# Patient Record
Sex: FEMALE | Race: WHITE | NOT HISPANIC OR LATINO
[De-identification: names, ages, dates, MRNs, and addresses within clinical notes are randomized per-mention and may not be internally consistent; named-entity substitution may affect disease eponyms.]

---

## 2017-09-05 ENCOUNTER — TRANSCRIPTION ENCOUNTER (OUTPATIENT)
Age: 82
End: 2017-09-05

## 2017-12-18 ENCOUNTER — EMERGENCY (EMERGENCY)
Facility: HOSPITAL | Age: 82
LOS: 1 days | Discharge: HOME | End: 2017-12-18
Attending: EMERGENCY MEDICINE | Admitting: EMERGENCY MEDICINE
Payer: MEDICARE

## 2017-12-18 ENCOUNTER — TRANSCRIPTION ENCOUNTER (OUTPATIENT)
Age: 82
End: 2017-12-18

## 2017-12-18 VITALS
RESPIRATION RATE: 12 BRPM | TEMPERATURE: 98 F | DIASTOLIC BLOOD PRESSURE: 71 MMHG | HEART RATE: 79 BPM | WEIGHT: 113.98 LBS | SYSTOLIC BLOOD PRESSURE: 121 MMHG | OXYGEN SATURATION: 98 %

## 2017-12-18 VITALS
DIASTOLIC BLOOD PRESSURE: 72 MMHG | OXYGEN SATURATION: 98 % | HEART RATE: 78 BPM | RESPIRATION RATE: 16 BRPM | SYSTOLIC BLOOD PRESSURE: 122 MMHG

## 2017-12-18 LAB
ALBUMIN SERPL ELPH-MCNC: 3.5 G/DL — SIGNIFICANT CHANGE UP (ref 3.3–5)
ALP SERPL-CCNC: 80 U/L — SIGNIFICANT CHANGE UP (ref 40–120)
ALT FLD-CCNC: 20 U/L — SIGNIFICANT CHANGE UP (ref 12–78)
ANION GAP SERPL CALC-SCNC: 6 MMOL/L — SIGNIFICANT CHANGE UP (ref 5–17)
APTT BLD: 32.6 SEC — SIGNIFICANT CHANGE UP (ref 27.5–37.4)
AST SERPL-CCNC: 16 U/L — SIGNIFICANT CHANGE UP (ref 15–37)
BASOPHILS # BLD AUTO: 0.1 K/UL — SIGNIFICANT CHANGE UP (ref 0–0.2)
BASOPHILS NFR BLD AUTO: 1.4 % — SIGNIFICANT CHANGE UP (ref 0–2)
BILIRUB SERPL-MCNC: 0.5 MG/DL — SIGNIFICANT CHANGE UP (ref 0.2–1.2)
BUN SERPL-MCNC: 22 MG/DL — SIGNIFICANT CHANGE UP (ref 7–23)
CALCIUM SERPL-MCNC: 9.4 MG/DL — SIGNIFICANT CHANGE UP (ref 8.5–10.1)
CHLORIDE SERPL-SCNC: 104 MMOL/L — SIGNIFICANT CHANGE UP (ref 96–108)
CO2 SERPL-SCNC: 31 MMOL/L — SIGNIFICANT CHANGE UP (ref 22–31)
CREAT SERPL-MCNC: 0.75 MG/DL — SIGNIFICANT CHANGE UP (ref 0.5–1.3)
EOSINOPHIL # BLD AUTO: 0.1 K/UL — SIGNIFICANT CHANGE UP (ref 0–0.5)
EOSINOPHIL NFR BLD AUTO: 2.2 % — SIGNIFICANT CHANGE UP (ref 0–6)
GLUCOSE SERPL-MCNC: 153 MG/DL — HIGH (ref 70–99)
HCT VFR BLD CALC: 36 % — SIGNIFICANT CHANGE UP (ref 34.5–45)
HGB BLD-MCNC: 11.4 G/DL — LOW (ref 11.5–15.5)
INR BLD: 1.02 RATIO — SIGNIFICANT CHANGE UP (ref 0.88–1.16)
LYMPHOCYTES # BLD AUTO: 1.2 K/UL — SIGNIFICANT CHANGE UP (ref 1–3.3)
LYMPHOCYTES # BLD AUTO: 20 % — SIGNIFICANT CHANGE UP (ref 13–44)
MCHC RBC-ENTMCNC: 28.3 PG — SIGNIFICANT CHANGE UP (ref 27–34)
MCHC RBC-ENTMCNC: 31.7 GM/DL — LOW (ref 32–36)
MCV RBC AUTO: 89.3 FL — SIGNIFICANT CHANGE UP (ref 80–100)
MONOCYTES # BLD AUTO: 0.6 K/UL — SIGNIFICANT CHANGE UP (ref 0–0.9)
MONOCYTES NFR BLD AUTO: 9.5 % — HIGH (ref 1–9)
NEUTROPHILS # BLD AUTO: 4.1 K/UL — SIGNIFICANT CHANGE UP (ref 1.8–7.4)
NEUTROPHILS NFR BLD AUTO: 66.9 % — SIGNIFICANT CHANGE UP (ref 43–77)
PLATELET # BLD AUTO: 306 K/UL — SIGNIFICANT CHANGE UP (ref 150–400)
POTASSIUM SERPL-MCNC: 4.1 MMOL/L — SIGNIFICANT CHANGE UP (ref 3.5–5.3)
POTASSIUM SERPL-SCNC: 4.1 MMOL/L — SIGNIFICANT CHANGE UP (ref 3.5–5.3)
PROT SERPL-MCNC: 7.1 G/DL — SIGNIFICANT CHANGE UP (ref 6–8.3)
PROTHROM AB SERPL-ACNC: 11.1 SEC — SIGNIFICANT CHANGE UP (ref 9.8–12.7)
RBC # BLD: 4.03 M/UL — SIGNIFICANT CHANGE UP (ref 3.8–5.2)
RBC # FLD: 12.8 % — SIGNIFICANT CHANGE UP (ref 10.3–14.5)
SODIUM SERPL-SCNC: 141 MMOL/L — SIGNIFICANT CHANGE UP (ref 135–145)
TROPONIN I SERPL-MCNC: <.015 NG/ML — SIGNIFICANT CHANGE UP (ref 0.01–0.04)
WBC # BLD: 6.2 K/UL — SIGNIFICANT CHANGE UP (ref 3.8–10.5)
WBC # FLD AUTO: 6.2 K/UL — SIGNIFICANT CHANGE UP (ref 3.8–10.5)

## 2017-12-18 PROCEDURE — 70450 CT HEAD/BRAIN W/O DYE: CPT | Mod: 26

## 2017-12-18 PROCEDURE — 99285 EMERGENCY DEPT VISIT HI MDM: CPT

## 2017-12-18 PROCEDURE — 71250 CT THORAX DX C-: CPT

## 2017-12-18 PROCEDURE — 99291 CRITICAL CARE FIRST HOUR: CPT | Mod: 25

## 2017-12-18 PROCEDURE — 85027 COMPLETE CBC AUTOMATED: CPT

## 2017-12-18 PROCEDURE — 70450 CT HEAD/BRAIN W/O DYE: CPT

## 2017-12-18 PROCEDURE — 80053 COMPREHEN METABOLIC PANEL: CPT

## 2017-12-18 PROCEDURE — 85610 PROTHROMBIN TIME: CPT

## 2017-12-18 PROCEDURE — 85730 THROMBOPLASTIN TIME PARTIAL: CPT

## 2017-12-18 PROCEDURE — 84484 ASSAY OF TROPONIN QUANT: CPT

## 2017-12-18 PROCEDURE — 71250 CT THORAX DX C-: CPT | Mod: 26

## 2017-12-18 NOTE — ED PROVIDER NOTE - CRANIAL NERVE AND PUPILLARY EXAM
central vision intact/peripheral vision intact/mild right facial droop; PERRL; EOMI/extra-ocular movements intact

## 2017-12-18 NOTE — ED PROVIDER NOTE - PROGRESS NOTE DETAILS
Dr. Ramirze to evaluate patient in ED  currently no focal deficits patient evaluated by Dr Ramirez - no focal deficits - stable for discharge as per Dr. Ramirez  patient agrees with plan

## 2017-12-18 NOTE — ED PROVIDER NOTE - OBJECTIVE STATEMENT
83 yo female with hx htn, remote hx of cvas sent for evaluation of weakness and right facial droop. Patient doesn't take any blood thinners/antiplatelet medications. PAtient states she fell x 2 days ago and hit head against wall. denies loc. denies fall, patient has been sleepy. Patient went to urgent care today, and was found to have rib fractures and sent to ED for further evaluation. Prior to urgent care, friend not 83 yo female with hx htn, remote hx of cvas sent for evaluation of weakness and right facial droop. Patient doesn't take any blood thinners/antiplatelet medications. PAtient states she fell x 2 days ago and hit head against wall. denies loc. denies fall, patient has been sleepy. Patient went to urgent care today, and was found to have rib fractures and sent to ED for further evaluation. Prior to urgent care, friend noted right facial droop. Patient has right facial droop at baseline, but possibly increased today. patient states she didn't noticed when it worsened. denies any difficulty with speech. denies any weakness to arms or legs. c/o pain to right lower ribs

## 2017-12-18 NOTE — ED ADULT TRIAGE NOTE - CHIEF COMPLAINT QUOTE
patient with complain of fatigue, off balanced and delayed speech responses. patient with previous facial droop now appears worst as per patient's friend, today starting about an hour ago. Pt with hx of multiple strokes.

## 2018-07-31 ENCOUNTER — TRANSCRIPTION ENCOUNTER (OUTPATIENT)
Age: 83
End: 2018-07-31

## 2019-01-09 ENCOUNTER — TRANSCRIPTION ENCOUNTER (OUTPATIENT)
Age: 84
End: 2019-01-09

## 2019-07-16 ENCOUNTER — TRANSCRIPTION ENCOUNTER (OUTPATIENT)
Age: 84
End: 2019-07-16

## 2019-08-18 ENCOUNTER — TRANSCRIPTION ENCOUNTER (OUTPATIENT)
Age: 84
End: 2019-08-18

## 2019-08-25 ENCOUNTER — TRANSCRIPTION ENCOUNTER (OUTPATIENT)
Age: 84
End: 2019-08-25

## 2019-08-26 PROBLEM — I10 ESSENTIAL (PRIMARY) HYPERTENSION: Chronic | Status: ACTIVE | Noted: 2017-12-18

## 2019-08-26 PROBLEM — I63.9 CEREBRAL INFARCTION, UNSPECIFIED: Chronic | Status: ACTIVE | Noted: 2017-12-18

## 2019-09-01 ENCOUNTER — TRANSCRIPTION ENCOUNTER (OUTPATIENT)
Age: 84
End: 2019-09-01

## 2019-09-06 PROBLEM — Z00.00 ENCOUNTER FOR PREVENTIVE HEALTH EXAMINATION: Status: ACTIVE | Noted: 2019-09-06

## 2019-09-09 ENCOUNTER — LABORATORY RESULT (OUTPATIENT)
Age: 84
End: 2019-09-09

## 2019-09-09 ENCOUNTER — APPOINTMENT (OUTPATIENT)
Dept: UROLOGY | Facility: CLINIC | Age: 84
End: 2019-09-09
Payer: MEDICARE

## 2019-09-09 VITALS
SYSTOLIC BLOOD PRESSURE: 147 MMHG | OXYGEN SATURATION: 96 % | BODY MASS INDEX: 23.79 KG/M2 | HEIGHT: 59 IN | HEART RATE: 77 BPM | DIASTOLIC BLOOD PRESSURE: 73 MMHG | WEIGHT: 118 LBS

## 2019-09-09 DIAGNOSIS — Z86.79 PERSONAL HISTORY OF OTHER DISEASES OF THE CIRCULATORY SYSTEM: ICD-10-CM

## 2019-09-09 DIAGNOSIS — Z80.42 FAMILY HISTORY OF MALIGNANT NEOPLASM OF PROSTATE: ICD-10-CM

## 2019-09-09 DIAGNOSIS — Z80.3 FAMILY HISTORY OF MALIGNANT NEOPLASM OF BREAST: ICD-10-CM

## 2019-09-09 DIAGNOSIS — Z86.39 PERSONAL HISTORY OF OTHER ENDOCRINE, NUTRITIONAL AND METABOLIC DISEASE: ICD-10-CM

## 2019-09-09 DIAGNOSIS — Z78.9 OTHER SPECIFIED HEALTH STATUS: ICD-10-CM

## 2019-09-09 DIAGNOSIS — N39.46 MIXED INCONTINENCE: ICD-10-CM

## 2019-09-09 DIAGNOSIS — Z63.4 DISAPPEARANCE AND DEATH OF FAMILY MEMBER: ICD-10-CM

## 2019-09-09 DIAGNOSIS — Z87.891 PERSONAL HISTORY OF NICOTINE DEPENDENCE: ICD-10-CM

## 2019-09-09 PROCEDURE — 99204 OFFICE O/P NEW MOD 45 MIN: CPT

## 2019-09-09 RX ORDER — METOPROLOL TARTRATE 50 MG/1
50 TABLET, FILM COATED ORAL
Qty: 180 | Refills: 0 | Status: ACTIVE | COMMUNITY
Start: 2019-09-04

## 2019-09-09 RX ORDER — LOVASTATIN 20 MG/1
20 TABLET ORAL
Qty: 90 | Refills: 0 | Status: ACTIVE | COMMUNITY
Start: 2019-05-14

## 2019-09-09 RX ORDER — FLUTICASONE PROPIONATE 50 UG/1
50 SPRAY, METERED NASAL
Qty: 16 | Refills: 0 | Status: ACTIVE | COMMUNITY
Start: 2019-01-21

## 2019-09-09 RX ORDER — MELOXICAM 7.5 MG/1
7.5 TABLET ORAL
Qty: 10 | Refills: 0 | Status: ACTIVE | COMMUNITY
Start: 2019-07-16

## 2019-09-09 RX ORDER — GABAPENTIN 300 MG/1
300 CAPSULE ORAL
Qty: 180 | Refills: 0 | Status: ACTIVE | COMMUNITY
Start: 2019-07-26

## 2019-09-09 RX ORDER — METFORMIN ER 500 MG 500 MG/1
500 TABLET ORAL
Qty: 180 | Refills: 0 | Status: ACTIVE | COMMUNITY
Start: 2018-10-09

## 2019-09-09 RX ORDER — SULFAMETHOXAZOLE AND TRIMETHOPRIM 800; 160 MG/1; MG/1
800-160 TABLET ORAL
Qty: 14 | Refills: 0 | Status: ACTIVE | COMMUNITY
Start: 2019-08-18

## 2019-09-09 RX ORDER — METHYLPREDNISOLONE 4 MG/1
4 TABLET ORAL
Qty: 21 | Refills: 0 | Status: ACTIVE | COMMUNITY
Start: 2019-07-18

## 2019-09-09 RX ORDER — NITROFURANTOIN (MONOHYDRATE/MACROCRYSTALS) 25; 75 MG/1; MG/1
100 CAPSULE ORAL
Qty: 14 | Refills: 0 | Status: ACTIVE | COMMUNITY
Start: 2019-08-25

## 2019-09-09 SDOH — SOCIAL STABILITY - SOCIAL INSECURITY: DISSAPEARANCE AND DEATH OF FAMILY MEMBER: Z63.4

## 2019-09-09 NOTE — PHYSICAL EXAM
[General Appearance - Well Developed] : well developed [Normal Appearance] : normal appearance [General Appearance - In No Acute Distress] : no acute distress [FreeTextEntry1] : normal peripheral circulation  [] : no respiratory distress [Abdomen Tenderness] : non-tender [Abdomen Soft] : soft [Abdomen Mass (___ Cm)] : no abdominal mass palpated [Costovertebral Angle Tenderness] : no ~M costovertebral angle tenderness [Normal Station and Gait] : the gait and station were normal for the patient's age [Skin Color & Pigmentation] : normal skin color and pigmentation [No Focal Deficits] : no focal deficits [Oriented To Time, Place, And Person] : oriented to person, place, and time [No Palpable Adenopathy] : no palpable adenopathy

## 2019-09-09 NOTE — HISTORY OF PRESENT ILLNESS
[FreeTextEntry1] : 85 yo female presents for Recurrent urinary tract infections. \par Has been to Urgent care center for dizziness a few times and has been told has UTI each time. \par Normal daytime frequency is every few hours or so. Nocturia of 1 x. \par Endorses urgency, urge incontinence, incontinence with cough and sneeze and sense of incomplete emptying. Wears pad. Changes 1 pad/day. \par Denies dysuria, hematuria, lower abdominal or flank pain, fever, chills or rigors.\par Past smoker: 0.5 PPD for 20 years. Quit: 20 years ago. \par

## 2019-09-09 NOTE — ASSESSMENT
[FreeTextEntry1] : Mixed urinary incontinence:\par Asked Pt to do timed voiding every 3-4 hours. \par Encouraged Kegel's exercise. \par \par Recurrent UTIs:\par Recommended: good oral hydration, timed voiding, urinate right after sex, change sanitary pads often, avoid douches, bubble baths and other feminine hygiene products. \par Recommended OTC Cranberry tablets. \par Discussed that if she keeps having UTIs options would be to either use low dose vaginal estrogen cream or low prophylactic antibiotics.\par Will get Urinalysis with reflex Urine culture and Renal and Bladder Ultrasound. \par \par Return to office after Ultrasound.

## 2019-09-09 NOTE — REVIEW OF SYSTEMS
[Eyesight Problems] : eyesight problems [Shortness Of Breath] : shortness of breath [Dry Eyes] : dryness of the eyes [Pain during urination] : pain during urination [Urine Infection (bladder/kidney)] : bladder/kidney infection [Negative] : Heme/Lymph

## 2019-09-09 NOTE — PHYSICAL EXAM
[General Appearance - Well Developed] : well developed [Normal Appearance] : normal appearance [] : no respiratory distress [FreeTextEntry1] : normal peripheral circulation  [General Appearance - In No Acute Distress] : no acute distress [Abdomen Tenderness] : non-tender [Abdomen Soft] : soft [Abdomen Mass (___ Cm)] : no abdominal mass palpated [Costovertebral Angle Tenderness] : no ~M costovertebral angle tenderness [Skin Color & Pigmentation] : normal skin color and pigmentation [Normal Station and Gait] : the gait and station were normal for the patient's age [No Focal Deficits] : no focal deficits [Oriented To Time, Place, And Person] : oriented to person, place, and time [No Palpable Adenopathy] : no palpable adenopathy

## 2019-09-11 LAB
APPEARANCE: CLEAR
BILIRUBIN URINE: NEGATIVE
BLOOD URINE: NEGATIVE
COLOR: YELLOW
GLUCOSE QUALITATIVE U: NEGATIVE
KETONES URINE: NEGATIVE
LEUKOCYTE ESTERASE URINE: ABNORMAL
NITRITE URINE: NEGATIVE
PH URINE: 6.5
PROTEIN URINE: NEGATIVE
SPECIFIC GRAVITY URINE: 1.01
UROBILINOGEN URINE: NORMAL

## 2019-09-16 ENCOUNTER — FORM ENCOUNTER (OUTPATIENT)
Age: 84
End: 2019-09-16

## 2019-09-17 ENCOUNTER — OUTPATIENT (OUTPATIENT)
Dept: OUTPATIENT SERVICES | Facility: HOSPITAL | Age: 84
LOS: 1 days | End: 2019-09-17
Payer: MEDICARE

## 2019-09-17 ENCOUNTER — APPOINTMENT (OUTPATIENT)
Dept: ULTRASOUND IMAGING | Facility: CLINIC | Age: 84
End: 2019-09-17
Payer: MEDICARE

## 2019-09-17 DIAGNOSIS — N39.0 URINARY TRACT INFECTION, SITE NOT SPECIFIED: ICD-10-CM

## 2019-09-17 PROCEDURE — 76770 US EXAM ABDO BACK WALL COMP: CPT

## 2019-09-17 PROCEDURE — 76770 US EXAM ABDO BACK WALL COMP: CPT | Mod: 26

## 2019-10-07 ENCOUNTER — APPOINTMENT (OUTPATIENT)
Dept: UROLOGY | Facility: CLINIC | Age: 84
End: 2019-10-07
Payer: MEDICARE

## 2019-10-07 VITALS
DIASTOLIC BLOOD PRESSURE: 69 MMHG | HEIGHT: 59 IN | SYSTOLIC BLOOD PRESSURE: 136 MMHG | RESPIRATION RATE: 14 BRPM | HEART RATE: 75 BPM | OXYGEN SATURATION: 95 % | WEIGHT: 118 LBS | BODY MASS INDEX: 23.79 KG/M2

## 2019-10-07 DIAGNOSIS — N39.0 URINARY TRACT INFECTION, SITE NOT SPECIFIED: ICD-10-CM

## 2019-10-07 PROCEDURE — 99213 OFFICE O/P EST LOW 20 MIN: CPT

## 2019-10-07 NOTE — ASSESSMENT
[FreeTextEntry1] : Recurrent urinary tract infections:\par Discussed Renal and Bladder Ultrasound. \par Discussed options of low dose prophylactic antibiotics since Pt cannot take Estrogen cream due to heart condition and history of blood clots. Not keen on Antibiotics prophylaxis. \par Once gain discussed: good oral hydration, timed voiding, urinate right after sex, change sanitary pads often, avoid douches, bubble baths and other feminine hygiene products. \par Recommended OTC Cranberry tablets. \par \par Follow with Primary care physician. \par Return to clinic as needed. \par \par  \par

## 2019-10-07 NOTE — HISTORY OF PRESENT ILLNESS
[FreeTextEntry1] : 87 yo female presents for follow up. \par No new episode of UTI. \par Denies dysuria, hematuria, lower abdominal or flank pain, fever, chills or rigors. \par \par Initially seen for Recurrent urinary tract infections. \par Had been to Urgent care center for dizziness a few times and has been told has UTI each time. \par Normal daytime frequency is every few hours or so. Nocturia of 1 x. \par Endorsed urgency, urge incontinence, incontinence with cough and sneeze and sense of incomplete emptying. Wears pad. Changes 1 pad/day. \par \par Past smoker: 0.5 PPD for 20 years. Quit: 20 years ago. \par

## 2020-01-25 ENCOUNTER — EMERGENCY (EMERGENCY)
Facility: HOSPITAL | Age: 85
LOS: 1 days | Discharge: HOME | End: 2020-01-25
Attending: EMERGENCY MEDICINE | Admitting: EMERGENCY MEDICINE
Payer: MEDICARE

## 2020-01-25 VITALS
TEMPERATURE: 98 F | OXYGEN SATURATION: 98 % | SYSTOLIC BLOOD PRESSURE: 125 MMHG | DIASTOLIC BLOOD PRESSURE: 57 MMHG | RESPIRATION RATE: 17 BRPM | HEART RATE: 84 BPM

## 2020-01-25 VITALS — HEIGHT: 59 IN | WEIGHT: 121.92 LBS

## 2020-01-25 DIAGNOSIS — Z95.828 PRESENCE OF OTHER VASCULAR IMPLANTS AND GRAFTS: Chronic | ICD-10-CM

## 2020-01-25 DIAGNOSIS — I25.10 ATHEROSCLEROTIC HEART DISEASE OF NATIVE CORONARY ARTERY WITHOUT ANGINA PECTORIS: Chronic | ICD-10-CM

## 2020-01-25 LAB
ALBUMIN SERPL ELPH-MCNC: 3.5 G/DL — SIGNIFICANT CHANGE UP (ref 3.3–5)
ALP SERPL-CCNC: 113 U/L — SIGNIFICANT CHANGE UP (ref 40–120)
ALT FLD-CCNC: 25 U/L — SIGNIFICANT CHANGE UP (ref 12–78)
ANION GAP SERPL CALC-SCNC: 8 MMOL/L — SIGNIFICANT CHANGE UP (ref 5–17)
APTT BLD: 30 SEC — SIGNIFICANT CHANGE UP (ref 28.5–37)
AST SERPL-CCNC: 32 U/L — SIGNIFICANT CHANGE UP (ref 15–37)
BASOPHILS # BLD AUTO: 0.03 K/UL — SIGNIFICANT CHANGE UP (ref 0–0.2)
BASOPHILS NFR BLD AUTO: 0.3 % — SIGNIFICANT CHANGE UP (ref 0–2)
BILIRUB SERPL-MCNC: 0.6 MG/DL — SIGNIFICANT CHANGE UP (ref 0.2–1.2)
BUN SERPL-MCNC: 17 MG/DL — SIGNIFICANT CHANGE UP (ref 7–23)
CALCIUM SERPL-MCNC: 9.4 MG/DL — SIGNIFICANT CHANGE UP (ref 8.5–10.1)
CHLORIDE SERPL-SCNC: 102 MMOL/L — SIGNIFICANT CHANGE UP (ref 96–108)
CK MB CFR SERPL CALC: 1.5 NG/ML — SIGNIFICANT CHANGE UP (ref 0–3.6)
CO2 SERPL-SCNC: 27 MMOL/L — SIGNIFICANT CHANGE UP (ref 22–31)
CREAT SERPL-MCNC: 0.83 MG/DL — SIGNIFICANT CHANGE UP (ref 0.5–1.3)
D DIMER BLD IA.RAPID-MCNC: 481 NG/ML DDU — HIGH
EOSINOPHIL # BLD AUTO: 0.18 K/UL — SIGNIFICANT CHANGE UP (ref 0–0.5)
EOSINOPHIL NFR BLD AUTO: 1.7 % — SIGNIFICANT CHANGE UP (ref 0–6)
GLUCOSE SERPL-MCNC: 317 MG/DL — HIGH (ref 70–99)
HCT VFR BLD CALC: 34.7 % — SIGNIFICANT CHANGE UP (ref 34.5–45)
HGB BLD-MCNC: 11.3 G/DL — LOW (ref 11.5–15.5)
IMM GRANULOCYTES NFR BLD AUTO: 0.3 % — SIGNIFICANT CHANGE UP (ref 0–1.5)
INR BLD: 1.03 RATIO — SIGNIFICANT CHANGE UP (ref 0.88–1.16)
LYMPHOCYTES # BLD AUTO: 1.06 K/UL — SIGNIFICANT CHANGE UP (ref 1–3.3)
LYMPHOCYTES # BLD AUTO: 10.1 % — LOW (ref 13–44)
MCHC RBC-ENTMCNC: 28.2 PG — SIGNIFICANT CHANGE UP (ref 27–34)
MCHC RBC-ENTMCNC: 32.6 GM/DL — SIGNIFICANT CHANGE UP (ref 32–36)
MCV RBC AUTO: 86.5 FL — SIGNIFICANT CHANGE UP (ref 80–100)
MONOCYTES # BLD AUTO: 0.94 K/UL — HIGH (ref 0–0.9)
MONOCYTES NFR BLD AUTO: 8.9 % — SIGNIFICANT CHANGE UP (ref 2–14)
NEUTROPHILS # BLD AUTO: 8.3 K/UL — HIGH (ref 1.8–7.4)
NEUTROPHILS NFR BLD AUTO: 78.7 % — HIGH (ref 43–77)
NRBC # BLD: 0 /100 WBCS — SIGNIFICANT CHANGE UP (ref 0–0)
NT-PROBNP SERPL-SCNC: 410 PG/ML — SIGNIFICANT CHANGE UP (ref 0–450)
PLATELET # BLD AUTO: 294 K/UL — SIGNIFICANT CHANGE UP (ref 150–400)
POTASSIUM SERPL-MCNC: 4.7 MMOL/L — SIGNIFICANT CHANGE UP (ref 3.5–5.3)
POTASSIUM SERPL-SCNC: 4.7 MMOL/L — SIGNIFICANT CHANGE UP (ref 3.5–5.3)
PROT SERPL-MCNC: 7.3 G/DL — SIGNIFICANT CHANGE UP (ref 6–8.3)
PROTHROM AB SERPL-ACNC: 11.6 SEC — SIGNIFICANT CHANGE UP (ref 10–12.9)
RBC # BLD: 4.01 M/UL — SIGNIFICANT CHANGE UP (ref 3.8–5.2)
RBC # FLD: 13.7 % — SIGNIFICANT CHANGE UP (ref 10.3–14.5)
SODIUM SERPL-SCNC: 137 MMOL/L — SIGNIFICANT CHANGE UP (ref 135–145)
TROPONIN I SERPL-MCNC: <.015 NG/ML — SIGNIFICANT CHANGE UP (ref 0.01–0.04)
TROPONIN I SERPL-MCNC: <.015 NG/ML — SIGNIFICANT CHANGE UP (ref 0.01–0.04)
WBC # BLD: 10.54 K/UL — HIGH (ref 3.8–10.5)
WBC # FLD AUTO: 10.54 K/UL — HIGH (ref 3.8–10.5)

## 2020-01-25 PROCEDURE — 99285 EMERGENCY DEPT VISIT HI MDM: CPT

## 2020-01-25 PROCEDURE — 93970 EXTREMITY STUDY: CPT | Mod: 26

## 2020-01-25 PROCEDURE — 85730 THROMBOPLASTIN TIME PARTIAL: CPT

## 2020-01-25 PROCEDURE — 71046 X-RAY EXAM CHEST 2 VIEWS: CPT

## 2020-01-25 PROCEDURE — 84484 ASSAY OF TROPONIN QUANT: CPT

## 2020-01-25 PROCEDURE — 85610 PROTHROMBIN TIME: CPT

## 2020-01-25 PROCEDURE — 99284 EMERGENCY DEPT VISIT MOD MDM: CPT | Mod: 25

## 2020-01-25 PROCEDURE — 93005 ELECTROCARDIOGRAM TRACING: CPT

## 2020-01-25 PROCEDURE — 83880 ASSAY OF NATRIURETIC PEPTIDE: CPT

## 2020-01-25 PROCEDURE — 93010 ELECTROCARDIOGRAM REPORT: CPT

## 2020-01-25 PROCEDURE — 71046 X-RAY EXAM CHEST 2 VIEWS: CPT | Mod: 26

## 2020-01-25 PROCEDURE — 36415 COLL VENOUS BLD VENIPUNCTURE: CPT

## 2020-01-25 PROCEDURE — 82553 CREATINE MB FRACTION: CPT

## 2020-01-25 PROCEDURE — 80053 COMPREHEN METABOLIC PANEL: CPT

## 2020-01-25 PROCEDURE — 71275 CT ANGIOGRAPHY CHEST: CPT | Mod: 26

## 2020-01-25 PROCEDURE — 85379 FIBRIN DEGRADATION QUANT: CPT

## 2020-01-25 PROCEDURE — 85027 COMPLETE CBC AUTOMATED: CPT

## 2020-01-25 PROCEDURE — 71275 CT ANGIOGRAPHY CHEST: CPT

## 2020-01-25 PROCEDURE — 93970 EXTREMITY STUDY: CPT

## 2020-01-25 PROCEDURE — 96374 THER/PROPH/DIAG INJ IV PUSH: CPT | Mod: XU

## 2020-01-25 RX ORDER — TRAMADOL HYDROCHLORIDE 50 MG/1
1 TABLET ORAL
Qty: 9 | Refills: 0
Start: 2020-01-25 | End: 2020-01-27

## 2020-01-25 RX ORDER — MORPHINE SULFATE 50 MG/1
2 CAPSULE, EXTENDED RELEASE ORAL ONCE
Refills: 0 | Status: DISCONTINUED | OUTPATIENT
Start: 2020-01-25 | End: 2020-01-25

## 2020-01-25 RX ORDER — CYCLOBENZAPRINE HYDROCHLORIDE 10 MG/1
1 TABLET, FILM COATED ORAL
Qty: 15 | Refills: 0
Start: 2020-01-25 | End: 2020-01-29

## 2020-01-25 RX ADMIN — MORPHINE SULFATE 2 MILLIGRAM(S): 50 CAPSULE, EXTENDED RELEASE ORAL at 13:18

## 2020-01-25 NOTE — ED PROVIDER NOTE - PATIENT PORTAL LINK FT
You can access the FollowMyHealth Patient Portal offered by [facility] by registering at the following website: [email or web address]. By joining [facility]’s FollowMyHealth portal, you will also be able to view your health information using other applications (apps) compatible with our system.

## 2020-01-25 NOTE — ED PROVIDER NOTE - CARE PLAN
Assessment and plan of treatment:	85yo F h/o htn, cva, cad cabg p/w sharp pleuritic chest pain since this AM. denies f/c/n/v/d/sob/cough. pt reports recent travel to [place]. Principal Discharge DX:	Chest pain, unspecified type  Assessment and plan of treatment:	85yo F h/o htn, cva, cad cabg p/w sharp pleuritic chest pain since this AM. denies f/c/n/v/d/sob/cough. pt reports recent travel to [place].

## 2020-01-25 NOTE — ED PROVIDER NOTE - PHYSICAL EXAMINATION
GEN: awake, alert, well appearing, NAD   HENT: atraumatic, normocephalic, MARCIO, EOMI, no midline instability, oropharynx w/o erythema or exudates, no lymphadenopathy  CV: reproducible tenderness noted to mid sternum normal rate and rhythm, S1, S2, no MRG, equal pulses throughout, no JVD, LLE edema noted   RESP: no distress, no IWOB, no retraction, clear to auscultation bilaterally   ABD: soft, nontender, nondistended, no rebound, no guarding, normoactive bowel sounds, no organomegally  MUSCULOSKELETAL: strenght 5/5 x 4, full range of motion, CMS intact   SKIN: normal color, no turgor, no wounds or rash   NEURO: Awake alert oriented x 3, no facial asymmetry, no slurred speech, no pronator drift, moving all extremities  PSYCH: no suicial ideation, no homicidal ideation, no depression, no anxiety, no hallucination

## 2020-01-25 NOTE — ED PROVIDER NOTE - NSFOLLOWUPINSTRUCTIONS_ED_ALL_ED_FT
I called to check up on the pt and help the pt setup a hospital follow up.  The pt did not answer her phone, so I left a vm for the pt to give me a call back.     [facility]    Chest Wall Pain  Chest wall pain is pain in or around the bones and muscles of your chest. Chest wall pain may be caused by:  An injury.Coughing a lot.Using your chest and arm muscles too much.Sometimes, the cause may not be known. This pain may take a few weeks or longer to get better.  Follow these instructions at home:  Managing pain, stiffness, and swelling     If told, put ice on the painful area:  Put ice in a plastic bag.Place a towel between your skin and the bag.Leave the ice on for 20 minutes, 2–3 times a day.Activity     Rest as told by your doctor.Avoid doing things that cause pain. This includes lifting heavy items.Ask your doctor what activities are safe for you.General instructions        Take over-the-counter and prescription medicines only as told by your doctor.Do not use any products that contain nicotine or tobacco, such as cigarettes, e-cigarettes, and chewing tobacco. If you need help quitting, ask your doctor.Keep all follow-up visits as told by your doctor. This is important.Contact a doctor if:  You have a fever.Your chest pain gets worse.You have new symptoms.Get help right away if:  You feel sick to your stomach (nauseous) or you throw up (vomit).You feel sweaty or light-headed.You have a cough with mucus from your lungs (sputum) or you cough up blood.You are short of breath.These symptoms may be an emergency. Do not wait to see if the symptoms will go away. Get medical help right away. Call your local emergency services (911 in the [place] Do not drive yourself to the hospital.   Summary  Chest wall pain is pain in or around the bones and muscles of your chest.It may be treated with ice, rest, and medicines. Your condition may also get better if you avoid doing things that cause pain.Contact a doctor if you have a fever, chest pain that gets worse, or new symptoms.Get help right away if you feel light-headed or you get short of breath. These symptoms may be an emergency.This information is not intended to replace advice given to you by your health care provider. Make sure you discuss any questions you have with your health care provider.    Document Released: 06/05/2009 Document Revised: 06/20/2019 Document Reviewed: 06/20/2019  Elsevier Interactive Patient Education © 2019 [employer]

## 2020-01-25 NOTE — ED ADULT NURSE NOTE - NSIMPLEMENTINTERV_GEN_ALL_ED
Implemented All Fall with Harm Risk Interventions:  [place] to call system. Call bell, personal items and telephone within reach. Instruct patient to call for assistance. Room bathroom lighting operational. Non-slip footwear when patient is off stretcher. Physically safe environment: no spills, clutter or unnecessary equipment. Stretcher in lowest position, wheels locked, appropriate side rails in place. Provide visual cue, wrist band, yellow gown, etc. Monitor gait and stability. Monitor for mental status changes and reorient to person, place, and time. Review medications for side effects contributing to fall risk. Reinforce activity limits and safety measures with patient and family. Provide visual clues: red socks.

## 2020-01-25 NOTE — ED PROVIDER NOTE - CLINICAL SUMMARY MEDICAL DECISION MAKING FREE TEXT BOX
87yo F h/o htn, cva, cad cabg p/w sharp pleuritic chest pain since this AM. denies f/c/n/v/d/sob/cough. pt reports recent travel to [place]. 85yo F h/o htn, cva, cad cabg p/w sharp pleuritic chest pain since this AM. denies f/c/n/v/d/sob/cough. pt reports recent travel to [place].  labs sig for elevated d-dimer, otherwise unremarkable, CTA neg, doppler neg, repeat trop neg, pt to f/u w/ pcp for further eval and mgmt

## 2020-01-25 NOTE — ED PROVIDER NOTE - PLAN OF CARE
87yo F h/o htn, cva, cad cabg p/w sharp pleuritic chest pain since this AM. denies f/c/n/v/d/sob/cough. pt reports recent travel to [place].

## 2020-01-25 NOTE — ED ADULT NURSE NOTE - OBJECTIVE STATEMENT
pt with complaints of sternal chest pain that awoke her up this morning at 0830, pt with cough but she has "post nasal drip" pt states it is a sharp pain when she breathes. pt also with left leg swelling "that I always have" when I don't elevate it.

## 2020-01-25 NOTE — ED PROVIDER NOTE - NS ED ROS FT
GEN: no fever, no chills, no weakness  HENT: no eye pain, no visual changes, no ear pain, no visual or hearing changes, no sore throat, no swelling or neck pain  CV: +chest pain, no palpitations, no dizziness, no swelling  RESP: no coughing, no sob, no IWOB, no JACKSON  GI: no abd pain, no distension, no nausea, no vomiting, no diarrhea, no constipation  : no dysuria,  no frequency, no hematuria, no discharge, no flank pain  MUSCULOSKELETAL: no myalgia, no arthralgia, no joint swelling, no bruising   SKIN: no rash, no wounds, no itching  NEURO: no change in mentation, no visual changes, no HA, no focal weakness, no trouble speaking, no gait abnormalities, no dizziness  PSYCH: no suidical ideation, no homicidal ideation, no depression, no anxiety, no hallucinations

## 2020-01-25 NOTE — ED ADULT TRIAGE NOTE - CHIEF COMPLAINT QUOTE
"I have chest pains."  pt states they woke her up this morning at 0830; pt also c/o shortness of breath

## 2020-01-25 NOTE — ED PROVIDER NOTE - OBJECTIVE STATEMENT
85yo F h/o htn, cva, cad cabg p/w sharp pleuritic chest pain since this AM. denies f/c/n/v/d/sob/cough. pt reports recent travel to [place].

## 2021-05-11 NOTE — ED PROVIDER NOTE - BIRTH SEX
Female Pain Refusal Text: I offered to prescribe pain medication but the patient refused to take this medication.

## 2021-05-27 ENCOUNTER — EMERGENCY (EMERGENCY)
Facility: HOSPITAL | Age: 86
LOS: 1 days | Discharge: HOME | End: 2021-05-27
Attending: EMERGENCY MEDICINE | Admitting: EMERGENCY MEDICINE
Payer: MEDICARE

## 2021-05-27 VITALS
OXYGEN SATURATION: 98 % | RESPIRATION RATE: 18 BRPM | HEART RATE: 72 BPM | DIASTOLIC BLOOD PRESSURE: 59 MMHG | SYSTOLIC BLOOD PRESSURE: 138 MMHG | TEMPERATURE: 98 F

## 2021-05-27 VITALS
RESPIRATION RATE: 16 BRPM | OXYGEN SATURATION: 96 % | SYSTOLIC BLOOD PRESSURE: 145 MMHG | HEART RATE: 88 BPM | DIASTOLIC BLOOD PRESSURE: 54 MMHG | WEIGHT: 113.1 LBS | TEMPERATURE: 98 F | HEIGHT: 59 IN

## 2021-05-27 DIAGNOSIS — Z95.828 PRESENCE OF OTHER VASCULAR IMPLANTS AND GRAFTS: Chronic | ICD-10-CM

## 2021-05-27 DIAGNOSIS — I25.10 ATHEROSCLEROTIC HEART DISEASE OF NATIVE CORONARY ARTERY WITHOUT ANGINA PECTORIS: Chronic | ICD-10-CM

## 2021-05-27 LAB
ALBUMIN SERPL ELPH-MCNC: 3.1 G/DL — LOW (ref 3.3–5)
ALP SERPL-CCNC: 73 U/L — SIGNIFICANT CHANGE UP (ref 40–120)
ALT FLD-CCNC: 13 U/L — SIGNIFICANT CHANGE UP (ref 12–78)
ANION GAP SERPL CALC-SCNC: 8 MMOL/L — SIGNIFICANT CHANGE UP (ref 5–17)
APPEARANCE UR: CLEAR — SIGNIFICANT CHANGE UP
AST SERPL-CCNC: 18 U/L — SIGNIFICANT CHANGE UP (ref 15–37)
BASOPHILS # BLD AUTO: 0.05 K/UL — SIGNIFICANT CHANGE UP (ref 0–0.2)
BASOPHILS NFR BLD AUTO: 0.8 % — SIGNIFICANT CHANGE UP (ref 0–2)
BILIRUB SERPL-MCNC: 0.4 MG/DL — SIGNIFICANT CHANGE UP (ref 0.2–1.2)
BILIRUB UR-MCNC: NEGATIVE — SIGNIFICANT CHANGE UP
BUN SERPL-MCNC: 20 MG/DL — SIGNIFICANT CHANGE UP (ref 7–23)
CALCIUM SERPL-MCNC: 10.8 MG/DL — HIGH (ref 8.5–10.1)
CHLORIDE SERPL-SCNC: 102 MMOL/L — SIGNIFICANT CHANGE UP (ref 96–108)
CO2 SERPL-SCNC: 27 MMOL/L — SIGNIFICANT CHANGE UP (ref 22–31)
COLOR SPEC: YELLOW — SIGNIFICANT CHANGE UP
CREAT SERPL-MCNC: 0.64 MG/DL — SIGNIFICANT CHANGE UP (ref 0.5–1.3)
DIFF PNL FLD: NEGATIVE — SIGNIFICANT CHANGE UP
EOSINOPHIL # BLD AUTO: 0.32 K/UL — SIGNIFICANT CHANGE UP (ref 0–0.5)
EOSINOPHIL NFR BLD AUTO: 5.1 % — SIGNIFICANT CHANGE UP (ref 0–6)
GLUCOSE SERPL-MCNC: 135 MG/DL — HIGH (ref 70–99)
GLUCOSE UR QL: NEGATIVE — SIGNIFICANT CHANGE UP
HCT VFR BLD CALC: 27.2 % — LOW (ref 34.5–45)
HGB BLD-MCNC: 8.8 G/DL — LOW (ref 11.5–15.5)
IMM GRANULOCYTES NFR BLD AUTO: 0.8 % — SIGNIFICANT CHANGE UP (ref 0–1.5)
KETONES UR-MCNC: NEGATIVE — SIGNIFICANT CHANGE UP
LEUKOCYTE ESTERASE UR-ACNC: ABNORMAL
LYMPHOCYTES # BLD AUTO: 1.52 K/UL — SIGNIFICANT CHANGE UP (ref 1–3.3)
LYMPHOCYTES # BLD AUTO: 24.1 % — SIGNIFICANT CHANGE UP (ref 13–44)
MAGNESIUM SERPL-MCNC: 1.3 MG/DL — LOW (ref 1.6–2.6)
MCHC RBC-ENTMCNC: 26.8 PG — LOW (ref 27–34)
MCHC RBC-ENTMCNC: 32.4 GM/DL — SIGNIFICANT CHANGE UP (ref 32–36)
MCV RBC AUTO: 82.9 FL — SIGNIFICANT CHANGE UP (ref 80–100)
MONOCYTES # BLD AUTO: 0.52 K/UL — SIGNIFICANT CHANGE UP (ref 0–0.9)
MONOCYTES NFR BLD AUTO: 8.2 % — SIGNIFICANT CHANGE UP (ref 2–14)
NEUTROPHILS # BLD AUTO: 3.86 K/UL — SIGNIFICANT CHANGE UP (ref 1.8–7.4)
NEUTROPHILS NFR BLD AUTO: 61 % — SIGNIFICANT CHANGE UP (ref 43–77)
NITRITE UR-MCNC: NEGATIVE — SIGNIFICANT CHANGE UP
NRBC # BLD: 0 /100 WBCS — SIGNIFICANT CHANGE UP (ref 0–0)
PH UR: 5 — SIGNIFICANT CHANGE UP (ref 5–8)
PHOSPHATE SERPL-MCNC: 2.6 MG/DL — SIGNIFICANT CHANGE UP (ref 2.5–4.5)
PLATELET # BLD AUTO: 455 K/UL — HIGH (ref 150–400)
POTASSIUM SERPL-MCNC: 4 MMOL/L — SIGNIFICANT CHANGE UP (ref 3.5–5.3)
POTASSIUM SERPL-SCNC: 4 MMOL/L — SIGNIFICANT CHANGE UP (ref 3.5–5.3)
PROT SERPL-MCNC: 6.8 G/DL — SIGNIFICANT CHANGE UP (ref 6–8.3)
PROT UR-MCNC: NEGATIVE — SIGNIFICANT CHANGE UP
RBC # BLD: 3.28 M/UL — LOW (ref 3.8–5.2)
RBC # FLD: 14.6 % — HIGH (ref 10.3–14.5)
SODIUM SERPL-SCNC: 137 MMOL/L — SIGNIFICANT CHANGE UP (ref 135–145)
SP GR SPEC: 1.02 — SIGNIFICANT CHANGE UP (ref 1.01–1.02)
UROBILINOGEN FLD QL: NEGATIVE — SIGNIFICANT CHANGE UP
WBC # BLD: 6.32 K/UL — SIGNIFICANT CHANGE UP (ref 3.8–10.5)
WBC # FLD AUTO: 6.32 K/UL — SIGNIFICANT CHANGE UP (ref 3.8–10.5)

## 2021-05-27 PROCEDURE — 84100 ASSAY OF PHOSPHORUS: CPT

## 2021-05-27 PROCEDURE — 86901 BLOOD TYPING SEROLOGIC RH(D): CPT

## 2021-05-27 PROCEDURE — 83735 ASSAY OF MAGNESIUM: CPT

## 2021-05-27 PROCEDURE — 96374 THER/PROPH/DIAG INJ IV PUSH: CPT

## 2021-05-27 PROCEDURE — 81001 URINALYSIS AUTO W/SCOPE: CPT

## 2021-05-27 PROCEDURE — 86900 BLOOD TYPING SEROLOGIC ABO: CPT

## 2021-05-27 PROCEDURE — 71045 X-RAY EXAM CHEST 1 VIEW: CPT

## 2021-05-27 PROCEDURE — 86850 RBC ANTIBODY SCREEN: CPT

## 2021-05-27 PROCEDURE — 71045 X-RAY EXAM CHEST 1 VIEW: CPT | Mod: 26

## 2021-05-27 PROCEDURE — 93010 ELECTROCARDIOGRAM REPORT: CPT

## 2021-05-27 PROCEDURE — 87086 URINE CULTURE/COLONY COUNT: CPT

## 2021-05-27 PROCEDURE — 93005 ELECTROCARDIOGRAM TRACING: CPT

## 2021-05-27 PROCEDURE — 99284 EMERGENCY DEPT VISIT MOD MDM: CPT

## 2021-05-27 PROCEDURE — 99284 EMERGENCY DEPT VISIT MOD MDM: CPT | Mod: 25

## 2021-05-27 PROCEDURE — 36415 COLL VENOUS BLD VENIPUNCTURE: CPT

## 2021-05-27 PROCEDURE — 85025 COMPLETE CBC W/AUTO DIFF WBC: CPT

## 2021-05-27 PROCEDURE — 80053 COMPREHEN METABOLIC PANEL: CPT

## 2021-05-27 RX ORDER — AZTREONAM 2 G
1 VIAL (EA) INJECTION
Qty: 14 | Refills: 0
Start: 2021-05-27 | End: 2021-06-02

## 2021-05-27 RX ORDER — MAGNESIUM SULFATE 500 MG/ML
2 VIAL (ML) INJECTION ONCE
Refills: 0 | Status: COMPLETED | OUTPATIENT
Start: 2021-05-27 | End: 2021-05-27

## 2021-05-27 RX ADMIN — Medication 50 GRAM(S): at 10:57

## 2021-05-27 NOTE — ED PROVIDER NOTE - PROGRESS NOTE DETAILS
Case discussed with Dr. Moreira, labs results discussed (pending UA). He states yesterday her calcium was > 13 which is why he sent her to ED. Today calcium is 10.8 however mag is low. Mag replaced. Agrees patient can be discharge and he will see patient in office next week for follow up.

## 2021-05-27 NOTE — ED PROVIDER NOTE - OBJECTIVE STATEMENT
89 yo female with h/o HTN, DM, CAD with stents, CVA sent to ED by her pmd Dr. Moreira for low calcium level. Patient was recently admitted to Dr. Whitaker last Monday to Saturday. While admitted: patient had cardiac angio - no stents required at that time. patient was found to be anemic, (hemoglobin 7-7.5), no blood transfusion, patient was given iron. Patient had endoscopy/colonoscopy which showed small intestine bleed which was cauterized. Since being home patient feeling weak and tired. Denies fever, chills, chest pain, sob, headache, dizziness, visual changes, abd pain, n/V, UE/LE weakness or paresthesias. no fall or trauma.     pmd: Dr. Moreira.

## 2021-05-27 NOTE — ED PROVIDER NOTE - CLINICAL SUMMARY MEDICAL DECISION MAKING FREE TEXT BOX
87 yo female with h/o HTN, DM, CAD with stents, CVA sent to ED by her pmd Dr. Moreira for low calcium level. Patient was recently admitted to Dr. Whitaker last Monday to Saturday. While admitted: patient had cardiac angio - no stents required at that time. patient was found to be anemic, (hemoglobin 7-7.5), no blood transfusion, patient was given iron. Patient had endoscopy/colonoscopy which showed small intestine bleed which was cauterized. Since being home patient feeling weak and tired. Denies fever, chills, chest pain, sob, headache, dizziness, visual changes, abd pain, n/V, UE/LE weakness or paresthesias. no fall or trauma. PE: as above. a/p: ekg, labs, cxr, UA, reassess. 87 yo female with h/o HTN, DM, CAD with stents, CVA sent to ED by her pmd Dr. Moreira for low calcium level. Patient was recently admitted to Dr. Whitaker last Monday to Saturday. While admitted: patient had cardiac angio - no stents required at that time. patient was found to be anemic, (hemoglobin 7-7.5), no blood transfusion, patient was given iron. Patient had endoscopy/colonoscopy which showed small intestine bleed which was cauterized. Since being home patient feeling weak and tired. Denies fever, chills, chest pain, sob, headache, dizziness, visual changes, abd pain, n/V, UE/LE weakness or paresthesias. no fall or trauma. PE: as above. a/p: ekg, labs, cxr, UA, reassess. + hypomagnesium, calcium 10.8, + UTI. Case discussed with pmd Dr. Moreira, agrees patient can be dced, will follow up in office next week.

## 2021-05-27 NOTE — ED ADULT NURSE NOTE - NSIMPLEMENTINTERV_GEN_ALL_ED
Implemented All Fall Risk Interventions:  [place] to call system. Call bell, personal items and telephone within reach. Instruct patient to call for assistance. Room bathroom lighting operational. Non-slip footwear when patient is off stretcher. Physically safe environment: no spills, clutter or unnecessary equipment. Stretcher in lowest position, wheels locked, appropriate side rails in place. Provide visual cue, wrist band, yellow gown, etc. Monitor gait and stability. Monitor for mental status changes and reorient to person, place, and time. Review medications for side effects contributing to fall risk. Reinforce activity limits and safety measures with patient and family.

## 2021-05-27 NOTE — ED PROVIDER NOTE - CARE PROVIDER_API CALL
Raymundo Moreira)  Internal Medicine  [address] [place] [address]  Phone: [phone]  Fax: [phone]  Follow Up Time: 1-3 Days

## 2021-05-27 NOTE — ED PROVIDER NOTE - PATIENT PORTAL LINK FT
You can access the FollowMyHealth Patient Portal offered by [facility] by registering at the following website: [email or web address]. By joining [facility]’s FollowMyHealth portal, you will also be able to view your health information using other applications (apps) compatible with our system.

## 2021-05-27 NOTE — ED PROVIDER NOTE - ATTENDING CONTRIBUTION TO CARE
89 yo sp recent admission to [facility], dc 5 days ago p/w had routine fu with PMD - found acute calcium abnormality (unsure if elev / decreased). Pt sent for eval. pt had JACKSON and had full cardiac mann, neg cath, xfusion for anemia. Pt now with persistent weakness, some jackson.  no recent travel / sick contacts. pt with hx COVID vaccination. no recent exposures.  no dizziness / palp. no numb/ting/focal weak. no other inj or co.  exam: MM Moist. neck supple. no meningeal signs. abd soft NT. no hsm. no cvat. nl non-focal neuro exam. No other acute findings. no c/c/e.   check labs, IV, reeval

## 2021-05-27 NOTE — ED ADULT NURSE NOTE - OBJECTIVE STATEMENT
a/ox4 patient came to ED c/o abnormal lab level. Per patient, her MD called her and states her calcium level "is at a dangerous level." Patient does not know exact number. Patient recently discharged from [facility] after having colonscopy which showed bleeding in small intestine. Patient states she's been having diarrhea and weakness since she's been discharged. Afebrile, no n/v/cp/sob/dizziness. Pending further lab results and radiology reports.

## 2021-05-27 NOTE — ED ADULT NURSE NOTE - CHIEF COMPLAINT QUOTE
Patient is send by Dr. Olivas for abnormal calcium levels. Patient was admitted to [facility] for GI bleed last week

## 2021-05-27 NOTE — ED PROVIDER NOTE - NSFOLLOWUPINSTRUCTIONS_ED_ALL_ED_FT
Hypomagnesemia  Hypomagnesemia is a condition in which the level of magnesium in the blood is low. Magnesium is a mineral that is found in many foods. It is used in many different processes in the body. Hypomagnesemia can affect every organ in the body. In severe cases, it can cause life-threatening problems.    What are the causes?  This condition may be caused by:  Not getting enough magnesium in your diet.  Malnutrition.  Problems with absorbing magnesium from the intestines.  Dehydration.  Alcohol abuse.  Vomiting.  Severe or chronic diarrhea.  Some medicines, including medicines that make you urinate more (diuretics).  Certain diseases, such as kidney disease, diabetes, celiac disease, and overactive thyroid.  What are the signs or symptoms?  Symptoms of this condition include:  Loss of appetite.  Nausea and vomiting.  Involuntary shaking or trembling of a body part (tremor).  Muscle weakness.  Tingling in the arms and legs.  Sudden tightening of muscles (muscle spasms).  Confusion.  Psychiatric issues, such as depression, irritability, or psychosis.  A feeling of fluttering of the heart.  Seizures.  These symptoms are more severe if magnesium levels drop suddenly.    How is this diagnosed?  This condition may be diagnosed based on:  Your symptoms and medical history.  A physical exam.  Blood and urine tests.  How is this treated?  ImageTreatment depends on the cause and the severity of the condition. It may be treated with:  A magnesium supplement. This can be taken in pill form. If the condition is severe, magnesium is usually given through an IV.  Changes to your diet. You may be directed to eat foods that have a lot of magnesium, such as green leafy vegetables, peas, beans, and nuts.  Stopping any intake of alcohol.  Follow these instructions at home:  Image Image   Make sure that your diet includes foods with magnesium. Foods that have a lot of magnesium in them include:  Green leafy vegetables, such as spinach and broccoli.  Beans and peas.  Nuts and seeds, such as almonds and sunflower seeds.  Whole grains, such as whole grain bread and fortified cereals.  Take magnesium supplements if your health care provider tells you to do that. Take them as directed.  Take over-the-counter and prescription medicines only as told by your health care provider.  Have your magnesium levels monitored as told by your health care provider.  When you are active, drink fluids that contain electrolytes.  Avoid drinking alcohol.  Keep all follow-up visits as told by your health care provider. This is important.  Contact a health care provider if:  You get worse instead of better.  Your symptoms return.  Get help right away if you:  Develop severe muscle weakness.  Have trouble breathing.  Feel that your heart is racing.  Summary  Hypomagnesemia is a condition in which the level of magnesium in the blood is low.  Hypomagnesemia can affect every organ in the body.  Treatment may include eating more foods that contain magnesium, taking magnesium supplements, and not drinking alcohol.  Have your magnesium levels monitored as told by your health care provider.  This information is not intended to replace advice given to you by your health care provider. Make sure you discuss any questions you have with your health care provider.    Urinary Tract Infection    A urinary tract infection (UTI) is an infection of any part of the urinary tract, which includes the kidneys, ureters, bladder, and urethra. Risk factors include ignoring your need to urinate, wiping back to front if female, being an uncircumcised male, and having diabetes or a weak immune system. Symptoms include frequent urination, pain or burning with urination, foul smelling urine, cloudy urine, pain in the lower abdomen, blood in the urine, and fever. If you were prescribed an antibiotic medicine, take it as told by your health care provider. Do not stop taking the antibiotic even if you start to feel better.    SEEK IMMEDIATE MEDICAL CARE IF YOU HAVE ANY OF THE FOLLOWING SYMPTOMS: severe back or abdominal pain, fever, inability to keep fluids or medicine down, dizziness/lightheadedness, or a change in mental status.

## 2021-05-27 NOTE — ED ADULT TRIAGE NOTE - CHIEF COMPLAINT QUOTE
Patient is send by Dr. Olivas for abnormal calcium levels. Patient was admitted to [facility] for GI bleed last week

## 2021-05-29 LAB
CULTURE RESULTS: SIGNIFICANT CHANGE UP
SPECIMEN SOURCE: SIGNIFICANT CHANGE UP

## 2021-11-02 ENCOUNTER — EMERGENCY (EMERGENCY)
Facility: HOSPITAL | Age: 86
LOS: 1 days | Discharge: HOME | End: 2021-11-02
Attending: EMERGENCY MEDICINE | Admitting: EMERGENCY MEDICINE
Payer: MEDICARE

## 2021-11-02 VITALS
DIASTOLIC BLOOD PRESSURE: 72 MMHG | SYSTOLIC BLOOD PRESSURE: 142 MMHG | WEIGHT: 108.91 LBS | RESPIRATION RATE: 16 BRPM | HEART RATE: 64 BPM | TEMPERATURE: 98 F | OXYGEN SATURATION: 100 % | HEIGHT: 59 IN

## 2021-11-02 DIAGNOSIS — I25.10 ATHEROSCLEROTIC HEART DISEASE OF NATIVE CORONARY ARTERY WITHOUT ANGINA PECTORIS: Chronic | ICD-10-CM

## 2021-11-02 DIAGNOSIS — Z95.828 PRESENCE OF OTHER VASCULAR IMPLANTS AND GRAFTS: Chronic | ICD-10-CM

## 2021-11-02 LAB
ALBUMIN SERPL ELPH-MCNC: 3.4 G/DL — SIGNIFICANT CHANGE UP (ref 3.3–5)
ALP SERPL-CCNC: 115 U/L — SIGNIFICANT CHANGE UP (ref 40–120)
ALT FLD-CCNC: 26 U/L — SIGNIFICANT CHANGE UP (ref 12–78)
ANION GAP SERPL CALC-SCNC: 7 MMOL/L — SIGNIFICANT CHANGE UP (ref 5–17)
APPEARANCE UR: ABNORMAL
APTT BLD: 35.5 SEC — SIGNIFICANT CHANGE UP (ref 27.5–35.5)
AST SERPL-CCNC: 21 U/L — SIGNIFICANT CHANGE UP (ref 15–37)
BASOPHILS # BLD AUTO: 0.05 K/UL — SIGNIFICANT CHANGE UP (ref 0–0.2)
BASOPHILS NFR BLD AUTO: 0.8 % — SIGNIFICANT CHANGE UP (ref 0–2)
BILIRUB SERPL-MCNC: 0.4 MG/DL — SIGNIFICANT CHANGE UP (ref 0.2–1.2)
BILIRUB UR-MCNC: NEGATIVE — SIGNIFICANT CHANGE UP
BUN SERPL-MCNC: 25 MG/DL — HIGH (ref 7–23)
CALCIUM SERPL-MCNC: 9.7 MG/DL — SIGNIFICANT CHANGE UP (ref 8.5–10.1)
CHLORIDE SERPL-SCNC: 102 MMOL/L — SIGNIFICANT CHANGE UP (ref 96–108)
CO2 SERPL-SCNC: 29 MMOL/L — SIGNIFICANT CHANGE UP (ref 22–31)
COLOR SPEC: YELLOW — SIGNIFICANT CHANGE UP
CREAT SERPL-MCNC: 0.81 MG/DL — SIGNIFICANT CHANGE UP (ref 0.5–1.3)
DIFF PNL FLD: ABNORMAL
EOSINOPHIL # BLD AUTO: 0.25 K/UL — SIGNIFICANT CHANGE UP (ref 0–0.5)
EOSINOPHIL NFR BLD AUTO: 4 % — SIGNIFICANT CHANGE UP (ref 0–6)
GLUCOSE SERPL-MCNC: 129 MG/DL — HIGH (ref 70–99)
GLUCOSE UR QL: NEGATIVE — SIGNIFICANT CHANGE UP
HCT VFR BLD CALC: 31.3 % — LOW (ref 34.5–45)
HGB BLD-MCNC: 9.8 G/DL — LOW (ref 11.5–15.5)
IMM GRANULOCYTES NFR BLD AUTO: 0.3 % — SIGNIFICANT CHANGE UP (ref 0–1.5)
INR BLD: 0.96 RATIO — SIGNIFICANT CHANGE UP (ref 0.88–1.16)
KETONES UR-MCNC: NEGATIVE — SIGNIFICANT CHANGE UP
LEUKOCYTE ESTERASE UR-ACNC: ABNORMAL
LYMPHOCYTES # BLD AUTO: 1.73 K/UL — SIGNIFICANT CHANGE UP (ref 1–3.3)
LYMPHOCYTES # BLD AUTO: 27.8 % — SIGNIFICANT CHANGE UP (ref 13–44)
MCHC RBC-ENTMCNC: 27.8 PG — SIGNIFICANT CHANGE UP (ref 27–34)
MCHC RBC-ENTMCNC: 31.3 GM/DL — LOW (ref 32–36)
MCV RBC AUTO: 88.7 FL — SIGNIFICANT CHANGE UP (ref 80–100)
MONOCYTES # BLD AUTO: 0.64 K/UL — SIGNIFICANT CHANGE UP (ref 0–0.9)
MONOCYTES NFR BLD AUTO: 10.3 % — SIGNIFICANT CHANGE UP (ref 2–14)
NEUTROPHILS # BLD AUTO: 3.54 K/UL — SIGNIFICANT CHANGE UP (ref 1.8–7.4)
NEUTROPHILS NFR BLD AUTO: 56.8 % — SIGNIFICANT CHANGE UP (ref 43–77)
NITRITE UR-MCNC: NEGATIVE — SIGNIFICANT CHANGE UP
NRBC # BLD: 0 /100 WBCS — SIGNIFICANT CHANGE UP (ref 0–0)
OB PNL STL: NEGATIVE — SIGNIFICANT CHANGE UP
PH UR: 6 — SIGNIFICANT CHANGE UP (ref 5–8)
PLATELET # BLD AUTO: 276 K/UL — SIGNIFICANT CHANGE UP (ref 150–400)
POTASSIUM SERPL-MCNC: 4.3 MMOL/L — SIGNIFICANT CHANGE UP (ref 3.5–5.3)
POTASSIUM SERPL-SCNC: 4.3 MMOL/L — SIGNIFICANT CHANGE UP (ref 3.5–5.3)
PROT SERPL-MCNC: 7.5 G/DL — SIGNIFICANT CHANGE UP (ref 6–8.3)
PROT UR-MCNC: 15
PROTHROM AB SERPL-ACNC: 11.3 SEC — SIGNIFICANT CHANGE UP (ref 10.6–13.6)
RBC # BLD: 3.53 M/UL — LOW (ref 3.8–5.2)
RBC # FLD: 15 % — HIGH (ref 10.3–14.5)
SARS-COV-2 RNA SPEC QL NAA+PROBE: SIGNIFICANT CHANGE UP
SODIUM SERPL-SCNC: 138 MMOL/L — SIGNIFICANT CHANGE UP (ref 135–145)
SP GR SPEC: 1.01 — SIGNIFICANT CHANGE UP (ref 1.01–1.02)
UROBILINOGEN FLD QL: NEGATIVE — SIGNIFICANT CHANGE UP
WBC # BLD: 6.23 K/UL — SIGNIFICANT CHANGE UP (ref 3.8–10.5)
WBC # FLD AUTO: 6.23 K/UL — SIGNIFICANT CHANGE UP (ref 3.8–10.5)

## 2021-11-02 PROCEDURE — 99284 EMERGENCY DEPT VISIT MOD MDM: CPT | Mod: 25

## 2021-11-02 PROCEDURE — 83880 ASSAY OF NATRIURETIC PEPTIDE: CPT

## 2021-11-02 PROCEDURE — 74177 CT ABD & PELVIS W/CONTRAST: CPT | Mod: MA

## 2021-11-02 PROCEDURE — 86901 BLOOD TYPING SEROLOGIC RH(D): CPT

## 2021-11-02 PROCEDURE — 86900 BLOOD TYPING SEROLOGIC ABO: CPT

## 2021-11-02 PROCEDURE — 87186 SC STD MICRODIL/AGAR DIL: CPT

## 2021-11-02 PROCEDURE — 74177 CT ABD & PELVIS W/CONTRAST: CPT | Mod: 26,MA

## 2021-11-02 PROCEDURE — 82272 OCCULT BLD FECES 1-3 TESTS: CPT

## 2021-11-02 PROCEDURE — 99284 EMERGENCY DEPT VISIT MOD MDM: CPT

## 2021-11-02 PROCEDURE — 87086 URINE CULTURE/COLONY COUNT: CPT

## 2021-11-02 PROCEDURE — 93005 ELECTROCARDIOGRAM TRACING: CPT

## 2021-11-02 PROCEDURE — 81001 URINALYSIS AUTO W/SCOPE: CPT

## 2021-11-02 PROCEDURE — 86850 RBC ANTIBODY SCREEN: CPT

## 2021-11-02 PROCEDURE — 96360 HYDRATION IV INFUSION INIT: CPT | Mod: XU

## 2021-11-02 PROCEDURE — U0003: CPT

## 2021-11-02 PROCEDURE — 85025 COMPLETE CBC W/AUTO DIFF WBC: CPT

## 2021-11-02 PROCEDURE — 85730 THROMBOPLASTIN TIME PARTIAL: CPT

## 2021-11-02 PROCEDURE — 93010 ELECTROCARDIOGRAM REPORT: CPT

## 2021-11-02 PROCEDURE — U0005: CPT

## 2021-11-02 PROCEDURE — 85610 PROTHROMBIN TIME: CPT

## 2021-11-02 PROCEDURE — 87077 CULTURE AEROBIC IDENTIFY: CPT

## 2021-11-02 PROCEDURE — 36415 COLL VENOUS BLD VENIPUNCTURE: CPT

## 2021-11-02 PROCEDURE — 80053 COMPREHEN METABOLIC PANEL: CPT

## 2021-11-02 RX ORDER — SODIUM CHLORIDE 9 MG/ML
500 INJECTION INTRAMUSCULAR; INTRAVENOUS; SUBCUTANEOUS ONCE
Refills: 0 | Status: COMPLETED | OUTPATIENT
Start: 2021-11-02 | End: 2021-11-02

## 2021-11-02 RX ADMIN — SODIUM CHLORIDE 500 MILLILITER(S): 9 INJECTION INTRAMUSCULAR; INTRAVENOUS; SUBCUTANEOUS at 15:01

## 2021-11-02 RX ADMIN — SODIUM CHLORIDE 500 MILLILITER(S): 9 INJECTION INTRAMUSCULAR; INTRAVENOUS; SUBCUTANEOUS at 14:01

## 2021-11-02 NOTE — ED PROVIDER NOTE - OBJECTIVE STATEMENT
89 y/o F with hx of DM, HTN, HLD, GERD, CVA presents with c/o black stools x 2 weeks. Pt states that she has chronic intermittent diarrhea since colonoscopy in 05/2021 but states that she noticed her stools have been black in color. Pt spoke with her PCP, Dr. Moreira today and advised to come to ED for eval. States that she also has had bloating and gurgling sensation in her abdomen for 2 days. Denies pain, N/V, fever, urinary symptoms, CP, SOB, hx of abdominal surgeries. 89 y/o F with hx of DM, HTN, HLD, GERD, CVA presents with c/o black stools x 2 weeks. Pt states that she has chronic intermittent diarrhea since colonoscopy in 05/2021 but states that she noticed her stools have been black in color. Pt spoke with her PCP, Dr. Moreira today and advised to come to ED for eval. States that she also has had bloating and gurgling sensation in her abdomen for 2 days. Denies pain, N/V, fever, urinary symptoms, CP, SOB, dizziness, hx of abdominal surgeries, use of blood thinners.

## 2021-11-02 NOTE — ED PROVIDER NOTE - CLINICAL SUMMARY MEDICAL DECISION MAKING FREE TEXT BOX
89 y/o F with hx of DM, HTN, HLD, GERD, CVA presents with c/o black stools x 2 weeks. Pt states that she has chronic intermittent diarrhea since colonoscopy in 05/2021 but states that she noticed her stools have been black in color. Pt spoke with her PCP, Dr. Moreira today and advised to come to ED for eval. States that she also has had bloating and gurgling sensation in her abdomen for 2 days. Denies pain, N/V, fever, urinary symptoms, CP, SOB, hx of abdominal surgeries. PE; as above A/P: will get labs, guaiac, ct abdomen/pelvis, reassess

## 2021-11-02 NOTE — ED PROVIDER NOTE - PROGRESS NOTE DETAILS
Reevaluated patient at bedside.  Patient feeling much improved.  Discussed the results of all diagnostic testing in ED and copies of all reports given.  Advised to f/u with pcp and GI. Informed about UA results, cx pending, no abx at this time since pt asymptomatic.  An opportunity to ask questions was given.  Discussed the importance of prompt, close medical follow-up.  Patient will return with any changes, concerns or persistent / worsening symptoms.  Understanding of all instructions verbalized.

## 2021-11-02 NOTE — ED ADULT NURSE NOTE - OBJECTIVE STATEMENT
Received pt alert and orientated. Pt is complaining of black stools for about 2 weeks. Pt denies nausea vomiting chest pain and SOB and abd pain. Pt is able to eat. Pt is able to walk. Call bell within reach. Freq rounding performed. Will continue to monitor. Safety/Comfort maintained.

## 2021-11-02 NOTE — ED PROVIDER NOTE - CARE PROVIDER_API CALL
Raymundo Moreira (MD)  Internal Medicine  [address] [place] [address]  Phone: [phone]  Fax: [phone]  Follow Up Time: 1-3 Days    Jossue Garcia ()  Internal Medicine  [address] [place] [address]  Phone: [phone]  Fax: [phone]  Follow Up Time: 1-3 Days

## 2021-11-02 NOTE — ED PROVIDER NOTE - PATIENT PORTAL LINK FT
You can access the FollowMyHealth Patient Portal offered by [facility] by registering at the following website: [email or web address]. By joining [employer]’s FollowMyHealth portal, you will also be able to view your health information using other applications (apps) compatible with our system.

## 2021-11-02 NOTE — ED PROVIDER NOTE - ATTENDING CONTRIBUTION TO CARE
I have personally performed a face to face diagnostic evaluation on this patient.  I have reviewed the PA note and agree with the history, exam, and plan of care, except as noted.  History and Exam by me shows 87 y/o F with hx of DM, HTN, HLD, GERD, CVA presents with c/o black stools x 2 weeks. Pt states that she has chronic intermittent diarrhea since colonoscopy in 05/2021 but states that she noticed her stools have been black in color. .  Patient is NAD.  A n O x 3. Head NC/AT. Lungs cta bl. Heart s1,s2, rrr, no murmurs. Abd-soft, nt, no guarding, no rebound, no distension, no cva tenderness. Ext- FROM actively,  ambulating s any difficulty.  Labs and ct  were unremarkable.

## 2021-11-02 NOTE — ED PROVIDER NOTE - NSFOLLOWUPINSTRUCTIONS_ED_ALL_ED_FT
Follow up with your PMD and gastroenterologist in 1-2 days for re-evaluation, ongoing care and treatment. Rest, drink plenty of fluids. If having worsening of symptoms, fever, vomiting, severe abdominal pain or other related symptoms, RETURN TO THE ER IMMEDIATELY.

## 2021-11-02 NOTE — ED PROVIDER NOTE - PROVIDER TOKENS
PROVIDER:[TOKEN:[[id number]],FOLLOWUP:[1-3 Days]],PROVIDER:[TOKEN:[[id number]],FOLLOWUP:[1-3 Days]]

## 2021-11-02 NOTE — ED ADULT NURSE NOTE - NSICDXPASTSURGICALHX_GEN_ALL_CORE_FT
PAST SURGICAL HISTORY:  CAD (coronary artery disease) cardiac stent    S/P vascular bypass left leg

## 2021-11-05 LAB
-  AMIKACIN: SIGNIFICANT CHANGE UP
-  AMOXICILLIN/CLAVULANIC ACID: SIGNIFICANT CHANGE UP
-  AMPICILLIN/SULBACTAM: SIGNIFICANT CHANGE UP
-  AMPICILLIN: SIGNIFICANT CHANGE UP
-  AZTREONAM: SIGNIFICANT CHANGE UP
-  CEFAZOLIN: SIGNIFICANT CHANGE UP
-  CEFEPIME: SIGNIFICANT CHANGE UP
-  CEFOXITIN: SIGNIFICANT CHANGE UP
-  CEFTRIAXONE: SIGNIFICANT CHANGE UP
-  CIPROFLOXACIN: SIGNIFICANT CHANGE UP
-  ERTAPENEM: SIGNIFICANT CHANGE UP
-  GENTAMICIN: SIGNIFICANT CHANGE UP
-  LEVOFLOXACIN: SIGNIFICANT CHANGE UP
-  MEROPENEM: SIGNIFICANT CHANGE UP
-  NITROFURANTOIN: SIGNIFICANT CHANGE UP
-  PIPERACILLIN/TAZOBACTAM: SIGNIFICANT CHANGE UP
-  TIGECYCLINE: SIGNIFICANT CHANGE UP
-  TOBRAMYCIN: SIGNIFICANT CHANGE UP
-  TRIMETHOPRIM/SULFAMETHOXAZOLE: SIGNIFICANT CHANGE UP
CULTURE RESULTS: SIGNIFICANT CHANGE UP
METHOD TYPE: SIGNIFICANT CHANGE UP
ORGANISM # SPEC MICROSCOPIC CNT: SIGNIFICANT CHANGE UP
ORGANISM # SPEC MICROSCOPIC CNT: SIGNIFICANT CHANGE UP
SPECIMEN SOURCE: SIGNIFICANT CHANGE UP

## 2021-11-05 NOTE — ED POST DISCHARGE NOTE - DETAILS
spoke with pt, informed about urine culture results, pt asymptomatic - no fever/dysuria/hematuria/frequency, advised to take abx sent to pharmacy and f/u PCP, return precautions given.

## 2022-05-18 NOTE — ED ADULT TRIAGE NOTE - RESPIRATORY RATE (BREATHS/MIN)
Problem: PAIN - ADULT  Goal: Verbalizes/displays adequate comfort level or baseline comfort level  Description: Interventions:  - Encourage patient to monitor pain and request assistance  - Assess pain using appropriate pain scale  - Administer analgesics based on type and severity of pain and evaluate response  - Implement non-pharmacological measures as appropriate and evaluate response  - Consider cultural and social influences on pain and pain management  - Notify physician/advanced practitioner if interventions unsuccessful or patient reports new pain  Outcome: Progressing     Problem: INFECTION - ADULT  Goal: Absence or prevention of progression during hospitalization  Description: INTERVENTIONS:  - Assess and monitor for signs and symptoms of infection  - Monitor lab/diagnostic results  - Monitor all insertion sites, i e  indwelling lines, tubes, and drains  - Monitor endotracheal if appropriate and nasal secretions for changes in amount and color  - [place] appropriate cooling/warming therapies per order  - Administer medications as ordered  - Instruct and encourage patient and family to use good hand hygiene technique  - Identify and instruct in appropriate isolation precautions for identified infection/condition  Outcome: Progressing     Problem: SAFETY ADULT  Goal: Patient will remain free of falls  Description: INTERVENTIONS:  - Educate patient/family on patient safety including physical limitations  - Instruct patient to call for assistance with activity   - Consult OT/PT to assist with strengthening/mobility   - Keep Call bell within reach  - Keep bed low and locked with side rails adjusted as appropriate  - Keep care items and personal belongings within reach  - Initiate and maintain comfort rounds  - Make Fall Risk Sign visible to staff  - Offer Toileting every 2 Hours, in advance of need  - Initiate/Maintain alarm  - Obtain necessary fall risk management equipment:   - Apply yellow socks and bracelet for high fall risk patients  - Consider moving patient to room near nurses station  Outcome: Progressing  Goal: Maintain or return to baseline ADL function  Description: INTERVENTIONS:  -  Assess patient's ability to carry out ADLs; assess patient's baseline for ADL function and identify physical deficits which impact ability to perform ADLs (bathing, care of mouth/teeth, toileting, grooming, dressing, etc )  - Assess/evaluate cause of self-care deficits   - Assess range of motion  - Assess patient's mobility; develop plan if impaired  - Assess patient's need for assistive devices and provide as appropriate  - Encourage maximum independence but intervene and supervise when necessary  - Involve family in performance of ADLs  - Assess for home care needs following discharge   - Consider OT consult to assist with ADL evaluation and planning for discharge  - Provide patient education as appropriate  Outcome: Progressing  Goal: Maintains/Returns to pre admission functional level  Description: INTERVENTIONS:  - Perform BMAT or MOVE assessment daily    - Set and communicate daily mobility goal to care team and patient/family/caregiver     - Collaborate with rehabilitation services on mobility goals if consulted  - Out of bed for toileting  - Record patient progress and toleration of activity level   Outcome: Progressing     Problem: DISCHARGE PLANNING  Goal: Discharge to home or other facility with appropriate resources  Description: INTERVENTIONS:  - Identify barriers to discharge w/patient and caregiver  - Arrange for needed discharge resources and transportation as appropriate  - Identify discharge learning needs (meds, wound care, etc )  - Arrange for interpretive services to assist at discharge as needed  - Refer to Case Management Department for coordinating discharge planning if the patient needs post-hospital services based on physician/advanced practitioner order or complex needs related to functional status, cognitive ability, or social support system  Outcome: Progressing     Problem: Knowledge Deficit  Goal: Patient/family/caregiver demonstrates understanding of disease process, treatment plan, medications, and discharge instructions  Description: Complete learning assessment and assess knowledge base  Interventions:  - Provide teaching at level of understanding  - Provide teaching via preferred learning methods  Outcome: Progressing     Problem: MOBILITY - ADULT  Goal: Maintain or return to baseline ADL function  Description: INTERVENTIONS:  -  Assess patient's ability to carry out ADLs; assess patient's baseline for ADL function and identify physical deficits which impact ability to perform ADLs (bathing, care of mouth/teeth, toileting, grooming, dressing, etc )  - Assess/evaluate cause of self-care deficits   - Assess range of motion  - Assess patient's mobility; develop plan if impaired  - Assess patient's need for assistive devices and provide as appropriate  - Encourage maximum independence but intervene and supervise when necessary  - Involve family in performance of ADLs  - Assess for home care needs following discharge   - Consider OT consult to assist with ADL evaluation and planning for discharge  - Provide patient education as appropriate  Outcome: Progressing  Goal: Maintains/Returns to pre admission functional level  Description: INTERVENTIONS:  - Perform BMAT or MOVE assessment daily    - Set and communicate daily mobility goal to care team and patient/family/caregiver     - Collaborate with rehabilitation services on mobility goals if consulted  - Out of bed for toileting  - Record patient progress and toleration of activity level   Outcome: Progressing     Problem: Prexisting or High Potential for Compromised Skin Integrity  Goal: Skin integrity is maintained or improved  Description: INTERVENTIONS:  - Identify patients at risk for skin breakdown  - Assess and monitor skin integrity  - Assess and monitor nutrition and hydration status  - Monitor labs   - Assess for incontinence   - Turn and reposition patient  - Assist with mobility/ambulation  - Relieve pressure over bony prominences  - Avoid friction and shearing  - Provide appropriate hygiene as needed including keeping skin clean and dry  - Evaluate need for skin moisturizer/barrier cream  - Collaborate with interdisciplinary team   - Patient/family teaching  - Consider wound care consult   Outcome: Progressing     Problem: Potential for Falls  Goal: Patient will remain free of falls  Description: INTERVENTIONS:  - Educate patient/family on patient safety including physical limitations  - Instruct patient to call for assistance with activity   - Consult OT/PT to assist with strengthening/mobility   - Keep Call bell within reach  - Keep bed low and locked with side rails adjusted as appropriate  - Keep care items and personal belongings within reach  - Initiate and maintain comfort rounds  - Make Fall Risk Sign visible to staff  - Offer Toileting every 2 Hours, in advance of need  - Initiate/Maintain alarm  - Obtain necessary fall risk management equipment:   - Apply yellow socks and bracelet for high fall risk patients  - Consider moving patient to room near nurses station  Outcome: Progressing     Problem: Nutrition/Hydration-ADULT  Goal: Nutrient/Hydration intake appropriate for improving, restoring or maintaining nutritional needs  Description: Monitor and assess patient's nutrition/hydration status for malnutrition  Collaborate with interdisciplinary team and initiate plan and interventions as ordered  Monitor patient's weight and dietary intake as ordered or per policy  Utilize nutrition screening tool and intervene as necessary  Determine patient's food preferences and provide high-protein, high-caloric foods as appropriate       INTERVENTIONS:  - Monitor oral intake, urinary output, labs, and treatment plans  - Assess nutrition and hydration status and recommend course of action  - Evaluate amount of meals eaten  - Assist patient with eating if necessary   - Allow adequate time for meals  - Recommend/ encourage appropriate diets, oral nutritional supplements, and vitamin/mineral supplements  - Order, calculate, and assess calorie counts as needed  - Recommend, monitor, and adjust tube feedings and TPN/PPN based on assessed needs  - Assess need for intravenous fluids  - Provide specific nutrition/hydration education as appropriate  - Include patient/family/caregiver in decisions related to nutrition  Outcome: Progressing     Problem: METABOLIC, FLUID AND ELECTROLYTES - ADULT  Goal: Electrolytes maintained within normal limits  Description: INTERVENTIONS:  - Monitor labs and assess patient for signs and symptoms of electrolyte imbalances  - Administer electrolyte replacement as ordered  - Monitor response to electrolyte replacements, including repeat lab results as appropriate  - Instruct patient on fluid and nutrition as appropriate  Outcome: Progressing  Goal: Fluid balance maintained  Description: INTERVENTIONS:  - Monitor labs   - Monitor I/O and WT  - Instruct patient on fluid and nutrition as appropriate  - Assess for signs & symptoms of volume excess or deficit  Outcome: Progressing     Problem: HEMATOLOGIC - ADULT  Goal: Maintains hematologic stability  Description: INTERVENTIONS  - Assess for signs and symptoms of bleeding or hemorrhage  - Monitor labs  - Administer supportive blood products/factors as ordered and appropriate  Outcome: Progressing     Problem: MUSCULOSKELETAL - ADULT  Goal: Maintain or return mobility to safest level of function  Description: INTERVENTIONS:  - Assess patient's ability to carry out ADLs; assess patient's baseline for ADL function and identify physical deficits which impact ability to perform ADLs (bathing, care of mouth/teeth, toileting, grooming, dressing, etc )  - Assess/evaluate cause of self-care deficits   - Assess range of motion  - Assess patient's mobility  - Assess patient's need for assistive devices and provide as appropriate  - Encourage maximum independence but intervene and supervise when necessary  - Involve family in performance of ADLs  - Assess for home care needs following discharge   - Consider OT consult to assist with ADL evaluation and planning for discharge  - Provide patient education as appropriate  Outcome: Progressing 16

## 2023-03-25 NOTE — ED PROVIDER NOTE - CPE EDP CARDIAC NORM
Your ears both have some redness behind ear drum but no bulging or signs of pus behind ear drum. I have sent a watch and wait Rx to the pharmacy. If you symptoms worsen, please start antibiotic, if they do not, no need to start antibiotic and just do symptomatic treatment as needed (fluids, tylenol, ibuprofen). We will message you if your strep culture comes back positive.    normal...

## 2024-04-19 ENCOUNTER — INPATIENT (INPATIENT)
Facility: HOSPITAL | Age: 89
LOS: 7 days | Discharge: HOME | DRG: 690 | End: 2024-04-27
Attending: INTERNAL MEDICINE | Admitting: INTERNAL MEDICINE
Payer: MEDICARE

## 2024-04-19 VITALS
OXYGEN SATURATION: 89 % | HEART RATE: 110 BPM | WEIGHT: 119.93 LBS | TEMPERATURE: 98 F | HEIGHT: 59 IN | SYSTOLIC BLOOD PRESSURE: 105 MMHG | DIASTOLIC BLOOD PRESSURE: 65 MMHG | RESPIRATION RATE: 18 BRPM

## 2024-04-19 DIAGNOSIS — I25.10 ATHEROSCLEROTIC HEART DISEASE OF NATIVE CORONARY ARTERY WITHOUT ANGINA PECTORIS: Chronic | ICD-10-CM

## 2024-04-19 DIAGNOSIS — N39.0 URINARY TRACT INFECTION, SITE NOT SPECIFIED: ICD-10-CM

## 2024-04-19 DIAGNOSIS — Z95.828 PRESENCE OF OTHER VASCULAR IMPLANTS AND GRAFTS: Chronic | ICD-10-CM

## 2024-04-19 LAB
ALBUMIN SERPL ELPH-MCNC: 3.3 G/DL — SIGNIFICANT CHANGE UP (ref 3.3–5)
ALP SERPL-CCNC: 102 U/L — SIGNIFICANT CHANGE UP (ref 40–120)
ALT FLD-CCNC: 16 U/L — SIGNIFICANT CHANGE UP (ref 12–78)
ANION GAP SERPL CALC-SCNC: 8 MMOL/L — SIGNIFICANT CHANGE UP (ref 5–17)
APPEARANCE UR: ABNORMAL
AST SERPL-CCNC: 16 U/L — SIGNIFICANT CHANGE UP (ref 15–37)
BASOPHILS # BLD AUTO: 0.04 K/UL — SIGNIFICANT CHANGE UP (ref 0–0.2)
BASOPHILS NFR BLD AUTO: 0.3 % — SIGNIFICANT CHANGE UP (ref 0–2)
BILIRUB SERPL-MCNC: 0.8 MG/DL — SIGNIFICANT CHANGE UP (ref 0.2–1.2)
BILIRUB UR-MCNC: NEGATIVE — SIGNIFICANT CHANGE UP
BUN SERPL-MCNC: 27 MG/DL — HIGH (ref 7–23)
CALCIUM SERPL-MCNC: 9.3 MG/DL — SIGNIFICANT CHANGE UP (ref 8.5–10.1)
CHLORIDE SERPL-SCNC: 106 MMOL/L — SIGNIFICANT CHANGE UP (ref 96–108)
CO2 SERPL-SCNC: 26 MMOL/L — SIGNIFICANT CHANGE UP (ref 22–31)
COLOR SPEC: YELLOW — SIGNIFICANT CHANGE UP
COMMENT - URINE 2: SIGNIFICANT CHANGE UP
CREAT SERPL-MCNC: 1.2 MG/DL — SIGNIFICANT CHANGE UP (ref 0.5–1.3)
DIFF PNL FLD: NEGATIVE — SIGNIFICANT CHANGE UP
EGFR: 43 ML/MIN/1.73M2 — LOW
EOSINOPHIL # BLD AUTO: 0.08 K/UL — SIGNIFICANT CHANGE UP (ref 0–0.5)
EOSINOPHIL NFR BLD AUTO: 0.6 % — SIGNIFICANT CHANGE UP (ref 0–6)
EPI CELLS # UR: PRESENT
FLUAV AG NPH QL: SIGNIFICANT CHANGE UP
FLUBV AG NPH QL: SIGNIFICANT CHANGE UP
GLUCOSE SERPL-MCNC: 207 MG/DL — HIGH (ref 70–99)
GLUCOSE UR QL: NEGATIVE MG/DL — SIGNIFICANT CHANGE UP
HCT VFR BLD CALC: 35 % — SIGNIFICANT CHANGE UP (ref 34.5–45)
HGB BLD-MCNC: 11.3 G/DL — LOW (ref 11.5–15.5)
IMM GRANULOCYTES NFR BLD AUTO: 0.4 % — SIGNIFICANT CHANGE UP (ref 0–0.9)
KETONES UR-MCNC: ABNORMAL MG/DL
LEUKOCYTE ESTERASE UR-ACNC: ABNORMAL
LIDOCAIN IGE QN: 9 U/L — LOW (ref 13–75)
LYMPHOCYTES # BLD AUTO: 0.69 K/UL — LOW (ref 1–3.3)
LYMPHOCYTES # BLD AUTO: 5 % — LOW (ref 13–44)
MCHC RBC-ENTMCNC: 29 PG — SIGNIFICANT CHANGE UP (ref 27–34)
MCHC RBC-ENTMCNC: 32.3 GM/DL — SIGNIFICANT CHANGE UP (ref 32–36)
MCV RBC AUTO: 89.7 FL — SIGNIFICANT CHANGE UP (ref 80–100)
MONOCYTES # BLD AUTO: 0.82 K/UL — SIGNIFICANT CHANGE UP (ref 0–0.9)
MONOCYTES NFR BLD AUTO: 5.9 % — SIGNIFICANT CHANGE UP (ref 2–14)
NEUTROPHILS # BLD AUTO: 12.13 K/UL — HIGH (ref 1.8–7.4)
NEUTROPHILS NFR BLD AUTO: 87.8 % — HIGH (ref 43–77)
NITRITE UR-MCNC: NEGATIVE — SIGNIFICANT CHANGE UP
NRBC # BLD: 0 /100 WBCS — SIGNIFICANT CHANGE UP (ref 0–0)
PH UR: 5.5 — SIGNIFICANT CHANGE UP (ref 5–8)
PLATELET # BLD AUTO: 322 K/UL — SIGNIFICANT CHANGE UP (ref 150–400)
POTASSIUM SERPL-MCNC: 4.8 MMOL/L — SIGNIFICANT CHANGE UP (ref 3.5–5.3)
POTASSIUM SERPL-SCNC: 4.8 MMOL/L — SIGNIFICANT CHANGE UP (ref 3.5–5.3)
PROT SERPL-MCNC: 7.1 G/DL — SIGNIFICANT CHANGE UP (ref 6–8.3)
PROT UR-MCNC: 30 MG/DL
RBC # BLD: 3.9 M/UL — SIGNIFICANT CHANGE UP (ref 3.8–5.2)
RBC # FLD: 14.5 % — SIGNIFICANT CHANGE UP (ref 10.3–14.5)
RBC CASTS # UR COMP ASSIST: 0 /HPF — SIGNIFICANT CHANGE UP (ref 0–4)
RSV RNA NPH QL NAA+NON-PROBE: SIGNIFICANT CHANGE UP
SARS-COV-2 RNA SPEC QL NAA+PROBE: SIGNIFICANT CHANGE UP
SODIUM SERPL-SCNC: 140 MMOL/L — SIGNIFICANT CHANGE UP (ref 135–145)
SP GR SPEC: 1.02 — SIGNIFICANT CHANGE UP (ref 1–1.03)
UROBILINOGEN FLD QL: 0.2 MG/DL — SIGNIFICANT CHANGE UP (ref 0.2–1)
WBC # BLD: 13.82 K/UL — HIGH (ref 3.8–10.5)
WBC # FLD AUTO: 13.82 K/UL — HIGH (ref 3.8–10.5)
WBC UR QL: ABNORMAL /HPF (ref 0–5)

## 2024-04-19 PROCEDURE — 99285 EMERGENCY DEPT VISIT HI MDM: CPT | Mod: FS

## 2024-04-19 PROCEDURE — 71045 X-RAY EXAM CHEST 1 VIEW: CPT | Mod: 26

## 2024-04-19 RX ORDER — HEPARIN SODIUM 5000 [USP'U]/ML
5000 INJECTION INTRAVENOUS; SUBCUTANEOUS EVERY 8 HOURS
Refills: 0 | Status: DISCONTINUED | OUTPATIENT
Start: 2024-04-19 | End: 2024-04-27

## 2024-04-19 RX ORDER — GLUCAGON INJECTION, SOLUTION 0.5 MG/.1ML
1 INJECTION, SOLUTION SUBCUTANEOUS ONCE
Refills: 0 | Status: DISCONTINUED | OUTPATIENT
Start: 2024-04-19 | End: 2024-04-27

## 2024-04-19 RX ORDER — CEFTRIAXONE 500 MG/1
1000 INJECTION, POWDER, FOR SOLUTION INTRAMUSCULAR; INTRAVENOUS EVERY 24 HOURS
Refills: 0 | Status: COMPLETED | OUTPATIENT
Start: 2024-04-20 | End: 2024-04-22

## 2024-04-19 RX ORDER — PANTOPRAZOLE SODIUM 20 MG/1
40 TABLET, DELAYED RELEASE ORAL
Refills: 0 | Status: DISCONTINUED | OUTPATIENT
Start: 2024-04-19 | End: 2024-04-27

## 2024-04-19 RX ORDER — GABAPENTIN 400 MG/1
400 CAPSULE ORAL AT BEDTIME
Refills: 0 | Status: DISCONTINUED | OUTPATIENT
Start: 2024-04-19 | End: 2024-04-27

## 2024-04-19 RX ORDER — FUROSEMIDE 40 MG
1 TABLET ORAL
Refills: 0 | DISCHARGE

## 2024-04-19 RX ORDER — METHENAMINE MANDELATE 1 G
1 TABLET ORAL
Refills: 0 | DISCHARGE

## 2024-04-19 RX ORDER — ACETAMINOPHEN 500 MG
1000 TABLET ORAL ONCE
Refills: 0 | Status: COMPLETED | OUTPATIENT
Start: 2024-04-19 | End: 2024-04-19

## 2024-04-19 RX ORDER — GABAPENTIN 400 MG/1
1 CAPSULE ORAL
Refills: 0 | DISCHARGE

## 2024-04-19 RX ORDER — TIZANIDINE 4 MG/1
0 TABLET ORAL
Qty: 0 | Refills: 0 | DISCHARGE

## 2024-04-19 RX ORDER — ASPIRIN/CALCIUM CARB/MAGNESIUM 324 MG
81 TABLET ORAL DAILY
Refills: 0 | Status: DISCONTINUED | OUTPATIENT
Start: 2024-04-19 | End: 2024-04-27

## 2024-04-19 RX ORDER — METOPROLOL TARTRATE 50 MG
0 TABLET ORAL
Qty: 0 | Refills: 0 | DISCHARGE

## 2024-04-19 RX ORDER — FLUTICASONE PROPIONATE AND SALMETEROL 50; 250 UG/1; UG/1
1 POWDER ORAL; RESPIRATORY (INHALATION)
Refills: 0 | DISCHARGE

## 2024-04-19 RX ORDER — INSULIN LISPRO 100/ML
VIAL (ML) SUBCUTANEOUS
Refills: 0 | Status: DISCONTINUED | OUTPATIENT
Start: 2024-04-19 | End: 2024-04-27

## 2024-04-19 RX ORDER — ATORVASTATIN CALCIUM 80 MG/1
0 TABLET, FILM COATED ORAL
Qty: 0 | Refills: 0 | DISCHARGE

## 2024-04-19 RX ORDER — DEXTROSE 50 % IN WATER 50 %
15 SYRINGE (ML) INTRAVENOUS ONCE
Refills: 0 | Status: DISCONTINUED | OUTPATIENT
Start: 2024-04-19 | End: 2024-04-27

## 2024-04-19 RX ORDER — METFORMIN HYDROCHLORIDE 850 MG/1
2 TABLET ORAL
Refills: 0 | DISCHARGE

## 2024-04-19 RX ORDER — OMEPRAZOLE 10 MG/1
1 CAPSULE, DELAYED RELEASE ORAL
Refills: 0 | DISCHARGE

## 2024-04-19 RX ORDER — OMEPRAZOLE 10 MG/1
0 CAPSULE, DELAYED RELEASE ORAL
Qty: 0 | Refills: 0 | DISCHARGE

## 2024-04-19 RX ORDER — METFORMIN HYDROCHLORIDE 850 MG/1
0 TABLET ORAL
Qty: 0 | Refills: 0 | DISCHARGE

## 2024-04-19 RX ORDER — MULTIVIT-MIN/FERROUS GLUCONATE 9 MG/15 ML
1 LIQUID (ML) ORAL
Refills: 0 | DISCHARGE

## 2024-04-19 RX ORDER — SODIUM CHLORIDE 9 MG/ML
1000 INJECTION INTRAMUSCULAR; INTRAVENOUS; SUBCUTANEOUS
Refills: 0 | Status: DISCONTINUED | OUTPATIENT
Start: 2024-04-19 | End: 2024-04-20

## 2024-04-19 RX ORDER — ASCORBIC ACID 60 MG
0 TABLET,CHEWABLE ORAL
Qty: 0 | Refills: 0 | DISCHARGE

## 2024-04-19 RX ORDER — SODIUM CHLORIDE 9 MG/ML
1000 INJECTION, SOLUTION INTRAVENOUS
Refills: 0 | Status: DISCONTINUED | OUTPATIENT
Start: 2024-04-19 | End: 2024-04-27

## 2024-04-19 RX ORDER — FUROSEMIDE 40 MG
0 TABLET ORAL
Qty: 0 | Refills: 0 | DISCHARGE

## 2024-04-19 RX ORDER — CEFTRIAXONE 500 MG/1
1000 INJECTION, POWDER, FOR SOLUTION INTRAMUSCULAR; INTRAVENOUS ONCE
Refills: 0 | Status: COMPLETED | OUTPATIENT
Start: 2024-04-19 | End: 2024-04-19

## 2024-04-19 RX ORDER — FLUTICASONE PROPIONATE 220 MCG
0 AEROSOL WITH ADAPTER (GRAM) INHALATION
Qty: 0 | Refills: 0 | DISCHARGE

## 2024-04-19 RX ORDER — SODIUM CHLORIDE 9 MG/ML
1000 INJECTION INTRAMUSCULAR; INTRAVENOUS; SUBCUTANEOUS ONCE
Refills: 0 | Status: COMPLETED | OUTPATIENT
Start: 2024-04-19 | End: 2024-04-19

## 2024-04-19 RX ORDER — ATORVASTATIN CALCIUM 80 MG/1
1 TABLET, FILM COATED ORAL
Refills: 0 | DISCHARGE

## 2024-04-19 RX ORDER — METOPROLOL TARTRATE 50 MG
50 TABLET ORAL
Refills: 0 | Status: DISCONTINUED | OUTPATIENT
Start: 2024-04-19 | End: 2024-04-27

## 2024-04-19 RX ORDER — DEXTROSE 50 % IN WATER 50 %
25 SYRINGE (ML) INTRAVENOUS ONCE
Refills: 0 | Status: DISCONTINUED | OUTPATIENT
Start: 2024-04-19 | End: 2024-04-27

## 2024-04-19 RX ORDER — INSULIN DEGLUDEC 100 U/ML
0 INJECTION, SOLUTION SUBCUTANEOUS
Qty: 0 | Refills: 0 | DISCHARGE

## 2024-04-19 RX ORDER — PREGABALIN 225 MG/1
1 CAPSULE ORAL
Refills: 0 | DISCHARGE

## 2024-04-19 RX ORDER — INSULIN GLARGINE 100 [IU]/ML
6 INJECTION, SOLUTION SUBCUTANEOUS AT BEDTIME
Refills: 0 | Status: DISCONTINUED | OUTPATIENT
Start: 2024-04-19 | End: 2024-04-20

## 2024-04-19 RX ORDER — ASPIRIN/CALCIUM CARB/MAGNESIUM 324 MG
0 TABLET ORAL
Qty: 0 | Refills: 0 | DISCHARGE

## 2024-04-19 RX ORDER — MAGNESIUM OXIDE 400 MG ORAL TABLET 241.3 MG
1 TABLET ORAL
Refills: 0 | DISCHARGE

## 2024-04-19 RX ORDER — INSULIN LISPRO 100/ML
VIAL (ML) SUBCUTANEOUS AT BEDTIME
Refills: 0 | Status: DISCONTINUED | OUTPATIENT
Start: 2024-04-19 | End: 2024-04-27

## 2024-04-19 RX ORDER — INSULIN DEGLUDEC 100 U/ML
12 INJECTION, SOLUTION SUBCUTANEOUS
Refills: 0 | DISCHARGE

## 2024-04-19 RX ORDER — METOPROLOL TARTRATE 50 MG
1 TABLET ORAL
Refills: 0 | DISCHARGE

## 2024-04-19 RX ORDER — DEXTROSE 50 % IN WATER 50 %
12.5 SYRINGE (ML) INTRAVENOUS ONCE
Refills: 0 | Status: DISCONTINUED | OUTPATIENT
Start: 2024-04-19 | End: 2024-04-27

## 2024-04-19 RX ORDER — ASPIRIN/CALCIUM CARB/MAGNESIUM 324 MG
1 TABLET ORAL
Refills: 0 | DISCHARGE

## 2024-04-19 RX ORDER — DEXTROSE 10 % IN WATER 10 %
125 INTRAVENOUS SOLUTION INTRAVENOUS ONCE
Refills: 0 | Status: DISCONTINUED | OUTPATIENT
Start: 2024-04-19 | End: 2024-04-27

## 2024-04-19 RX ORDER — ALBUTEROL 90 UG/1
2 AEROSOL, METERED ORAL EVERY 6 HOURS
Refills: 0 | Status: DISCONTINUED | OUTPATIENT
Start: 2024-04-19 | End: 2024-04-23

## 2024-04-19 RX ADMIN — SODIUM CHLORIDE 75 MILLILITER(S): 9 INJECTION INTRAMUSCULAR; INTRAVENOUS; SUBCUTANEOUS at 16:56

## 2024-04-19 RX ADMIN — SODIUM CHLORIDE 1000 MILLILITER(S): 9 INJECTION INTRAMUSCULAR; INTRAVENOUS; SUBCUTANEOUS at 15:06

## 2024-04-19 RX ADMIN — Medication 1000 MILLIGRAM(S): at 14:50

## 2024-04-19 RX ADMIN — CEFTRIAXONE 100 MILLIGRAM(S): 500 INJECTION, POWDER, FOR SOLUTION INTRAMUSCULAR; INTRAVENOUS at 15:03

## 2024-04-19 RX ADMIN — Medication 600 MILLIGRAM(S): at 23:56

## 2024-04-19 RX ADMIN — CEFTRIAXONE 1000 MILLIGRAM(S): 500 INJECTION, POWDER, FOR SOLUTION INTRAMUSCULAR; INTRAVENOUS at 16:56

## 2024-04-19 RX ADMIN — Medication 400 MILLIGRAM(S): at 13:53

## 2024-04-19 RX ADMIN — SODIUM CHLORIDE 1000 MILLILITER(S): 9 INJECTION INTRAMUSCULAR; INTRAVENOUS; SUBCUTANEOUS at 13:29

## 2024-04-19 RX ADMIN — Medication 50 MILLIGRAM(S): at 19:40

## 2024-04-19 RX ADMIN — HEPARIN SODIUM 5000 UNIT(S): 5000 INJECTION INTRAVENOUS; SUBCUTANEOUS at 22:27

## 2024-04-19 RX ADMIN — GABAPENTIN 400 MILLIGRAM(S): 400 CAPSULE ORAL at 22:27

## 2024-04-19 RX ADMIN — INSULIN GLARGINE 6 UNIT(S): 100 INJECTION, SOLUTION SUBCUTANEOUS at 22:26

## 2024-04-19 NOTE — PATIENT PROFILE ADULT - FALL HARM RISK - HARM RISK INTERVENTIONS

## 2024-04-19 NOTE — ED PROVIDER NOTE - OBJECTIVE STATEMENT
89 yo female with PMHx of CVA (cerebral vascular accident), HTN (hypertension) and COPD, presenting to ED with complaints of weakness today, patient is currently being treated for UTI, has only received 2 doses of Nitrofurantoin yesterday, has not taken her dose today. Patient endorses weakness and shaking chills, as well as cough that has progressed from dry to "rumbly" unable to bring up phlegm. Denies CP, SOB or dizziness

## 2024-04-19 NOTE — H&P ADULT - HISTORY OF PRESENT ILLNESS
89 yo female with PMHx of CVA (cerebral vascular accident), HTN (hypertension) and COPD, presenting to ED with complaints of weakness today, patient went to see urologist for dysuria and chills  was treated for UTI received 2 doses of Nitrofurantoin yesterday,   Patient endorses weakness and shaking chills, as well as cough that has progressed from dry to "rumbly" unable to bring up phlegm. Denies CP, SOB or dizziness  In ED tm101

## 2024-04-19 NOTE — ED ADULT NURSE NOTE - OBJECTIVE STATEMENT
Pt BIBA from home c/o weakness along with back pain. Pt A&Ox3 on 2L NC. Per pt daughter, pt has been treated for UTI and states pt c/o sore throat along with shivers last night. Pt noted to have a wet cough. Rectal temp noted - MD made aware. Pt medicated per MD. Pt daughter remains at bedside. Pt placed on bedside cardiac monitoring along with continuous O2 monitoring. Pt resting on stretcher.

## 2024-04-19 NOTE — ED PROVIDER NOTE - ATTENDING APP SHARED VISIT CONTRIBUTION OF CARE
89yo female bib ems with weakness, unable to walk today, recently treated for uti, +cough, no vomiting or diarrhea  exam: lungs cta, rrr, abd soft, neuro intact  plan: eg, labs, xr, fluids, ua, iv abx admit  agree with assessment and plan of PA

## 2024-04-19 NOTE — H&P ADULT - NSHPLABSRESULTS_GEN_ALL_CORE
LABS:                        11.3   13.82 )-----------( 322      ( 2024 13:20 )             35.0     04-    140  |  106  |  27<H>  ----------------------------<  207<H>  4.8   |  26  |  1.20    Ca    9.3      2024 13:20    TPro  7.1  /  Alb  3.3  /  TBili  0.8  /  DBili  x   /  AST  16  /  ALT  16  /  AlkPhos  102  04-19      CAPILLARY BLOOD GLUCOSE      POCT Blood Glucose.: 188 mg/dL (2024 21:49)        Urinalysis Basic - ( 2024 13:20 )    Color: Yellow / Appearance: Cloudy / S.017 / pH: x  Gluc: 207 mg/dL / Ketone: Trace mg/dL  / Bili: Negative / Urobili: 0.2 mg/dL   Blood: x / Protein: 30 mg/dL / Nitrite: Negative   Leuk Esterase: Large / RBC: 0 /HPF / WBC Too Numerous to count /HPF   Sq Epi: x / Non Sq Epi: x / Bacteria: x        RADIOLOGY & ADDITIONAL TESTS:

## 2024-04-19 NOTE — ED ADULT NURSE NOTE - CHIEF COMPLAINT QUOTE
from home, AOX4, c/o weakness, ABD  and back pain, cough. Pt being treated by PCP for UTI. no relief. allergies to Penicillin and Mycin. . decreased appetite.

## 2024-04-19 NOTE — H&P ADULT - ASSESSMENT
89 yo female with PMHx of CVA (cerebral vascular accident), HTN (hypertension) , DM2 and COPD aw dysuria, fever/chills and weakness  +leucocytosis, +UA,   aw sepsis dt UTI    #Sepsis dt UTI  cont ceftriaxone  IVF  fu Ucx, Bld cx  ID cs fu    #Worsening cough  hx of stable copd  RVP neg  CXR- diffuse interstitial lung dz  check probnp  will obtain CT chest if no improvement    #HTN- hold lasix dt sepsis  resume bblocker    #DM-2- lantus and sliding scale    #DVT ppx- hep sq    [facility]   [phone]

## 2024-04-19 NOTE — ED ADULT TRIAGE NOTE - CHIEF COMPLAINT QUOTE
from home, AOX4, c/o weakness, ABD  and back pain. Pt being treated by PCP for UTI. no relief. allergies to Penicillin and Mycin. . decreased appetite. from home, AOX4, c/o weakness, ABD  and back pain, cough. Pt being treated by PCP for UTI. no relief. allergies to Penicillin and Mycin. . decreased appetite.

## 2024-04-19 NOTE — ED PROVIDER NOTE - WHICH SHOWED
EKG: sinus tachy @107, normal axis, normal OH, no acute st t wave changes EKG: sinus tachy @107, normal axis, normal UT, no acute st t wave changes  XR; chronic changes, ?LLL infiltrate

## 2024-04-19 NOTE — H&P ADULT - NSHPREVIEWOFSYSTEMS_GEN_ALL_CORE
REVIEW OF SYSTEMS:    CONSTITUTIONAL: No weakness, fevers or +chills  EYES/ENT: No visual changes;  No vertigo or throat pain   NECK: No pain or stiffness  RESPIRATORY: No cough, wheezing, hemoptysis; No shortness of breath  CARDIOVASCULAR: No chest pain or palpitations  GASTROINTESTINAL: No abdominal or epigastric pain. No nausea, vomiting, or hematemesis; No diarrhea or constipation. No melena or hematochezia.  GENITOURINARY: + dysuria, frequency or hematuria  NEUROLOGICAL: No numbness or weakness  SKIN: No itching, rashes

## 2024-04-19 NOTE — ED ADULT NURSE NOTE - NSFALLRISKINTERV_ED_ALL_ED
Assistance OOB with selected safe patient handling equipment if applicable/Assistance with ambulation/Communicate fall risk and risk factors to all staff, patient, and family/Monitor gait and stability/Provide visual cue: yellow wristband, yellow gown, etc/Reinforce activity limits and safety measures with patient and family/Call bell, personal items and telephone in reach/Instruct patient to call for assistance before getting out of bed/chair/stretcher/Non-slip footwear applied when patient is off stretcher/[place] to call system/Physically safe environment - no spills, clutter or unnecessary equipment/Purposeful Proactive Rounding/Room/bathroom lighting operational, light cord in reach

## 2024-04-19 NOTE — H&P ADULT - NSHPPHYSICALEXAM_GEN_ALL_CORE
Vitals last 24 hrs  T(C): 36.7 (04-19-24 @ 21:50), Max: 37.5 (04-19-24 @ 14:49)  HR: 90 (04-19-24 @ 21:50) (86 - 110)  BP: 110/68 (04-19-24 @ 21:50) (99/59 - 110/68)  RR: 18 (04-19-24 @ 21:50) (17 - 18)  SpO2: 97% (04-19-24 @ 21:50) (88% - 98%)    PHYSICAL EXAM:  GENERAL: NAD, well-groomed, well-developed  HEAD:  Atraumatic, Normocephalic  EYES: EOMI, PERRLA, conjunctiva and sclera clear  ENMT: No tonsillar erythema, exudates, or enlargement; Moist mucous membranes  NECK: Supple, No JVD, Normal thyroid  HEART: Regular rate and rhythm; No murmurs, rubs, or gallops  RESPIRATORY: CTA B/L, No W/R/R  ABDOMEN: Soft, Nontender, Nondistended; Bowel sounds present  NEUROLOGY: A&Ox3, nonfocal, moving all extremities  EXTREMITIES:  2+ Peripheral Pulses, No clubbing, cyanosis, or edema  SKIN: warm, dry, normal color, no rash or abnormal lesions

## 2024-04-20 LAB
A1C WITH ESTIMATED AVERAGE GLUCOSE RESULT: 9.2 % — HIGH (ref 4–5.6)
ANION GAP SERPL CALC-SCNC: 6 MMOL/L — SIGNIFICANT CHANGE UP (ref 5–17)
BUN SERPL-MCNC: 20 MG/DL — SIGNIFICANT CHANGE UP (ref 7–23)
CALCIUM SERPL-MCNC: 8.1 MG/DL — LOW (ref 8.5–10.1)
CHLORIDE SERPL-SCNC: 107 MMOL/L — SIGNIFICANT CHANGE UP (ref 96–108)
CO2 SERPL-SCNC: 23 MMOL/L — SIGNIFICANT CHANGE UP (ref 22–31)
CREAT SERPL-MCNC: 0.87 MG/DL — SIGNIFICANT CHANGE UP (ref 0.5–1.3)
CULTURE RESULTS: SIGNIFICANT CHANGE UP
EGFR: 63 ML/MIN/1.73M2 — SIGNIFICANT CHANGE UP
ESTIMATED AVERAGE GLUCOSE: 217 MG/DL — HIGH (ref 68–114)
GLUCOSE SERPL-MCNC: 191 MG/DL — HIGH (ref 70–99)
HCT VFR BLD CALC: 34.3 % — LOW (ref 34.5–45)
HGB BLD-MCNC: 10.8 G/DL — LOW (ref 11.5–15.5)
MCHC RBC-ENTMCNC: 29.1 PG — SIGNIFICANT CHANGE UP (ref 27–34)
MCHC RBC-ENTMCNC: 31.5 GM/DL — LOW (ref 32–36)
MCV RBC AUTO: 92.5 FL — SIGNIFICANT CHANGE UP (ref 80–100)
NRBC # BLD: 0 /100 WBCS — SIGNIFICANT CHANGE UP (ref 0–0)
PLATELET # BLD AUTO: 280 K/UL — SIGNIFICANT CHANGE UP (ref 150–400)
POTASSIUM SERPL-MCNC: 4.6 MMOL/L — SIGNIFICANT CHANGE UP (ref 3.5–5.3)
POTASSIUM SERPL-SCNC: 4.6 MMOL/L — SIGNIFICANT CHANGE UP (ref 3.5–5.3)
RBC # BLD: 3.71 M/UL — LOW (ref 3.8–5.2)
RBC # FLD: 14.5 % — SIGNIFICANT CHANGE UP (ref 10.3–14.5)
SODIUM SERPL-SCNC: 136 MMOL/L — SIGNIFICANT CHANGE UP (ref 135–145)
SPECIMEN SOURCE: SIGNIFICANT CHANGE UP
WBC # BLD: 8.58 K/UL — SIGNIFICANT CHANGE UP (ref 3.8–10.5)
WBC # FLD AUTO: 8.58 K/UL — SIGNIFICANT CHANGE UP (ref 3.8–10.5)

## 2024-04-20 PROCEDURE — 71250 CT THORAX DX C-: CPT | Mod: 26

## 2024-04-20 RX ORDER — INSULIN GLARGINE 100 [IU]/ML
12 INJECTION, SOLUTION SUBCUTANEOUS AT BEDTIME
Refills: 0 | Status: DISCONTINUED | OUTPATIENT
Start: 2024-04-20 | End: 2024-04-27

## 2024-04-20 RX ORDER — ACETAMINOPHEN 500 MG
650 TABLET ORAL EVERY 6 HOURS
Refills: 0 | Status: DISCONTINUED | OUTPATIENT
Start: 2024-04-20 | End: 2024-04-27

## 2024-04-20 RX ORDER — BENZOCAINE AND MENTHOL 5; 1 G/100ML; G/100ML
1 LIQUID ORAL
Refills: 0 | Status: DISCONTINUED | OUTPATIENT
Start: 2024-04-20 | End: 2024-04-27

## 2024-04-20 RX ORDER — FUROSEMIDE 40 MG
20 TABLET ORAL ONCE
Refills: 0 | Status: COMPLETED | OUTPATIENT
Start: 2024-04-20 | End: 2024-04-20

## 2024-04-20 RX ADMIN — HEPARIN SODIUM 5000 UNIT(S): 5000 INJECTION INTRAVENOUS; SUBCUTANEOUS at 05:41

## 2024-04-20 RX ADMIN — HEPARIN SODIUM 5000 UNIT(S): 5000 INJECTION INTRAVENOUS; SUBCUTANEOUS at 21:42

## 2024-04-20 RX ADMIN — PANTOPRAZOLE SODIUM 40 MILLIGRAM(S): 20 TABLET, DELAYED RELEASE ORAL at 05:41

## 2024-04-20 RX ADMIN — Medication 650 MILLIGRAM(S): at 15:04

## 2024-04-20 RX ADMIN — Medication 50 MILLIGRAM(S): at 05:40

## 2024-04-20 RX ADMIN — GABAPENTIN 400 MILLIGRAM(S): 400 CAPSULE ORAL at 21:42

## 2024-04-20 RX ADMIN — Medication 600 MILLIGRAM(S): at 12:09

## 2024-04-20 RX ADMIN — Medication 20 MILLIGRAM(S): at 15:06

## 2024-04-20 RX ADMIN — Medication 650 MILLIGRAM(S): at 12:09

## 2024-04-20 RX ADMIN — INSULIN GLARGINE 12 UNIT(S): 100 INJECTION, SOLUTION SUBCUTANEOUS at 21:42

## 2024-04-20 RX ADMIN — Medication 1: at 12:49

## 2024-04-20 RX ADMIN — Medication 2: at 17:40

## 2024-04-20 RX ADMIN — Medication 81 MILLIGRAM(S): at 12:09

## 2024-04-20 RX ADMIN — CEFTRIAXONE 100 MILLIGRAM(S): 500 INJECTION, POWDER, FOR SOLUTION INTRAMUSCULAR; INTRAVENOUS at 14:39

## 2024-04-20 RX ADMIN — HEPARIN SODIUM 5000 UNIT(S): 5000 INJECTION INTRAVENOUS; SUBCUTANEOUS at 14:38

## 2024-04-20 NOTE — PHYSICAL THERAPY INITIAL EVALUATION ADULT - TRANSFER TRAINING, PT EVAL
Patient will perform sit<->stand transfer with CGA with rolling walker by 2 weeks to allow patient to get on/off toilet safely.

## 2024-04-20 NOTE — PROGRESS NOTE ADULT - ASSESSMENT
89 yo female with PMHx of CVA (cerebral vascular accident), HTN (hypertension) , DM2 and COPD aw dysuria, fever/chills and weakness  +leucocytosis, +UA,   aw sepsis dt UTI    #Sepsis dt UTI/+/-pneumonia  cont ceftriaxone  will add levaquin  fu Ucx, Bld cx  ID cs fu    #Worsening cough  hx of stable copd  RVP neg  CXR- diffuse interstitial lung dz  check probnp  will obtain CT chest- showed GC opacities- pulm edema vs pneumonia  check probnp  start levaquin  cards cs, pulm and  ID cs fu    #HTN- hold lasix dt sepsis  resume bblocker    #DM-2- lantus and sliding scale    #DVT ppx- hep sq    d/w dgtr at bedside plan of care    [facility]   [phone]

## 2024-04-20 NOTE — PHYSICAL THERAPY INITIAL EVALUATION ADULT - PERTINENT HX OF CURRENT PROBLEM, REHAB EVAL
Per H&P, pt is a 89 yo female with PMHx of CVA (cerebral vascular accident), HTN (hypertension) and COPD, presenting to ED with complaints of weakness today, patient went to see urologist for dysuria and chills  was treated for UTI received 2 doses of Nitrofurantoin yesterday,   Patient endorses weakness and shaking chills, as well as cough that has progressed from dry to "rumbly" unable to bring up phlegm. Denies CP, SOB or dizziness. In ED tm101.

## 2024-04-20 NOTE — CONSULT NOTE ADULT - SUBJECTIVE AND OBJECTIVE BOX
HPI:  89YO F PMHx of CVA, HTN and COPD presenting to ED with complaints of weakness and shaking chills, with cough Denies CP, SOB or dizziness Also with dysuria and chills and saw her urologist who prescribed Nitrofurantoin for poss UTI Pt took 2 doses PTA. In ED [id number] UA neg, CT chest with pna.     Infectious Disease consult was called to evaluate pt and for antibiotic management.    Past Medical & Surgical Hx:  PAST MEDICAL & SURGICAL HISTORY:  HTN (hypertension)  CVA (cerebral vascular accident)  CAD (coronary artery disease)  cardiac stent  S/P vascular bypass  left leg      Social History--  EtOH: denies   Tobacco: denies   Drug Use: denies     FAMILY HISTORY:  Noncontributory    Allergies  Biaxin (Unknown)  penicillin (Unknown)    Intolerances  NONE      Home Medications:  aspirin 81 mg oral delayed release tablet: 1 tab(s) orally once a day (19 Apr 2024 16:57)  atorvastatin 20 mg oral tablet: 1 tab(s) orally once a day (19 Apr 2024 16:59)  cyanocobalamin 1000 mcg oral tablet: 1 tab(s) orally once a day (19 Apr 2024 16:59)  furosemide 20 mg oral tablet: 1 tab(s) orally every other day as needed depending on the day as per patient (19 Apr 2024 16:59)  gabapentin 400 mg oral capsule: 1 cap(s) orally once a day (19 Apr 2024 16:59)  insulin degludec 100 units/mL subcutaneous solution: 12 unit(s) subcutaneous once a day (at bedtime) (19 Apr 2024 16:59)  magnesium oxide 400 mg oral tablet: 1 tab(s) orally once a day (19 Apr 2024 16:59)  MetFORMIN (Eqv-Fortamet) 500 mg oral tablet, extended release: 2 tab(s) orally once a day (19 Apr 2024 16:59)  methenamine hippurate 1 g oral tablet: 1 tab(s) orally 2 times a day (19 Apr 2024 16:59)  metoprolol tartrate 50 mg oral tablet: 1 tab(s) orally 2 times a day (19 Apr 2024 16:59)  Multiple Vitamins oral tablet: 1 tab(s) orally once a day (19 Apr 2024 16:59)  omeprazole 40 mg oral delayed release capsule: 1 cap(s) orally once a day (19 Apr 2024 16:59)  PreserVision AREDS 2 oral capsule: 1 cap(s) orally 2 times a day (19 Apr 2024 16:59)  Wixela Inhub 250 mcg-50 mcg inhalation powder: 1 puff(s) inhaled as needed for  shortness of breath and/or wheezing (19 Apr 2024 16:59)      Current Inpatient Medications :    ANTIBIOTICS:   cefTRIAXone   IVPB 1000 milliGRAM(s) IV Intermittent every 24 hours  levoFLOXacin IVPB 750 milliGRAM(s) IV Intermittent every 48 hours      OTHER RELEVANT MEDICATIONS :  acetaminophen     Tablet .. 650 milliGRAM(s) Oral every 6 hours PRN  albuterol    90 MICROgram(s) HFA Inhaler 2 Puff(s) Inhalation every 6 hours PRN  aspirin enteric coated 81 milliGRAM(s) Oral daily  dextrose 10% Bolus 125 milliLiter(s) IV Bolus once  dextrose 5%. 1000 milliLiter(s) IV Continuous <Continuous>  dextrose 5%. 1000 milliLiter(s) IV Continuous <Continuous>  dextrose 50% Injectable 12.5 Gram(s) IV Push once  dextrose 50% Injectable 25 Gram(s) IV Push once  dextrose Oral Gel 15 Gram(s) Oral once PRN  gabapentin 400 milliGRAM(s) Oral at bedtime  glucagon  Injectable 1 milliGRAM(s) IntraMuscular once  guaiFENesin  milliGRAM(s) Oral every 12 hours PRN  heparin   Injectable 5000 Unit(s) SubCutaneous every 8 hours  insulin glargine Injectable (LANTUS) 12 Unit(s) SubCutaneous at bedtime  insulin lispro (ADMELOG) corrective regimen sliding scale   SubCutaneous at bedtime  insulin lispro (ADMELOG) corrective regimen sliding scale   SubCutaneous three times a day before meals  metoprolol tartrate 50 milliGRAM(s) Oral two times a day  pantoprazole    Tablet 40 milliGRAM(s) Oral before breakfast      ROS:  CONSTITUTIONAL:  + fever or chills  EYES:  Negative  blurry vision or double vision  CARDIOVASCULAR:  Negative for chest pain or palpitations  RESPIRATORY:  Negative for wheezing, or SOB + cough,   GASTROINTESTINAL:  Negative for nausea, vomiting, diarrhea, constipation, or abdominal pain  GENITOURINARY:  Negative frequency, urgency ,  or hematuria +dysuria  NEUROLOGIC:  No headache, confusion, dizziness, lightheadedness  All other systems were reviewed and are negative          I&O's Detail    19 Apr 2024 07:01  -  20 Apr 2024 07:00  --------------------------------------------------------  IN:  Total IN: 0 mL    OUT:    Voided (mL): 850 mL  Total OUT: 850 mL    Total NET: -850 mL          Physical Exam:  Vital Signs Last 24 Hrs  T(C): 38.8 (20 Apr 2024 11:45), Max: 38.8 (20 Apr 2024 11:45)  T(F): 101.8 (20 Apr 2024 11:45), Max: 101.8 (20 Apr 2024 11:45)  HR: 99 (20 Apr 2024 11:45) (86 - 99)  BP: 118/64 (20 Apr 2024 11:45) (108/64 - 118/65)  RR: 18 (20 Apr 2024 11:45) (18 - 18)  SpO2: 93% (20 Apr 2024 11:45) (88% - 97%)    Parameters below as of 20 Apr 2024 11:45  Patient On (Oxygen Delivery Method): nasal cannula      General: weak no acute distress  Neck: supple, trachea midline  Lungs: decreased, no wheeze/rhonchi  Cardiovascular: regular rate and rhythm, S1 S2  Abdomen: soft, nontender, ND, bowel sounds normal  Neurological:  alert and oriented x3  Skin: no rash  Extremities: no edema    Labs:                         10.8   8.58  )-----------( 280      ( 20 Apr 2024 08:50 )             34.3   04-20    136  |  107  |  20  ----------------------------<  191<H>  4.6   |  23  |  0.87    Ca    8.1<L>      20 Apr 2024 08:50    TPro  7.1  /  Alb  3.3  /  TBili  0.8  /  DBili  x   /  AST  16  /  ALT  16  /  AlkPhos  102  04-19    Urine Microscopic-Add On (NC) (04.19.24 @ 13:20)    Red Blood Cell - Urine: 0 /HPF   White Blood Cell - Urine: Too Numerous to count /HPF   Squamous Epithelial Cells: Present   Comment - Urine 2: Few wbc clumps  Urinalysis (04.19.24 @ 13:20)    pH Urine: 5.5   Glucose Qualitative, Urine: Negative mg/dL   Blood, Urine: Negative   Color: Yellow   Urine Appearance: Cloudy   Bilirubin: Negative   Ketone - Urine: Trace mg/dL   Specific Gravity: 1.017   Protein, Urine: 30 mg/dL   Urobilinogen: 0.2 mg/dL   Nitrite: Negative   Leukocyte Esterase Concentration: Large        RECENT CULTURES:  Culture - Urine (collected 19 Apr 2024 13:20)  Source: Clean Catch Clean Catch (Midstream)  Final Report (20 Apr 2024 16:51):    <10,000 CFU/mL Normal Urogenital Kimberlee      RADIOLOGY & ADDITIONAL STUDIES:    ACC: [id number] EXAM:  CT CHEST   ORDERED BY:  KAISER ESTEVES     PROCEDURE DATE:  04/20/2024          INTERPRETATION:  CT CHEST WITHOUT CONTRAST    INDICATION: Obstructive pulmonary disease. Cough. Haziness on x-ray.    TECHNIQUE: Unenhanced helical images were obtained of the chest. Coronal   and sagittal images were reconstructed. Maximum intensity projection   images were generated.        COMPARISON: Radiograph chest 4/19/2024. CT chest 1/25/2020 and 12/18/2017.    FINDINGS:    Tubes/Lines: None.    Lungs, airways and pleura: Bilateral septal thickening, groundglass   opacities and peripheral consolidation in the upper lobes and bilateral   lower lobe consolidation. The opacities are superimposed upon underlying   emphysema.    Secretions in the trachea and bronchus intermedius. No pneumothorax.    Mediastinum: Hypodense right lobe thyroid nodule. The chest lymph nodes   measure less than 15 mm in the short axis. Unremarkable esophagus. The   heart is normal in size. Coronary artery calcifications. Mitral annular   calcification. Aortic valve calcifications. The aorta is normal in   caliber. Aortic calcifications.    Upper Abdomen: The left renal pelvis is dilated. Diverticulosis.   Thickening of the left adrenal gland.    Bones And Soft Tissues: The bones are unremarkable.  A right axillary   graft is partially imaged.      IMPRESSION:    1.  Bilateral septal thickening, groundglass opacities, peripheral   consolidation and pleural effusions. These findings could occur inthe   setting of pulmonary edema, however, if there are no signs of pulmonary   edema, other entities including pneumonia, could be considered.      Assessment :   89YO F PMHx of CVA, HTN and COPD admitted with CAP   In ED [id number] UA neg, CT chest with pna.   + cough  UTI ruled out    Plan :   Cont Rocephin  Stop Levaquin  Fu cultures  Trend temps and cbc  Legionella and pneumococcal antigen  Asp precautions        Continue with present regiment .  Approptiate use of antibiotics and adverse effects reviewed.      I have discussed the above plan of care with patient/daughter in detail. They expressed understanding of the treatment plan . Risks, benefits and alternatives discussed in detail. I have asked if they have any questions or concerns and appropriately addressed them to the best of my ability .      > 45 minutes spent in direct patient care reviewing  the notes, lab data/ imaging , discussion with multidisciplinary team. All questions were addressed and answered to the best of my capacity .    Thank you for allowing me to participate in the care of your patient .      Jeannette Terry MD  Infectious Disease  [phone]

## 2024-04-20 NOTE — PROGRESS NOTE ADULT - SUBJECTIVE AND OBJECTIVE BOX
Patient is a 89y old  Female who presents with a chief complaint of     INTERVAL HPI/OVERNIGHT EVENTS: noted  pt seen and examined this am   events note  +high temp 101 this am  +cough, weakness      Vital Signs Last 24 Hrs  T(C): 38.8 (20 Apr 2024 11:45), Max: 38.8 (20 Apr 2024 11:45)  T(F): 101.8 (20 Apr 2024 11:45), Max: 101.8 (20 Apr 2024 11:45)  HR: 99 (20 Apr 2024 11:45) (86 - 99)  BP: 118/64 (20 Apr 2024 11:45) (108/64 - 118/65)  BP(mean): --  RR: 18 (20 Apr 2024 11:45) (18 - 18)  SpO2: 93% (20 Apr 2024 11:45) (88% - 97%)    Parameters below as of 20 Apr 2024 11:45  Patient On (Oxygen Delivery Method): nasal cannula        acetaminophen     Tablet .. 650 milliGRAM(s) Oral every 6 hours PRN  albuterol    90 MICROgram(s) HFA Inhaler 2 Puff(s) Inhalation every 6 hours PRN  aspirin enteric coated 81 milliGRAM(s) Oral daily  cefTRIAXone   IVPB 1000 milliGRAM(s) IV Intermittent every 24 hours  dextrose 10% Bolus 125 milliLiter(s) IV Bolus once  dextrose 5%. 1000 milliLiter(s) IV Continuous <Continuous>  dextrose 5%. 1000 milliLiter(s) IV Continuous <Continuous>  dextrose 50% Injectable 12.5 Gram(s) IV Push once  dextrose 50% Injectable 25 Gram(s) IV Push once  dextrose Oral Gel 15 Gram(s) Oral once PRN  gabapentin 400 milliGRAM(s) Oral at bedtime  glucagon  Injectable 1 milliGRAM(s) IntraMuscular once  guaiFENesin  milliGRAM(s) Oral every 12 hours PRN  heparin   Injectable 5000 Unit(s) SubCutaneous every 8 hours  insulin glargine Injectable (LANTUS) 12 Unit(s) SubCutaneous at bedtime  insulin lispro (ADMELOG) corrective regimen sliding scale   SubCutaneous three times a day before meals  insulin lispro (ADMELOG) corrective regimen sliding scale   SubCutaneous at bedtime  levoFLOXacin IVPB 750 milliGRAM(s) IV Intermittent every 48 hours  metoprolol tartrate 50 milliGRAM(s) Oral two times a day  pantoprazole    Tablet 40 milliGRAM(s) Oral before breakfast      PHYSICAL EXAM:  GENERAL: NAD,   EYES: conjunctiva and sclera clear  ENMT: Moist mucous membranes  NECK: Supple, No JVD, Normal thyroid  CHEST/LUNG: non labored, cta b/l  HEART: Regular rate and rhythm; No murmurs, rubs, or gallops  ABDOMEN: Soft, Nontender, Nondistended; Bowel sounds present  EXTREMITIES:  2+ Peripheral Pulses, No clubbing, cyanosis, or edema  LYMPH: No lymphadenopathy noted  SKIN: No rashes or lesions    Consultant(s) Notes Reviewed:  [x ] YES  [ ] NO  Care Discussed with Consultants/Other Providers [ x] YES  [ ] NO    LABS:                        10.8   8.58  )-----------( 280      ( 20 Apr 2024 08:50 )             34.3     04-20    136  |  107  |  20  ----------------------------<  191<H>  4.6   |  23  |  0.87    Ca    8.1<L>      20 Apr 2024 08:50    TPro  7.1  /  Alb  3.3  /  TBili  0.8  /  DBili  x   /  AST  16  /  ALT  16  /  AlkPhos  102  04-19      Urinalysis Basic - ( 20 Apr 2024 08:50 )    Color: x / Appearance: x / SG: x / pH: x  Gluc: 191 mg/dL / Ketone: x  / Bili: x / Urobili: x   Blood: x / Protein: x / Nitrite: x   Leuk Esterase: x / RBC: x / WBC x   Sq Epi: x / Non Sq Epi: x / Bacteria: x      CAPILLARY BLOOD GLUCOSE      POCT Blood Glucose.: 191 mg/dL (20 Apr 2024 12:44)  POCT Blood Glucose.: 121 mg/dL (20 Apr 2024 08:26)  POCT Blood Glucose.: 188 mg/dL (19 Apr 2024 21:49)        Urinalysis Basic - ( 20 Apr 2024 08:50 )    Color: x / Appearance: x / SG: x / pH: x  Gluc: 191 mg/dL / Ketone: x  / Bili: x / Urobili: x   Blood: x / Protein: x / Nitrite: x   Leuk Esterase: x / RBC: x / WBC x   Sq Epi: x / Non Sq Epi: x / Bacteria: x          RADIOLOGY & ADDITIONAL TESTS:    Imaging Personally Reviewed:  [x ] YES  [ ] NO

## 2024-04-20 NOTE — PHYSICAL THERAPY INITIAL EVALUATION ADULT - ADDITIONAL COMMENTS
Pt lives with her daughter in a private house, [address] + HR. Patient reports being independent in ADLs and ambulation prior to admission. Pt owns [employer] RW & cane.

## 2024-04-20 NOTE — PHYSICAL THERAPY INITIAL EVALUATION ADULT - PATIENT PROFILE REVIEW, REHAB EVAL
PT orders received: increase as tolerated. Consult with TAWANNA Zacarias, pt may participate in PT evaluation./yes

## 2024-04-21 LAB
ANION GAP SERPL CALC-SCNC: 6 MMOL/L — SIGNIFICANT CHANGE UP (ref 5–17)
BUN SERPL-MCNC: 15 MG/DL — SIGNIFICANT CHANGE UP (ref 7–23)
CALCIUM SERPL-MCNC: 8 MG/DL — LOW (ref 8.5–10.1)
CHLORIDE SERPL-SCNC: 102 MMOL/L — SIGNIFICANT CHANGE UP (ref 96–108)
CO2 SERPL-SCNC: 26 MMOL/L — SIGNIFICANT CHANGE UP (ref 22–31)
CREAT SERPL-MCNC: 0.86 MG/DL — SIGNIFICANT CHANGE UP (ref 0.5–1.3)
EGFR: 64 ML/MIN/1.73M2 — SIGNIFICANT CHANGE UP
GLUCOSE SERPL-MCNC: 235 MG/DL — HIGH (ref 70–99)
HCT VFR BLD CALC: 30.4 % — LOW (ref 34.5–45)
HGB BLD-MCNC: 9.9 G/DL — LOW (ref 11.5–15.5)
MCHC RBC-ENTMCNC: 28.8 PG — SIGNIFICANT CHANGE UP (ref 27–34)
MCHC RBC-ENTMCNC: 32.6 GM/DL — SIGNIFICANT CHANGE UP (ref 32–36)
MCV RBC AUTO: 88.4 FL — SIGNIFICANT CHANGE UP (ref 80–100)
NRBC # BLD: 0 /100 WBCS — SIGNIFICANT CHANGE UP (ref 0–0)
NT-PROBNP SERPL-SCNC: 1769 PG/ML — HIGH (ref 0–450)
PLATELET # BLD AUTO: 279 K/UL — SIGNIFICANT CHANGE UP (ref 150–400)
POTASSIUM SERPL-MCNC: 4.1 MMOL/L — SIGNIFICANT CHANGE UP (ref 3.5–5.3)
POTASSIUM SERPL-SCNC: 4.1 MMOL/L — SIGNIFICANT CHANGE UP (ref 3.5–5.3)
RBC # BLD: 3.44 M/UL — LOW (ref 3.8–5.2)
RBC # FLD: 14.3 % — SIGNIFICANT CHANGE UP (ref 10.3–14.5)
SODIUM SERPL-SCNC: 134 MMOL/L — LOW (ref 135–145)
WBC # BLD: 6.77 K/UL — SIGNIFICANT CHANGE UP (ref 3.8–10.5)
WBC # FLD AUTO: 6.77 K/UL — SIGNIFICANT CHANGE UP (ref 3.8–10.5)

## 2024-04-21 PROCEDURE — 93010 ELECTROCARDIOGRAM REPORT: CPT

## 2024-04-21 RX ORDER — SODIUM CHLORIDE 9 MG/ML
500 INJECTION INTRAMUSCULAR; INTRAVENOUS; SUBCUTANEOUS ONCE
Refills: 0 | Status: COMPLETED | OUTPATIENT
Start: 2024-04-21 | End: 2024-04-21

## 2024-04-21 RX ADMIN — Medication 2: at 21:49

## 2024-04-21 RX ADMIN — GABAPENTIN 400 MILLIGRAM(S): 400 CAPSULE ORAL at 21:48

## 2024-04-21 RX ADMIN — Medication 50 MILLIGRAM(S): at 05:37

## 2024-04-21 RX ADMIN — Medication 50 MILLIGRAM(S): at 17:53

## 2024-04-21 RX ADMIN — HEPARIN SODIUM 5000 UNIT(S): 5000 INJECTION INTRAVENOUS; SUBCUTANEOUS at 14:37

## 2024-04-21 RX ADMIN — BENZOCAINE AND MENTHOL 1 LOZENGE: 5; 1 LIQUID ORAL at 05:37

## 2024-04-21 RX ADMIN — INSULIN GLARGINE 12 UNIT(S): 100 INJECTION, SOLUTION SUBCUTANEOUS at 21:48

## 2024-04-21 RX ADMIN — Medication 1: at 08:24

## 2024-04-21 RX ADMIN — Medication 81 MILLIGRAM(S): at 12:14

## 2024-04-21 RX ADMIN — HEPARIN SODIUM 5000 UNIT(S): 5000 INJECTION INTRAVENOUS; SUBCUTANEOUS at 05:37

## 2024-04-21 RX ADMIN — Medication 100 MILLIGRAM(S): at 17:52

## 2024-04-21 RX ADMIN — CEFTRIAXONE 100 MILLIGRAM(S): 500 INJECTION, POWDER, FOR SOLUTION INTRAMUSCULAR; INTRAVENOUS at 14:36

## 2024-04-21 RX ADMIN — PANTOPRAZOLE SODIUM 40 MILLIGRAM(S): 20 TABLET, DELAYED RELEASE ORAL at 05:37

## 2024-04-21 RX ADMIN — Medication 100 MILLIGRAM(S): at 03:22

## 2024-04-21 RX ADMIN — SODIUM CHLORIDE 500 MILLILITER(S): 9 INJECTION INTRAMUSCULAR; INTRAVENOUS; SUBCUTANEOUS at 04:51

## 2024-04-21 RX ADMIN — BENZOCAINE AND MENTHOL 1 LOZENGE: 5; 1 LIQUID ORAL at 17:52

## 2024-04-21 RX ADMIN — HEPARIN SODIUM 5000 UNIT(S): 5000 INJECTION INTRAVENOUS; SUBCUTANEOUS at 21:49

## 2024-04-21 RX ADMIN — Medication 2: at 12:13

## 2024-04-21 NOTE — CONSULT NOTE ADULT - SUBJECTIVE AND OBJECTIVE BOX
History of Present Illness: The patient is a 89 year old female with a history of HTN, HL, DM, COPD, CVA who presented with weakness and chills. She was started on antibiotics as outpatient for UTI. She also notes a cough, at times wheezing. She has chronic shortness of breath. No chest pain or leg swelling.    Past Medical/Surgical History:  HTN, HL, DM, COPD, CVA    Medications:  Home Medications:  aspirin 81 mg oral delayed release tablet: 1 tab(s) orally once a day (19 Apr 2024 16:57)  atorvastatin 20 mg oral tablet: 1 tab(s) orally once a day (19 Apr 2024 16:59)  cyanocobalamin 1000 mcg oral tablet: 1 tab(s) orally once a day (19 Apr 2024 16:59)  furosemide 20 mg oral tablet: 1 tab(s) orally every other day as needed depending on the day as per patient (19 Apr 2024 16:59)  gabapentin 400 mg oral capsule: 1 cap(s) orally once a day (19 Apr 2024 16:59)  insulin degludec 100 units/mL subcutaneous solution: 12 unit(s) subcutaneous once a day (at bedtime) (19 Apr 2024 16:59)  magnesium oxide 400 mg oral tablet: 1 tab(s) orally once a day (19 Apr 2024 16:59)  MetFORMIN (Eqv-Fortamet) 500 mg oral tablet, extended release: 2 tab(s) orally once a day (19 Apr 2024 16:59)  methenamine hippurate 1 g oral tablet: 1 tab(s) orally 2 times a day (19 Apr 2024 16:59)  metoprolol tartrate 50 mg oral tablet: 1 tab(s) orally 2 times a day (19 Apr 2024 16:59)  Multiple Vitamins oral tablet: 1 tab(s) orally once a day (19 Apr 2024 16:59)  omeprazole 40 mg oral delayed release capsule: 1 cap(s) orally once a day (19 Apr 2024 16:59)  PreserVision AREDS 2 oral capsule: 1 cap(s) orally 2 times a day (19 Apr 2024 16:59)  Wixela Inhub 250 mcg-50 mcg inhalation powder: 1 puff(s) inhaled as needed for  shortness of breath and/or wheezing (19 Apr 2024 16:59)      Family History: Non-contributory family history of premature cardiovascular atherosclerotic disease    Social History: No tobacco, alcohol or drug use    Review of Systems:  General: +fevers, +chills, weight gain  Skin: No rashes, color changes  Cardiovascular: No chest pain, orthopnea  Respiratory: No shortness of breath, +cough  Gastrointestinal: No nausea, abdominal pain  Genitourinary: No incontinence, pain with urination  Musculoskeletal: No pain, swelling, decreased range of motion  Neurological: No headache, +weakness  Psychiatric: No depression, anxiety  Endocrine: No weight gain, increased thirst  All other systems are comprehensively negative.    Physical Exam:  Vitals:        Vital Signs Last 24 Hrs  T(C): 37.8 (21 Apr 2024 04:47), Max: 38.8 (20 Apr 2024 11:45)  T(F): 100 (21 Apr 2024 04:47), Max: 101.8 (20 Apr 2024 11:45)  HR: 113 (21 Apr 2024 05:34) (99 - 121)  BP: 113/68 (21 Apr 2024 05:34) (95/52 - 118/64)  BP(mean): --  RR: 18 (21 Apr 2024 04:47) (18 - 18)  SpO2: 94% (21 Apr 2024 04:47) (93% - 94%)  Parameters below as of 21 Apr 2024 04:47  Patient On (Oxygen Delivery Method): nasal cannula  O2 Flow (L/min): 2  General: NAD  HEENT: MMM  Neck: No JVD, no carotid bruit  Lungs: CTAB  CV: RRR, nl S1/S2, no M/R/G  Abdomen: S/NT/ND, +BS  Extremities: No LE edema, no cyanosis  Neuro: AAOx3, non-focal  Skin: No rash    Labs:                        10.8   8.58  )-----------( 280      ( 20 Apr 2024 08:50 )             34.3     04-20    136  |  107  |  20  ----------------------------<  191<H>  4.6   |  23  |  0.87    Ca    8.1<L>      20 Apr 2024 08:50    TPro  7.1  /  Alb  3.3  /  TBili  0.8  /  DBili  x   /  AST  16  /  ALT  16  /  AlkPhos  102  04-19            ECG/Telemetry: Sinus tachycardia, normal axis, nonspecific ST abnormality

## 2024-04-21 NOTE — CARE COORDINATION ASSESSMENT. - NSCAREPROVIDERS_GEN_ALL_CORE_FT
CARE PROVIDERS:  Accepting Physician: Amy Cummings  Administration: Lisandro Frost  Admitting: Amy Cummings  Attending: Amy Cummings  Consultant: Jeannette Terry  Consultant: Asif Chen  Covering Team: Kavin Garcia  ED ACP: Mariposa Sullivan  ED Attending: Cathleen Zelaya  ED Nurse: Kim Melvin  Nurse: Deann Johnson  Ordered: ADM, User  Ordered: Doctor, Unknown  Ordered: ServiceAccount, [facility]  Outpatient Provider: Emily Cole  Override: Monse Guerra  Override: Deann Johnson  Override: Zac Luna  PCA/Nursing Assistant: Monse Guerra  PCA/Nursing Assistant: Rosy Garcia  Primary Team: Hannah Valdivia  : Jasmyn Naylor

## 2024-04-21 NOTE — PROGRESS NOTE ADULT - SUBJECTIVE AND OBJECTIVE BOX
Covering [facility] DIVISION of INFECTIOUS DISEASE  KRISSY Hannon, LILLY Rajput G. Casimir WHELEHAN, EILEEN is a 89yFemale , patient examined and chart reviewed.    INTERVAL HPI/ OVERNIGHT EVENTS:   No events. Feeling better today.  Afebrile.    PAST MEDICAL & SURGICAL HISTORY:  HTN (hypertension)  CVA (cerebral vascular accident)  CAD (coronary artery disease)  cardiac stent  S/P vascular bypass  left leg      For details regarding the patient's social history, family history, and other miscellaneous elements, please refer the initial infectious diseases consultation and/or the admitting history and physical examination for this admission.    ROS:  CONSTITUTIONAL:  Negative fever or chills  EYES:  Negative  blurry vision or double vision  CARDIOVASCULAR:  Negative for chest pain or palpitations  RESPIRATORY:  Negative for cough, wheezing, or SOB   GASTROINTESTINAL:  Negative for nausea, vomiting, diarrhea, constipation, or abdominal pain  GENITOURINARY:  Negative frequency, urgency or dysuria  NEUROLOGIC:  No headache, confusion, dizziness, lightheadedness  All other systems were reviewed and are negative     ALLERGIES  Biaxin (Unknown)  penicillin (Unknown)      Current inpatient medications :    ANTIBIOTICS/RELEVANT:  cefTRIAXone   IVPB 1000 milliGRAM(s) IV Intermittent every 24 hours      acetaminophen     Tablet .. 650 milliGRAM(s) Oral every 6 hours PRN  albuterol    90 MICROgram(s) HFA Inhaler 2 Puff(s) Inhalation every 6 hours PRN  aspirin enteric coated 81 milliGRAM(s) Oral daily  benzocaine/menthol Lozenge 1 Lozenge Oral two times a day  dextrose 10% Bolus 125 milliLiter(s) IV Bolus once  dextrose 5%. 1000 milliLiter(s) IV Continuous <Continuous>  dextrose 5%. 1000 milliLiter(s) IV Continuous <Continuous>  dextrose 50% Injectable 12.5 Gram(s) IV Push once  dextrose 50% Injectable 25 Gram(s) IV Push once  dextrose Oral Gel 15 Gram(s) Oral once PRN  gabapentin 400 milliGRAM(s) Oral at bedtime  glucagon  Injectable 1 milliGRAM(s) IntraMuscular once  guaiFENesin Oral Liquid (Sugar-Free) 100 milliGRAM(s) Oral every 6 hours PRN  heparin   Injectable 5000 Unit(s) SubCutaneous every 8 hours  insulin glargine Injectable (LANTUS) 12 Unit(s) SubCutaneous at bedtime  insulin lispro (ADMELOG) corrective regimen sliding scale   SubCutaneous three times a day before meals  insulin lispro (ADMELOG) corrective regimen sliding scale   SubCutaneous at bedtime  metoprolol tartrate 50 milliGRAM(s) Oral two times a day  pantoprazole    Tablet 40 milliGRAM(s) Oral before breakfast      Objective:    04-20 @ 07:01  -  04-21 @ 07:00  --------------------------------------------------------  IN: 0 mL / OUT: 800 mL / NET: -800 mL      T(C): 36.8 (04-21-24 @ 11:37), Max: 37.8 (04-21-24 @ 04:47)  HR: 95 (04-21-24 @ 11:37) (95 - 121)  BP: 114/75 (04-21-24 @ 16:52) (101/64 - 114/75)  RR: 18 (04-21-24 @ 11:37) (18 - 18)  SpO2: 96% (04-21-24 @ 11:37) (94% - 96%)      Physical Exam:  General:  no acute distress  Neck: supple, trachea midline  Lungs: decreased no wheeze/rhonchi  Cardiovascular: regular rate and rhythm, S1 S2  Abdomen: soft, nontender,  bowel sounds normal  Neurological: alert and oriented x3  Skin: no rash  Extremities: no edema        LABS:                          9.9    6.77  )-----------( 279      ( 21 Apr 2024 08:55 )             30.4       04-21    134<L>  |  102  |  15  ----------------------------<  235<H>  4.1   |  26  |  0.86    Ca    8.0<L>      21 Apr 2024 08:55      MICROBIOLOGY:    Culture - Blood (collected 19 Apr 2024 13:25)  Source: .Blood Blood-Peripheral  Preliminary Report (21 Apr 2024 19:01):    No growth at 48 Hours    Culture - Blood (collected 19 Apr 2024 13:20)  Source: .Blood Blood-Peripheral  Preliminary Report (21 Apr 2024 19:01):    No growth at 48 Hours    Culture - Urine (collected 19 Apr 2024 13:20)  Source: Clean Catch Clean Catch (Midstream)  Final Report (20 Apr 2024 16:51):    <10,000 CFU/mL Normal Urogenital Kimberlee    RADIOLOGY & ADDITIONAL STUDIES:  ACC: [id number] EXAM:  CT CHEST   ORDERED BY:  KAISER ESTEVES     PROCEDURE DATE:  04/20/2024          INTERPRETATION:  CT CHEST WITHOUT CONTRAST    INDICATION: Obstructive pulmonary disease. Cough. Haziness on x-ray.    TECHNIQUE: Unenhanced helical images were obtained of the chest. Coronal   and sagittal images were reconstructed. Maximum intensity projection   images were generated.        COMPARISON: Radiograph chest 4/19/2024. CT chest 1/25/2020 and 12/18/2017.    FINDINGS:    Tubes/Lines: None.    Lungs, airways and pleura: Bilateral septal thickening, groundglass   opacities and peripheral consolidation in the upper lobes and bilateral   lower lobe consolidation. The opacities are superimposed upon underlying   emphysema.    Secretions in the trachea and bronchus intermedius. No pneumothorax.    Mediastinum: Hypodense right lobe thyroid nodule. The chest lymph nodes   measure less than 15 mm in the short axis. Unremarkable esophagus. The   heart is normal in size. Coronary artery calcifications. Mitral annular   calcification. Aortic valve calcifications. The aorta is normal in   caliber. Aortic calcifications.    Upper Abdomen: The left renal pelvis is dilated. Diverticulosis.   Thickening of the left adrenal gland.    Bones And Soft Tissues: The bones are unremarkable.  A right axillary   graft is partially imaged.      IMPRESSION:    1.  Bilateral septal thickening, groundglass opacities, peripheral   consolidation and pleural effusions. These findings could occur inthe   setting of pulmonary edema, however, if there are no signs of pulmonary   edema, other entities including pneumonia, could be considered.      Assessment :   89YO F PMHx of CVA, HTN and COPD admitted with CAP   In ED [id number] UA neg, CT chest with pna.   + cough  UTI ruled out    Plan :   Cont Rocephin  Fu cultures  Trend temps and cbc  Legionella and pneumococcal antigen  Asp precautions        Continue with present regiment.  Appropriate use of antibiotics and adverse effects reviewed.      I have discussed the above plan of care with patient/ family in detail. They expressed understanding of the  treatment plan . Risks, benefits and alternatives discussed in detail. I have asked if they have any questions or concerns and appropriately addressed them to the best of my ability .    > 35 minutes were spent in direct patient care reviewing notes, medications ,labs data/ imaging , discussion with multidisciplinary team.    Thank you for allowing me to participate in care of your patient .    Jeannette Terry MD  Infectious Disease  [phone]

## 2024-04-21 NOTE — PATIENT CHOICE NOTE. - NSPOSTACUTEPROV_GEN_ALL_CORE
Subacute Rehab [FreeTextEntry1] : SOAPP: low on 11/16/22 \par Low risk: Patient has combination of a low risk SOAP and no risk factors. UDS would be repeated randomly every quarter.\par \par UDS:12/15/22, repeated 01/18/23\par \par [place] REGISTRY: Checked\par \par \par \par

## 2024-04-21 NOTE — PROVIDER CONTACT NOTE (OTHER) - SITUATION
HR -118-121, B/P 101/64, parameters for Lopressor are to hold for B/P less than 110, 1800 dose not given d/t parameter

## 2024-04-21 NOTE — CARE COORDINATION ASSESSMENT. - OTHER PERTINENT DISCHARGE PLANNING INFORMATION:
SW met with this pt at bedside with dtr present- sw discussed name role elos and dc planning. Pt from home, lives with dtr, independent ambulator, has no homecare services previously, PCP is Dr Moreira and pharmacy is [facility] on [facility]. Pt does not used home o2. PT recommending LEELEE, pt and dtr both in agreement. LEELEE list reviewed and left at the bedside. SW to follow.

## 2024-04-21 NOTE — CARE COORDINATION ASSESSMENT. - NSPASTMEDSURGHISTORY_GEN_ALL_CORE_FT
PAST MEDICAL & SURGICAL HISTORY:  CVA (cerebral vascular accident)      HTN (hypertension)      S/P vascular bypass  left leg      CAD (coronary artery disease)  cardiac stent

## 2024-04-21 NOTE — PROGRESS NOTE ADULT - ASSESSMENT
89 yo female with PMHx of CVA (cerebral vascular accident), HTN (hypertension) , DM2 and COPD aw dysuria, fever/chills and weakness  +leucocytosis, +UA,   aw sepsis dt UTI    #Sepsis dt UTI/and pneumonia  cont ceftriaxone  levaquin dced  fu Ucx neg( pt received nitrofurantoin PTA), Bld cx ngtd  ID cs fu noted    #Worsening cough  hx of stable copd  RVP neg  CXR- diffuse interstitial lung dz  check probnp  will obtain CT chest- showed GC opacities- pulm edema vs pneumonia  check probnp- 1700, fu TTE  cards cs, pulm and  ID cs fu    #HTN- hold lasix dt sepsis  resume bblocker    #DM-2- lantus and sliding scale    #DVT ppx- hep sq    d/w dgtr at bedside plan of care    [facility]   [phone]       I will SWITCH the dose or number of times a day I take the medications listed below when I get home from the hospital:    aspirin 325 mg oral tablet  -- 1 tab(s) by mouth once a day

## 2024-04-21 NOTE — PROGRESS NOTE ADULT - SUBJECTIVE AND OBJECTIVE BOX
Patient is a 89y old  Female who presents with a chief complaint of     INTERVAL HPI/OVERNIGHT EVENTS: noted  pt seen and examined this am   events noted  feels well      Vital Signs Last 24 Hrs  T(C): 36.8 (21 Apr 2024 11:37), Max: 37.8 (21 Apr 2024 04:47)  T(F): 98.2 (21 Apr 2024 11:37), Max: 100 (21 Apr 2024 04:47)  HR: 95 (21 Apr 2024 11:37) (95 - 121)  BP: 114/67 (21 Apr 2024 11:37) (95/52 - 114/67)  BP(mean): --  RR: 18 (21 Apr 2024 11:37) (18 - 18)  SpO2: 96% (21 Apr 2024 11:37) (94% - 96%)    Parameters below as of 21 Apr 2024 11:37  Patient On (Oxygen Delivery Method): nasal cannula        acetaminophen     Tablet .. 650 milliGRAM(s) Oral every 6 hours PRN  albuterol    90 MICROgram(s) HFA Inhaler 2 Puff(s) Inhalation every 6 hours PRN  aspirin enteric coated 81 milliGRAM(s) Oral daily  benzocaine/menthol Lozenge 1 Lozenge Oral two times a day  cefTRIAXone   IVPB 1000 milliGRAM(s) IV Intermittent every 24 hours  dextrose 10% Bolus 125 milliLiter(s) IV Bolus once  dextrose 5%. 1000 milliLiter(s) IV Continuous <Continuous>  dextrose 5%. 1000 milliLiter(s) IV Continuous <Continuous>  dextrose 50% Injectable 12.5 Gram(s) IV Push once  dextrose 50% Injectable 25 Gram(s) IV Push once  dextrose Oral Gel 15 Gram(s) Oral once PRN  gabapentin 400 milliGRAM(s) Oral at bedtime  glucagon  Injectable 1 milliGRAM(s) IntraMuscular once  guaiFENesin Oral Liquid (Sugar-Free) 100 milliGRAM(s) Oral every 6 hours PRN  heparin   Injectable 5000 Unit(s) SubCutaneous every 8 hours  insulin glargine Injectable (LANTUS) 12 Unit(s) SubCutaneous at bedtime  insulin lispro (ADMELOG) corrective regimen sliding scale   SubCutaneous three times a day before meals  insulin lispro (ADMELOG) corrective regimen sliding scale   SubCutaneous at bedtime  metoprolol tartrate 50 milliGRAM(s) Oral two times a day  pantoprazole    Tablet 40 milliGRAM(s) Oral before breakfast      PHYSICAL EXAM:  GENERAL: NAD,   EYES: conjunctiva and sclera clear  ENMT: Moist mucous membranes  NECK: Supple, No JVD, Normal thyroid  CHEST/LUNG: non labored, cta b/l  HEART: Regular rate and rhythm; No murmurs, rubs, or gallops  ABDOMEN: Soft, Nontender, Nondistended; Bowel sounds present  EXTREMITIES:  2+ Peripheral Pulses, No clubbing, cyanosis, or edema  LYMPH: No lymphadenopathy noted  SKIN: No rashes or lesions    Consultant(s) Notes Reviewed:  [x ] YES  [ ] NO  Care Discussed with Consultants/Other Providers [ x] YES  [ ] NO    LABS:                        9.9    6.77  )-----------( 279      ( 21 Apr 2024 08:55 )             30.4     04-21    134<L>  |  102  |  15  ----------------------------<  235<H>  4.1   |  26  |  0.86    Ca    8.0<L>      21 Apr 2024 08:55        Urinalysis Basic - ( 21 Apr 2024 08:55 )    Color: x / Appearance: x / SG: x / pH: x  Gluc: 235 mg/dL / Ketone: x  / Bili: x / Urobili: x   Blood: x / Protein: x / Nitrite: x   Leuk Esterase: x / RBC: x / WBC x   Sq Epi: x / Non Sq Epi: x / Bacteria: x      CAPILLARY BLOOD GLUCOSE      POCT Blood Glucose.: 204 mg/dL (21 Apr 2024 12:08)  POCT Blood Glucose.: 166 mg/dL (21 Apr 2024 08:17)  POCT Blood Glucose.: 178 mg/dL (20 Apr 2024 21:17)  POCT Blood Glucose.: 214 mg/dL (20 Apr 2024 17:05)        Urinalysis Basic - ( 21 Apr 2024 08:55 )    Color: x / Appearance: x / SG: x / pH: x  Gluc: 235 mg/dL / Ketone: x  / Bili: x / Urobili: x   Blood: x / Protein: x / Nitrite: x   Leuk Esterase: x / RBC: x / WBC x   Sq Epi: x / Non Sq Epi: x / Bacteria: x        Culture - Blood (collected 19 Apr 2024 13:25)  Source: .Blood Blood-Peripheral  Preliminary Report (20 Apr 2024 19:01):    No growth at 24 hours    Culture - Blood (collected 19 Apr 2024 13:20)  Source: .Blood Blood-Peripheral  Preliminary Report (20 Apr 2024 19:01):    No growth at 24 hours    Culture - Urine (collected 19 Apr 2024 13:20)  Source: Clean Catch Clean Catch (Midstream)  Final Report (20 Apr 2024 16:51):    <10,000 CFU/mL Normal Urogenital Kimberlee        RADIOLOGY & ADDITIONAL TESTS:    Imaging Personally Reviewed:  [x ] YES  [ ] NO

## 2024-04-22 ENCOUNTER — RESULT REVIEW (OUTPATIENT)
Age: 89
End: 2024-04-22

## 2024-04-22 LAB
ANION GAP SERPL CALC-SCNC: 6 MMOL/L — SIGNIFICANT CHANGE UP (ref 5–17)
BUN SERPL-MCNC: 15 MG/DL — SIGNIFICANT CHANGE UP (ref 7–23)
CALCIUM SERPL-MCNC: 8.8 MG/DL — SIGNIFICANT CHANGE UP (ref 8.5–10.1)
CHLORIDE SERPL-SCNC: 102 MMOL/L — SIGNIFICANT CHANGE UP (ref 96–108)
CO2 SERPL-SCNC: 28 MMOL/L — SIGNIFICANT CHANGE UP (ref 22–31)
CREAT SERPL-MCNC: 0.78 MG/DL — SIGNIFICANT CHANGE UP (ref 0.5–1.3)
EGFR: 72 ML/MIN/1.73M2 — SIGNIFICANT CHANGE UP
GLUCOSE SERPL-MCNC: 189 MG/DL — HIGH (ref 70–99)
HCT VFR BLD CALC: 30.8 % — LOW (ref 34.5–45)
HGB BLD-MCNC: 10.1 G/DL — LOW (ref 11.5–15.5)
MCHC RBC-ENTMCNC: 28.7 PG — SIGNIFICANT CHANGE UP (ref 27–34)
MCHC RBC-ENTMCNC: 32.8 GM/DL — SIGNIFICANT CHANGE UP (ref 32–36)
MCV RBC AUTO: 87.5 FL — SIGNIFICANT CHANGE UP (ref 80–100)
NRBC # BLD: 0 /100 WBCS — SIGNIFICANT CHANGE UP (ref 0–0)
PLATELET # BLD AUTO: 303 K/UL — SIGNIFICANT CHANGE UP (ref 150–400)
POTASSIUM SERPL-MCNC: 4.1 MMOL/L — SIGNIFICANT CHANGE UP (ref 3.5–5.3)
POTASSIUM SERPL-SCNC: 4.1 MMOL/L — SIGNIFICANT CHANGE UP (ref 3.5–5.3)
RBC # BLD: 3.52 M/UL — LOW (ref 3.8–5.2)
RBC # FLD: 14 % — SIGNIFICANT CHANGE UP (ref 10.3–14.5)
SODIUM SERPL-SCNC: 136 MMOL/L — SIGNIFICANT CHANGE UP (ref 135–145)
WBC # BLD: 6.34 K/UL — SIGNIFICANT CHANGE UP (ref 3.8–10.5)
WBC # FLD AUTO: 6.34 K/UL — SIGNIFICANT CHANGE UP (ref 3.8–10.5)

## 2024-04-22 RX ADMIN — CEFTRIAXONE 100 MILLIGRAM(S): 500 INJECTION, POWDER, FOR SOLUTION INTRAMUSCULAR; INTRAVENOUS at 14:49

## 2024-04-22 RX ADMIN — Medication 2: at 17:21

## 2024-04-22 RX ADMIN — HEPARIN SODIUM 5000 UNIT(S): 5000 INJECTION INTRAVENOUS; SUBCUTANEOUS at 14:48

## 2024-04-22 RX ADMIN — GABAPENTIN 400 MILLIGRAM(S): 400 CAPSULE ORAL at 22:22

## 2024-04-22 RX ADMIN — Medication 100 MILLIGRAM(S): at 06:01

## 2024-04-22 RX ADMIN — BENZOCAINE AND MENTHOL 1 LOZENGE: 5; 1 LIQUID ORAL at 18:52

## 2024-04-22 RX ADMIN — HEPARIN SODIUM 5000 UNIT(S): 5000 INJECTION INTRAVENOUS; SUBCUTANEOUS at 22:21

## 2024-04-22 RX ADMIN — INSULIN GLARGINE 12 UNIT(S): 100 INJECTION, SOLUTION SUBCUTANEOUS at 22:22

## 2024-04-22 RX ADMIN — BENZOCAINE AND MENTHOL 1 LOZENGE: 5; 1 LIQUID ORAL at 06:01

## 2024-04-22 RX ADMIN — Medication 1: at 22:20

## 2024-04-22 RX ADMIN — Medication 100 MILLIGRAM(S): at 22:30

## 2024-04-22 RX ADMIN — Medication 2: at 12:13

## 2024-04-22 RX ADMIN — HEPARIN SODIUM 5000 UNIT(S): 5000 INJECTION INTRAVENOUS; SUBCUTANEOUS at 06:01

## 2024-04-22 RX ADMIN — ALBUTEROL 2 PUFF(S): 90 AEROSOL, METERED ORAL at 22:29

## 2024-04-22 RX ADMIN — PANTOPRAZOLE SODIUM 40 MILLIGRAM(S): 20 TABLET, DELAYED RELEASE ORAL at 06:02

## 2024-04-22 RX ADMIN — Medication 81 MILLIGRAM(S): at 14:48

## 2024-04-22 RX ADMIN — Medication 1: at 08:13

## 2024-04-22 RX ADMIN — Medication 50 MILLIGRAM(S): at 18:52

## 2024-04-22 NOTE — CASE MANAGEMENT PROGRESS NOTE - NSCMPROGRESSNOTE_GEN_ALL_CORE
Discussed patient in interdisciplinary rounds.  Patient admitted with UTI on IVAX and anticipate LEELEE when D/C ready.  WIll monitor for transition needs

## 2024-04-22 NOTE — PROGRESS NOTE ADULT - SUBJECTIVE AND OBJECTIVE BOX
[facility], Division of Infectious Diseases  KRISSY Luong Y. Patel, S. Shah, G. [facility]  [phone]    Name: FEDERICO DOS SANTOS  Age: 89y  Gender: Female  MRN: [id number]    Interval History:  Patient seen and examined at bedside  No acute overnight events. Afebrile  No complaints  Notes reviewed    Antibiotics:  cefTRIAXone   IVPB 1000 milliGRAM(s) IV Intermittent every 24 hours      Medications:  acetaminophen     Tablet .. 650 milliGRAM(s) Oral every 6 hours PRN  albuterol    90 MICROgram(s) HFA Inhaler 2 Puff(s) Inhalation every 6 hours PRN  aspirin enteric coated 81 milliGRAM(s) Oral daily  benzocaine/menthol Lozenge 1 Lozenge Oral two times a day  cefTRIAXone   IVPB 1000 milliGRAM(s) IV Intermittent every 24 hours  dextrose 10% Bolus 125 milliLiter(s) IV Bolus once  dextrose 5%. 1000 milliLiter(s) IV Continuous <Continuous>  dextrose 5%. 1000 milliLiter(s) IV Continuous <Continuous>  dextrose 50% Injectable 25 Gram(s) IV Push once  dextrose 50% Injectable 12.5 Gram(s) IV Push once  dextrose Oral Gel 15 Gram(s) Oral once PRN  gabapentin 400 milliGRAM(s) Oral at bedtime  glucagon  Injectable 1 milliGRAM(s) IntraMuscular once  guaiFENesin Oral Liquid (Sugar-Free) 100 milliGRAM(s) Oral every 6 hours PRN  heparin   Injectable 5000 Unit(s) SubCutaneous every 8 hours  insulin glargine Injectable (LANTUS) 12 Unit(s) SubCutaneous at bedtime  insulin lispro (ADMELOG) corrective regimen sliding scale   SubCutaneous three times a day before meals  insulin lispro (ADMELOG) corrective regimen sliding scale   SubCutaneous at bedtime  metoprolol tartrate 50 milliGRAM(s) Oral two times a day  pantoprazole    Tablet 40 milliGRAM(s) Oral before breakfast      Review of Systems:  A 10-point review of systems was obtained.   Review of systems otherwise negative except as previously noted.    Allergies: Biaxin (Unknown)  penicillin (Unknown)    For details regarding the patient's past medical history, social history, family history, and other miscellaneous elements, please refer the initial infectious diseases consultation and/or the admitting history and physical examination for this admission.    Objective:  Vitals:   T(C): 36.8 (04-22-24 @ 04:47), Max: 36.8 (04-21-24 @ 11:37)  HR: 92 (04-22-24 @ 04:47) (87 - 95)  BP: 107/61 (04-22-24 @ 04:47) (107/61 - 145/71)  RR: 18 (04-22-24 @ 04:47) (18 - 18)  SpO2: 97% (04-22-24 @ 04:47) (96% - 97%)    Physical Examination:  General: no acute distress  HEENT: NC/AT, EOMI,   Cardio: S1, S2 heard, RRR, no murmurs  Resp: decreased breath sounds  Abd: soft, NT, ND,  Ext: no edema or cyanosis  Skin: warm, dry, no visible rash      Laboratory Studies:  CBC:                       10.1   6.34  )-----------( 303      ( 22 Apr 2024 07:40 )             30.8     CMP: 04-22    136  |  102  |  15  ----------------------------<  189<H>  4.1   |  28  |  0.78    Ca    8.8      22 Apr 2024 07:40        Urinalysis Basic - ( 22 Apr 2024 07:40 )    Color: x / Appearance: x / SG: x / pH: x  Gluc: 189 mg/dL / Ketone: x  / Bili: x / Urobili: x   Blood: x / Protein: x / Nitrite: x   Leuk Esterase: x / RBC: x / WBC x   Sq Epi: x / Non Sq Epi: x / Bacteria: x        Microbiology: reviewed    Culture - Blood (collected 04-19-24 @ 13:25)  Source: .Blood Blood-Peripheral  Preliminary Report (04-21-24 @ 19:01):    No growth at 48 Hours    Culture - Blood (collected 04-19-24 @ 13:20)  Source: .Blood Blood-Peripheral  Preliminary Report (04-21-24 @ 19:01):    No growth at 48 Hours    Culture - Urine (collected 04-19-24 @ 13:20)  Source: Clean Catch Clean Catch (Midstream)  Final Report (04-20-24 @ 16:51):    <10,000 CFU/mL Normal Urogenital Kimberlee          Radiology: reviewed

## 2024-04-22 NOTE — PROGRESS NOTE ADULT - SUBJECTIVE AND OBJECTIVE BOX
Chief Complaint: Weakness, chills    Interval Events: No events overnight.    Review of Systems:  General: No fevers, chills, weight gain  Skin: No rashes, color changes  Cardiovascular: No chest pain, orthopnea  Respiratory: No shortness of breath, cough  Gastrointestinal: No nausea, abdominal pain  Genitourinary: No incontinence, pain with urination  Musculoskeletal: No pain, swelling, decreased range of motion  Neurological: No headache, weakness  Psychiatric: No depression, anxiety  Endocrine: No weight gain, increased thirst  All other systems are comprehensively negative.    Physical Exam:  Vitals:        Vital Signs Last 24 Hrs  T(C): 36.8 (22 Apr 2024 04:47), Max: 36.8 (21 Apr 2024 11:37)  T(F): 98.3 (22 Apr 2024 04:47), Max: 98.3 (22 Apr 2024 04:47)  HR: 92 (22 Apr 2024 04:47) (87 - 95)  BP: 107/61 (22 Apr 2024 04:47) (107/61 - 145/71)  BP(mean): --  RR: 18 (22 Apr 2024 04:47) (18 - 18)  SpO2: 97% (22 Apr 2024 04:47) (96% - 97%)  Parameters below as of 22 Apr 2024 04:47  Patient On (Oxygen Delivery Method): nasal cannula  O2 Flow (L/min): 2  General: NAD  HEENT: MMM  Neck: No JVD, no carotid bruit  Lungs: CTAB  CV: RRR, nl S1/S2, no M/R/G  Abdomen: S/NT/ND, +BS  Extremities: No LE edema, no cyanosis  Neuro: AAOx3, non-focal  Skin: No rash    Labs:                        9.9    6.77  )-----------( 279      ( 21 Apr 2024 08:55 )             30.4     04-21    134<L>  |  102  |  15  ----------------------------<  235<H>  4.1   |  26  |  0.86    Ca    8.0<L>      21 Apr 2024 08:55              ECG/Telemetry: Sinus rhythm

## 2024-04-22 NOTE — PROGRESS NOTE ADULT - ASSESSMENT
The patient is a 89 year old female with a history of HTN, HL, DM, COPD, CVA who presented with weakness and chills in the setting of UTI, possible PNA.    Plan:  - ECG with sinus tachycardia and otherwise nonspecific findings  - CT chest with bilateral septal thickening, GGO, consolidations and pleural effusions - PNA vs. heart failure  - BNP 1769  - Echo pending  - No signs of decompensated heart failure on exam  - Hold off with diuretics for now; await echo  - Continue aspirin 81 mg daily  - Continue atorvastatin 20 mg daily  - Continue metoprolol tartrate 50 mg bid  - IV antibiotics

## 2024-04-22 NOTE — PROGRESS NOTE ADULT - ASSESSMENT
89 yo female with PMHx of CVA (cerebral vascular accident), HTN (hypertension) , DM2 and COPD aw dysuria, fever/chills and weakness  +leucocytosis, +UA,   aw sepsis dt UTI    #Sepsis dt UTI/and pneumonia  cont ceftriaxone  levaquin dced  fu Ucx neg( pt received nitrofurantoin PTA), Bld cx ngtd  ID cs fu noted    #Worsening cough  hx of stable copd  RVP neg  CXR- diffuse interstitial lung dz  check probnp  will obtain CT chest- showed GC opacities- pulm edema vs pneumonia  check probnp- 1700, fu TTE  cards cs, pulm and  ID cs fu    #HTN- hold lasix dt sepsis  resume bblocker    #DM-2- lantus and sliding scale    #DVT ppx- hep sq    d/w dgtr at bedside plan of care    [facility]   [phone]

## 2024-04-22 NOTE — PHARMACOTHERAPY INTERVENTION NOTE - COMMENTS
Admission counseling - discussed current and new medications with the patient at bedside including indications, directions, and adverse effects to monitor. Patient verbalized understanding and had no further questions.
Diabetes Medication on Admission: Humalog, Lantus,     Diabetes medications and hospital hypoglycemic protocol was counseled by Pharmacist at bedside on indications, directions, and side effects.    Patient verbalized understanding and had no further questions.

## 2024-04-22 NOTE — PROGRESS NOTE ADULT - SUBJECTIVE AND OBJECTIVE BOX
Patient is a 89y old  Female who presents with a chief complaint of     INTERVAL HPI/OVERNIGHT EVENTS: noted  pt seen and examined this am   events noted  feels well      Vital Signs Last 24 Hrs  T(C): 36.7 (22 Apr 2024 11:27), Max: 36.8 (22 Apr 2024 04:47)  T(F): 98 (22 Apr 2024 11:27), Max: 98.3 (22 Apr 2024 04:47)  HR: 103 (22 Apr 2024 11:27) (87 - 103)  BP: 111/73 (22 Apr 2024 11:27) (107/61 - 145/71)  BP(mean): --  RR: 18 (22 Apr 2024 11:27) (18 - 18)  SpO2: 95% (22 Apr 2024 11:27) (95% - 97%)    Parameters below as of 22 Apr 2024 11:27  Patient On (Oxygen Delivery Method): nasal cannula        acetaminophen     Tablet .. 650 milliGRAM(s) Oral every 6 hours PRN  albuterol    90 MICROgram(s) HFA Inhaler 2 Puff(s) Inhalation every 6 hours PRN  aspirin enteric coated 81 milliGRAM(s) Oral daily  benzocaine/menthol Lozenge 1 Lozenge Oral two times a day  dextrose 10% Bolus 125 milliLiter(s) IV Bolus once  dextrose 5%. 1000 milliLiter(s) IV Continuous <Continuous>  dextrose 5%. 1000 milliLiter(s) IV Continuous <Continuous>  dextrose 50% Injectable 25 Gram(s) IV Push once  dextrose 50% Injectable 12.5 Gram(s) IV Push once  dextrose Oral Gel 15 Gram(s) Oral once PRN  gabapentin 400 milliGRAM(s) Oral at bedtime  glucagon  Injectable 1 milliGRAM(s) IntraMuscular once  guaiFENesin Oral Liquid (Sugar-Free) 100 milliGRAM(s) Oral every 6 hours PRN  heparin   Injectable 5000 Unit(s) SubCutaneous every 8 hours  insulin glargine Injectable (LANTUS) 12 Unit(s) SubCutaneous at bedtime  insulin lispro (ADMELOG) corrective regimen sliding scale   SubCutaneous three times a day before meals  insulin lispro (ADMELOG) corrective regimen sliding scale   SubCutaneous at bedtime  metoprolol tartrate 50 milliGRAM(s) Oral two times a day  pantoprazole    Tablet 40 milliGRAM(s) Oral before breakfast      PHYSICAL EXAM:  GENERAL: NAD,   EYES: conjunctiva and sclera clear  ENMT: Moist mucous membranes  NECK: Supple, No JVD, Normal thyroid  CHEST/LUNG: non labored, cta b/l  HEART: Regular rate and rhythm; No murmurs, rubs, or gallops  ABDOMEN: Soft, Nontender, Nondistended; Bowel sounds present  EXTREMITIES:  2+ Peripheral Pulses, No clubbing, cyanosis, or edema  LYMPH: No lymphadenopathy noted  SKIN: No rashes or lesions    Consultant(s) Notes Reviewed:  [x ] YES  [ ] NO  Care Discussed with Consultants/Other Providers [ x] YES  [ ] NO    LABS:                        10.1   6.34  )-----------( 303      ( 22 Apr 2024 07:40 )             30.8     04-22    136  |  102  |  15  ----------------------------<  189<H>  4.1   |  28  |  0.78    Ca    8.8      22 Apr 2024 07:40        Urinalysis Basic - ( 22 Apr 2024 07:40 )    Color: x / Appearance: x / SG: x / pH: x  Gluc: 189 mg/dL / Ketone: x  / Bili: x / Urobili: x   Blood: x / Protein: x / Nitrite: x   Leuk Esterase: x / RBC: x / WBC x   Sq Epi: x / Non Sq Epi: x / Bacteria: x      CAPILLARY BLOOD GLUCOSE      POCT Blood Glucose.: 214 mg/dL (22 Apr 2024 11:59)  POCT Blood Glucose.: 181 mg/dL (22 Apr 2024 07:56)  POCT Blood Glucose.: 336 mg/dL (21 Apr 2024 21:41)  POCT Blood Glucose.: 147 mg/dL (21 Apr 2024 16:55)        Urinalysis Basic - ( 22 Apr 2024 07:40 )    Color: x / Appearance: x / SG: x / pH: x  Gluc: 189 mg/dL / Ketone: x  / Bili: x / Urobili: x   Blood: x / Protein: x / Nitrite: x   Leuk Esterase: x / RBC: x / WBC x   Sq Epi: x / Non Sq Epi: x / Bacteria: x          RADIOLOGY & ADDITIONAL TESTS:    Imaging Personally Reviewed:  [x ] YES  [ ] NO

## 2024-04-23 ENCOUNTER — TRANSCRIPTION ENCOUNTER (OUTPATIENT)
Age: 89
End: 2024-04-23

## 2024-04-23 DIAGNOSIS — E11.65 TYPE 2 DIABETES MELLITUS WITH HYPERGLYCEMIA: ICD-10-CM

## 2024-04-23 LAB
BASE EXCESS BLDV CALC-SCNC: 3.3 MMOL/L — HIGH (ref -2–3)
BLOOD GAS COMMENTS, VENOUS: SIGNIFICANT CHANGE UP
D DIMER BLD IA.RAPID-MCNC: 1267 NG/ML DDU — HIGH
HCO3 BLDV-SCNC: 28 MMOL/L — SIGNIFICANT CHANGE UP (ref 22–29)
PCO2 BLDV: 48 MMHG — HIGH (ref 39–42)
PH BLDV: 7.38 — SIGNIFICANT CHANGE UP (ref 7.32–7.43)
PO2 BLDV: 89 MMHG — HIGH (ref 25–45)
SAO2 % BLDV: 98.9 % — HIGH (ref 67–88)

## 2024-04-23 PROCEDURE — 71045 X-RAY EXAM CHEST 1 VIEW: CPT | Mod: 26

## 2024-04-23 PROCEDURE — 99222 1ST HOSP IP/OBS MODERATE 55: CPT

## 2024-04-23 RX ORDER — POLYETHYLENE GLYCOL 3350 17 G/17G
17 POWDER, FOR SOLUTION ORAL DAILY
Refills: 0 | Status: DISCONTINUED | OUTPATIENT
Start: 2024-04-23 | End: 2024-04-27

## 2024-04-23 RX ORDER — IPRATROPIUM/ALBUTEROL SULFATE 18-103MCG
3 AEROSOL WITH ADAPTER (GRAM) INHALATION EVERY 8 HOURS
Refills: 0 | Status: DISCONTINUED | OUTPATIENT
Start: 2024-04-23 | End: 2024-04-27

## 2024-04-23 RX ORDER — SENNA PLUS 8.6 MG/1
2 TABLET ORAL AT BEDTIME
Refills: 0 | Status: DISCONTINUED | OUTPATIENT
Start: 2024-04-23 | End: 2024-04-27

## 2024-04-23 RX ORDER — CEFUROXIME AXETIL 250 MG
500 TABLET ORAL EVERY 12 HOURS
Refills: 0 | Status: COMPLETED | OUTPATIENT
Start: 2024-04-23 | End: 2024-04-26

## 2024-04-23 RX ADMIN — POLYETHYLENE GLYCOL 3350 17 GRAM(S): 17 POWDER, FOR SOLUTION ORAL at 14:35

## 2024-04-23 RX ADMIN — INSULIN GLARGINE 12 UNIT(S): 100 INJECTION, SOLUTION SUBCUTANEOUS at 22:00

## 2024-04-23 RX ADMIN — Medication 2: at 12:34

## 2024-04-23 RX ADMIN — Medication 50 MILLIGRAM(S): at 17:22

## 2024-04-23 RX ADMIN — Medication 100 MILLIGRAM(S): at 05:43

## 2024-04-23 RX ADMIN — BENZOCAINE AND MENTHOL 1 LOZENGE: 5; 1 LIQUID ORAL at 05:43

## 2024-04-23 RX ADMIN — GABAPENTIN 400 MILLIGRAM(S): 400 CAPSULE ORAL at 22:00

## 2024-04-23 RX ADMIN — Medication 81 MILLIGRAM(S): at 12:38

## 2024-04-23 RX ADMIN — HEPARIN SODIUM 5000 UNIT(S): 5000 INJECTION INTRAVENOUS; SUBCUTANEOUS at 22:01

## 2024-04-23 RX ADMIN — PANTOPRAZOLE SODIUM 40 MILLIGRAM(S): 20 TABLET, DELAYED RELEASE ORAL at 05:43

## 2024-04-23 RX ADMIN — HEPARIN SODIUM 5000 UNIT(S): 5000 INJECTION INTRAVENOUS; SUBCUTANEOUS at 05:43

## 2024-04-23 RX ADMIN — Medication 500 MILLIGRAM(S): at 17:22

## 2024-04-23 RX ADMIN — BENZOCAINE AND MENTHOL 1 LOZENGE: 5; 1 LIQUID ORAL at 17:23

## 2024-04-23 RX ADMIN — SENNA PLUS 2 TABLET(S): 8.6 TABLET ORAL at 22:00

## 2024-04-23 RX ADMIN — Medication 1: at 08:25

## 2024-04-23 RX ADMIN — HEPARIN SODIUM 5000 UNIT(S): 5000 INJECTION INTRAVENOUS; SUBCUTANEOUS at 14:35

## 2024-04-23 RX ADMIN — Medication 1: at 17:23

## 2024-04-23 NOTE — PROGRESS NOTE ADULT - SUBJECTIVE AND OBJECTIVE BOX
Chief Complaint: Weakness, chills    Interval Events: No events overnight.    Review of Systems:  General: No fevers, chills, weight gain  Skin: No rashes, color changes  Cardiovascular: No chest pain, orthopnea  Respiratory: No shortness of breath, cough  Gastrointestinal: No nausea, abdominal pain  Genitourinary: No incontinence, pain with urination  Musculoskeletal: No pain, swelling, decreased range of motion  Neurological: No headache, weakness  Psychiatric: No depression, anxiety  Endocrine: No weight gain, increased thirst  All other systems are comprehensively negative.    Physical Exam:  Vital Signs Last 24 Hrs  T(C): 36.3 (23 Apr 2024 05:11), Max: 36.7 (22 Apr 2024 11:27)  T(F): 97.4 (23 Apr 2024 05:11), Max: 98.1 (22 Apr 2024 21:15)  HR: 96 (23 Apr 2024 05:11) (86 - 103)  BP: 107/57 (23 Apr 2024 05:11) (107/57 - 122/69)  BP(mean): --  RR: 18 (23 Apr 2024 05:11) (18 - 18)  SpO2: 94% (23 Apr 2024 05:11) (94% - 95%)  Parameters below as of 23 Apr 2024 05:11  Patient On (Oxygen Delivery Method): nasal cannula  O2 Flow (L/min): 2  General: NAD  HEENT: MMM  Neck: No JVD, no carotid bruit  Lungs: CTAB  CV: RRR, nl S1/S2, no M/R/G  Abdomen: S/NT/ND, +BS  Extremities: No LE edema, no cyanosis  Neuro: AAOx3, non-focal  Skin: No rash    Labs:             04-22    136  |  102  |  15  ----------------------------<  189<H>  4.1   |  28  |  0.78    Ca    8.8      22 Apr 2024 07:40                          10.1   6.34  )-----------( 303      ( 22 Apr 2024 07:40 )             30.8       ECG/Telemetry: Sinus rhythm

## 2024-04-23 NOTE — PROGRESS NOTE ADULT - SUBJECTIVE AND OBJECTIVE BOX
Patient is a 89y old  Female who presents with a chief complaint of     INTERVAL HPI/OVERNIGHT EVENTS: noted  pt seen and examined this am   events noted  feels well      Vital Signs Last 24 Hrs  T(C): 36.9 (23 Apr 2024 11:41), Max: 36.9 (23 Apr 2024 11:41)  T(F): 98.4 (23 Apr 2024 11:41), Max: 98.4 (23 Apr 2024 11:41)  HR: 101 (23 Apr 2024 11:41) (86 - 101)  BP: 116/55 (23 Apr 2024 11:41) (107/57 - 122/69)  BP(mean): --  RR: 18 (23 Apr 2024 11:41) (18 - 18)  SpO2: 98% (23 Apr 2024 11:41) (94% - 98%)    Parameters below as of 23 Apr 2024 11:41  Patient On (Oxygen Delivery Method): nasal cannula        acetaminophen     Tablet .. 650 milliGRAM(s) Oral every 6 hours PRN  albuterol/ipratropium for Nebulization 3 milliLiter(s) Nebulizer every 8 hours  aspirin enteric coated 81 milliGRAM(s) Oral daily  benzocaine/menthol Lozenge 1 Lozenge Oral two times a day  cefuroxime   Tablet 500 milliGRAM(s) Oral every 12 hours  dextrose 10% Bolus 125 milliLiter(s) IV Bolus once  dextrose 5%. 1000 milliLiter(s) IV Continuous <Continuous>  dextrose 5%. 1000 milliLiter(s) IV Continuous <Continuous>  dextrose 50% Injectable 12.5 Gram(s) IV Push once  dextrose 50% Injectable 25 Gram(s) IV Push once  dextrose Oral Gel 15 Gram(s) Oral once PRN  gabapentin 400 milliGRAM(s) Oral at bedtime  glucagon  Injectable 1 milliGRAM(s) IntraMuscular once  guaiFENesin Oral Liquid (Sugar-Free) 100 milliGRAM(s) Oral every 6 hours PRN  heparin   Injectable 5000 Unit(s) SubCutaneous every 8 hours  insulin glargine Injectable (LANTUS) 12 Unit(s) SubCutaneous at bedtime  insulin lispro (ADMELOG) corrective regimen sliding scale   SubCutaneous three times a day before meals  insulin lispro (ADMELOG) corrective regimen sliding scale   SubCutaneous at bedtime  metoprolol tartrate 50 milliGRAM(s) Oral two times a day  pantoprazole    Tablet 40 milliGRAM(s) Oral before breakfast  polyethylene glycol 3350 17 Gram(s) Oral daily  senna 2 Tablet(s) Oral at bedtime      PHYSICAL EXAM:  GENERAL: NAD,   EYES: conjunctiva and sclera clear  ENMT: Moist mucous membranes  NECK: Supple, No JVD, Normal thyroid  CHEST/LUNG: non labored, cta b/l  HEART: Regular rate and rhythm; No murmurs, rubs, or gallops  ABDOMEN: Soft, Nontender, Nondistended; Bowel sounds present  EXTREMITIES:  2+ Peripheral Pulses, No clubbing, cyanosis, or edema  LYMPH: No lymphadenopathy noted  SKIN: No rashes or lesions    Consultant(s) Notes Reviewed:  [x ] YES  [ ] NO  Care Discussed with Consultants/Other Providers [ x] YES  [ ] NO    LABS:                        10.1   6.34  )-----------( 303      ( 22 Apr 2024 07:40 )             30.8     04-22    136  |  102  |  15  ----------------------------<  189<H>  4.1   |  28  |  0.78    Ca    8.8      22 Apr 2024 07:40        Urinalysis Basic - ( 22 Apr 2024 07:40 )    Color: x / Appearance: x / SG: x / pH: x  Gluc: 189 mg/dL / Ketone: x  / Bili: x / Urobili: x   Blood: x / Protein: x / Nitrite: x   Leuk Esterase: x / RBC: x / WBC x   Sq Epi: x / Non Sq Epi: x / Bacteria: x      CAPILLARY BLOOD GLUCOSE      POCT Blood Glucose.: 224 mg/dL (23 Apr 2024 12:31)  POCT Blood Glucose.: 176 mg/dL (23 Apr 2024 08:20)  POCT Blood Glucose.: 253 mg/dL (22 Apr 2024 22:14)  POCT Blood Glucose.: 201 mg/dL (22 Apr 2024 16:57)        Urinalysis Basic - ( 22 Apr 2024 07:40 )    Color: x / Appearance: x / SG: x / pH: x  Gluc: 189 mg/dL / Ketone: x  / Bili: x / Urobili: x   Blood: x / Protein: x / Nitrite: x   Leuk Esterase: x / RBC: x / WBC x   Sq Epi: x / Non Sq Epi: x / Bacteria: x          RADIOLOGY & ADDITIONAL TESTS:    Imaging Personally Reviewed:  [x ] YES  [ ] NO Patient is a 89y old  Female who presents with a chief complaint of     INTERVAL HPI/OVERNIGHT EVENTS: noted  pt seen and examined this am   events noted  c/o the sided pleuritic cp, cough worse      Vital Signs Last 24 Hrs  T(C): 36.9 (23 Apr 2024 11:41), Max: 36.9 (23 Apr 2024 11:41)  T(F): 98.4 (23 Apr 2024 11:41), Max: 98.4 (23 Apr 2024 11:41)  HR: 101 (23 Apr 2024 11:41) (86 - 101)  BP: 116/55 (23 Apr 2024 11:41) (107/57 - 122/69)  BP(mean): --  RR: 18 (23 Apr 2024 11:41) (18 - 18)  SpO2: 98% (23 Apr 2024 11:41) (94% - 98%)    Parameters below as of 23 Apr 2024 11:41  Patient On (Oxygen Delivery Method): nasal cannula        acetaminophen     Tablet .. 650 milliGRAM(s) Oral every 6 hours PRN  albuterol/ipratropium for Nebulization 3 milliLiter(s) Nebulizer every 8 hours  aspirin enteric coated 81 milliGRAM(s) Oral daily  benzocaine/menthol Lozenge 1 Lozenge Oral two times a day  cefuroxime   Tablet 500 milliGRAM(s) Oral every 12 hours  dextrose 10% Bolus 125 milliLiter(s) IV Bolus once  dextrose 5%. 1000 milliLiter(s) IV Continuous <Continuous>  dextrose 5%. 1000 milliLiter(s) IV Continuous <Continuous>  dextrose 50% Injectable 12.5 Gram(s) IV Push once  dextrose 50% Injectable 25 Gram(s) IV Push once  dextrose Oral Gel 15 Gram(s) Oral once PRN  gabapentin 400 milliGRAM(s) Oral at bedtime  glucagon  Injectable 1 milliGRAM(s) IntraMuscular once  guaiFENesin Oral Liquid (Sugar-Free) 100 milliGRAM(s) Oral every 6 hours PRN  heparin   Injectable 5000 Unit(s) SubCutaneous every 8 hours  insulin glargine Injectable (LANTUS) 12 Unit(s) SubCutaneous at bedtime  insulin lispro (ADMELOG) corrective regimen sliding scale   SubCutaneous three times a day before meals  insulin lispro (ADMELOG) corrective regimen sliding scale   SubCutaneous at bedtime  metoprolol tartrate 50 milliGRAM(s) Oral two times a day  pantoprazole    Tablet 40 milliGRAM(s) Oral before breakfast  polyethylene glycol 3350 17 Gram(s) Oral daily  senna 2 Tablet(s) Oral at bedtime      PHYSICAL EXAM:  GENERAL: NAD,   EYES: conjunctiva and sclera clear  ENMT: Moist mucous membranes  NECK: Supple, No JVD, Normal thyroid  CHEST/LUNG: non labored, cta b/l  HEART: Regular rate and rhythm; No murmurs, rubs, or gallops  ABDOMEN: Soft, Nontender, Nondistended; Bowel sounds present  EXTREMITIES:  2+ Peripheral Pulses, No clubbing, cyanosis, or edema  LYMPH: No lymphadenopathy noted  SKIN: No rashes or lesions    Consultant(s) Notes Reviewed:  [x ] YES  [ ] NO  Care Discussed with Consultants/Other Providers [ x] YES  [ ] NO    LABS:                        10.1   6.34  )-----------( 303      ( 22 Apr 2024 07:40 )             30.8     04-22    136  |  102  |  15  ----------------------------<  189<H>  4.1   |  28  |  0.78    Ca    8.8      22 Apr 2024 07:40        Urinalysis Basic - ( 22 Apr 2024 07:40 )    Color: x / Appearance: x / SG: x / pH: x  Gluc: 189 mg/dL / Ketone: x  / Bili: x / Urobili: x   Blood: x / Protein: x / Nitrite: x   Leuk Esterase: x / RBC: x / WBC x   Sq Epi: x / Non Sq Epi: x / Bacteria: x      CAPILLARY BLOOD GLUCOSE      POCT Blood Glucose.: 224 mg/dL (23 Apr 2024 12:31)  POCT Blood Glucose.: 176 mg/dL (23 Apr 2024 08:20)  POCT Blood Glucose.: 253 mg/dL (22 Apr 2024 22:14)  POCT Blood Glucose.: 201 mg/dL (22 Apr 2024 16:57)        Urinalysis Basic - ( 22 Apr 2024 07:40 )    Color: x / Appearance: x / SG: x / pH: x  Gluc: 189 mg/dL / Ketone: x  / Bili: x / Urobili: x   Blood: x / Protein: x / Nitrite: x   Leuk Esterase: x / RBC: x / WBC x   Sq Epi: x / Non Sq Epi: x / Bacteria: x          RADIOLOGY & ADDITIONAL TESTS:    Imaging Personally Reviewed:  [x ] YES  [ ] NO

## 2024-04-23 NOTE — DISCHARGE NOTE PROVIDER - NSDCMRMEDTOKEN_GEN_ALL_CORE_FT
aspirin 81 mg oral delayed release tablet: 1 tab(s) orally once a day  atorvastatin 20 mg oral tablet: 1 tab(s) orally once a day  cyanocobalamin 1000 mcg oral tablet: 1 tab(s) orally once a day  furosemide 20 mg oral tablet: 1 tab(s) orally every other day as needed depending on the day as per patient  gabapentin 400 mg oral capsule: 1 cap(s) orally once a day  insulin degludec 100 units/mL subcutaneous solution: 12 unit(s) subcutaneous once a day (at bedtime)  magnesium oxide 400 mg oral tablet: 1 tab(s) orally once a day  MetFORMIN (Eqv-Fortamet) 500 mg oral tablet, extended release: 2 tab(s) orally once a day  methenamine hippurate 1 g oral tablet: 1 tab(s) orally 2 times a day  metoprolol tartrate 50 mg oral tablet: 1 tab(s) orally 2 times a day  Multiple Vitamins oral tablet: 1 tab(s) orally once a day  omeprazole 40 mg oral delayed release capsule: 1 cap(s) orally once a day  PreserVision AREDS 2 oral capsule: 1 cap(s) orally 2 times a day  Wixela Inhub 250 mcg-50 mcg inhalation powder: 1 puff(s) inhaled as needed for  shortness of breath and/or wheezing

## 2024-04-23 NOTE — DISCHARGE NOTE PROVIDER - CARE PROVIDER_API CALL
Nadine Douglass  Pulmonary Disease  [address] [facility], [address]  [place] [address]  Phone: [phone]  Fax: [phone]  Follow Up Time:     Timothy Rachel  Cardiovascular Disease  [address] [place] [address], [address]  [place] [id number]  Phone: [phone]  Fax: [phone]  Follow Up Time:     Raymundo Moreira  Internal Medicine  [address] [place] [id number]  Phone: [phone]  Fax: [phone]  Follow Up Time:

## 2024-04-23 NOTE — CONSULT NOTE ADULT - SUBJECTIVE AND OBJECTIVE BOX
Date/Time Patient Seen:  		  Referring MD:   Data Reviewed	       Patient is a 89y old  Female who presents with a chief complaint of     Subjective/HPI   89 yo female with PMHx of CVA (cerebral vascular accident), HTN (hypertension) and COPD, presenting to ED with complaints of weakness   PAST MEDICAL & SURGICAL HISTORY:  HTN (hypertension)    CVA (cerebral vascular accident)    CAD (coronary artery disease)  cardiac stent    S/P vascular bypass  left leg    PAST SURGICAL HISTORY:  CAD (coronary artery disease) cardiac stent    S/P vascular bypass left leg.     Social History:  · Substance use	No  · Social History (marital status, living situation, occupation, and sexual history)	lives with dgtr  walks independently at home,  occasionally uses cane/walker     Tobacco Screening:  · Core Measure Site	Yes  · Has the patient used tobacco in the past 30 days?	No    Risk Assessment:    Present on Admission:  Deep Venous Thrombosis	no  Pulmonary Embolus	no        Medication list         MEDICATIONS  (STANDING):  aspirin enteric coated 81 milliGRAM(s) Oral daily  benzocaine/menthol Lozenge 1 Lozenge Oral two times a day  cefuroxime   Tablet 500 milliGRAM(s) Oral every 12 hours  dextrose 10% Bolus 125 milliLiter(s) IV Bolus once  dextrose 5%. 1000 milliLiter(s) (100 mL/Hr) IV Continuous <Continuous>  dextrose 5%. 1000 milliLiter(s) (50 mL/Hr) IV Continuous <Continuous>  dextrose 50% Injectable 12.5 Gram(s) IV Push once  dextrose 50% Injectable 25 Gram(s) IV Push once  gabapentin 400 milliGRAM(s) Oral at bedtime  glucagon  Injectable 1 milliGRAM(s) IntraMuscular once  heparin   Injectable 5000 Unit(s) SubCutaneous every 8 hours  insulin glargine Injectable (LANTUS) 12 Unit(s) SubCutaneous at bedtime  insulin lispro (ADMELOG) corrective regimen sliding scale   SubCutaneous three times a day before meals  insulin lispro (ADMELOG) corrective regimen sliding scale   SubCutaneous at bedtime  metoprolol tartrate 50 milliGRAM(s) Oral two times a day  pantoprazole    Tablet 40 milliGRAM(s) Oral before breakfast  polyethylene glycol 3350 17 Gram(s) Oral daily  senna 2 Tablet(s) Oral at bedtime    MEDICATIONS  (PRN):  acetaminophen     Tablet .. 650 milliGRAM(s) Oral every 6 hours PRN Temp greater or equal to 38C (100.4F), Mild Pain (1 - 3)  albuterol    90 MICROgram(s) HFA Inhaler 2 Puff(s) Inhalation every 6 hours PRN Shortness of Breath and/or Wheezing  dextrose Oral Gel 15 Gram(s) Oral once PRN Blood Glucose LESS THAN 70 milliGRAM(s)/deciliter  guaiFENesin Oral Liquid (Sugar-Free) 100 milliGRAM(s) Oral every 6 hours PRN Cough         Vitals log        ICU Vital Signs Last 24 Hrs  T(C): 36.9 (23 Apr 2024 11:41), Max: 36.9 (23 Apr 2024 11:41)  T(F): 98.4 (23 Apr 2024 11:41), Max: 98.4 (23 Apr 2024 11:41)  HR: 101 (23 Apr 2024 11:41) (86 - 101)  BP: 116/55 (23 Apr 2024 11:41) (107/57 - 122/69)  BP(mean): --  ABP: --  ABP(mean): --  RR: 18 (23 Apr 2024 11:41) (18 - 18)  SpO2: 98% (23 Apr 2024 11:41) (94% - 98%)    O2 Parameters below as of 23 Apr 2024 11:41  Patient On (Oxygen Delivery Method): nasal cannula      PAST SURGICAL HISTORY:  CAD (coronary artery disease) cardiac stent    S/P vascular bypass left leg.     Social History:  · Substance use	No  · Social History (marital status, living situation, occupation, and sexual history)	lives with dgtr  walks independently at home,  occasionally uses cane/walker     Tobacco Screening:  · Core Measure Site	Yes  · Has the patient used tobacco in the past 30 days?	No    Risk Assessment:    Present on Admission:  Deep Venous Thrombosis	no  Pulmonary Embolus	no             Input and Output:  I&O's Detail    22 Apr 2024 07:01  -  23 Apr 2024 07:00  --------------------------------------------------------  IN:  Total IN: 0 mL    OUT:    Voided (mL): 250 mL  Total OUT: 250 mL    Total NET: -250 mL          Lab Data                        10.1   6.34  )-----------( 303      ( 22 Apr 2024 07:40 )             30.8     04-22    136  |  102  |  15  ----------------------------<  189<H>  4.1   |  28  |  0.78    Ca    8.8      22 Apr 2024 07:40              Review of Systems	  edema  weakness  sob  acevedo  on o2 support  alert  verbal      Objective     Physical Examination    heart s1s2  lung dc BS  head nc  verbal  alert  cn grossly int  on o2 support  moves extr      Pertinent Lab findings & Imaging      Lv:  NO   Adequate UO     I&O's Detail    22 Apr 2024 07:01  -  23 Apr 2024 07:00  --------------------------------------------------------  IN:  Total IN: 0 mL    OUT:    Voided (mL): 250 mL  Total OUT: 250 mL    Total NET: -250 mL               Discussed with:     Cultures:	        Radiology    CONCLUSIONS:      1. Left ventricular systolic function is normal with an ejection fraction of 57 % by Gomez's method of disks.   2. The left atrium is mildly dilated.    ________________________________________________________________________________________  FINDINGS:     Left Ventricle:  The left ventricular cavity is normal in size. Left ventricular wall thickness is normal. Left ventricular systolic function is normal with a calculated ejection fraction of 57 % by the Gomez's biplane method of disks. There is mild (grade 1) left ventricular diastolic dysfunction.           ACC: [id number] EXAM:  CT CHEST   ORDERED BY:  [facility]     PROCEDURE DATE:  04/20/2024          INTERPRETATION:  CT CHEST WITHOUT CONTRAST    INDICATION: Obstructive pulmonary disease. Cough. Haziness on x-ray.    TECHNIQUE: Unenhanced helical images were obtained of the chest. Coronal   and sagittal images were reconstructed. Maximum intensity projection   images were generated.        COMPARISON: Radiograph chest 4/19/2024. CT chest 1/25/2020 and 12/18/2017.    FINDINGS:    Tubes/Lines: None.    Lungs, airways and pleura: Bilateral septal thickening, groundglass   opacities and peripheral consolidation in the upper lobes and bilateral   lower lobe consolidation. The opacities are superimposed upon underlying   emphysema.    Secretions in the trachea and bronchus intermedius. No pneumothorax.    Mediastinum: Hypodense right lobe thyroid nodule. The chest lymph nodes   measure less than 15 mm in the short axis. Unremarkable esophagus. The   heart is normal in size. Coronary artery calcifications. Mitral annular   calcification. Aortic valve calcifications. The aorta is normal in   caliber. Aortic calcifications.    Upper Abdomen: The left renal pelvis is dilated. Diverticulosis.   Thickening of the left adrenal gland.    Bones And Soft Tissues: The bones are unremarkable.  A right axillary   graft is partially imaged.      IMPRESSION:    1.  Bilateral septal thickening, groundglass opacities, peripheral   consolidation and pleural effusions. These findings could occur in the   setting of pulmonary edema, however, if there are no signs of pulmonary   edema, other entities including pneumonia, could be considered.    --- End of Report ---            PETER PHELPS MD; Attending Radiologist  This document has been electronically signed. Apr 20 2024 12:54PM

## 2024-04-23 NOTE — PROGRESS NOTE ADULT - ASSESSMENT
The patient is a 89 year old female with a history of HTN, HL, DM, COPD, CVA who presented with weakness and chills in the setting of UTI, possible PNA.    Plan:  - ECG with sinus tachycardia and otherwise nonspecific findings  - CT chest with bilateral septal thickening, GGO, consolidations and pleural effusions - PNA vs. heart failure  - BNP 1769  - Echo with normal LV systolic function, no valve issues, normal pulm pressures, very small/collapsible IVC  - No signs of decompensated heart failure on exam  - No indication for diuretics  - If needed, can give IV fluids  - Continue aspirin 81 mg daily  - Continue atorvastatin 20 mg daily  - Continue metoprolol tartrate 50 mg bid  - IV antibiotics

## 2024-04-23 NOTE — CONSULT NOTE ADULT - ASSESSMENT
The patient is a 89 year old female with a history of HTN, HL, DM, COPD, CVA who presented with weakness and chills in the setting of UTI, possible PNA.    Plan:  - ECG with sinus tachycardia and otherwise nonspecific findings  - CT chest with bilateral septal thickening, GGO, consolidations and pleural effusions - PNA vs. heart failure  - Check BNP  - Check echo  - No signs of decompensated heart failure on exam  - Hold off with diuretics unless BNP significantly elevated or if evidence of heart failure on echo  - Continue aspirin 81 mg daily  - Continue atorvastatin 20 mg daily  - Continue metoprolol tartrate 50 mg bid  - IV antibiotics
89 yo female with PMHx of CVA (cerebral vascular accident), HTN (hypertension) and COPD, presenting to ED with complaints of weakness     copd  dyspnea  pleural eff  atelectasis  pulm edema  HTN  CVA    ID and Cardio eval noted  complete ABX  WBC noted  LABS reviewed  TTE and CT chest reviewed  follows with Dr Rafat loza as outpatient - [facility]  [employer] TID - hold off on Inhaler rx regimen - pt may not be a suitable candidate for inhaler use  VBG  [facility] rx regimen optimization  CT chest shows Pleural eff - pulm edema - atelectasis  monitor VS and HD and Sat  goal sat > 88 pct  spoke with DTR  
Physical Exam:   Vital Signs Last 24 Hrs  T(C): 36.3 (23 Apr 2024 05:11), Max: 36.7 (22 Apr 2024 11:27)  T(F): 97.4 (23 Apr 2024 05:11), Max: 98.1 (22 Apr 2024 21:15)  HR: 96 (23 Apr 2024 05:11) (86 - 103)  BP: 107/57 (23 Apr 2024 05:11) (107/57 - 122/69)  BP(mean): --  RR: 18 (23 Apr 2024 05:11) (18 - 18)  SpO2: 94% (23 Apr 2024 05:11) (94% - 95%)    Parameters below as of 23 Apr 2024 05:11  Patient On (Oxygen Delivery Method): nasal cannula  O2 Flow (L/min): 2           CAPILLARY BLOOD GLUCOSE      POCT Blood Glucose.: 176 mg/dL (23 Apr 2024 08:20)  POCT Blood Glucose.: 253 mg/dL (22 Apr 2024 22:14)  POCT Blood Glucose.: 201 mg/dL (22 Apr 2024 16:57)  POCT Blood Glucose.: 214 mg/dL (22 Apr 2024 11:59)      Cholesterol, Serum: 113 mg/dL (05.19.21 @ 08:36)     HDL Cholesterol, Serum: 22 mg/dL (05.19.21 @ 08:36)     LDL Cholesterol Calculated: 66 mg/dL (05.19.21 @ 08:36)     DIET: CC  >50%

## 2024-04-23 NOTE — CONSULT NOTE ADULT - SUBJECTIVE AND OBJECTIVE BOX
Patient is a 89y old  Female who presents with a chief complaint of     Reason For Consult: dm2 uncontrolled    HPI:  89 yo female with PMHx of CVA (cerebral vascular accident), HTN (hypertension) and COPD, presenting to ED with complaints of weakness today, patient went to see urologist for dysuria and chills  was treated for UTI received 2 doses of Nitrofurantoin yesterday,   Patient endorses weakness and shaking chills, as well as cough that has progressed from dry to "rumbly" unable to bring up phlegm. Denies CP, SOB or dizziness  In ED [id number] (19 Apr 2024 22:02)      PAST MEDICAL & SURGICAL HISTORY:  HTN (hypertension)      CVA (cerebral vascular accident)      CAD (coronary artery disease)  cardiac stent      S/P vascular bypass  left leg          FAMILY HISTORY:        Social History:    MEDICATIONS  (STANDING):  aspirin enteric coated 81 milliGRAM(s) Oral daily  benzocaine/menthol Lozenge 1 Lozenge Oral two times a day  dextrose 10% Bolus 125 milliLiter(s) IV Bolus once  dextrose 5%. 1000 milliLiter(s) (50 mL/Hr) IV Continuous <Continuous>  dextrose 5%. 1000 milliLiter(s) (100 mL/Hr) IV Continuous <Continuous>  dextrose 50% Injectable 12.5 Gram(s) IV Push once  dextrose 50% Injectable 25 Gram(s) IV Push once  gabapentin 400 milliGRAM(s) Oral at bedtime  glucagon  Injectable 1 milliGRAM(s) IntraMuscular once  heparin   Injectable 5000 Unit(s) SubCutaneous every 8 hours  insulin glargine Injectable (LANTUS) 12 Unit(s) SubCutaneous at bedtime  insulin lispro (ADMELOG) corrective regimen sliding scale   SubCutaneous three times a day before meals  insulin lispro (ADMELOG) corrective regimen sliding scale   SubCutaneous at bedtime  metoprolol tartrate 50 milliGRAM(s) Oral two times a day  pantoprazole    Tablet 40 milliGRAM(s) Oral before breakfast    MEDICATIONS  (PRN):  acetaminophen     Tablet .. 650 milliGRAM(s) Oral every 6 hours PRN Temp greater or equal to 38C (100.4F), Mild Pain (1 - 3)  albuterol    90 MICROgram(s) HFA Inhaler 2 Puff(s) Inhalation every 6 hours PRN Shortness of Breath and/or Wheezing  dextrose Oral Gel 15 Gram(s) Oral once PRN Blood Glucose LESS THAN 70 milliGRAM(s)/deciliter  guaiFENesin Oral Liquid (Sugar-Free) 100 milliGRAM(s) Oral every 6 hours PRN Cough        T(C): 36.3 (04-23-24 @ 05:11), Max: 36.7 (04-22-24 @ 11:27)  HR: 96 (04-23-24 @ 05:11) (86 - 103)  BP: 107/57 (04-23-24 @ 05:11) (107/57 - 122/69)  RR: 18 (04-23-24 @ 05:11) (18 - 18)  SpO2: 94% (04-23-24 @ 05:11) (94% - 95%)  Wt(kg): --    PHYSICAL EXAM:  GENERAL: NAD, well-groomed, well-developed  HEAD:  Atraumatic, Normocephalic  NECK: Supple, No JVD, Normal thyroid  CHEST/LUNG: Clear to percussion bilaterally; No rales, rhonchi, wheezing, or rubs  HEART: Regular rate and rhythm; No murmurs, rubs, or gallops  ABDOMEN: Soft, Nontender, Nondistended; Bowel sounds present  EXTREMITIES:  2+ Peripheral Pulses, No clubbing, cyanosis, or edema  SKIN: No rashes or lesions    CAPILLARY BLOOD GLUCOSE      POCT Blood Glucose.: 253 mg/dL (22 Apr 2024 22:14)  POCT Blood Glucose.: 201 mg/dL (22 Apr 2024 16:57)  POCT Blood Glucose.: 214 mg/dL (22 Apr 2024 11:59)  POCT Blood Glucose.: 181 mg/dL (22 Apr 2024 07:56)                            10.1   6.34  )-----------( 303      ( 22 Apr 2024 07:40 )             30.8       CMP:  04-22 @ 07:40  SGPT --  Albumin --   Alk Phos --   Anion Gap 6   SGOT --   Total Bili --   BUN 15   Calcium Total 8.8   CO2 28   Chloride 102   Creatinine 0.78   eGFR if AA --   eGFR if non AA --   Glucose 189   Potassium 4.1   Protein --   Sodium 136      Thyroid Function Tests:      Diabetes Tests:       Radiology:

## 2024-04-23 NOTE — CONSULT NOTE ADULT - CONSULT REASON
Fever pna
pna  copd  sob  acevedo  on o2 support
?Acute diastolic heart failure
dm2 uncontrolled
89y A1C with Estimated Average Glucose Result: 9.2 % (04-20-24 @ 08:50)   diabetes mellitus uncontrolled type 2

## 2024-04-23 NOTE — PROGRESS NOTE ADULT - ASSESSMENT
89 yo female with PMHx of CVA (cerebral vascular accident), HTN (hypertension) , DM2 and COPD aw dysuria, fever/chills and weakness  +leucocytosis, +UA,   aw sepsis dt UTI    #Sepsis dt UTI/and pneumonia  cont ceftriaxone, switched to ceftin  fu Ucx neg, Bld cx ngtd  ID cs fu noted    #Worsening cough  hx of stable copd  RVP neg  CXR- diffuse interstitial lung dz  check probnp   CT chest- showed GC opacities- pulm edema vs pneumonia  check probnp- 1700, fu TTE reviewed  cards cs, pulm and  ID cs fu    #HTN-  resume bblocker  pt on prn lasix at home    #DM-2- lantus and sliding scale    #DVT ppx- hep sq    d/w dgtr at bedside plan of care    [facility]   [phone]       89 yo female with PMHx of CVA (cerebral vascular accident), HTN (hypertension) , DM2 and COPD aw dysuria, fever/chills and weakness  +leucocytosis, +UA,   aw sepsis dt UTI    #Sepsis dt UTI/and pneumonia  cont ceftriaxone, switched to ceftin  fu Ucx neg, Bld cx ngtd  ID cs fu noted    #Worsening cough  hx of copd  RVP neg  CXR- diffuse interstitial lung dz  check probnp   CT chest- showed GC opacities- pulm edema vs pneumonia  check probnp- 1700, fu TTE reviewed  cards cs, pulm and  ID cs fu  nebs started    #HTN-  resume bblocker  pt on prn lasix at home    #DM-2- lantus and sliding scale    #DVT ppx- hep sq    d/w dgtr at bedside plan of care    [facility]   [phone]

## 2024-04-23 NOTE — DISCHARGE NOTE PROVIDER - NSDCCPCAREPLAN_GEN_ALL_CORE_FT
PRINCIPAL DISCHARGE DIAGNOSIS  Diagnosis: Other specified sepsis  Assessment and Plan of Treatment:       SECONDARY DISCHARGE DIAGNOSES  Diagnosis: Gram-negative pneumonia  Assessment and Plan of Treatment:     Diagnosis: Acute on chronic diastolic congestive heart failure  Assessment and Plan of Treatment:

## 2024-04-23 NOTE — DISCHARGE NOTE PROVIDER - PROVIDER TOKENS
PROVIDER:[TOKEN:[[id number]]],PROVIDER:[TOKEN:[[id number]]],PROVIDER:[TOKEN:[[id number]]]

## 2024-04-23 NOTE — PROGRESS NOTE ADULT - ASSESSMENT
89YO F PMHx of CVA, HTN and COPD admitted with CAP   In ED [id number] UA neg, CT chest with pna.   + cough  UTI ruled out  Cx NGTD    Recommendations:   Switch to PO ceftin 500mg BID to complete course  Trend temps and cbc  Asp precautions  supportive care, O2 as needed  Additional care per primary team    Infectious Diseases will follow. Please call with any questions.  Tabby Duarte M.D.  [facility] Division of Infectious Diseases [phone]  For after 5 P.M. and weekends, please call [phone]

## 2024-04-23 NOTE — CONSULT NOTE ADULT - SUBJECTIVE AND OBJECTIVE BOX
Patient is a 89y old  Female who presents with a chief complaint of   Type: DX year known complications Endocrine Last seen Rx home Hx DKA/HHS, Glucometer checks, needs, weight, diet, exercise  diabetes education provided  Hx ASCVD, CKD, HF    HPI:  89 yo female with PMHx of CVA (cerebral vascular accident), HTN (hypertension) and COPD, presenting to ED with complaints of weakness today, patient went to see urologist for dysuria and chills  was treated for UTI received 2 doses of Nitrofurantoin yesterday,   Patient endorses weakness and shaking chills, as well as cough that has progressed from dry to "rumbly" unable to bring up phlegm. Denies CP, SOB or dizziness  In ED tm101 (19 Apr 2024 22:02)      PAST MEDICAL & SURGICAL HISTORY:  HTN (hypertension)      CVA (cerebral vascular accident)      CAD (coronary artery disease)  cardiac stent      S/P vascular bypass  left leg          Allergies    Biaxin (Unknown)  penicillin (Unknown)    Intolerances        MEDICATIONS  (STANDING):  aspirin enteric coated 81 milliGRAM(s) Oral daily  benzocaine/menthol Lozenge 1 Lozenge Oral two times a day  dextrose 10% Bolus 125 milliLiter(s) IV Bolus once  dextrose 5%. 1000 milliLiter(s) (50 mL/Hr) IV Continuous <Continuous>  dextrose 5%. 1000 milliLiter(s) (100 mL/Hr) IV Continuous <Continuous>  dextrose 50% Injectable 12.5 Gram(s) IV Push once  dextrose 50% Injectable 25 Gram(s) IV Push once  gabapentin 400 milliGRAM(s) Oral at bedtime  glucagon  Injectable 1 milliGRAM(s) IntraMuscular once  heparin   Injectable 5000 Unit(s) SubCutaneous every 8 hours  insulin glargine Injectable (LANTUS) 12 Unit(s) SubCutaneous at bedtime  insulin lispro (ADMELOG) corrective regimen sliding scale   SubCutaneous three times a day before meals  insulin lispro (ADMELOG) corrective regimen sliding scale   SubCutaneous at bedtime  metoprolol tartrate 50 milliGRAM(s) Oral two times a day  pantoprazole    Tablet 40 milliGRAM(s) Oral before breakfast       Patient is a 89y old  Female who presents with a chief complaint of   Type:2 DX >20 years. A1c 9.2%  no known complications. No Endocrine/ PCP- Harish Last seen: every 3 mos. Rx home: tresiba 12 units HS- prefers AM- encourge to speak with PCP and request to change to AM- No Hx DKA/HHS, Glucometer checks x2 daily, denies needs,  diet- dtr lives with patient and makes meals. mentions had a son with [employer] and  at 45 yr old. diabetes education provided verbally and handouts.      HPI:  89 yo female with PMHx of CVA (cerebral vascular accident), HTN (hypertension) and COPD, presenting to ED with complaints of weakness today, patient went to see urologist for dysuria and chills  was treated for UTI received 2 doses of Nitrofurantoin yesterday,   Patient endorses weakness and shaking chills, as well as cough that has progressed from dry to "rumbly" unable to bring up phlegm. Denies CP, SOB or dizziness  In ED tm101 (2024 22:02)      PAST MEDICAL & SURGICAL HISTORY:  HTN (hypertension)      CVA (cerebral vascular accident)      CAD (coronary artery disease)  cardiac stent      S/P vascular bypass  left leg          Allergies    Biaxin (Unknown)  penicillin (Unknown)    Intolerances        MEDICATIONS  (STANDING):  aspirin enteric coated 81 milliGRAM(s) Oral daily  benzocaine/menthol Lozenge 1 Lozenge Oral two times a day  dextrose 10% Bolus 125 milliLiter(s) IV Bolus once  dextrose 5%. 1000 milliLiter(s) (50 mL/Hr) IV Continuous <Continuous>  dextrose 5%. 1000 milliLiter(s) (100 mL/Hr) IV Continuous <Continuous>  dextrose 50% Injectable 12.5 Gram(s) IV Push once  dextrose 50% Injectable 25 Gram(s) IV Push once  gabapentin 400 milliGRAM(s) Oral at bedtime  glucagon  Injectable 1 milliGRAM(s) IntraMuscular once  heparin   Injectable 5000 Unit(s) SubCutaneous every 8 hours  insulin glargine Injectable (LANTUS) 12 Unit(s) SubCutaneous at bedtime  insulin lispro (ADMELOG) corrective regimen sliding scale   SubCutaneous three times a day before meals  insulin lispro (ADMELOG) corrective regimen sliding scale   SubCutaneous at bedtime  metoprolol tartrate 50 milliGRAM(s) Oral two times a day  pantoprazole    Tablet 40 milliGRAM(s) Oral before breakfast

## 2024-04-23 NOTE — PROGRESS NOTE ADULT - SUBJECTIVE AND OBJECTIVE BOX
[facility], Division of Infectious Diseases  KRISSY Luong Y. Patel, S. Shah, [facility]  [phone]    Name: FEDERICO DOS SANTOS  Age: 89y  Gender: Female  MRN: [id number]    Interval History:  Patient seen and examined at bedside this morning  No acute overnight events. Afebrile  No complaints  Notes reviewed    Antibiotics:      Medications:  acetaminophen     Tablet .. 650 milliGRAM(s) Oral every 6 hours PRN  albuterol    90 MICROgram(s) HFA Inhaler 2 Puff(s) Inhalation every 6 hours PRN  aspirin enteric coated 81 milliGRAM(s) Oral daily  benzocaine/menthol Lozenge 1 Lozenge Oral two times a day  dextrose 10% Bolus 125 milliLiter(s) IV Bolus once  dextrose 5%. 1000 milliLiter(s) IV Continuous <Continuous>  dextrose 5%. 1000 milliLiter(s) IV Continuous <Continuous>  dextrose 50% Injectable 25 Gram(s) IV Push once  dextrose 50% Injectable 12.5 Gram(s) IV Push once  dextrose Oral Gel 15 Gram(s) Oral once PRN  gabapentin 400 milliGRAM(s) Oral at bedtime  glucagon  Injectable 1 milliGRAM(s) IntraMuscular once  guaiFENesin Oral Liquid (Sugar-Free) 100 milliGRAM(s) Oral every 6 hours PRN  heparin   Injectable 5000 Unit(s) SubCutaneous every 8 hours  insulin glargine Injectable (LANTUS) 12 Unit(s) SubCutaneous at bedtime  insulin lispro (ADMELOG) corrective regimen sliding scale   SubCutaneous three times a day before meals  insulin lispro (ADMELOG) corrective regimen sliding scale   SubCutaneous at bedtime  metoprolol tartrate 50 milliGRAM(s) Oral two times a day  pantoprazole    Tablet 40 milliGRAM(s) Oral before breakfast  polyethylene glycol 3350 17 Gram(s) Oral daily  senna 2 Tablet(s) Oral at bedtime      Review of Systems:  A 10-point review of systems was obtained.     Pertinent positives and negatives--  Constitutional: No fevers. No Chills. No Rigors.   Cardiovascular: No chest pain. No palpitations.  Respiratory: No shortness of breath. No cough.  Gastrointestinal: No nausea or vomiting. No diarrhea or constipation.   Psychiatric: Pleasant. Appropriate affect.    Review of systems otherwise negative except as previously noted.    Allergies: Biaxin (Unknown)  penicillin (Unknown)    For details regarding the patient's past medical history, social history, family history, and other miscellaneous elements, please refer the initial infectious diseases consultation and/or the admitting history and physical examination for this admission.    Objective:  Vitals:   T(C): 36.9 (04-23-24 @ 11:41), Max: 36.9 (04-23-24 @ 11:41)  HR: 101 (04-23-24 @ 11:41) (86 - 101)  BP: 116/55 (04-23-24 @ 11:41) (107/57 - 122/69)  RR: 18 (04-23-24 @ 11:41) (18 - 18)  SpO2: 98% (04-23-24 @ 11:41) (94% - 98%)    Physical Examination:  General: no acute distress  HEENT: NC/AT, EOMI, anicteric, no oral lesions  Neck: supple, no palpable LAD  Cardio: S1, S2 heard, RRR, no murmurs  Resp: breath sounds heard bilaterally, no rales, wheezes or rhonchi  Abd: soft, NT, ND, + bowel sounds  Neuro: no obvious focal deficits  Ext: no edema or cyanosis  Skin: warm, dry, no visible rash      Laboratory Studies:  CBC:                       10.1   6.34  )-----------( 303      ( 22 Apr 2024 07:40 )             30.8     CMP: 04-22    136  |  102  |  15  ----------------------------<  189<H>  4.1   |  28  |  0.78    Ca    8.8      22 Apr 2024 07:40        Urinalysis Basic - ( 22 Apr 2024 07:40 )    Color: x / Appearance: x / SG: x / pH: x  Gluc: 189 mg/dL / Ketone: x  / Bili: x / Urobili: x   Blood: x / Protein: x / Nitrite: x   Leuk Esterase: x / RBC: x / WBC x   Sq Epi: x / Non Sq Epi: x / Bacteria: x        Microbiology: reviewed    Culture - Blood (collected 04-19-24 @ 13:25)  Source: .Blood Blood-Peripheral  Preliminary Report (04-22-24 @ 19:01):    No growth at 72 Hours    Culture - Blood (collected 04-19-24 @ 13:20)  Source: .Blood Blood-Peripheral  Preliminary Report (04-22-24 @ 19:01):    No growth at 72 Hours    Culture - Urine (collected 04-19-24 @ 13:20)  Source: Clean Catch Clean Catch (Midstream)  Final Report (04-20-24 @ 16:51):    <10,000 CFU/mL Normal Urogenital Kimberlee          Radiology: reviewed

## 2024-04-23 NOTE — DISCHARGE NOTE PROVIDER - HOSPITAL COURSE
89 yo female with PMHx of CVA (cerebral vascular accident), HTN (hypertension) and COPD, presenting to ED with complaints of weakness today, patient went to see urologist for dysuria and chills  was treated for UTI received 2 doses of Nitrofurantoin yesterday, patient endorses weakness and shaking chills, as well as cough that has progressed from dry to "rumbly" unable to bring up phlegm. Denies CP, SOB or dizziness. Pt was admitted and seen by pulm, ID, cardio and endocrine. She was treated for sepsis and acute hypoxemic resp failure due to PNA, CHF, and atelectasis. UTI was ruled out. She improved however still required oxygen and was arranged for it at home.  LABS:                      10.9   6.23  )-----------( 390      ( 26 Apr 2024 07:42 )             33.8   04-26  139  |  102  |  15  ----------------------------<  176<H>  4.3   |  33<H>  |  0.76  Ca    10.0      26 Apr 2024 07:42    RADIOLOGY & ADDITIONAL TESTS:  < from: CT Chest No Cont (04.20.24 @ 08:51) >  Lungs, airways and pleura: Bilateral septal thickening, groundglass   opacities and peripheral consolidation in the upper lobes and bilateral   lower lobe consolidation. The opacities are superimposed upon underlying   emphysema.  Secretions in the trachea and bronchus intermedius. No pneumothorax.  Mediastinum: Hypodense right lobe thyroid nodule. The chest lymph nodes   measure less than 15 mm in the short axis. Unremarkable esophagus. The   heart is normal in size. Coronary artery calcifications. Mitral annular   calcification. Aortic valve calcifications. The aorta is normal in   caliber. Aortic calcifications.  Upper Abdomen: The left renal pelvis is dilated. Diverticulosis.   Thickening of the left adrenal gland.  Bones And Soft Tissues: The bones are unremarkable.  A right axillary   graft is partially imaged.  IMPRESSION:  1.  Bilateral septal thickening, groundglass opacities, peripheral   consolidation and pleural effusions. These findings could occur inthe   setting of pulmonary edema, however, if there are no signs of pulmonary   edema, other entities including pneumonia, could be considered.    < from: CT Angio Chest PE Protocol w/ IV Cont (04.24.24 @ 09:17) >  Mucoid secretions right mainstem bronchus with mucoid impaction right   lower lobe and segmental right lower lobe bronchi and right middle lobe   bronchus.  Partial atelectasis right middle lobe, new from prior exam.  Persistent atelectasis/airspace consolidation right lower lobe.  Few scattered groundglass opacities left upper lobe.  Predominantly bibasilar cystic airspace disease with architectural   distortion and subpleural reticulations; findings likely reflecting   chronic interstitial lung disease superimposed on background of emphysema.  Stable 1.2 cm nodular opacity left lower lobe.  Trace right pleural effusion.  Clusters of prevascular, pretracheal and subcarinal mediastinal lymph   nodes measuring up to 1.5 cm short axis, stable.  There is no CT evidence for acute pulmonary embolism.  Atherosclerotic changes thoracic aorta and coronary artery calcification.  Left atrial enlargement.  Aortic valve calcification.  Mitral annular calcification.   No pericardial effusion.  1.5 cm low-density nodule inferior right lobe of the thyroid gland,   stable.  Right axillary/chest wall graft.  VISUALIZED UPPER ABDOMEN: Within normal limits.  BONES: Degenerative changes.  IMPRESSION:  No acute pulmonary embolism.  Mucoid secretions with impaction right middle lobe and lower lobe bronchi.  Partial right middle lobe atelectasis, new from prior exam  Persistent atelectasis/airspace consolidation right lower lobe.  Few scattered groundglass opacities left upper lobe, could reflect acute   respiratoryinfection.  Probable chronic interstitial lung disease superimposed on background of   emphysema.  Stable 1.5 cm nodule left lower lobe.  Recommend short interval follow-up CT scan of the chest in 12 weeks.  < from: TTE W or WO Ultrasound Enhancing Agent (04.22.24 @ 15:29) >     1. Left ventricular systolic function is normal with an ejection fraction of 57 % by Gomez's method of disks.   2. The left atrium is mildly dilated.

## 2024-04-23 NOTE — CASE MANAGEMENT PROGRESS NOTE - NSCMPROGRESSNOTE_GEN_ALL_CORE
Patient in need of home O2.  Home evaluation form done.  Home O2 script in chart for MD signature.  CM remains available throughout hospital stay.

## 2024-04-23 NOTE — DISCHARGE NOTE PROVIDER - CARE PROVIDERS DIRECT ADDRESSES
[email or web address]

## 2024-04-23 NOTE — CONSULT NOTE ADULT - PROBLEM SELECTOR RECOMMENDATION 9
cont lantus 12 units qhs  cont low dose admelog corrective scale coverage qac/qhs  cont cons cho diet  goal bg conservative mngmt
Type 2 A1c 9.2% adm UTI  Recommend endocrine-Perlman on consult  lantus 12 units HS ; low corrective scale aC/hs   FU appt: TBA  DSC recommendations: return to home: lantus 12 units daily; metformin 1000 mg AC dinner regimen and glucose monitoring  diabetes education provided  Diabetes support info and cell # [phone] given   Goal 100-180 mg/dL; 140-180 mg/dL in critical care areas

## 2024-04-24 LAB
ANION GAP SERPL CALC-SCNC: 5 MMOL/L — SIGNIFICANT CHANGE UP (ref 5–17)
BUN SERPL-MCNC: 16 MG/DL — SIGNIFICANT CHANGE UP (ref 7–23)
CALCIUM SERPL-MCNC: 9.8 MG/DL — SIGNIFICANT CHANGE UP (ref 8.5–10.1)
CHLORIDE SERPL-SCNC: 103 MMOL/L — SIGNIFICANT CHANGE UP (ref 96–108)
CO2 SERPL-SCNC: 31 MMOL/L — SIGNIFICANT CHANGE UP (ref 22–31)
CREAT SERPL-MCNC: 0.73 MG/DL — SIGNIFICANT CHANGE UP (ref 0.5–1.3)
CULTURE RESULTS: SIGNIFICANT CHANGE UP
CULTURE RESULTS: SIGNIFICANT CHANGE UP
EGFR: 78 ML/MIN/1.73M2 — SIGNIFICANT CHANGE UP
GLUCOSE SERPL-MCNC: 167 MG/DL — HIGH (ref 70–99)
HCT VFR BLD CALC: 31 % — LOW (ref 34.5–45)
HGB BLD-MCNC: 9.9 G/DL — LOW (ref 11.5–15.5)
MCHC RBC-ENTMCNC: 28.5 PG — SIGNIFICANT CHANGE UP (ref 27–34)
MCHC RBC-ENTMCNC: 31.9 GM/DL — LOW (ref 32–36)
MCV RBC AUTO: 89.3 FL — SIGNIFICANT CHANGE UP (ref 80–100)
NRBC # BLD: 0 /100 WBCS — SIGNIFICANT CHANGE UP (ref 0–0)
PLATELET # BLD AUTO: 324 K/UL — SIGNIFICANT CHANGE UP (ref 150–400)
POTASSIUM SERPL-MCNC: 4.4 MMOL/L — SIGNIFICANT CHANGE UP (ref 3.5–5.3)
POTASSIUM SERPL-SCNC: 4.4 MMOL/L — SIGNIFICANT CHANGE UP (ref 3.5–5.3)
RBC # BLD: 3.47 M/UL — LOW (ref 3.8–5.2)
RBC # FLD: 13.8 % — SIGNIFICANT CHANGE UP (ref 10.3–14.5)
SODIUM SERPL-SCNC: 139 MMOL/L — SIGNIFICANT CHANGE UP (ref 135–145)
SPECIMEN SOURCE: SIGNIFICANT CHANGE UP
SPECIMEN SOURCE: SIGNIFICANT CHANGE UP
WBC # BLD: 5.58 K/UL — SIGNIFICANT CHANGE UP (ref 3.8–10.5)
WBC # FLD AUTO: 5.58 K/UL — SIGNIFICANT CHANGE UP (ref 3.8–10.5)

## 2024-04-24 PROCEDURE — 71275 CT ANGIOGRAPHY CHEST: CPT | Mod: 26

## 2024-04-24 RX ORDER — ACETYLCYSTEINE 200 MG/ML
4 VIAL (ML) MISCELLANEOUS EVERY 12 HOURS
Refills: 0 | Status: DISCONTINUED | OUTPATIENT
Start: 2024-04-24 | End: 2024-04-25

## 2024-04-24 RX ADMIN — HEPARIN SODIUM 5000 UNIT(S): 5000 INJECTION INTRAVENOUS; SUBCUTANEOUS at 13:58

## 2024-04-24 RX ADMIN — Medication 1: at 08:14

## 2024-04-24 RX ADMIN — Medication 50 MILLIGRAM(S): at 17:28

## 2024-04-24 RX ADMIN — Medication 1: at 12:33

## 2024-04-24 RX ADMIN — BENZOCAINE AND MENTHOL 1 LOZENGE: 5; 1 LIQUID ORAL at 06:06

## 2024-04-24 RX ADMIN — Medication 2: at 22:19

## 2024-04-24 RX ADMIN — Medication 3 MILLILITER(S): at 20:10

## 2024-04-24 RX ADMIN — Medication 100 MILLIGRAM(S): at 00:10

## 2024-04-24 RX ADMIN — Medication 3 MILLILITER(S): at 14:35

## 2024-04-24 RX ADMIN — Medication 500 MILLIGRAM(S): at 17:28

## 2024-04-24 RX ADMIN — Medication 500 MILLIGRAM(S): at 06:06

## 2024-04-24 RX ADMIN — GABAPENTIN 400 MILLIGRAM(S): 400 CAPSULE ORAL at 21:35

## 2024-04-24 RX ADMIN — INSULIN GLARGINE 12 UNIT(S): 100 INJECTION, SOLUTION SUBCUTANEOUS at 22:19

## 2024-04-24 RX ADMIN — BENZOCAINE AND MENTHOL 1 LOZENGE: 5; 1 LIQUID ORAL at 17:28

## 2024-04-24 RX ADMIN — PANTOPRAZOLE SODIUM 40 MILLIGRAM(S): 20 TABLET, DELAYED RELEASE ORAL at 06:06

## 2024-04-24 RX ADMIN — HEPARIN SODIUM 5000 UNIT(S): 5000 INJECTION INTRAVENOUS; SUBCUTANEOUS at 06:05

## 2024-04-24 RX ADMIN — Medication 3 MILLILITER(S): at 07:53

## 2024-04-24 RX ADMIN — Medication 100 MILLIGRAM(S): at 17:28

## 2024-04-24 RX ADMIN — Medication 1: at 17:27

## 2024-04-24 RX ADMIN — HEPARIN SODIUM 5000 UNIT(S): 5000 INJECTION INTRAVENOUS; SUBCUTANEOUS at 21:34

## 2024-04-24 RX ADMIN — Medication 4 MILLILITER(S): at 20:13

## 2024-04-24 RX ADMIN — Medication 50 MILLIGRAM(S): at 06:06

## 2024-04-24 RX ADMIN — Medication 81 MILLIGRAM(S): at 12:51

## 2024-04-24 RX ADMIN — SENNA PLUS 2 TABLET(S): 8.6 TABLET ORAL at 21:35

## 2024-04-24 NOTE — PROGRESS NOTE ADULT - ASSESSMENT
89 yo female with PMHx of CVA (cerebral vascular accident), HTN (hypertension) and COPD, presenting to ED with complaints of weakness     copd  dyspnea  pleural eff  atelectasis  pulm edema  HTN  CVA    D dimer very high  will check CTA chest  VS noted    ID and Cardio eval noted  complete ABX  WBC noted  LABS reviewed  TTE and CT chest reviewed  follows with Dr Douglass pulbeto med as outpatient - [facility] office  [facility] TID - hold off on Inhaler rx regimen - pt may not be a suitable candidate for inhaler use  VBG  cvs rx regimen optimization  CT chest shows Pleural eff - pulm edema - atelectasis  monitor VS and HD and Sat  goal sat > 88 pct  spoke with DTR

## 2024-04-24 NOTE — PROGRESS NOTE ADULT - SUBJECTIVE AND OBJECTIVE BOX
[facility], Division of Infectious Diseases  KRISSY Luong Y. Patel, S. Shah, G. [facility]  [phone]    Name: FEDERICO DOS SANTOS  Age: 89y  Gender: Female  MRN: [id number]    Interval History:  Patient seen and examined at bedside  No acute overnight events. Afebrile  No complaints  Notes reviewed    Antibiotics:  cefuroxime   Tablet 500 milliGRAM(s) Oral every 12 hours      Medications:  acetaminophen     Tablet .. 650 milliGRAM(s) Oral every 6 hours PRN  albuterol/ipratropium for Nebulization 3 milliLiter(s) Nebulizer every 8 hours  aspirin enteric coated 81 milliGRAM(s) Oral daily  benzocaine/menthol Lozenge 1 Lozenge Oral two times a day  cefuroxime   Tablet 500 milliGRAM(s) Oral every 12 hours  dextrose 10% Bolus 125 milliLiter(s) IV Bolus once  dextrose 5%. 1000 milliLiter(s) IV Continuous <Continuous>  dextrose 5%. 1000 milliLiter(s) IV Continuous <Continuous>  dextrose 50% Injectable 12.5 Gram(s) IV Push once  dextrose 50% Injectable 25 Gram(s) IV Push once  dextrose Oral Gel 15 Gram(s) Oral once PRN  gabapentin 400 milliGRAM(s) Oral at bedtime  glucagon  Injectable 1 milliGRAM(s) IntraMuscular once  guaiFENesin Oral Liquid (Sugar-Free) 100 milliGRAM(s) Oral every 6 hours PRN  heparin   Injectable 5000 Unit(s) SubCutaneous every 8 hours  insulin glargine Injectable (LANTUS) 12 Unit(s) SubCutaneous at bedtime  insulin lispro (ADMELOG) corrective regimen sliding scale   SubCutaneous at bedtime  insulin lispro (ADMELOG) corrective regimen sliding scale   SubCutaneous three times a day before meals  metoprolol tartrate 50 milliGRAM(s) Oral two times a day  pantoprazole    Tablet 40 milliGRAM(s) Oral before breakfast  polyethylene glycol 3350 17 Gram(s) Oral daily  senna 2 Tablet(s) Oral at bedtime      Review of Systems:  A 10-point review of systems was obtained.  Review of systems otherwise negative except as previously noted.    Allergies: Biaxin (Unknown)  penicillin (Unknown)    For details regarding the patient's past medical history, social history, family history, and other miscellaneous elements, please refer the initial infectious diseases consultation and/or the admitting history and physical examination for this admission.    Objective:  Vitals:   T(C): 36.4 (04-24-24 @ 05:07), Max: 36.9 (04-23-24 @ 11:41)  HR: 69 (04-24-24 @ 08:14) (69 - 101)  BP: 124/70 (04-24-24 @ 06:03) (109/62 - 131/71)  RR: 18 (04-24-24 @ 05:07) (18 - 18)  SpO2: 98% (04-24-24 @ 08:14) (98% - 98%)    Physical Examination:  General: no acute distress  HEENT: NC/AT,  Cardio: RRR  Resp: decreased breath sounds on NC  Abd: soft, NT, ND  Ext: no edema or cyanosis  Skin: warm, dry, no visible rash      Laboratory Studies:  CBC:                       9.9    5.58  )-----------( 324      ( 24 Apr 2024 08:10 )             31.0     CMP: 04-24    139  |  103  |  16  ----------------------------<  167<H>  4.4   |  31  |  0.73    Ca    9.8      24 Apr 2024 08:10        Urinalysis Basic - ( 24 Apr 2024 08:10 )    Color: x / Appearance: x / SG: x / pH: x  Gluc: 167 mg/dL / Ketone: x  / Bili: x / Urobili: x   Blood: x / Protein: x / Nitrite: x   Leuk Esterase: x / RBC: x / WBC x   Sq Epi: x / Non Sq Epi: x / Bacteria: x        Microbiology: reviewed    Culture - Blood (collected 04-19-24 @ 13:25)  Source: .Blood Blood-Peripheral  Preliminary Report (04-23-24 @ 19:01):    No growth at 4 days    Culture - Blood (collected 04-19-24 @ 13:20)  Source: .Blood Blood-Peripheral  Preliminary Report (04-23-24 @ 19:01):    No growth at 4 days    Culture - Urine (collected 04-19-24 @ 13:20)  Source: Clean Catch Clean Catch (Midstream)  Final Report (04-20-24 @ 16:51):    <10,000 CFU/mL Normal Urogenital Kimberlee          Radiology: reviewed       [facility], Division of Infectious Diseases  KRISSY Luong Y. Patel, S. Shah, G. [facility]  [phone]    Name: FEDERICO DOS SANTOS  Age: 89y  Gender: Female  MRN: [id number]    Interval History:  Patient seen and examined at bedside  No acute overnight events. Afebrile  No complaints  Notes reviewed    Antibiotics:  cefuroxime   Tablet 500 milliGRAM(s) Oral every 12 hours      Medications:  acetaminophen     Tablet .. 650 milliGRAM(s) Oral every 6 hours PRN  albuterol/ipratropium for Nebulization 3 milliLiter(s) Nebulizer every 8 hours  aspirin enteric coated 81 milliGRAM(s) Oral daily  benzocaine/menthol Lozenge 1 Lozenge Oral two times a day  cefuroxime   Tablet 500 milliGRAM(s) Oral every 12 hours  dextrose 10% Bolus 125 milliLiter(s) IV Bolus once  dextrose 5%. 1000 milliLiter(s) IV Continuous <Continuous>  dextrose 5%. 1000 milliLiter(s) IV Continuous <Continuous>  dextrose 50% Injectable 12.5 Gram(s) IV Push once  dextrose 50% Injectable 25 Gram(s) IV Push once  dextrose Oral Gel 15 Gram(s) Oral once PRN  gabapentin 400 milliGRAM(s) Oral at bedtime  glucagon  Injectable 1 milliGRAM(s) IntraMuscular once  guaiFENesin Oral Liquid (Sugar-Free) 100 milliGRAM(s) Oral every 6 hours PRN  heparin   Injectable 5000 Unit(s) SubCutaneous every 8 hours  insulin glargine Injectable (LANTUS) 12 Unit(s) SubCutaneous at bedtime  insulin lispro (ADMELOG) corrective regimen sliding scale   SubCutaneous at bedtime  insulin lispro (ADMELOG) corrective regimen sliding scale   SubCutaneous three times a day before meals  metoprolol tartrate 50 milliGRAM(s) Oral two times a day  pantoprazole    Tablet 40 milliGRAM(s) Oral before breakfast  polyethylene glycol 3350 17 Gram(s) Oral daily  senna 2 Tablet(s) Oral at bedtime      Review of Systems:  A 10-point review of systems was obtained.  Review of systems otherwise negative except as previously noted.    Allergies: Biaxin (Unknown)  penicillin (Unknown)    For details regarding the patient's past medical history, social history, family history, and other miscellaneous elements, please refer the initial infectious diseases consultation and/or the admitting history and physical examination for this admission.    Objective:  Vitals:   T(C): 36.4 (04-24-24 @ 05:07), Max: 36.9 (04-23-24 @ 11:41)  HR: 69 (04-24-24 @ 08:14) (69 - 101)  BP: 124/70 (04-24-24 @ 06:03) (109/62 - 131/71)  RR: 18 (04-24-24 @ 05:07) (18 - 18)  SpO2: 98% (04-24-24 @ 08:14) (98% - 98%)    Physical Examination:  General: no acute distress  HEENT: NC/AT,  Cardio: RRR  Resp: decreased breath sounds on NC  Abd: soft, NT, ND  Ext: no edema or cyanosis  Skin: warm, dry, no visible rash      Laboratory Studies:  CBC:                       9.9    5.58  )-----------( 324      ( 24 Apr 2024 08:10 )             31.0     CMP: 04-24    139  |  103  |  16  ----------------------------<  167<H>  4.4   |  31  |  0.73    Ca    9.8      24 Apr 2024 08:10        Urinalysis Basic - ( 24 Apr 2024 08:10 )    Color: x / Appearance: x / SG: x / pH: x  Gluc: 167 mg/dL / Ketone: x  / Bili: x / Urobili: x   Blood: x / Protein: x / Nitrite: x   Leuk Esterase: x / RBC: x / WBC x   Sq Epi: x / Non Sq Epi: x / Bacteria: x        Microbiology: reviewed    Culture - Blood (collected 04-19-24 @ 13:25)  Source: .Blood Blood-Peripheral  Preliminary Report (04-23-24 @ 19:01):    No growth at 4 days    Culture - Blood (collected 04-19-24 @ 13:20)  Source: .Blood Blood-Peripheral  Preliminary Report (04-23-24 @ 19:01):    No growth at 4 days    Culture - Urine (collected 04-19-24 @ 13:20)  Source: Clean Catch Clean Catch (Midstream)  Final Report (04-20-24 @ 16:51):    <10,000 CFU/mL Normal Urogenital Kimberlee          Radiology: reviewed      < from: CT Chest No Cont (04.20.24 @ 08:51) >    ACC: [id number] EXAM:  CT CHEST   ORDERED BY:  KAISER ESTEVES     PROCEDURE DATE:  04/20/2024          INTERPRETATION:  CT CHEST WITHOUT CONTRAST    INDICATION: Obstructive pulmonary disease. Cough. Haziness on x-ray.    TECHNIQUE: Unenhanced helical images were obtained of the chest. Coronal   and sagittal images were reconstructed. Maximum intensity projection   images were generated.        COMPARISON: Radiograph chest 4/19/2024. CT chest 1/25/2020 and 12/18/2017.    FINDINGS:    Tubes/Lines: None.    Lungs, airways and pleura: Bilateral septal thickening, groundglass   opacities and peripheral consolidation in the upper lobes and bilateral   lower lobe consolidation. The opacities are superimposed upon underlying   emphysema.    Secretions in the trachea and bronchus intermedius. No pneumothorax.    Mediastinum: Hypodense right lobe thyroid nodule. The chest lymph nodes   measure less than 15 mm in the short axis. Unremarkable esophagus. The   heart is normal in size. Coronary artery calcifications. Mitral annular   calcification. Aortic valve calcifications. The aorta is normal in   caliber. Aortic calcifications.    Upper Abdomen: The left renal pelvis is dilated. Diverticulosis.   Thickening of the left adrenal gland.    Bones And Soft Tissues: The bones are unremarkable.  A right axillary   graft is partially imaged.      IMPRESSION:    1.  Bilateral septal thickening, groundglass opacities, peripheral   consolidation and pleural effusions. These findings could occur inthe   setting of pulmonary edema, however, if there are no signs of pulmonary   edema, other entities including pneumonia, could be considered.    --- End of Report ---            PETER PHELPS MD; Attending Radiologist  This document has been electronically signed. Apr 20 2024 12:54PM    < end of copied text >

## 2024-04-24 NOTE — PROGRESS NOTE ADULT - ASSESSMENT
89 yo female with PMHx of CVA (cerebral vascular accident), HTN (hypertension) , DM2 and COPD aw dysuria, fever/chills and weakness  +leucocytosis, +UA,   aw sepsis dt UTI    #Sepsis dt UTI/and pneumonia  cont ceftriaxone, switched to ceftin  fu Ucx neg, Bld cx ngtd  ID cs fu noted    #Worsening cough  hx of copd  RVP neg  CXR- diffuse interstitial lung dz  check probnp   CT chest- showed GC opacities- pulm edema vs pneumonia  check probnp- 1700, fu TTE reviewed  cards cs, pulm and  ID cs fu  nebs started  CTA without PE, RML atelectasis    #HTN-  resume bblocker  pt on prn lasix at home    #DM-2- lantus and sliding scale    #DVT ppx- hep sq    d/w dgtr at bedside plan of care    [facility]   [phone]

## 2024-04-24 NOTE — PROGRESS NOTE ADULT - SUBJECTIVE AND OBJECTIVE BOX
Patient is a 89y old  Female who presents with a chief complaint of       INTERVAL HPI/OVERNIGHT EVENTS:   no complaints  pt seen and examined         Vital Signs Last 24 Hrs  T(C): 36.4 (24 Apr 2024 11:19), Max: 36.7 (23 Apr 2024 20:45)  T(F): 97.5 (24 Apr 2024 11:19), Max: 98 (23 Apr 2024 20:45)  HR: 75 (24 Apr 2024 14:40) (69 - 86)  BP: 105/61 (24 Apr 2024 11:19) (105/61 - 131/71)  BP(mean): --  RR: 18 (24 Apr 2024 11:19) (18 - 18)  SpO2: 96% (24 Apr 2024 14:40) (96% - 98%)    Parameters below as of 24 Apr 2024 14:40  Patient On (Oxygen Delivery Method): nasal cannula, 2L        acetaminophen     Tablet .. 650 milliGRAM(s) Oral every 6 hours PRN  acetylcysteine 10%  Inhalation 4 milliLiter(s) Inhalation every 12 hours  albuterol/ipratropium for Nebulization 3 milliLiter(s) Nebulizer every 8 hours  aspirin enteric coated 81 milliGRAM(s) Oral daily  benzocaine/menthol Lozenge 1 Lozenge Oral two times a day  cefuroxime   Tablet 500 milliGRAM(s) Oral every 12 hours  dextrose 10% Bolus 125 milliLiter(s) IV Bolus once  dextrose 5%. 1000 milliLiter(s) IV Continuous <Continuous>  dextrose 5%. 1000 milliLiter(s) IV Continuous <Continuous>  dextrose 50% Injectable 25 Gram(s) IV Push once  dextrose 50% Injectable 12.5 Gram(s) IV Push once  dextrose Oral Gel 15 Gram(s) Oral once PRN  gabapentin 400 milliGRAM(s) Oral at bedtime  glucagon  Injectable 1 milliGRAM(s) IntraMuscular once  guaiFENesin Oral Liquid (Sugar-Free) 100 milliGRAM(s) Oral every 6 hours PRN  heparin   Injectable 5000 Unit(s) SubCutaneous every 8 hours  insulin glargine Injectable (LANTUS) 12 Unit(s) SubCutaneous at bedtime  insulin lispro (ADMELOG) corrective regimen sliding scale   SubCutaneous three times a day before meals  insulin lispro (ADMELOG) corrective regimen sliding scale   SubCutaneous at bedtime  metoprolol tartrate 50 milliGRAM(s) Oral two times a day  pantoprazole    Tablet 40 milliGRAM(s) Oral before breakfast  polyethylene glycol 3350 17 Gram(s) Oral daily  senna 2 Tablet(s) Oral at bedtime      PHYSICAL EXAM:  GENERAL: NAD   EYES: conjunctiva and sclera clear  ENMT: Moist mucous membranes  NECK: Supple, No JVD, Normal thyroid  CHEST/LUNG: non labored, cta b/l  HEART: Regular rate and rhythm; No murmurs, rubs, or gallops  ABDOMEN: Soft, Nontender, Nondistended; Bowel sounds present  EXTREMITIES:  No clubbing, no cyanosis, no edema  LYMPH: No lymphadenopathy noted  SKIN: No rashes or lesions  NEURO: no new focal deficits    Consultant(s) Notes Reviewed:  [x ] YES  [ ] NO  Care Discussed with Consultants/Other Providers [ x] YES  [ ] NO    LABS:                        9.9    5.58  )-----------( 324      ( 24 Apr 2024 08:10 )             31.0     04-24    139  |  103  |  16  ----------------------------<  167<H>  4.4   |  31  |  0.73    Ca    9.8      24 Apr 2024 08:10        Urinalysis Basic - ( 24 Apr 2024 08:10 )    Color: x / Appearance: x / SG: x / pH: x  Gluc: 167 mg/dL / Ketone: x  / Bili: x / Urobili: x   Blood: x / Protein: x / Nitrite: x   Leuk Esterase: x / RBC: x / WBC x   Sq Epi: x / Non Sq Epi: x / Bacteria: x      CAPILLARY BLOOD GLUCOSE      POCT Blood Glucose.: 198 mg/dL (24 Apr 2024 12:30)  POCT Blood Glucose.: 164 mg/dL (24 Apr 2024 08:06)  POCT Blood Glucose.: 237 mg/dL (23 Apr 2024 21:25)  POCT Blood Glucose.: 175 mg/dL (23 Apr 2024 17:14)        Urinalysis Basic - ( 24 Apr 2024 08:10 )    Color: x / Appearance: x / SG: x / pH: x  Gluc: 167 mg/dL / Ketone: x  / Bili: x / Urobili: x   Blood: x / Protein: x / Nitrite: x   Leuk Esterase: x / RBC: x / WBC x   Sq Epi: x / Non Sq Epi: x / Bacteria: x          RADIOLOGY & ADDITIONAL TESTS:    Imaging Personally Reviewed  Reviewed consultants input

## 2024-04-24 NOTE — CASE MANAGEMENT PROGRESS NOTE - NSCMPROGRESSNOTE_GEN_ALL_CORE
Patient discussed in rounds.  Patient is on PO antibiotics and in need for home oxygen.  MD aware of need to sign oxygen script and co-sign sat levels.  Discharge disposition remains [facility] vs home.  CM remains available throughout hospital stay.

## 2024-04-24 NOTE — PROGRESS NOTE ADULT - SUBJECTIVE AND OBJECTIVE BOX
Chief Complaint: Weakness, chills    Interval Events: No events overnight.    Review of Systems:  General: No fevers, chills, weight gain  Skin: No rashes, color changes  Cardiovascular: No chest pain, orthopnea  Respiratory: No shortness of breath, cough  Gastrointestinal: No nausea, abdominal pain  Genitourinary: No incontinence, pain with urination  Musculoskeletal: No pain, swelling, decreased range of motion  Neurological: No headache, weakness  Psychiatric: No depression, anxiety  Endocrine: No weight gain, increased thirst  All other systems are comprehensively negative.    Physical Exam:  Vital Signs Last 24 Hrs  T(C): 36.4 (24 Apr 2024 05:07), Max: 36.9 (23 Apr 2024 11:41)  T(F): 97.5 (24 Apr 2024 05:07), Max: 98.4 (23 Apr 2024 11:41)  HR: 69 (24 Apr 2024 08:14) (69 - 101)  BP: 124/70 (24 Apr 2024 06:03) (109/62 - 131/71)  BP(mean): --  RR: 18 (24 Apr 2024 05:07) (18 - 18)  SpO2: 98% (24 Apr 2024 08:14) (98% - 98%)  Parameters below as of 24 Apr 2024 08:14  Patient On (Oxygen Delivery Method): nasal cannula, 2L  General: NAD  HEENT: MMM  Neck: No JVD, no carotid bruit  Lungs: CTAB  CV: RRR, nl S1/S2, no M/R/G  Abdomen: S/NT/ND, +BS  Extremities: No LE edema, no cyanosis  Neuro: AAOx3, non-focal  Skin: No rash    Labs:                 ECG/Telemetry: Sinus rhythm

## 2024-04-24 NOTE — PROGRESS NOTE ADULT - ASSESSMENT
89YO F PMHx of CVA, HTN and COPD admitted with CAP   In ED [id number] UA neg, CT chest with pna.   + cough  UTI ruled out  Cx NGTD    Recommendations:   C/w PO ceftin 500mg BID to complete course 4/26  Trend temps and cbc  Asp precautions  supportive care, O2 as needed  Additional care per primary team    Stable from ID standpoint  D/c planning per primary team    Please call with any questions.  Tabby Duarte M.D.  [facility] Division of Infectious Diseases [phone]  For after 5 P.M. and weekends, please call [phone]   89YO F PMHx of CVA, HTN and COPD admitted with CAP   In ED [id number] UA neg, CT chest with pna.   + cough  UTI ruled out  Cx NGTD    Recommendations:   C/w PO ceftin 500mg BID to complete course 4/26  Pending CTA to r/o PE  Trend temps and cbc  Asp precautions  supportive care, O2 as needed  Additional care per primary team    Stable from ID standpoint    Infectious Diseases will follow. Please call with any questions.  Tabby Duarte M.D.  [facility] Division of Infectious Diseases [phone]  For after 5 P.M. and weekends, please call [phone]

## 2024-04-24 NOTE — PROGRESS NOTE ADULT - ASSESSMENT
The patient is a 89 year old female with a history of HTN, HL, DM, COPD, CVA who presented with weakness and chills in the setting of UTI, possible PNA.    Plan:  - ECG with sinus tachycardia and otherwise nonspecific findings  - CT chest with bilateral septal thickening, GGO, consolidations and pleural effusions - PNA vs. heart failure  - BNP 1769  - Echo with normal LV systolic function, no valve issues, normal pulm pressures, very small/collapsible IVC  - No signs of decompensated heart failure on exam  - No indication for diuretics  - Continue aspirin 81 mg daily  - Continue atorvastatin 20 mg daily  - Continue metoprolol tartrate 50 mg bid  - IV antibiotics  - CTA chest pending

## 2024-04-24 NOTE — PROGRESS NOTE ADULT - SUBJECTIVE AND OBJECTIVE BOX
Date/Time Patient Seen:  		  Referring MD:   Data Reviewed	       Patient is a 89y old  Female who presents with a chief complaint of     Subjective/HPI     PAST MEDICAL & SURGICAL HISTORY:  HTN (hypertension)    CVA (cerebral vascular accident)    CAD (coronary artery disease)  cardiac stent    S/P vascular bypass  left leg          Medication list         MEDICATIONS  (STANDING):  albuterol/ipratropium for Nebulization 3 milliLiter(s) Nebulizer every 8 hours  aspirin enteric coated 81 milliGRAM(s) Oral daily  benzocaine/menthol Lozenge 1 Lozenge Oral two times a day  cefuroxime   Tablet 500 milliGRAM(s) Oral every 12 hours  dextrose 10% Bolus 125 milliLiter(s) IV Bolus once  dextrose 5%. 1000 milliLiter(s) (50 mL/Hr) IV Continuous <Continuous>  dextrose 5%. 1000 milliLiter(s) (100 mL/Hr) IV Continuous <Continuous>  dextrose 50% Injectable 25 Gram(s) IV Push once  dextrose 50% Injectable 12.5 Gram(s) IV Push once  gabapentin 400 milliGRAM(s) Oral at bedtime  glucagon  Injectable 1 milliGRAM(s) IntraMuscular once  heparin   Injectable 5000 Unit(s) SubCutaneous every 8 hours  insulin glargine Injectable (LANTUS) 12 Unit(s) SubCutaneous at bedtime  insulin lispro (ADMELOG) corrective regimen sliding scale   SubCutaneous three times a day before meals  insulin lispro (ADMELOG) corrective regimen sliding scale   SubCutaneous at bedtime  metoprolol tartrate 50 milliGRAM(s) Oral two times a day  pantoprazole    Tablet 40 milliGRAM(s) Oral before breakfast  polyethylene glycol 3350 17 Gram(s) Oral daily  senna 2 Tablet(s) Oral at bedtime    MEDICATIONS  (PRN):  acetaminophen     Tablet .. 650 milliGRAM(s) Oral every 6 hours PRN Temp greater or equal to 38C (100.4F), Mild Pain (1 - 3)  dextrose Oral Gel 15 Gram(s) Oral once PRN Blood Glucose LESS THAN 70 milliGRAM(s)/deciliter  guaiFENesin Oral Liquid (Sugar-Free) 100 milliGRAM(s) Oral every 6 hours PRN Cough         Vitals log        ICU Vital Signs Last 24 Hrs  T(C): 36.4 (24 Apr 2024 05:07), Max: 36.9 (23 Apr 2024 11:41)  T(F): 97.5 (24 Apr 2024 05:07), Max: 98.4 (23 Apr 2024 11:41)  HR: 80 (24 Apr 2024 06:03) (80 - 101)  BP: 124/70 (24 Apr 2024 06:03) (109/62 - 131/71)  BP(mean): --  ABP: --  ABP(mean): --  RR: 18 (24 Apr 2024 05:07) (18 - 18)  SpO2: 98% (24 Apr 2024 05:07) (98% - 98%)    O2 Parameters below as of 24 Apr 2024 05:07  Patient On (Oxygen Delivery Method): nasal cannula  O2 Flow (L/min): 2               Input and Output:  I&O's Detail    22 Apr 2024 07:01  -  23 Apr 2024 07:00  --------------------------------------------------------  IN:  Total IN: 0 mL    OUT:    Voided (mL): 250 mL  Total OUT: 250 mL    Total NET: -250 mL      23 Apr 2024 07:01  -  24 Apr 2024 06:18  --------------------------------------------------------  IN:  Total IN: 0 mL    OUT:    Voided (mL): 600 mL  Total OUT: 600 mL    Total NET: -600 mL          Lab Data                        10.1   6.34  )-----------( 303      ( 22 Apr 2024 07:40 )             30.8     04-22    136  |  102  |  15  ----------------------------<  189<H>  4.1   |  28  |  0.78    Ca    8.8      22 Apr 2024 07:40              Review of Systems	      Objective     Physical Examination    heart s1s2  lung dec BS  head nc  head at      Pertinent Lab findings & Imaging      Lv:  NO   Adequate UO     I&O's Detail    22 Apr 2024 07:01  -  23 Apr 2024 07:00  --------------------------------------------------------  IN:  Total IN: 0 mL    OUT:    Voided (mL): 250 mL  Total OUT: 250 mL    Total NET: -250 mL      23 Apr 2024 07:01  -  24 Apr 2024 06:18  --------------------------------------------------------  IN:  Total IN: 0 mL    OUT:    Voided (mL): 600 mL  Total OUT: 600 mL    Total NET: -600 mL               Discussed with:     Cultures:	        Radiology

## 2024-04-25 LAB
ANION GAP SERPL CALC-SCNC: 6 MMOL/L — SIGNIFICANT CHANGE UP (ref 5–17)
BUN SERPL-MCNC: 17 MG/DL — SIGNIFICANT CHANGE UP (ref 7–23)
CALCIUM SERPL-MCNC: 9.4 MG/DL — SIGNIFICANT CHANGE UP (ref 8.5–10.1)
CHLORIDE SERPL-SCNC: 99 MMOL/L — SIGNIFICANT CHANGE UP (ref 96–108)
CO2 SERPL-SCNC: 32 MMOL/L — HIGH (ref 22–31)
CREAT SERPL-MCNC: 0.8 MG/DL — SIGNIFICANT CHANGE UP (ref 0.5–1.3)
CRP SERPL-MCNC: 38 MG/L — HIGH
EGFR: 70 ML/MIN/1.73M2 — SIGNIFICANT CHANGE UP
GLUCOSE SERPL-MCNC: 184 MG/DL — HIGH (ref 70–99)
HCT VFR BLD CALC: 33.7 % — LOW (ref 34.5–45)
HGB BLD-MCNC: 11 G/DL — LOW (ref 11.5–15.5)
MCHC RBC-ENTMCNC: 28.8 PG — SIGNIFICANT CHANGE UP (ref 27–34)
MCHC RBC-ENTMCNC: 32.6 GM/DL — SIGNIFICANT CHANGE UP (ref 32–36)
MCV RBC AUTO: 88.2 FL — SIGNIFICANT CHANGE UP (ref 80–100)
NRBC # BLD: 0 /100 WBCS — SIGNIFICANT CHANGE UP (ref 0–0)
PLATELET # BLD AUTO: 323 K/UL — SIGNIFICANT CHANGE UP (ref 150–400)
POTASSIUM SERPL-MCNC: 4.5 MMOL/L — SIGNIFICANT CHANGE UP (ref 3.5–5.3)
POTASSIUM SERPL-SCNC: 4.5 MMOL/L — SIGNIFICANT CHANGE UP (ref 3.5–5.3)
RBC # BLD: 3.82 M/UL — SIGNIFICANT CHANGE UP (ref 3.8–5.2)
RBC # FLD: 13.9 % — SIGNIFICANT CHANGE UP (ref 10.3–14.5)
SODIUM SERPL-SCNC: 137 MMOL/L — SIGNIFICANT CHANGE UP (ref 135–145)
WBC # BLD: 6.43 K/UL — SIGNIFICANT CHANGE UP (ref 3.8–10.5)
WBC # FLD AUTO: 6.43 K/UL — SIGNIFICANT CHANGE UP (ref 3.8–10.5)

## 2024-04-25 RX ORDER — SODIUM CHLORIDE 9 MG/ML
4 INJECTION INTRAMUSCULAR; INTRAVENOUS; SUBCUTANEOUS EVERY 8 HOURS
Refills: 0 | Status: DISCONTINUED | OUTPATIENT
Start: 2024-04-25 | End: 2024-04-27

## 2024-04-25 RX ADMIN — SODIUM CHLORIDE 4 MILLILITER(S): 9 INJECTION INTRAMUSCULAR; INTRAVENOUS; SUBCUTANEOUS at 23:26

## 2024-04-25 RX ADMIN — Medication 500 MILLIGRAM(S): at 06:02

## 2024-04-25 RX ADMIN — Medication 50 MILLIGRAM(S): at 17:27

## 2024-04-25 RX ADMIN — Medication 2: at 09:20

## 2024-04-25 RX ADMIN — BENZOCAINE AND MENTHOL 1 LOZENGE: 5; 1 LIQUID ORAL at 06:02

## 2024-04-25 RX ADMIN — PANTOPRAZOLE SODIUM 40 MILLIGRAM(S): 20 TABLET, DELAYED RELEASE ORAL at 06:02

## 2024-04-25 RX ADMIN — Medication 3 MILLILITER(S): at 07:51

## 2024-04-25 RX ADMIN — INSULIN GLARGINE 12 UNIT(S): 100 INJECTION, SOLUTION SUBCUTANEOUS at 22:00

## 2024-04-25 RX ADMIN — Medication 2: at 17:27

## 2024-04-25 RX ADMIN — Medication 3 MILLILITER(S): at 23:26

## 2024-04-25 RX ADMIN — HEPARIN SODIUM 5000 UNIT(S): 5000 INJECTION INTRAVENOUS; SUBCUTANEOUS at 21:59

## 2024-04-25 RX ADMIN — BENZOCAINE AND MENTHOL 1 LOZENGE: 5; 1 LIQUID ORAL at 17:27

## 2024-04-25 RX ADMIN — Medication 100 MILLIGRAM(S): at 17:27

## 2024-04-25 RX ADMIN — Medication 2: at 13:20

## 2024-04-25 RX ADMIN — Medication 500 MILLIGRAM(S): at 17:28

## 2024-04-25 RX ADMIN — HEPARIN SODIUM 5000 UNIT(S): 5000 INJECTION INTRAVENOUS; SUBCUTANEOUS at 14:14

## 2024-04-25 RX ADMIN — Medication 3 MILLILITER(S): at 13:29

## 2024-04-25 RX ADMIN — SODIUM CHLORIDE 4 MILLILITER(S): 9 INJECTION INTRAMUSCULAR; INTRAVENOUS; SUBCUTANEOUS at 13:26

## 2024-04-25 RX ADMIN — HEPARIN SODIUM 5000 UNIT(S): 5000 INJECTION INTRAVENOUS; SUBCUTANEOUS at 06:03

## 2024-04-25 RX ADMIN — GABAPENTIN 400 MILLIGRAM(S): 400 CAPSULE ORAL at 22:00

## 2024-04-25 RX ADMIN — SODIUM CHLORIDE 4 MILLILITER(S): 9 INJECTION INTRAMUSCULAR; INTRAVENOUS; SUBCUTANEOUS at 07:51

## 2024-04-25 RX ADMIN — Medication 81 MILLIGRAM(S): at 14:14

## 2024-04-25 RX ADMIN — SENNA PLUS 2 TABLET(S): 8.6 TABLET ORAL at 21:59

## 2024-04-25 NOTE — PROGRESS NOTE ADULT - SUBJECTIVE AND OBJECTIVE BOX
CAPILLARY BLOOD GLUCOSE      POCT Blood Glucose.: 211 mg/dL (25 Apr 2024 08:37)  POCT Blood Glucose.: 314 mg/dL (24 Apr 2024 21:49)  POCT Blood Glucose.: 184 mg/dL (24 Apr 2024 17:01)  POCT Blood Glucose.: 198 mg/dL (24 Apr 2024 12:30)      Vital Signs Last 24 Hrs  T(C): 36.3 (25 Apr 2024 09:31), Max: 36.6 (25 Apr 2024 05:14)  T(F): 97.4 (25 Apr 2024 09:31), Max: 97.8 (25 Apr 2024 05:14)  HR: 112 (25 Apr 2024 09:31) (75 - 112)  BP: 118/66 (25 Apr 2024 09:31) (102/63 - 118/66)  BP(mean): --  RR: 18 (25 Apr 2024 09:31) (18 - 18)  SpO2: 95% (25 Apr 2024 09:31) (95% - 99%)    Parameters below as of 25 Apr 2024 09:31  Patient On (Oxygen Delivery Method): nasal cannula  O2 Flow (L/min): 2      General: WN/WD NAD  Respiratory: CTA B/L  CV: RRR, S1S2, no murmurs, rubs or gallops  Abdominal: Soft, NT, ND +BS, Last BM  Extremities: No edema, + peripheral pulses     04-25    137  |  99  |  17  ----------------------------<  184<H>  4.5   |  32<H>  |  0.80    Ca    9.4      25 Apr 2024 07:18        dextrose 50% Injectable 25 Gram(s) IV Push once  dextrose 50% Injectable 12.5 Gram(s) IV Push once  dextrose Oral Gel 15 Gram(s) Oral once PRN  glucagon  Injectable 1 milliGRAM(s) IntraMuscular once  insulin glargine Injectable (LANTUS) 12 Unit(s) SubCutaneous at bedtime  insulin lispro (ADMELOG) corrective regimen sliding scale   SubCutaneous three times a day before meals  insulin lispro (ADMELOG) corrective regimen sliding scale   SubCutaneous at bedtime

## 2024-04-25 NOTE — PROGRESS NOTE ADULT - ASSESSMENT
89 yo female with PMHx of CVA (cerebral vascular accident), HTN (hypertension) and COPD, presenting to ED with complaints of weakness     copd  dyspnea  pleural eff  atelectasis  pulm edema  HTN  CVA    ct chest - pna - atelectasis - mucoid impaction  mucolytics - nebs - abx  fio2 wean as tolerated  cm follow up noted    ID and Cardio eval noted  complete ABX  WBC noted  LABS reviewed  TTE and CT chest reviewed  follows with Dr Douglass pulbeto med as outpatient - [facility]  NEBS TID - hold off on Inhaler rx regimen - pt may not be a suitable candidate for inhaler use  VBG  cvs rx regimen optimization  CT chest shows Pleural eff - pulm edema - atelectasis  monitor VS and HD and Sat  goal sat > 88 pct  spoke with DTR   89 yo female with PMHx of CVA (cerebral vascular accident), HTN (hypertension) and COPD, presenting to ED with complaints of weakness     copd  dyspnea  pleural eff  atelectasis  pulm edema  HTN  CVA    ct chest - pna - atelectasis - mucoid impaction  mucolytics - nebs - abx  fio2 wean as tolerated  cm follow up noted    ID and Cardio eval noted  complete ABX  WBC noted  LABS reviewed  TTE and CT chest reviewed  follows with Dr Douglass pulm med as outpatient - [facility] - will need rpt imaging to resolution of PNA  NEBS TID - hold off on Inhaler rx regimen - pt may not be a suitable candidate for inhaler use  VBG  cvs rx regimen optimization  CT chest shows Pleural eff - pulm edema - atelectasis  monitor VS and HD and Sat  goal sat > 88 pct  spoke with DTR

## 2024-04-25 NOTE — PROGRESS NOTE ADULT - SUBJECTIVE AND OBJECTIVE BOX
Patient is a 89y old  Female who presents with a chief complaint of       INTERVAL HPI/OVERNIGHT EVENTS:   no complaints  pt seen and examined         Vital Signs Last 24 Hrs  T(C): 36.3 (25 Apr 2024 19:54), Max: 36.6 (25 Apr 2024 05:14)  T(F): 97.3 (25 Apr 2024 19:54), Max: 97.8 (25 Apr 2024 05:14)  HR: 102 (25 Apr 2024 19:54) (75 - 112)  BP: 120/76 (25 Apr 2024 19:54) (102/63 - 120/76)  BP(mean): --  RR: 18 (25 Apr 2024 19:54) (18 - 18)  SpO2: 100% (25 Apr 2024 19:54) (91% - 100%)    Parameters below as of 25 Apr 2024 19:54  Patient On (Oxygen Delivery Method): nasal cannula  O2 Flow (L/min): 2      acetaminophen     Tablet .. 650 milliGRAM(s) Oral every 6 hours PRN  albuterol/ipratropium for Nebulization 3 milliLiter(s) Nebulizer every 8 hours  aspirin enteric coated 81 milliGRAM(s) Oral daily  benzocaine/menthol Lozenge 1 Lozenge Oral two times a day  cefuroxime   Tablet 500 milliGRAM(s) Oral every 12 hours  dextrose 10% Bolus 125 milliLiter(s) IV Bolus once  dextrose 5%. 1000 milliLiter(s) IV Continuous <Continuous>  dextrose 5%. 1000 milliLiter(s) IV Continuous <Continuous>  dextrose 50% Injectable 25 Gram(s) IV Push once  dextrose 50% Injectable 12.5 Gram(s) IV Push once  dextrose Oral Gel 15 Gram(s) Oral once PRN  gabapentin 400 milliGRAM(s) Oral at bedtime  glucagon  Injectable 1 milliGRAM(s) IntraMuscular once  guaiFENesin Oral Liquid (Sugar-Free) 100 milliGRAM(s) Oral every 6 hours PRN  heparin   Injectable 5000 Unit(s) SubCutaneous every 8 hours  insulin glargine Injectable (LANTUS) 12 Unit(s) SubCutaneous at bedtime  insulin lispro (ADMELOG) corrective regimen sliding scale   SubCutaneous at bedtime  insulin lispro (ADMELOG) corrective regimen sliding scale   SubCutaneous three times a day before meals  metoprolol tartrate 50 milliGRAM(s) Oral two times a day  pantoprazole    Tablet 40 milliGRAM(s) Oral before breakfast  polyethylene glycol 3350 17 Gram(s) Oral daily  senna 2 Tablet(s) Oral at bedtime  sodium chloride 3%  Inhalation 4 milliLiter(s) Inhalation every 8 hours      PHYSICAL EXAM:  GENERAL: NAD   EYES: conjunctiva and sclera clear  ENMT: Moist mucous membranes  NECK: Supple, No JVD, Normal thyroid  CHEST/LUNG: non labored, cta b/l  HEART: Regular rate and rhythm; No murmurs, rubs, or gallops  ABDOMEN: Soft, Nontender, Nondistended; Bowel sounds present  EXTREMITIES:  No clubbing, no cyanosis, no edema  LYMPH: No lymphadenopathy noted  SKIN: No rashes or lesions  NEURO: no new focal deficits    Consultant(s) Notes Reviewed:  [x ] YES  [ ] NO  Care Discussed with Consultants/Other Providers [ x] YES  [ ] NO    LABS:                        11.0   6.43  )-----------( 323      ( 25 Apr 2024 07:18 )             33.7     04-25    137  |  99  |  17  ----------------------------<  184<H>  4.5   |  32<H>  |  0.80    Ca    9.4      25 Apr 2024 07:18        Urinalysis Basic - ( 25 Apr 2024 07:18 )    Color: x / Appearance: x / SG: x / pH: x  Gluc: 184 mg/dL / Ketone: x  / Bili: x / Urobili: x   Blood: x / Protein: x / Nitrite: x   Leuk Esterase: x / RBC: x / WBC x   Sq Epi: x / Non Sq Epi: x / Bacteria: x      CAPILLARY BLOOD GLUCOSE      POCT Blood Glucose.: 207 mg/dL (25 Apr 2024 16:56)  POCT Blood Glucose.: 205 mg/dL (25 Apr 2024 12:21)  POCT Blood Glucose.: 211 mg/dL (25 Apr 2024 08:37)  POCT Blood Glucose.: 314 mg/dL (24 Apr 2024 21:49)        Urinalysis Basic - ( 25 Apr 2024 07:18 )    Color: x / Appearance: x / SG: x / pH: x  Gluc: 184 mg/dL / Ketone: x  / Bili: x / Urobili: x   Blood: x / Protein: x / Nitrite: x   Leuk Esterase: x / RBC: x / WBC x   Sq Epi: x / Non Sq Epi: x / Bacteria: x          RADIOLOGY & ADDITIONAL TESTS:    Imaging Personally Reviewed  Reviewed consultants input

## 2024-04-25 NOTE — PROGRESS NOTE ADULT - ASSESSMENT
89YO F PMHx of CVA, HTN and COPD admitted with CAP   In ED [id number] UA neg, CT chest with pna.   + cough  UTI ruled out  Cx NGTD  CT w/ mucoid impaction and persistent RLL PNA    Recommendations:   C/w PO ceftin 500mg BID to complete course 4/26  Trend temps and cbc  Asp precautions  supportive care, O2 as needed  Additional care per primary team    Stable from ID standpoint  D/w Dr. Cotter  Please call with any further questions.  Tabby Duarte M.D.  [facility] Division of Infectious Diseases [phone]  For after 5 P.M. and weekends, please call [phone]

## 2024-04-25 NOTE — PROGRESS NOTE ADULT - PROBLEM SELECTOR PLAN 1
cont lantus 12 units qhs  cont low dose admelog corrective scale coverage qac/qhs  cont cons cho diet  goal bg conservative mngmt

## 2024-04-25 NOTE — PROGRESS NOTE ADULT - ASSESSMENT
The patient is a 89 year old female with a history of HTN, HL, DM, COPD, CVA who presented with weakness and chills in the setting of UTI, possible PNA.    Plan:  - ECG with sinus tachycardia and otherwise nonspecific findings  - CT chest with bilateral septal thickening, GGO, consolidations and pleural effusions - PNA vs. heart failure  - BNP 1769  - Echo with normal LV systolic function, no valve issues, normal pulm pressures, very small/collapsible IVC  - No signs of decompensated heart failure on exam  - No indication for diuretics  - Continue aspirin 81 mg daily  - Continue atorvastatin 20 mg daily  - Continue metoprolol tartrate 50 mg bid  - On oral antibiotics  - CTA chest negative for PE; noted are mucoid secretions, PNA, chronic ILD and emphysema

## 2024-04-25 NOTE — PROGRESS NOTE ADULT - SUBJECTIVE AND OBJECTIVE BOX
[facility], Division of Infectious Diseases  KRISSY Luong Y. Patel, S. [facility], [facility]  [phone]    Name: FEDERICO DOS SANTOS  Age: 89y  Gender: Female  MRN: [id number]    Interval History:  Patient seen and examined at bedside  No acute overnight events. Afebrile  No complaints  Notes reviewed    Antibiotics:  cefuroxime   Tablet 500 milliGRAM(s) Oral every 12 hours      Medications:  acetaminophen     Tablet .. 650 milliGRAM(s) Oral every 6 hours PRN  albuterol/ipratropium for Nebulization 3 milliLiter(s) Nebulizer every 8 hours  aspirin enteric coated 81 milliGRAM(s) Oral daily  benzocaine/menthol Lozenge 1 Lozenge Oral two times a day  cefuroxime   Tablet 500 milliGRAM(s) Oral every 12 hours  dextrose 10% Bolus 125 milliLiter(s) IV Bolus once  dextrose 5%. 1000 milliLiter(s) IV Continuous <Continuous>  dextrose 5%. 1000 milliLiter(s) IV Continuous <Continuous>  dextrose 50% Injectable 25 Gram(s) IV Push once  dextrose 50% Injectable 12.5 Gram(s) IV Push once  dextrose Oral Gel 15 Gram(s) Oral once PRN  gabapentin 400 milliGRAM(s) Oral at bedtime  glucagon  Injectable 1 milliGRAM(s) IntraMuscular once  guaiFENesin Oral Liquid (Sugar-Free) 100 milliGRAM(s) Oral every 6 hours PRN  heparin   Injectable 5000 Unit(s) SubCutaneous every 8 hours  insulin glargine Injectable (LANTUS) 12 Unit(s) SubCutaneous at bedtime  insulin lispro (ADMELOG) corrective regimen sliding scale   SubCutaneous at bedtime  insulin lispro (ADMELOG) corrective regimen sliding scale   SubCutaneous three times a day before meals  metoprolol tartrate 50 milliGRAM(s) Oral two times a day  pantoprazole    Tablet 40 milliGRAM(s) Oral before breakfast  polyethylene glycol 3350 17 Gram(s) Oral daily  senna 2 Tablet(s) Oral at bedtime  sodium chloride 3%  Inhalation 4 milliLiter(s) Inhalation every 8 hours      Review of Systems:  A 10-point review of systems was obtained.   Review of systems otherwise negative except as previously noted.    Allergies: Biaxin (Unknown)  penicillin (Unknown)    For details regarding the patient's past medical history, social history, family history, and other miscellaneous elements, please refer the initial infectious diseases consultation and/or the admitting history and physical examination for this admission.    Objective:  Vitals:   T(C): 36.5 (04-25-24 @ 11:35), Max: 36.6 (04-25-24 @ 05:14)  HR: 98 (04-25-24 @ 11:35) (75 - 112)  BP: 107/65 (04-25-24 @ 11:35) (102/63 - 118/66)  RR: 18 (04-25-24 @ 11:35) (18 - 18)  SpO2: 98% (04-25-24 @ 11:35) (95% - 99%)    Physical Examination:  General: no acute distress  HEENT: NC/AT, EOMI,  Cardio: RRR  Resp: decreased breath sounds, on NC  Abd: soft, NT, ND  Ext: no edema or cyanosis  Skin: warm, dry, no visible rash      Laboratory Studies:  CBC:                       11.0   6.43  )-----------( 323      ( 25 Apr 2024 07:18 )             33.7     CMP: 04-25    137  |  99  |  17  ----------------------------<  184<H>  4.5   |  32<H>  |  0.80    Ca    9.4      25 Apr 2024 07:18        Urinalysis Basic - ( 25 Apr 2024 07:18 )    Color: x / Appearance: x / SG: x / pH: x  Gluc: 184 mg/dL / Ketone: x  / Bili: x / Urobili: x   Blood: x / Protein: x / Nitrite: x   Leuk Esterase: x / RBC: x / WBC x   Sq Epi: x / Non Sq Epi: x / Bacteria: x        Microbiology: reviewed    Culture - Blood (collected 04-19-24 @ 13:25)  Source: .Blood Blood-Peripheral  Final Report (04-24-24 @ 19:00):    No growth at 5 days    Culture - Blood (collected 04-19-24 @ 13:20)  Source: .Blood Blood-Peripheral  Final Report (04-24-24 @ 19:00):    No growth at 5 days    Culture - Urine (collected 04-19-24 @ 13:20)  Source: Clean Catch Clean Catch (Midstream)  Final Report (04-20-24 @ 16:51):    <10,000 CFU/mL Normal Urogenital Kimberlee          Radiology: reviewed    < from: CT Angio Chest PE Protocol w/ IV Cont (04.24.24 @ 09:17) >    ACC: [id number] EXAM:  CT ANGIO CHEST PULM ART WAWIC   ORDERED BY:   BING CALLAHAN     PROCEDURE DATE:  04/24/2024          INTERPRETATION:  CLINICAL INFORMATION: Shortness of breath. Elevated   d-dimer. Evaluate for pulmonary embolism.    COMPARISON: CT scan chest 4/20/2024.    CONTRAST/COMPLICATIONS:  IV Contrast: Omnipaque 350  70 cc administered   30 cc discarded  Oral Contrast: NONE  Complications: None reported at time of study completion    PROCEDURE:  CT Angiography of the Chest.  Sagittal and coronal reformats were performed as well as 3D (MIP)   reconstructions.    FINDINGS:    LUNGS AND AIRWAYS:  PLEURA:    Mucoid secretions right mainstem bronchus with mucoid impaction right   lower lobe and segmental right lower lobe bronchi and right middle lobe   bronchus.    Partial atelectasis right middle lobe, new from prior exam.    Persistent atelectasis/airspace consolidation right lower lobe.    Few scattered groundglass opacities left upper lobe.    Predominantly bibasilar cystic airspace disease with architectural   distortion and subpleural reticulations; findings likely reflecting   chronic interstitial lung disease superimposed on background of emphysema.    Stable 1.2 cm nodular opacity left lower lobe.    Trace right pleural effusion.    MEDIASTINUM AND SHANNON:  Clusters of prevascular, pretracheal and subcarinal mediastinal lymph   nodes measuring up to 1.5 cm short axis, stable.    VESSELS:    There is no CT evidence for acute pulmonary embolism.    Atherosclerotic changes thoracic aorta and coronary artery calcification.    HEART:  Left atrial enlargement.  Aortic valve calcification.  Mitral annular calcification.   No pericardial effusion.    CHEST WALL AND LOWER NECK:  1.5 cm low-density nodule inferior right lobe of the thyroid gland,   stable.  Right axillary/chest wall graft.    VISUALIZED UPPER ABDOMEN: Within normal limits.    BONES: Degenerative changes.    IMPRESSION:    No acute pulmonary embolism.    Mucoid secretions with impaction right middle lobe and lower lobe bronchi.  Partial right middle lobe atelectasis, new from prior exam.    Persistent atelectasis/airspace consolidation right lower lobe.    Few scattered groundglass opacities left upper lobe, could reflect acute   respiratoryinfection.    Probable chronic interstitial lung disease superimposed on background of   emphysema.    Stable 1.5 cm nodule left lower lobe.    Recommend short interval follow-up CT scan of the chest in 12 weeks.    Other findings as discussed above.    --- End of Report ---            SONIA DAVID MD; Attending Radiologist  This document has been electronically signed. Apr 24 2024 12:10PM    < end of copied text >

## 2024-04-25 NOTE — PROGRESS NOTE ADULT - SUBJECTIVE AND OBJECTIVE BOX
PULMONARY CONSULT NOTE      FEDERICO DOS SANTOS  MRN-[id number]    Patient is a 89y old  Female who presents with a chief complaint of     HISTORY OF PRESENT ILLNESS:    MEDICATIONS  (STANDING):  acetylcysteine 10%  Inhalation 4 milliLiter(s) Inhalation every 12 hours  albuterol/ipratropium for Nebulization 3 milliLiter(s) Nebulizer every 8 hours  aspirin enteric coated 81 milliGRAM(s) Oral daily  benzocaine/menthol Lozenge 1 Lozenge Oral two times a day  cefuroxime   Tablet 500 milliGRAM(s) Oral every 12 hours  dextrose 10% Bolus 125 milliLiter(s) IV Bolus once  dextrose 5%. 1000 milliLiter(s) (50 mL/Hr) IV Continuous <Continuous>  dextrose 5%. 1000 milliLiter(s) (100 mL/Hr) IV Continuous <Continuous>  dextrose 50% Injectable 12.5 Gram(s) IV Push once  dextrose 50% Injectable 25 Gram(s) IV Push once  gabapentin 400 milliGRAM(s) Oral at bedtime  glucagon  Injectable 1 milliGRAM(s) IntraMuscular once  heparin   Injectable 5000 Unit(s) SubCutaneous every 8 hours  insulin glargine Injectable (LANTUS) 12 Unit(s) SubCutaneous at bedtime  insulin lispro (ADMELOG) corrective regimen sliding scale   SubCutaneous three times a day before meals  insulin lispro (ADMELOG) corrective regimen sliding scale   SubCutaneous at bedtime  metoprolol tartrate 50 milliGRAM(s) Oral two times a day  pantoprazole    Tablet 40 milliGRAM(s) Oral before breakfast  polyethylene glycol 3350 17 Gram(s) Oral daily  senna 2 Tablet(s) Oral at bedtime      MEDICATIONS  (PRN):  acetaminophen     Tablet .. 650 milliGRAM(s) Oral every 6 hours PRN Temp greater or equal to 38C (100.4F), Mild Pain (1 - 3)  dextrose Oral Gel 15 Gram(s) Oral once PRN Blood Glucose LESS THAN 70 milliGRAM(s)/deciliter  guaiFENesin Oral Liquid (Sugar-Free) 100 milliGRAM(s) Oral every 6 hours PRN Cough      Allergies    Biaxin (Unknown)  penicillin (Unknown)    Intolerances        PAST MEDICAL & SURGICAL HISTORY:  HTN (hypertension)      CVA (cerebral vascular accident)      CAD (coronary artery disease)  cardiac stent      S/P vascular bypass  left leg          FAMILY HISTORY:      SOCIAL HISTORY  Smoking History:     REVIEW OF SYSTEMS:    REVIEW OF SYSTEMS      General:	    Skin/Breast:  	  Ophthalmologic:  	  ENMT:	    Respiratory and Thorax:  	  Cardiovascular:	    Gastrointestinal:	    Genitourinary:	    Musculoskeletal:	    Neurological:	    Psychiatric:	    Hematology/Lymphatics:	    Endocrine:	    Allergic/Immunologic:	    Vital Signs Last 24 Hrs  T(C): 36.6 (25 Apr 2024 05:14), Max: 36.6 (25 Apr 2024 05:14)  T(F): 97.8 (25 Apr 2024 05:14), Max: 97.8 (25 Apr 2024 05:14)  HR: 80 (25 Apr 2024 05:14) (69 - 86)  BP: 102/63 (25 Apr 2024 05:14) (102/63 - 124/70)  BP(mean): --  RR: 18 (25 Apr 2024 05:14) (18 - 18)  SpO2: 96% (25 Apr 2024 05:14) (96% - 99%)    Parameters below as of 25 Apr 2024 05:14  Patient On (Oxygen Delivery Method): nasal cannula  O2 Flow (L/min): 2      PHYSICAL EXAMINATION:  PHYSICAL EXAM:      Constitutional:    Eyes:    ENMT:    Neck:    Breasts:    Back:    Respiratory:    Cardiovascular:    Gastrointestinal:    Genitourinary:    Rectal:    Extremities:    Vascular:    Neurological:    Skin:    Lymph Nodes:    Musculoskeletal:    Psychiatric:    heart s1s2  lung dc BS  head nc  head at        LABS:                        9.9    5.58  )-----------( 324      ( 24 Apr 2024 08:10 )             31.0     04-24    139  |  103  |  16  ----------------------------<  167<H>  4.4   |  31  |  0.73    Ca    9.8      24 Apr 2024 08:10        Urinalysis Basic - ( 24 Apr 2024 08:10 )    Color: x / Appearance: x / SG: x / pH: x  Gluc: 167 mg/dL / Ketone: x  / Bili: x / Urobili: x   Blood: x / Protein: x / Nitrite: x   Leuk Esterase: x / RBC: x / WBC x   Sq Epi: x / Non Sq Epi: x / Bacteria: x                      MICROBIOLOGY:    RADIOLOGY & ADDITIONAL STUDIES:

## 2024-04-25 NOTE — PROGRESS NOTE ADULT - SUBJECTIVE AND OBJECTIVE BOX
Chief Complaint: Weakness, chills    Interval Events: No events overnight.    Review of Systems:  General: No fevers, chills, weight gain  Skin: No rashes, color changes  Cardiovascular: No chest pain, orthopnea  Respiratory: No shortness of breath, cough  Gastrointestinal: No nausea, abdominal pain  Genitourinary: No incontinence, pain with urination  Musculoskeletal: No pain, swelling, decreased range of motion  Neurological: No headache, weakness  Psychiatric: No depression, anxiety  Endocrine: No weight gain, increased thirst  All other systems are comprehensively negative.    Physical Exam:  Vital Signs Last 24 Hrs  T(C): 36.6 (25 Apr 2024 05:14), Max: 36.6 (25 Apr 2024 05:14)  T(F): 97.8 (25 Apr 2024 05:14), Max: 97.8 (25 Apr 2024 05:14)  HR: 80 (25 Apr 2024 05:14) (75 - 86)  BP: 102/63 (25 Apr 2024 05:14) (102/63 - 116/72)  BP(mean): --  RR: 18 (25 Apr 2024 05:14) (18 - 18)  SpO2: 96% (25 Apr 2024 05:14) (96% - 99%)  Parameters below as of 25 Apr 2024 05:14  Patient On (Oxygen Delivery Method): nasal cannula  O2 Flow (L/min): 2  General: NAD  HEENT: MMM  Neck: No JVD, no carotid bruit  Lungs: CTAB  CV: RRR, nl S1/S2, no M/R/G  Abdomen: S/NT/ND, +BS  Extremities: No LE edema, no cyanosis  Neuro: AAOx3, non-focal  Skin: No rash    Labs:             04-25    137  |  99  |  17  ----------------------------<  184<H>  4.5   |  32<H>  |  0.80    Ca    9.4      25 Apr 2024 07:18                          11.0   6.43  )-----------( 323      ( 25 Apr 2024 07:18 )             33.7       ECG/Telemetry: Sinus rhythm

## 2024-04-25 NOTE — PROGRESS NOTE ADULT - ASSESSMENT
89 yo female with PMHx of CVA (cerebral vascular accident), HTN (hypertension) , DM2 and COPD aw dysuria, fever/chills and weakness  +leucocytosis, +UA,   aw sepsis dt UTI    #Sepsis dt UTI/and pneumonia  cont ceftriaxone, switched to ceftin  fu Ucx neg, Bld cx ngtd  ID cs fu noted    #Worsening cough  hx of copd  RVP neg  CXR- diffuse interstitial lung dz  check probnp   CT chest- showed GC opacities- pulm edema vs pneumonia  check probnp- 1700, fu TTE reviewed  cards cs, pulm and  ID cs fu  nebs started  CTA without PE, RML atelectasis  pulmonary toileting    #HTN-  resume bblocker  pt on prn lasix at home    #DM-2- lantus and sliding scale    #DVT ppx- hep sq    d/w dgtr at bedside plan of care    [facility]   [phone]

## 2024-04-26 ENCOUNTER — TRANSCRIPTION ENCOUNTER (OUTPATIENT)
Age: 89
End: 2024-04-26

## 2024-04-26 LAB
ANION GAP SERPL CALC-SCNC: 4 MMOL/L — LOW (ref 5–17)
BUN SERPL-MCNC: 15 MG/DL — SIGNIFICANT CHANGE UP (ref 7–23)
CALCIUM SERPL-MCNC: 10 MG/DL — SIGNIFICANT CHANGE UP (ref 8.5–10.1)
CHLORIDE SERPL-SCNC: 102 MMOL/L — SIGNIFICANT CHANGE UP (ref 96–108)
CO2 SERPL-SCNC: 33 MMOL/L — HIGH (ref 22–31)
CREAT SERPL-MCNC: 0.76 MG/DL — SIGNIFICANT CHANGE UP (ref 0.5–1.3)
EGFR: 74 ML/MIN/1.73M2 — SIGNIFICANT CHANGE UP
GLUCOSE SERPL-MCNC: 176 MG/DL — HIGH (ref 70–99)
HCT VFR BLD CALC: 33.8 % — LOW (ref 34.5–45)
HGB BLD-MCNC: 10.9 G/DL — LOW (ref 11.5–15.5)
MCHC RBC-ENTMCNC: 28.8 PG — SIGNIFICANT CHANGE UP (ref 27–34)
MCHC RBC-ENTMCNC: 32.2 GM/DL — SIGNIFICANT CHANGE UP (ref 32–36)
MCV RBC AUTO: 89.4 FL — SIGNIFICANT CHANGE UP (ref 80–100)
NRBC # BLD: 0 /100 WBCS — SIGNIFICANT CHANGE UP (ref 0–0)
PLATELET # BLD AUTO: 390 K/UL — SIGNIFICANT CHANGE UP (ref 150–400)
POTASSIUM SERPL-MCNC: 4.3 MMOL/L — SIGNIFICANT CHANGE UP (ref 3.5–5.3)
POTASSIUM SERPL-SCNC: 4.3 MMOL/L — SIGNIFICANT CHANGE UP (ref 3.5–5.3)
RBC # BLD: 3.78 M/UL — LOW (ref 3.8–5.2)
RBC # FLD: 13.7 % — SIGNIFICANT CHANGE UP (ref 10.3–14.5)
SODIUM SERPL-SCNC: 139 MMOL/L — SIGNIFICANT CHANGE UP (ref 135–145)
WBC # BLD: 6.23 K/UL — SIGNIFICANT CHANGE UP (ref 3.8–10.5)
WBC # FLD AUTO: 6.23 K/UL — SIGNIFICANT CHANGE UP (ref 3.8–10.5)

## 2024-04-26 PROCEDURE — 71046 X-RAY EXAM CHEST 2 VIEWS: CPT | Mod: 26

## 2024-04-26 RX ADMIN — Medication 2: at 17:39

## 2024-04-26 RX ADMIN — BENZOCAINE AND MENTHOL 1 LOZENGE: 5; 1 LIQUID ORAL at 05:55

## 2024-04-26 RX ADMIN — Medication 50 MILLIGRAM(S): at 17:29

## 2024-04-26 RX ADMIN — PANTOPRAZOLE SODIUM 40 MILLIGRAM(S): 20 TABLET, DELAYED RELEASE ORAL at 05:55

## 2024-04-26 RX ADMIN — Medication 81 MILLIGRAM(S): at 12:35

## 2024-04-26 RX ADMIN — SODIUM CHLORIDE 4 MILLILITER(S): 9 INJECTION INTRAMUSCULAR; INTRAVENOUS; SUBCUTANEOUS at 07:43

## 2024-04-26 RX ADMIN — HEPARIN SODIUM 5000 UNIT(S): 5000 INJECTION INTRAVENOUS; SUBCUTANEOUS at 15:05

## 2024-04-26 RX ADMIN — Medication 500 MILLIGRAM(S): at 05:55

## 2024-04-26 RX ADMIN — GABAPENTIN 400 MILLIGRAM(S): 400 CAPSULE ORAL at 21:46

## 2024-04-26 RX ADMIN — HEPARIN SODIUM 5000 UNIT(S): 5000 INJECTION INTRAVENOUS; SUBCUTANEOUS at 21:46

## 2024-04-26 RX ADMIN — HEPARIN SODIUM 5000 UNIT(S): 5000 INJECTION INTRAVENOUS; SUBCUTANEOUS at 05:54

## 2024-04-26 RX ADMIN — Medication 1: at 08:40

## 2024-04-26 RX ADMIN — BENZOCAINE AND MENTHOL 1 LOZENGE: 5; 1 LIQUID ORAL at 17:29

## 2024-04-26 RX ADMIN — SODIUM CHLORIDE 4 MILLILITER(S): 9 INJECTION INTRAMUSCULAR; INTRAVENOUS; SUBCUTANEOUS at 23:22

## 2024-04-26 RX ADMIN — Medication 3 MILLILITER(S): at 14:00

## 2024-04-26 RX ADMIN — Medication 3 MILLILITER(S): at 23:22

## 2024-04-26 RX ADMIN — Medication 3 MILLILITER(S): at 07:41

## 2024-04-26 RX ADMIN — Medication 2: at 12:42

## 2024-04-26 RX ADMIN — INSULIN GLARGINE 12 UNIT(S): 100 INJECTION, SOLUTION SUBCUTANEOUS at 21:47

## 2024-04-26 RX ADMIN — SODIUM CHLORIDE 4 MILLILITER(S): 9 INJECTION INTRAMUSCULAR; INTRAVENOUS; SUBCUTANEOUS at 14:01

## 2024-04-26 NOTE — PROGRESS NOTE ADULT - ASSESSMENT
The patient is a 89 year old female with a history of HTN, HL, DM, COPD, CVA who presented with weakness and chills in the setting of UTI, possible pneumonia    4/26/24  Seen at [facility]  Lying flat, alert and awake  Still slightly short of breath and non-productive cough    Plan:  - ECG with sinus tachycardia and otherwise nonspecific findings  - CT chest with bilateral septal thickening, GGO, consolidations and pleural effusions - PNA vs. heart failure  - 4/21/24 BNP-1769  - Echocardiogram with LV EF-57%-see above  - No signs of decompensated heart failure on exam  - Hold off with diuretics unless BNP significantly elevated or if evidence of heart failure on echo  - Continue aspirin 81 mg daily  - Continue atorvastatin 20 mg daily  - Continue metoprolol tartrate 50 mg bid  - IV antibiotics-completed  - Being followed by Pulmonary, ID

## 2024-04-26 NOTE — PROGRESS NOTE ADULT - ASSESSMENT
89YO F PMHx of CVA, HTN and COPD admitted with CAP   In ED [id number] UA neg, CT chest with pna.   + cough  UTI ruled out  Cx NGTD  CT w/ mucoid impaction and persistent RLL PNA  S/p ceftriaxone --> ceftin completed 4/26    Recommendations:   Monitor off Abx  Trend temps and cbc  Asp precautions  supportive care, O2 as needed  Additional care per primary team    Please call with any questions.  Tabby Duarte M.D.  [facility] Division of Infectious Diseases [phone]  For after 5 P.M. and weekends, please call [phone]

## 2024-04-26 NOTE — PROGRESS NOTE ADULT - ASSESSMENT
89 yo female with PMHx of CVA (cerebral vascular accident), HTN (hypertension) , DM2 and COPD aw dysuria, fever/chills and weakness  +leucocytosis, +UA,   aw sepsis dt UTI    #Sepsis dt UTI/and pneumonia  cont ceftriaxone, switched to ceftin  fu Ucx neg, Bld cx ngtd  ID cs fu noted    #Worsening cough  hx of copd  RVP neg  CXR- diffuse interstitial lung dz  check probnp   CT chest- showed GC opacities- pulm edema vs pneumonia  check probnp- 1700, fu TTE reviewed  cards cs, pulm and  ID cs fu  nebs started  CTA without PE, RML atelectasis  pulmonary toileting    #HTN-  resume bblocker  pt on prn lasix at home    #DM-2- lantus and sliding scale    #DVT ppx- hep sq    dc plan [facility] vs home pt  daughter to discuss with pt today    [facility]   [phone]

## 2024-04-26 NOTE — PROGRESS NOTE ADULT - ASSESSMENT
89 yo female with PMHx of CVA (cerebral vascular accident), HTN (hypertension) and COPD, presenting to ED with complaints of weakness     copd  dyspnea  pleural eff  atelectasis  pulm edema  HTN  CVA    ct chest - pna - atelectasis - mucoid impaction  mucolytics - nebs - abx  fio2 wean as tolerated  cm follow up noted    ID and Cardio eval noted  complete ABX  WBC noted  LABS reviewed  TTE and CT chest reviewed  follows with Dr Douglass pulm med as outpatient - [facility] office - will need rpt imaging to resolution of PNA  NEBS TID - hold off on Inhaler rx regimen - pt may not be a suitable candidate for inhaler use  VBG  cvs rx regimen optimization  CT chest shows Pleural eff - pulm edema - atelectasis  monitor VS and HD and Sat  goal sat > 88 pct  spoke with DTR

## 2024-04-26 NOTE — DISCHARGE NOTE NURSING/CASE MANAGEMENT/SOCIAL WORK - NSSCCONTNUM_GEN_ALL_CORE
[facility] agency [phone] or [phone] will reach out to you within 24-72 hours of your discharge to schedule home care visit/eval appointment with you. Please call agency for any queries regarding home care services

## 2024-04-26 NOTE — PROGRESS NOTE ADULT - SUBJECTIVE AND OBJECTIVE BOX
Patient is a 89y Female with a known history of :  Type 2 diabetes mellitus with hyperglycemia [E11.65]      HPI:  89 yo female with PMHx of CVA (cerebral vascular accident), HTN (hypertension) and COPD, presenting to ED with complaints of weakness today, patient went to see urologist for dysuria and chills  was treated for UTI received 2 doses of Nitrofurantoin yesterday,   Patient endorses weakness and shaking chills, as well as cough that has progressed from dry to "rumbly" unable to bring up phlegm. Denies CP, SOB or dizziness  In ED tm101 (19 Apr 2024 22:02)      REVIEW OF SYSTEMS:    CONSTITUTIONAL: No fever, weight loss, or fatigue  EYES: No eye pain, visual disturbances, or discharge  ENMT:  No difficulty hearing, tinnitus, vertigo; No sinus or throat pain  NECK: No pain or stiffness  BREASTS: No pain, masses, or nipple discharge  RESPIRATORY: No cough, wheezing, chills or hemoptysis; No shortness of breath  CARDIOVASCULAR: No chest pain, palpitations, dizziness, or leg swelling  GASTROINTESTINAL: No abdominal or epigastric pain. No nausea, vomiting, or hematemesis; No diarrhea or constipation. No melena or hematochezia.  GENITOURINARY: No dysuria, frequency, hematuria, or incontinence  NEUROLOGICAL: No headaches, memory loss, loss of strength, numbness, or tremors  SKIN: No itching, burning, rashes, or lesions   LYMPH NODES: No enlarged glands  ENDOCRINE: No heat or cold intolerance; No hair loss  MUSCULOSKELETAL: No joint pain or swelling; No muscle, back, or extremity pain  PSYCHIATRIC: No depression, anxiety, mood swings, or difficulty sleeping  HEME/LYMPH: No easy bruising, or bleeding gums  ALLERGY AND IMMUNOLOGIC: No hives or eczema    MEDICATIONS  (STANDING):  albuterol/ipratropium for Nebulization 3 milliLiter(s) Nebulizer every 8 hours  aspirin enteric coated 81 milliGRAM(s) Oral daily  benzocaine/menthol Lozenge 1 Lozenge Oral two times a day  dextrose 10% Bolus 125 milliLiter(s) IV Bolus once  dextrose 5%. 1000 milliLiter(s) (50 mL/Hr) IV Continuous <Continuous>  dextrose 5%. 1000 milliLiter(s) (100 mL/Hr) IV Continuous <Continuous>  dextrose 50% Injectable 12.5 Gram(s) IV Push once  dextrose 50% Injectable 25 Gram(s) IV Push once  gabapentin 400 milliGRAM(s) Oral at bedtime  glucagon  Injectable 1 milliGRAM(s) IntraMuscular once  heparin   Injectable 5000 Unit(s) SubCutaneous every 8 hours  insulin glargine Injectable (LANTUS) 12 Unit(s) SubCutaneous at bedtime  insulin lispro (ADMELOG) corrective regimen sliding scale   SubCutaneous three times a day before meals  insulin lispro (ADMELOG) corrective regimen sliding scale   SubCutaneous at bedtime  metoprolol tartrate 50 milliGRAM(s) Oral two times a day  pantoprazole    Tablet 40 milliGRAM(s) Oral before breakfast  polyethylene glycol 3350 17 Gram(s) Oral daily  senna 2 Tablet(s) Oral at bedtime  sodium chloride 3%  Inhalation 4 milliLiter(s) Inhalation every 8 hours    MEDICATIONS  (PRN):  acetaminophen     Tablet .. 650 milliGRAM(s) Oral every 6 hours PRN Temp greater or equal to 38C (100.4F), Mild Pain (1 - 3)  dextrose Oral Gel 15 Gram(s) Oral once PRN Blood Glucose LESS THAN 70 milliGRAM(s)/deciliter  guaiFENesin Oral Liquid (Sugar-Free) 100 milliGRAM(s) Oral every 6 hours PRN Cough      ALLERGIES: Biaxin (Unknown)  penicillin (Unknown)      FAMILY HISTORY:      Social history:  Alochol:   Smoking:   Drug Use:   Marital Status:     PHYSICAL EXAMINATION:  -----------------------------  T(C): 36.3 (04-26-24 @ 05:10), Max: 36.5 (04-25-24 @ 11:35)  HR: 89 (04-26-24 @ 08:30) (75 - 105)  BP: 107/64 (04-26-24 @ 05:10) (107/64 - 120/76)  RR: 18 (04-26-24 @ 05:10) (18 - 18)  SpO2: 92% (04-26-24 @ 08:30) (91% - 100%)  Wt(kg): --    04-25 @ 07:01  -  04-26 @ 07:00  --------------------------------------------------------  IN:  Total IN: 0 mL    OUT:    Voided (mL): 1650 mL  Total OUT: 1650 mL    Total NET: -1650 mL            Constitutional: well developed, normal appearance, well groomed, well nourished, no deformities and no acute distress.   Eyes: the conjunctiva exhibited no abnormalities and the eyelids demonstrated no xanthelasmas.   HEENT: normal oral mucosa, no oral pallor and no oral cyanosis.   Neck: normal jugular venous A waves present, normal jugular venous V waves present and no jugular venous jensen A waves.   Pulmonary: no respiratory distress, normal respiratory rhythm and effort, no accessory muscle use and lungs were clear to auscultation bilaterally.   Cardiovascular: heart rate and rhythm were normal, normal S1 and S2 and no murmur, gallop, rub, heave or thrill are present.   Musculoskeletal: the gait could not be assessed.   Extremities: no clubbing of the fingernails, no localized cyanosis, no petechial hemorrhages and no ischemic changes.   Skin: normal skin color and pigmentation, no rash, no venous stasis, no skin lesions, no skin ulcer and no xanthoma was observed.       LABS:   --------  04-26    139  |  102  |  15  ----------------------------<  176<H>  4.3   |  33<H>  |  0.76    Ca    10.0      26 Apr 2024 07:42                           10.9   6.23  )-----------( 390      ( 26 Apr 2024 07:42 )             33.8                   Radiology:    < from: TTE W or WO Ultrasound Enhancing Agent (04.22.24 @ 15:29) >    TRANSTHORACIC ECHOCARDIOGRAM REPORT  ________________________________________________________________________________                                      _______       Pt. Name:       FEDERICO DOS SANTOS Study Date:    4/22/2024  MRN:            [id number]          YOB: 1933  Accession #:    [id number]         Age:           89 years  Account#:       [id number]        Gender:        F  Visit ID#  Heart Rate:                       Height:        59.06 in (150.00 cm)  Rhythm:                Weight:        119.05 lb (54.00 kg)  Blood Pressure: 111/73 mmHg       BSA/BMI:       1.48 m² / 24.00 kg/m²  ________________________________________________________________________________________  Referring Physician:    [id number] Amy Cummings  Interpreting Physician: Asif Chen  Primary Sonographer:    Hunter Owens    CPT:               ECHO TTE WO CON COMP W DOPP - 38447.m  Indication(s):     Heart failure, unspecified - I50.9  Procedure:         Transthoracic echocardiogram with 2-D, M-mode and complete                     spectral and color flow Doppler.  Ordering Location: [facility]  Admission Status:  Inpatient  Study Information: Image quality for this study is good.    _______________________________________________________________________________________     CONCLUSIONS:      1. Left ventricular systolic function is normal with an ejection fraction of 57 % by Gomez's method of disks.   2. The left atrium is mildly dilated.    ________________________________________________________________________________________  FINDINGS:     Left Ventricle:  The left ventricular cavity is normal in size. Left ventricular wall thickness is normal. Left ventricular systolic function is normal with a calculated ejection fraction of 57 % by the Gomez's biplane method of disks. There is mild (grade 1) left ventricular diastolic dysfunction.     Right Ventricle:  The right ventricular cavity is normal in size and normal systolic function.     Left Atrium:  The left atrium is mildly dilated with an indexed volume of 35.60 ml/m².     Right Atrium:  The right atrium is normal in size.     Aortic Valve:  There is fibrocalcific aortic valve sclerosis without stenosis. The peak transaortic velocity is 2.08 m/s, peak transaortic gradient is 17.3 mmHg and mean transaortic gradient is 8.0 mmHg with an LVOT/aortic valve VTI ratio of 0.51. There is no evidence of aortic regurgitation.     Mitral Valve:  There is calcification of the mitral valve annulus. Mitral valve leaflets have focal calcification. There is trace mitral regurgitation.     Tricuspid Valve:  Structurally normal tricuspid valve with normal leaflet excursion. There is mild tricuspid regurgitation. Estimated pulmonary artery systolic pressure is 22 mmHg.     Pulmonic Valve:  The pulmonic valve was not well visualized.     Aorta:  The aortic root at the sinuses of Valsalva is normal in size, measuring 3.10 cm (indexed 2.09 cm/m²).     Pericardium:  No pericardial effusion seen.     Systemic Veins:  The inferior vena cava is normal in size measuring 0.75 cm in diameter, (normal <2.1cm) with normal inspiratory collapse (normal >50%) consistent with normal right atrial pressure (~3, range 0-5mmHg).  ____________________________________________________________________  QUANTITATIVE DATA:  Left Ventricle Measurements: (Indexed to BSA)     IVSd (2D):   0.9 cm  LVPWd (2D):  0.8 cm  LVIDd (2D):  3.0 cm  LVIDs (2D):  2.1 cm  LV Mass:     62 g   42.1 g/m²  LV Vol d, MOD A2C: 35.3 ml 23.85 ml/m²  LV Vold, MOD A4C: 21.6 ml 14.59 ml/m²  LV Vol d, MOD BP:  29.0 ml 19.57 ml/m²  LV Vol s, MOD A2C: 17.3 ml 11.69 ml/m²  LV Vol s, MOD A4C: 8.4 ml  5.67 ml/m²  LV Vol s, MOD BP:  12.3 ml 8.32 ml/m²  LVOT SV MOD BP:    16.7 ml  LV EF% MOD BP:     57 %     MV E Vmax:    1.09 m/s  MV A Vmax:    1.72 m/s  MV E/A:       0.63  e' lateral:   11.40 cm/s  e' medial:    10.10 cm/s  E/e' lateral: 9.56  E/e' medial:  10.79  E/e' Average: 10.14  MV DT:        225 msec    Aorta Measurements: (Normal range) (Indexed toBSA)     Sinuses of Valsalva: 3.10 cm (2.7 - 3.3 cm)       Left Atrium Measurements: (Indexed to BSA)  LA Diam 2D: 3.10 cm    Right Ventricle Measurements:     RV Base (RVID1):  2.8 cm  RV Mid (RVID2):   2.3 cm  RV Major (RVID3): 4.5 cm       LVOT / RVOT/ Qp/Qs Data: (Indexed to BSA)  LVOT Vmax: 0.87 m/s  LVOT VTI:  20.20 cm    Aortic Valve Measurements:  AV Vmax:                2.1 m/s  AV Peak Gradient:       17.3 mmHg  AV Mean Gradient:       8.0 mmHg  AV VTI:                 39.4 cm  AV VTI Ratio:           0.51  AoV Dimensionless Index 0.51    Mitral Valve Measurements:     MV Vmax:      1.72 m/s  MV VTI, sonya   0.286 m  MV Mean Grad: 5.0 mmHg  MV Peak Grad: 11.8 mmHg  MV E Vmax:    1.1 m/s  MV A Vmax:    1.7 m/s  MV E/A:       0.6       Tricuspid Valve Measurements:     TR Vmax:          2.2 m/s  TR Peak Gradient: 19.0 mmHg  RA Pressure:      3 mmHg  PASP:             22 mmHg    ________________________________________________________________________________________  Electronically signed on 4/23/2024 at 9:29:43 AM by Asif Chen         *** Final ***    < end of copied text >

## 2024-04-26 NOTE — PROGRESS NOTE ADULT - SUBJECTIVE AND OBJECTIVE BOX
[facility], Division of Infectious Diseases  KRISSY Luong Y. Patel, S. Shah, G. [facility]  [phone]    Name: FEDERICO DOS SANTOS  Age: 89y  Gender: Female  MRN: [id number]    Interval History:  Patient seen and examined at bedside this morning  No acute overnight events. Afebrile  on NC  No complaints  Notes reviewed    Antibiotics:      Medications:  acetaminophen     Tablet .. 650 milliGRAM(s) Oral every 6 hours PRN  albuterol/ipratropium for Nebulization 3 milliLiter(s) Nebulizer every 8 hours  aspirin enteric coated 81 milliGRAM(s) Oral daily  benzocaine/menthol Lozenge 1 Lozenge Oral two times a day  dextrose 10% Bolus 125 milliLiter(s) IV Bolus once  dextrose 5%. 1000 milliLiter(s) IV Continuous <Continuous>  dextrose 5%. 1000 milliLiter(s) IV Continuous <Continuous>  dextrose 50% Injectable 12.5 Gram(s) IV Push once  dextrose 50% Injectable 25 Gram(s) IV Push once  dextrose Oral Gel 15 Gram(s) Oral once PRN  gabapentin 400 milliGRAM(s) Oral at bedtime  glucagon  Injectable 1 milliGRAM(s) IntraMuscular once  guaiFENesin Oral Liquid (Sugar-Free) 100 milliGRAM(s) Oral every 6 hours PRN  heparin   Injectable 5000 Unit(s) SubCutaneous every 8 hours  insulin glargine Injectable (LANTUS) 12 Unit(s) SubCutaneous at bedtime  insulin lispro (ADMELOG) corrective regimen sliding scale   SubCutaneous three times a day before meals  insulin lispro (ADMELOG) corrective regimen sliding scale   SubCutaneous at bedtime  metoprolol tartrate 50 milliGRAM(s) Oral two times a day  pantoprazole    Tablet 40 milliGRAM(s) Oral before breakfast  polyethylene glycol 3350 17 Gram(s) Oral daily  senna 2 Tablet(s) Oral at bedtime  sodium chloride 3%  Inhalation 4 milliLiter(s) Inhalation every 8 hours      Review of Systems: unable to obtain    Allergies: Biaxin (Unknown)  penicillin (Unknown)    For details regarding the patient's past medical history, social history, family history, and other miscellaneous elements, please refer the initial infectious diseases consultation and/or the admitting history and physical examination for this admission.    Objective:  Vitals:   T(C): 36.3 (04-26-24 @ 05:10), Max: 36.5 (04-25-24 @ 11:35)  HR: 97 (04-26-24 @ 05:10) (75 - 112)  BP: 107/64 (04-26-24 @ 05:10) (107/64 - 120/76)  RR: 18 (04-26-24 @ 05:10) (18 - 18)  SpO2: 91% (04-26-24 @ 05:10) (91% - 100%)    Physical Examination:  General: no acute distress  HEENT: NC/AT, EOMI,  Cardio: RRR  Resp: decreased breath sounds  Abd: soft, NT, ND  Ext: no edema or cyanosis  Skin: warm, dry, no visible rash      Laboratory Studies:  CBC:                       10.9   6.23  )-----------( 390      ( 26 Apr 2024 07:42 )             33.8     CMP: 04-26    139  |  102  |  15  ----------------------------<  176<H>  4.3   |  33<H>  |  0.76    Ca    10.0      26 Apr 2024 07:42        Urinalysis Basic - ( 26 Apr 2024 07:42 )    Color: x / Appearance: x / SG: x / pH: x  Gluc: 176 mg/dL / Ketone: x  / Bili: x / Urobili: x   Blood: x / Protein: x / Nitrite: x   Leuk Esterase: x / RBC: x / WBC x   Sq Epi: x / Non Sq Epi: x / Bacteria: x        Microbiology: reviewed    Culture - Blood (collected 04-19-24 @ 13:25)  Source: .Blood Blood-Peripheral  Final Report (04-24-24 @ 19:00):    No growth at 5 days    Culture - Blood (collected 04-19-24 @ 13:20)  Source: .Blood Blood-Peripheral  Final Report (04-24-24 @ 19:00):    No growth at 5 days    Culture - Urine (collected 04-19-24 @ 13:20)  Source: Clean Catch Clean Catch (Midstream)  Final Report (04-20-24 @ 16:51):    <10,000 CFU/mL Normal Urogenital Kimberlee          Radiology: reviewed

## 2024-04-26 NOTE — DIETITIAN INITIAL EVALUATION ADULT - PERTINENT MEDS FT
MEDICATIONS  (STANDING):  albuterol/ipratropium for Nebulization 3 milliLiter(s) Nebulizer every 8 hours  aspirin enteric coated 81 milliGRAM(s) Oral daily  benzocaine/menthol Lozenge 1 Lozenge Oral two times a day  dextrose 10% Bolus 125 milliLiter(s) IV Bolus once  dextrose 5%. 1000 milliLiter(s) (100 mL/Hr) IV Continuous <Continuous>  dextrose 5%. 1000 milliLiter(s) (50 mL/Hr) IV Continuous <Continuous>  dextrose 50% Injectable 12.5 Gram(s) IV Push once  dextrose 50% Injectable 25 Gram(s) IV Push once  gabapentin 400 milliGRAM(s) Oral at bedtime  glucagon  Injectable 1 milliGRAM(s) IntraMuscular once  heparin   Injectable 5000 Unit(s) SubCutaneous every 8 hours  insulin glargine Injectable (LANTUS) 12 Unit(s) SubCutaneous at bedtime  insulin lispro (ADMELOG) corrective regimen sliding scale   SubCutaneous three times a day before meals  insulin lispro (ADMELOG) corrective regimen sliding scale   SubCutaneous at bedtime  metoprolol tartrate 50 milliGRAM(s) Oral two times a day  pantoprazole    Tablet 40 milliGRAM(s) Oral before breakfast  polyethylene glycol 3350 17 Gram(s) Oral daily  senna 2 Tablet(s) Oral at bedtime  sodium chloride 3%  Inhalation 4 milliLiter(s) Inhalation every 8 hours    MEDICATIONS  (PRN):  acetaminophen     Tablet .. 650 milliGRAM(s) Oral every 6 hours PRN Temp greater or equal to 38C (100.4F), Mild Pain (1 - 3)  dextrose Oral Gel 15 Gram(s) Oral once PRN Blood Glucose LESS THAN 70 milliGRAM(s)/deciliter  guaiFENesin Oral Liquid (Sugar-Free) 100 milliGRAM(s) Oral every 6 hours PRN Cough

## 2024-04-26 NOTE — CASE MANAGEMENT PROGRESS NOTE - NSCMPROGRESSNOTE_GEN_ALL_CORE
Patient discussed in rounds.  [facility] vs [facility].  POC approved but not deliver.  Home care referral started not sent.  Possible [facility] vs home.  Awaiting final decision. CM remains available throughout hospital stay.

## 2024-04-26 NOTE — DISCHARGE NOTE NURSING/CASE MANAGEMENT/SOCIAL WORK - PATIENT PORTAL LINK FT
You can access the FollowMyHealth Patient Portal offered by [facility] by registering at the following website: [email or web address]. By joining [facility]’s FollowMyHealth portal, you will also be able to view your health information using other applications (apps) compatible with our system.

## 2024-04-26 NOTE — PROGRESS NOTE ADULT - SUBJECTIVE AND OBJECTIVE BOX
Patient is a 89y old  Female who presents with a chief complaint of Urinary tract infection     (26 Apr 2024 09:21)        INTERVAL HPI/OVERNIGHT EVENTS:   no complaints  pt seen and examined         Vital Signs Last 24 Hrs  T(C): 36.5 (26 Apr 2024 12:15), Max: 36.5 (26 Apr 2024 12:15)  T(F): 97.7 (26 Apr 2024 12:15), Max: 97.7 (26 Apr 2024 12:15)  HR: 102 (26 Apr 2024 17:25) (89 - 105)  BP: 127/71 (26 Apr 2024 17:25) (105/69 - 127/71)  BP(mean): --  RR: 17 (26 Apr 2024 17:25) (17 - 18)  SpO2: 94% (26 Apr 2024 17:25) (91% - 100%)    Parameters below as of 26 Apr 2024 17:25  Patient On (Oxygen Delivery Method): nasal cannula  O2 Flow (L/min): 2      acetaminophen     Tablet .. 650 milliGRAM(s) Oral every 6 hours PRN  albuterol/ipratropium for Nebulization 3 milliLiter(s) Nebulizer every 8 hours  aspirin enteric coated 81 milliGRAM(s) Oral daily  benzocaine/menthol Lozenge 1 Lozenge Oral two times a day  dextrose 10% Bolus 125 milliLiter(s) IV Bolus once  dextrose 5%. 1000 milliLiter(s) IV Continuous <Continuous>  dextrose 5%. 1000 milliLiter(s) IV Continuous <Continuous>  dextrose 50% Injectable 12.5 Gram(s) IV Push once  dextrose 50% Injectable 25 Gram(s) IV Push once  dextrose Oral Gel 15 Gram(s) Oral once PRN  gabapentin 400 milliGRAM(s) Oral at bedtime  glucagon  Injectable 1 milliGRAM(s) IntraMuscular once  guaiFENesin Oral Liquid (Sugar-Free) 100 milliGRAM(s) Oral every 6 hours PRN  heparin   Injectable 5000 Unit(s) SubCutaneous every 8 hours  insulin glargine Injectable (LANTUS) 12 Unit(s) SubCutaneous at bedtime  insulin lispro (ADMELOG) corrective regimen sliding scale   SubCutaneous at bedtime  insulin lispro (ADMELOG) corrective regimen sliding scale   SubCutaneous three times a day before meals  metoprolol tartrate 50 milliGRAM(s) Oral two times a day  pantoprazole    Tablet 40 milliGRAM(s) Oral before breakfast  polyethylene glycol 3350 17 Gram(s) Oral daily  senna 2 Tablet(s) Oral at bedtime  sodium chloride 3%  Inhalation 4 milliLiter(s) Inhalation every 8 hours      PHYSICAL EXAM:  GENERAL: NAD   EYES: conjunctiva and sclera clear  ENMT: Moist mucous membranes  NECK: Supple, No JVD, Normal thyroid  CHEST/LUNG: non labored, cta b/l  HEART: Regular rate and rhythm; No murmurs, rubs, or gallops  ABDOMEN: Soft, Nontender, Nondistended; Bowel sounds present  EXTREMITIES:  No clubbing, no cyanosis, no edema  LYMPH: No lymphadenopathy noted  SKIN: No rashes or lesions  NEURO: no new focal deficits    Consultant(s) Notes Reviewed:  [x ] YES  [ ] NO  Care Discussed with Consultants/Other Providers [ x] YES  [ ] NO    LABS:                        10.9   6.23  )-----------( 390      ( 26 Apr 2024 07:42 )             33.8     04-26    139  |  102  |  15  ----------------------------<  176<H>  4.3   |  33<H>  |  0.76    Ca    10.0      26 Apr 2024 07:42        Urinalysis Basic - ( 26 Apr 2024 07:42 )    Color: x / Appearance: x / SG: x / pH: x  Gluc: 176 mg/dL / Ketone: x  / Bili: x / Urobili: x   Blood: x / Protein: x / Nitrite: x   Leuk Esterase: x / RBC: x / WBC x   Sq Epi: x / Non Sq Epi: x / Bacteria: x      CAPILLARY BLOOD GLUCOSE      POCT Blood Glucose.: 201 mg/dL (26 Apr 2024 17:21)  POCT Blood Glucose.: 241 mg/dL (26 Apr 2024 12:10)  POCT Blood Glucose.: 190 mg/dL (26 Apr 2024 08:11)  POCT Blood Glucose.: 228 mg/dL (25 Apr 2024 21:51)        Urinalysis Basic - ( 26 Apr 2024 07:42 )    Color: x / Appearance: x / SG: x / pH: x  Gluc: 176 mg/dL / Ketone: x  / Bili: x / Urobili: x   Blood: x / Protein: x / Nitrite: x   Leuk Esterase: x / RBC: x / WBC x   Sq Epi: x / Non Sq Epi: x / Bacteria: x          RADIOLOGY & ADDITIONAL TESTS:    Imaging Personally Reviewed  Reviewed consultants input

## 2024-04-26 NOTE — DIETITIAN INITIAL EVALUATION ADULT - ENERGY INTAKE
2 yo female with PMHx of CVA (cerebral vascular accident), HTN (hypertension) , DM2 and COPD aw dysuria, fever/chills and weakness  +leucocytosis, +UA,   aw sepsis dt UTI    #Sepsis dt UTI/and pneumonia  cont ceftriaxone, switched to ceftin  fu Ucx neg, Bld cx ngtd  ID cs fu noted    #Worsening cough  hx of copd  RVP neg  CXR- diffuse interstitial lung dz  check probnp   CT chest- showed GC opacities- pulm edema vs pneumonia  check probnp- 1700, fu TTE reviewed  cards cs, pulm and  ID cs fu  nebs started  CTA without PE, RML atelectasis  pulmonary toileting    #HTN-  resume bblocker  pt on prn lasix at home    #DM-2- lantus and sliding scale    #DVT ppx- hep sq    d/w dgtr at bedside plan of care   Fair (50-75%)

## 2024-04-26 NOTE — DIETITIAN INITIAL EVALUATION ADULT - OTHER INFO
89 YO F PMHx of CVA, HTN and COPD admitted with CAP   In ED [id number] UA neg, CT chest with pna.   + cough  UTI ruled out   89 yo female with PMHx of CVA , HTN ,DM2, COPD presented  with dysuria, fever/chills ,weakness ,leucocytosis, +UA,   admitted w/sepsis due to PNA.     At time of RD visit to pts bedside, pt reports her appetite was improving, was not eating well for a few days, had a BM yesterday, avoids juices, puts nothing in tea, only puts milk in coffee, doesn't feel her portions of starches is excessive, eats small amounts of cakes and cookies

## 2024-04-26 NOTE — DISCHARGE NOTE NURSING/CASE MANAGEMENT/SOCIAL WORK - NSDCPEFALRISK_GEN_ALL_CORE
For information on Fall & Injury Prevention, visit: [email or web address] OR  [email or web address] OR  https://www.cdc.gov/steadi/patient.html

## 2024-04-26 NOTE — DIETITIAN INITIAL EVALUATION ADULT - PERTINENT LABORATORY DATA
04-26    139  |  102  |  15  ----------------------------<  176<H>  4.3   |  33<H>  |  0.76    Ca    10.0      26 Apr 2024 07:42    POCT Blood Glucose.: 190 mg/dL (04-26-24 @ 08:11)  A1C with Estimated Average Glucose Result: 9.2 % (04-20-24 @ 08:50)   04-26    139  |  102  |  15  ----------------------------<  176<H>  4.3   |  33<H>  |  0.76    Ca    10.0      26 Apr 2024 07:42    POCT Blood Glucose.: 190 mg/dL (04-26-24 @ 08:11)  A1C with Estimated Average Glucose Result: 9.2 % (04-20-24 @ 08:50) slightly above desirable level for age

## 2024-04-26 NOTE — PROGRESS NOTE ADULT - SUBJECTIVE AND OBJECTIVE BOX
Date/Time Patient Seen:  		  Referring MD:   Data Reviewed	       Patient is a 89y old  Female who presents with a chief complaint of     Subjective/HPI     PAST MEDICAL & SURGICAL HISTORY:  HTN (hypertension)    CVA (cerebral vascular accident)    CAD (coronary artery disease)  cardiac stent    S/P vascular bypass  left leg          Medication list         MEDICATIONS  (STANDING):  albuterol/ipratropium for Nebulization 3 milliLiter(s) Nebulizer every 8 hours  aspirin enteric coated 81 milliGRAM(s) Oral daily  benzocaine/menthol Lozenge 1 Lozenge Oral two times a day  cefuroxime   Tablet 500 milliGRAM(s) Oral every 12 hours  dextrose 10% Bolus 125 milliLiter(s) IV Bolus once  dextrose 5%. 1000 milliLiter(s) (50 mL/Hr) IV Continuous <Continuous>  dextrose 5%. 1000 milliLiter(s) (100 mL/Hr) IV Continuous <Continuous>  dextrose 50% Injectable 25 Gram(s) IV Push once  dextrose 50% Injectable 12.5 Gram(s) IV Push once  gabapentin 400 milliGRAM(s) Oral at bedtime  glucagon  Injectable 1 milliGRAM(s) IntraMuscular once  heparin   Injectable 5000 Unit(s) SubCutaneous every 8 hours  insulin glargine Injectable (LANTUS) 12 Unit(s) SubCutaneous at bedtime  insulin lispro (ADMELOG) corrective regimen sliding scale   SubCutaneous three times a day before meals  insulin lispro (ADMELOG) corrective regimen sliding scale   SubCutaneous at bedtime  metoprolol tartrate 50 milliGRAM(s) Oral two times a day  pantoprazole    Tablet 40 milliGRAM(s) Oral before breakfast  polyethylene glycol 3350 17 Gram(s) Oral daily  senna 2 Tablet(s) Oral at bedtime  sodium chloride 3%  Inhalation 4 milliLiter(s) Inhalation every 8 hours    MEDICATIONS  (PRN):  acetaminophen     Tablet .. 650 milliGRAM(s) Oral every 6 hours PRN Temp greater or equal to 38C (100.4F), Mild Pain (1 - 3)  dextrose Oral Gel 15 Gram(s) Oral once PRN Blood Glucose LESS THAN 70 milliGRAM(s)/deciliter  guaiFENesin Oral Liquid (Sugar-Free) 100 milliGRAM(s) Oral every 6 hours PRN Cough         Vitals log        ICU Vital Signs Last 24 Hrs  T(C): 36.3 (26 Apr 2024 05:10), Max: 36.5 (25 Apr 2024 11:35)  T(F): 97.4 (26 Apr 2024 05:10), Max: 97.7 (25 Apr 2024 11:35)  HR: 97 (26 Apr 2024 05:10) (75 - 112)  BP: 107/64 (26 Apr 2024 05:10) (107/64 - 120/76)  BP(mean): --  ABP: --  ABP(mean): --  RR: 18 (26 Apr 2024 05:10) (18 - 18)  SpO2: 91% (26 Apr 2024 05:10) (91% - 100%)    O2 Parameters below as of 26 Apr 2024 05:10  Patient On (Oxygen Delivery Method): nasal cannula  O2 Flow (L/min): 2               Input and Output:  I&O's Detail    24 Apr 2024 07:01  -  25 Apr 2024 07:00  --------------------------------------------------------  IN:  Total IN: 0 mL    OUT:    Voided (mL): 1250 mL  Total OUT: 1250 mL    Total NET: -1250 mL      25 Apr 2024 07:01  -  26 Apr 2024 05:42  --------------------------------------------------------  IN:  Total IN: 0 mL    OUT:    Voided (mL): 1250 mL  Total OUT: 1250 mL    Total NET: -1250 mL          Lab Data                        11.0   6.43  )-----------( 323      ( 25 Apr 2024 07:18 )             33.7     04-25    137  |  99  |  17  ----------------------------<  184<H>  4.5   |  32<H>  |  0.80    Ca    9.4      25 Apr 2024 07:18              Review of Systems	      Objective     Physical Examination    heart s1s2  lung dc BS  head nc      Pertinent Lab findings & Imaging      Lv:  NO   Adequate UO     I&O's Detail    24 Apr 2024 07:01  -  25 Apr 2024 07:00  --------------------------------------------------------  IN:  Total IN: 0 mL    OUT:    Voided (mL): 1250 mL  Total OUT: 1250 mL    Total NET: -1250 mL      25 Apr 2024 07:01  -  26 Apr 2024 05:42  --------------------------------------------------------  IN:  Total IN: 0 mL    OUT:    Voided (mL): 1250 mL  Total OUT: 1250 mL    Total NET: -1250 mL               Discussed with:     Cultures:	        Radiology

## 2024-04-27 VITALS
TEMPERATURE: 98 F | SYSTOLIC BLOOD PRESSURE: 113 MMHG | HEART RATE: 89 BPM | OXYGEN SATURATION: 89 % | RESPIRATION RATE: 18 BRPM | DIASTOLIC BLOOD PRESSURE: 69 MMHG

## 2024-04-27 PROCEDURE — 85027 COMPLETE CBC AUTOMATED: CPT

## 2024-04-27 PROCEDURE — 99285 EMERGENCY DEPT VISIT HI MDM: CPT | Mod: 25

## 2024-04-27 PROCEDURE — 83880 ASSAY OF NATRIURETIC PEPTIDE: CPT

## 2024-04-27 PROCEDURE — 97162 PT EVAL MOD COMPLEX 30 MIN: CPT

## 2024-04-27 PROCEDURE — 97110 THERAPEUTIC EXERCISES: CPT

## 2024-04-27 PROCEDURE — 83690 ASSAY OF LIPASE: CPT

## 2024-04-27 PROCEDURE — 85379 FIBRIN DEGRADATION QUANT: CPT

## 2024-04-27 PROCEDURE — 87637 SARSCOV2&INF A&B&RSV AMP PRB: CPT

## 2024-04-27 PROCEDURE — 93005 ELECTROCARDIOGRAM TRACING: CPT

## 2024-04-27 PROCEDURE — 83036 HEMOGLOBIN GLYCOSYLATED A1C: CPT

## 2024-04-27 PROCEDURE — 94667 MNPJ CHEST WALL 1ST: CPT

## 2024-04-27 PROCEDURE — 87040 BLOOD CULTURE FOR BACTERIA: CPT

## 2024-04-27 PROCEDURE — 71250 CT THORAX DX C-: CPT | Mod: MC

## 2024-04-27 PROCEDURE — 97530 THERAPEUTIC ACTIVITIES: CPT

## 2024-04-27 PROCEDURE — 71046 X-RAY EXAM CHEST 2 VIEWS: CPT

## 2024-04-27 PROCEDURE — 82962 GLUCOSE BLOOD TEST: CPT

## 2024-04-27 PROCEDURE — 71275 CT ANGIOGRAPHY CHEST: CPT | Mod: MC

## 2024-04-27 PROCEDURE — 81001 URINALYSIS AUTO W/SCOPE: CPT

## 2024-04-27 PROCEDURE — 82803 BLOOD GASES ANY COMBINATION: CPT

## 2024-04-27 PROCEDURE — 36415 COLL VENOUS BLD VENIPUNCTURE: CPT

## 2024-04-27 PROCEDURE — 80053 COMPREHEN METABOLIC PANEL: CPT

## 2024-04-27 PROCEDURE — 96365 THER/PROPH/DIAG IV INF INIT: CPT

## 2024-04-27 PROCEDURE — 86140 C-REACTIVE PROTEIN: CPT

## 2024-04-27 PROCEDURE — 93306 TTE W/DOPPLER COMPLETE: CPT

## 2024-04-27 PROCEDURE — 96366 THER/PROPH/DIAG IV INF ADDON: CPT

## 2024-04-27 PROCEDURE — 85025 COMPLETE CBC W/AUTO DIFF WBC: CPT

## 2024-04-27 PROCEDURE — 87086 URINE CULTURE/COLONY COUNT: CPT

## 2024-04-27 PROCEDURE — 71045 X-RAY EXAM CHEST 1 VIEW: CPT

## 2024-04-27 PROCEDURE — 96367 TX/PROPH/DG ADDL SEQ IV INF: CPT

## 2024-04-27 PROCEDURE — 97116 GAIT TRAINING THERAPY: CPT

## 2024-04-27 PROCEDURE — 94640 AIRWAY INHALATION TREATMENT: CPT

## 2024-04-27 PROCEDURE — 80048 BASIC METABOLIC PNL TOTAL CA: CPT

## 2024-04-27 RX ADMIN — Medication 3 MILLILITER(S): at 07:37

## 2024-04-27 RX ADMIN — Medication 1: at 08:36

## 2024-04-27 RX ADMIN — Medication 81 MILLIGRAM(S): at 11:56

## 2024-04-27 RX ADMIN — BENZOCAINE AND MENTHOL 1 LOZENGE: 5; 1 LIQUID ORAL at 05:17

## 2024-04-27 RX ADMIN — HEPARIN SODIUM 5000 UNIT(S): 5000 INJECTION INTRAVENOUS; SUBCUTANEOUS at 05:17

## 2024-04-27 RX ADMIN — PANTOPRAZOLE SODIUM 40 MILLIGRAM(S): 20 TABLET, DELAYED RELEASE ORAL at 05:17

## 2024-04-27 RX ADMIN — SODIUM CHLORIDE 4 MILLILITER(S): 9 INJECTION INTRAMUSCULAR; INTRAVENOUS; SUBCUTANEOUS at 07:39

## 2024-04-27 RX ADMIN — Medication 2: at 12:52

## 2024-04-27 NOTE — CAREGIVER ENGAGEMENT NOTE - CAREGIVER OUTREACH NOTES - FREE TEXT
SW spoke with pt's dtr Vanessa to clarify plan for d/c. Per Vanessa, she spoke with pt and pt feels that she is too weak to return home. SW confirmed that plan is for [facility] on d/c. SW to follow and remain available for any needs.
Per MD, patient is medically cleared for discharge home today.  CM spoke to daughter Vanessa to discussed discharge disposition home with home care and new oxygen. Discharge notice signed and copy given to patient.  POC to be deliver to bedside by CM on consignment. Daughter will transport patient home. Patient verbalized understanding of the transition plan and is in agreement.  CM remains available throughout hospital stay.  
SW spoke with pt's dtr regarding d/c planning. Per dtr, she spoke with hospital staff and her impression was that pt was ambulating better. Dtr prefers pt to return home but is receptive to dual plan for [facility] in the event that pt is too weak to d.c home. Requested referral sent to [facility]. Would need additional choices. SW to follow and remain available for any needs.

## 2024-04-27 NOTE — CASE MANAGEMENT PROGRESS NOTE - NSCMPROGRESSNOTE_GEN_ALL_CORE
Per MD, patient is medically cleared for discharge home today.  CM met with patient and daughter at bedside to discussed discharge disposition home with [facility]. POC delivered to bedside and explained with return demonstration to patient and daughter.  POC battery life 87%. Daughter to transport patient home.  Instructed to follow up with PCP within a week. Patient and daughter verbalized understanding of the transition plan and are in agreement. CM answered all questions to the best of my abilities. CM remains available throughout hospital stay.

## 2024-04-27 NOTE — SOCIAL WORK PROGRESS NOTE - NSSWPROGRESSNOTE_GEN_ALL_CORE
Per discussion w/ inpatient interdisciplinary treatment team this AM, patient is medically cleared for transition to next level of care today, 04/27/2024.  spoke w/ patient's daughter Vanessa @ [phone] to discuss discharge plan, and she identified to be agreeable to discharge home rather than discharge to sub-acute rehab -- hospital  Rozina aware and facilitating discharge home.  to remain available for any updates or changes.

## 2024-04-27 NOTE — PROGRESS NOTE ADULT - ASSESSMENT
89 yo female with PMHx of CVA (cerebral vascular accident), HTN (hypertension) and COPD, presenting to ED with complaints of weakness     copd  dyspnea  pleural eff  atelectasis  pulm edema  HTN  CVA    ct chest - pna - atelectasis - mucoid impaction  mucolytics - nebs - s/p abx  fio2 wean as tolerated  cm follow up noted  check RA sat    ID and Cardio eval noted  completed ABX  WBC noted  LABS reviewed  TTE and CT chest reviewed  follows with Dr Douglass pul med as outpatient - [facility] office - will need rpt imaging to resolution of PNA  NEBS TID - hold off on Inhaler rx regimen - pt may not be a suitable candidate for inhaler use  VBG  cvs rx regimen optimization  CT chest shows Pleural eff - pulm edema - atelectasis  monitor VS and HD and Sat  goal sat > 88 pct  spoke with DTR

## 2024-04-27 NOTE — PROGRESS NOTE ADULT - PROVIDER SPECIALTY LIST ADULT
Cardiology
Cardiology
Hospitalist
Hospitalist
Infectious Disease
Internal Medicine
Pulmonology
Cardiology
Hospitalist
Infectious Disease
Internal Medicine
Internal Medicine
Pulmonology
Cardiology
Infectious Disease
Internal Medicine
Pulmonology
Pulmonology
Endocrinology
Admission

## 2024-04-27 NOTE — PROGRESS NOTE ADULT - SUBJECTIVE AND OBJECTIVE BOX
Patient is a 89y Female with a known history of :  Type 2 diabetes mellitus with hyperglycemia [E11.65]      HPI:  89 yo female with PMHx of CVA (cerebral vascular accident), HTN (hypertension) and COPD, presenting to ED with complaints of weakness today, patient went to see urologist for dysuria and chills  was treated for UTI received 2 doses of Nitrofurantoin yesterday,   Patient endorses weakness and shaking chills, as well as cough that has progressed from dry to "rumbly" unable to bring up phlegm. Denies CP, SOB or dizziness  In ED tm101 (19 Apr 2024 22:02)      REVIEW OF SYSTEMS:    CONSTITUTIONAL: No fever, weight loss, or fatigue  EYES: No eye pain, visual disturbances, or discharge  ENMT:  No difficulty hearing, tinnitus, vertigo; No sinus or throat pain  NECK: No pain or stiffness  BREASTS: No pain, masses, or nipple discharge  RESPIRATORY: No cough, wheezing, chills or hemoptysis; No shortness of breath  CARDIOVASCULAR: No chest pain, palpitations, dizziness, or leg swelling  GASTROINTESTINAL: No abdominal or epigastric pain. No nausea, vomiting, or hematemesis; No diarrhea or constipation. No melena or hematochezia.  GENITOURINARY: No dysuria, frequency, hematuria, or incontinence  NEUROLOGICAL: No headaches, memory loss, loss of strength, numbness, or tremors  SKIN: No itching, burning, rashes, or lesions   LYMPH NODES: No enlarged glands  ENDOCRINE: No heat or cold intolerance; No hair loss  MUSCULOSKELETAL: No joint pain or swelling; No muscle, back, or extremity pain  PSYCHIATRIC: No depression, anxiety, mood swings, or difficulty sleeping  HEME/LYMPH: No easy bruising, or bleeding gums  ALLERGY AND IMMUNOLOGIC: No hives or eczema    MEDICATIONS  (STANDING):  albuterol/ipratropium for Nebulization 3 milliLiter(s) Nebulizer every 8 hours  aspirin enteric coated 81 milliGRAM(s) Oral daily  benzocaine/menthol Lozenge 1 Lozenge Oral two times a day  dextrose 10% Bolus 125 milliLiter(s) IV Bolus once  dextrose 5%. 1000 milliLiter(s) (50 mL/Hr) IV Continuous <Continuous>  dextrose 5%. 1000 milliLiter(s) (100 mL/Hr) IV Continuous <Continuous>  dextrose 50% Injectable 12.5 Gram(s) IV Push once  dextrose 50% Injectable 25 Gram(s) IV Push once  gabapentin 400 milliGRAM(s) Oral at bedtime  glucagon  Injectable 1 milliGRAM(s) IntraMuscular once  heparin   Injectable 5000 Unit(s) SubCutaneous every 8 hours  insulin glargine Injectable (LANTUS) 12 Unit(s) SubCutaneous at bedtime  insulin lispro (ADMELOG) corrective regimen sliding scale   SubCutaneous three times a day before meals  insulin lispro (ADMELOG) corrective regimen sliding scale   SubCutaneous at bedtime  metoprolol tartrate 50 milliGRAM(s) Oral two times a day  pantoprazole    Tablet 40 milliGRAM(s) Oral before breakfast  polyethylene glycol 3350 17 Gram(s) Oral daily  senna 2 Tablet(s) Oral at bedtime  sodium chloride 3%  Inhalation 4 milliLiter(s) Inhalation every 8 hours    MEDICATIONS  (PRN):  acetaminophen     Tablet .. 650 milliGRAM(s) Oral every 6 hours PRN Temp greater or equal to 38C (100.4F), Mild Pain (1 - 3)  dextrose Oral Gel 15 Gram(s) Oral once PRN Blood Glucose LESS THAN 70 milliGRAM(s)/deciliter  guaiFENesin Oral Liquid (Sugar-Free) 100 milliGRAM(s) Oral every 6 hours PRN Cough      ALLERGIES: Biaxin (Unknown)  penicillin (Unknown)      FAMILY HISTORY:      Social history:  Alochol:   Smoking:   Drug Use:   Marital Status:     PHYSICAL EXAMINATION:  -----------------------------  T(C): 36.7 (04-27-24 @ 04:56), Max: 36.8 (04-26-24 @ 19:55)  HR: 80 (04-27-24 @ 04:56) (79 - 105)  BP: 109/66 (04-27-24 @ 04:56) (105/69 - 127/71)  RR: 18 (04-27-24 @ 04:56) (17 - 18)  SpO2: 97% (04-27-24 @ 04:56) (92% - 100%)  Wt(kg): --    04-26 @ 07:01  -  04-27 @ 07:00  --------------------------------------------------------  IN:  Total IN: 0 mL    OUT:    Voided (mL): 1100 mL  Total OUT: 1100 mL    Total NET: -1100 mL            Constitutional: well developed, normal appearance, well groomed, well nourished, no deformities and no acute distress.   Eyes: the conjunctiva exhibited no abnormalities and the eyelids demonstrated no xanthelasmas.   HEENT: normal oral mucosa, no oral pallor and no oral cyanosis.   Neck: normal jugular venous A waves present, normal jugular venous V waves present and no jugular venous jensen A waves.   Pulmonary: no respiratory distress, normal respiratory rhythm and effort, no accessory muscle use and lungs were clear to auscultation bilaterally. Anteriorly  Cardiovascular: heart rate and rhythm were normal, normal S1 and S2 and no murmur, gallop, rub, heave or thrill are present.   Musculoskeletal: the gait could not be assessed.   Extremities: no clubbing of the fingernails, no localized cyanosis, no petechial hemorrhages and no ischemic changes.   Skin: normal skin color and pigmentation, no rash, no venous stasis, no skin lesions, no skin ulcer and no xanthoma was observed.       LABS:   --------  04-26    139  |  102  |  15  ----------------------------<  176<H>  4.3   |  33<H>  |  0.76    Ca    10.0      26 Apr 2024 07:42                           10.9   6.23  )-----------( 390      ( 26 Apr 2024 07:42 )             33.8                   Radiology:

## 2024-04-27 NOTE — CAREGIVER ENGAGEMENT NOTE - CAREGIVER EDUCATION - TYPES DISCUSSED
Discharge plan/DME/Home Care Services/Insurance benefits/Post-acute care agency contact/Post-discharge escalation process/SNF placement process/Transportation coordination/Transportation letter provided

## 2024-04-27 NOTE — CAREGIVER ENGAGEMENT NOTE - CAREGIVER EDUCATION NOTES - FREE TEXT
Per discussion w/ inpatient interdisciplinary treatment team this AM, patient is medically cleared for transition to next level of care today, 04/27/2024.  spoke w/ patient's daughter Vanessa @ [phone] to discuss discharge plan, and she identified to be agreeable to discharge home rather than discharge to sub-acute rehab -- hospital  Rozina aware and facilitating discharge home.  to remain available for any updates or changes.

## 2024-04-27 NOTE — PROGRESS NOTE ADULT - ASSESSMENT
The patient is a 89 year old female with a history of HTN, HL, DM, COPD, CVA who presented with weakness and chills in the setting of UTI, possible pneumonia    4/27/24  Seen at [facility]  Lying flat, sleeping comfortably  Easily arousable  No complaints offered    Plan:  - ECG with sinus tachycardia and otherwise nonspecific findings  - CT chest with bilateral septal thickening, GGO, consolidations and pleural effusions - PNA vs. heart failure  - 4/21/24 BNP-1769  - Echocardiogram with LV EF-57%  - No signs of decompensated heart failure on exam  - Hold off with diuretics unless BNP significantly elevated or if evidence of heart failure on echo  - Continue aspirin 81 mg daily  - Continue atorvastatin 20 mg daily  - Continue metoprolol tartrate 50 mg bid  - IV antibiotics-completed  - Being followed by Pulmonary, ID

## 2024-04-27 NOTE — PROGRESS NOTE ADULT - SUBJECTIVE AND OBJECTIVE BOX
Date/Time Patient Seen:  		  Referring MD:   Data Reviewed	       Patient is a 89y old  Female who presents with a chief complaint of Urinary tract infection     (26 Apr 2024 09:21)      Subjective/HPI     PAST MEDICAL & SURGICAL HISTORY:  HTN (hypertension)    CVA (cerebral vascular accident)    CAD (coronary artery disease)  cardiac stent    S/P vascular bypass  left leg          Medication list         MEDICATIONS  (STANDING):  albuterol/ipratropium for Nebulization 3 milliLiter(s) Nebulizer every 8 hours  aspirin enteric coated 81 milliGRAM(s) Oral daily  benzocaine/menthol Lozenge 1 Lozenge Oral two times a day  dextrose 10% Bolus 125 milliLiter(s) IV Bolus once  dextrose 5%. 1000 milliLiter(s) (50 mL/Hr) IV Continuous <Continuous>  dextrose 5%. 1000 milliLiter(s) (100 mL/Hr) IV Continuous <Continuous>  dextrose 50% Injectable 12.5 Gram(s) IV Push once  dextrose 50% Injectable 25 Gram(s) IV Push once  gabapentin 400 milliGRAM(s) Oral at bedtime  glucagon  Injectable 1 milliGRAM(s) IntraMuscular once  heparin   Injectable 5000 Unit(s) SubCutaneous every 8 hours  insulin glargine Injectable (LANTUS) 12 Unit(s) SubCutaneous at bedtime  insulin lispro (ADMELOG) corrective regimen sliding scale   SubCutaneous at bedtime  insulin lispro (ADMELOG) corrective regimen sliding scale   SubCutaneous three times a day before meals  metoprolol tartrate 50 milliGRAM(s) Oral two times a day  pantoprazole    Tablet 40 milliGRAM(s) Oral before breakfast  polyethylene glycol 3350 17 Gram(s) Oral daily  senna 2 Tablet(s) Oral at bedtime  sodium chloride 3%  Inhalation 4 milliLiter(s) Inhalation every 8 hours    MEDICATIONS  (PRN):  acetaminophen     Tablet .. 650 milliGRAM(s) Oral every 6 hours PRN Temp greater or equal to 38C (100.4F), Mild Pain (1 - 3)  dextrose Oral Gel 15 Gram(s) Oral once PRN Blood Glucose LESS THAN 70 milliGRAM(s)/deciliter  guaiFENesin Oral Liquid (Sugar-Free) 100 milliGRAM(s) Oral every 6 hours PRN Cough         Vitals log        ICU Vital Signs Last 24 Hrs  T(C): 36.7 (27 Apr 2024 04:56), Max: 36.8 (26 Apr 2024 19:55)  T(F): 98 (27 Apr 2024 04:56), Max: 98.2 (26 Apr 2024 19:55)  HR: 80 (27 Apr 2024 04:56) (79 - 105)  BP: 109/66 (27 Apr 2024 04:56) (105/69 - 127/71)  BP(mean): --  ABP: --  ABP(mean): --  RR: 18 (27 Apr 2024 04:56) (17 - 18)  SpO2: 97% (27 Apr 2024 04:56) (92% - 100%)    O2 Parameters below as of 27 Apr 2024 04:56  Patient On (Oxygen Delivery Method): nasal cannula  O2 Flow (L/min): 2               Input and Output:  I&O's Detail    25 Apr 2024 07:01  -  26 Apr 2024 07:00  --------------------------------------------------------  IN:  Total IN: 0 mL    OUT:    Voided (mL): 1650 mL  Total OUT: 1650 mL    Total NET: -1650 mL      26 Apr 2024 07:01  -  27 Apr 2024 06:33  --------------------------------------------------------  IN:  Total IN: 0 mL    OUT:    Voided (mL): 1100 mL  Total OUT: 1100 mL    Total NET: -1100 mL          Lab Data                        10.9   6.23  )-----------( 390      ( 26 Apr 2024 07:42 )             33.8     04-26    139  |  102  |  15  ----------------------------<  176<H>  4.3   |  33<H>  |  0.76    Ca    10.0      26 Apr 2024 07:42              Review of Systems	      Objective     Physical Examination    heart s1s2  lung dc bS  head nc      Pertinent Lab findings & Imaging      Lv:  NO   Adequate UO     I&O's Detail    25 Apr 2024 07:01  -  26 Apr 2024 07:00  --------------------------------------------------------  IN:  Total IN: 0 mL    OUT:    Voided (mL): 1650 mL  Total OUT: 1650 mL    Total NET: -1650 mL      26 Apr 2024 07:01  -  27 Apr 2024 06:33  --------------------------------------------------------  IN:  Total IN: 0 mL    OUT:    Voided (mL): 1100 mL  Total OUT: 1100 mL    Total NET: -1100 mL               Discussed with:     Cultures:	        Radiology

## 2024-05-02 NOTE — ED PROVIDER NOTE - WR ORDER ID 1
10 degrees of active dorsiflexion to allow the Pt to be able to walk without compensation- progressing well       PLAN  Yes  Continue plan of care  Re-Cert Due: NA  [x]  Upgrade activities as tolerated  []  Discharge due to :  []  Other:      Brooks Atkinson, PT       5/2/2024       8:13 AM              [id number]

## 2024-06-20 NOTE — PROGRESS NOTE ADULT - ASSESSMENT
89YO F PMHx of CVA, HTN and COPD admitted with CAP   In ED [id number] UA neg, CT chest with pna.   + cough  UTI ruled out  Cx NGTD    Recommendations:   C/w ceftriaxone x7 day course  --can switch to PO ceftin 500mg BID to complete course  F/u uPNA Ag  Trend temps and cbc  Asp precautions  supportive care, O2 as needed  Additional care per primary team    Infectious Diseases will follow. Please call with any questions.  Tabby Duarte M.D.  [facility] Division of Infectious Diseases [phone]  For after 5 P.M. and weekends, please call [phone]   ct +acute diverticulitis

## 2024-07-16 PROBLEM — R91.1 LUNG NODULE: Status: ACTIVE | Noted: 2024-07-16

## 2024-07-17 ENCOUNTER — APPOINTMENT (OUTPATIENT)
Dept: THORACIC SURGERY | Facility: CLINIC | Age: 89
End: 2024-07-17
Payer: MEDICARE

## 2024-07-17 VITALS
BODY MASS INDEX: 24.19 KG/M2 | HEIGHT: 59 IN | HEART RATE: 93 BPM | DIASTOLIC BLOOD PRESSURE: 68 MMHG | WEIGHT: 120 LBS | SYSTOLIC BLOOD PRESSURE: 139 MMHG | OXYGEN SATURATION: 92 %

## 2024-07-17 DIAGNOSIS — R91.1 SOLITARY PULMONARY NODULE: ICD-10-CM

## 2024-07-17 PROCEDURE — 99204 OFFICE O/P NEW MOD 45 MIN: CPT

## 2024-07-17 RX ORDER — FUROSEMIDE 80 MG/1
TABLET ORAL
Refills: 0 | Status: ACTIVE | COMMUNITY

## 2024-07-17 RX ORDER — INSULIN DEGLUDEC INJECTION 100 U/ML
100 INJECTION, SOLUTION SUBCUTANEOUS
Refills: 0 | Status: ACTIVE | COMMUNITY

## 2024-07-17 RX ORDER — ATORVASTATIN CALCIUM 20 MG/1
20 TABLET, FILM COATED ORAL
Refills: 0 | Status: ACTIVE | COMMUNITY

## 2024-08-09 NOTE — HISTORY OF PRESENT ILLNESS
[FreeTextEntry1] : Ms. FEDERICO DOS SANTOS, 89 year old female, former smoker (1/2 PPD x 40yrs, quit 30 yrs ago) , w/ hx of CAD s/p stent, IDDM, PVD s/p femoral-popliteal bypass graft occlusion, HTN, and HLD, who referred by Dr. Nadine Douglass (Pulm) for a LLL nodule.   Spirometry on 03/14/2024:  FEV1 1.40 (99%).   CT chest on 06/07/2024: ([facility]) - There is extensive peripheral increased interstitial markings, and honeycombing which has progressed since previous chest CT with more involvement of the upper to mid lung fields.Newly appreciated left lower lobe mass series 601 image 22 and 3:65. Measuring 1.4 x 1.5 x 1.8 cm. - 3.33:0.6 cm anterior left upper lobe subpleural nodule stable. Previously noted right upper lobe lung nodule not as well appreciated on today's study. - Stable parenchymal density and pleural thickening adjacent to left major fissure 3:35.Stable tiny nodule periphery left upper lobe 3:56.Stable lingular nodule 3:70 measuring 0.4 cm.Chronic stable changes right upper lobe 3:24. Significant lower lobe bronchiectasis and paraseptal emphysematous changes as previously mentioned. No new consolidations. -  Newly appreciated surgical clips in the right high axillary region and new appreciated tubular graft coursing from right axillary region along the right lateral chest wall and right upper lateral abdomen within the subcutaneous soft tissues distal extent not included on today's exam.  PET/CT on 07/03/2024: ([facility]) - Left lower lobe spiculated nodule image 75, 16 mm, is hypermetabolic SUV 9.5. This is highly suspicious for malignancy. - Additional scattered smaller lung nodules (left lingula 5 mm, peripheral left upper lobe 6 mm) are non-avid or below PET resolution. - There is bilateral lung base interstitial thickening with mild diffuse nonfocal activity. There is bilateral ill-defined groundglass haziness, non-avid. - Bilateral lung base interstitial thickening with mild activity, suggestive of interstitial lung disease.   Depression screening completed on 7/17/2024  Spirometry on 08/01/2024: FVC 1.79 (95%), FEV1 1.46 (103%).   Cardiac clearance....  Patient is here today for a follow up.

## 2024-08-09 NOTE — CONSULT LETTER
[Dear  ___] : Dear  [unfilled], [Consult Letter:] : I had the pleasure of evaluating your patient, [unfilled]. [Please see my note below.] : Please see my note below. [Consult Closing:] : Thank you very much for allowing me to participate in the care of this patient.  If you have any questions, please do not hesitate to contact me. [Sincerely,] : Sincerely, [FreeTextEntry2] :  Dr. Nadine Douglass (Pulm/Ref) [FreeTextEntry3] : Zelalem Lazar MD  Attending Surgeon  Division of Thoracic Surgery  , Cardiovascular and Thoracic Surgery  [facility] [employer] [facility]

## 2024-08-09 NOTE — ASSESSMENT
[FreeTextEntry1] : Ms. FEDERICO DOS SANTOS, 89-year-old female, former smoker (1/2 PPD x 40yrs, quit 30 yrs ago) , w/ hx of CAD s/p stent, IDDM, PVD s/p femoral-popliteal bypass graft occlusion, HTN, and HLD, who referred by Dr. Douglass for a LLL nodule.   On 07/17/2024, PT with a LLL nodule concerning for primary lung carcinoma in the background of pulmonary fibrosis. Pt feels well without complaints of shortness of breath. I have discussed resection of this lesion, and she wishes to proceed with surgery. I have asked the patient to obtain cardiac clearance and complete pulmonary functions.

## 2024-08-21 ENCOUNTER — NON-APPOINTMENT (OUTPATIENT)
Age: 89
End: 2024-08-21

## 2024-08-21 ENCOUNTER — APPOINTMENT (OUTPATIENT)
Dept: THORACIC SURGERY | Facility: CLINIC | Age: 89
End: 2024-08-21
Payer: MEDICARE

## 2024-08-21 VITALS
SYSTOLIC BLOOD PRESSURE: 114 MMHG | OXYGEN SATURATION: 80 % | WEIGHT: 119 LBS | BODY MASS INDEX: 23.99 KG/M2 | HEIGHT: 59 IN | HEART RATE: 88 BPM | DIASTOLIC BLOOD PRESSURE: 69 MMHG

## 2024-08-21 VITALS — OXYGEN SATURATION: 94 %

## 2024-08-21 DIAGNOSIS — R91.1 SOLITARY PULMONARY NODULE: ICD-10-CM

## 2024-08-21 PROCEDURE — 99214 OFFICE O/P EST MOD 30 MIN: CPT

## 2024-08-21 NOTE — ASSESSMENT
[FreeTextEntry1] : Ms. FEDERICO DOS SANTOS, 89-year-old female, former smoker (1/2 PPD x 40yrs, quit 30 yrs ago) , w/ hx of CAD s/p stent, IDDM, PVD s/p femoral-popliteal bypass graft occlusion, HTN, DM and HLD, who referred by Dr. Douglass for a LLL nodule.   On 07/17/2024, PT with a LLL nodule concerning for primary lung carcinoma in the background of pulmonary fibrosis. Pt feels well without complaints of shortness of breath. I have discussed resection of this lesion, and she wishes to proceed with surgery. I have asked the patient to obtain cardiac clearance and complete pulmonary functions.   I have reviewed the patient's medical records and diagnostic images at time of this office consultation and have made the following recommendation: 1. Spirometry study reviewed, will need DLCO results as well. Will contact Dr. Douglass's office for complete pulmonary function test. As per patient and daughter, cleared by cardiologist for surgery. Recommended Left VATS LLL wedge resection. Risks of surgery includes but not limited to pain, infection, bleeding, injuries to surrounding structures, and need for further procedure, stroke or death. Benefits and alternatives explained to patient, all questions answered, patient agreed to proceed with surgery. 2. Denies any Anticoagulation/antiplatelets.  I, Dr. OTONIEL CHOPRA, personally performed the evaluation and management (E/M) services for this established patient who presents today with (a) new problem(s)/exacerbation of (an) existing condition(s).  That E/M includes conducting the examination, assessing all new/exacerbated conditions, and establishing a new plan of care.  Today, my ACP, MALORIE LeivaBC, was here to observe my evaluation and management services for this new problem/exacerbated condition to be followed going forward.

## 2024-08-21 NOTE — CONSULT LETTER
[FreeTextEntry2] :  Dr. Nadine Douglass (Pulm/Ref) [FreeTextEntry3] : Zelalem Lazar MD  Attending Surgeon  Division of Thoracic Surgery  , Cardiovascular and Thoracic Surgery  [facility]

## 2024-08-21 NOTE — CONSULT LETTER
[FreeTextEntry2] :  Dr. Nadine Douglass (Pulm/Ref) [FreeTextEntry3] : Zelalem Lazar MD  Attending Surgeon  Division of Thoracic Surgery  , Cardiovascular and Thoracic Surgery  [facility] at [facility]

## 2024-08-21 NOTE — HISTORY OF PRESENT ILLNESS
[FreeTextEntry1] : Ms. FEDERICO DOS SANTOS, 89 year old female, former smoker (1/2 PPD x 40yrs, quit 30 yrs ago) , w/ hx of CAD s/p stent, IDDM, PVD s/p femoral-popliteal bypass graft occlusion, HTN, and HLD, who referred by Dr. Nadine Douglass (Pulm) for a LLL nodule.   Spirometry on 03/14/2024:  FEV1 1.40 (99%).   CT chest on 06/07/2024: (PH) - There is extensive peripheral increased interstitial markings, and honeycombing which has progressed since previous chest CT with more involvement of the upper to mid lung fields.Newly appreciated left lower lobe mass series 601 image 22 and 3:65. Measuring 1.4 x 1.5 x 1.8 cm. - 3.33:0.6 cm anterior left upper lobe subpleural nodule stable. Previously noted right upper lobe lung nodule not as well appreciated on today's study. - Stable parenchymal density and pleural thickening adjacent to left major fissure 3:35.Stable tiny nodule periphery left upper lobe 3:56.Stable lingular nodule 3:70 measuring 0.4 cm.Chronic stable changes right upper lobe 3:24. Significant lower lobe bronchiectasis and paraseptal emphysematous changes as previously mentioned. No new consolidations. -  Newly appreciated surgical clips in the right high axillary region and new appreciated tubular graft coursing from right axillary region along the right lateral chest wall and right upper lateral abdomen within the subcutaneous soft tissues distal extent not included on today's exam.  PET/CT on 07/03/2024: (PH) - Left lower lobe spiculated nodule image 75, 16 mm, is hypermetabolic SUV 9.5. This is highly suspicious for malignancy. - Additional scattered smaller lung nodules (left lingula 5 mm, peripheral left upper lobe 6 mm) are non-avid or below PET resolution. - There is bilateral lung base interstitial thickening with mild diffuse nonfocal activity. There is bilateral ill-defined groundglass haziness, non-avid. - Bilateral lung base interstitial thickening with mild activity, suggestive of interstitial lung disease.   Depression screening completed on 7/17/2024  Spirometry on 08/01/2024: FVC 1.79 (95%), FEV1 1.46 (103%).   Cardiac clearance performed by Dr. Rachel  Patient is here today for a follow up. She reports that her cough is getting better, denies any worsening SOB, chest pain, fever or chills.

## 2024-08-21 NOTE — CONSULT LETTER
[FreeTextEntry2] :  Dr. Nadine Douglass (Pulm/Ref) [FreeTextEntry3] : Zelalem Lazar MD  Attending Surgeon  Division of Thoracic Surgery  , Cardiovascular and Thoracic Surgery  [facility] at [facility]

## 2024-09-05 ENCOUNTER — OUTPATIENT (OUTPATIENT)
Dept: OUTPATIENT SERVICES | Facility: HOSPITAL | Age: 89
LOS: 1 days | End: 2024-09-05

## 2024-09-05 VITALS
DIASTOLIC BLOOD PRESSURE: 51 MMHG | HEART RATE: 88 BPM | OXYGEN SATURATION: 97 % | WEIGHT: 119.93 LBS | TEMPERATURE: 98 F | RESPIRATION RATE: 16 BRPM | SYSTOLIC BLOOD PRESSURE: 138 MMHG | HEIGHT: 59 IN

## 2024-09-05 DIAGNOSIS — R91.1 SOLITARY PULMONARY NODULE: ICD-10-CM

## 2024-09-05 DIAGNOSIS — E11.9 TYPE 2 DIABETES MELLITUS WITHOUT COMPLICATIONS: ICD-10-CM

## 2024-09-05 DIAGNOSIS — I25.10 ATHEROSCLEROTIC HEART DISEASE OF NATIVE CORONARY ARTERY WITHOUT ANGINA PECTORIS: Chronic | ICD-10-CM

## 2024-09-05 DIAGNOSIS — Z87.09 PERSONAL HISTORY OF OTHER DISEASES OF THE RESPIRATORY SYSTEM: ICD-10-CM

## 2024-09-05 DIAGNOSIS — I10 ESSENTIAL (PRIMARY) HYPERTENSION: ICD-10-CM

## 2024-09-05 DIAGNOSIS — Z95.5 PRESENCE OF CORONARY ANGIOPLASTY IMPLANT AND GRAFT: ICD-10-CM

## 2024-09-05 DIAGNOSIS — Z95.828 PRESENCE OF OTHER VASCULAR IMPLANTS AND GRAFTS: Chronic | ICD-10-CM

## 2024-09-05 LAB
A1C WITH ESTIMATED AVERAGE GLUCOSE RESULT: 6.6 % — HIGH (ref 4–5.6)
ANION GAP SERPL CALC-SCNC: 16 MMOL/L — HIGH (ref 7–14)
BASOPHILS # BLD AUTO: 0.05 K/UL — SIGNIFICANT CHANGE UP (ref 0–0.2)
BASOPHILS NFR BLD AUTO: 0.9 % — SIGNIFICANT CHANGE UP (ref 0–2)
BLD GP AB SCN SERPL QL: NEGATIVE — SIGNIFICANT CHANGE UP
BUN SERPL-MCNC: 28 MG/DL — HIGH (ref 7–23)
CALCIUM SERPL-MCNC: 9.8 MG/DL — SIGNIFICANT CHANGE UP (ref 8.4–10.5)
CHLORIDE SERPL-SCNC: 94 MMOL/L — LOW (ref 98–107)
CO2 SERPL-SCNC: 26 MMOL/L — SIGNIFICANT CHANGE UP (ref 22–31)
CREAT SERPL-MCNC: 1.1 MG/DL — SIGNIFICANT CHANGE UP (ref 0.5–1.3)
EGFR: 47 ML/MIN/1.73M2 — LOW
EOSINOPHIL # BLD AUTO: 0.23 K/UL — SIGNIFICANT CHANGE UP (ref 0–0.5)
EOSINOPHIL NFR BLD AUTO: 4 % — SIGNIFICANT CHANGE UP (ref 0–6)
ESTIMATED AVERAGE GLUCOSE: 143 — SIGNIFICANT CHANGE UP
GLUCOSE SERPL-MCNC: 286 MG/DL — HIGH (ref 70–99)
HCT VFR BLD CALC: 29.8 % — LOW (ref 34.5–45)
HGB BLD-MCNC: 9.7 G/DL — LOW (ref 11.5–15.5)
IMM GRANULOCYTES NFR BLD AUTO: 0.2 % — SIGNIFICANT CHANGE UP (ref 0–0.9)
LYMPHOCYTES # BLD AUTO: 1.08 K/UL — SIGNIFICANT CHANGE UP (ref 1–3.3)
LYMPHOCYTES # BLD AUTO: 18.8 % — SIGNIFICANT CHANGE UP (ref 13–44)
MCHC RBC-ENTMCNC: 28.9 PG — SIGNIFICANT CHANGE UP (ref 27–34)
MCHC RBC-ENTMCNC: 32.6 GM/DL — SIGNIFICANT CHANGE UP (ref 32–36)
MCV RBC AUTO: 88.7 FL — SIGNIFICANT CHANGE UP (ref 80–100)
MONOCYTES # BLD AUTO: 0.42 K/UL — SIGNIFICANT CHANGE UP (ref 0–0.9)
MONOCYTES NFR BLD AUTO: 7.3 % — SIGNIFICANT CHANGE UP (ref 2–14)
NEUTROPHILS # BLD AUTO: 3.96 K/UL — SIGNIFICANT CHANGE UP (ref 1.8–7.4)
NEUTROPHILS NFR BLD AUTO: 68.8 % — SIGNIFICANT CHANGE UP (ref 43–77)
PLATELET # BLD AUTO: 361 K/UL — SIGNIFICANT CHANGE UP (ref 150–400)
POTASSIUM SERPL-MCNC: 3.7 MMOL/L — SIGNIFICANT CHANGE UP (ref 3.5–5.3)
POTASSIUM SERPL-SCNC: 3.7 MMOL/L — SIGNIFICANT CHANGE UP (ref 3.5–5.3)
RBC # BLD: 3.36 M/UL — LOW (ref 3.8–5.2)
RBC # FLD: 15.3 % — HIGH (ref 10.3–14.5)
RH IG SCN BLD-IMP: POSITIVE — SIGNIFICANT CHANGE UP
RH IG SCN BLD-IMP: POSITIVE — SIGNIFICANT CHANGE UP
SODIUM SERPL-SCNC: 136 MMOL/L — SIGNIFICANT CHANGE UP (ref 135–145)
WBC # BLD: 5.75 K/UL — SIGNIFICANT CHANGE UP (ref 3.8–10.5)
WBC # FLD AUTO: 5.75 K/UL — SIGNIFICANT CHANGE UP (ref 3.8–10.5)

## 2024-09-05 NOTE — H&P PST ADULT - HISTORY OF PRESENT ILLNESS
89 y/o Female former smoker (1/2 PPD x 40yrs, quit 30 yrs ago) COPD , CVA (distant h/o, no deficit) CAD s/p stent (2015), IDDM, PVD s/p femoral-popliteal bypass graft (2015) occlusion (2022) HTN, HLD anemia presents to presurgical testing with diagnosis of solitary pulmonary nodule. Pt with a left lower lung nodule. Pt is scheduled for a left video assisted thoracoscopic surgery VATS left lower lobe resection.

## 2024-09-05 NOTE — H&P PST ADULT - PROBLEM SELECTOR PLAN 4
Patient instructed to hold Metformin on the morning of procedure.   Patient instructed to take 5 units of Tresiba the evening prior to procedure   Pt stated understanding.

## 2024-09-05 NOTE — H&P PST ADULT - NSANTHGENDERRD_ENT_A_CORE
Detail Level: Simple
Sunscreen Recommendations: I recommended a broad spectrum sunscreen with a SPF of 30 or higher. I explained that SPF 30 sunscreens block approximately 97 percent of the sun's harmful rays. Sunscreens should be applied at least 15 minutes prior to expected sun exposure and then every 2 hours after that as long as sun exposure continues. If swimming or exercising sunscreen should be reapplied every 45 minutes to an hour after getting wet or sweating. Recommended protective clothing and sun avoidance at peak hours.
Detail Level: Generalized
No

## 2024-09-05 NOTE — H&P PST ADULT - PRIMARY CARE PROVIDER
Dr Moreira [facility] [phone]                                                                                                                                                                      Dr. Nadine Douglass ([facility])  [phone]

## 2024-09-05 NOTE — H&P PST ADULT - PROBLEM SELECTOR PLAN 1
Patient tentatively scheduled for  left video assisted thoracoscopic surgery VATS left lower lobe resection for 9/12/24. Pre-op instructions provided. Pt given verbal and written instructions with teach back on chlorhexidine shampoo and pepcid. Pt verbalized understanding with return demonstration.     PST CBC T&S ABO A1C BMP done  EKG 4/2024 (HIE)    Copy of cardiac eval with summary of echo and stress test in chart.  "Patient is to be considered in optimal achievable cardiac condition for the proposed procedure"

## 2024-09-05 NOTE — H&P PST ADULT - NSICDXPASTSURGICALHX_GEN_ALL_CORE_FT
PAST SURGICAL HISTORY:  Bilateral cataracts     CAD (coronary artery disease) cardiac stent    H/O rotator cuff surgery     S/P vascular bypass left leg

## 2024-09-05 NOTE — H&P PST ADULT - NSICDXPASTMEDICALHX_GEN_ALL_CORE_FT
PAST MEDICAL HISTORY:  Anemia     CAD (coronary artery disease)     Chronic diastolic heart failure     CVA (cerebral vascular accident)     DM2 (diabetes mellitus, type 2)     H/O hearing loss     History of COPD     HLD (hyperlipidemia)     HTN (hypertension)     PVD (peripheral vascular disease)     Solitary pulmonary nodule

## 2024-09-05 NOTE — H&P PST ADULT - LAST STRESS TEST
8/4/23 hyperdynamic left ventricular systolic function with an appropriate increase in LVEF. no evidence of myocardial ischemia

## 2024-09-08 ENCOUNTER — INPATIENT (INPATIENT)
Facility: HOSPITAL | Age: 89
LOS: 8 days | Discharge: HOME | End: 2024-09-17
Attending: HOSPITALIST | Admitting: HOSPITALIST
Payer: MEDICARE

## 2024-09-08 VITALS
DIASTOLIC BLOOD PRESSURE: 67 MMHG | HEIGHT: 59 IN | RESPIRATION RATE: 25 BRPM | OXYGEN SATURATION: 98 % | HEART RATE: 99 BPM | WEIGHT: 119.93 LBS | TEMPERATURE: 97 F | SYSTOLIC BLOOD PRESSURE: 107 MMHG

## 2024-09-08 DIAGNOSIS — Z95.828 PRESENCE OF OTHER VASCULAR IMPLANTS AND GRAFTS: Chronic | ICD-10-CM

## 2024-09-08 DIAGNOSIS — I25.10 ATHEROSCLEROTIC HEART DISEASE OF NATIVE CORONARY ARTERY WITHOUT ANGINA PECTORIS: Chronic | ICD-10-CM

## 2024-09-08 LAB
ALBUMIN SERPL ELPH-MCNC: 4 G/DL — SIGNIFICANT CHANGE UP (ref 3.3–5)
ALP SERPL-CCNC: 102 U/L — SIGNIFICANT CHANGE UP (ref 40–120)
ALT FLD-CCNC: 11 U/L — SIGNIFICANT CHANGE UP (ref 4–33)
ANION GAP SERPL CALC-SCNC: 14 MMOL/L — SIGNIFICANT CHANGE UP (ref 7–14)
APPEARANCE UR: CLEAR — SIGNIFICANT CHANGE UP
AST SERPL-CCNC: 17 U/L — SIGNIFICANT CHANGE UP (ref 4–32)
BACTERIA # UR AUTO: ABNORMAL /HPF
BASOPHILS # BLD AUTO: 0.04 K/UL — SIGNIFICANT CHANGE UP (ref 0–0.2)
BASOPHILS NFR BLD AUTO: 0.3 % — SIGNIFICANT CHANGE UP (ref 0–2)
BILIRUB SERPL-MCNC: 0.3 MG/DL — SIGNIFICANT CHANGE UP (ref 0.2–1.2)
BILIRUB UR-MCNC: NEGATIVE — SIGNIFICANT CHANGE UP
BLOOD GAS VENOUS COMPREHENSIVE RESULT: SIGNIFICANT CHANGE UP
BUN SERPL-MCNC: 20 MG/DL — SIGNIFICANT CHANGE UP (ref 7–23)
CALCIUM SERPL-MCNC: 9.8 MG/DL — SIGNIFICANT CHANGE UP (ref 8.4–10.5)
CAST: 1 /LPF — SIGNIFICANT CHANGE UP (ref 0–4)
CHLORIDE SERPL-SCNC: 96 MMOL/L — LOW (ref 98–107)
CO2 SERPL-SCNC: 25 MMOL/L — SIGNIFICANT CHANGE UP (ref 22–31)
COLOR SPEC: YELLOW — SIGNIFICANT CHANGE UP
CREAT SERPL-MCNC: 0.89 MG/DL — SIGNIFICANT CHANGE UP (ref 0.5–1.3)
DIFF PNL FLD: NEGATIVE — SIGNIFICANT CHANGE UP
EGFR: 61 ML/MIN/1.73M2 — SIGNIFICANT CHANGE UP
EOSINOPHIL # BLD AUTO: 0.13 K/UL — SIGNIFICANT CHANGE UP (ref 0–0.5)
EOSINOPHIL NFR BLD AUTO: 0.9 % — SIGNIFICANT CHANGE UP (ref 0–6)
GLUCOSE SERPL-MCNC: 213 MG/DL — HIGH (ref 70–99)
GLUCOSE UR QL: NEGATIVE MG/DL — SIGNIFICANT CHANGE UP
HCT VFR BLD CALC: 33.2 % — LOW (ref 34.5–45)
HGB BLD-MCNC: 10.7 G/DL — LOW (ref 11.5–15.5)
IANC: 13.9 K/UL — HIGH (ref 1.8–7.4)
IMM GRANULOCYTES NFR BLD AUTO: 0.5 % — SIGNIFICANT CHANGE UP (ref 0–0.9)
KETONES UR-MCNC: NEGATIVE MG/DL — SIGNIFICANT CHANGE UP
LEUKOCYTE ESTERASE UR-ACNC: ABNORMAL
LYMPHOCYTES # BLD AUTO: 0.44 K/UL — LOW (ref 1–3.3)
LYMPHOCYTES # BLD AUTO: 2.9 % — LOW (ref 13–44)
MCHC RBC-ENTMCNC: 28.5 PG — SIGNIFICANT CHANGE UP (ref 27–34)
MCHC RBC-ENTMCNC: 32.2 GM/DL — SIGNIFICANT CHANGE UP (ref 32–36)
MCV RBC AUTO: 88.5 FL — SIGNIFICANT CHANGE UP (ref 80–100)
MONOCYTES # BLD AUTO: 0.51 K/UL — SIGNIFICANT CHANGE UP (ref 0–0.9)
MONOCYTES NFR BLD AUTO: 3.4 % — SIGNIFICANT CHANGE UP (ref 2–14)
NEUTROPHILS # BLD AUTO: 13.9 K/UL — HIGH (ref 1.8–7.4)
NEUTROPHILS NFR BLD AUTO: 92 % — HIGH (ref 43–77)
NITRITE UR-MCNC: NEGATIVE — SIGNIFICANT CHANGE UP
NRBC # BLD: 0 /100 WBCS — SIGNIFICANT CHANGE UP (ref 0–0)
NRBC # FLD: 0 K/UL — SIGNIFICANT CHANGE UP (ref 0–0)
PH UR: 7 — SIGNIFICANT CHANGE UP (ref 5–8)
PLATELET # BLD AUTO: 393 K/UL — SIGNIFICANT CHANGE UP (ref 150–400)
POTASSIUM SERPL-MCNC: 4.3 MMOL/L — SIGNIFICANT CHANGE UP (ref 3.5–5.3)
POTASSIUM SERPL-SCNC: 4.3 MMOL/L — SIGNIFICANT CHANGE UP (ref 3.5–5.3)
PROT SERPL-MCNC: 7.1 G/DL — SIGNIFICANT CHANGE UP (ref 6–8.3)
PROT UR-MCNC: NEGATIVE MG/DL — SIGNIFICANT CHANGE UP
RBC # BLD: 3.75 M/UL — LOW (ref 3.8–5.2)
RBC # FLD: 14.9 % — HIGH (ref 10.3–14.5)
RBC CASTS # UR COMP ASSIST: 1 /HPF — SIGNIFICANT CHANGE UP (ref 0–4)
SODIUM SERPL-SCNC: 135 MMOL/L — SIGNIFICANT CHANGE UP (ref 135–145)
SP GR SPEC: 1.01 — SIGNIFICANT CHANGE UP (ref 1–1.03)
SQUAMOUS # UR AUTO: 0 /HPF — SIGNIFICANT CHANGE UP (ref 0–5)
UROBILINOGEN FLD QL: 0.2 MG/DL — SIGNIFICANT CHANGE UP (ref 0.2–1)
WBC # BLD: 15.09 K/UL — HIGH (ref 3.8–10.5)
WBC # FLD AUTO: 15.09 K/UL — HIGH (ref 3.8–10.5)
WBC UR QL: >50 /HPF — HIGH (ref 0–5)

## 2024-09-08 PROCEDURE — 99285 EMERGENCY DEPT VISIT HI MDM: CPT | Mod: GC

## 2024-09-08 PROCEDURE — 71046 X-RAY EXAM CHEST 2 VIEWS: CPT | Mod: 26

## 2024-09-08 RX ADMIN — Medication 100 MILLIGRAM(S): at 21:22

## 2024-09-08 NOTE — ED ADULT TRIAGE NOTE - CHIEF COMPLAINT QUOTE
c/o worsening SOB today. tachypneic upon arrival. O2 84% on room air. placed on 6L NC, O2 98% on 6L NC. per daughter, was told has elevated WBC count. endorsing chills. hx of HLD, DM II, PVD, [place], CHF, CAD.  c/o worsening SOB today. tachypneic upon arrival. O2 84% on room air. placed on 6L NC, O2 98% on 6L NC. per daughter, was told has elevated WBC count. endorsing chills. hx of HLD, DM II, PVD, [place], CHF (uses home O2 as needed), CAD. .

## 2024-09-08 NOTE — ED ADULT NURSE NOTE - NSFALLRISKINTERV_ED_ALL_ED

## 2024-09-08 NOTE — ED PROVIDER NOTE - PROGRESS NOTE DETAILS
Yung Frazier MD PGY3: urine pos, abx given, remains on baseline o2 requirement. sdm with patient given upcoming procedure and comorbidities with sepsis criteria met and acute leukocytosis, to admit for further management.

## 2024-09-08 NOTE — ED PROVIDER NOTE - CLINICAL SUMMARY MEDICAL DECISION MAKING FREE TEXT BOX
89 female past medical history COPD, former smoker, as needed O2, CVA, CAD status post stent, diabetes, hypertension, hyperlipidemia, lung cancer scheduled for VATS on Thursday presenting for abnormal urine test outpatient.   With initial vital signs tachycardic, desats to high 80s on room air improves on her baseline as needed O2.  With constitutional symptoms and abnormal urine testing outpatient, likely with UTI versus other infectious etiology including pneumonia given hypoxia.  To evaluate labs, x-ray, urine, reassess.

## 2024-09-08 NOTE — ED ADULT NURSE NOTE - OBJECTIVE STATEMENT
89 year old AO4 ambulatory with a wheelchair c/o worsening SOB. PMHx of CHF, CAD, DM2, PVD, [place], States at baseline she uses O2 as needed at 2L. States she has surgery on Thursday for lung CA. Noted to be tachypneic with a non productive cough. Pt on RA noted have a O2 saturation of 80%, on 6L NC noted to be at 99%. Placed on the cardiac monitor noted to be sinus tachycardia. VS as charted. Right 18g IV placed, labs drawn, medicated as per eMAR. Urine studies collected. Pt denies chest pain, headache, dizziness, nausea, vomiting, abd pain. Pending imaging.

## 2024-09-08 NOTE — ED PROVIDER NOTE - PHYSICAL EXAMINATION
CONSTITUTIONAL: awake, nad.   SKIN: warm, dry  HEAD: Normocephalic; atraumatic.  EYES: no conjunctival injection. no scleral icterus  ENT: No nasal discharge; airway clear.  CARD: S1, S2 normal; no murmurs, gallops, or rubs. borderline tachycardic   RESP: No wheezes, rales or rhonchi. Good air movement bilaterally. desats 89% on RA  ABD: soft ntnd, no guarding, no distention, no rigidity.   EXT: No cyanosis or edema.   NEURO: Alert, oriented, grossly unremarkable  PSYCH: Cooperative, appropriate.

## 2024-09-08 NOTE — ED ADULT NURSE NOTE - CHIEF COMPLAINT QUOTE
c/o worsening SOB today. tachypneic upon arrival. O2 84% on room air. placed on 6L NC, O2 98% on 6L NC. per daughter, was told has elevated WBC count. endorsing chills. hx of HLD, DM II, PVD, [place], CHF (uses home O2 as needed), CAD. .

## 2024-09-08 NOTE — ED ADULT NURSE NOTE - IS THE PATIENT ABLE TO BE SCREENED?
INTERNAL MEDICINE RESIDENT PROGRESS NOTE     Patient: Ingrid Rodriguez Date: 5/3/2023   YOB: 1958 Admission Date: 5/2/2023   MRN: [id number] Attending: Steven Pineda MD     Primary MD: PcpAlison    Team: Red    Chief Complaint: Weakness, Leg Swelling, and Fall     HOSPITAL SUMMARY     Ingrid Rodriguez is a 64 year old female with no significant PMHx, who was admitted on 5/2/2023 with leg weakness and swelling.    Patient was not saturating well in the ED and required oxygen. NTpBNP elevated at 1100, CXR clear, but with significant BLE edema. She was diuresed on Lasix. Echocardiogram pending.    BLE swelling, warmth and erythema consistent with cellulitis and started on antibiotics.    INTERVAL EVENTS     • The patient had no acute overnight events  • Increasing O2 needs, however she does not endorse any symptoms  • Inaccurate weights and I/O's, however she appears less edematous on exam today  • Patient denies any headache, syncope, presyncope, dyspnea, chest pain, palpitations, abdominal pain, constipation, diarrhea, or dysuria.    Reviewed: Medical History, Surgical History, Social History, Family History and Old records requested/reviewed    CURRENT MEDICATIONS     Scheduled:   Current Facility-Administered Medications   Medication Dose Route Frequency Provider Last Rate Last Admin   • magnesium oxide (MAG-OX) tablet 400 mg  400 mg Oral Once Steven Pineda MD       • magnesium oxide (MAG-OX) tablet 400 mg  400 mg Oral Once Steven Pineda MD       • potassium CHLORIDE (KLOR-CON M) gray ER tablet 40 mEq  40 mEq Oral Once Steven Pineda MD       • ketoconazole (NIZORAL) 2 % cream   Topical Daily Samina Major MD       • ceFAZolin (ANCEF) syringe 2,000 mg  2,000 mg Intravenous 3 times per day Samina Major MD       • sodium chloride (PF) 0.9 % injection 2 mL  2 mL Intracatheter 2 times per day Abraham Washington DO   2 mL at 05/03/23 0913   • enoxaparin (LOVENOX)  injection 40 mg  40 mg Subcutaneous Daily Samina Major MD   40 mg at 05/02/23 1826   • Potassium Standard Replacement Protocol (Levels 3.5 and lower)   Does not apply See Admin Instructions Samina Major MD       • Potassium Replacement (Levels 3.6 - 4)   Does not apply See Admin Instructions Samina Major MD       • Magnesium Standard Replacement Protocol   Does not apply See Admin Instructions Samina Major MD       • Phosphorus Standard Replacement Protocol   Does not apply See Admin Instructions Samina Major MD       • furosemide (LASIX INJECT) injection 40 mg  40 mg Intravenous BID Samina Major MD   40 mg at 05/03/23 0909   • nystatin (MYCOSTATIN) powder   Topical TID Samina Major MD   Given at 05/03/23 0909       Infusions:   Current Facility-Administered Medications   Medication Dose Route Frequency Provider Last Rate Last Admin       PRN:   Current Facility-Administered Medications   Medication Dose Route Frequency Provider Last Rate Last Admin   • sodium chloride 0.9 % flush bag 25 mL  25 mL Intravenous PRN Abraham Washington DO       • sodium chloride 0.9 % flush bag 25 mL  25 mL Intravenous PRN Samina Major MD       • ondansetron (ZOFRAN) injection 4 mg  4 mg Intravenous BID PRN Samina Major MD       • prochlorperazine (COMPAZINE) tablet 5 mg  5 mg Oral Q4H PRN Samina Major MD       • prochlorperazine (COMPAZINE) injection 5 mg  5 mg Intravenous Q4H PRN Samina Major MD       • acetaminophen (TYLENOL) tablet 650 mg  650 mg Oral Q4H PRN Samina Major MD       • polyethylene glycol (MIRALAX) packet 17 g  17 g Oral Daily PRN Samina Major MD       • docusate sodium-sennosides (SENOKOT S) 50-8.6 MG 2 tablet  2 tablet Oral Daily PRN Samina Major MD       • bisacodyl (DULCOLAX) suppository 10 mg  10 mg Rectal Daily PRN Samina Major MD       • magnesium hydroxide (MILK OF  MAGNESIA) 400 MG/5ML suspension 30 mL  30 mL Oral Daily PRN Samina Major MD       • aluminum-magnesium hydroxide-simethicone (MAALOX) 200-200-20 MG/5ML suspension 30 mL  30 mL Oral Q4H PRN Samina Major MD       • sodium chloride (NORMAL SALINE) 0.9 % bolus 500 mL  500 mL Intravenous PRN Samina Major MD       • nitroGLYCERIN (NITROSTAT) sublingual tablet 0.4 mg  0.4 mg Sublingual Q5 Min PRN Samina Major MD         PHYSICAL EXAM     Vital Signs:    Vital Last Value 24 Hour Range   Temperature 98 °F (36.7 °C) Temp  Min: 97.7 °F (36.5 °C)  Max: 98.4 °F (36.9 °C)   Pulse 90 Pulse  Min: 75  Max: 96   Respiratory 18 Resp  Min: 18  Max: 22   Blood Pressure 104/67 BP  Min: 102/65  Max: 157/75   Art BP   No data recorded   Pulse Oximetry 94 % SpO2  Min: 75 %  Max: 100 %     Vital Today Admitted   Weight 108.1 kg (238 lb 6.4 oz) Weight: 112.1 kg (247 lb 2.2 oz)   Height N/A Height: 5' 4.17\" (163 cm)   BMI N/A BMI (Calculated): 39.95     Physical Exam:    Constitutional: NAD, normal appearing female, unkempt.  HEENT: EOMI, no scleral icterus or conjunctival pallor, MMM, no nasal discharge, oropharynx without erythema or exudate.  Cardiovascular: RRR, normal S1/S2, no M/R/G. 3+ bilateral pitting edema.  Respiratory: CTABL, no wheezes, rales, rhonchi. No retractions or increased work of breathing.  Gastrointestinal: Soft, non-tender, non-distended, no organomegaly. Positive bowel sounds in all quadrants. No guarding or rebound tenderness.  Genitourinary: No suprapubic tenderness.  Skin: 3+ pitting BLE edema, with warmth and skin blistering. No more weeping and reduced tightness prior to before  Neurologic: A&Ox3. No gross motor or sensory deficits. No facial droop or dysarthria.  Psychiatric: Affect and mood appropriate. The patient is alert, interactive, appropriate.     LABS AND IMAGING     Intake & Output:  Date 05/03/23 0700 - 05/04/23 0659   Shift [id number] [id number] 1800-0659 24 Hour  Total   INTAKE   P.O. 150   150   Shift Total 150   150   OUTPUT   Urine 500   500   Shift Total 500   500   Weight (kg) 108.1 108.1 108.1 108.1        Intake/Output Summary (Last 24 hours) at 5/3/2023 1055  Last data filed at 5/3/2023 1049  Gross per 24 hour   Intake 530 ml   Output 1100 ml   Net -570 ml       CBC:  Recent Labs   Lab 05/03/23  0921 05/02/23  1202   WBC 7.7 11.2*   HGB 12.2 11.9*   HCT 38.4 38.4    225       CMP:  Recent Labs   Lab 05/03/23  0921 05/02/23  2300 05/02/23  1204 05/02/23  1202   SODIUM 145  --   --  138   POTASSIUM 3.6 3.3*  --  3.3*   CHLORIDE 104  --   --  105   CO2 33*  --   --  29   BUN 15  --   --  17   CREATININE 0.78  --  0.70 0.70   GLUCOSE 130*  --   --  114*   ALBUMIN  --   --   --  3.1*   GPT  --   --   --  19   ALKPT  --   --   --  111   BILIRUBIN  --   --   --  1.9*   MG 1.6*  --   --  1.7       Microbiology:  Blood Culture  No results found for this or any previous visit.  Urine Culture  Recent Labs   Lab 05/02/23  1308   USPG 1.020   UPH 5.5   UKET 40*   UPROT 30*   UGLU Negative   UBILI Negative   UROB 0.2   UWBC Large*   URBC Moderate*       Cardiac Markers:   Recent Labs   Lab 05/02/23  1202   *       BNP:   No results found    Lipid Panel:   Recent Labs   Lab 05/03/23  0921   CHOLESTEROL 145   TRIGLYCERIDE 94   HDL 57   CALCLDL 69       HbA1c:   Recent Labs   Lab 05/02/23  1202   HGBA1C 4.7       TSH:  No results found    Coagulation Studies:   Recent Labs   Lab 05/02/23  1202   PT 11.0   PTT 23   INR 1.1       All labs were reviewed. Pertinent positives and negatives are discussed in the A&P    Imaging:  XR Chest AP or PA    Result Date: 5/2/2023  Narrative: EXAM:  XR CHEST AP OR PA DATE and TIME: 5/2/2023 at 1220 hours CLINICAL HISTORY: weakness PRIOR EXAM: No prior exam available FINDINGS: Hazy opacity involving the inferior right lung presumably reflects overlying soft tissue enhancement. No convincing evidence of active pulmonary disease.  The  heart is mildly enlarged but is accentuated by technique.  No pleural effusion is identified. Gaseous prominence of left upper quadrant bowel loops is observed.     Impression: IMPRESSION: No convincing evidence of active pulmonary disease.       BEST PRACTICE BOX     Quality Indicators   AMI? No  AMI With Heart Failure with Reduced LVEF (<40%)? no  Heart failure?  Yes    LVEF assessment: awaiting results of echo/other ordered on: 5/2/23  No results found for: EF         LVEF <=35%: no, TBD  ACE/ARB for LVEF<=40%: ACEI / ARB not ordered due to Other: awaiting echo  Angiotensin Receptor Neprilysin Inhibitor for LVEF<=40%: ARNI contraindicated due to other medical reason awaiting echo  Beta blocker (evidence based) for LVEF <=40%: no - contraindicated: Beta-blocker not ordered due to other: awaiting echo  SGLT-2 Inhibitor for LVEF <=40%: No, awaiting echo  Aldosterone blocking agent/Antagonist prescribed for LVEF <=35%: no - contraindicated: optimizing other medications at this time  Lab Monitoring Follow-up: TBD  Hydralazine and Nitrate ordered for  patients w LVEF <=40%: not indicated - patient not   Hx of or current Atrial fibrillation/ Atrial flutter: no history of atrial fibrillation/atrial flutter  Post discharge follow-up within 7 days scheduled: no, to be addressed prior to discharge    Stroke measures indicated? no  Pneumonia? NO    Severe sepsis with septic shock? No    DVT/VTE Prophylaxis:  VTE Pharmacologic Prophylaxis: Yes  VTE Mechanical Prophylaxis: Yes    ASSESSMENT AND PLAN     Ingrid Rodriguez is a 64 year old female with no significant PMHx significant, who was admitted on 5/2/2023 with lower extremity weakness and swelling.    Fluid overload, suspect 2/2 to newly diagnosed right sided heart failure  Acute hypoxemic respiratory failure 2/2 the above  Non-nephrotic range proteinuria  Elevated bilirubin suspect 2/2 congestive  hepatopathy  Hypokalemia  Hypomagnesemia  NYHA class III. No prior echo on file. Endorses dyspnea on exertion, without orthopnea and PND. BNP was > 1000. CXR showed no overt pulmonary edema. Denies any recent changes in her diet, denies increased sodium intake. Endorses weight gain over the last 1.5 years. EKG with right axis deviation  Suspect patient has right sided heart failure with pulmonary hypertension given constellation of symptoms  Plan  • IV lasix 40 mg BID to achieve goal diuresis of -2 to -3 L daily  • Telemetry x48h  • Strict I/O  • Daily standing weights  • Cardiac diet, 1800 mL fluid restriction, 2 gram sodium restriction  • Monitor electrolytes q24h to keep K > 4, and Mg > 2  • Lipid panel  •   • MD wound care  • UPCR with non-nephrotic range proteinuria  o Glycohemoglobin wnl  • TTE ordered and pending  • GDMT and Cardiology consult pending the above    BLE cellulitis  Intertrigo  WBC 11.2 on admission, afebrile and without tachycardia/tachypnea. UA concerning for infection, however patient asymptomatic other than leg weakness. Lower extremities appear consistent with cellulitis  Plan  • Ketoconazole cream  • Start ancef 2 g TID    Transient hypertension, resolved  BP on admission 154/67, which subsequently normalized. No signs of end-organ damage, no prior history of hypertension.  Plan  • CTM, start antihypertensives if uncontrolled    Macrocytic anemia  Hgb 11.9 on admission. No baseline on file. Denies alcohol use   Plan  • Iron panel ordered and pending  • B12/folate  • Monitor for overt bleeding  • Continue to monitor with daily CBCs  • Transfuse if hgb < 7    Chronic conditions:    N/A    Isolation: none  Code Status: DNR ([facility])  Nutrition: PO Cardiac, 1800 cc fluid restriction    Discharge Planning     Barriers to discharge: Patient is not medically ready and needs to remain in the hospital today due to IV diuresis and echocardiogram  Anticipated discharge destination:  Shelter  Expected Discharge Date: TBD        This patient's case was discussed with the attending physician, Steven Self MD. Please see their note/addendum for additional information.   _____________________________      Samina Major MD  Internal Medicine PGY-II  5/3/2023 10:55 AM  Pager: [phone] / [phone]     Please contact the cross cover pager ([place] - [phone] / , [facility] - [phone] / ) for questions between:  Mon-Fri: 7p-7a  Sat - Sun: 11a-7a    05/03/23    I've seen, examined and discussed the patient in detail with the resident. I agree with all of the components of his note.     Yes

## 2024-09-08 NOTE — ED ADULT NURSE NOTE - HOW OFTEN DO YOU HAVE A DRINK CONTAINING ALCOHOL?
Began shift with patient on 50 mcg of Levo, 0.08 of Vaso. Will not titrate beyond this per Dr. Hi and Dr. Farmer.    2025 CRRT started  2027 After initiation of CRRT, Patient MAP dropped from 62 to 51. Dr. Farmer notified and came bedside.  2037 1 cc epi (5mcg/cc)   2039 1 cc epi & 1 L LR. Blood gas drawn, Lactic 11  2040 2 amps bicarb  2042 150 amio bolus IVP over 5 min. HR improved from 140s to 90s in atrial fibrillation  2045 1g ca cl IVP  2047 4 cc epi (5mcg/cc - total of 20 mcg of epi pushed)  2100 2 amp bicarb, 1L MD TRI performed bedside echo  2113 started epi drip at 5mcg/min  2155 maxed on epi (10mcg/min)  2129 1 amp of ca cl and 1 amp of bicarb  2218 BS of 55 - amp D50. After pushing the D50 writer looked up at patient and noticed the patient's eyes rolled back into head and head twitching back and forth rapidly. Patient presented with significant artifact on telemtrey. Dr. Farmer bedside, confirmed seizure and ordered ativan.  2220 0.5 mg  Ativan given  2227 sodium bicarb gtt started  2302 10 beats of MD Breanna notified. No new orders received.   2315 increased bicarb to 100mEq/hr, 1 amp of bicarb IVP additional  2333 5cc (500mg) of phyenl no response  0115 1 g of Ca cl (0.99) 1 amp D50 - BS 55. Notified Dr. Farmer about drip levels for pressors. Will continue to keep going with what we are at. Will not titrate up on anything else at this time. The lactic continues to climb each hour.   0220 1 g of ca cl (1.06)  0330 BS 60s, 1 amp of D50  0400 Notified Dr. Farmer of frequent D50 IVP, orders reieved to changed maintence fluids to D5 at 50/hr from LR.  0600 1 g ca cl (0.99) updated Dr. Farmer about [place] of [id number], no orders to give product at this time, lactic of 20. No new orders.   Never

## 2024-09-08 NOTE — ED PROVIDER NOTE - PRESENTATION SUGGESTIVE OF:
Provider made aware with one ampule of D50 IVP 25mg given.  Blood sugar rechecked after 15 minutes with 131 result. Bacterial Etiology

## 2024-09-08 NOTE — ED PROVIDER NOTE - OBJECTIVE STATEMENT
89 female past medical history COPD, former smoker, as needed O2, CVA, CAD status post stent, diabetes, hypertension, hyperlipidemia, lung cancer scheduled for VATS on Thursday presenting for abnormal urine test outpatient.  Patient states that patient urinalysis showed elevated white blood cell count, additionally has been endorsing generalized fatigue, chills, no measured fevers.  Has been using her as needed O2 to 2 L.  Otherwise without sore throat, confusion, abdominal pain, stool changes.  Does endorse dysuria and worse in the morning.

## 2024-09-08 NOTE — ED PROVIDER NOTE - ATTENDING CONTRIBUTION TO CARE
This is a 89 y/o F PMHx former smoker (1/2 PPD x 40yrs, quit 30 yrs ago) COPD (on home O2 PRN) , CVA (distant h/o, no deficit) CAD s/p stent (2015), IDDM, PVD (s/p femoral-popliteal bypass graft (2015) occlusion (2022)), HTN, HLD anemia, scheduled for VATS L lower lobe resection p/w chills after being recently diagnosed with UTI from PMD. Reports worried given upcoming surgery and wanted to see if needed a dose of IV antibiotics. Reports is on home oxygen as needed and SOB is chronic and unchanged. . SpO2 on RA 84-89% (improved to 94-95% on supplemental O2). EKG: Sinus tachycardia @ 101bpm. This is a 89 y/o F PMHx former smoker (1/2 PPD x 40yrs, quit 30 yrs ago) COPD (on home O2 PRN) , CVA (distant h/o, no deficit) CAD s/p stent (2015), IDDM, PVD (s/p femoral-popliteal bypass graft (2015) occlusion (2022)), HTN, HLD anemia, scheduled for VATS L lower lobe resection p/w chills after being recently diagnosed with UTI from PMD. Reports worried given upcoming surgery and wanted to see if needed a dose of IV antibiotics. Reports is on home oxygen as needed and SOB is chronic and unchanged. . SpO2 on RA 84-89% (improved to 94-95% on supplemental O2). EKG: Sinus tachycardia @ 101bpm. Plan- Labs, CXR, UA/Ucx, Ceftriaxone, Reassess

## 2024-09-09 DIAGNOSIS — J44.9 CHRONIC OBSTRUCTIVE PULMONARY DISEASE, UNSPECIFIED: ICD-10-CM

## 2024-09-09 DIAGNOSIS — C34.90 MALIGNANT NEOPLASM OF UNSPECIFIED PART OF UNSPECIFIED BRONCHUS OR LUNG: ICD-10-CM

## 2024-09-09 DIAGNOSIS — I10 ESSENTIAL (PRIMARY) HYPERTENSION: ICD-10-CM

## 2024-09-09 DIAGNOSIS — N39.0 URINARY TRACT INFECTION, SITE NOT SPECIFIED: ICD-10-CM

## 2024-09-09 DIAGNOSIS — Z29.9 ENCOUNTER FOR PROPHYLACTIC MEASURES, UNSPECIFIED: ICD-10-CM

## 2024-09-09 DIAGNOSIS — E11.9 TYPE 2 DIABETES MELLITUS WITHOUT COMPLICATIONS: ICD-10-CM

## 2024-09-09 DIAGNOSIS — A41.9 SEPSIS, UNSPECIFIED ORGANISM: ICD-10-CM

## 2024-09-09 DIAGNOSIS — I25.10 ATHEROSCLEROTIC HEART DISEASE OF NATIVE CORONARY ARTERY WITHOUT ANGINA PECTORIS: ICD-10-CM

## 2024-09-09 DIAGNOSIS — E78.5 HYPERLIPIDEMIA, UNSPECIFIED: ICD-10-CM

## 2024-09-09 LAB
ADD ON TEST-SPECIMEN IN LAB: SIGNIFICANT CHANGE UP
ADD ON TEST-SPECIMEN IN LAB: SIGNIFICANT CHANGE UP
ANION GAP SERPL CALC-SCNC: 11 MMOL/L — SIGNIFICANT CHANGE UP (ref 7–14)
BASOPHILS # BLD AUTO: 0.04 K/UL — SIGNIFICANT CHANGE UP (ref 0–0.2)
BASOPHILS NFR BLD AUTO: 0.2 % — SIGNIFICANT CHANGE UP (ref 0–2)
BLOOD GAS VENOUS COMPREHENSIVE RESULT: SIGNIFICANT CHANGE UP
BUN SERPL-MCNC: 22 MG/DL — SIGNIFICANT CHANGE UP (ref 7–23)
CALCIUM SERPL-MCNC: 8.7 MG/DL — SIGNIFICANT CHANGE UP (ref 8.4–10.5)
CHLORIDE SERPL-SCNC: 97 MMOL/L — LOW (ref 98–107)
CO2 SERPL-SCNC: 27 MMOL/L — SIGNIFICANT CHANGE UP (ref 22–31)
CREAT SERPL-MCNC: 0.88 MG/DL — SIGNIFICANT CHANGE UP (ref 0.5–1.3)
EGFR: 62 ML/MIN/1.73M2 — SIGNIFICANT CHANGE UP
EOSINOPHIL # BLD AUTO: 0.13 K/UL — SIGNIFICANT CHANGE UP (ref 0–0.5)
EOSINOPHIL NFR BLD AUTO: 0.7 % — SIGNIFICANT CHANGE UP (ref 0–6)
GLUCOSE SERPL-MCNC: 206 MG/DL — HIGH (ref 70–99)
HCT VFR BLD CALC: 27.8 % — LOW (ref 34.5–45)
HGB BLD-MCNC: 9.1 G/DL — LOW (ref 11.5–15.5)
IANC: 15.63 K/UL — HIGH (ref 1.8–7.4)
IMM GRANULOCYTES NFR BLD AUTO: 0.7 % — SIGNIFICANT CHANGE UP (ref 0–0.9)
LYMPHOCYTES # BLD AUTO: 0.76 K/UL — LOW (ref 1–3.3)
LYMPHOCYTES # BLD AUTO: 4.4 % — LOW (ref 13–44)
MAGNESIUM SERPL-MCNC: 1.8 MG/DL — SIGNIFICANT CHANGE UP (ref 1.6–2.6)
MCHC RBC-ENTMCNC: 28.8 PG — SIGNIFICANT CHANGE UP (ref 27–34)
MCHC RBC-ENTMCNC: 32.7 GM/DL — SIGNIFICANT CHANGE UP (ref 32–36)
MCV RBC AUTO: 88 FL — SIGNIFICANT CHANGE UP (ref 80–100)
MONOCYTES # BLD AUTO: 0.77 K/UL — SIGNIFICANT CHANGE UP (ref 0–0.9)
MONOCYTES NFR BLD AUTO: 4.4 % — SIGNIFICANT CHANGE UP (ref 2–14)
NEUTROPHILS # BLD AUTO: 15.63 K/UL — HIGH (ref 1.8–7.4)
NEUTROPHILS NFR BLD AUTO: 89.6 % — HIGH (ref 43–77)
NRBC # BLD: 0 /100 WBCS — SIGNIFICANT CHANGE UP (ref 0–0)
NRBC # FLD: 0 K/UL — SIGNIFICANT CHANGE UP (ref 0–0)
PHOSPHATE SERPL-MCNC: 2.6 MG/DL — SIGNIFICANT CHANGE UP (ref 2.5–4.5)
PLATELET # BLD AUTO: 346 K/UL — SIGNIFICANT CHANGE UP (ref 150–400)
POTASSIUM SERPL-MCNC: 4.2 MMOL/L — SIGNIFICANT CHANGE UP (ref 3.5–5.3)
POTASSIUM SERPL-SCNC: 4.2 MMOL/L — SIGNIFICANT CHANGE UP (ref 3.5–5.3)
RBC # BLD: 3.16 M/UL — LOW (ref 3.8–5.2)
RBC # FLD: 14.9 % — HIGH (ref 10.3–14.5)
SODIUM SERPL-SCNC: 135 MMOL/L — SIGNIFICANT CHANGE UP (ref 135–145)
WBC # BLD: 17.45 K/UL — HIGH (ref 3.8–10.5)
WBC # FLD AUTO: 17.45 K/UL — HIGH (ref 3.8–10.5)

## 2024-09-09 PROCEDURE — 99497 ADVNCD CARE PLAN 30 MIN: CPT | Mod: 25

## 2024-09-09 PROCEDURE — 99223 1ST HOSP IP/OBS HIGH 75: CPT

## 2024-09-09 PROCEDURE — 99222 1ST HOSP IP/OBS MODERATE 55: CPT

## 2024-09-09 RX ORDER — ASPIRIN 81 MG
81 TABLET, DELAYED RELEASE (ENTERIC COATED) ORAL DAILY
Refills: 0 | Status: DISCONTINUED | OUTPATIENT
Start: 2024-09-09 | End: 2024-09-17

## 2024-09-09 RX ORDER — FUROSEMIDE 40 MG
20 TABLET ORAL
Refills: 0 | Status: DISCONTINUED | OUTPATIENT
Start: 2024-09-09 | End: 2024-09-09

## 2024-09-09 RX ORDER — INSULIN GLARGINE 100 [IU]/ML
8 INJECTION, SOLUTION SUBCUTANEOUS AT BEDTIME
Refills: 0 | Status: DISCONTINUED | OUTPATIENT
Start: 2024-09-09 | End: 2024-09-15

## 2024-09-09 RX ORDER — POVIDONE, PROPYLENE GLYCOL 6.8; 3 MG/ML; MG/ML
1 LIQUID OPHTHALMIC
Refills: 0 | Status: DISCONTINUED | OUTPATIENT
Start: 2024-09-09 | End: 2024-09-17

## 2024-09-09 RX ORDER — IPRATROPIUM BROMIDE 42 UG/1
2 SPRAY, METERED NASAL
Refills: 0 | DISCHARGE

## 2024-09-09 RX ORDER — METOPROLOL TARTRATE 100 MG/1
50 TABLET ORAL
Refills: 0 | Status: DISCONTINUED | OUTPATIENT
Start: 2024-09-09 | End: 2024-09-17

## 2024-09-09 RX ORDER — DEXTROSE 15 G/33 G
25 GEL IN PACKET (GRAM) ORAL ONCE
Refills: 0 | Status: DISCONTINUED | OUTPATIENT
Start: 2024-09-09 | End: 2024-09-17

## 2024-09-09 RX ORDER — DEXTROSE 15 G/33 G
15 GEL IN PACKET (GRAM) ORAL ONCE
Refills: 0 | Status: DISCONTINUED | OUTPATIENT
Start: 2024-09-09 | End: 2024-09-17

## 2024-09-09 RX ORDER — FLUTICASONE PROPIONATE AND SALMETEROL 250; 50 UG/1; UG/1
1 POWDER RESPIRATORY (INHALATION)
Refills: 0 | Status: DISCONTINUED | OUTPATIENT
Start: 2024-09-09 | End: 2024-09-17

## 2024-09-09 RX ORDER — PANTOPRAZOLE SODIUM 40 MG
40 TABLET, DELAYED RELEASE (ENTERIC COATED) ORAL
Refills: 0 | Status: DISCONTINUED | OUTPATIENT
Start: 2024-09-09 | End: 2024-09-17

## 2024-09-09 RX ORDER — FLUTICASONE PROPIONATE 50 UG/1
1 SPRAY, METERED NASAL
Refills: 0 | Status: DISCONTINUED | OUTPATIENT
Start: 2024-09-09 | End: 2024-09-17

## 2024-09-09 RX ORDER — GLUCAGON INJECTION, SOLUTION 1 MG/.2ML
1 INJECTION, SOLUTION SUBCUTANEOUS ONCE
Refills: 0 | Status: DISCONTINUED | OUTPATIENT
Start: 2024-09-09 | End: 2024-09-17

## 2024-09-09 RX ORDER — DEXTROSE 15 G/33 G
12.5 GEL IN PACKET (GRAM) ORAL ONCE
Refills: 0 | Status: DISCONTINUED | OUTPATIENT
Start: 2024-09-09 | End: 2024-09-17

## 2024-09-09 RX ORDER — ACETAMINOPHEN 325 MG/1
1000 TABLET ORAL ONCE
Refills: 0 | Status: COMPLETED | OUTPATIENT
Start: 2024-09-09 | End: 2024-09-15

## 2024-09-09 RX ORDER — FLUTICASONE PROPIONATE AND SALMETEROL 250; 50 UG/1; UG/1
1 POWDER RESPIRATORY (INHALATION)
Refills: 0 | DISCHARGE

## 2024-09-09 RX ORDER — FUROSEMIDE 40 MG
20 TABLET ORAL EVERY 12 HOURS
Refills: 0 | Status: DISCONTINUED | OUTPATIENT
Start: 2024-09-09 | End: 2024-09-11

## 2024-09-09 RX ORDER — GABAPENTIN 100 MG
400 CAPSULE ORAL AT BEDTIME
Refills: 0 | Status: DISCONTINUED | OUTPATIENT
Start: 2024-09-09 | End: 2024-09-17

## 2024-09-09 RX ADMIN — Medication 81 MILLIGRAM(S): at 12:15

## 2024-09-09 RX ADMIN — Medication 2: at 08:45

## 2024-09-09 RX ADMIN — Medication 20 MILLIGRAM(S): at 21:13

## 2024-09-09 RX ADMIN — Medication 4: at 17:10

## 2024-09-09 RX ADMIN — INSULIN GLARGINE 8 UNIT(S): 100 INJECTION, SOLUTION SUBCUTANEOUS at 23:16

## 2024-09-09 RX ADMIN — FLUTICASONE PROPIONATE AND SALMETEROL 1 DOSE(S): 250; 50 POWDER RESPIRATORY (INHALATION) at 09:37

## 2024-09-09 RX ADMIN — Medication 5: at 13:02

## 2024-09-09 RX ADMIN — FLUTICASONE PROPIONATE 1 SPRAY(S): 50 SPRAY, METERED NASAL at 17:22

## 2024-09-09 RX ADMIN — Medication 20 MILLIGRAM(S): at 14:05

## 2024-09-09 RX ADMIN — Medication 40 MILLIGRAM(S): at 06:47

## 2024-09-09 RX ADMIN — Medication 20 MILLIGRAM(S): at 23:16

## 2024-09-09 RX ADMIN — Medication 1: at 23:34

## 2024-09-09 RX ADMIN — FLUTICASONE PROPIONATE AND SALMETEROL 1 DOSE(S): 250; 50 POWDER RESPIRATORY (INHALATION) at 21:13

## 2024-09-09 RX ADMIN — Medication 400 MILLIGRAM(S): at 21:13

## 2024-09-09 RX ADMIN — Medication 100 MILLIGRAM(S): at 23:15

## 2024-09-09 NOTE — H&P ADULT - HISTORY OF PRESENT ILLNESS
89 -year-old female with COPD on 2L home O2 PRN, CVA (remote, no deficits), CAD s/p stent ('15), IDDM2, PVD s/p fem-pop bypass ('15) w/occlusion ('22), HLD, HTN, anemia, presenting with (+)UA noted during presurgical testing. Patient is scheduled to have a VATS LLL resection on Thursday. Patient reports going to her PCP and was prescribed abx, of which she took 1 dose prior to presenting t the ED at her daughter's behest. She notes having increased frequency of urination and dysuria for the past 1 week. She has a history of prior UTIs, last UTI 1 month ago. She notes chills but denies any fevers.    In the ED VS:  98.7  98-99  103-107/52-67  25  96-98% on 2-6L NC, received Ceftriaxone 1g IVPB x1

## 2024-09-09 NOTE — CONSULT NOTE ADULT - ASSESSMENT
89 y/o F PMhx COPD on 2L home O2 PRN, CVA (remote, no deficits), CAD s/p stent, DM II, PVD s/p fem-pop bypass ('15) w/occlusion ('22), HTN who presented w/ dysuria and rigors    UTI, rigors  reports dysuria, frequency, urgency  denies flank pain  prior urine cultures reviewed  feels better today    penicillin allergy  reaction is rash  tolerating ceftriaxone without issues    Recommendations  c/w ceftriaxone 1g daily  f/u urine culture  check blood cultures  trend temps, wbc    Aldo Verduzco M.D.  [facility], Division of Infectious Diseases  [phone]  After 5pm on weekdays and all day on weekends - please call [phone]  89 y/o F PMhx COPD on 2L home O2 PRN, CVA (remote, no deficits), CAD s/p stent, DM II, PVD s/p fem-pop bypass ('15) w/occlusion ('22), HTN who presented w/ dysuria and rigors    UTI, rigors  reports dysuria, frequency, urgency  denies flank pain  prior urine cultures reviewed  feels better today    acute on chronic hypoxic resp failure  CXR w/ pulmonary edema    penicillin allergy  reaction is rash  tolerating ceftriaxone without issues    Recommendations  c/w ceftriaxone 1g daily  f/u urine culture  check blood cultures  diuresis per cards, pulm  trend temps, wbc    Aldo Verduzco M.D.  [facility], Division of Infectious Diseases  [phone]  After 5pm on weekdays and all day on weekends - please call [phone]

## 2024-09-09 NOTE — H&P ADULT - ASSESSMENT
89 -year-old female with COPD on 2L home O2 PRN, CVA (remote, no deficits), CAD s/p stent ('15), IDDM2, PVD s/p fem-pop bypass ('15) w/occlusion ('22), HLD, HTN, anemia, presenting with (+)UA noted during presurgical testing.

## 2024-09-09 NOTE — PATIENT PROFILE ADULT - FALL HARM RISK - HARM RISK INTERVENTIONS

## 2024-09-09 NOTE — H&P ADULT - PROBLEM SELECTOR PLAN 2
c/w basal insulin, decreased from 10units of deglutec to 8units of glargine  monitor FS QAC/HS w/ISS  recent A1c 6.6%  reports FS in 70s to low 100s, no recent hypoglycemic events, adherent with home regimen, took prior to arrival to ED last night  hold home metformin

## 2024-09-09 NOTE — H&P ADULT - PROBLEM SELECTOR PLAN 7
plan for VATS LLL resection  I reached out to [facility] this morning so as to relay the info to Dr. Lazar

## 2024-09-09 NOTE — ED ADULT NURSE REASSESSMENT NOTE - NS ED NURSE REASSESS COMMENT FT1
Pt noted to be in no acute distress. Denies SOB, chest pain, headache, dizziness. VS as charted. RR even and unlabored. Pt safety maintained. Pending bed assignment.

## 2024-09-09 NOTE — H&P ADULT - PROBLEM SELECTOR PROBLEM 2
Type 2 diabetes mellitus, with long-term current use of insulin no loss of consciousness, no gait abnormality, no headache, no sensory deficits, and no weakness.

## 2024-09-09 NOTE — H&P ADULT - NSHPPHYSICALEXAM_GEN_ALL_CORE
Vital Signs Last 24 Hrs  T(C): 36.8 (09 Sep 2024 05:25), Max: 37.1 (09 Sep 2024 00:04)  T(F): 98.3 (09 Sep 2024 05:25), Max: 98.7 (09 Sep 2024 00:04)  HR: 97 (09 Sep 2024 05:25) (89 - 104)  BP: 115/50 (09 Sep 2024 05:25) (103/52 - 120/48)  RR: 20 (09 Sep 2024 05:25) (18 - 30)  SpO2: 97% (09 Sep 2024 05:25) (96% - 99%)    Parameters below as of 09 Sep 2024 05:25  Patient On (Oxygen Delivery Method): nasal cannula  O2 Flow (L/min): 5    PHYSICAL EXAM:  GENERAL: NAD  HEAD:  Atraumatic, Normocephalic  EYES: PERRL, conjunctiva and sclera clear  NECK: Supple, No JVD  CHEST/LUNG: Clear to auscultation bilaterally; No wheezes, rales or rhonchi; normal work of breathing, speaking in full sentences  HEART: Regular rate and rhythm; No murmurs, rubs, or gallops, (+)S1, S2  ABDOMEN: Soft, Nontender, Nondistended; Normal Bowel sounds  EXTREMITIES:  2+ Peripheral Pulses, No clubbing, cyanosis, or edema  PSYCH: normal mood and affect, A&Ox3  NEUROLOGY: no focal neuro deficits  SKIN: No rashes or lesions

## 2024-09-09 NOTE — CONSULT NOTE ADULT - ASSESSMENT
89 -year-old female with COPD on 2L home O2 PRN, CVA (remote, no deficits), CAD s/p stent ('15), IDDM2, PVD s/p fem-pop bypass ('15) w/occlusion ('22), HLD, HTN, anemia, presenting with (+)UA noted during presurgical testing. Patient is scheduled to have a VATS LLL resection on Thursday. Patient reports going to her PCP and was prescribed abx, of which she took 1 dose prior to presenting t the ED at her daughter's behest. She notes having increased frequency of urination and dysuria for the past 1 week. She has a history of prior UTIs, last UTI 1 month ago. She notes chills but denies any fevers.  In the ED VS:  98.7  98-99  103-107/52-67  25  96-98% on 2-6L NC, received Ceftriaxone 1g IVPB x1 (09 Sep 2024 07:07):  She has copd and uses oxygen at home on an prn basis:  she felt feverish and chills at home and denies any sob:  on 32 L of oxygen:   -no wheezing  reportedly she had pet scan chest as an outpt and she has malignancy ;  she is scheduled for vats and left lower lobectomy on coming 12th , but admitted here for fever,  chills      UTI;  COPD;  PULMONARY NODULE: SUSPECTED MALIGNANCY ;   cad/s/p pci  DM2  PVD:  S/P FEM-POP BYPASS  HTN  HLD  ANEMIA      UTI;  -sheis being  treated for uti with ceftriaone;   -likely reason for her chills at home;   COPD;  -she looks OK;   -no sob:  or wheezing   --VBG seems to be doing  ok   -on Adviar  PULMONARY NODULE: SUSPECTED MALIGNANCY ;   -she had 1.5 cm smnoud l e in left lower lobe;   -she says she had pet scan in April 2024;  showed; No acute pulmonary embolism. Mucoid secretions with impaction right middle lobe and lower lobe bronchi. Partial right middle lobe atelectasis, new from prior exam. Persistent atelectasis/airspace consolidation right lower lobe. Few scattered groundglass opacities left upper lobe, could reflect acute respiratoryinfection. Probable chronic interstitial lung disease superimposed on background of emphysema. Stable 1.5 cm nodule left lower lobe.  -now she is scheduled fr vats left lower lung resection/;  given postive pet scan as an outpt:   -cxr this time also suggests pulm edema;  and her EF was normal though ? could the changes e due t ILD?   -cont aDVAIR here   cad/s/p pci  -cnt current meds;   DM2  -monitor and control   PVD:  S/P FEM-POP BYPASS  -cont current meds!  HTN  -controlled  HLD  -atorvastatin 20 milliGRAM(s) Oral at bedtime  ANEMIA  -She is mildly anemic ; monitor and transfuse as needed dw team

## 2024-09-09 NOTE — CONSULT NOTE ADULT - SUBJECTIVE AND OBJECTIVE BOX
[facility], Division of Infectious Diseases  KRISSY Luong S. Shah, Y. Patel, G. Dax  [phone]  ([phone] - weekdays after 5pm and weekends)    FEDERICO DOS SANTOS  89y, Female  [id number]    HPI--  HPI:  89 y/o F PMhx COPD on 2L home O2 PRN, CVA (remote, no deficits), CAD s/p stent, DM II, PVD s/p fem-pop bypass ('15) w/occlusion ('22), HTN who presented w/ dysuria and rigors. Patient reports having dysuria for 1-2 weeks. Also endorses frequency and urgency. Did not thing symptoms were as bad as her usual UTIs so she waited until she saw her PCP for presurgical evaluation. UA was positive and she was prescribed macrobid on 9/7 of which she took 1 dose prior to admission. Reports she had rigors and was brought to ED as her daughter was concerned.   Denies fever, chest pain, SOB, abd pain, n/v, diarrhea, flank pain.  She was started on ceftriaxone.  Of note she is scheduled to have a VATS LLL resection on Thursday.    ROS: 10 point review of systems completed, pertinent positives and negatives as per HPI.    Allergies: penicillin (Unknown)  macrolide antibiotics (Other)  Biaxin (Unknown)    PMH -- HTN (hypertension)    CVA (cerebral vascular accident)    HLD (hyperlipidemia)    DM2 (diabetes mellitus, type 2)    Anemia    PVD (peripheral vascular disease)    Solitary pulmonary nodule    Chronic diastolic heart failure    H/O hearing loss    History of COPD    CAD (coronary artery disease)      PSH -- CAD (coronary artery disease)    S/P vascular bypass    Bilateral cataracts    H/O rotator cuff surgery      FH -- FH: heart disease (Child)      Social History -- former smoker, denies illicit drug use    Physical Exam--  Vital Signs Last 24 Hrs  T(F): 98.3 (09 Sep 2024 05:25), Max: 98.7 (09 Sep 2024 00:04)  HR: 97 (09 Sep 2024 05:25) (89 - 104)  BP: 115/50 (09 Sep 2024 05:25) (103/52 - 120/48)  RR: 20 (09 Sep 2024 05:25) (18 - 30)  SpO2: 97% (09 Sep 2024 05:25) (96% - 99%)  General: nontoxic-appearing, no acute distress  HEENT: anicteric  Lungs: Clear bilaterally without rales  Heart: S1, S2, normal rate.   Abdomen: Soft. Nondistended. Nontender. no suprapubic tenderness  Neuro: AAOx3, no obvious focal deficits   Back: No costovertebral angle tenderness.  Extremities: No LE edema.   Skin: Warm. Dry. No rash.  Psychiatric: Appropriate affect and mood for situation.     Laboratory & Imaging Data--  CBC:                       9.1    17.45 )-----------( 346      ( 09 Sep 2024 06:35 )             27.8     WBC Count: 17.45 K/uL (09-09-24 @ 06:35)  WBC Count: 15.09 K/uL (09-08-24 @ 21:17)  WBC Count: 5.75 K/uL (09-05-24 @ 11:48)    CMP: 09-09    135  |  97<L>  |  22  ----------------------------<  206<H>  4.2   |  27  |  0.88    Ca    8.7      09 Sep 2024 06:35  Phos  2.6     09-09  Mg     1.80     09-09    TPro  7.1  /  Alb  4.0  /  TBili  0.3  /  DBili  x   /  AST  17  /  ALT  11  /  AlkPhos  102  09-08    LIVER FUNCTIONS - ( 08 Sep 2024 21:17 )  Alb: 4.0 g/dL / Pro: 7.1 g/dL / ALK PHOS: 102 U/L / ALT: 11 U/L / AST: 17 U/L / GGT: x           Urinalysis Basic - ( 09 Sep 2024 06:35 )    Color: x / Appearance: x / SG: x / pH: x  Gluc: 206 mg/dL / Ketone: x  / Bili: x / Urobili: x   Blood: x / Protein: x / Nitrite: x   Leuk Esterase: x / RBC: x / WBC x   Sq Epi: x / Non Sq Epi: x / Bacteria: x      Microbiology:     Urinalysis with Rflx Culture (collected 09-08-24 @ 21:17)        Radiology--  ***  Active Medications--  artificial tears (preservative free) Ophthalmic Solution 1 Drop(s) Both EYES two times a day PRN  aspirin enteric coated 81 milliGRAM(s) Oral daily  atorvastatin 20 milliGRAM(s) Oral at bedtime  cefTRIAXone   IVPB 1000 milliGRAM(s) IV Intermittent every 24 hours  dextrose 5%. 1000 milliLiter(s) IV Continuous <Continuous>  dextrose 5%. 1000 milliLiter(s) IV Continuous <Continuous>  dextrose 50% Injectable 12.5 Gram(s) IV Push once  dextrose 50% Injectable 25 Gram(s) IV Push once  dextrose 50% Injectable 25 Gram(s) IV Push once  dextrose Oral Gel 15 Gram(s) Oral once PRN  fluticasone propionate 50 MICROgram(s)/spray Nasal Spray 1 Spray(s) Both Nostrils two times a day  fluticasone propionate/ salmeterol 250-50 MICROgram(s) Diskus 1 Dose(s) Inhalation two times a day  furosemide    Tablet 20 milliGRAM(s) Oral two times a day  gabapentin 400 milliGRAM(s) Oral at bedtime  glucagon  Injectable 1 milliGRAM(s) IntraMuscular once  insulin glargine Injectable (LANTUS) 8 Unit(s) SubCutaneous at bedtime  insulin lispro (ADMELOG) corrective regimen sliding scale   SubCutaneous at bedtime  insulin lispro (ADMELOG) corrective regimen sliding scale   SubCutaneous three times a day before meals  metoprolol tartrate 50 milliGRAM(s) Oral two times a day  pantoprazole    Tablet 40 milliGRAM(s) Oral before breakfast    Antimicrobials:   cefTRIAXone   IVPB 1000 milliGRAM(s) IV Intermittent every 24 hours    Immunologic:

## 2024-09-09 NOTE — PATIENT PROFILE ADULT - NSPROPTRIGHTCAREGIVER_GEN_A_NUR
Patient returning call inquiring on appointment with Dr. Connors if the anti-itch creams do not work.     Patient has been advised to try the creams per Jonna Dowling's recommendations, if the creams do not work or symptoms worsen, patient may reach out for a visit to discuss treatment with Dr. Connors.     Patient verbalized understanding.    declines

## 2024-09-09 NOTE — H&P ADULT - NSHPREVIEWOFSYSTEMS_GEN_ALL_CORE
REVIEW OF SYSTEMS:    CONSTITUTIONAL: No weakness, fevers or chills  EYES/ENT: No visual changes; No dysphagia; No sore throat; No rhinorrhea; No sinus pain/pressure  NECK: No pain or stiffness  RESPIRATORY: (+) dry cough; No wheezing or hemoptysis; No shortness of breath  CARDIOVASCULAR: No chest pain or palpitations; No lower extremity edema  GASTROINTESTINAL: No abdominal or epigastric pain. No nausea, vomiting, or hematemesis; No diarrhea or constipation. No melena or hematochezia.  GENITOURINARY: (+) dysuria, frequency; No hematuria  NEUROLOGICAL: (+) neuropathy in legs; No weakness; No HA; (+) LH/dizziness earlier, resolved  MSK: ambulates with cane PRN, No falls  SKIN: No itching, burning, rashes, or lesions   All other review of systems is negative unless indicated above.

## 2024-09-09 NOTE — CONSULT NOTE ADULT - SUBJECTIVE AND OBJECTIVE BOX
HPI:  89 -year-old female with COPD on 2L home O2 PRN, CVA (remote, no deficits), CAD s/p stent ('15), IDDM2, PVD s/p fem-pop bypass ('15) w/occlusion ('22), HLD, HTN, anemia, presenting with (+)UA noted during presurgical testing. Patient is scheduled to have a VATS LLL resection on Thursday. Patient reports going to her PCP and was prescribed abx, of which she took 1 dose prior to presenting t the ED at her daughter's behest. She notes having increased frequency of urination and dysuria for the past 1 week. She has a history of prior UTIs, last UTI 1 month ago. She notes chills but denies any fevers.    In the ED VS:  98.7  98-99  103-107/52-67  25  96-98% on 2-6L NC, received Ceftriaxone 1g IVPB x1 (09 Sep 2024 07:07)    Thoracic:  89 -year-old female with COPD on 2L home O2 PRN, CVA (remote, no deficits), CAD s/p stent ('15), IDDM2, PVD s/p fem-pop bypass ('15) w/occlusion ('22), HLD, HTN, anemia, presenting with (+)UA noted during presurgical testing for a LVATS Lung Resection on Thursday, 9/12/24 with Dr. Lazar. She states that her only symptoms included feeling cold, chills and some rigors, as well as dysuria/frequency. She also admits to mild cough. Otherwise, she denies other signs/symptoms of infection included lethargy, confusion/ams, fevers, n/v/d. At bedside, patient is seen resting comfortably on 5L via NC, in NAD. She was placed on Ceftriaxone for UTI and states that she is feeling significantly better. She denies other acute complaints or issues at this time.    PAST MEDICAL & SURGICAL HISTORY:  HTN (hypertension)      CVA (cerebral vascular accident)      HLD (hyperlipidemia)      DM2 (diabetes mellitus, type 2)      Anemia      PVD (peripheral vascular disease)      Solitary pulmonary nodule      Chronic diastolic heart failure      H/O hearing loss      History of COPD      CAD (coronary artery disease)      CAD (coronary artery disease)  cardiac stent      S/P vascular bypass  left leg      Bilateral cataracts      H/O rotator cuff surgery          REVIEW OF SYSTEMS  As noted in HPI.    MEDICATIONS  (STANDING):  aspirin enteric coated 81 milliGRAM(s) Oral daily  atorvastatin 20 milliGRAM(s) Oral at bedtime  cefTRIAXone   IVPB 1000 milliGRAM(s) IV Intermittent every 24 hours  dextrose 5%. 1000 milliLiter(s) (100 mL/Hr) IV Continuous <Continuous>  dextrose 5%. 1000 milliLiter(s) (50 mL/Hr) IV Continuous <Continuous>  dextrose 50% Injectable 25 Gram(s) IV Push once  dextrose 50% Injectable 25 Gram(s) IV Push once  dextrose 50% Injectable 12.5 Gram(s) IV Push once  fluticasone propionate 50 MICROgram(s)/spray Nasal Spray 1 Spray(s) Both Nostrils two times a day  fluticasone propionate/ salmeterol 250-50 MICROgram(s) Diskus 1 Dose(s) Inhalation two times a day  furosemide    Tablet 20 milliGRAM(s) Oral two times a day  gabapentin 400 milliGRAM(s) Oral at bedtime  glucagon  Injectable 1 milliGRAM(s) IntraMuscular once  insulin glargine Injectable (LANTUS) 8 Unit(s) SubCutaneous at bedtime  insulin lispro (ADMELOG) corrective regimen sliding scale   SubCutaneous at bedtime  insulin lispro (ADMELOG) corrective regimen sliding scale   SubCutaneous three times a day before meals  metoprolol tartrate 50 milliGRAM(s) Oral two times a day  pantoprazole    Tablet 40 milliGRAM(s) Oral before breakfast    MEDICATIONS  (PRN):  artificial tears (preservative free) Ophthalmic Solution 1 Drop(s) Both EYES two times a day PRN Dry Eyes  dextrose Oral Gel 15 Gram(s) Oral once PRN Blood Glucose LESS THAN 70 milliGRAM(s)/deciliter      Allergies    penicillin (Unknown)  macrolide antibiotics (Other)  Biaxin (Unknown)    Intolerances        SOCIAL HISTORY:  Occupation:  Smoking Hx:   Etoh Hx:   IVDA Hx:     FAMILY HISTORY:  FH: heart disease (Child)        Vital Signs Last 24 Hrs  T(C): 36.8 (09 Sep 2024 05:25), Max: 37.1 (09 Sep 2024 00:04)  T(F): 98.3 (09 Sep 2024 05:25), Max: 98.7 (09 Sep 2024 00:04)  HR: 97 (09 Sep 2024 05:25) (89 - 104)  BP: 115/50 (09 Sep 2024 05:25) (103/52 - 120/48)  BP(mean): --  RR: 20 (09 Sep 2024 05:25) (18 - 30)  SpO2: 97% (09 Sep 2024 05:25) (96% - 99%)    Parameters below as of 09 Sep 2024 05:25  Patient On (Oxygen Delivery Method): nasal cannula  O2 Flow (L/min): 5      PHYSICAL EXAM:  General: Well appearing/appears stated age, NAD  Eyes: Vision grossly intact, EOMI  ENMT: Hearing grossly intact. Airway grossly patent, no stridor  Neck: Neck supple, trachea midline  Cardiovascular: SR/ST  Respiratory: Breathing comfortably on 5L, no respiratory distress, no accessory muscle use. Cough noted while at bedside.  Gastrointestinal: Soft, NT, ND  Extremities: BARRETT x4  Vascular: Well perfused  Neurological: Nonfocal, no deficits  Psychiatric: Appropriate affect        LABS:                        9.1    17.45 )-----------( 346      ( 09 Sep 2024 06:35 )             27.8     09-09    135  |  97<L>  |  22  ----------------------------<  206<H>  4.2   |  27  |  0.88    Ca    8.7      09 Sep 2024 06:35  Phos  2.6     09-09  Mg     1.80     09-09    TPro  7.1  /  Alb  4.0  /  TBili  0.3  /  DBili  x   /  AST  17  /  ALT  11  /  AlkPhos  102  09-08      Urinalysis Basic - ( 09 Sep 2024 06:35 )    Color: x / Appearance: x / SG: x / pH: x  Gluc: 206 mg/dL / Ketone: x  / Bili: x / Urobili: x   Blood: x / Protein: x / Nitrite: x   Leuk Esterase: x / RBC: x / WBC x   Sq Epi: x / Non Sq Epi: x / Bacteria: x        RADIOLOGY & ADDITIONAL STUDIES:  < from: Xray Chest 2 Views PA/Lat (09.08.24 @ 22:33) >  ******PRELIMINARY REPORT******      ******PRELIMINARY REPORT******         ACC: [id number] EXAM:  XR CHEST PA LAT 2V   ORDERED BY: TOM GALEANO     PROCEDURE DATE:  09/08/2024    ******PRELIMINARY REPORT******      ******PRELIMINARY REPORT******           INTERPRETATION:  EXAMINATION: XR CHEST PA AND LATERAL    CLINICAL INDICATION: Shortness of Breath    TECHNIQUE: 2 views; Frontal and lateral views of the chest were obtained   from 9/8/2024 10:33 PM    COMPARISON: Chest x-ray 4/26/2024    FINDINGS:    The heart size is not accurately assessed on this projection.  Bilateral patchy airspace and interstitial lung opacities, with lower   lobe predominance. Interlobular septal thickening with Luiza B lines are   appreciated. Small bilateral pleural effusions.  There is no pneumothorax.  . Right axillary surgical clips.    IMPRESSION:  Cardiogenic pulmonary edema        ******PRELIMINARY REPORT******      ******PRELIMINARY REPORT******       REJI LEONARD MD; Resident Radiologist  This document is a PRELIMINARY interpretation and is pending final   attending approval. Sep  9 2024  1:39AM    < end of copied text >      ASSESSMENT:   89 y.o. female with COPD on 2L home O2 PRN, CVA (remote, no deficits), CHF, CAD s/p stent ('15), IDDM2, PVD s/p fem-pop bypass ('15) w/occlusion ('22), HLD, HTN, anemia, presenting with (+)UA noted during presurgical testing for a LVATS Lung Resection on Thursday, 9/12/24 with Dr. Lazar. Patient on Ceftriaxone for + UA.    - c/w Ceftriaxone  - Daily CXR  - Daily labs (CBC/BMP)  - Pending OR Thursday w/ Dr. Lazar for LVATS, Lung Resection  - Care per primary team  - Will discuss case with Dr. Lazar  - Further recs to follow.     HPI:  89 -year-old female with COPD on 2L home O2 PRN, CVA (remote, no deficits), CAD s/p stent ('15), IDDM2, PVD s/p fem-pop bypass ('15) w/occlusion ('22), HLD, HTN, anemia, presenting with (+)UA noted during presurgical testing. Patient is scheduled to have a VATS LLL resection on Thursday. Patient reports going to her PCP and was prescribed abx, of which she took 1 dose prior to presenting t the ED at her daughter's behest. She notes having increased frequency of urination and dysuria for the past 1 week. She has a history of prior UTIs, last UTI 1 month ago. She notes chills but denies any fevers.    In the ED VS:  98.7  98-99  103-107/52-67  25  96-98% on 2-6L NC, received Ceftriaxone 1g IVPB x1 (09 Sep 2024 07:07)    Thoracic:  89 -year-old female with COPD on 2L home O2 PRN, CVA (remote, no deficits), CAD s/p stent ('15), IDDM2, PVD s/p fem-pop bypass ('15) w/occlusion ('22), HLD, HTN, anemia, presenting with (+)UA noted during presurgical testing for a LVATS Lung Resection on Thursday, 9/12/24 with Dr. Lazar. She states that her only symptoms included feeling cold, chills and some rigors, as well as dysuria/frequency. She also admits to mild cough. Otherwise, she denies other signs/symptoms of infection included lethargy, confusion/ams, fevers, n/v/d. At bedside, patient is seen resting comfortably on 5L via NC, in NAD. She was placed on Ceftriaxone for UTI and states that she is feeling significantly better. She denies other acute complaints or issues at this time.    PAST MEDICAL & SURGICAL HISTORY:  HTN (hypertension)      CVA (cerebral vascular accident)      HLD (hyperlipidemia)      DM2 (diabetes mellitus, type 2)      Anemia      PVD (peripheral vascular disease)      Solitary pulmonary nodule      Chronic diastolic heart failure      H/O hearing loss      History of COPD      CAD (coronary artery disease)      CAD (coronary artery disease)  cardiac stent      S/P vascular bypass  left leg      Bilateral cataracts      H/O rotator cuff surgery          REVIEW OF SYSTEMS  As noted in HPI.    MEDICATIONS  (STANDING):  aspirin enteric coated 81 milliGRAM(s) Oral daily  atorvastatin 20 milliGRAM(s) Oral at bedtime  cefTRIAXone   IVPB 1000 milliGRAM(s) IV Intermittent every 24 hours  dextrose 5%. 1000 milliLiter(s) (100 mL/Hr) IV Continuous <Continuous>  dextrose 5%. 1000 milliLiter(s) (50 mL/Hr) IV Continuous <Continuous>  dextrose 50% Injectable 25 Gram(s) IV Push once  dextrose 50% Injectable 25 Gram(s) IV Push once  dextrose 50% Injectable 12.5 Gram(s) IV Push once  fluticasone propionate 50 MICROgram(s)/spray Nasal Spray 1 Spray(s) Both Nostrils two times a day  fluticasone propionate/ salmeterol 250-50 MICROgram(s) Diskus 1 Dose(s) Inhalation two times a day  furosemide    Tablet 20 milliGRAM(s) Oral two times a day  gabapentin 400 milliGRAM(s) Oral at bedtime  glucagon  Injectable 1 milliGRAM(s) IntraMuscular once  insulin glargine Injectable (LANTUS) 8 Unit(s) SubCutaneous at bedtime  insulin lispro (ADMELOG) corrective regimen sliding scale   SubCutaneous at bedtime  insulin lispro (ADMELOG) corrective regimen sliding scale   SubCutaneous three times a day before meals  metoprolol tartrate 50 milliGRAM(s) Oral two times a day  pantoprazole    Tablet 40 milliGRAM(s) Oral before breakfast    MEDICATIONS  (PRN):  artificial tears (preservative free) Ophthalmic Solution 1 Drop(s) Both EYES two times a day PRN Dry Eyes  dextrose Oral Gel 15 Gram(s) Oral once PRN Blood Glucose LESS THAN 70 milliGRAM(s)/deciliter      Allergies    penicillin (Unknown)  macrolide antibiotics (Other)  Biaxin (Unknown)    Intolerances        SOCIAL HISTORY:  Occupation:  Smoking Hx:   Etoh Hx:   IVDA Hx:     FAMILY HISTORY:  FH: heart disease (Child)        Vital Signs Last 24 Hrs  T(C): 36.8 (09 Sep 2024 05:25), Max: 37.1 (09 Sep 2024 00:04)  T(F): 98.3 (09 Sep 2024 05:25), Max: 98.7 (09 Sep 2024 00:04)  HR: 97 (09 Sep 2024 05:25) (89 - 104)  BP: 115/50 (09 Sep 2024 05:25) (103/52 - 120/48)  BP(mean): --  RR: 20 (09 Sep 2024 05:25) (18 - 30)  SpO2: 97% (09 Sep 2024 05:25) (96% - 99%)    Parameters below as of 09 Sep 2024 05:25  Patient On (Oxygen Delivery Method): nasal cannula  O2 Flow (L/min): 5      PHYSICAL EXAM:  General: Well appearing/appears stated age, NAD  Eyes: Vision grossly intact, EOMI  ENMT: Hearing grossly intact. Airway grossly patent, no stridor  Neck: Neck supple, trachea midline  Cardiovascular: SR/ST  Respiratory: Breathing comfortably on 5L, no respiratory distress, no accessory muscle use. Cough noted while at bedside.  Gastrointestinal: Soft, NT, ND  Extremities: BARRETT x4  Vascular: Well perfused  Neurological: Nonfocal, no deficits  Psychiatric: Appropriate affect        LABS:                        9.1    17.45 )-----------( 346      ( 09 Sep 2024 06:35 )             27.8     09-09    135  |  97<L>  |  22  ----------------------------<  206<H>  4.2   |  27  |  0.88    Ca    8.7      09 Sep 2024 06:35  Phos  2.6     09-09  Mg     1.80     09-09    TPro  7.1  /  Alb  4.0  /  TBili  0.3  /  DBili  x   /  AST  17  /  ALT  11  /  AlkPhos  102  09-08      Urinalysis Basic - ( 09 Sep 2024 06:35 )    Color: x / Appearance: x / SG: x / pH: x  Gluc: 206 mg/dL / Ketone: x  / Bili: x / Urobili: x   Blood: x / Protein: x / Nitrite: x   Leuk Esterase: x / RBC: x / WBC x   Sq Epi: x / Non Sq Epi: x / Bacteria: x        RADIOLOGY & ADDITIONAL STUDIES:  < from: Xray Chest 2 Views PA/Lat (09.08.24 @ 22:33) >  ******PRELIMINARY REPORT******      ******PRELIMINARY REPORT******         ACC: [id number] EXAM:  XR CHEST PA LAT 2V   ORDERED BY: TOM GALEANO     PROCEDURE DATE:  09/08/2024    ******PRELIMINARY REPORT******      ******PRELIMINARY REPORT******           INTERPRETATION:  EXAMINATION: XR CHEST PA AND LATERAL    CLINICAL INDICATION: Shortness of Breath    TECHNIQUE: 2 views; Frontal and lateral views of the chest were obtained   from 9/8/2024 10:33 PM    COMPARISON: Chest x-ray 4/26/2024    FINDINGS:    The heart size is not accurately assessed on this projection.  Bilateral patchy airspace and interstitial lung opacities, with lower   lobe predominance. Interlobular septal thickening with Luiza B lines are   appreciated. Small bilateral pleural effusions.  There is no pneumothorax.  . Right axillary surgical clips.    IMPRESSION:  Cardiogenic pulmonary edema        ******PRELIMINARY REPORT******      ******PRELIMINARY REPORT******       REJI LEONARD MD; Resident Radiologist  This document is a PRELIMINARY interpretation and is pending final   attending approval. Sep  9 2024  1:39AM    < end of copied text >      ASSESSMENT:   89 y.o. female with COPD on 2L home O2 PRN, CVA (remote, no deficits), CHF, CAD s/p stent ('15), IDDM2, PVD s/p fem-pop bypass ('15) w/occlusion ('22), HLD, HTN, anemia, presenting with (+)UA noted during presurgical testing for a LVATS Lung Resection on Thursday, 9/12/24 with Dr. Lazar. Patient on Ceftriaxone for + UA.    - c/w Ceftriaxone  - Daily CXR  - Daily labs (CBC/BMP)  - Pending OR Thursday w/ Dr. Lazar for LVATS, Lung Resection  - Care per primary team  - Will discuss case with Dr. Lazar  - Further recs to follow.    ***Update: 9/9/24 17:25***  - d/t UTI, age and white count, pt will need to be rescheduled for OR  - Per Dr. Lazar, patient to continue abx and will be scheduled for OR sometime next week.  Above per Dr. Lazar

## 2024-09-09 NOTE — CONSULT NOTE ADULT - ASSESSMENT
89 y/o F w/COPD, former smoker, as needed O2, CVA, CAD status post stent, DM, HTN, HLD, lung cancer scheduled for VATS on Thursday presenting w CHF and possible UTI    #HFpEF  -recent TTW noted  -normal LVEF and mild AS  -change lasix to 20mg IV q12hrs    #CAD s/p PCI  -stable  -cont asa, statin, BB    #Lung CA  -preop for LLL VATS w thoracic on sunday  -plan to optimize volume status    #Hypoxia  -mulifactorial  -diureses as above  -pulm eval    #UTI  -iv abx per med      75 minutes spent on total encounter; more than 50% of the visit was spent counseling and/or coordinating care by the attending physician.

## 2024-09-09 NOTE — H&P ADULT - NSICDXFAMILYHX_GEN_ALL_CORE_FT
FAMILY HISTORY:  Child  Still living? No  FH: heart disease, Age at diagnosis: Age Unknown     Female

## 2024-09-09 NOTE — CONSULT NOTE ADULT - SUBJECTIVE AND OBJECTIVE BOX
09-09-24 @ 10:06    Patient is a 89y old  Female who presents with a chief complaint of UTI (09 Sep 2024 08:47)      HPI:  89 -year-old female with COPD on 2L home O2 PRN, CVA (remote, no deficits), CAD s/p stent ('15), IDDM2, PVD s/p fem-pop bypass ('15) w/occlusion ('22), HLD, HTN, anemia, presenting with (+)UA noted during presurgical testing. Patient is scheduled to have a VATS LLL resection on Thursday. Patient reports going to her PCP and was prescribed abx, of which she took 1 dose prior to presenting t the ED at her daughter's behest. She notes having increased frequency of urination and dysuria for the past 1 week. She has a history of prior UTIs, last UTI 1 month ago. She notes chills but denies any fevers.    In the ED VS:  98.7  98-99  103-107/52-67  25  96-98% on 2-6L NC, received Ceftriaxone 1g IVPB x1 (09 Sep 2024 07:07):    she has copd and uses oxygen at home on an prn basis:  she felt feverish and chills at home and denies any sob:  on 32 L of oxygen:   -no wheezing  reportedly she had pet scan chest as an outpt and she has malignancy ;  she is scheduled for vats and left lower lobectomy on coming 12th , but admitted here for fever,  chills    ?FOLLOWING PRESENT  [x ] Hx of PE/DVT, [y ] Hx COPD, [ x] Hx of Asthma, [y ] Hx of Hospitalization, [x ]  Hx of BiPAP/CPAP use, [ x Hx of CARIN    Allergies    penicillin (Unknown)  macrolide antibiotics (Other)  Biaxin (Unknown)    Intolerances        PAST MEDICAL & SURGICAL HISTORY:  HTN (hypertension)      CVA (cerebral vascular accident)      HLD (hyperlipidemia)      DM2 (diabetes mellitus, type 2)      Anemia      PVD (peripheral vascular disease)      Solitary pulmonary nodule      Chronic diastolic heart failure      H/O hearing loss      History of COPD      CAD (coronary artery disease)      CAD (coronary artery disease)  cardiac stent      S/P vascular bypass  left leg      Bilateral cataracts      H/O rotator cuff surgery          FAMILY HISTORY:  FH: heart disease (Child)        Social History: [ ex smoker:  smoked for 30 yrs:  quit 30 yrs ago  ] TOBACCO                  [  x] ETOH                                 [x  ] IVDA/DRUGS    REVIEW OF SYSTEMS      General:	feverish . chills    Skin/Breast:x  	  Ophthalmologic:x  	  ENMT:	x    Respiratory and Thorax: no sob:  no wheezing  	  Cardiovascular:	x    Gastrointestinal:	x    Genitourinary:	x    Musculoskeletal:	x    Neurological:	x    Psychiatric:	x    Hematology/Lymphatics:	x    Endocrine:	x    Allergic/Immunologic:x    MEDICATIONS  (STANDING):  aspirin enteric coated 81 milliGRAM(s) Oral daily  atorvastatin 20 milliGRAM(s) Oral at bedtime  cefTRIAXone   IVPB 1000 milliGRAM(s) IV Intermittent every 24 hours  dextrose 5%. 1000 milliLiter(s) (50 mL/Hr) IV Continuous <Continuous>  dextrose 5%. 1000 milliLiter(s) (100 mL/Hr) IV Continuous <Continuous>  dextrose 50% Injectable 25 Gram(s) IV Push once  dextrose 50% Injectable 25 Gram(s) IV Push once  dextrose 50% Injectable 12.5 Gram(s) IV Push once  fluticasone propionate 50 MICROgram(s)/spray Nasal Spray 1 Spray(s) Both Nostrils two times a day  fluticasone propionate/ salmeterol 250-50 MICROgram(s) Diskus 1 Dose(s) Inhalation two times a day  furosemide    Tablet 20 milliGRAM(s) Oral two times a day  gabapentin 400 milliGRAM(s) Oral at bedtime  glucagon  Injectable 1 milliGRAM(s) IntraMuscular once  insulin glargine Injectable (LANTUS) 8 Unit(s) SubCutaneous at bedtime  insulin lispro (ADMELOG) corrective regimen sliding scale   SubCutaneous at bedtime  insulin lispro (ADMELOG) corrective regimen sliding scale   SubCutaneous three times a day before meals  metoprolol tartrate 50 milliGRAM(s) Oral two times a day  pantoprazole    Tablet 40 milliGRAM(s) Oral before breakfast    MEDICATIONS  (PRN):  artificial tears (preservative free) Ophthalmic Solution 1 Drop(s) Both EYES two times a day PRN Dry Eyes  dextrose Oral Gel 15 Gram(s) Oral once PRN Blood Glucose LESS THAN 70 milliGRAM(s)/deciliter       Vital Signs Last 24 Hrs  T(C): 36.8 (09 Sep 2024 05:25), Max: 37.1 (09 Sep 2024 00:04)  T(F): 98.3 (09 Sep 2024 05:25), Max: 98.7 (09 Sep 2024 00:04)  HR: 97 (09 Sep 2024 05:25) (89 - 104)  BP: 115/50 (09 Sep 2024 05:25) (103/52 - 120/48)  BP(mean): --  RR: 20 (09 Sep 2024 05:25) (18 - 30)  SpO2: 97% (09 Sep 2024 05:25) (96% - 99%)    Parameters below as of 09 Sep 2024 05:25  Patient On (Oxygen Delivery Method): nasal cannula  O2 Flow (L/min): 5  Orthostatic VS          I&O's Summary    08 Sep 2024 07:01  -  09 Sep 2024 07:00  --------------------------------------------------------  IN: 0 mL / OUT: 0 mL / NET: 0 mL    09 Sep 2024 07:01  -  09 Sep 2024 10:06  --------------------------------------------------------  IN: 0 mL / OUT: 30 mL / NET: -30 mL        Physical Exam:   GENERAL: NAD, well-groomed, well-developed  HEENT: MARCIO/   Atraumatic, Normocephalic  ENMT: No tonsillar erythema, exudates, or enlargement; Moist mucous membranes, Good dentition, No lesions  NECK: Supple, No JVD, Normal thyroid  CHEST/LUNG: poor air entry;  no wheezing  CVS: Regular rate and rhythm; No murmurs, rubs, or gallops  GI: : Soft, Nontender, Nondistended; Bowel sounds present  NERVOUS SYSTEM:  Alert & Oriented X3  EXTREMITIES: - edema  LYMPH: No lymphadenopathy noted  SKIN: No rashes or lesions  ENDOCRINOLOGY: No Thyromegaly  PSYCH: Appropriate    Labs:  6.8<44<4>>44<<7.465>>6.8<<3><<4><<5<<449>>, Venous<44<4>>20<<7.455>>Venous<<3><<4><<5<<209>>                            9.1    17.45 )-----------( 346      ( 09 Sep 2024 06:35 )             27.8                         10.7   15.09 )-----------( 393      ( 08 Sep 2024 21:17 )             33.2                         9.7    5.75  )-----------( 361      ( 05 Sep 2024 11:48 )             29.8     09-09    135  |  97<L>  |  22  ----------------------------<  206<H>  4.2   |  27  |  0.88  09-08    135  |  96<L>  |  20  ----------------------------<  213<H>  4.3   |  25  |  0.89  09-05    136  |  94<L>  |  28<H>  ----------------------------<  286<H>  3.7   |  26  |  1.10    Ca    8.7      09 Sep 2024 06:35  Ca    9.8      08 Sep 2024 21:17  Phos  2.6     09-09  Mg     1.80     09-09    TPro  7.1  /  Alb  4.0  /  TBili  0.3  /  DBili  x   /  AST  17  /  ALT  11  /  AlkPhos  102  09-08    CAPILLARY BLOOD GLUCOSE      POCT Blood Glucose.: 210 mg/dL (09 Sep 2024 08:15)  POCT Blood Glucose.: 230 mg/dL (09 Sep 2024 05:53)  POCT Blood Glucose.: 226 mg/dL (09 Sep 2024 04:49)  POCT Blood Glucose.: 236 mg/dL (08 Sep 2024 19:58)    LIVER FUNCTIONS - ( 08 Sep 2024 21:17 )  Alb: 4.0 g/dL / Pro: 7.1 g/dL / ALK PHOS: 102 U/L / ALT: 11 U/L / AST: 17 U/L / GGT: x             Urinalysis Basic - ( 09 Sep 2024 06:35 )    Color: x / Appearance: x / SG: x / pH: x  Gluc: 206 mg/dL / Ketone: x  / Bili: x / Urobili: x   Blood: x / Protein: x / Nitrite: x   Leuk Esterase: x / RBC: x / WBC x   Sq Epi: x / Non Sq Epi: x / Bacteria: x      Urinalysis with Rflx Culture (collected 08 Sep 2024 21:17)      D DImer      Studies  Chest X-RAY  CT SCAN Chest   CT Abdomen  Venous Dopplers: LE:   Others      rad< from: Xray Chest 2 Views PA/Lat (09.08.24 @ 22:33) >  from 9/8/2024 10:33 PM    COMPARISON: Chest x-ray 4/26/2024    FINDINGS:    The heart size is not accurately assessed on this projection.  Bilateral patchy airspace and interstitial lung opacities, with lower   lobe predominance. Interlobular septal thickening with Luiza B lines are   appreciated. Small bilateral pleural effusions.  There is no pneumothorax.  . Right axillary surgical clips.    IMPRESSION:  Cardiogenic pulmonary edema        ******PRELIMINARY REPORT******      ******PRELIMINARY REPORT******       REJI LEONARD MD; Resident Radiologist  This document is a PRELIMINARY interpretation and is pending final   attending approval. Sep  9 2024  1:39AM    < end of copied text >  < from: Xray Chest 2 Views PA/Lat (04.26.24 @ 11:32) >  COMPARISON: 4/23/2024    Frontal and lateral views of the chest were obtained with suboptimal   inspiration. With allowance for this, the heart is similar in size and   the lungs show progression of pulmonary congestion and small pleural   effusions.    Right axillary clips are again noted. There is no evidence of   pneumothorax.    IMPRESSION: Progression of congestion.      Thank you for this referral.    --- End of Report ---            LJ IRVIN MD; Attending Interventional Radiologist  This document has been electronically signed. Apr 28 2024 11:39AM    < end of copied text >  < from: CT Angio Chest PE Protocol w/ IV Cont (04.24.24 @ 09:17) >    No acute pulmonary embolism.    Mucoid secretions with impaction right middle lobe and lower lobe bronchi.  Partial right middle lobe atelectasis, new from prior exam.    Persistent atelectasis/airspace consolidation right lower lobe.    Few scattered groundglass opacities left upper lobe, could reflect acute   respiratoryinfection.    Probable chronic interstitial lung disease superimposed on background of   emphysema.    Stable 1.5 cm nodule left lower lobe.    Recommend short interval follow-up CT scan of the chest in 12 weeks.    Other findings as discussed above.    < end of copied text >        < from: TTE W or WO Ultrasound Enhancing Agent (04.22.24 @ 15:29) >  _______________________________________________________________________________________     CONCLUSIONS:      1. Left ventricular systolic function is normal with an ejection fraction of 57 % by Gomez's method of disks.   2. The left atrium is mildly dilated.    < end of copied text >  < from: Xray Chest 2 Views PA/Lat (09.08.24 @ 22:33) >  rt size is not accurately assessed on this projection.  Bilateral patchy airspace and interstitial lung opacities, with lower   lobe predominance. Interlobular septal thickening with Luiza B lines are   appreciated. Small bilateral pleural effusions.  There is no pneumothorax.  . Right axillary surgical clips.    IMPRESSION:  Cardiogenic pulmonary edema        ******PRELIMINARY REPORT******      ******PRELIMINARY REPORT******       REJI LEONARD MD; Resident Radiologist  This document is a PRELIMINARY interpretation and is pending final   attending approval. Sep  9 2024  1:39AM    < end of copied text >

## 2024-09-09 NOTE — CHART NOTE - NSCHARTNOTEFT_GEN_A_CORE
[facility]      [facility] [place] [address] [facility] [address]  Dept Phone: [phone]  Dept Fax: [phone]        Transthoracic Echocardiography Report (TTE)     Patient Name  RAYA CABALLERO    Accession #        [id number]     MRN #         [id number]            Date of Study      07/27/2024     Date of Birth 01/28/1933         Referring          Timothy Rachel MD  Physician     Age           89 year(s)         Sonographer        Kirk Barillas  BS,RDCS,RVT     Gender        Female             Interpreting       Timothy Rachel MD  Physician     [facility]  Location:     [place]     Height: 59 inches Weight: 120 pounds BSA: 1.48 m^2 BMI: 24.24 kg/m^2BP:  128/78 mmHg     Technical Quality: Adequate visualization     Indications:CHF(Cardiac Heart Failure).     M-Mode/2D Measurements DERIVED VARIABLES     LVID Diastole: 3.68 cm                                 FS39.13 %  LVID Systole: 2.24 cm                                  EF Ueqxyb37.4 %  LV Septum: 1.09 cm  LV PW: 1.1 cm  LV EDV/LV EDV Index: 57.3 ml/ 39 ml/m^2  LV ESV/LV ESV Index: 11.26 ml/ 8 ml/m^2     Aortic Root Dimension: 2.96 cm  Ascending Aorta:2.87 cm     SV (2D-Teich):40.32ml     Aortic Valve Mitral Valve     AV Peak Velocity: 2 m/s             MV Peak E-Wave: 1.39 m/s  AV Peak Gradient: 15.98 mmHg        MV Peak A-Wave: 1.26 m/s  AV Mean Gradient: 10.17 mmHg        MV E/A Ratio: 1.11  LVOT Peak Velocity: 0.68 m/s  LVOT: 1.97 cm  AV Area (Continuity):1.35 cm^2  MV Deceleration Time: 193 msec     Tricuspid Valve  TR Velocity:2.99 m/s  TR Gradient:35.84 mmHg     Pulmonic Valve  PV Peak Velocity: 0.64 m/s  PV Peak Gradient: 1.61 mmHg     Findings     Mitral Valve  Mitral annular calcification is present.  Trace mitral regurgitation is present.  Mild thickening of anterior leaflet of mitral valve.     Aortic Valve  Mild-moderate aortic valve calcification with evidence of aortic stenosis.  Mild aortic stenosis is present.  Trivial aortic regurgitation is noted.     Tricuspid Valve  Mild tricuspid regurgitation.     Pulmonic Valve  Trace pulmonic regurgitation present.     Left Atrium  Normal size left atrium.     Left Ventricle  Left ventricle size is normal.  Normal left ventricular wall thickness.  Ejection fraction is visually estimated at 65 %.     Right Atrium  Normal right atrium size.     Right Ventricle  Normal right ventricular size and function.     Pericardial Effusion  No evidence of any pericardial effusion.     Miscellaneous  The aorta is within normal limits.     Summary  Left ventricle size is normal.  Normal left ventricular wall thickness.  Ejection fraction is visually estimated at 65 %.  Normal size left atrium.  Normal right atrium size.  Mild aortic stenosis is present.  Trivial aortic regurgitation is noted.  Mitral annular calcification is present.  Trace mitral regurgitation is present.  Mild tricuspid regurgitation.  Trace pulmonic regurgitation present.  No evidence of any pericardial effusion.     Signature     ----------------------------------------------------------------  Electronically signed by Timothy Rachel MD (Interpreting  physician) on 07/27/2024 at 10:07 AM  ----------------------------------------------------------------

## 2024-09-09 NOTE — H&P ADULT - PROBLEM SELECTOR PLAN 3
c/w metoprolol, furosemide with hold parameters  reportedly supposed to be taking furosemide 20mg BID however takes 0-2x daily c/w metoprolol   c/w furosemide, reportedly supposed to be taking furosemide 20mg BID however takes 0-2x daily, reports her cardiologist wants her taking BID  given CXR findings continued back on BID dosing however monitor VS and I/Os  cards consulted by Dr. Gilbert, f/u recs

## 2024-09-09 NOTE — H&P ADULT - PROBLEM SELECTOR PLAN 1
WBC>12, HR>90  Lactate 2.7->1.2  c/w ceftriaxone   f/u UCx  no IVF given CXR findings. WBC>12, HR>90  Lactate 2.7->1.2  c/w ceftriaxone   f/u UCx  no IVF given CXR findings (no history of CHF per recent echo 4/2024, does not appear dry on exam)  VS Q4 WBC>12, HR>90  Lactate 2.7->1.2  c/w ceftriaxone   f/u UCx  no IVF given CXR findings (no history of CHF per recent echo 4/2024, does not appear dry on exam)  VS Q4  trend leukocytosis

## 2024-09-09 NOTE — CONSULT NOTE ADULT - SUBJECTIVE AND OBJECTIVE BOX
CARDIOLOGY CONSULT - Dr. Valenzuela  Date of Service: 9/9/2024    HPI:  89 -year-old female with COPD on 2L home O2 PRN, CVA (remote, no deficits), CAD s/p stent ('15), IDDM2, PVD s/p fem-pop bypass ('15) w/occlusion ('22), HLD, HTN, anemia, presenting with (+)UA noted during presurgical testing. Patient is scheduled to have a VATS LLL resection on Thursday. Patient reports going to her PCP and was prescribed abx, of which she took 1 dose prior to presenting t the ED at her daughter's behest. She notes having increased frequency of urination and dysuria for the past 1 week. She has a history of prior UTIs, last UTI 1 month ago. She notes chills but denies any fevers.    In the ED VS:  98.7  98-99  103-107/52-67  25  96-98% on 2-6L NC, received Ceftriaxone 1g IVPB x1 (09 Sep 2024 07:07)      PAST MEDICAL & SURGICAL HISTORY:  HTN (hypertension)      CVA (cerebral vascular accident)      HLD (hyperlipidemia)      DM2 (diabetes mellitus, type 2)      Anemia      PVD (peripheral vascular disease)      Solitary pulmonary nodule      Chronic diastolic heart failure      H/O hearing loss      History of COPD      CAD (coronary artery disease)      CAD (coronary artery disease)  cardiac stent      S/P vascular bypass  left leg      Bilateral cataracts      H/O rotator cuff surgery              PREVIOUS DIAGNOSTIC TESTING:    [ ] Echocardiogram:  [ ]  Catheterization:  [ ] Stress Test:  	    MEDICATIONS:  MEDICATIONS  (STANDING):  aspirin enteric coated 81 milliGRAM(s) Oral daily  atorvastatin 20 milliGRAM(s) Oral at bedtime  cefTRIAXone   IVPB 1000 milliGRAM(s) IV Intermittent every 24 hours  dextrose 5%. 1000 milliLiter(s) (100 mL/Hr) IV Continuous <Continuous>  dextrose 5%. 1000 milliLiter(s) (50 mL/Hr) IV Continuous <Continuous>  dextrose 50% Injectable 12.5 Gram(s) IV Push once  dextrose 50% Injectable 25 Gram(s) IV Push once  dextrose 50% Injectable 25 Gram(s) IV Push once  fluticasone propionate 50 MICROgram(s)/spray Nasal Spray 1 Spray(s) Both Nostrils two times a day  fluticasone propionate/ salmeterol 250-50 MICROgram(s) Diskus 1 Dose(s) Inhalation two times a day  furosemide    Tablet 20 milliGRAM(s) Oral two times a day  gabapentin 400 milliGRAM(s) Oral at bedtime  glucagon  Injectable 1 milliGRAM(s) IntraMuscular once  insulin glargine Injectable (LANTUS) 8 Unit(s) SubCutaneous at bedtime  insulin lispro (ADMELOG) corrective regimen sliding scale   SubCutaneous at bedtime  insulin lispro (ADMELOG) corrective regimen sliding scale   SubCutaneous three times a day before meals  metoprolol tartrate 50 milliGRAM(s) Oral two times a day  pantoprazole    Tablet 40 milliGRAM(s) Oral before breakfast      FAMILY HISTORY:  FH: heart disease (Child)        SOCIAL HISTORY:    [ ] Non-smoker  [ ] Smoker  [ ] Alcohol    Allergies    penicillin (Unknown)  macrolide antibiotics (Other)  Biaxin (Unknown)    Intolerances    	    REVIEW OF SYSTEMS:  CONSTITUTIONAL: No fever, weight loss, or fatigue  EYES: No eye pain, visual disturbances, or discharge  ENMT:  No difficulty hearing, tinnitus, vertigo; No sinus or throat pain  NECK: No pain or stiffness  RESPIRATORY: No cough, wheezing, chills or hemoptysis; No Shortness of Breath  CARDIOVASCULAR: No chest pain, palpitations, passing out, dizziness, or leg swelling  GASTROINTESTINAL: No abdominal or epigastric pain. No nausea, vomiting, or hematemesis; No diarrhea or constipation. No melena or hematochezia.  GENITOURINARY: No dysuria, frequency, hematuria, or incontinence  NEUROLOGICAL: No headaches, memory loss, loss of strength, numbness, or tremors  SKIN: No itching, burning, rashes, or lesions   	    [ ] All others negative	  [ ] Unable to obtain    PHYSICAL EXAM:  T(C): 36.8 (09-09-24 @ 05:25), Max: 37.1 (09-09-24 @ 00:04)  HR: 97 (09-09-24 @ 05:25) (89 - 104)  BP: 115/50 (09-09-24 @ 05:25) (103/52 - 120/48)  RR: 20 (09-09-24 @ 05:25) (18 - 30)  SpO2: 97% (09-09-24 @ 05:25) (96% - 99%)  Wt(kg): --  I&O's Summary    08 Sep 2024 07:01  -  09 Sep 2024 07:00  --------------------------------------------------------  IN: 0 mL / OUT: 0 mL / NET: 0 mL    09 Sep 2024 07:01  -  09 Sep 2024 10:25  --------------------------------------------------------  IN: 0 mL / OUT: 30 mL / NET: -30 mL        Appearance: Normal	  Psychiatry: A & O x 3, Mood & affect appropriate  HEENT:   Normal oral mucosa, PERRL, EOMI	  Lymphatic: No lymphadenopathy  Cardiovascular: Normal S1 S2,RRR, No JVD, No murmurs  Respiratory: Lungs clear to auscultation	  Gastrointestinal:  Soft, Non-tender, + BS	  Skin: No rashes, No ecchymoses, No cyanosis	  Neurologic: Non-focal  Extremities: Normal range of motion, No clubbing, cyanosis or edema  Vascular: Peripheral pulses palpable 2+ bilaterally    TELEMETRY: 	    ECG:  	  RADIOLOGY:  OTHER: 	  	  LABS:	 	    CARDIAC MARKERS:                                  9.1    17.45 )-----------( 346      ( 09 Sep 2024 06:35 )             27.8     09-09    135  |  97<L>  |  22  ----------------------------<  206<H>  4.2   |  27  |  0.88    Ca    8.7      09 Sep 2024 06:35  Phos  2.6     09-09  Mg     1.80     09-09    TPro  7.1  /  Alb  4.0  /  TBili  0.3  /  DBili  x   /  AST  17  /  ALT  11  /  AlkPhos  102  09-08      proBNP:   Lipid Profile:   HgA1c:   TSH:     ASSESSMENT/PLAN: 	              70 minutes spent on total encounter; more than 50% of the visit was spent counseling and/or coordinating care by the attending physician.

## 2024-09-09 NOTE — H&P ADULT - NSHPLABSRESULTS_GEN_ALL_CORE
10.7   15.09 )-----------( 393      ( 08 Sep 2024 21:17 )             33.2     135  |  96<L>  |  20  ----------------------------<  213<H>       4.3   |  25  |  0.89    Ca    9.8      08 Sep 2024 21:17    TPro  7.1  /  Alb  4.0  /  TBili  0.3  /  DBili  x   /  AST  17  /  ALT  11  /  AlkPhos  102      06:35 - VBG - pH: 7.46  | pCO2: 44    | pO2: 44    | Lactate: 1.2    21:07 - VBG - pH: 7.45  | pCO2: 44    | pO2: 20    | Lactate: 2.7      Pro-BNP: 1389 (24 @ 21:17)    A1C with Estimated Average Glucose (24 @ 13:42)    A1C with Estimated Average Glucose Result: 6.6 %    Estimated Average Glucose: 143    Urinalysis Basic - ( 08 Sep 2024 21:17 )  Color: Yellow / Appearance: Clear / S.010 / pH: 7.0  Gluc: Negative mg/dL / Ketone: Negative mg/dL  / Bili: Negative / Urobili: 0.2 mg/dL   Blood: Negative / Protein: Negative mg/dL / Nitrite: Negative   Leuk Esterase: Moderate / RBC: 1 /HPF / WBC >50 /HPF   Sq Epi: 0 /HPF / Bacteria: Few /HPF    Urinalysis with Rflx Culture (collected 08 Sep 2024 21:17)    CXR personally reviewed and interpreted - ?mild pulm edema    EKG personally reviewed and interpreted - SR with sinus arrhythmia, QTc 430ms    < from: TTE W or WO Ultrasound Enhancing Agent (24 @ 15:29) >  CONCLUSIONS:   1. Left ventricular systolic function is normal with an ejection fraction of 57 % by Gomez's method of disks.   2. The left atrium is mildly dilated.  < end of copied text >

## 2024-09-09 NOTE — H&P ADULT - CONVERSATION DETAILS
Discussed advanced directives with patient and reasoning behind discussion. At present, would like to be FULL CODE however would reassess code status after results of VATS as patient reports she would not want resuscitation if there is no chance of meaningful recovery.

## 2024-09-10 LAB
ANION GAP SERPL CALC-SCNC: 13 MMOL/L — SIGNIFICANT CHANGE UP (ref 7–14)
BASOPHILS # BLD AUTO: 0.02 K/UL — SIGNIFICANT CHANGE UP (ref 0–0.2)
BASOPHILS NFR BLD AUTO: 0.2 % — SIGNIFICANT CHANGE UP (ref 0–2)
BUN SERPL-MCNC: 20 MG/DL — SIGNIFICANT CHANGE UP (ref 7–23)
CALCIUM SERPL-MCNC: 9 MG/DL — SIGNIFICANT CHANGE UP (ref 8.4–10.5)
CHLORIDE SERPL-SCNC: 96 MMOL/L — LOW (ref 98–107)
CO2 SERPL-SCNC: 25 MMOL/L — SIGNIFICANT CHANGE UP (ref 22–31)
CREAT SERPL-MCNC: 0.96 MG/DL — SIGNIFICANT CHANGE UP (ref 0.5–1.3)
EGFR: 56 ML/MIN/1.73M2 — LOW
EOSINOPHIL # BLD AUTO: 0.34 K/UL — SIGNIFICANT CHANGE UP (ref 0–0.5)
EOSINOPHIL NFR BLD AUTO: 3.6 % — SIGNIFICANT CHANGE UP (ref 0–6)
GLUCOSE SERPL-MCNC: 216 MG/DL — HIGH (ref 70–99)
HCT VFR BLD CALC: 28.3 % — LOW (ref 34.5–45)
HGB BLD-MCNC: 9.2 G/DL — LOW (ref 11.5–15.5)
IANC: 7.24 K/UL — SIGNIFICANT CHANGE UP (ref 1.8–7.4)
IMM GRANULOCYTES NFR BLD AUTO: 0.5 % — SIGNIFICANT CHANGE UP (ref 0–0.9)
LYMPHOCYTES # BLD AUTO: 1.06 K/UL — SIGNIFICANT CHANGE UP (ref 1–3.3)
LYMPHOCYTES # BLD AUTO: 11.2 % — LOW (ref 13–44)
MAGNESIUM SERPL-MCNC: 1.8 MG/DL — SIGNIFICANT CHANGE UP (ref 1.6–2.6)
MCHC RBC-ENTMCNC: 28.7 PG — SIGNIFICANT CHANGE UP (ref 27–34)
MCHC RBC-ENTMCNC: 32.5 GM/DL — SIGNIFICANT CHANGE UP (ref 32–36)
MCV RBC AUTO: 88.2 FL — SIGNIFICANT CHANGE UP (ref 80–100)
MONOCYTES # BLD AUTO: 0.73 K/UL — SIGNIFICANT CHANGE UP (ref 0–0.9)
MONOCYTES NFR BLD AUTO: 7.7 % — SIGNIFICANT CHANGE UP (ref 2–14)
MRSA PCR RESULT.: SIGNIFICANT CHANGE UP
NEUTROPHILS # BLD AUTO: 7.24 K/UL — SIGNIFICANT CHANGE UP (ref 1.8–7.4)
NEUTROPHILS NFR BLD AUTO: 76.8 % — SIGNIFICANT CHANGE UP (ref 43–77)
NRBC # BLD: 0 /100 WBCS — SIGNIFICANT CHANGE UP (ref 0–0)
NRBC # FLD: 0 K/UL — SIGNIFICANT CHANGE UP (ref 0–0)
PHOSPHATE SERPL-MCNC: 3.3 MG/DL — SIGNIFICANT CHANGE UP (ref 2.5–4.5)
PLATELET # BLD AUTO: 343 K/UL — SIGNIFICANT CHANGE UP (ref 150–400)
POTASSIUM SERPL-MCNC: 4 MMOL/L — SIGNIFICANT CHANGE UP (ref 3.5–5.3)
POTASSIUM SERPL-SCNC: 4 MMOL/L — SIGNIFICANT CHANGE UP (ref 3.5–5.3)
RBC # BLD: 3.21 M/UL — LOW (ref 3.8–5.2)
RBC # FLD: 15 % — HIGH (ref 10.3–14.5)
S AUREUS DNA NOSE QL NAA+PROBE: SIGNIFICANT CHANGE UP
SODIUM SERPL-SCNC: 134 MMOL/L — LOW (ref 135–145)
WBC # BLD: 9.44 K/UL — SIGNIFICANT CHANGE UP (ref 3.8–10.5)
WBC # FLD AUTO: 9.44 K/UL — SIGNIFICANT CHANGE UP (ref 3.8–10.5)

## 2024-09-10 PROCEDURE — 71045 X-RAY EXAM CHEST 1 VIEW: CPT | Mod: 26

## 2024-09-10 RX ORDER — CHLORHEXIDINE GLUCONATE 40 MG/ML
1 SOLUTION TOPICAL DAILY
Refills: 0 | Status: DISCONTINUED | OUTPATIENT
Start: 2024-09-10 | End: 2024-09-17

## 2024-09-10 RX ADMIN — Medication 20 MILLIGRAM(S): at 23:04

## 2024-09-10 RX ADMIN — Medication 400 MILLIGRAM(S): at 21:13

## 2024-09-10 RX ADMIN — Medication 2: at 18:18

## 2024-09-10 RX ADMIN — METOPROLOL TARTRATE 50 MILLIGRAM(S): 100 TABLET ORAL at 18:19

## 2024-09-10 RX ADMIN — Medication 1: at 23:03

## 2024-09-10 RX ADMIN — Medication 20 MILLIGRAM(S): at 21:13

## 2024-09-10 RX ADMIN — FLUTICASONE PROPIONATE 1 SPRAY(S): 50 SPRAY, METERED NASAL at 05:57

## 2024-09-10 RX ADMIN — FLUTICASONE PROPIONATE AND SALMETEROL 1 DOSE(S): 250; 50 POWDER RESPIRATORY (INHALATION) at 11:07

## 2024-09-10 RX ADMIN — Medication 40 MILLIGRAM(S): at 05:57

## 2024-09-10 RX ADMIN — FLUTICASONE PROPIONATE 1 SPRAY(S): 50 SPRAY, METERED NASAL at 18:18

## 2024-09-10 RX ADMIN — METOPROLOL TARTRATE 50 MILLIGRAM(S): 100 TABLET ORAL at 05:57

## 2024-09-10 RX ADMIN — FLUTICASONE PROPIONATE AND SALMETEROL 1 DOSE(S): 250; 50 POWDER RESPIRATORY (INHALATION) at 21:16

## 2024-09-10 RX ADMIN — Medication 2: at 12:58

## 2024-09-10 RX ADMIN — Medication 100 MILLIGRAM(S): at 21:15

## 2024-09-10 RX ADMIN — Medication 81 MILLIGRAM(S): at 11:15

## 2024-09-10 RX ADMIN — INSULIN GLARGINE 8 UNIT(S): 100 INJECTION, SOLUTION SUBCUTANEOUS at 23:04

## 2024-09-10 RX ADMIN — Medication 2: at 09:20

## 2024-09-10 RX ADMIN — CHLORHEXIDINE GLUCONATE 1 APPLICATION(S): 40 SOLUTION TOPICAL at 12:58

## 2024-09-10 RX ADMIN — Medication 20 MILLIGRAM(S): at 11:08

## 2024-09-10 NOTE — PROGRESS NOTE ADULT - ASSESSMENT
89 y/o F w/COPD, former smoker, as needed O2, CVA, CAD status post stent, DM, HTN, HLD, lung cancer scheduled for VATS on Thursday presenting w CHF and possible UTI    #HFpEF  -recent TTW noted  -normal LVEF and mild AS  -cont lasix to 20mg IV q12hrs, change to PO tomorrow    #CAD s/p PCI  -stable  -cont asa, statin, BB    #Lung CA  -preop for LLL VATS w thoracic on sunday  -plan to optimize volume status    #Hypoxia  -mulifactorial  -diureses as above  -pulm eval    #UTI  -iv abx per med      55 minutes spent on total encounter; more than 50% of the visit was spent counseling and/or coordinating care by the attending physician.

## 2024-09-10 NOTE — PHYSICAL THERAPY INITIAL EVALUATION ADULT - PERTINENT HX OF CURRENT PROBLEM, REHAB EVAL
89 year old female w/COPD, former smoker, as needed O2, CVA, CAD status post stent, DM, HTN, HLD, lung cancer scheduled for VATS on Thursday presenting for abnormal urine test outpatient.  Patient states that patient urinalysis showed elevated white blood cell count, additionally has been endorsing generalized fatigue, chills, no measured fevers.

## 2024-09-10 NOTE — PROGRESS NOTE ADULT - SUBJECTIVE AND OBJECTIVE BOX
She feels well this morning  No dysuria or urinary frequency  No acute pain  No SOB    Vital Signs Last 24 Hrs  T(C): 36.8 (10 Sep 2024 05:55), Max: 36.9 (09 Sep 2024 11:00)  T(F): 98.3 (10 Sep 2024 05:55), Max: 98.4 (09 Sep 2024 11:00)  HR: 102 (10 Sep 2024 05:55) (93 - 102)  BP: 107/52 (10 Sep 2024 05:55) (100/50 - 130/83)  BP(mean): --  RR: 18 (10 Sep 2024 05:55) (16 - 20)  SpO2: 95% (10 Sep 2024 05:55) (93% - 100%)    I&O's Summary    09-09-24 @ 07:01  -  09-10-24 @ 07:00  --------------------------------------------------------  IN: 1160 mL / OUT: 1400 mL / NET: -240 mL        Gen: NAD  Head: NCAT, EOMI  Lungs: CTA b/l  Heart: RRR, nl S1/S2, no murmurs  Abd: soft, NTND, NABS  Neuro: A+O x 3  Exts: no LE edema b/l    LABS:                        9.2    9.44  )-----------( 343      ( 10 Sep 2024 05:50 )             28.3     09-10    134<L>  |  96<L>  |  20  ----------------------------<  216<H>  4.0   |  25  |  0.96    Ca    9.0      10 Sep 2024 05:50  Phos  3.3     09-10  Mg     1.80     09-10    TPro  7.1  /  Alb  4.0  /  TBili  0.3  /  DBili  x   /  AST  17  /  ALT  11  /  AlkPhos  102  09-08      CAPILLARY BLOOD GLUCOSE      POCT Blood Glucose.: 221 mg/dL (10 Sep 2024 08:44)  POCT Blood Glucose.: 251 mg/dL (09 Sep 2024 22:14)  POCT Blood Glucose.: 302 mg/dL (09 Sep 2024 16:25)  POCT Blood Glucose.: 352 mg/dL (09 Sep 2024 12:44)        Urinalysis Basic - ( 10 Sep 2024 05:50 )    Color: x / Appearance: x / SG: x / pH: x  Gluc: 216 mg/dL / Ketone: x  / Bili: x / Urobili: x   Blood: x / Protein: x / Nitrite: x   Leuk Esterase: x / RBC: x / WBC x   Sq Epi: x / Non Sq Epi: x / Bacteria: x        RADIOLOGY & ADDITIONAL TESTS:    Imaging Personally Reviewed:  [x] YES  [ ] NO    Case discussed with NPP:  [X] YES  [ ] NO

## 2024-09-10 NOTE — PROGRESS NOTE ADULT - SUBJECTIVE AND OBJECTIVE BOX
CARDIOLOGY FOLLOW UP - Dr. Valnezuela  Date of Service: 09-10-24 @ 14:18    CC: no events, feels well    Review of Systems:  Constitutional: No fever, weight loss, or fatigue  Respiratory: No cough, wheezing, or hemoptysis, no shortness of breath  Cardiovascular: No chest pain, palpitations, passing out, dizziness, or leg swelling  Gastrointestinal: No abd or epigastric pain. No nausea, vomiting, or hematemesis; no diarrhea or consiptaiton, no melena or hematochezia  Vascular: No edema     TELEMETRY:    PHYSICAL EXAM:  T(C): 36.7 (09-10-24 @ 10:48), Max: 36.9 (09-10-24 @ 01:29)  HR: 87 (09-10-24 @ 10:48) (87 - 102)  BP: 111/69 (09-10-24 @ 10:48) (100/50 - 130/83)  RR: 18 (09-10-24 @ 10:48) (17 - 20)  SpO2: 95% (09-10-24 @ 10:48) (93% - 96%)  Wt(kg): --  I&O's Summary    09 Sep 2024 07:01  -  10 Sep 2024 07:00  --------------------------------------------------------  IN: 1160 mL / OUT: 1400 mL / NET: -240 mL    10 Sep 2024 07:01  -  10 Sep 2024 14:18  --------------------------------------------------------  IN: 360 mL / OUT: 300 mL / NET: 60 mL        Appearance: Normal	  Cardiovascular: Normal S1 S2,RRR, No JVD, No murmurs  Respiratory: Lungs clear to auscultation	  Gastrointestinal:  Soft, Non-tender, + BS	  Extremities: Normal range of motion, No clubbing, cyanosis or edema  Vascular: Peripheral pulses palpable 2+ bilaterally       Home Medications:  Advair Diskus 250 mcg-50 mcg inhalation powder: 1 puff(s) inhaled 2 times a day (09 Sep 2024 06:08)  aspirin 81 mg oral delayed release tablet: 1 tab(s) orally once a day (at bedtime) (09 Sep 2024 06:08)  atorvastatin 20 mg oral tablet: 1 tab(s) orally once a day (at bedtime) (09 Sep 2024 06:08)  Cranberry 500mg orally once a day- LD-09/5/24:  (09 Sep 2024 06:08)  fluticasone 50 mcg/inh inhalation powder: 1 puff(s) in each nostril 2 times a day (09 Sep 2024 06:08)  furosemide 20 mg oral tablet: 1 tab(s) orally once a day taking 1-2x daily depending on weight; occasionally skips medication if she has plans to go out (09 Sep 2024 06:06)  gabapentin 400 mg oral capsule: 1 cap(s) orally once a day (at bedtime) (09 Sep 2024 06:08)  ipratropium 42 mcg/inh (0.06%) nasal spray: 2 spray(s) intranasally 2 times a day (09 Sep 2024 06:08)  iron 65 mg orally twice daily:  (09 Sep 2024 06:08)  magnesium 250 mg orally twice a daily:  (09 Sep 2024 06:08)  MetFORMIN (Eqv-Fortamet) 500 mg oral tablet, extended release: 2 tab(s) orally once a day (09 Sep 2024 06:08)  metoprolol tartrate 50 mg oral tablet: 1 tab(s) orally 2 times a day (09 Sep 2024 06:08)  Multiple Vitamins oral tablet: 1 tab(s) orally once a day LD - 09/5/24 (09 Sep 2024 06:08)  omeprazole 40 mg oral delayed release capsule: 1 cap(s) orally once a day (09 Sep 2024 06:08)  PreserVision AREDS 2 oral capsule: 1 cap(s) orally 2 times a day (09 Sep 2024 06:08)  Systane eye drops 2 drop each eye  as needed:  (09 Sep 2024 06:08)  Tresiba FlexTouch 100 units/mL subcutaneous solution: 10 unit(s) subcutaneous once a day (at bedtime) (09 Sep 2024 06:08)  vitamin B12 2500mg orally once a day:  (09 Sep 2024 06:08)  Vitamin C 500mg orally once a day at night:  (09 Sep 2024 06:08)        MEDICATIONS  (STANDING):  acetaminophen   IVPB .. 1000 milliGRAM(s) IV Intermittent once  aspirin enteric coated 81 milliGRAM(s) Oral daily  atorvastatin 20 milliGRAM(s) Oral at bedtime  cefTRIAXone   IVPB 1000 milliGRAM(s) IV Intermittent every 24 hours  chlorhexidine 2% Cloths 1 Application(s) Topical daily  dextrose 5%. 1000 milliLiter(s) (50 mL/Hr) IV Continuous <Continuous>  dextrose 5%. 1000 milliLiter(s) (100 mL/Hr) IV Continuous <Continuous>  dextrose 50% Injectable 12.5 Gram(s) IV Push once  dextrose 50% Injectable 25 Gram(s) IV Push once  dextrose 50% Injectable 25 Gram(s) IV Push once  fluticasone propionate 50 MICROgram(s)/spray Nasal Spray 1 Spray(s) Both Nostrils two times a day  fluticasone propionate/ salmeterol 250-50 MICROgram(s) Diskus 1 Dose(s) Inhalation two times a day  furosemide   Injectable 20 milliGRAM(s) IV Push every 12 hours  gabapentin 400 milliGRAM(s) Oral at bedtime  glucagon  Injectable 1 milliGRAM(s) IntraMuscular once  insulin glargine Injectable (LANTUS) 8 Unit(s) SubCutaneous at bedtime  insulin lispro (ADMELOG) corrective regimen sliding scale   SubCutaneous at bedtime  insulin lispro (ADMELOG) corrective regimen sliding scale   SubCutaneous three times a day before meals  metoprolol tartrate 50 milliGRAM(s) Oral two times a day  pantoprazole    Tablet 40 milliGRAM(s) Oral before breakfast        EKG:  RADIOLOGY:  DIAGNOSTIC TESTING:  [ ] Echocardiogram:  [ ] Catherterization:  [ ] Stress Test:  OTHER:     LABS:	 	                          9.2    9.44  )-----------( 343      ( 10 Sep 2024 05:50 )             28.3     09-10    134<L>  |  96<L>  |  20  ----------------------------<  216<H>  4.0   |  25  |  0.96    Ca    9.0      10 Sep 2024 05:50  Phos  3.3     09-10  Mg     1.80     09-10    TPro  7.1  /  Alb  4.0  /  TBili  0.3  /  DBili  x   /  AST  17  /  ALT  11  /  AlkPhos  102  09-08          CARDIAC MARKERS:

## 2024-09-10 NOTE — PROGRESS NOTE ADULT - ASSESSMENT
89 -year-old female with COPD on 2L home O2 PRN, CVA (remote, no deficits), CAD s/p stent ('15), IDDM2, PVD s/p fem-pop bypass ('15) w/occlusion ('22), HLD, HTN, anemia, presenting with (+)UA noted during presurgical testing. Patient is scheduled to have a VATS LLL resection on Thursday. Patient reports going to her PCP and was prescribed abx, of which she took 1 dose prior to presenting t the ED at her daughter's behest. She notes having increased frequency of urination and dysuria for the past 1 week. She has a history of prior UTIs, last UTI 1 month ago. She notes chills but denies any fevers.  In the ED VS:  98.7  98-99  103-107/52-67  25  96-98% on 2-6L NC, received Ceftriaxone 1g IVPB x1 (09 Sep 2024 07:07):  She has copd and uses oxygen at home on an prn basis:  she felt feverish and chills at home and denies any sob:  on 32 L of oxygen:   -no wheezing  reportedly she had pet scan chest as an outpt and she has malignancy ;  she is scheduled for vats and left lower lobectomy on coming 12th , but admitted here for fever,  chills      UTI;  COPD;  PULMONARY NODULE: SUSPECTED MALIGNANCY ;   cad/s/p pci  DM2  PVD:  S/P FEM-POP BYPASS  HTN  HLD  ANEMIA      UTI;  -she is being  treated for uti with ceftriaxone   -likely reason for her chills at home;     9/10;  -she is on ceftriaxone for 5 days    COPD;  -she looks OK;   -no sob:  or wheezing   --VBG seems to be doing  ok   -on Adviar    PULMONARY NODULE: SUSPECTED MALIGNANCY ;   -she had 1.5 cm smnoud l e in left lower lobe;   -she says she had pet scan in April 2024;  showed; No acute pulmonary embolism. Mucoid secretions with impaction right middle lobe and lower lobe bronchi. Partial right middle lobe atelectasis, new from prior exam. Persistent atelectasis/airspace consolidation right lower lobe. Few scattered groundglass opacities left upper lobe, could reflect acute respiratory infection. Probable chronic interstitial lung disease superimposed on background of emphysema. Stable 1.5 cm nodule left lower lobe.  -now she is scheduled fr vats left lower lung resection/;  given postive pet scan as an outpt:   -cxr this time also suggests pulm edema;  and her EF was normal though ? could the changes e due t ILD?   -cont ADVAIR here   9/10:   cont diuresis:   -cards following    cad/s/p pci  -cnt current meds;     DM2  -monitor and control     PVD:  S/P FEM-POP BYPASS  -cont current meds!    HTN  -controlled    HLD  -atorvastatin 20 milliGRAM(s) Oral at bedtime    ANEMIA  -She is mildly anemic ; monitor and transfuse as needed dw team     dw id

## 2024-09-10 NOTE — PROGRESS NOTE ADULT - ASSESSMENT
89 y/o F PMhx COPD on 2L home O2 PRN, CVA (remote, no deficits), CAD s/p stent, DM II, PVD s/p fem-pop bypass ('15) w/occlusion ('22), HTN who presented w/ dysuria and rigors  planned for LVATS, Lung Resection 9/12    UTI, rigors  reports dysuria, frequency, urgency- improved  denies flank pain  prior urine cultures reviewed  leukocytosis resolved    acute on chronic hypoxic resp failure  CXR w/ pulmonary edema    penicillin allergy  reaction is rash  tolerating ceftriaxone without issues    Recommendations  c/w ceftriaxone 1g daily  f/u urine culture  f/u blood cultures  diuresis per cards, christine Verduzco M.D.  [facility], Division of Infectious Diseases  [phone]  After 5pm on weekdays and all day on weekends - please call [phone]  89 y/o F PMhx COPD on 2L home O2 PRN, CVA (remote, no deficits), CAD s/p stent, DM II, PVD s/p fem-pop bypass ('15) w/occlusion ('22), HTN who presented w/ dysuria and rigors  planned for LVATS, Lung Resection 9/12    UTI, rigors  reports dysuria, frequency, urgency- improved  denies flank pain  prior urine cultures reviewed  leukocytosis resolved    acute on chronic hypoxic resp failure  CXR w/ pulmonary edema    penicillin allergy  reaction is rash  tolerating ceftriaxone without issues    Recommendations  c/w ceftriaxone 1g daily  - will plan for 5 day course of antibiotics w/ last day 9/12  f/u urine culture  f/u blood cultures  diuresis per cards, pulbeto Verduzco M.D.  [facility], Division of Infectious Diseases  [phone]  After 5pm on weekdays and all day on weekends - please call [phone]

## 2024-09-10 NOTE — PROGRESS NOTE ADULT - ASSESSMENT
89 -year-old female with COPD on 2L home O2 PRN, CVA (remote, no deficits), CAD s/p stent ('15), IDDM2, PVD s/p fem-pop bypass ('15) w/occlusion ('22), HLD, HTN, anemia, presenting with (+)UA noted during presurgical testing.      Sepsis due to urinary tract infection based on WBC>12, HR>90  - lactate 2.7->1.2  - c/w ceftriaxone   - f/u UCx/blood cxs  - trend leukocytosis  - appreciate ID    Type 2 diabetes mellitus, with unspecified complications  - c/w basal insulin, decreased from 10 units of deglutec to 8 units of glargine  - monitor FS QAC/HS w/ISS  - recent A1c 6.6%  - reports FS in 70s to low 100s, no recent hypoglycemic events, adherent with home regimen, took prior to arrival to ED last night  - hold home metformin    Essential hypertension  - c/w metoprolol   - c/w furosemide, reportedly supposed to be taking furosemide 20mg BID however takes 0-2x daily, reports her cardiologist wants her taking BID  - given CXR findings continued back on BID dosing however monitor VS and I/Os  - appreciate cardiology    Chronic obstructive pulmonary disease without acute exacerbation  - c/w LABA/ICS  - O2 PRN  - incentive spirometry  - appreciate pulmonology    Hyperlipidemia  - c/w statin    CAD of native arteries without angina pectoris   - c/w ASA, statin, metoprolol    LLL pulmonary nodule  - CTS to reschedule VATS    Need for prophylactic measure  - SCDs for VTE ppx     89 -year-old female with COPD on 2L home O2 PRN, CVA (remote, no deficits), CAD s/p stent ('15), IDDM2, PVD s/p fem-pop bypass ('15) w/occlusion ('22), HLD, HTN, anemia, presenting with (+)UA noted during presurgical testing.      Sepsis due to urinary tract infection based on WBC>12, HR>90  - lactate 2.7->1.2  - c/w ceftriaxone   - f/u UCx/blood cxs  - trend leukocytosis  - appreciate ID    Acute-on-chronic HFpEF  - recent outpt TTE pasted into [place]  - daily weights  - initially started on lasix 20 mg IV bid  - appreciate cardiology    Type 2 diabetes mellitus, with unspecified complications  - c/w basal insulin, decreased from 10 units of deglutec to 8 units of glargine  - monitor FS QAC/HS w/ISS  - recent A1c 6.6%  - reports FS in 70s to low 100s, no recent hypoglycemic events, adherent with home regimen, took prior to arrival to ED last night  - hold home metformin    Essential hypertension  - c/w metoprolol   - c/w furosemide, reportedly supposed to be taking furosemide 20mg BID however takes 0-2x daily, reports her cardiologist wants her taking BID  - given CXR findings continued back on BID dosing however monitor VS and I/Os  - appreciate cardiology    Chronic obstructive pulmonary disease without acute exacerbation  - c/w LABA/ICS  - O2 PRN  - incentive spirometry  - appreciate pulmonology    Hyperlipidemia  - c/w statin    CAD of native arteries without angina pectoris   - c/w ASA, statin, metoprolol    LLL pulmonary nodule  - CTS to reschedule VATS    Need for prophylactic measure  - SCDs for VTE ppx

## 2024-09-10 NOTE — PHYSICAL THERAPY INITIAL EVALUATION ADULT - ADDITIONAL COMMENTS
Patient lives in a house with her daughter with 3 stairs to enter. Was mostly independent with ambulation, occasionally using a cane. Independent with ADLs.    After session, patient left semi supine in bed with all lines intact in NAD.

## 2024-09-10 NOTE — PROGRESS NOTE ADULT - SUBJECTIVE AND OBJECTIVE BOX
[facility], Division of Infectious Diseases  KRISSY Luong Y. Patel, S. Shah, G. Dax  [phone]  ([phone] - weekdays after 5pm and weekends)    Name: FEDERICO DOS SANTOS  Age/Gender: 89y Female  MRN: [id number]    Interval History:  Notes reviewed.   No concerning overnight events.  Afebrile.   reports symptoms resolved    Allergies: penicillin (Unknown)  macrolide antibiotics (Other)  Biaxin (Unknown)      Objective:  Vitals:   T(F): 98.3 (09-10-24 @ 05:55), Max: 98.4 (09-09-24 @ 11:00)  HR: 102 (09-10-24 @ 05:55) (93 - 102)  BP: 107/52 (09-10-24 @ 05:55) (100/50 - 130/83)  RR: 18 (09-10-24 @ 05:55) (16 - 20)  SpO2: 95% (09-10-24 @ 05:55) (93% - 100%)  Physical Examination:  General: no acute distress  HEENT: anicteric, NC  Cardio: S1, S2, normal rate  Resp: clear to auscultation anteriorly  Abd: soft, NT, ND, no suprapubic tenderness  Ext: no LE edema  Skin: warm, dry    Laboratory Studies:  CBC:                       9.2    9.44  )-----------( 343      ( 10 Sep 2024 05:50 )             28.3     WBC Trend:  9.44 09-10-24 @ 05:50  17.45 09-09-24 @ 06:35  15.09 09-08-24 @ 21:17  5.75 09-05-24 @ 11:48    CMP: 09-10    134<L>  |  96<L>  |  20  ----------------------------<  216<H>  4.0   |  25  |  0.96    Ca    9.0      10 Sep 2024 05:50  Phos  3.3     09-10  Mg     1.80     09-10    TPro  7.1  /  Alb  4.0  /  TBili  0.3  /  DBili  x   /  AST  17  /  ALT  11  /  AlkPhos  102  09-08      LIVER FUNCTIONS - ( 08 Sep 2024 21:17 )  Alb: 4.0 g/dL / Pro: 7.1 g/dL / ALK PHOS: 102 U/L / ALT: 11 U/L / AST: 17 U/L / GGT: x             Urinalysis Basic - ( 10 Sep 2024 05:50 )    Color: x / Appearance: x / SG: x / pH: x  Gluc: 216 mg/dL / Ketone: x  / Bili: x / Urobili: x   Blood: x / Protein: x / Nitrite: x   Leuk Esterase: x / RBC: x / WBC x   Sq Epi: x / Non Sq Epi: x / Bacteria: x      Microbiology: reviewed     Urinalysis with Rflx Culture (collected 09-08-24 @ 21:17)        Radiology: reviewed     Medications:  acetaminophen   IVPB .. 1000 milliGRAM(s) IV Intermittent once  artificial tears (preservative free) Ophthalmic Solution 1 Drop(s) Both EYES two times a day PRN  aspirin enteric coated 81 milliGRAM(s) Oral daily  atorvastatin 20 milliGRAM(s) Oral at bedtime  cefTRIAXone   IVPB 1000 milliGRAM(s) IV Intermittent every 24 hours  chlorhexidine 2% Cloths 1 Application(s) Topical daily  dextrose 5%. 1000 milliLiter(s) IV Continuous <Continuous>  dextrose 5%. 1000 milliLiter(s) IV Continuous <Continuous>  dextrose 50% Injectable 25 Gram(s) IV Push once  dextrose 50% Injectable 25 Gram(s) IV Push once  dextrose 50% Injectable 12.5 Gram(s) IV Push once  dextrose Oral Gel 15 Gram(s) Oral once PRN  fluticasone propionate 50 MICROgram(s)/spray Nasal Spray 1 Spray(s) Both Nostrils two times a day  fluticasone propionate/ salmeterol 250-50 MICROgram(s) Diskus 1 Dose(s) Inhalation two times a day  furosemide   Injectable 20 milliGRAM(s) IV Push every 12 hours  gabapentin 400 milliGRAM(s) Oral at bedtime  glucagon  Injectable 1 milliGRAM(s) IntraMuscular once  insulin glargine Injectable (LANTUS) 8 Unit(s) SubCutaneous at bedtime  insulin lispro (ADMELOG) corrective regimen sliding scale   SubCutaneous at bedtime  insulin lispro (ADMELOG) corrective regimen sliding scale   SubCutaneous three times a day before meals  metoprolol tartrate 50 milliGRAM(s) Oral two times a day  pantoprazole    Tablet 40 milliGRAM(s) Oral before breakfast    Antimicrobials:  cefTRIAXone   IVPB 1000 milliGRAM(s) IV Intermittent every 24 hours
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
## 2024-09-10 NOTE — PROGRESS NOTE ADULT - SUBJECTIVE AND OBJECTIVE BOX
Date of Service: 09-10-24 @ 10:23    Patient is a 89y old  Female who presents with a chief complaint of UTI (10 Sep 2024 10:01)      Any change in ROS: seems better:  on 2 L of oxygen     MEDICATIONS  (STANDING):  acetaminophen   IVPB .. 1000 milliGRAM(s) IV Intermittent once  aspirin enteric coated 81 milliGRAM(s) Oral daily  atorvastatin 20 milliGRAM(s) Oral at bedtime  cefTRIAXone   IVPB 1000 milliGRAM(s) IV Intermittent every 24 hours  chlorhexidine 2% Cloths 1 Application(s) Topical daily  dextrose 5%. 1000 milliLiter(s) (50 mL/Hr) IV Continuous <Continuous>  dextrose 5%. 1000 milliLiter(s) (100 mL/Hr) IV Continuous <Continuous>  dextrose 50% Injectable 12.5 Gram(s) IV Push once  dextrose 50% Injectable 25 Gram(s) IV Push once  dextrose 50% Injectable 25 Gram(s) IV Push once  fluticasone propionate 50 MICROgram(s)/spray Nasal Spray 1 Spray(s) Both Nostrils two times a day  fluticasone propionate/ salmeterol 250-50 MICROgram(s) Diskus 1 Dose(s) Inhalation two times a day  furosemide   Injectable 20 milliGRAM(s) IV Push every 12 hours  gabapentin 400 milliGRAM(s) Oral at bedtime  glucagon  Injectable 1 milliGRAM(s) IntraMuscular once  insulin glargine Injectable (LANTUS) 8 Unit(s) SubCutaneous at bedtime  insulin lispro (ADMELOG) corrective regimen sliding scale   SubCutaneous at bedtime  insulin lispro (ADMELOG) corrective regimen sliding scale   SubCutaneous three times a day before meals  metoprolol tartrate 50 milliGRAM(s) Oral two times a day  pantoprazole    Tablet 40 milliGRAM(s) Oral before breakfast    MEDICATIONS  (PRN):  artificial tears (preservative free) Ophthalmic Solution 1 Drop(s) Both EYES two times a day PRN Dry Eyes  dextrose Oral Gel 15 Gram(s) Oral once PRN Blood Glucose LESS THAN 70 milliGRAM(s)/deciliter    Vital Signs Last 24 Hrs  T(C): 36.8 (10 Sep 2024 05:55), Max: 36.9 (09 Sep 2024 11:00)  T(F): 98.3 (10 Sep 2024 05:55), Max: 98.4 (09 Sep 2024 11:00)  HR: 102 (10 Sep 2024 05:55) (93 - 102)  BP: 107/52 (10 Sep 2024 05:55) (100/50 - 130/83)  BP(mean): --  RR: 18 (10 Sep 2024 05:55) (16 - 20)  SpO2: 95% (10 Sep 2024 05:55) (93% - 100%)    Parameters below as of 10 Sep 2024 05:55  Patient On (Oxygen Delivery Method): nasal cannula  O2 Flow (L/min): 3      I&O's Summary    09 Sep 2024 07:01  -  10 Sep 2024 07:00  --------------------------------------------------------  IN: 1160 mL / OUT: 1400 mL / NET: -240 mL          Physical Exam:   GENERAL: NAD, well-groomed, well-developed  HEENT: MARCIO/   Atraumatic, Normocephalic  ENMT: No tonsillar erythema, exudates, or enlargement; Moist mucous membranes, Good dentition, No lesions  NECK: Supple, No JVD, Normal thyroid  CHEST/LUNG: Clear to auscultaion  CVS: Regular rate and rhythm; No murmurs, rubs, or gallops  GI: : Soft, Nontender, Nondistended; Bowel sounds present  NERVOUS SYSTEM:  Alert & Oriented X3  EXTREMITIES:  - edema  LYMPH: No lymphadenopathy noted  SKIN: No rashes or lesions  ENDOCRINOLOGY: No Thyromegaly  PSYCH: Appropriate    Labs:  31, 31                            9.2    9.44  )-----------( 343      ( 10 Sep 2024 05:50 )             28.3                         9.1    17.45 )-----------( 346      ( 09 Sep 2024 06:35 )             27.8                         10.7   15.09 )-----------( 393      ( 08 Sep 2024 21:17 )             33.2     09-10    134<L>  |  96<L>  |  20  ----------------------------<  216<H>  4.0   |  25  |  0.96  09-09    135  |  97<L>  |  22  ----------------------------<  206<H>  4.2   |  27  |  0.88  09-08    135  |  96<L>  |  20  ----------------------------<  213<H>  4.3   |  25  |  0.89    Ca    9.0      10 Sep 2024 05:50  Ca    8.7      09 Sep 2024 06:35  Ca    9.8      08 Sep 2024 21:17  Phos  3.3     09-10  Phos  2.6     09-09  Mg     1.80     09-10  Mg     1.80     09-09    TPro  7.1  /  Alb  4.0  /  TBili  0.3  /  DBili  x   /  AST  17  /  ALT  11  /  AlkPhos  102  09-08    CAPILLARY BLOOD GLUCOSE      POCT Blood Glucose.: 221 mg/dL (10 Sep 2024 08:44)  POCT Blood Glucose.: 251 mg/dL (09 Sep 2024 22:14)  POCT Blood Glucose.: 302 mg/dL (09 Sep 2024 16:25)  POCT Blood Glucose.: 352 mg/dL (09 Sep 2024 12:44)      LIVER FUNCTIONS - ( 08 Sep 2024 21:17 )  Alb: 4.0 g/dL / Pro: 7.1 g/dL / ALK PHOS: 102 U/L / ALT: 11 U/L / AST: 17 U/L / GGT: x             Urinalysis Basic - ( 10 Sep 2024 05:50 )    Color: x / Appearance: x / SG: x / pH: x  Gluc: 216 mg/dL / Ketone: x  / Bili: x / Urobili: x   Blood: x / Protein: x / Nitrite: x   Leuk Esterase: x / RBC: x / WBC x   Sq Epi: x / Non Sq Epi: x / Bacteria: x      Procalcitonin: 5.24 ng/mL (09-09 @ 06:35)  rad< from: Xray Chest 1 View- PORTABLE-Routine (Xray Chest 1 View- PORTABLE-Routine in AM.) (09.10.24 @ 07:21) >  EXAM: Portable chest    FINDINGS:    Bilateral and diffuse interstitial opacities without cardiomegaly   unchanged from the last study.  Findings are likely cardiogenic in origin although interstitial lung   disease not excluded.  Right axilla surgical clips.  No effusions or pneumothorax.        COMPARISON: September 8        IMPRESSION: Stable bilateral interstitial opacities without cardiomegaly.    --- End of Report ---            SALVADOR ALVARES MD; Attending Radiologist  This document has been electronically signed. Sep 10 2024  9:46AM    < end of copied text >  < from: Xray Chest 2 Views PA/Lat (09.08.24 @ 22:33) >  from 9/8/2024 10:33 PM    COMPARISON: Chest CT 6/7/2024 Chest x-ray 4/26/2024    FINDINGS:    The heart size is not accurately assessed on this projection.  Bilateral patchy airspace and interstitial lung opacities, with lower   lobe predominance. Interlobular septal thickening with Luiza B lines,   markedly increased. Small bilateral pleural effusions.  There is no pneumothorax.  Right axillary surgical clips.    No acute osseous findings    IMPRESSION:  Cardiogenic pulmonary edema, increased from mild pulmonary venous   congestion/perihilar interstitial edema.    Small bilateral pleural effusions/adjacent atelectasis, improved    --- End of Report ---          REJI LEONARD MD; Resident Radiologist  Thisdocument has been electronically signed.  MARTIN JUAN DO; Attending Radiologist  This document has been electronically signed. Sep  9 2024  2:09PM    < end of copied text >        RECENT CULTURES:        RESPIRATORY CULTURES:          Studies  Chest X-RAY  CT SCAN Chest   Venous Dopplers: LE:   CT Abdomen  Others    c< from: CT Angio Chest PE Protocol w/ IV Cont (04.24.24 @ 09:17) >    No acute pulmonary embolism.    Mucoid secretions with impaction right middle lobe and lower lobe bronchi.  Partial right middle lobe atelectasis, new from prior exam.    Persistent atelectasis/airspace consolidation right lower lobe.    Few scattered groundglass opacities left upper lobe, could reflect acute   respiratoryinfection.    Probable chronic interstitial lung disease superimposed on background of   emphysema.    Stable 1.5 cm nodule left lower lobe.    Recommend short interval follow-up CT scan of the chest in 12 weeks.    Other findings as discussed above.    < end of copied text >

## 2024-09-11 LAB
-  AMOXICILLIN/CLAVULANIC ACID: SIGNIFICANT CHANGE UP
-  AMPICILLIN/SULBACTAM: SIGNIFICANT CHANGE UP
-  AMPICILLIN: SIGNIFICANT CHANGE UP
-  AZTREONAM: SIGNIFICANT CHANGE UP
-  CEFAZOLIN: SIGNIFICANT CHANGE UP
-  CEFEPIME: SIGNIFICANT CHANGE UP
-  CEFOXITIN: SIGNIFICANT CHANGE UP
-  CEFTRIAXONE: SIGNIFICANT CHANGE UP
-  CIPROFLOXACIN: SIGNIFICANT CHANGE UP
-  ERTAPENEM: SIGNIFICANT CHANGE UP
-  GENTAMICIN: SIGNIFICANT CHANGE UP
-  IMIPENEM: SIGNIFICANT CHANGE UP
-  LEVOFLOXACIN: SIGNIFICANT CHANGE UP
-  MEROPENEM: SIGNIFICANT CHANGE UP
-  NITROFURANTOIN: SIGNIFICANT CHANGE UP
-  PIPERACILLIN/TAZOBACTAM: SIGNIFICANT CHANGE UP
-  TOBRAMYCIN: SIGNIFICANT CHANGE UP
-  TRIMETHOPRIM/SULFAMETHOXAZOLE: SIGNIFICANT CHANGE UP
ANION GAP SERPL CALC-SCNC: 16 MMOL/L — HIGH (ref 7–14)
BASOPHILS # BLD AUTO: 0.05 K/UL — SIGNIFICANT CHANGE UP (ref 0–0.2)
BASOPHILS NFR BLD AUTO: 0.4 % — SIGNIFICANT CHANGE UP (ref 0–2)
BUN SERPL-MCNC: 24 MG/DL — HIGH (ref 7–23)
CALCIUM SERPL-MCNC: 9.4 MG/DL — SIGNIFICANT CHANGE UP (ref 8.4–10.5)
CHLORIDE SERPL-SCNC: 94 MMOL/L — LOW (ref 98–107)
CO2 SERPL-SCNC: 26 MMOL/L — SIGNIFICANT CHANGE UP (ref 22–31)
CREAT SERPL-MCNC: 0.96 MG/DL — SIGNIFICANT CHANGE UP (ref 0.5–1.3)
EGFR: 56 ML/MIN/1.73M2 — LOW
EOSINOPHIL # BLD AUTO: 0.35 K/UL — SIGNIFICANT CHANGE UP (ref 0–0.5)
EOSINOPHIL NFR BLD AUTO: 2.8 % — SIGNIFICANT CHANGE UP (ref 0–6)
GLUCOSE SERPL-MCNC: 223 MG/DL — HIGH (ref 70–99)
HCT VFR BLD CALC: 30.1 % — LOW (ref 34.5–45)
HGB BLD-MCNC: 9.7 G/DL — LOW (ref 11.5–15.5)
IANC: 10.1 K/UL — HIGH (ref 1.8–7.4)
IMM GRANULOCYTES NFR BLD AUTO: 0.4 % — SIGNIFICANT CHANGE UP (ref 0–0.9)
LYMPHOCYTES # BLD AUTO: 1.06 K/UL — SIGNIFICANT CHANGE UP (ref 1–3.3)
LYMPHOCYTES # BLD AUTO: 8.5 % — LOW (ref 13–44)
MAGNESIUM SERPL-MCNC: 1.8 MG/DL — SIGNIFICANT CHANGE UP (ref 1.6–2.6)
MCHC RBC-ENTMCNC: 28.4 PG — SIGNIFICANT CHANGE UP (ref 27–34)
MCHC RBC-ENTMCNC: 32.2 GM/DL — SIGNIFICANT CHANGE UP (ref 32–36)
MCV RBC AUTO: 88 FL — SIGNIFICANT CHANGE UP (ref 80–100)
METHOD TYPE: SIGNIFICANT CHANGE UP
MONOCYTES # BLD AUTO: 0.84 K/UL — SIGNIFICANT CHANGE UP (ref 0–0.9)
MONOCYTES NFR BLD AUTO: 6.7 % — SIGNIFICANT CHANGE UP (ref 2–14)
NEUTROPHILS # BLD AUTO: 10.1 K/UL — HIGH (ref 1.8–7.4)
NEUTROPHILS NFR BLD AUTO: 81.2 % — HIGH (ref 43–77)
NRBC # BLD: 0 /100 WBCS — SIGNIFICANT CHANGE UP (ref 0–0)
NRBC # FLD: 0 K/UL — SIGNIFICANT CHANGE UP (ref 0–0)
PHOSPHATE SERPL-MCNC: 3.6 MG/DL — SIGNIFICANT CHANGE UP (ref 2.5–4.5)
PLATELET # BLD AUTO: 399 K/UL — SIGNIFICANT CHANGE UP (ref 150–400)
POTASSIUM SERPL-MCNC: 3.7 MMOL/L — SIGNIFICANT CHANGE UP (ref 3.5–5.3)
POTASSIUM SERPL-SCNC: 3.7 MMOL/L — SIGNIFICANT CHANGE UP (ref 3.5–5.3)
RBC # BLD: 3.42 M/UL — LOW (ref 3.8–5.2)
RBC # FLD: 14.7 % — HIGH (ref 10.3–14.5)
SODIUM SERPL-SCNC: 136 MMOL/L — SIGNIFICANT CHANGE UP (ref 135–145)
WBC # BLD: 12.45 K/UL — HIGH (ref 3.8–10.5)
WBC # FLD AUTO: 12.45 K/UL — HIGH (ref 3.8–10.5)

## 2024-09-11 RX ORDER — FUROSEMIDE 40 MG
20 TABLET ORAL DAILY
Refills: 0 | Status: DISCONTINUED | OUTPATIENT
Start: 2024-09-12 | End: 2024-09-14

## 2024-09-11 RX ORDER — NITROFURANTOIN MONOHYD/M-CRYST 100 MG
100 CAPSULE ORAL
Refills: 0 | Status: DISCONTINUED | OUTPATIENT
Start: 2024-09-11 | End: 2024-09-12

## 2024-09-11 RX ORDER — GUAIFENESIN 100 MG/5ML
200 LIQUID ORAL ONCE
Refills: 0 | Status: COMPLETED | OUTPATIENT
Start: 2024-09-11 | End: 2024-09-12

## 2024-09-11 RX ADMIN — INSULIN GLARGINE 8 UNIT(S): 100 INJECTION, SOLUTION SUBCUTANEOUS at 22:01

## 2024-09-11 RX ADMIN — FLUTICASONE PROPIONATE AND SALMETEROL 1 DOSE(S): 250; 50 POWDER RESPIRATORY (INHALATION) at 11:06

## 2024-09-11 RX ADMIN — Medication 20 MILLIGRAM(S): at 22:04

## 2024-09-11 RX ADMIN — Medication 100 MILLIGRAM(S): at 11:56

## 2024-09-11 RX ADMIN — Medication 100 MILLIGRAM(S): at 22:02

## 2024-09-11 RX ADMIN — FLUTICASONE PROPIONATE AND SALMETEROL 1 DOSE(S): 250; 50 POWDER RESPIRATORY (INHALATION) at 22:04

## 2024-09-11 RX ADMIN — CHLORHEXIDINE GLUCONATE 1 APPLICATION(S): 40 SOLUTION TOPICAL at 11:15

## 2024-09-11 RX ADMIN — Medication 81 MILLIGRAM(S): at 11:04

## 2024-09-11 RX ADMIN — Medication 2: at 12:48

## 2024-09-11 RX ADMIN — Medication 1: at 22:03

## 2024-09-11 RX ADMIN — FLUTICASONE PROPIONATE 1 SPRAY(S): 50 SPRAY, METERED NASAL at 05:30

## 2024-09-11 RX ADMIN — Medication 100 MILLIGRAM(S): at 22:01

## 2024-09-11 RX ADMIN — FLUTICASONE PROPIONATE 1 SPRAY(S): 50 SPRAY, METERED NASAL at 18:03

## 2024-09-11 RX ADMIN — Medication 3: at 18:02

## 2024-09-11 RX ADMIN — Medication 2: at 09:01

## 2024-09-11 RX ADMIN — Medication 20 MILLIGRAM(S): at 11:04

## 2024-09-11 RX ADMIN — Medication 400 MILLIGRAM(S): at 22:03

## 2024-09-11 RX ADMIN — Medication 40 MILLIGRAM(S): at 05:29

## 2024-09-11 RX ADMIN — METOPROLOL TARTRATE 50 MILLIGRAM(S): 100 TABLET ORAL at 18:02

## 2024-09-11 RX ADMIN — METOPROLOL TARTRATE 50 MILLIGRAM(S): 100 TABLET ORAL at 05:29

## 2024-09-11 NOTE — PROGRESS NOTE ADULT - ASSESSMENT
89 y/o F w/COPD, former smoker, as needed O2, CVA, CAD status post stent, DM, HTN, HLD, lung cancer scheduled for VATS on Thursday presenting w CHF and possible UTI    #HFpEF  -recent TTW noted  -normal LVEF and mild AS  -change lasix to 20mg PO daily after am IV dose    #CAD s/p PCI  -stable  -cont asa, statin, BB    #Lung CA  -preop for LLL VATS w thoracic on sunday  -plan to optimize volume status    #Hypoxia  -mulifactorial  -diureses as above  -pulm eval    #UTI  -iv abx per med      35 minutes spent on total encounter; more than 50% of the visit was spent counseling and/or coordinating care by the attending physician.

## 2024-09-11 NOTE — PROGRESS NOTE ADULT - SUBJECTIVE AND OBJECTIVE BOX
[facility], Division of Infectious Diseases  KRISSY Luong Y. Patel, S. Shah, G. Dax  [phone]  ([phone] - weekdays after 5pm and weekends)    Name: FEDERICO DOS SANTOS  Age/Gender: 89y Female  MRN: [id number]    Interval History:  Notes reviewed.   No concerning overnight events.  Afebrile.   denies dysuria or  symptoms    Allergies: penicillin (Unknown)  macrolide antibiotics (Other)  Biaxin (Unknown)      Objective:  Vitals:   T(F): 98.2 (09-11-24 @ 10:11), Max: 98.3 (09-10-24 @ 18:04)  HR: 85 (09-11-24 @ 10:11) (84 - 93)  BP: 122/40 (09-11-24 @ 10:11) (110/43 - 127/52)  RR: 18 (09-11-24 @ 10:11) (18 - 18)  SpO2: 96% (09-11-24 @ 10:11) (95% - 99%)  Physical Examination:  General: no acute distress  HEENT: anicteric, NC  Cardio: S1, S2, normal rate  Resp: clear to auscultation anteriorly  Abd: soft, NT, ND, no suprapubic tenderness  Ext: no LE edema  Skin: warm, dry    Laboratory Studies:  CBC:                       9.7    12.45 )-----------( 399      ( 11 Sep 2024 05:20 )             30.1     WBC Trend:  12.45 09-11-24 @ 05:20  9.44 09-10-24 @ 05:50  17.45 09-09-24 @ 06:35  15.09 09-08-24 @ 21:17  5.75 09-05-24 @ 11:48    CMP: 09-11    136  |  94<L>  |  24<H>  ----------------------------<  223<H>  3.7   |  26  |  0.96    Ca    9.4      11 Sep 2024 05:20  Phos  3.6     09-11  Mg     1.80     09-11            Urinalysis Basic - ( 11 Sep 2024 05:20 )    Color: x / Appearance: x / SG: x / pH: x  Gluc: 223 mg/dL / Ketone: x  / Bili: x / Urobili: x   Blood: x / Protein: x / Nitrite: x   Leuk Esterase: x / RBC: x / WBC x   Sq Epi: x / Non Sq Epi: x / Bacteria: x      Microbiology: reviewed     Culture - Blood (collected 09-09-24 @ 10:55)  Source: .Blood Blood-Peripheral  Preliminary Report (09-10-24 @ 17:01):    No growth at 24 hours    Culture - Blood (collected 09-09-24 @ 10:45)  Source: .Blood Blood-Peripheral  Preliminary Report (09-10-24 @ 17:01):    No growth at 24 hours    Urinalysis with Rflx Culture (collected 09-08-24 @ 21:17)    Culture - Urine (collected 09-08-24 @ 21:17)  Source: Clean Catch  Preliminary Report (09-10-24 @ 13:46):    10,000 - 49,000 CFU/mL Citrobacter freundii complex    50,000 - 99,000 CFU/mL Enterococcus faecium        Radiology: reviewed     Medications:  acetaminophen   IVPB .. 1000 milliGRAM(s) IV Intermittent once  artificial tears (preservative free) Ophthalmic Solution 1 Drop(s) Both EYES two times a day PRN  aspirin enteric coated 81 milliGRAM(s) Oral daily  atorvastatin 20 milliGRAM(s) Oral at bedtime  cefTRIAXone   IVPB 1000 milliGRAM(s) IV Intermittent every 24 hours  chlorhexidine 2% Cloths 1 Application(s) Topical daily  dextrose 5%. 1000 milliLiter(s) IV Continuous <Continuous>  dextrose 5%. 1000 milliLiter(s) IV Continuous <Continuous>  dextrose 50% Injectable 25 Gram(s) IV Push once  dextrose 50% Injectable 25 Gram(s) IV Push once  dextrose 50% Injectable 12.5 Gram(s) IV Push once  dextrose Oral Gel 15 Gram(s) Oral once PRN  fluticasone propionate 50 MICROgram(s)/spray Nasal Spray 1 Spray(s) Both Nostrils two times a day  fluticasone propionate/ salmeterol 250-50 MICROgram(s) Diskus 1 Dose(s) Inhalation two times a day  furosemide   Injectable 20 milliGRAM(s) IV Push every 12 hours  gabapentin 400 milliGRAM(s) Oral at bedtime  glucagon  Injectable 1 milliGRAM(s) IntraMuscular once  insulin glargine Injectable (LANTUS) 8 Unit(s) SubCutaneous at bedtime  insulin lispro (ADMELOG) corrective regimen sliding scale   SubCutaneous at bedtime  insulin lispro (ADMELOG) corrective regimen sliding scale   SubCutaneous three times a day before meals  metoprolol tartrate 50 milliGRAM(s) Oral two times a day  pantoprazole    Tablet 40 milliGRAM(s) Oral before breakfast    Antimicrobials:  cefTRIAXone   IVPB 1000 milliGRAM(s) IV Intermittent every 24 hours

## 2024-09-11 NOTE — PROGRESS NOTE ADULT - SUBJECTIVE AND OBJECTIVE BOX
THORACIC SURGERY PROGRESS NOTE      Patient was admitted after her UA was positive at presurgical testing. Now diagnosed with a citrobacter freundii and E faecium UTI.       SUBJECTIVE:  Patient examined at bedside, no complaints. Inquiring if her lung surgery is still happening tomorrow.  No nausea, no vomiting  Tolerating diet  ID just changed her antibiotics to Macrobid  PO lasix started for pulmonary edema  White count increased from yesterday      OBJECTIVE:  VITALS:  T(F): 97.2 (09-11-24 @ 13:41), Max: 98.3 (09-10-24 @ 18:04)  HR: 92 (09-11-24 @ 13:41) (84 - 93)  BP: 129/53 (09-11-24 @ 13:41) (110/43 - 129/53)  RR: 18 (09-11-24 @ 13:41) (18 - 18)  SpO2: 95% (09-11-24 @ 13:41) (95% - 99%)      09-10 @ 07:01  -  09-11 @ 07:00  --------------------------------------------------------  IN:    Oral Fluid: 1140 mL  Total IN: 1140 mL    OUT:    Voided (mL): 2100 mL  Total OUT: 2100 mL    Total NET: -960 mL      09-11 @ 07:01  -  09-11 @ 13:52  --------------------------------------------------------  IN:    Oral Fluid: 480 mL  Total IN: 480 mL    OUT:    Blood Loss (mL): 0 mL    IV PiggyBack: 0 mL    Voided (mL): 500 mL  Total OUT: 500 mL    Total NET: -20 mL      LABS:                        9.7    12.45 )-----------( 399      ( 11 Sep 2024 05:20 )             30.1     09-11    136  |  94<L>  |  24<H>  ----------------------------<  223<H>  3.7   |  26  |  0.96    Ca    9.4      11 Sep 2024 05:20  Phos  3.6     09-11  Mg     1.80     09-11      PHYSICAL EXAM:   General: AAO x3, No acute distress  Skin: No jaundice, no icterus  Cardiac: S1 & S2 present, rate and rhythm are regular  Abdomen: soft, nontender, nondistended, no rebound tenderness, no guarding, no palpable masses  Extremities: non edematous, no calf pain bilaterally

## 2024-09-11 NOTE — PROGRESS NOTE ADULT - SUBJECTIVE AND OBJECTIVE BOX
LABS:                        9.7    12.45 )-----------( 399      ( 11 Sep 2024 05:20 )             30.1     09-11    136  |  94<L>  |  24<H>  ----------------------------<  223<H>  3.7   |  26  |  0.96    Ca    9.4      11 Sep 2024 05:20  Phos  3.6     09-11  Mg     1.80     09-11        CAPILLARY BLOOD GLUCOSE      POCT Blood Glucose.: 244 mg/dL (11 Sep 2024 12:42)  POCT Blood Glucose.: 243 mg/dL (11 Sep 2024 08:41)  POCT Blood Glucose.: 295 mg/dL (10 Sep 2024 22:44)  POCT Blood Glucose.: 233 mg/dL (10 Sep 2024 17:23)        Urinalysis Basic - ( 11 Sep 2024 05:20 )    Color: x / Appearance: x / SG: x / pH: x  Gluc: 223 mg/dL / Ketone: x  / Bili: x / Urobili: x   Blood: x / Protein: x / Nitrite: x   Leuk Esterase: x / RBC: x / WBC x   Sq Epi: x / Non Sq Epi: x / Bacteria: x        RADIOLOGY & ADDITIONAL TESTS:    Imaging Personally Reviewed:  [x] YES  [ ] NO    Case discussed with NPP:  [X] YES  [ ] NONo new symptoms overnight or this morning    Vital Signs Last 24 Hrs  T(C): 36.2 (11 Sep 2024 13:41), Max: 36.8 (10 Sep 2024 18:04)  T(F): 97.2 (11 Sep 2024 13:41), Max: 98.3 (10 Sep 2024 18:04)  HR: 92 (11 Sep 2024 13:41) (84 - 93)  BP: 129/53 (11 Sep 2024 13:41) (114/50 - 129/53)  BP(mean): --  RR: 18 (11 Sep 2024 13:41) (18 - 18)  SpO2: 95% (11 Sep 2024 13:41) (95% - 99%)    I&O's Summary    09-10-24 @ 07:01  -  09-11-24 @ 07:00  --------------------------------------------------------  IN: 1140 mL / OUT: 2100 mL / NET: -960 mL    09-11-24 @ 07:01  -  09-11-24 @ 14:45  --------------------------------------------------------  IN: 480 mL / OUT: 500 mL / NET: -20 mL        Gen: NAD  Head: NCAT, EOMI  Lungs: CTA b/l  Heart: RRR, nl S1/S2, no murmurs  Abd: soft, NTND, NABS  Neuro: A+O x 3  Exts: no LE edema b/l     No new symptoms overnight or this morning    Vital Signs Last 24 Hrs  T(C): 36.2 (11 Sep 2024 13:41), Max: 36.8 (10 Sep 2024 18:04)  T(F): 97.2 (11 Sep 2024 13:41), Max: 98.3 (10 Sep 2024 18:04)  HR: 92 (11 Sep 2024 13:41) (84 - 93)  BP: 129/53 (11 Sep 2024 13:41) (114/50 - 129/53)  BP(mean): --  RR: 18 (11 Sep 2024 13:41) (18 - 18)  SpO2: 95% (11 Sep 2024 13:41) (95% - 99%)    I&O's Summary    09-10-24 @ 07:01  -  09-11-24 @ 07:00  --------------------------------------------------------  IN: 1140 mL / OUT: 2100 mL / NET: -960 mL    09-11-24 @ 07:01  -  09-11-24 @ 14:45  --------------------------------------------------------  IN: 480 mL / OUT: 500 mL / NET: -20 mL        Gen: NAD  Head: NCAT, EOMI  Lungs: CTA b/l  Heart: RRR, nl S1/S2, no murmurs  Abd: soft, NTND, NABS  Neuro: A+O x 3  Exts: no LE edema b/l    LABS:                        9.7    12.45 )-----------( 399      ( 11 Sep 2024 05:20 )             30.1     09-11    136  |  94<L>  |  24<H>  ----------------------------<  223<H>  3.7   |  26  |  0.96    Ca    9.4      11 Sep 2024 05:20  Phos  3.6     09-11  Mg     1.80     09-11        CAPILLARY BLOOD GLUCOSE      POCT Blood Glucose.: 244 mg/dL (11 Sep 2024 12:42)  POCT Blood Glucose.: 243 mg/dL (11 Sep 2024 08:41)  POCT Blood Glucose.: 295 mg/dL (10 Sep 2024 22:44)  POCT Blood Glucose.: 233 mg/dL (10 Sep 2024 17:23)        Urinalysis Basic - ( 11 Sep 2024 05:20 )    Color: x / Appearance: x / SG: x / pH: x  Gluc: 223 mg/dL / Ketone: x  / Bili: x / Urobili: x   Blood: x / Protein: x / Nitrite: x   Leuk Esterase: x / RBC: x / WBC x   Sq Epi: x / Non Sq Epi: x / Bacteria: x        RADIOLOGY & ADDITIONAL TESTS:    Imaging Personally Reviewed:  [x] YES  [ ] NO    Case discussed with NPP:  [X] YES  [ ] NO

## 2024-09-11 NOTE — PROGRESS NOTE ADULT - ASSESSMENT
89 -year-old female with COPD on 2L home O2 PRN, CVA (remote, no deficits), CAD s/p stent ('15), IDDM2, PVD s/p fem-pop bypass ('15) w/occlusion ('22), HLD, HTN, anemia, presenting with (+)UA noted during presurgical testing. Patient is scheduled to have a VATS LLL resection on Thursday. Patient reports going to her PCP and was prescribed abx, of which she took 1 dose prior to presenting t the ED at her daughter's behest. She notes having increased frequency of urination and dysuria for the past 1 week. She has a history of prior UTIs, last UTI 1 month ago. She notes chills but denies any fevers.  In the ED VS:  98.7  98-99  103-107/52-67  25  96-98% on 2-6L NC, received Ceftriaxone 1g IVPB x1 (09 Sep 2024 07:07):  She has copd and uses oxygen at home on an prn basis:  she felt feverish and chills at home and denies any sob:  on 32 L of oxygen:   -no wheezing  reportedly she had pet scan chest as an outpt and she has malignancy ;  she is scheduled for vats and left lower lobectomy on coming 12th , but admitted here for fever,  chills      UTI;  COPD;  PULMONARY NODULE: SUSPECTED MALIGNANCY ;   cad/s/p pci  DM2  PVD:  S/P FEM-POP BYPASS  HTN  HLD  ANEMIA      UTI;  -she is being  treated for uti with ceftriaxone   -likely reason for her chills at home;     9/10;  -she is on ceftriaxone for 5 days  9/11/ on ceftriaxone     COPD;  -she looks OK;   -no sob:  or wheezing   --VBG seems to be doing  ok   -on Adviar    PULMONARY NODULE: SUSPECTED MALIGNANCY ;   -she had 1.5 cm smnoud l e in left lower lobe;   -she says she had pet scan in April 2024;  showed; No acute pulmonary embolism. Mucoid secretions with impaction right middle lobe and lower lobe bronchi. Partial right middle lobe atelectasis, new from prior exam. Persistent atelectasis/airspace consolidation right lower lobe. Few scattered groundglass opacities left upper lobe, could reflect acute respiratory infection. Probable chronic interstitial lung disease superimposed on background of emphysema. Stable 1.5 cm nodule left lower lobe.  -now she is scheduled fr vats left lower lung resection/;  given postive pet scan as an outpt:   -cxr this time also suggests pulm edema;  and her EF was normal though ? could the changes e due t ILD?   -cont ADVAIR here   9/10:   cont diuresis:   -cards following  9/11: a above;  for possible surgery in am      cad/s/p pci  -cnt current meds;     DM2  -monitor and control     PVD:  S/P FEM-POP BYPASS  -cont current meds!    HTN  -controlled    HLD  -atorvastatin 20 milliGRAM(s) Oral at bedtime    ANEMIA  -She is mildly anemic ; monitor and transfuse as needed dw team     dw id

## 2024-09-11 NOTE — PROGRESS NOTE ADULT - ASSESSMENT
89 -year-old female with COPD on 2L home O2 PRN, CVA (remote, no deficits), CAD s/p stent ('15), IDDM2, PVD s/p fem-pop bypass ('15) w/occlusion ('22), HLD, HTN, anemia, presenting with (+)UA noted during presurgical testing.      Sepsis due to urinary tract infection based on WBC>12, HR>90  - lactate 2.7->1.2  - ceftriaxone 9/10/24 -->  - macrobid 9/11/24 -->  - urine cx 9/8/24 --> 10K-49K Citrobacter freundii + 50K-99K Enterococcus faecium  - blood cxs 9/9/24 --> negative  - MRSA PCR 9/10/24 --> negative  - daily CBC  - appreciate ID  - resolved    Acute-on-chronic HFpEF  - recent outpt TTE pasted into [place]  - daily weights  - initially started on lasix 20 mg IV bid  - appreciate cardiology    Type 2 diabetes mellitus, with unspecified complications  - c/w basal insulin, decreased from 10 units of deglutec to 8 units of glargine  - monitor FS QAC/HS w/ISS  - recent A1c 6.6%  - reports FS in 70s to low 100s, no recent hypoglycemic events, adherent with home regimen, took prior to arrival to ED last night  - hold home metformin    Essential hypertension  - c/w metoprolol tartrate  - c/w furosemide IVP, reportedly supposed to be taking furosemide 20 mg bid however takes 0-2x daily, reports her cardiologist wants her taking bid  - appreciate cardiology    Chronic obstructive pulmonary disease without acute exacerbation  - c/w LABA/ICS  - O2 PRN  - incentive spirometry  - appreciate pulmonology    Hyperlipidemia  - c/w statin    CAD of native arteries without angina pectoris   - c/w ASA, statin, metoprolol    LLL pulmonary nodule  - CTS to reschedule VATS as outpt    Need for prophylactic measure  - SCDs for VTE ppx

## 2024-09-11 NOTE — PROGRESS NOTE ADULT - SUBJECTIVE AND OBJECTIVE BOX
Date of Service: 09-11-24 @ 11:25    Patient is a 89y old  Female who presents with a chief complaint of UTI (11 Sep 2024 10:49)      Any change in ROS: seems OK:  no sob:  on 2 L of oxygen : mild cough     MEDICATIONS  (STANDING):  acetaminophen   IVPB .. 1000 milliGRAM(s) IV Intermittent once  aspirin enteric coated 81 milliGRAM(s) Oral daily  atorvastatin 20 milliGRAM(s) Oral at bedtime  cefTRIAXone   IVPB 1000 milliGRAM(s) IV Intermittent every 24 hours  chlorhexidine 2% Cloths 1 Application(s) Topical daily  dextrose 5%. 1000 milliLiter(s) (100 mL/Hr) IV Continuous <Continuous>  dextrose 5%. 1000 milliLiter(s) (50 mL/Hr) IV Continuous <Continuous>  dextrose 50% Injectable 12.5 Gram(s) IV Push once  dextrose 50% Injectable 25 Gram(s) IV Push once  dextrose 50% Injectable 25 Gram(s) IV Push once  fluticasone propionate 50 MICROgram(s)/spray Nasal Spray 1 Spray(s) Both Nostrils two times a day  fluticasone propionate/ salmeterol 250-50 MICROgram(s) Diskus 1 Dose(s) Inhalation two times a day  furosemide   Injectable 20 milliGRAM(s) IV Push every 12 hours  gabapentin 400 milliGRAM(s) Oral at bedtime  glucagon  Injectable 1 milliGRAM(s) IntraMuscular once  insulin glargine Injectable (LANTUS) 8 Unit(s) SubCutaneous at bedtime  insulin lispro (ADMELOG) corrective regimen sliding scale   SubCutaneous at bedtime  insulin lispro (ADMELOG) corrective regimen sliding scale   SubCutaneous three times a day before meals  metoprolol tartrate 50 milliGRAM(s) Oral two times a day  nitrofurantoin monohydrate/macrocrystals (MACROBID) 100 milliGRAM(s) Oral two times a day  pantoprazole    Tablet 40 milliGRAM(s) Oral before breakfast    MEDICATIONS  (PRN):  artificial tears (preservative free) Ophthalmic Solution 1 Drop(s) Both EYES two times a day PRN Dry Eyes  dextrose Oral Gel 15 Gram(s) Oral once PRN Blood Glucose LESS THAN 70 milliGRAM(s)/deciliter    Vital Signs Last 24 Hrs  T(C): 36.8 (11 Sep 2024 10:11), Max: 36.8 (10 Sep 2024 18:04)  T(F): 98.2 (11 Sep 2024 10:11), Max: 98.3 (10 Sep 2024 18:04)  HR: 85 (11 Sep 2024 10:11) (84 - 93)  BP: 122/40 (11 Sep 2024 10:11) (110/43 - 127/52)  BP(mean): --  RR: 18 (11 Sep 2024 10:11) (18 - 18)  SpO2: 96% (11 Sep 2024 10:11) (95% - 99%)    Parameters below as of 11 Sep 2024 10:11  Patient On (Oxygen Delivery Method): nasal cannula        I&O's Summary    10 Sep 2024 07:01  -  11 Sep 2024 07:00  --------------------------------------------------------  IN: 1140 mL / OUT: 2100 mL / NET: -960 mL    11 Sep 2024 07:01  -  11 Sep 2024 11:25  --------------------------------------------------------  IN: 240 mL / OUT: 300 mL / NET: -60 mL          Physical Exam:   GENERAL: NAD, well-groomed, well-developed  HEENT: MARCIO/   Atraumatic, Normocephalic  ENMT: No tonsillar erythema, exudates, or enlargement; Moist mucous membranes, Good dentition, No lesions  NECK: Supple, No JVD, Normal thyroid  CHEST/LUNG: Clear to auscultaion  CVS: Regular rate and rhythm; No murmurs, rubs, or gallops  GI: : Soft, Nontender, Nondistended; Bowel sounds present  NERVOUS SYSTEM:  Alert & Oriented X3  EXTREMITIES:  -edema  LYMPH: No lymphadenopathy noted  SKIN: No rashes or lesions  ENDOCRINOLOGY: No Thyromegaly  PSYCH: Appropriate    Labs:  31, 31                            9.7    12.45 )-----------( 399      ( 11 Sep 2024 05:20 )             30.1                         9.2    9.44  )-----------( 343      ( 10 Sep 2024 05:50 )             28.3                         9.1    17.45 )-----------( 346      ( 09 Sep 2024 06:35 )             27.8                         10.7   15.09 )-----------( 393      ( 08 Sep 2024 21:17 )             33.2     09-11    136  |  94<L>  |  24<H>  ----------------------------<  223<H>  3.7   |  26  |  0.96  09-10    134<L>  |  96<L>  |  20  ----------------------------<  216<H>  4.0   |  25  |  0.96  09-09    135  |  97<L>  |  22  ----------------------------<  206<H>  4.2   |  27  |  0.88  09-08    135  |  96<L>  |  20  ----------------------------<  213<H>  4.3   |  25  |  0.89    Ca    9.4      11 Sep 2024 05:20  Ca    9.0      10 Sep 2024 05:50  Phos  3.6     09-11  Phos  3.3     09-10  Mg     1.80     09-11  Mg     1.80     09-10    TPro  7.1  /  Alb  4.0  /  TBili  0.3  /  DBili  x   /  AST  17  /  ALT  11  /  AlkPhos  102  09-08    CAPILLARY BLOOD GLUCOSE      POCT Blood Glucose.: 243 mg/dL (11 Sep 2024 08:41)  POCT Blood Glucose.: 295 mg/dL (10 Sep 2024 22:44)  POCT Blood Glucose.: 233 mg/dL (10 Sep 2024 17:23)  POCT Blood Glucose.: 216 mg/dL (10 Sep 2024 12:28)          Urinalysis Basic - ( 11 Sep 2024 05:20 )    Color: x / Appearance: x / SG: x / pH: x  Gluc: 223 mg/dL / Ketone: x  / Bili: x / Urobili: x   Blood: x / Protein: x / Nitrite: x   Leuk Esterase: x / RBC: x / WBC x   Sq Epi: x / Non Sq Epi: x / Bacteria: x      Procalcitonin: 5.24 ng/mL (09-09 @ 06:35)        RECENT CULTURES:  09-09 @ 10:55 .Blood Blood-Peripheral       rad< from: Xray Chest 1 View- PORTABLE-Routine (Xray Chest 1 View- PORTABLE-Routine in AM.) (09.10.24 @ 07:21) >    Bilateral and diffuse interstitial opacities without cardiomegaly   unchanged from the last study.  Findings are likely cardiogenic in origin although interstitial lung   disease not excluded.  Right axilla surgical clips.  No effusions or pneumothorax.        COMPARISON: September 8        IMPRESSION: Stable bilateral interstitial opacities without cardiomegaly.    --- End of Report ---            SALVADOR ALVARES MD; Attending Radiologist  This document has been electronically signed. Sep 10 2024  9:46AM    < end of copied text >  < from: Xray Chest 2 Views PA/Lat (09.08.24 @ 22:33) >    FINDINGS:    The heart size is not accurately assessed on this projection.  Bilateral patchy airspace and interstitial lung opacities, with lower   lobe predominance. Interlobular septal thickening with Luiza B lines,   markedly increased. Small bilateral pleural effusions.  There is no pneumothorax.  Right axillary surgical clips.    No acute osseous findings    IMPRESSION:  Cardiogenic pulmonary edema, increased from mild pulmonary venous   congestion/perihilar interstitial edema.    Small bilateral pleural effusions/adjacent atelectasis, improved    --- End of Report ---          REJI LEONARD MD; Resident Radiologist  Thisdocument has been electronically signed.  MARTIN JUAN DO; Attending Radiologist  This document has been electronically signed. Sep  9 2024  2:09PM    < end of copied text >  < from: CT Chest No Cont (04.20.24 @ 08:51) >    IMPRESSION:    1.  Bilateral septal thickening, groundglass opacities, peripheral   consolidation and pleural effusions. These findings could occur inthe   setting of pulmonary edema, however, if there are no signs of pulmonary   edema, other entities including pneumonia, could be considered.    --- End of Report ---    < end of copied text >           No growth at 24 hours    09-09 @ 10:45 .Blood Blood-Peripheral                No growth at 24 hours    09-08 @ 21:17 Clean Catch                10,000 - 49,000 CFU/mL Citrobacter freundii complex  50,000 - 99,000 CFU/mL Enterococcus faecium          RESPIRATORY CULTURES:          Studies  Chest X-RAY  CT SCAN Chest   Venous Dopplers: LE:   CT Abdomen  Others

## 2024-09-11 NOTE — PROGRESS NOTE ADULT - ASSESSMENT
89 y/o female with a LLL nodule, COPD on 2L home O2 PRN, CVA (no deficits), CAD s/p stent (2015), IDDM2, PVD s/p fem-pop bypass (2015) w/occlusion (2022), HLD, HTN, anemia, presenting with (+)UA noted during presurgical testing and found to have a citrobacter freundii and E faecium UTI in addition to pulmonary edema.      1. Planned lung surgery for tomorrow has been cancelled due to active UTI and elevated white count.  2. Please optimize the patients pulmonary edema and UTI then discharge her home, we will bring her back as an outpatient for elective lung resection surgery.  3. No planned thoracic intervention during this hospitalization.   4. Plan per primary team, reconsult as needed.  5. Discussed with Dr. Lazar who agrees.

## 2024-09-11 NOTE — PROGRESS NOTE ADULT - ASSESSMENT
89 y/o F PMhx COPD on 2L home O2 PRN, CVA (remote, no deficits), CAD s/p stent, DM II, PVD s/p fem-pop bypass ('15) w/occlusion ('22), HTN who presented w/ dysuria and rigors  planned for LVATS, Lung Resection 9/12    UTI, rigors  reports dysuria, frequency, urgency- improved  denies flank pain  prior urine cultures reviewed  urine cx w/ citrobacter freundii and E faecium  blood cultures- NGTD    acute on chronic hypoxic resp failure  CXR w/ pulmonary edema    penicillin allergy  reaction is rash  tolerating ceftriaxone without issues    Recommendations  c/w ceftriaxone 1g daily  will add macrobid as ceftriaxone does not cover enterococcus  f/u urine culture sensitivities  diuresis per cards, pulm  trend wbc    Aldo Verduzco M.D.  [facility], Division of Infectious Diseases  [phone]  After 5pm on weekdays and all day on weekends - please call [phone]

## 2024-09-11 NOTE — PROGRESS NOTE ADULT - SUBJECTIVE AND OBJECTIVE BOX
CARDIOLOGY FOLLOW UP - Dr. Valenzuela  Date of Service: 09-11-24 @ 10:49    CC: no events    Review of Systems:  Constitutional: No fever, weight loss, or fatigue  Respiratory: No cough, wheezing, or hemoptysis, no shortness of breath  Cardiovascular: No chest pain, palpitations, passing out, dizziness, or leg swelling  Gastrointestinal: No abd or epigastric pain. No nausea, vomiting, or hematemesis; no diarrhea or consiptaiton, no melena or hematochezia  Vascular: No edema     TELEMETRY:    PHYSICAL EXAM:  T(C): 36.8 (09-11-24 @ 10:11), Max: 36.8 (09-10-24 @ 18:04)  HR: 85 (09-11-24 @ 10:11) (84 - 93)  BP: 122/40 (09-11-24 @ 10:11) (110/43 - 127/52)  RR: 18 (09-11-24 @ 10:11) (18 - 18)  SpO2: 96% (09-11-24 @ 10:11) (95% - 99%)  Wt(kg): --  I&O's Summary    10 Sep 2024 07:01  -  11 Sep 2024 07:00  --------------------------------------------------------  IN: 1140 mL / OUT: 2100 mL / NET: -960 mL    11 Sep 2024 07:01  -  11 Sep 2024 10:49  --------------------------------------------------------  IN: 240 mL / OUT: 300 mL / NET: -60 mL        Appearance: Normal	  Cardiovascular: Normal S1 S2,RRR, No JVD, No murmurs  Respiratory: Lungs clear to auscultation	  Gastrointestinal:  Soft, Non-tender, + BS	  Extremities: Normal range of motion, No clubbing, cyanosis or edema  Vascular: Peripheral pulses palpable 2+ bilaterally       Home Medications:  Advair Diskus 250 mcg-50 mcg inhalation powder: 1 puff(s) inhaled 2 times a day (09 Sep 2024 06:08)  aspirin 81 mg oral delayed release tablet: 1 tab(s) orally once a day (at bedtime) (09 Sep 2024 06:08)  atorvastatin 20 mg oral tablet: 1 tab(s) orally once a day (at bedtime) (09 Sep 2024 06:08)  Cranberry 500mg orally once a day- LD-09/5/24:  (09 Sep 2024 06:08)  fluticasone 50 mcg/inh inhalation powder: 1 puff(s) in each nostril 2 times a day (09 Sep 2024 06:08)  furosemide 20 mg oral tablet: 1 tab(s) orally once a day taking 1-2x daily depending on weight; occasionally skips medication if she has plans to go out (09 Sep 2024 06:06)  gabapentin 400 mg oral capsule: 1 cap(s) orally once a day (at bedtime) (09 Sep 2024 06:08)  ipratropium 42 mcg/inh (0.06%) nasal spray: 2 spray(s) intranasally 2 times a day (09 Sep 2024 06:08)  iron 65 mg orally twice daily:  (09 Sep 2024 06:08)  magnesium 250 mg orally twice a daily:  (09 Sep 2024 06:08)  MetFORMIN (Eqv-Fortamet) 500 mg oral tablet, extended release: 2 tab(s) orally once a day (09 Sep 2024 06:08)  metoprolol tartrate 50 mg oral tablet: 1 tab(s) orally 2 times a day (09 Sep 2024 06:08)  Multiple Vitamins oral tablet: 1 tab(s) orally once a day LD - 09/5/24 (09 Sep 2024 06:08)  omeprazole 40 mg oral delayed release capsule: 1 cap(s) orally once a day (09 Sep 2024 06:08)  PreserVision AREDS 2 oral capsule: 1 cap(s) orally 2 times a day (09 Sep 2024 06:08)  Systane eye drops 2 drop each eye  as needed:  (09 Sep 2024 06:08)  Tresiba FlexTouch 100 units/mL subcutaneous solution: 10 unit(s) subcutaneous once a day (at bedtime) (09 Sep 2024 06:08)  vitamin B12 2500mg orally once a day:  (09 Sep 2024 06:08)  Vitamin C 500mg orally once a day at night:  (09 Sep 2024 06:08)        MEDICATIONS  (STANDING):  acetaminophen   IVPB .. 1000 milliGRAM(s) IV Intermittent once  aspirin enteric coated 81 milliGRAM(s) Oral daily  atorvastatin 20 milliGRAM(s) Oral at bedtime  cefTRIAXone   IVPB 1000 milliGRAM(s) IV Intermittent every 24 hours  chlorhexidine 2% Cloths 1 Application(s) Topical daily  dextrose 5%. 1000 milliLiter(s) (50 mL/Hr) IV Continuous <Continuous>  dextrose 5%. 1000 milliLiter(s) (100 mL/Hr) IV Continuous <Continuous>  dextrose 50% Injectable 25 Gram(s) IV Push once  dextrose 50% Injectable 25 Gram(s) IV Push once  dextrose 50% Injectable 12.5 Gram(s) IV Push once  fluticasone propionate 50 MICROgram(s)/spray Nasal Spray 1 Spray(s) Both Nostrils two times a day  fluticasone propionate/ salmeterol 250-50 MICROgram(s) Diskus 1 Dose(s) Inhalation two times a day  furosemide   Injectable 20 milliGRAM(s) IV Push every 12 hours  gabapentin 400 milliGRAM(s) Oral at bedtime  glucagon  Injectable 1 milliGRAM(s) IntraMuscular once  insulin glargine Injectable (LANTUS) 8 Unit(s) SubCutaneous at bedtime  insulin lispro (ADMELOG) corrective regimen sliding scale   SubCutaneous at bedtime  insulin lispro (ADMELOG) corrective regimen sliding scale   SubCutaneous three times a day before meals  metoprolol tartrate 50 milliGRAM(s) Oral two times a day  nitrofurantoin monohydrate/macrocrystals (MACROBID) 100 milliGRAM(s) Oral two times a day  pantoprazole    Tablet 40 milliGRAM(s) Oral before breakfast        EKG:  RADIOLOGY:  DIAGNOSTIC TESTING:  [ ] Echocardiogram:  [ ] Catherterization:  [ ] Stress Test:  OTHER:     LABS:	 	                          9.7    12.45 )-----------( 399      ( 11 Sep 2024 05:20 )             30.1     09-11    136  |  94<L>  |  24<H>  ----------------------------<  223<H>  3.7   |  26  |  0.96    Ca    9.4      11 Sep 2024 05:20  Phos  3.6     09-11  Mg     1.80     09-11            CARDIAC MARKERS:

## 2024-09-12 ENCOUNTER — APPOINTMENT (OUTPATIENT)
Dept: THORACIC SURGERY | Facility: HOSPITAL | Age: 89
End: 2024-09-12

## 2024-09-12 LAB
-  AMPICILLIN: SIGNIFICANT CHANGE UP
-  CIPROFLOXACIN: SIGNIFICANT CHANGE UP
-  LEVOFLOXACIN: SIGNIFICANT CHANGE UP
-  NITROFURANTOIN: SIGNIFICANT CHANGE UP
-  TETRACYCLINE: SIGNIFICANT CHANGE UP
-  VANCOMYCIN: SIGNIFICANT CHANGE UP
ANION GAP SERPL CALC-SCNC: 17 MMOL/L — HIGH (ref 7–14)
BASOPHILS # BLD AUTO: 0.05 K/UL — SIGNIFICANT CHANGE UP (ref 0–0.2)
BASOPHILS NFR BLD AUTO: 0.3 % — SIGNIFICANT CHANGE UP (ref 0–2)
BUN SERPL-MCNC: 27 MG/DL — HIGH (ref 7–23)
CALCIUM SERPL-MCNC: 9.5 MG/DL — SIGNIFICANT CHANGE UP (ref 8.4–10.5)
CHLORIDE SERPL-SCNC: 93 MMOL/L — LOW (ref 98–107)
CO2 SERPL-SCNC: 25 MMOL/L — SIGNIFICANT CHANGE UP (ref 22–31)
CREAT SERPL-MCNC: 0.89 MG/DL — SIGNIFICANT CHANGE UP (ref 0.5–1.3)
CULTURE RESULTS: ABNORMAL
EGFR: 61 ML/MIN/1.73M2 — SIGNIFICANT CHANGE UP
EOSINOPHIL # BLD AUTO: 0.17 K/UL — SIGNIFICANT CHANGE UP (ref 0–0.5)
EOSINOPHIL NFR BLD AUTO: 1 % — SIGNIFICANT CHANGE UP (ref 0–6)
GLUCOSE SERPL-MCNC: 232 MG/DL — HIGH (ref 70–99)
HCT VFR BLD CALC: 34.4 % — LOW (ref 34.5–45)
HGB BLD-MCNC: 11.1 G/DL — LOW (ref 11.5–15.5)
IANC: 14.97 K/UL — HIGH (ref 1.8–7.4)
IMM GRANULOCYTES NFR BLD AUTO: 0.4 % — SIGNIFICANT CHANGE UP (ref 0–0.9)
LYMPHOCYTES # BLD AUTO: 1.12 K/UL — SIGNIFICANT CHANGE UP (ref 1–3.3)
LYMPHOCYTES # BLD AUTO: 6.6 % — LOW (ref 13–44)
MAGNESIUM SERPL-MCNC: 1.9 MG/DL — SIGNIFICANT CHANGE UP (ref 1.6–2.6)
MCHC RBC-ENTMCNC: 28.5 PG — SIGNIFICANT CHANGE UP (ref 27–34)
MCHC RBC-ENTMCNC: 32.3 GM/DL — SIGNIFICANT CHANGE UP (ref 32–36)
MCV RBC AUTO: 88.4 FL — SIGNIFICANT CHANGE UP (ref 80–100)
METHOD TYPE: SIGNIFICANT CHANGE UP
MONOCYTES # BLD AUTO: 0.69 K/UL — SIGNIFICANT CHANGE UP (ref 0–0.9)
MONOCYTES NFR BLD AUTO: 4 % — SIGNIFICANT CHANGE UP (ref 2–14)
NEUTROPHILS # BLD AUTO: 14.97 K/UL — HIGH (ref 1.8–7.4)
NEUTROPHILS NFR BLD AUTO: 87.7 % — HIGH (ref 43–77)
NRBC # BLD: 0 /100 WBCS — SIGNIFICANT CHANGE UP (ref 0–0)
NRBC # FLD: 0 K/UL — SIGNIFICANT CHANGE UP (ref 0–0)
ORGANISM # SPEC MICROSCOPIC CNT: ABNORMAL
PHOSPHATE SERPL-MCNC: 3.1 MG/DL — SIGNIFICANT CHANGE UP (ref 2.5–4.5)
PLATELET # BLD AUTO: 432 K/UL — HIGH (ref 150–400)
POTASSIUM SERPL-MCNC: 3.6 MMOL/L — SIGNIFICANT CHANGE UP (ref 3.5–5.3)
POTASSIUM SERPL-SCNC: 3.6 MMOL/L — SIGNIFICANT CHANGE UP (ref 3.5–5.3)
RBC # BLD: 3.89 M/UL — SIGNIFICANT CHANGE UP (ref 3.8–5.2)
RBC # FLD: 14.8 % — HIGH (ref 10.3–14.5)
SODIUM SERPL-SCNC: 135 MMOL/L — SIGNIFICANT CHANGE UP (ref 135–145)
SPECIMEN SOURCE: SIGNIFICANT CHANGE UP
TROPONIN T, HIGH SENSITIVITY RESULT: 21 NG/L — SIGNIFICANT CHANGE UP
WBC # BLD: 17.06 K/UL — HIGH (ref 3.8–10.5)
WBC # FLD AUTO: 17.06 K/UL — HIGH (ref 3.8–10.5)

## 2024-09-12 PROCEDURE — 93010 ELECTROCARDIOGRAM REPORT: CPT

## 2024-09-12 PROCEDURE — 71045 X-RAY EXAM CHEST 1 VIEW: CPT | Mod: 26

## 2024-09-12 RX ORDER — ACETAMINOPHEN 325 MG/1
650 TABLET ORAL ONCE
Refills: 0 | Status: COMPLETED | OUTPATIENT
Start: 2024-09-12 | End: 2024-09-12

## 2024-09-12 RX ORDER — HEPARIN SODIUM,BOVINE 1000/ML
5000 VIAL (ML) INJECTION EVERY 8 HOURS
Refills: 0 | Status: DISCONTINUED | OUTPATIENT
Start: 2024-09-12 | End: 2024-09-17

## 2024-09-12 RX ORDER — CEFEPIME 2 G/1
1000 INJECTION, POWDER, FOR SOLUTION INTRAVENOUS EVERY 24 HOURS
Refills: 0 | Status: DISCONTINUED | OUTPATIENT
Start: 2024-09-12 | End: 2024-09-16

## 2024-09-12 RX ORDER — IPRATROPIUM BROMIDE AND ALBUTEROL SULFATE .5; 3 MG/3ML; MG/3ML
3 SOLUTION RESPIRATORY (INHALATION) ONCE
Refills: 0 | Status: COMPLETED | OUTPATIENT
Start: 2024-09-12 | End: 2024-09-12

## 2024-09-12 RX ORDER — VANCOMYCIN/0.9 % SOD CHLORIDE 1.75G/25
750 PLASTIC BAG, INJECTION (ML) INTRAVENOUS ONCE
Refills: 0 | Status: COMPLETED | OUTPATIENT
Start: 2024-09-12 | End: 2024-09-12

## 2024-09-12 RX ADMIN — FLUTICASONE PROPIONATE AND SALMETEROL 1 DOSE(S): 250; 50 POWDER RESPIRATORY (INHALATION) at 21:22

## 2024-09-12 RX ADMIN — Medication 20 MILLIGRAM(S): at 21:19

## 2024-09-12 RX ADMIN — GUAIFENESIN 200 MILLIGRAM(S): 100 LIQUID ORAL at 01:09

## 2024-09-12 RX ADMIN — FLUTICASONE PROPIONATE AND SALMETEROL 1 DOSE(S): 250; 50 POWDER RESPIRATORY (INHALATION) at 11:30

## 2024-09-12 RX ADMIN — FLUTICASONE PROPIONATE 1 SPRAY(S): 50 SPRAY, METERED NASAL at 17:44

## 2024-09-12 RX ADMIN — Medication 5000 UNIT(S): at 13:37

## 2024-09-12 RX ADMIN — Medication 400 MILLIGRAM(S): at 21:19

## 2024-09-12 RX ADMIN — INSULIN GLARGINE 8 UNIT(S): 100 INJECTION, SOLUTION SUBCUTANEOUS at 21:19

## 2024-09-12 RX ADMIN — ACETAMINOPHEN 650 MILLIGRAM(S): 325 TABLET ORAL at 11:28

## 2024-09-12 RX ADMIN — METOPROLOL TARTRATE 50 MILLIGRAM(S): 100 TABLET ORAL at 17:44

## 2024-09-12 RX ADMIN — Medication 3: at 08:45

## 2024-09-12 RX ADMIN — Medication 6: at 12:57

## 2024-09-12 RX ADMIN — METOPROLOL TARTRATE 50 MILLIGRAM(S): 100 TABLET ORAL at 05:54

## 2024-09-12 RX ADMIN — Medication 40 MILLIGRAM(S): at 05:52

## 2024-09-12 RX ADMIN — ACETAMINOPHEN 650 MILLIGRAM(S): 325 TABLET ORAL at 11:58

## 2024-09-12 RX ADMIN — CHLORHEXIDINE GLUCONATE 1 APPLICATION(S): 40 SOLUTION TOPICAL at 11:29

## 2024-09-12 RX ADMIN — CEFEPIME 100 MILLIGRAM(S): 2 INJECTION, POWDER, FOR SOLUTION INTRAVENOUS at 13:36

## 2024-09-12 RX ADMIN — Medication 5000 UNIT(S): at 21:28

## 2024-09-12 RX ADMIN — Medication 250 MILLIGRAM(S): at 11:29

## 2024-09-12 RX ADMIN — Medication 100 MILLIGRAM(S): at 05:53

## 2024-09-12 RX ADMIN — Medication 81 MILLIGRAM(S): at 11:28

## 2024-09-12 RX ADMIN — IPRATROPIUM BROMIDE AND ALBUTEROL SULFATE 3 MILLILITER(S): .5; 3 SOLUTION RESPIRATORY (INHALATION) at 11:09

## 2024-09-12 RX ADMIN — Medication 1: at 21:21

## 2024-09-12 RX ADMIN — Medication 20 MILLIGRAM(S): at 05:52

## 2024-09-12 RX ADMIN — Medication 5: at 18:30

## 2024-09-12 RX ADMIN — FLUTICASONE PROPIONATE 1 SPRAY(S): 50 SPRAY, METERED NASAL at 05:54

## 2024-09-12 NOTE — PROGRESS NOTE ADULT - SUBJECTIVE AND OBJECTIVE BOX
CARDIOLOGY FOLLOW UP - Dr. Valenzuela  Date of Service: 09-12-24 @ 11:29    CC: no events    Review of Systems:  Constitutional: No fever, weight loss, or fatigue  Respiratory: No cough, wheezing, or hemoptysis, no shortness of breath  Cardiovascular: No chest pain, palpitations, passing out, dizziness, or leg swelling  Gastrointestinal: No abd or epigastric pain. No nausea, vomiting, or hematemesis; no diarrhea or consiptaiton, no melena or hematochezia  Vascular: No edema     TELEMETRY:    PHYSICAL EXAM:  T(C): 36.8 (09-12-24 @ 09:58), Max: 37.6 (09-11-24 @ 21:35)  HR: 95 (09-12-24 @ 11:13) (91 - 100)  BP: 111/40 (09-12-24 @ 09:58) (111/40 - 129/53)  RR: 18 (09-12-24 @ 09:58) (18 - 18)  SpO2: 95% (09-12-24 @ 11:13) (93% - 97%)  Wt(kg): --  I&O's Summary    11 Sep 2024 07:01  -  12 Sep 2024 07:00  --------------------------------------------------------  IN: 1370 mL / OUT: 1700 mL / NET: -330 mL        Appearance: Normal	  Cardiovascular: Normal S1 S2,RRR, No JVD, No murmurs  Respiratory: Lungs clear to auscultation	  Gastrointestinal:  Soft, Non-tender, + BS	  Extremities: Normal range of motion, No clubbing, cyanosis or edema  Vascular: Peripheral pulses palpable 2+ bilaterally       Home Medications:  Advair Diskus 250 mcg-50 mcg inhalation powder: 1 puff(s) inhaled 2 times a day (09 Sep 2024 06:08)  aspirin 81 mg oral delayed release tablet: 1 tab(s) orally once a day (at bedtime) (09 Sep 2024 06:08)  atorvastatin 20 mg oral tablet: 1 tab(s) orally once a day (at bedtime) (09 Sep 2024 06:08)  Cranberry 500mg orally once a day- LD-09/5/24:  (09 Sep 2024 06:08)  fluticasone 50 mcg/inh inhalation powder: 1 puff(s) in each nostril 2 times a day (09 Sep 2024 06:08)  furosemide 20 mg oral tablet: 1 tab(s) orally once a day taking 1-2x daily depending on weight; occasionally skips medication if she has plans to go out (09 Sep 2024 06:06)  gabapentin 400 mg oral capsule: 1 cap(s) orally once a day (at bedtime) (09 Sep 2024 06:08)  ipratropium 42 mcg/inh (0.06%) nasal spray: 2 spray(s) intranasally 2 times a day (09 Sep 2024 06:08)  iron 65 mg orally twice daily:  (09 Sep 2024 06:08)  magnesium 250 mg orally twice a daily:  (09 Sep 2024 06:08)  MetFORMIN (Eqv-Fortamet) 500 mg oral tablet, extended release: 2 tab(s) orally once a day (09 Sep 2024 06:08)  metoprolol tartrate 50 mg oral tablet: 1 tab(s) orally 2 times a day (09 Sep 2024 06:08)  Multiple Vitamins oral tablet: 1 tab(s) orally once a day LD - 09/5/24 (09 Sep 2024 06:08)  omeprazole 40 mg oral delayed release capsule: 1 cap(s) orally once a day (09 Sep 2024 06:08)  PreserVision AREDS 2 oral capsule: 1 cap(s) orally 2 times a day (09 Sep 2024 06:08)  Systane eye drops 2 drop each eye  as needed:  (09 Sep 2024 06:08)  Tresiba FlexTouch 100 units/mL subcutaneous solution: 10 unit(s) subcutaneous once a day (at bedtime) (09 Sep 2024 06:08)  vitamin B12 2500mg orally once a day:  (09 Sep 2024 06:08)  Vitamin C 500mg orally once a day at night:  (09 Sep 2024 06:08)        MEDICATIONS  (STANDING):  acetaminophen     Tablet .. 650 milliGRAM(s) Oral once  acetaminophen   IVPB .. 1000 milliGRAM(s) IV Intermittent once  aspirin enteric coated 81 milliGRAM(s) Oral daily  atorvastatin 20 milliGRAM(s) Oral at bedtime  cefepime   IVPB 1000 milliGRAM(s) IV Intermittent every 24 hours  chlorhexidine 2% Cloths 1 Application(s) Topical daily  dextrose 5%. 1000 milliLiter(s) (50 mL/Hr) IV Continuous <Continuous>  dextrose 5%. 1000 milliLiter(s) (100 mL/Hr) IV Continuous <Continuous>  dextrose 50% Injectable 25 Gram(s) IV Push once  dextrose 50% Injectable 25 Gram(s) IV Push once  dextrose 50% Injectable 12.5 Gram(s) IV Push once  fluticasone propionate 50 MICROgram(s)/spray Nasal Spray 1 Spray(s) Both Nostrils two times a day  fluticasone propionate/ salmeterol 250-50 MICROgram(s) Diskus 1 Dose(s) Inhalation two times a day  furosemide    Tablet 20 milliGRAM(s) Oral daily  gabapentin 400 milliGRAM(s) Oral at bedtime  glucagon  Injectable 1 milliGRAM(s) IntraMuscular once  insulin glargine Injectable (LANTUS) 8 Unit(s) SubCutaneous at bedtime  insulin lispro (ADMELOG) corrective regimen sliding scale   SubCutaneous at bedtime  insulin lispro (ADMELOG) corrective regimen sliding scale   SubCutaneous three times a day before meals  metoprolol tartrate 50 milliGRAM(s) Oral two times a day  pantoprazole    Tablet 40 milliGRAM(s) Oral before breakfast  vancomycin  IVPB 750 milliGRAM(s) IV Intermittent once        EKG:  RADIOLOGY:  DIAGNOSTIC TESTING:  [ ] Echocardiogram:  [ ] Catherterization:  [ ] Stress Test:  OTHER:     LABS:	 	                          11.1   17.06 )-----------( 432      ( 12 Sep 2024 05:26 )             34.4     09-12    135  |  93<L>  |  27<H>  ----------------------------<  232<H>  3.6   |  25  |  0.89    Ca    9.5      12 Sep 2024 05:26  Phos  3.1     09-12  Mg     1.90     09-12            CARDIAC MARKERS:

## 2024-09-12 NOTE — CHART NOTE - NSCHARTNOTEFT_GEN_A_CORE
Called by RN patient report having SOB.   Patient was seen bedside states she is having tightness while taking a deep breath only 3/10.  Denies CP, Palpitation, N/V. States has not had a BM in 4 days.     Discussed with Dr. Gilbert and Dr Lizett Duncan.    Will order CT C/A/P  Will order chest xray   Resp asked to give a treatment.

## 2024-09-12 NOTE — PROGRESS NOTE ADULT - ASSESSMENT
89 -year-old female with COPD on 2L home O2 PRN, CVA (remote, no deficits), CAD s/p stent ('15), IDDM2, PVD s/p fem-pop bypass ('15) w/occlusion ('22), HLD, HTN, anemia, presenting with (+)UA noted during presurgical testing. Patient is scheduled to have a VATS LLL resection on Thursday. Patient reports going to her PCP and was prescribed abx, of which she took 1 dose prior to presenting t the ED at her daughter's behest. She notes having increased frequency of urination and dysuria for the past 1 week. She has a history of prior UTIs, last UTI 1 month ago. She notes chills but denies any fevers.  In the ED VS:  98.7  98-99  103-107/52-67  25  96-98% on 2-6L NC, received Ceftriaxone 1g IVPB x1 (09 Sep 2024 07:07):  She has copd and uses oxygen at home on an prn basis:  she felt feverish and chills at home and denies any sob:  on 32 L of oxygen:   -no wheezing  reportedly she had pet scan chest as an outpt and she has malignancy ;  she is scheduled for vats and left lower lobectomy on coming 12th , but admitted here for fever,  chills      UTI;  COPD;  PULMONARY NODULE: SUSPECTED MALIGNANCY ;   cad/s/p pci  DM2  PVD:  S/P FEM-POP BYPASS  HTN  HLD  ANEMIA      UTI;  -she is being  treated for uti with ceftriaxone   -likely reason for her chills at home;     9/10;  -she is on ceftriaxone for 5 days  9/11/ on ceftriaxone     9/12: : reced  on e dose of vanco : on nitrofurantoin for short term :  -would not advise  to cont nitrofurantoin for long term as she already had ILD and bronchiectasis:     COPD;  -she looks OK;   -no sob:  or wheezing   -VBG seems to be doing  ok   -on Adviar    PULMONARY NODULE: SUSPECTED MALIGNANCY ;   -she had 1.5 cm smnoud l e in left lower lobe;   -she says she had pet scan in April 2024;  showed; No acute pulmonary embolism. Mucoid secretions with impaction right middle lobe and lower lobe bronchi. Partial right middle lobe atelectasis, new from prior exam. Persistent atelectasis/airspace consolidation right lower lobe. Few scattered groundglass opacities left upper lobe, could reflect acute respiratory infection. Probable chronic interstitial lung disease superimposed on background of emphysema. Stable 1.5 cm nodule left lower lobe.  -now she is scheduled fr vats left lower lung resection/;  given postive pet scan as an outpt:   -cxr this time also suggests pulm edema;  and her EF was normal though ? could the changes e due t ILD?   -cont ADVAIR here   9/10:   cont diuresis:   -cards following  9/11: a above;  for possible surgery in am    9/12: she has 1.5 cm nodule in left lower lobe:  she was supposed to get surgery during this admission,   but now cancelled secondary to UTI and increasing WBC     cad/s/p pci  -cnt current meds;     DM2  -monitor and control     PVD:  S/P FEM-POP BYPASS  -cont current meds!    HTN  -controlled    HLD  -atorvastatin 20 milliGRAM(s) Oral at bedtime    ANEMIA  -She is mildly anemic ; monitor and transfuse as needed dw team     dw id/  PMD

## 2024-09-12 NOTE — PROGRESS NOTE ADULT - SUBJECTIVE AND OBJECTIVE BOX
WBC noted to have worsened to 17 this morning  Pt reported to me that whenever she takes a deep breath in, she feels substernal chest discomfort  She denied any     Vital Signs Last 24 Hrs  T(C): 36.8 (12 Sep 2024 09:58), Max: 37.6 (11 Sep 2024 21:35)  T(F): 98.2 (12 Sep 2024 09:58), Max: 99.6 (11 Sep 2024 21:35)  HR: 95 (12 Sep 2024 11:13) (91 - 100)  BP: 111/40 (12 Sep 2024 09:58) (111/40 - 129/53)  BP(mean): --  RR: 18 (12 Sep 2024 09:58) (18 - 18)  SpO2: 95% (12 Sep 2024 11:13) (93% - 97%)    I&O's Summary    09-11-24 @ 07:01  -  09-12-24 @ 07:00  --------------------------------------------------------  IN: 1370 mL / OUT: 1700 mL / NET: -330 mL        Gen: NAD  Head: NCAT, EOMI  Neck: supple without JVD  Lungs: CTA b/l  Heart: RRR, nl S1/S2, no murmurs  Abd: soft, NTND, NABS, no HSM  Neuro: A+O x 3, speech and comprehension are normal  Exts: no LE edema b/l  Psych: nl affect + mood  Skin: no rashes or jaundice        LABS:                        11.1   17.06 )-----------( 432      ( 12 Sep 2024 05:26 )             34.4     09-12    135  |  93<L>  |  27<H>  ----------------------------<  232<H>  3.6   |  25  |  0.89    Ca    9.5      12 Sep 2024 05:26  Phos  3.1     09-12  Mg     1.90     09-12        CAPILLARY BLOOD GLUCOSE      POCT Blood Glucose.: 251 mg/dL (12 Sep 2024 08:13)  POCT Blood Glucose.: 258 mg/dL (11 Sep 2024 21:16)  POCT Blood Glucose.: 261 mg/dL (11 Sep 2024 17:33)  POCT Blood Glucose.: 244 mg/dL (11 Sep 2024 12:42)        Urinalysis Basic - ( 12 Sep 2024 05:26 )    Color: x / Appearance: x / SG: x / pH: x  Gluc: 232 mg/dL / Ketone: x  / Bili: x / Urobili: x   Blood: x / Protein: x / Nitrite: x   Leuk Esterase: x / RBC: x / WBC x   Sq Epi: x / Non Sq Epi: x / Bacteria: x        RADIOLOGY & ADDITIONAL TESTS:    Imaging Personally Reviewed:  [x] YES  [ ] NO    Case discussed with NPP:  [X] YES  [ ] NO

## 2024-09-12 NOTE — PROGRESS NOTE ADULT - ASSESSMENT
89 y/o F w/COPD, former smoker, as needed O2, CVA, CAD status post stent, DM, HTN, HLD, lung cancer scheduled for VATS on Thursday presenting w CHF and possible UTI    #HFpEF  -recent TTW noted  -normal LVEF and mild AS  -cont lasix 20mg PO daily     #CAD s/p PCI  -stable  -cont asa, statin, BB    #Lung CA  -preop for LLL VATS w thoracic  -pt optimized    #Hypoxia  -mulifactorial  -diureses as above  -pulm eval noted        35 minutes spent on total encounter; more than 50% of the visit was spent counseling and/or coordinating care by the attending physician.

## 2024-09-12 NOTE — PROGRESS NOTE ADULT - ASSESSMENT
89 y/o F PMhx COPD on 2L home O2 PRN, CVA (remote, no deficits), CAD s/p stent, DM II, PVD s/p fem-pop bypass ('15) w/occlusion ('22), HTN who presented w/ dysuria and rigors  planned for LVATS, Lung Resection 9/12    UTI, rigors  reports dysuria, frequency, urgency- improved  denies flank pain  prior urine cultures reviewed  urine cx w/ citrobacter freundii and E faecium  blood cultures- NGTD    acute on chronic hypoxic resp failure  CXR w/ pulmonary edema    penicillin allergy  reaction is rash  tolerating ceftriaxone without issues    citrobacter sensitives reviewed  Recommendations  c/w macrobid for now  - will cover citrobacter and to cover E faecium as well  will give vanc x 1 to cover E faecium in case resistant to macrobid  check CT abd/pelvis w/ IV contrast and CT chest without contrast  switch ceftriaxone to cefepime for now pending above  f/u urine culture E faecium sensitivities  trend wbc    Aldo Verduzco M.D.  [facility], Division of Infectious Diseases  [phone]  After 5pm on weekdays and all day on weekends - please call [phone]  89 y/o F PMhx COPD on 2L home O2 PRN, CVA (remote, no deficits), CAD s/p stent, DM II, PVD s/p fem-pop bypass ('15) w/occlusion ('22), HTN who presented w/ dysuria and rigors  planned for LVATS, Lung Resection 9/12    UTI, rigors  reports dysuria, frequency, urgency- improved  denies flank pain  prior urine cultures reviewed  urine cx w/ citrobacter freundii and E faecium  blood cultures- NGTD    acute on chronic hypoxic resp failure  CXR w/ pulmonary edema    penicillin allergy  reaction is rash  tolerating ceftriaxone without issues    citrobacter sensitives reviewed  Recommendations  switch ceftriaxone to cefepime for now pending above  will give vanc x 1 to cover E faecium  discontinue macrobid  check CT abd/pelvis w/ IV contrast and CT chest without contrast  f/u urine culture E faecium sensitivities  trend wbc    Aldo Verduzco M.D.  [facility], Division of Infectious Diseases  [phone]  After 5pm on weekdays and all day on weekends - please call [phone]  89 y/o F PMhx COPD on 2L home O2 PRN, CVA (remote, no deficits), CAD s/p stent, DM II, PVD s/p fem-pop bypass ('15) w/occlusion ('22), HTN who presented w/ dysuria and rigors  planned for LVATS, Lung Resection 9/12    UTI, rigors  reports dysuria, frequency, urgency- improved  denies flank pain  prior urine cultures reviewed  urine cx w/ citrobacter freundii and E faecium  blood cultures- NGTD    acute on chronic hypoxic resp failure  CXR w/ pulmonary edema    penicillin allergy  reaction is rash  tolerating ceftriaxone without issues    citrobacter sensitives reviewed  Recommendations  switch ceftriaxone to cefepime for now  will give vanc x 1 to cover E faecium  discontinue macrobid  check CT abd/pelvis w/ IV contrast and CT chest without contrast  f/u urine culture E faecium sensitivities  trend wbc    Aldo Verduzco M.D.  [facility], Division of Infectious Diseases  [phone]  After 5pm on weekdays and all day on weekends - please call [phone]

## 2024-09-12 NOTE — PROGRESS NOTE ADULT - SUBJECTIVE AND OBJECTIVE BOX
[facility], Division of Infectious Diseases  KRISSY Luong Y. Patel, S. Shah, G. Dax  [phone]  ([phone] - weekdays after 5pm and weekends)    Name: FEDERICO DOS SANTOS  Age/Gender: 89y Female  MRN: [id number]    Interval History:  Notes reviewed.   Afebrile.   states had a difficult time getting comfortable overnight but unable to elaborate further    Allergies: penicillin (Unknown)  macrolide antibiotics (Other)  Biaxin (Unknown)      Objective:  Vitals:   T(F): 98.2 (09-12-24 @ 09:58), Max: 99.6 (09-11-24 @ 21:35)  HR: 91 (09-12-24 @ 09:58) (91 - 100)  BP: 111/40 (09-12-24 @ 09:58) (111/40 - 129/53)  RR: 18 (09-12-24 @ 09:58) (18 - 18)  SpO2: 93% (09-12-24 @ 09:58) (93% - 97%)  Physical Examination:  General: no acute distress  HEENT: anicteric, NC  Cardio: S1, S2, normal rate  Resp: clear to auscultation anteriorly  Abd: soft, distended, R sided and mid abdominal pain  Ext: no LE edema  Skin: warm, dry    Laboratory Studies:  CBC:                       11.1   17.06 )-----------( 432      ( 12 Sep 2024 05:26 )             34.4     WBC Trend:  17.06 09-12-24 @ 05:26  12.45 09-11-24 @ 05:20  9.44 09-10-24 @ 05:50  17.45 09-09-24 @ 06:35  15.09 09-08-24 @ 21:17  5.75 09-05-24 @ 11:48    CMP: 09-12    135  |  93<L>  |  27<H>  ----------------------------<  232<H>  3.6   |  25  |  0.89    Ca    9.5      12 Sep 2024 05:26  Phos  3.1     09-12  Mg     1.90     09-12            Urinalysis Basic - ( 12 Sep 2024 05:26 )    Color: x / Appearance: x / SG: x / pH: x  Gluc: 232 mg/dL / Ketone: x  / Bili: x / Urobili: x   Blood: x / Protein: x / Nitrite: x   Leuk Esterase: x / RBC: x / WBC x   Sq Epi: x / Non Sq Epi: x / Bacteria: x      Microbiology: reviewed     Culture - Blood (collected 09-09-24 @ 10:55)  Source: .Blood Blood-Peripheral  Preliminary Report (09-11-24 @ 17:02):    No growth at 48 Hours    Culture - Blood (collected 09-09-24 @ 10:45)  Source: .Blood Blood-Peripheral  Preliminary Report (09-11-24 @ 17:02):    No growth at 48 Hours    Urinalysis with Rflx Culture (collected 09-08-24 @ 21:17)    Culture - Urine (collected 09-08-24 @ 21:17)  Source: Clean Catch  Preliminary Report (09-10-24 @ 13:46):    10,000 - 49,000 CFU/mL Citrobacter freundii complex    50,000 - 99,000 CFU/mL Enterococcus faecium  Organism: Citrobacter freundii complex (09-11-24 @ 17:52)  Organism: Citrobacter freundii complex (09-11-24 @ 17:52)      Method Type: KHADIJAH      -  Amoxicillin/Clavulanic Acid: R >16/8      -  Ampicillin: R 16 These ampicillin results predict results for amoxicillin      -  Ampicillin/Sulbactam: R 16/8      -  Aztreonam: S <=4      -  Cefazolin: R >16      -  Cefepime: S <=2      -  Cefoxitin: R >16      -  Ceftriaxone: I 2 Enterobacter cloacae, Klebsiella aerogenes, and Citrobacter freundii may develop resistance during prolonged therapy.      -  Ciprofloxacin: S <=0.25      -  Ertapenem: S <=0.5      -  Gentamicin: S <=2      -  Imipenem: S <=1      -  Levofloxacin: S <=0.5      -  Meropenem: S <=1      -  Nitrofurantoin: S <=32 Should not be used to treat pyelonephritis      -  Piperacillin/Tazobactam: S <=8 Treatment with Pipercillin/Tazobactam is not recommended in severe infections casued by Klebsiella aerogenes, Enterobacter cloacae complex, and Citrobacter freundii complex.      -  Tobramycin: S <=2      -  Trimethoprim/Sulfamethoxazole: S <=0.5/9.5        Radiology: reviewed     Medications:  acetaminophen   IVPB .. 1000 milliGRAM(s) IV Intermittent once  artificial tears (preservative free) Ophthalmic Solution 1 Drop(s) Both EYES two times a day PRN  aspirin enteric coated 81 milliGRAM(s) Oral daily  atorvastatin 20 milliGRAM(s) Oral at bedtime  chlorhexidine 2% Cloths 1 Application(s) Topical daily  dextrose 5%. 1000 milliLiter(s) IV Continuous <Continuous>  dextrose 5%. 1000 milliLiter(s) IV Continuous <Continuous>  dextrose 50% Injectable 25 Gram(s) IV Push once  dextrose 50% Injectable 25 Gram(s) IV Push once  dextrose 50% Injectable 12.5 Gram(s) IV Push once  dextrose Oral Gel 15 Gram(s) Oral once PRN  fluticasone propionate 50 MICROgram(s)/spray Nasal Spray 1 Spray(s) Both Nostrils two times a day  fluticasone propionate/ salmeterol 250-50 MICROgram(s) Diskus 1 Dose(s) Inhalation two times a day  furosemide    Tablet 20 milliGRAM(s) Oral daily  gabapentin 400 milliGRAM(s) Oral at bedtime  glucagon  Injectable 1 milliGRAM(s) IntraMuscular once  insulin glargine Injectable (LANTUS) 8 Unit(s) SubCutaneous at bedtime  insulin lispro (ADMELOG) corrective regimen sliding scale   SubCutaneous at bedtime  insulin lispro (ADMELOG) corrective regimen sliding scale   SubCutaneous three times a day before meals  metoprolol tartrate 50 milliGRAM(s) Oral two times a day  nitrofurantoin monohydrate/macrocrystals (MACROBID) 100 milliGRAM(s) Oral two times a day  pantoprazole    Tablet 40 milliGRAM(s) Oral before breakfast  vancomycin  IVPB 750 milliGRAM(s) IV Intermittent once    Antimicrobials:  nitrofurantoin monohydrate/macrocrystals (MACROBID) 100 milliGRAM(s) Oral two times a day  vancomycin  IVPB 750 milliGRAM(s) IV Intermittent once

## 2024-09-12 NOTE — PROGRESS NOTE ADULT - ASSESSMENT
89 -year-old female with COPD on 2L home O2 PRN, CVA (remote, no deficits), CAD s/p stent ('15), IDDM2, PVD s/p fem-pop bypass ('15) w/occlusion ('22), HLD, HTN, anemia, presenting with (+)UA noted during presurgical testing.      Sepsis due to urinary tract infection based on WBC>12, HR>90  - lactate 2.7->1.2  - ceftriaxone 9/10/24 --> cefepime 9/12/24  - macrobid 9/11/24 --> vancomycin x 1 dose 9/12/24  - urine cx 9/8/24 --> 10K-49K Citrobacter freundii + 50K-99K Enterococcus faecium  - blood cxs 9/9/24 --> negative  - MRSA PCR 9/10/24 --> negative  - daily CBC  - appreciate ID  - CT chest w/o contrast, abd/pelvis w/contrast pending  - resolved    Acute-on-chronic HFpEF  - recent outpt TTE pasted into [place]  - daily weights  - initially started on lasix 20 mg IV bid, transitioned to 20 mg PO daily 9/12/24  - appreciate cardiology    Type 2 diabetes mellitus, with unspecified complications  - c/w basal insulin, decreased from 10 units of deglutec to 8 units of glargine  - monitor FS QAC/HS w/ISS  - recent A1c 6.6%  - reports FS in 70s to low 100s, no recent hypoglycemic events, adherent with home regimen, took prior to arrival to ED last night  - hold home metformin    Essential hypertension  - c/w metoprolol tartrate  - c/w furosemide PO, reportedly supposed to be taking furosemide 20 mg bid however takes 0-2x daily, reports her cardiologist wants her taking bid  - appreciate cardiology    Chronic obstructive pulmonary disease without acute exacerbation  - c/w LABA/ICS  - O2 PRN  - incentive spirometry  - appreciate pulmonology    Hyperlipidemia  - c/w statin    CAD of native arteries without angina pectoris   - c/w ASA, statin, metoprolol    LLL pulmonary nodule  - CTS to reschedule VATS as outpt    Need for prophylactic measure  - Sc heparin 5000 units q8h

## 2024-09-12 NOTE — PROGRESS NOTE ADULT - SUBJECTIVE AND OBJECTIVE BOX
Date of Service: 09-12-24 @ 10:07    Patient is a 89y old  Female who presents with a chief complaint of UTI (11 Sep 2024 14:44)      Any change in ROS: seems to be doing  ok : still feels SOB:      MEDICATIONS  (STANDING):  acetaminophen   IVPB .. 1000 milliGRAM(s) IV Intermittent once  aspirin enteric coated 81 milliGRAM(s) Oral daily  atorvastatin 20 milliGRAM(s) Oral at bedtime  chlorhexidine 2% Cloths 1 Application(s) Topical daily  dextrose 5%. 1000 milliLiter(s) (100 mL/Hr) IV Continuous <Continuous>  dextrose 5%. 1000 milliLiter(s) (50 mL/Hr) IV Continuous <Continuous>  dextrose 50% Injectable 12.5 Gram(s) IV Push once  dextrose 50% Injectable 25 Gram(s) IV Push once  dextrose 50% Injectable 25 Gram(s) IV Push once  fluticasone propionate 50 MICROgram(s)/spray Nasal Spray 1 Spray(s) Both Nostrils two times a day  fluticasone propionate/ salmeterol 250-50 MICROgram(s) Diskus 1 Dose(s) Inhalation two times a day  furosemide    Tablet 20 milliGRAM(s) Oral daily  gabapentin 400 milliGRAM(s) Oral at bedtime  glucagon  Injectable 1 milliGRAM(s) IntraMuscular once  insulin glargine Injectable (LANTUS) 8 Unit(s) SubCutaneous at bedtime  insulin lispro (ADMELOG) corrective regimen sliding scale   SubCutaneous at bedtime  insulin lispro (ADMELOG) corrective regimen sliding scale   SubCutaneous three times a day before meals  metoprolol tartrate 50 milliGRAM(s) Oral two times a day  nitrofurantoin monohydrate/macrocrystals (MACROBID) 100 milliGRAM(s) Oral two times a day  pantoprazole    Tablet 40 milliGRAM(s) Oral before breakfast  vancomycin  IVPB 750 milliGRAM(s) IV Intermittent once    MEDICATIONS  (PRN):  artificial tears (preservative free) Ophthalmic Solution 1 Drop(s) Both EYES two times a day PRN Dry Eyes  dextrose Oral Gel 15 Gram(s) Oral once PRN Blood Glucose LESS THAN 70 milliGRAM(s)/deciliter    Vital Signs Last 24 Hrs  T(C): 36.8 (12 Sep 2024 09:58), Max: 37.6 (11 Sep 2024 21:35)  T(F): 98.2 (12 Sep 2024 09:58), Max: 99.6 (11 Sep 2024 21:35)  HR: 91 (12 Sep 2024 09:58) (85 - 100)  BP: 111/40 (12 Sep 2024 09:58) (111/40 - 129/53)  BP(mean): --  RR: 18 (12 Sep 2024 09:58) (18 - 18)  SpO2: 93% (12 Sep 2024 09:58) (93% - 97%)    Parameters below as of 12 Sep 2024 05:45  Patient On (Oxygen Delivery Method): nasal cannula  O2 Flow (L/min): 4      I&O's Summary    11 Sep 2024 07:01  -  12 Sep 2024 07:00  --------------------------------------------------------  IN: 1370 mL / OUT: 1700 mL / NET: -330 mL          Physical Exam:   GENERAL: NAD, well-groomed, well-developed  HEENT: MARCIO/   Atraumatic, Normocephalic  ENMT: No tonsillar erythema, exudates, or enlargement; Moist mucous membranes, Good dentition, No lesions  NECK: Supple, No JVD, Normal thyroid  CHEST/LUNG: Clear to auscultaion-no wheezing  CVS: Regular rate and rhythm; No murmurs, rubs, or gallops  GI: : Soft, Nontender, Nondistended; Bowel sounds present  NERVOUS SYSTEM:  Alert & Oriented X3  EXTREMITIES:  - edema  LYMPH: No lymphadenopathy noted  SKIN: No rashes or lesions  ENDOCRINOLOGY: No Thyromegaly  PSYCH: calm     Labs:  31, 31                            11.1   17.06 )-----------( 432      ( 12 Sep 2024 05:26 )             34.4                         9.7    12.45 )-----------( 399      ( 11 Sep 2024 05:20 )             30.1                         9.2    9.44  )-----------( 343      ( 10 Sep 2024 05:50 )             28.3                         9.1    17.45 )-----------( 346      ( 09 Sep 2024 06:35 )             27.8                         10.7   15.09 )-----------( 393      ( 08 Sep 2024 21:17 )             33.2     09-12    135  |  93<L>  |  27<H>  ----------------------------<  232<H>  3.6   |  25  |  0.89  09-11    136  |  94<L>  |  24<H>  ----------------------------<  223<H>  3.7   |  26  |  0.96  09-10    134<L>  |  96<L>  |  20  ----------------------------<  216<H>  4.0   |  25  |  0.96  09-09    135  |  97<L>  |  22  ----------------------------<  206<H>  4.2   |  27  |  0.88  09-08    135  |  96<L>  |  20  ----------------------------<  213<H>  4.3   |  25  |  0.89    Ca    9.5      12 Sep 2024 05:26  Ca    9.4      11 Sep 2024 05:20  Phos  3.1     09-12  Phos  3.6     09-11  Mg     1.90     09-12  Mg     1.80     09-11    TPro  7.1  /  Alb  4.0  /  TBili  0.3  /  DBili  x   /  AST  17  /  ALT  11  /  AlkPhos  102  09-08    CAPILLARY BLOOD GLUCOSE      POCT Blood Glucose.: 251 mg/dL (12 Sep 2024 08:13)  POCT Blood Glucose.: 258 mg/dL (11 Sep 2024 21:16)  POCT Blood Glucose.: 261 mg/dL (11 Sep 2024 17:33)  POCT Blood Glucose.: 244 mg/dL (11 Sep 2024 12:42)          Urinalysis Basic - ( 12 Sep 2024 05:26 )    Color: x / Appearance: x / SG: x / pH: x  Gluc: 232 mg/dL / Ketone: x  / Bili: x / Urobili: x   Blood: x / Protein: x / Nitrite: x   Leuk Esterase: x / RBC: x / WBC x   Sq Epi: x / Non Sq Epi: x / Bacteria: x      Procalcitonin: 5.24 ng/mL (09-09 @ 06:35)        RECENT CULTURES:  09-09 @ 10:55 .Blood Blood-Peripheral         rad< from: Xray Chest 1 View- PORTABLE-Routine (Xray Chest 1 View- PORTABLE-Routine in AM.) (09.10.24 @ 07:21) >    EXAM: Portable chest    FINDINGS:    Bilateral and diffuse interstitial opacities without cardiomegaly   unchanged from the last study.  Findings are likely cardiogenic in origin although interstitial lung   disease not excluded.  Right axilla surgical clips.  No effusions or pneumothorax.        COMPARISON: September 8        IMPRESSION: Stable bilateral interstitial opacities without cardiomegaly.    --- End of Report ---            SALVADOR ALVARES MD; Attending Radiologist  This document has been electronically signed. Sep 10 2024  9:46AM    < end of copied text >  < from: Xray Chest 2 Views PA/Lat (09.08.24 @ 22:33) >  markedly increased. Small bilateral pleural effusions.  There is no pneumothorax.  Right axillary surgical clips.    No acute osseous findings    IMPRESSION:  Cardiogenic pulmonary edema, increased from mild pulmonary venous   congestion/perihilar interstitial edema.    Small bilateral pleural effusions/adjacent atelectasis, improved    --- End of Report ---          REJI LEONARD MD; Resident Radiologist  Thisdocument has been electronically signed.  MARTIN JUAN DO; Attending Radiologist  This document has been electronically signed. Sep  9 2024  2:09PM    < end of copied text >  < from: CT Angio Chest PE Protocol w/ IV Cont (04.24.24 @ 09:17) >  No acute pulmonary embolism.    Mucoid secretions with impaction right middle lobe and lower lobe bronchi.  Partial right middle lobe atelectasis, new from prior exam.    Persistent atelectasis/airspace consolidation right lower lobe.    Few scattered groundglass opacities left upper lobe, could reflect acute   respiratoryinfection.    Probable chronic interstitial lung disease superimposed on background of   emphysema.    Stable 1.5 cm nodule left lower lobe.    Recommend short interval follow-up CT scan of the chest in 12 weeks.    Other findings as discussed above.    < end of copied text >         No growth at 48 Hours    09-09 @ 10:45 .Blood Blood-Peripheral                No growth at 48 Hours    09-08 @ 21:17 Clean Catch   KHADIJAH      Citrobacter freundii complex  Citrobacter freundii complex     10,000 - 49,000 CFU/mL Citrobacter freundii complex  50,000 - 99,000 CFU/mL Enterococcus faecium          RESPIRATORY CULTURES:          Studies  Chest X-RAY  CT SCAN Chest   Venous Dopplers: LE:   CT Abdomen  Others

## 2024-09-13 LAB
ANION GAP SERPL CALC-SCNC: 9 MMOL/L — SIGNIFICANT CHANGE UP (ref 7–14)
BUN SERPL-MCNC: 25 MG/DL — HIGH (ref 7–23)
CALCIUM SERPL-MCNC: 9 MG/DL — SIGNIFICANT CHANGE UP (ref 8.4–10.5)
CHLORIDE SERPL-SCNC: 90 MMOL/L — LOW (ref 98–107)
CO2 SERPL-SCNC: 30 MMOL/L — SIGNIFICANT CHANGE UP (ref 22–31)
CREAT SERPL-MCNC: 0.89 MG/DL — SIGNIFICANT CHANGE UP (ref 0.5–1.3)
EGFR: 61 ML/MIN/1.73M2 — SIGNIFICANT CHANGE UP
GLUCOSE SERPL-MCNC: 211 MG/DL — HIGH (ref 70–99)
HCT VFR BLD CALC: 29.1 % — LOW (ref 34.5–45)
HGB BLD-MCNC: 9.6 G/DL — LOW (ref 11.5–15.5)
MAGNESIUM SERPL-MCNC: 2 MG/DL — SIGNIFICANT CHANGE UP (ref 1.6–2.6)
MCHC RBC-ENTMCNC: 28.8 PG — SIGNIFICANT CHANGE UP (ref 27–34)
MCHC RBC-ENTMCNC: 33 GM/DL — SIGNIFICANT CHANGE UP (ref 32–36)
MCV RBC AUTO: 87.4 FL — SIGNIFICANT CHANGE UP (ref 80–100)
NRBC # BLD: 0 /100 WBCS — SIGNIFICANT CHANGE UP (ref 0–0)
NRBC # FLD: 0 K/UL — SIGNIFICANT CHANGE UP (ref 0–0)
PHOSPHATE SERPL-MCNC: 2.8 MG/DL — SIGNIFICANT CHANGE UP (ref 2.5–4.5)
PLATELET # BLD AUTO: 391 K/UL — SIGNIFICANT CHANGE UP (ref 150–400)
POTASSIUM SERPL-MCNC: 3.5 MMOL/L — SIGNIFICANT CHANGE UP (ref 3.5–5.3)
POTASSIUM SERPL-SCNC: 3.5 MMOL/L — SIGNIFICANT CHANGE UP (ref 3.5–5.3)
RBC # BLD: 3.33 M/UL — LOW (ref 3.8–5.2)
RBC # FLD: 14.6 % — HIGH (ref 10.3–14.5)
SODIUM SERPL-SCNC: 129 MMOL/L — LOW (ref 135–145)
VANCOMYCIN FLD-MCNC: 5.4 UG/ML — SIGNIFICANT CHANGE UP
WBC # BLD: 12.94 K/UL — HIGH (ref 3.8–10.5)
WBC # FLD AUTO: 12.94 K/UL — HIGH (ref 3.8–10.5)

## 2024-09-13 PROCEDURE — 71260 CT THORAX DX C+: CPT | Mod: 26

## 2024-09-13 PROCEDURE — 74177 CT ABD & PELVIS W/CONTRAST: CPT | Mod: 26

## 2024-09-13 RX ORDER — VANCOMYCIN/0.9 % SOD CHLORIDE 1.75G/25
1000 PLASTIC BAG, INJECTION (ML) INTRAVENOUS ONCE
Refills: 0 | Status: COMPLETED | OUTPATIENT
Start: 2024-09-13 | End: 2024-09-13

## 2024-09-13 RX ORDER — GUAIFENESIN 100 MG/5ML
100 LIQUID ORAL EVERY 6 HOURS
Refills: 0 | Status: DISCONTINUED | OUTPATIENT
Start: 2024-09-13 | End: 2024-09-14

## 2024-09-13 RX ADMIN — Medication 400 MILLIGRAM(S): at 21:52

## 2024-09-13 RX ADMIN — Medication 2: at 09:02

## 2024-09-13 RX ADMIN — GUAIFENESIN 100 MILLIGRAM(S): 100 LIQUID ORAL at 18:56

## 2024-09-13 RX ADMIN — Medication 20 MILLIGRAM(S): at 21:52

## 2024-09-13 RX ADMIN — Medication 5000 UNIT(S): at 21:52

## 2024-09-13 RX ADMIN — Medication 4: at 12:39

## 2024-09-13 RX ADMIN — CHLORHEXIDINE GLUCONATE 1 APPLICATION(S): 40 SOLUTION TOPICAL at 13:14

## 2024-09-13 RX ADMIN — FLUTICASONE PROPIONATE AND SALMETEROL 1 DOSE(S): 250; 50 POWDER RESPIRATORY (INHALATION) at 21:52

## 2024-09-13 RX ADMIN — Medication 81 MILLIGRAM(S): at 13:07

## 2024-09-13 RX ADMIN — INSULIN GLARGINE 8 UNIT(S): 100 INJECTION, SOLUTION SUBCUTANEOUS at 22:47

## 2024-09-13 RX ADMIN — METOPROLOL TARTRATE 50 MILLIGRAM(S): 100 TABLET ORAL at 06:08

## 2024-09-13 RX ADMIN — CEFEPIME 100 MILLIGRAM(S): 2 INJECTION, POWDER, FOR SOLUTION INTRAVENOUS at 13:08

## 2024-09-13 RX ADMIN — METOPROLOL TARTRATE 50 MILLIGRAM(S): 100 TABLET ORAL at 18:55

## 2024-09-13 RX ADMIN — FLUTICASONE PROPIONATE 1 SPRAY(S): 50 SPRAY, METERED NASAL at 06:08

## 2024-09-13 RX ADMIN — Medication 3: at 18:54

## 2024-09-13 RX ADMIN — Medication 250 MILLIGRAM(S): at 10:41

## 2024-09-13 RX ADMIN — Medication 5000 UNIT(S): at 06:07

## 2024-09-13 RX ADMIN — Medication 40 MILLIGRAM(S): at 06:07

## 2024-09-13 RX ADMIN — FLUTICASONE PROPIONATE 1 SPRAY(S): 50 SPRAY, METERED NASAL at 18:54

## 2024-09-13 RX ADMIN — Medication 5000 UNIT(S): at 13:07

## 2024-09-13 RX ADMIN — FLUTICASONE PROPIONATE AND SALMETEROL 1 DOSE(S): 250; 50 POWDER RESPIRATORY (INHALATION) at 09:03

## 2024-09-13 RX ADMIN — Medication 20 MILLIGRAM(S): at 06:07

## 2024-09-13 NOTE — PROGRESS NOTE ADULT - SUBJECTIVE AND OBJECTIVE BOX
NO acute abd pain  No diarrhea or flank pain  No dysuria  Going downstairs for CT this morning    Vital Signs Last 24 Hrs  T(C): 36.6 (13 Sep 2024 09:09), Max: 36.9 (12 Sep 2024 21:00)  T(F): 97.9 (13 Sep 2024 09:09), Max: 98.4 (12 Sep 2024 21:00)  HR: 91 (13 Sep 2024 09:09) (91 - 97)  BP: 106/49 (13 Sep 2024 09:09) (106/49 - 130/95)  BP(mean): --  RR: 18 (13 Sep 2024 09:09) (18 - 18)  SpO2: 95% (13 Sep 2024 09:09) (93% - 96%)    I&O's Summary    09-12-24 @ 07:01  -  09-13-24 @ 07:00  --------------------------------------------------------  IN: 970 mL / OUT: 2400 mL / NET: -1430 mL          Gen: NAD  Head: NCAT, EOMI  Neck: supple without JVD  Lungs: CTA b/l  Heart: RRR, nl S1/S2, no murmurs  Abd: soft, NTND, NABS, no HSM  Neuro: A+O x 3, speech and comprehension are normal  Exts: no LE edema b/l  Psych: nl affect + mood  Skin: no rashes or jaundice      LABS:                        9.6    12.94 )-----------( 391      ( 13 Sep 2024 06:04 )             29.1     09-13    129<L>  |  90<L>  |  25<H>  ----------------------------<  211<H>  3.5   |  30  |  0.89    Ca    9.0      13 Sep 2024 06:04  Phos  2.8     09-13  Mg     2.00     09-13        CAPILLARY BLOOD GLUCOSE      POCT Blood Glucose.: 221 mg/dL (13 Sep 2024 08:34)  POCT Blood Glucose.: 282 mg/dL (12 Sep 2024 21:10)  POCT Blood Glucose.: 356 mg/dL (12 Sep 2024 17:41)  POCT Blood Glucose.: 411 mg/dL (12 Sep 2024 12:38)  POCT Blood Glucose.: 426 mg/dL (12 Sep 2024 12:36)        Urinalysis Basic - ( 13 Sep 2024 06:04 )    Color: x / Appearance: x / SG: x / pH: x  Gluc: 211 mg/dL / Ketone: x  / Bili: x / Urobili: x   Blood: x / Protein: x / Nitrite: x   Leuk Esterase: x / RBC: x / WBC x   Sq Epi: x / Non Sq Epi: x / Bacteria: x        RADIOLOGY & ADDITIONAL TESTS:    Imaging Personally Reviewed:  [x] YES  [ ] NO    Case discussed with NPP:  [X] YES  [ ] NO

## 2024-09-13 NOTE — PROGRESS NOTE ADULT - ASSESSMENT
89 -year-old female with COPD on 2L home O2 PRN, CVA (remote, no deficits), CAD s/p stent ('15), IDDM2, PVD s/p fem-pop bypass ('15) w/occlusion ('22), HLD, HTN, anemia, presenting with (+)UA noted during presurgical testing. Patient is scheduled to have a VATS LLL resection on Thursday. Patient reports going to her PCP and was prescribed abx, of which she took 1 dose prior to presenting t the ED at her daughter's behest. She notes having increased frequency of urination and dysuria for the past 1 week. She has a history of prior UTIs, last UTI 1 month ago. She notes chills but denies any fevers.  In the ED VS:  98.7  98-99  103-107/52-67  25  96-98% on 2-6L NC, received Ceftriaxone 1g IVPB x1 (09 Sep 2024 07:07):  She has copd and uses oxygen at home on an prn basis:  she felt feverish and chills at home and denies any sob:  on 32 L of oxygen:   -no wheezing  reportedly she had pet scan chest as an outpt and she has malignancy ;  she is scheduled for vats and left lower lobectomy on coming 12th , but admitted here for fever,  chills      UTI;  COPD;  PULMONARY NODULE: SUSPECTED MALIGNANCY ;   cad/s/p pci  DM2  PVD:  S/P FEM-POP BYPASS  HTN  HLD  ANEMIA      UTI;  -she is being  treated for uti with ceftriaxone   -likely reason for her chills at home;     9/10;  -she is on ceftriaxone for 5 days  9/11/ on ceftriaxone     9/12: : reced  on e dose of vanco : on nitrofurantoin for short term :  -would not advise  to cont nitrofurantoin for long term as she already had ILD and bronchiectasis:   9/13:  -cont current medications:  dw i d:  no long term nitrofurantoin     COPD;  -she looks OK;   -no sob:  or wheezing   -VBG seems to be doing  ok   -on Adviar    PULMONARY NODULE: SUSPECTED MALIGNANCY ;   -she had 1.5 cm smnoud l e in left lower lobe;   -she says she had pet scan in April 2024;  showed; No acute pulmonary embolism. Mucoid secretions with impaction right middle lobe and lower lobe bronchi. Partial right middle lobe atelectasis, new from prior exam. Persistent atelectasis/airspace consolidation right lower lobe. Few scattered groundglass opacities left upper lobe, could reflect acute respiratory infection. Probable chronic interstitial lung disease superimposed on background of emphysema. Stable 1.5 cm nodule left lower lobe.  -now she is scheduled fr vats left lower lung resection/;  given postive pet scan as an outpt:   -cxr this time also suggests pulm edema;  and her EF was normal though ? could the changes e due t ILD?   -cont ADVAIR here   9/10:   cont diuresis:   -cards following  9/11: a above;  for possible surgery in am    9/12: she has 1.5 cm nodule in left lower lobe:  she was supposed to get surgery during this admission,   but now cancelled secondary to UTI and increasing WBC   9/13-ct chest is pending;   -the ct scan chest from three months ago showed; Bilateral septal thickening, groundglass opacities, peripheral consolidation and pleural effusions. These findings could occur inthe   setting of pulmonary edema, however, if there are no signs of pulmonary edema, other entities including pneumonia, could be considered.  rpt ct chest pending today     cad/s/p pci  -cnt current meds;     DM2  -monitor and control     PVD:  S/P FEM-POP BYPASS  -cont current meds!    HTN  -controlled    HLD  -atorvastatin 20 milliGRAM(s) Oral at bedtime    ANEMIA  -She is mildly anemic ; monitor and transfuse as needed dw team     dw id/  PMD

## 2024-09-13 NOTE — PROGRESS NOTE ADULT - SUBJECTIVE AND OBJECTIVE BOX
[facility], Division of Infectious Diseases  KRISSY Luong Y. Patel, S. Shah, G. Dax  [phone]  ([phone] - weekdays after 5pm and weekends)    Name: FEDERICO DOS SANTOS  Age/Gender: 89y Female  MRN: [id number]    Interval History:  Notes reviewed.   No concerning overnight events.  Afebrile.   denies abdominal pain or  symptoms    Allergies: penicillin (Unknown)  macrolide antibiotics (Other)  Biaxin (Unknown)      Objective:  Vitals:   T(F): 97.9 (09-13-24 @ 06:00), Max: 98.4 (09-12-24 @ 21:00)  HR: 96 (09-13-24 @ 06:00) (91 - 97)  BP: 130/95 (09-13-24 @ 06:00) (111/40 - 130/95)  RR: 18 (09-13-24 @ 06:00) (18 - 18)  SpO2: 95% (09-13-24 @ 06:00) (93% - 96%)  Physical Examination:  General: no acute distress  HEENT: anicteric, NC  Cardio: S1, S2, normal rate  Resp: clear to auscultation anteriorly  Abd: soft, distended, NT  Ext: no LE edema  Skin: warm, dry    Laboratory Studies:  CBC:                       9.6    12.94 )-----------( 391      ( 13 Sep 2024 06:04 )             29.1     WBC Trend:  12.94 09-13-24 @ 06:04  17.06 09-12-24 @ 05:26  12.45 09-11-24 @ 05:20  9.44 09-10-24 @ 05:50  17.45 09-09-24 @ 06:35  15.09 09-08-24 @ 21:17    CMP: 09-13    129<L>  |  90<L>  |  25<H>  ----------------------------<  211<H>  3.5   |  30  |  0.89    Ca    9.0      13 Sep 2024 06:04  Phos  2.8     09-13  Mg     2.00     09-13            Urinalysis Basic - ( 13 Sep 2024 06:04 )    Color: x / Appearance: x / SG: x / pH: x  Gluc: 211 mg/dL / Ketone: x  / Bili: x / Urobili: x   Blood: x / Protein: x / Nitrite: x   Leuk Esterase: x / RBC: x / WBC x   Sq Epi: x / Non Sq Epi: x / Bacteria: x      Microbiology: reviewed     Culture - Blood (collected 09-09-24 @ 10:55)  Source: .Blood Blood-Peripheral  Preliminary Report (09-12-24 @ 17:01):    No growth at 72 Hours    Culture - Blood (collected 09-09-24 @ 10:45)  Source: .Blood Blood-Peripheral  Preliminary Report (09-12-24 @ 17:01):    No growth at 72 Hours    Urinalysis with Rflx Culture (collected 09-08-24 @ 21:17)    Culture - Urine (collected 09-08-24 @ 21:17)  Source: Clean Catch  Final Report (09-12-24 @ 20:58):    10,000 - 49,000 CFU/mL Citrobacter freundii complex    50,000 - 99,000 CFU/mL Enterococcus faecium  Organism: Citrobacter freundii complex  Enterococcus faecium (09-12-24 @ 20:58)  Organism: Enterococcus faecium (09-12-24 @ 20:58)      Method Type: KHADIJAH      -  Ampicillin: S <=2 Predicts results to ampicillin/sulbactam, amoxacillin-clavulanate and  piperacillin-tazobactam.      -  Ciprofloxacin: I 2      -  Levofloxacin: I 4      -  Nitrofurantoin: S <=32 Should not be used to treat pyelonephritis.      -  Tetracycline: S <=1      -  Vancomycin: S 0.5  Organism: Citrobacter freundii complex (09-12-24 @ 20:58)      Method Type: KHADIJAH      -  Amoxicillin/Clavulanic Acid: R >16/8      -  Ampicillin: R 16 These ampicillin results predict results for amoxicillin      -  Ampicillin/Sulbactam: R 16/8      -  Aztreonam: S <=4      -  Cefazolin: R >16      -  Cefepime: S <=2      -  Cefoxitin: R >16      -  Ceftriaxone: I 2 Enterobacter cloacae, Klebsiella aerogenes, and Citrobacter freundii may develop resistance during prolonged therapy.      -  Ciprofloxacin: S <=0.25      -  Ertapenem: S <=0.5      -  Gentamicin: S <=2      -  Imipenem: S <=1      -  Levofloxacin: S <=0.5      -  Meropenem: S <=1      -  Nitrofurantoin: S <=32 Should not be used to treat pyelonephritis      -  Piperacillin/Tazobactam: S <=8 Treatment with Pipercillin/Tazobactam is not recommended in severe infections casued by Klebsiella aerogenes, Enterobacter cloacae complex, and Citrobacter freundii complex.      -  Tobramycin: S <=2      -  Trimethoprim/Sulfamethoxazole: S <=0.5/9.5        Radiology: reviewed     Medications:  acetaminophen   IVPB .. 1000 milliGRAM(s) IV Intermittent once  artificial tears (preservative free) Ophthalmic Solution 1 Drop(s) Both EYES two times a day PRN  aspirin enteric coated 81 milliGRAM(s) Oral daily  atorvastatin 20 milliGRAM(s) Oral at bedtime  cefepime   IVPB 1000 milliGRAM(s) IV Intermittent every 24 hours  chlorhexidine 2% Cloths 1 Application(s) Topical daily  dextrose 5%. 1000 milliLiter(s) IV Continuous <Continuous>  dextrose 5%. 1000 milliLiter(s) IV Continuous <Continuous>  dextrose 50% Injectable 12.5 Gram(s) IV Push once  dextrose 50% Injectable 25 Gram(s) IV Push once  dextrose 50% Injectable 25 Gram(s) IV Push once  dextrose Oral Gel 15 Gram(s) Oral once PRN  fluticasone propionate 50 MICROgram(s)/spray Nasal Spray 1 Spray(s) Both Nostrils two times a day  fluticasone propionate/ salmeterol 250-50 MICROgram(s) Diskus 1 Dose(s) Inhalation two times a day  furosemide    Tablet 20 milliGRAM(s) Oral daily  gabapentin 400 milliGRAM(s) Oral at bedtime  glucagon  Injectable 1 milliGRAM(s) IntraMuscular once  heparin   Injectable 5000 Unit(s) SubCutaneous every 8 hours  insulin glargine Injectable (LANTUS) 8 Unit(s) SubCutaneous at bedtime  insulin lispro (ADMELOG) corrective regimen sliding scale   SubCutaneous at bedtime  insulin lispro (ADMELOG) corrective regimen sliding scale   SubCutaneous three times a day before meals  metoprolol tartrate 50 milliGRAM(s) Oral two times a day  pantoprazole    Tablet 40 milliGRAM(s) Oral before breakfast    Antimicrobials:  cefepime   IVPB 1000 milliGRAM(s) IV Intermittent every 24 hours

## 2024-09-13 NOTE — PROGRESS NOTE ADULT - SUBJECTIVE AND OBJECTIVE BOX
CARDIOLOGY FOLLOW UP - Dr. Valenzuela  Date of Service: 09-13-24 @ 10:47    CC: no events    Review of Systems:  Constitutional: No fever, weight loss, or fatigue  Respiratory: No cough, wheezing, or hemoptysis, no shortness of breath  Cardiovascular: No chest pain, palpitations, passing out, dizziness, or leg swelling  Gastrointestinal: No abd or epigastric pain. No nausea, vomiting, or hematemesis; no diarrhea or consiptaiton, no melena or hematochezia  Vascular: No edema     TELEMETRY:    PHYSICAL EXAM:  T(C): 36.6 (09-13-24 @ 09:09), Max: 36.9 (09-12-24 @ 21:00)  HR: 91 (09-13-24 @ 09:09) (91 - 97)  BP: 106/49 (09-13-24 @ 09:09) (106/49 - 130/95)  RR: 18 (09-13-24 @ 09:09) (18 - 18)  SpO2: 95% (09-13-24 @ 09:09) (94% - 96%)  Wt(kg): --  I&O's Summary    12 Sep 2024 07:01  -  13 Sep 2024 07:00  --------------------------------------------------------  IN: 970 mL / OUT: 2400 mL / NET: -1430 mL        Appearance: Normal	  Cardiovascular: Normal S1 S2,RRR, No JVD, No murmurs  Respiratory: Lungs clear to auscultation	  Gastrointestinal:  Soft, Non-tender, + BS	  Extremities: Normal range of motion, No clubbing, cyanosis or edema  Vascular: Peripheral pulses palpable 2+ bilaterally       Home Medications:  Advair Diskus 250 mcg-50 mcg inhalation powder: 1 puff(s) inhaled 2 times a day (09 Sep 2024 06:08)  aspirin 81 mg oral delayed release tablet: 1 tab(s) orally once a day (at bedtime) (09 Sep 2024 06:08)  atorvastatin 20 mg oral tablet: 1 tab(s) orally once a day (at bedtime) (09 Sep 2024 06:08)  Cranberry 500mg orally once a day- LD-09/5/24:  (09 Sep 2024 06:08)  fluticasone 50 mcg/inh inhalation powder: 1 puff(s) in each nostril 2 times a day (09 Sep 2024 06:08)  furosemide 20 mg oral tablet: 1 tab(s) orally once a day taking 1-2x daily depending on weight; occasionally skips medication if she has plans to go out (09 Sep 2024 06:06)  gabapentin 400 mg oral capsule: 1 cap(s) orally once a day (at bedtime) (09 Sep 2024 06:08)  ipratropium 42 mcg/inh (0.06%) nasal spray: 2 spray(s) intranasally 2 times a day (09 Sep 2024 06:08)  iron 65 mg orally twice daily:  (09 Sep 2024 06:08)  magnesium 250 mg orally twice a daily:  (09 Sep 2024 06:08)  MetFORMIN (Eqv-Fortamet) 500 mg oral tablet, extended release: 2 tab(s) orally once a day (09 Sep 2024 06:08)  metoprolol tartrate 50 mg oral tablet: 1 tab(s) orally 2 times a day (09 Sep 2024 06:08)  Multiple Vitamins oral tablet: 1 tab(s) orally once a day LD - 09/5/24 (09 Sep 2024 06:08)  omeprazole 40 mg oral delayed release capsule: 1 cap(s) orally once a day (09 Sep 2024 06:08)  PreserVision AREDS 2 oral capsule: 1 cap(s) orally 2 times a day (09 Sep 2024 06:08)  Systane eye drops 2 drop each eye  as needed:  (09 Sep 2024 06:08)  Tresiba FlexTouch 100 units/mL subcutaneous solution: 10 unit(s) subcutaneous once a day (at bedtime) (09 Sep 2024 06:08)  vitamin B12 2500mg orally once a day:  (09 Sep 2024 06:08)  Vitamin C 500mg orally once a day at night:  (09 Sep 2024 06:08)        MEDICATIONS  (STANDING):  acetaminophen   IVPB .. 1000 milliGRAM(s) IV Intermittent once  aspirin enteric coated 81 milliGRAM(s) Oral daily  atorvastatin 20 milliGRAM(s) Oral at bedtime  cefepime   IVPB 1000 milliGRAM(s) IV Intermittent every 24 hours  chlorhexidine 2% Cloths 1 Application(s) Topical daily  dextrose 5%. 1000 milliLiter(s) (100 mL/Hr) IV Continuous <Continuous>  dextrose 5%. 1000 milliLiter(s) (50 mL/Hr) IV Continuous <Continuous>  dextrose 50% Injectable 25 Gram(s) IV Push once  dextrose 50% Injectable 25 Gram(s) IV Push once  dextrose 50% Injectable 12.5 Gram(s) IV Push once  fluticasone propionate 50 MICROgram(s)/spray Nasal Spray 1 Spray(s) Both Nostrils two times a day  fluticasone propionate/ salmeterol 250-50 MICROgram(s) Diskus 1 Dose(s) Inhalation two times a day  furosemide    Tablet 20 milliGRAM(s) Oral daily  gabapentin 400 milliGRAM(s) Oral at bedtime  glucagon  Injectable 1 milliGRAM(s) IntraMuscular once  heparin   Injectable 5000 Unit(s) SubCutaneous every 8 hours  insulin glargine Injectable (LANTUS) 8 Unit(s) SubCutaneous at bedtime  insulin lispro (ADMELOG) corrective regimen sliding scale   SubCutaneous at bedtime  insulin lispro (ADMELOG) corrective regimen sliding scale   SubCutaneous three times a day before meals  metoprolol tartrate 50 milliGRAM(s) Oral two times a day  pantoprazole    Tablet 40 milliGRAM(s) Oral before breakfast        EKG:  RADIOLOGY:  DIAGNOSTIC TESTING:  [ ] Echocardiogram:  [ ] Catherterization:  [ ] Stress Test:  OTHER:     LABS:	 	                          9.6    12.94 )-----------( 391      ( 13 Sep 2024 06:04 )             29.1     09-13    129<L>  |  90<L>  |  25<H>  ----------------------------<  211<H>  3.5   |  30  |  0.89    Ca    9.0      13 Sep 2024 06:04  Phos  2.8     09-13  Mg     2.00     09-13            CARDIAC MARKERS:

## 2024-09-13 NOTE — PROGRESS NOTE ADULT - SUBJECTIVE AND OBJECTIVE BOX
Date of Service: 09-13-24 @ 12:09    Patient is a 89y old  Female who presents with a chief complaint of UTI (13 Sep 2024 10:47)      Any change in ROS: seems to be doing OK: on 2 L     MEDICATIONS  (STANDING):  acetaminophen   IVPB .. 1000 milliGRAM(s) IV Intermittent once  aspirin enteric coated 81 milliGRAM(s) Oral daily  atorvastatin 20 milliGRAM(s) Oral at bedtime  cefepime   IVPB 1000 milliGRAM(s) IV Intermittent every 24 hours  chlorhexidine 2% Cloths 1 Application(s) Topical daily  dextrose 5%. 1000 milliLiter(s) (50 mL/Hr) IV Continuous <Continuous>  dextrose 5%. 1000 milliLiter(s) (100 mL/Hr) IV Continuous <Continuous>  dextrose 50% Injectable 12.5 Gram(s) IV Push once  dextrose 50% Injectable 25 Gram(s) IV Push once  dextrose 50% Injectable 25 Gram(s) IV Push once  fluticasone propionate 50 MICROgram(s)/spray Nasal Spray 1 Spray(s) Both Nostrils two times a day  fluticasone propionate/ salmeterol 250-50 MICROgram(s) Diskus 1 Dose(s) Inhalation two times a day  furosemide    Tablet 20 milliGRAM(s) Oral daily  gabapentin 400 milliGRAM(s) Oral at bedtime  glucagon  Injectable 1 milliGRAM(s) IntraMuscular once  heparin   Injectable 5000 Unit(s) SubCutaneous every 8 hours  insulin glargine Injectable (LANTUS) 8 Unit(s) SubCutaneous at bedtime  insulin lispro (ADMELOG) corrective regimen sliding scale   SubCutaneous at bedtime  insulin lispro (ADMELOG) corrective regimen sliding scale   SubCutaneous three times a day before meals  metoprolol tartrate 50 milliGRAM(s) Oral two times a day  pantoprazole    Tablet 40 milliGRAM(s) Oral before breakfast    MEDICATIONS  (PRN):  artificial tears (preservative free) Ophthalmic Solution 1 Drop(s) Both EYES two times a day PRN Dry Eyes  dextrose Oral Gel 15 Gram(s) Oral once PRN Blood Glucose LESS THAN 70 milliGRAM(s)/deciliter    Vital Signs Last 24 Hrs  T(C): 36.7 (13 Sep 2024 11:30), Max: 36.9 (12 Sep 2024 21:00)  T(F): 98 (13 Sep 2024 11:30), Max: 98.4 (12 Sep 2024 21:00)  HR: 89 (13 Sep 2024 11:30) (89 - 97)  BP: 104/50 (13 Sep 2024 11:30) (104/50 - 130/95)  BP(mean): --  RR: 17 (13 Sep 2024 11:30) (17 - 18)  SpO2: 96% (13 Sep 2024 11:30) (94% - 96%)    Parameters below as of 13 Sep 2024 11:30  Patient On (Oxygen Delivery Method): nasal cannula        I&O's Summary    12 Sep 2024 07:01  -  13 Sep 2024 07:00  --------------------------------------------------------  IN: 970 mL / OUT: 2400 mL / NET: -1430 mL          Physical Exam:   GENERAL: NAD, well-groomed, well-developed  HEENT: MARCIO/   Atraumatic, Normocephalic  ENMT: No tonsillar erythema, exudates, or enlargement; Moist mucous membranes, Good dentition, No lesions  NECK: Supple, No JVD, Normal thyroid  CHEST/LUNG: Clear to auscultaion  CVS: Regular rate and rhythm; No murmurs, rubs, or gallops  GI: : Soft, Nontender, Nondistended; Bowel sounds present  NERVOUS SYSTEM:  Alert & Oriented X3  EXTREMITIES:- edema  LYMPH: No lymphadenopathy noted  SKIN: No rashes or lesions  ENDOCRINOLOGY: No Thyromegaly  PSYCH: Appropriate    Labs:  31, 31                            9.6    12.94 )-----------( 391      ( 13 Sep 2024 06:04 )             29.1                         11.1   17.06 )-----------( 432      ( 12 Sep 2024 05:26 )             34.4                         9.7    12.45 )-----------( 399      ( 11 Sep 2024 05:20 )             30.1                         9.2    9.44  )-----------( 343      ( 10 Sep 2024 05:50 )             28.3     09-13    129<L>  |  90<L>  |  25<H>  ----------------------------<  211<H>  3.5   |  30  |  0.89  09-12    135  |  93<L>  |  27<H>  ----------------------------<  232<H>  3.6   |  25  |  0.89  09-11    136  |  94<L>  |  24<H>  ----------------------------<  223<H>  3.7   |  26  |  0.96  09-10    134<L>  |  96<L>  |  20  ----------------------------<  216<H>  4.0   |  25  |  0.96    Ca    9.0      13 Sep 2024 06:04  Ca    9.5      12 Sep 2024 05:26  Phos  2.8     09-13  Phos  3.1     09-12  Mg     2.00     09-13  Mg     1.90     09-12      CAPILLARY BLOOD GLUCOSE      POCT Blood Glucose.: 221 mg/dL (13 Sep 2024 08:34)  POCT Blood Glucose.: 282 mg/dL (12 Sep 2024 21:10)  POCT Blood Glucose.: 356 mg/dL (12 Sep 2024 17:41)  POCT Blood Glucose.: 411 mg/dL (12 Sep 2024 12:38)  POCT Blood Glucose.: 426 mg/dL (12 Sep 2024 12:36)          Urinalysis Basic - ( 13 Sep 2024 06:04 )    Color: x / Appearance: x / SG: x / pH: x  Gluc: 211 mg/dL / Ketone: x  / Bili: x / Urobili: x   Blood: x / Protein: x / Nitrite: x   Leuk Esterase: x / RBC: x / WBC x   Sq Epi: x / Non Sq Epi: x / Bacteria: x          rad< from: Xray Chest 1 View- PORTABLE-Urgent (Xray Chest 1 View- PORTABLE-Urgent .) (09.12.24 @ 12:49) >    PROCEDURE DATE:  09/12/2024          INTERPRETATION:  EXAMINATION: XR CHEST URGENT    CLINICAL INDICATION: Shortness of breath    TECHNIQUE: Single frontal, portable view of the chest was obtained.    COMPARISON: Chest x-ray 9/10/2024.    FINDINGS:  Diffuse bilateral linear and reticular opacities, related to interstitial   lung disease.  Right lateral chest wall surgical clips.  New small bilateral pleural effusions. No pneumothorax.  No acute osseous abnormality.  The heart is not enlarged.    IMPRESSION: Interstitial lung disease with small effusions.      --- End of Report ---          ABY HUGO MD; Resident Radiologist  This document has been electronically signed.  SALVADOR ALVARES MD; Attending Radiologist  This document has been electronically signed. Sep 12 2024  4:47PM    < end of copied text >    RECENT CULTURES:  09-09 @ 10:55 .Blood Blood-Peripheral                No growth at 72 Hours    09-09 @ 10:45 .Blood Blood-Peripheral                No growth at 72 Hours    09-08 @ 21:17 Clean Catch   KHADIJAH      Citrobacter freundii complex  Enterococcus faecium  Citrobacter freundii complex     10,000 - 49,000 CFU/mL Citrobacter freundii complex  50,000 - 99,000 CFU/mL Enterococcus faecium      rad< from: CT Angio Chest PE Protocol w/ IV Cont (04.24.24 @ 09:17) >   No pericardial effusion.    CHEST WALL AND LOWER NECK:  1.5 cm low-density nodule inferior right lobe of the thyroid gland,   stable.  Right axillary/chest wall graft.    VISUALIZED UPPER ABDOMEN: Within normal limits.    BONES: Degenerative changes.    IMPRESSION:    No acute pulmonary embolism.    Mucoid secretions with impaction right middle lobe and lower lobe bronchi.  Partial right middle lobe atelectasis, new from prior exam.    Persistent atelectasis/airspace consolidation right lower lobe.    Few scattered groundglass opacities left upper lobe, could reflect acute   respiratoryinfection.    Probable chronic interstitial lung disease superimposed on background of   emphysema.    Stable 1.5 cm nodule left lower lobe.    Recommend short interval follow-up CT scan of the chest in 12 weeks.    Other findings as discussed above.    < end of copied text >  < from: CT Chest No Cont (04.20.24 @ 08:51) >  Bones And Soft Tissues: The bones are unremarkable.  A right axillary   graft is partially imaged.      IMPRESSION:    1.  Bilateral septal thickening, groundglass opacities, peripheral   consolidation and pleural effusions. These findings could occur inthe   setting of pulmonary edema, however, if there are no signs of pulmonary   edema, other entities including pneumonia, could be considered.    --- End of Report ---            PETER PHELPS MD; Attending Radiologist  This document has been electronically signed. Apr 20 2024 12:54PM    < end of copied text >      RESPIRATORY CULTURES:          Studies  Chest X-RAY  CT SCAN Chest   Venous Dopplers: LE:   CT Abdomen  Others

## 2024-09-13 NOTE — PROGRESS NOTE ADULT - ASSESSMENT
89 y/o F PMhx COPD on 2L home O2 PRN, CVA (remote, no deficits), CAD s/p stent, DM II, PVD s/p fem-pop bypass ('15) w/occlusion ('22), HTN who presented w/ dysuria and rigors  planned for LVATS, Lung Resection 9/12    UTI, leukocytosis  symptoms improved but leukocytosis worsened  no flank pain  urine cx w/ citrobacter freundii and E faecium, sensitivities reviewed  both organisms sensitive to macrobid, however, leukocytosis worsened while on this  blood cultures- NGTD  9/12- ceftriaxone switched to cefepime and vanc added    acute on chronic hypoxic resp failure  CXR w/ pulmonary edema    penicillin allergy  reaction is rash  tolerating ceftriaxone without issues    Recommendations  c/w vanc and cefepime for now  f/u vanc random level today, will redose pending level  CT C/A/P pending  trend wbc    Dr. Kiana Farias will be covering 9/14 & 9/15. I will resume care on 9/16     Aldo Verduzco M.D.  [facility], Division of Infectious Diseases  [phone]  After 5pm on weekdays and all day on weekends - please call [phone]  89 y/o F PMhx COPD on 2L home O2 PRN, CVA (remote, no deficits), CAD s/p stent, DM II, PVD s/p fem-pop bypass ('15) w/occlusion ('22), HTN who presented w/ dysuria and rigors  planned for LVATS, Lung Resection 9/12    PNA, UTI, leukocytosis  symptoms improved but leukocytosis worsened  no flank pain  urine cx w/ citrobacter freundii and E faecium, sensitivities reviewed  blood cultures- NGTD  9/12- ceftriaxone switched to cefepime and vanc added  CT c/f multifocal pneumonia    acute on chronic hypoxic resp failure  CXR w/ pulmonary edema    penicillin allergy  reaction is rash  tolerating ceftriaxone without issues    Recommendations  c/w cefepime x 5 days  vanc x 1g today, last day of vancomycin for E faecium UTI  MRSA PCR screen negative so will not continue vanc for PNA  trend wbc  bowel regimen    Dr. Kiana Farisa will be covering 9/14 & 9/15. I will resume care on 9/16     Aldo Verduzco M.D.  [facility], Division of Infectious Diseases  [phone]  After 5pm on weekdays and all day on weekends - please call [phone]  89 y/o F PMhx COPD on 2L home O2 PRN, CVA (remote, no deficits), CAD s/p stent, DM II, PVD s/p fem-pop bypass ('15) w/occlusion ('22), HTN who presented w/ dysuria and rigors  planned for LVATS, Lung Resection 9/12    PNA, UTI, leukocytosis, acute on chronic hypoxic resp failure  symptoms improved but leukocytosis worsened  no flank pain  urine cx w/ citrobacter freundii and E faecium, sensitivities reviewed  blood cultures- NGTD  CT c/f multifocal pneumonia    ceftriaxone 9/8-9/11  macrobid 9/11-9/12  vanc 9/12->  cefepime 9/12->    penicillin allergy  reaction is rash  tolerating ceftriaxone without issues    Recommendations  c/w cefepime x 5 days  vanc x 1g today, last day of vancomycin for E faecium UTI  MRSA PCR screen negative so will not continue vanc for PNA  trend wbc  bowel regimen    Dr. Kiana Farias will be covering 9/14 & 9/15. I will resume care on 9/16     Aldo Verduzco M.D.  [facility], Division of Infectious Diseases  [phone]  After 5pm on weekdays and all day on weekends - please call [phone]

## 2024-09-13 NOTE — PROGRESS NOTE ADULT - ASSESSMENT
89 -year-old female with COPD on 2L home O2 PRN, CVA (remote, no deficits), CAD s/p stent ('15), IDDM2, PVD s/p fem-pop bypass ('15) w/occlusion ('22), HLD, HTN, anemia, presenting with (+)UA noted during presurgical testing.      Sepsis due to urinary tract infection based on WBC>12, HR>90  - lactate 2.7->1.2  - ceftriaxone 9/10/24 --> cefepime 9/12/24  - macrobid 9/11/24 --> vancomycin x 1 dose 9/12/24 + 9/13/24  - urine cx 9/8/24 --> 10K-49K Citrobacter freundii + 50K-99K Enterococcus faecium  - blood cxs 9/9/24 --> negative  - MRSA PCR 9/10/24 --> negative  - daily CBC  - appreciate ID  - CT chest w/o contrast, abd/pelvis w/contrast 9/13/24 -->  - resolved    Acute-on-chronic HFpEF  - recent outpt TTE pasted into [facility]  - daily weights  - initially started on lasix 20 mg IV bid, transitioned to 20 mg PO daily 9/12/24  - appreciate cardiology    Type 2 diabetes mellitus, with unspecified complications  - c/w basal insulin, decreased from 10 units of deglutec to 8 units of glargine  - monitor FS QAC/HS w/ISS  - recent A1c 6.6%  - reports FS in 70s to low 100s, no recent hypoglycemic events, adherent with home regimen, took prior to arrival to ED last night  - hold home metformin    Essential hypertension  - c/w metoprolol tartrate  - c/w furosemide PO, reportedly supposed to be taking furosemide 20 mg bid however takes 0-2x daily, reports her cardiologist wants her taking bid  - appreciate cardiology    Chronic obstructive pulmonary disease without acute exacerbation  - c/w LABA/ICS  - O2 PRN  - incentive spirometry  - appreciate pulmonology    Hyperlipidemia  - c/w statin    CAD of native arteries without angina pectoris   - c/w ASA, statin, metoprolol    LLL pulmonary nodule  - CTS to reschedule VATS as outpt    Need for prophylactic measure  - Sc heparin 5000 units q8h

## 2024-09-14 LAB
ANION GAP SERPL CALC-SCNC: 12 MMOL/L — SIGNIFICANT CHANGE UP (ref 7–14)
BASOPHILS # BLD AUTO: 0.05 K/UL — SIGNIFICANT CHANGE UP (ref 0–0.2)
BASOPHILS NFR BLD AUTO: 0.5 % — SIGNIFICANT CHANGE UP (ref 0–2)
BUN SERPL-MCNC: 26 MG/DL — HIGH (ref 7–23)
CALCIUM SERPL-MCNC: 9 MG/DL — SIGNIFICANT CHANGE UP (ref 8.4–10.5)
CHLORIDE SERPL-SCNC: 92 MMOL/L — LOW (ref 98–107)
CO2 SERPL-SCNC: 27 MMOL/L — SIGNIFICANT CHANGE UP (ref 22–31)
CREAT SERPL-MCNC: 0.99 MG/DL — SIGNIFICANT CHANGE UP (ref 0.5–1.3)
CULTURE RESULTS: SIGNIFICANT CHANGE UP
CULTURE RESULTS: SIGNIFICANT CHANGE UP
EGFR: 54 ML/MIN/1.73M2 — LOW
EOSINOPHIL # BLD AUTO: 0.35 K/UL — SIGNIFICANT CHANGE UP (ref 0–0.5)
EOSINOPHIL NFR BLD AUTO: 3.6 % — SIGNIFICANT CHANGE UP (ref 0–6)
GLUCOSE SERPL-MCNC: 231 MG/DL — HIGH (ref 70–99)
HCT VFR BLD CALC: 27.6 % — LOW (ref 34.5–45)
HGB BLD-MCNC: 9 G/DL — LOW (ref 11.5–15.5)
IANC: 7.21 K/UL — SIGNIFICANT CHANGE UP (ref 1.8–7.4)
IMM GRANULOCYTES NFR BLD AUTO: 0.7 % — SIGNIFICANT CHANGE UP (ref 0–0.9)
LYMPHOCYTES # BLD AUTO: 1.3 K/UL — SIGNIFICANT CHANGE UP (ref 1–3.3)
LYMPHOCYTES # BLD AUTO: 13.5 % — SIGNIFICANT CHANGE UP (ref 13–44)
MAGNESIUM SERPL-MCNC: 2.2 MG/DL — SIGNIFICANT CHANGE UP (ref 1.6–2.6)
MCHC RBC-ENTMCNC: 28.1 PG — SIGNIFICANT CHANGE UP (ref 27–34)
MCHC RBC-ENTMCNC: 32.6 GM/DL — SIGNIFICANT CHANGE UP (ref 32–36)
MCV RBC AUTO: 86.3 FL — SIGNIFICANT CHANGE UP (ref 80–100)
MONOCYTES # BLD AUTO: 0.66 K/UL — SIGNIFICANT CHANGE UP (ref 0–0.9)
MONOCYTES NFR BLD AUTO: 6.8 % — SIGNIFICANT CHANGE UP (ref 2–14)
NEUTROPHILS # BLD AUTO: 7.21 K/UL — SIGNIFICANT CHANGE UP (ref 1.8–7.4)
NEUTROPHILS NFR BLD AUTO: 74.9 % — SIGNIFICANT CHANGE UP (ref 43–77)
NRBC # BLD: 0 /100 WBCS — SIGNIFICANT CHANGE UP (ref 0–0)
NRBC # FLD: 0 K/UL — SIGNIFICANT CHANGE UP (ref 0–0)
PHOSPHATE SERPL-MCNC: 3.1 MG/DL — SIGNIFICANT CHANGE UP (ref 2.5–4.5)
PLATELET # BLD AUTO: 394 K/UL — SIGNIFICANT CHANGE UP (ref 150–400)
POTASSIUM SERPL-MCNC: 4.1 MMOL/L — SIGNIFICANT CHANGE UP (ref 3.5–5.3)
POTASSIUM SERPL-SCNC: 4.1 MMOL/L — SIGNIFICANT CHANGE UP (ref 3.5–5.3)
RBC # BLD: 3.2 M/UL — LOW (ref 3.8–5.2)
RBC # FLD: 14.6 % — HIGH (ref 10.3–14.5)
SODIUM SERPL-SCNC: 131 MMOL/L — LOW (ref 135–145)
SPECIMEN SOURCE: SIGNIFICANT CHANGE UP
SPECIMEN SOURCE: SIGNIFICANT CHANGE UP
WBC # BLD: 9.64 K/UL — SIGNIFICANT CHANGE UP (ref 3.8–10.5)
WBC # FLD AUTO: 9.64 K/UL — SIGNIFICANT CHANGE UP (ref 3.8–10.5)

## 2024-09-14 RX ORDER — GUAIFENESIN 100 MG/5ML
200 LIQUID ORAL EVERY 6 HOURS
Refills: 0 | Status: COMPLETED | OUTPATIENT
Start: 2024-09-14 | End: 2024-09-16

## 2024-09-14 RX ORDER — FUROSEMIDE 40 MG
20 TABLET ORAL DAILY
Refills: 0 | Status: DISCONTINUED | OUTPATIENT
Start: 2024-09-14 | End: 2024-09-15

## 2024-09-14 RX ORDER — METHYLPREDNISOLONE 4 MG
20 TABLET ORAL EVERY 8 HOURS
Refills: 0 | Status: DISCONTINUED | OUTPATIENT
Start: 2024-09-14 | End: 2024-09-15

## 2024-09-14 RX ORDER — SODIUM CHLORIDE 9 MG/ML
4 INJECTION INTRAMUSCULAR; INTRAVENOUS; SUBCUTANEOUS EVERY 12 HOURS
Refills: 0 | Status: DISCONTINUED | OUTPATIENT
Start: 2024-09-14 | End: 2024-09-17

## 2024-09-14 RX ORDER — ACETAMINOPHEN 325 MG/1
1000 TABLET ORAL ONCE
Refills: 0 | Status: COMPLETED | OUTPATIENT
Start: 2024-09-14 | End: 2024-09-14

## 2024-09-14 RX ORDER — IPRATROPIUM BROMIDE AND ALBUTEROL SULFATE .5; 3 MG/3ML; MG/3ML
3 SOLUTION RESPIRATORY (INHALATION) ONCE
Refills: 0 | Status: COMPLETED | OUTPATIENT
Start: 2024-09-14 | End: 2024-09-14

## 2024-09-14 RX ADMIN — FLUTICASONE PROPIONATE 1 SPRAY(S): 50 SPRAY, METERED NASAL at 17:02

## 2024-09-14 RX ADMIN — GUAIFENESIN 200 MILLIGRAM(S): 100 LIQUID ORAL at 23:57

## 2024-09-14 RX ADMIN — Medication 400 MILLIGRAM(S): at 21:42

## 2024-09-14 RX ADMIN — GUAIFENESIN 200 MILLIGRAM(S): 100 LIQUID ORAL at 17:02

## 2024-09-14 RX ADMIN — FLUTICASONE PROPIONATE 1 SPRAY(S): 50 SPRAY, METERED NASAL at 05:23

## 2024-09-14 RX ADMIN — SODIUM CHLORIDE 4 MILLILITER(S): 9 INJECTION INTRAMUSCULAR; INTRAVENOUS; SUBCUTANEOUS at 06:55

## 2024-09-14 RX ADMIN — Medication 3: at 09:21

## 2024-09-14 RX ADMIN — GUAIFENESIN 200 MILLIGRAM(S): 100 LIQUID ORAL at 11:47

## 2024-09-14 RX ADMIN — Medication 20 MILLIGRAM(S): at 14:20

## 2024-09-14 RX ADMIN — Medication 4: at 18:07

## 2024-09-14 RX ADMIN — CEFEPIME 100 MILLIGRAM(S): 2 INJECTION, POWDER, FOR SOLUTION INTRAVENOUS at 11:45

## 2024-09-14 RX ADMIN — Medication 40 MILLIGRAM(S): at 05:23

## 2024-09-14 RX ADMIN — Medication 3: at 12:51

## 2024-09-14 RX ADMIN — IPRATROPIUM BROMIDE AND ALBUTEROL SULFATE 3 MILLILITER(S): .5; 3 SOLUTION RESPIRATORY (INHALATION) at 06:55

## 2024-09-14 RX ADMIN — INSULIN GLARGINE 8 UNIT(S): 100 INJECTION, SOLUTION SUBCUTANEOUS at 21:41

## 2024-09-14 RX ADMIN — Medication 81 MILLIGRAM(S): at 11:45

## 2024-09-14 RX ADMIN — CHLORHEXIDINE GLUCONATE 1 APPLICATION(S): 40 SOLUTION TOPICAL at 11:51

## 2024-09-14 RX ADMIN — Medication 10 MILLIGRAM(S): at 10:24

## 2024-09-14 RX ADMIN — FLUTICASONE PROPIONATE AND SALMETEROL 1 DOSE(S): 250; 50 POWDER RESPIRATORY (INHALATION) at 10:26

## 2024-09-14 RX ADMIN — Medication 20 MILLIGRAM(S): at 21:42

## 2024-09-14 RX ADMIN — Medication 5000 UNIT(S): at 14:20

## 2024-09-14 RX ADMIN — Medication 5000 UNIT(S): at 05:22

## 2024-09-14 RX ADMIN — Medication 20 MILLIGRAM(S): at 15:03

## 2024-09-14 RX ADMIN — Medication 20 MILLIGRAM(S): at 21:50

## 2024-09-14 RX ADMIN — GUAIFENESIN 100 MILLIGRAM(S): 100 LIQUID ORAL at 05:22

## 2024-09-14 RX ADMIN — FLUTICASONE PROPIONATE AND SALMETEROL 1 DOSE(S): 250; 50 POWDER RESPIRATORY (INHALATION) at 21:42

## 2024-09-14 RX ADMIN — METOPROLOL TARTRATE 50 MILLIGRAM(S): 100 TABLET ORAL at 17:01

## 2024-09-14 RX ADMIN — Medication 4: at 21:40

## 2024-09-14 RX ADMIN — ACETAMINOPHEN 400 MILLIGRAM(S): 325 TABLET ORAL at 23:56

## 2024-09-14 RX ADMIN — Medication 20 MILLIGRAM(S): at 05:22

## 2024-09-14 RX ADMIN — METOPROLOL TARTRATE 50 MILLIGRAM(S): 100 TABLET ORAL at 05:22

## 2024-09-14 RX ADMIN — Medication 5000 UNIT(S): at 21:41

## 2024-09-14 NOTE — PROGRESS NOTE ADULT - ASSESSMENT
89 y/o F w/COPD, former smoker, as needed O2, CVA, CAD status post stent, DM, HTN, HLD, lung cancer scheduled for VATS on Thursday presenting w CHF and possible UTI    #HFpEF  -recent TTW noted  -normal LVEF and mild AS  -change lasix to 20mg IV daily    #CAD s/p PCI  -stable  -cont asa, statin, BB    #Lung CA  -preop for LLL VATS w thoracic      #Hypoxia  -mulifactorial  -diureses as above  -pulm eval noted  -CT chest noted        35 minutes spent on total encounter; more than 50% of the visit was spent counseling and/or coordinating care by the attending physician.

## 2024-09-14 NOTE — PROGRESS NOTE ADULT - SUBJECTIVE AND OBJECTIVE BOX
Coughing spasm last night, resulting in pulse oximetry dropping to 70s, briefly requiring NRB before she eventually recovered back to 3LNCO2  Minimal phlegm so far  Constipated x 7 days --> dulcolax ordered    Vital Signs Last 24 Hrs  T(C): 36.7 (14 Sep 2024 05:20), Max: 36.8 (13 Sep 2024 15:50)  T(F): 98.1 (14 Sep 2024 05:20), Max: 98.3 (13 Sep 2024 15:50)  HR: 70 (14 Sep 2024 07:16) (70 - 98)  BP: 118/50 (14 Sep 2024 05:20) (104/50 - 126/77)  BP(mean): --  RR: 18 (14 Sep 2024 05:20) (17 - 18)  SpO2: 98% (14 Sep 2024 07:16) (95% - 98%)    I&O's Summary    09-13-24 @ 07:01  -  09-14-24 @ 07:00  --------------------------------------------------------  IN: 450 mL / OUT: 1400 mL / NET: -950 mL        Gen: NAD  Head: NCAT, EOMI  Neck: supple without JVD  Lungs: decreased BS @ bases, no wheezes or rales  Heart: RRR, nl S1/S2, no murmurs  Abd: soft, NTND, NABS, no HSM  Neuro: A+O x 3, speech and comprehension are normal  Exts: no LE edema b/l  Psych: nl affect + mood  Skin: no rashes or jaundice    LABS:                        9.0    9.64  )-----------( 394      ( 14 Sep 2024 07:41 )             27.6     09-14    131<L>  |  92<L>  |  26<H>  ----------------------------<  231<H>  4.1   |  27  |  0.99    Ca    9.0      14 Sep 2024 07:41  Phos  3.1     09-14  Mg     2.20     09-14        CAPILLARY BLOOD GLUCOSE      POCT Blood Glucose.: 261 mg/dL (14 Sep 2024 08:19)  POCT Blood Glucose.: 234 mg/dL (13 Sep 2024 22:45)  POCT Blood Glucose.: 230 mg/dL (13 Sep 2024 20:51)  POCT Blood Glucose.: 291 mg/dL (13 Sep 2024 18:06)  POCT Blood Glucose.: 341 mg/dL (13 Sep 2024 16:54)  POCT Blood Glucose.: 329 mg/dL (13 Sep 2024 12:31)        Urinalysis Basic - ( 14 Sep 2024 07:41 )    Color: x / Appearance: x / SG: x / pH: x  Gluc: 231 mg/dL / Ketone: x  / Bili: x / Urobili: x   Blood: x / Protein: x / Nitrite: x   Leuk Esterase: x / RBC: x / WBC x   Sq Epi: x / Non Sq Epi: x / Bacteria: x        RADIOLOGY & ADDITIONAL TESTS:    Imaging Personally Reviewed:  [x] YES  [ ] NO    Case discussed with NPP:  [X] YES  [ ] NO

## 2024-09-14 NOTE — PROGRESS NOTE ADULT - ASSESSMENT
89 -year-old female with COPD on 2L home O2 PRN, CVA (remote, no deficits), CAD s/p stent ('15), IDDM2, PVD s/p fem-pop bypass ('15) w/occlusion ('22), HLD, HTN, anemia, presenting with (+)UA noted during presurgical testing. Patient is scheduled to have a VATS LLL resection on Thursday. Patient reports going to her PCP and was prescribed abx, of which she took 1 dose prior to presenting t the ED at her daughter's behest. She notes having increased frequency of urination and dysuria for the past 1 week. She has a history of prior UTIs, last UTI 1 month ago. She notes chills but denies any fevers.  In the ED VS:  98.7  98-99  103-107/52-67  25  96-98% on 2-6L NC, received Ceftriaxone 1g IVPB x1 (09 Sep 2024 07:07):  She has copd and uses oxygen at home on an prn basis:  she felt feverish and chills at home and denies any sob:  on 32 L of oxygen:   -no wheezing  reportedly she had pet scan chest as an outpt and she has malignancy ;  she is scheduled for vats and left lower lobectomy on coming 12th , but admitted here for fever,  chills      UTI;  COPD;  PULMONARY NODULE: SUSPECTED MALIGNANCY ;   cad/s/p pci  DM2  PVD:  S/P FEM-POP BYPASS  HTN  HLD  ANEMIA      UTI;  -she is being  treated for uti with ceftriaxone   -likely reason for her chills at home;     9/10;  -she is on ceftriaxone for 5 days  9/11/ on ceftriaxone     9/12: : reced  on e dose of vanco : on nitrofurantoin for short term :  -would not advise  to cont nitrofurantoin for long term as she already had ILD and bronchiectasis:   9/13:  -cont current medications:  dw i d:  no long term nitrofurantoin     COPD;  -she looks OK;   -no sob:  or wheezing   -VBG seems to be doing  ok   -on Adviar    9/14:  -she became more tachypneic and sob today am:   -ct chest with suspected pneumonia and has underlying emphysema:   -she is already on antibiotics and would add steroids also   -now on vanc and zosyn     PULMONARY NODULE: SUSPECTED MALIGNANCY ;   -she had 1.5 cm smnoud l e in left lower lobe;   -she says she had pet scan in April 2024;  showed; No acute pulmonary embolism. Mucoid secretions with impaction right middle lobe and lower lobe bronchi. Partial right middle lobe atelectasis, new from prior exam. Persistent atelectasis/airspace consolidation right lower lobe. Few scattered groundglass opacities left upper lobe, could reflect acute respiratory infection. Probable chronic interstitial lung disease superimposed on background of emphysema. Stable 1.5 cm nodule left lower lobe.  -now she is scheduled fr vats left lower lung resection/;  given postive pet scan as an outpt:   -cxr this time also suggests pulm edema;  and her EF was normal though ? could the changes e due t ILD?   -cont ADVAIR here   9/10:   cont diuresis:   -cards following  9/11: a above;  for possible surgery in am    9/12: she has 1.5 cm nodule in left lower lobe:  she was supposed to get surgery during this admission,   but now cancelled secondary to UTI and increasing WBC   9/13-ct chest is pending;   -the ct scan chest from three months ago showed; Bilateral septal thickening, groundglass opacities, peripheral consolidation and pleural effusions. These findings could occur inthe   setting of pulmonary edema, however, if there are no signs of pulmonary edema, other entities including pneumonia, could be considered.  rpt ct chest pending today   9/14:  -the rpt ct scan chest showed mild increase in the nodule size:  would defer to thoracic  primary team  : currently surgery is postponed    cad/s/p pci  -cnt current meds;     DM2  -monitor and control     PVD:  S/P FEM-POP BYPASS  -cont current meds!    HTN  -controlled    HLD  -atorvastatin 20 milliGRAM(s) Oral at bedtime    ANEMIA  -She is mildly anemic ; monitor and transfuse as needed dw team     dw id/  PMD

## 2024-09-14 NOTE — PROGRESS NOTE ADULT - ASSESSMENT
89 -year-old female with COPD on 2L home O2 PRN, CVA (remote, no deficits), CAD s/p stent ('15), IDDM2, PVD s/p fem-pop bypass ('15) w/occlusion ('22), HLD, HTN, anemia, presenting with (+)UA noted during presurgical testing.      Sepsis due to urinary tract infection based on WBC>12, HR>90 and G(+)PNA b/l lower lobes and RML  - lactate 2.7->1.2  - ceftriaxone 9/10/24 --> cefepime 9/12/24  - macrobid 9/11/24 --> vancomycin x 1 dose 9/12/24 + 9/13/24  - urine cx 9/8/24 --> 10K-49K Citrobacter freundii + 50K-99K Enterococcus faecium  - blood cxs 9/9/24 --> negative  - MRSA PCR 9/10/24 --> negative  - CT chest w/o contrast, abd/pelvis w/contrast 9/13/24 --> PNA b/l lower lobes and RML  - incentive spirometer  - nebs prn  - antitussives prn  - daily CBC  - sepsis resolved  - appreciate ID    Acute-on-chronic HFpEF  - recent outpt TTE pasted into [facility]  - daily weights  - initially started on lasix 20 mg IV bid, transitioned to 20 mg PO daily 9/12/24  - appreciate cardiology    Type 2 diabetes mellitus, with unspecified complications  - c/w basal insulin, decreased from 10 units of deglutec to 8 units of glargine  - monitor FS QAC/HS w/ISS  - recent A1c 6.6%  - reports FS in 70s to low 100s, no recent hypoglycemic events, adherent with home regimen, took prior to arrival to ED last night  - hold home metformin    Essential hypertension  - c/w metoprolol tartrate  - c/w furosemide PO, reportedly supposed to be taking furosemide 20 mg bid however takes 0-2x daily, reports her cardiologist wants her taking bid  - appreciate cardiology    Chronic obstructive pulmonary disease without acute exacerbation  - c/w LABA/ICS  - O2 PRN  - incentive spirometry  - appreciate pulmonology    Hyperlipidemia  - c/w statin    CAD of native arteries without angina pectoris   - c/w ASA, statin, metoprolol    LLL pulmonary nodule  - CTS to reschedule VATS as outpt    Need for prophylactic measure  - SC heparin 5000 units q8h

## 2024-09-14 NOTE — PROGRESS NOTE ADULT - SUBJECTIVE AND OBJECTIVE BOX
Date of Service: 09-14-24 @ 11:19    Patient is a 89y old  Female who presents with a chief complaint of UTI (14 Sep 2024 10:07)      Any change in ROS:     MEDICATIONS  (STANDING):  acetaminophen   IVPB .. 1000 milliGRAM(s) IV Intermittent once  aspirin enteric coated 81 milliGRAM(s) Oral daily  atorvastatin 20 milliGRAM(s) Oral at bedtime  cefepime   IVPB 1000 milliGRAM(s) IV Intermittent every 24 hours  chlorhexidine 2% Cloths 1 Application(s) Topical daily  dextrose 5%. 1000 milliLiter(s) (50 mL/Hr) IV Continuous <Continuous>  dextrose 5%. 1000 milliLiter(s) (100 mL/Hr) IV Continuous <Continuous>  dextrose 50% Injectable 12.5 Gram(s) IV Push once  dextrose 50% Injectable 25 Gram(s) IV Push once  dextrose 50% Injectable 25 Gram(s) IV Push once  fluticasone propionate 50 MICROgram(s)/spray Nasal Spray 1 Spray(s) Both Nostrils two times a day  fluticasone propionate/ salmeterol 250-50 MICROgram(s) Diskus 1 Dose(s) Inhalation two times a day  furosemide   Injectable 20 milliGRAM(s) IV Push daily  gabapentin 400 milliGRAM(s) Oral at bedtime  glucagon  Injectable 1 milliGRAM(s) IntraMuscular once  guaiFENesin Oral Liquid (Sugar-Free) 200 milliGRAM(s) Oral every 6 hours  heparin   Injectable 5000 Unit(s) SubCutaneous every 8 hours  insulin glargine Injectable (LANTUS) 8 Unit(s) SubCutaneous at bedtime  insulin lispro (ADMELOG) corrective regimen sliding scale   SubCutaneous at bedtime  insulin lispro (ADMELOG) corrective regimen sliding scale   SubCutaneous three times a day before meals  metoprolol tartrate 50 milliGRAM(s) Oral two times a day  pantoprazole    Tablet 40 milliGRAM(s) Oral before breakfast  sodium chloride 3%  Inhalation 4 milliLiter(s) Inhalation every 12 hours    MEDICATIONS  (PRN):  artificial tears (preservative free) Ophthalmic Solution 1 Drop(s) Both EYES two times a day PRN Dry Eyes  dextrose Oral Gel 15 Gram(s) Oral once PRN Blood Glucose LESS THAN 70 milliGRAM(s)/deciliter  hydrocodone/homatropine Syrup 5 milliLiter(s) Oral at bedtime PRN Cough    Vital Signs Last 24 Hrs  T(C): 36.7 (14 Sep 2024 05:20), Max: 36.8 (13 Sep 2024 15:50)  T(F): 98.1 (14 Sep 2024 05:20), Max: 98.3 (13 Sep 2024 15:50)  HR: 70 (14 Sep 2024 07:16) (70 - 98)  BP: 118/50 (14 Sep 2024 05:20) (104/50 - 126/77)  BP(mean): --  RR: 18 (14 Sep 2024 05:20) (17 - 18)  SpO2: 98% (14 Sep 2024 07:16) (95% - 98%)    Parameters below as of 14 Sep 2024 05:20  Patient On (Oxygen Delivery Method): nasal cannula  O2 Flow (L/min): 3      I&O's Summary    13 Sep 2024 07:01  -  14 Sep 2024 07:00  --------------------------------------------------------  IN: 450 mL / OUT: 1400 mL / NET: -950 mL          Physical Exam:   GENERAL: NAD, well-groomed, well-developed  HEENT: MARCIO/   Atraumatic, Normocephalic  ENMT: No tonsillar erythema, exudates, or enlargement; Moist mucous membranes, Good dentition, No lesions  NECK: Supple, No JVD, Normal thyroid  CHEST/LUNG: bibasilar cracekls+  CVS: Regular rate and rhythm; No murmurs, rubs, or gallops  GI: : Soft, Nontender, Nondistended; Bowel sounds present  NERVOUS SYSTEM:  Alert & Oriented X3  EXTREMITIES: -edema  LYMPH: No lymphadenopathy noted  SKIN: No rashes or lesions  ENDOCRINOLOGY: No Thyromegaly  PSYCH: Appropriate    Labs:  31, 31                            9.0    9.64  )-----------( 394      ( 14 Sep 2024 07:41 )             27.6                         9.6    12.94 )-----------( 391      ( 13 Sep 2024 06:04 )             29.1                         11.1   17.06 )-----------( 432      ( 12 Sep 2024 05:26 )             34.4                         9.7    12.45 )-----------( 399      ( 11 Sep 2024 05:20 )             30.1     09-14    131<L>  |  92<L>  |  26<H>  ----------------------------<  231<H>  4.1   |  27  |  0.99  09-13    129<L>  |  90<L>  |  25<H>  ----------------------------<  211<H>  3.5   |  30  |  0.89  09-12    135  |  93<L>  |  27<H>  ----------------------------<  232<H>  3.6   |  25  |  0.89  09-11    136  |  94<L>  |  24<H>  ----------------------------<  223<H>  3.7   |  26  |  0.96    Ca    9.0      14 Sep 2024 07:41  Ca    9.0      13 Sep 2024 06:04  Phos  3.1     09-14  Phos  2.8     09-13  Mg     2.20     09-14  Mg     2.00     09-13      CAPILLARY BLOOD GLUCOSE      POCT Blood Glucose.: 261 mg/dL (14 Sep 2024 08:19)  POCT Blood Glucose.: 234 mg/dL (13 Sep 2024 22:45)  POCT Blood Glucose.: 230 mg/dL (13 Sep 2024 20:51)  POCT Blood Glucose.: 291 mg/dL (13 Sep 2024 18:06)  POCT Blood Glucose.: 341 mg/dL (13 Sep 2024 16:54)  POCT Blood Glucose.: 329 mg/dL (13 Sep 2024 12:31)          Urinalysis Basic - ( 14 Sep 2024 07:41 )    Color: x / Appearance: x / SG: x / pH: x  Gluc: 231 mg/dL / Ketone: x  / Bili: x / Urobili: x   Blood: x / Protein: x / Nitrite: x   Leuk Esterase: x / RBC: x / WBC x   Sq Epi: x / Non Sq Epi: x / Bacteria: x            RECENT CULTURES:  09-09 @ 10:55 .Blood Blood-Peripheral       rad< from: CT Chest w/ IV Cont (09.13.24 @ 09:37) >  ABDOMINAL WALL: Post surgicalchanges in the left groin.  BONES: Degenerative changes and osteopenia.    IMPRESSION:  Likely multifocal pneumonia. New lymphadenopathy can be reactive. A   short-term follow-up CT chest in 6-8 weeks is advised.  Previously FDG avid left lower lobe pulmonary nodule is minimally   increased.  No acute abnormality in the abdomen and pelvis.    --- End of Report ---            GIOVANNY KOTHARI MD; Attending Radiologist  This document has been electronically signed. Sep 13 2024  1:19PM    < end of copied text >           No growth at 4 days    09-09 @ 10:45 .Blood Blood-Peripheral                No growth at 4 days    09-08 @ 21:17 Clean Catch   [id number]      Citrobacter freundii complex  Enterococcus faecium  Citrobacter freundii complex     10,000 - 49,000 CFU/mL Citrobacter freundii complex  50,000 - 99,000 CFU/mL Enterococcus faecium          RESPIRATORY CULTURES:        ct< from: CT Chest w/ IV Cont (09.13.24 @ 09:37) >  ABDOMINAL WALL: Post surgicalchanges in the left groin.  BONES: Degenerative changes and osteopenia.    IMPRESSION:  Likely multifocal pneumonia. New lymphadenopathy can be reactive. A   short-term follow-up CT chest in 6-8 weeks is advised.  Previously FDG avid left lower lobe pulmonary nodule is minimally   increased.  No acute abnormality in the abdomen and pelvis.    --- End of Report ---            GIOVANNY KOTHARI MD; Attending Radiologist  This document has been electronically signed. Sep 13 2024  1:19PM    < end of copied text >    Studies  Chest X-RAY  CT SCAN Chest   Venous Dopplers: LE:   CT Abdomen  Others

## 2024-09-14 NOTE — CHART NOTE - NSCHARTNOTEFT_GEN_A_CORE
Notified by primary RN - patient desaturated to 70s on 3LNC during after paroxysmal coughing. RN put NRB on for approx. 10 min and was able to be weaned back down to 3LNC. Provider assessed patient at bedside - patient stated she was trying to bring up her sputum. Reports feeling better after bringing up small amount of thick secretion; appears comfortable in NAD able to speak in complete sentences. B/L clear but diminished. VSS. Will order DuoNeb x1; Hyper Sal 3% tx and Guaifenesin ATC. IS at bedside. Will continue to Monitor on .       Cartina Rogers, NP  Medicine Overnight Coverage   p [phone]

## 2024-09-14 NOTE — PROGRESS NOTE ADULT - SUBJECTIVE AND OBJECTIVE BOX
CARDIOLOGY FOLLOW UP - Dr. Valenzuela  Date of Service: 09-14-24 @ 08:20    CC: events noted    Review of Systems:  Constitutional: No fever, weight loss, or fatigue  Respiratory: No cough, wheezing, or hemoptysis, no shortness of breath  Cardiovascular: No chest pain, palpitations, passing out, dizziness, or leg swelling  Gastrointestinal: No abd or epigastric pain. No nausea, vomiting, or hematemesis; no diarrhea or consiptaiton, no melena or hematochezia  Vascular: No edema     TELEMETRY:    PHYSICAL EXAM:  T(C): 36.7 (09-14-24 @ 05:20), Max: 36.8 (09-13-24 @ 15:50)  HR: 70 (09-14-24 @ 07:16) (70 - 98)  BP: 118/50 (09-14-24 @ 05:20) (104/50 - 126/77)  RR: 18 (09-14-24 @ 05:20) (17 - 18)  SpO2: 98% (09-14-24 @ 07:16) (95% - 98%)  Wt(kg): --  I&O's Summary    13 Sep 2024 07:01  -  14 Sep 2024 07:00  --------------------------------------------------------  IN: 450 mL / OUT: 1400 mL / NET: -950 mL        Appearance: Normal	  Cardiovascular: Normal S1 S2,RRR, No JVD, No murmurs  Respiratory: Lungs clear to auscultation	  Gastrointestinal:  Soft, Non-tender, + BS	  Extremities: Normal range of motion, No clubbing, cyanosis or edema  Vascular: Peripheral pulses palpable 2+ bilaterally       Home Medications:  Advair Diskus 250 mcg-50 mcg inhalation powder: 1 puff(s) inhaled 2 times a day (09 Sep 2024 06:08)  aspirin 81 mg oral delayed release tablet: 1 tab(s) orally once a day (at bedtime) (09 Sep 2024 06:08)  atorvastatin 20 mg oral tablet: 1 tab(s) orally once a day (at bedtime) (09 Sep 2024 06:08)  Cranberry 500mg orally once a day- LD-09/5/24:  (09 Sep 2024 06:08)  fluticasone 50 mcg/inh inhalation powder: 1 puff(s) in each nostril 2 times a day (09 Sep 2024 06:08)  furosemide 20 mg oral tablet: 1 tab(s) orally once a day taking 1-2x daily depending on weight; occasionally skips medication if she has plans to go out (09 Sep 2024 06:06)  gabapentin 400 mg oral capsule: 1 cap(s) orally once a day (at bedtime) (09 Sep 2024 06:08)  ipratropium 42 mcg/inh (0.06%) nasal spray: 2 spray(s) intranasally 2 times a day (09 Sep 2024 06:08)  iron 65 mg orally twice daily:  (09 Sep 2024 06:08)  magnesium 250 mg orally twice a daily:  (09 Sep 2024 06:08)  MetFORMIN (Eqv-Fortamet) 500 mg oral tablet, extended release: 2 tab(s) orally once a day (09 Sep 2024 06:08)  metoprolol tartrate 50 mg oral tablet: 1 tab(s) orally 2 times a day (09 Sep 2024 06:08)  Multiple Vitamins oral tablet: 1 tab(s) orally once a day LD - 09/5/24 (09 Sep 2024 06:08)  omeprazole 40 mg oral delayed release capsule: 1 cap(s) orally once a day (09 Sep 2024 06:08)  PreserVision AREDS 2 oral capsule: 1 cap(s) orally 2 times a day (09 Sep 2024 06:08)  Systane eye drops 2 drop each eye  as needed:  (09 Sep 2024 06:08)  Tresiba FlexTouch 100 units/mL subcutaneous solution: 10 unit(s) subcutaneous once a day (at bedtime) (09 Sep 2024 06:08)  vitamin B12 2500mg orally once a day:  (09 Sep 2024 06:08)  Vitamin C 500mg orally once a day at night:  (09 Sep 2024 06:08)        MEDICATIONS  (STANDING):  acetaminophen   IVPB .. 1000 milliGRAM(s) IV Intermittent once  aspirin enteric coated 81 milliGRAM(s) Oral daily  atorvastatin 20 milliGRAM(s) Oral at bedtime  cefepime   IVPB 1000 milliGRAM(s) IV Intermittent every 24 hours  chlorhexidine 2% Cloths 1 Application(s) Topical daily  dextrose 5%. 1000 milliLiter(s) (50 mL/Hr) IV Continuous <Continuous>  dextrose 5%. 1000 milliLiter(s) (100 mL/Hr) IV Continuous <Continuous>  dextrose 50% Injectable 25 Gram(s) IV Push once  dextrose 50% Injectable 25 Gram(s) IV Push once  dextrose 50% Injectable 12.5 Gram(s) IV Push once  fluticasone propionate 50 MICROgram(s)/spray Nasal Spray 1 Spray(s) Both Nostrils two times a day  fluticasone propionate/ salmeterol 250-50 MICROgram(s) Diskus 1 Dose(s) Inhalation two times a day  furosemide    Tablet 20 milliGRAM(s) Oral daily  gabapentin 400 milliGRAM(s) Oral at bedtime  glucagon  Injectable 1 milliGRAM(s) IntraMuscular once  guaiFENesin Oral Liquid (Sugar-Free) 200 milliGRAM(s) Oral every 6 hours  heparin   Injectable 5000 Unit(s) SubCutaneous every 8 hours  insulin glargine Injectable (LANTUS) 8 Unit(s) SubCutaneous at bedtime  insulin lispro (ADMELOG) corrective regimen sliding scale   SubCutaneous at bedtime  insulin lispro (ADMELOG) corrective regimen sliding scale   SubCutaneous three times a day before meals  metoprolol tartrate 50 milliGRAM(s) Oral two times a day  pantoprazole    Tablet 40 milliGRAM(s) Oral before breakfast  sodium chloride 3%  Inhalation 4 milliLiter(s) Inhalation every 12 hours        EKG:  RADIOLOGY:  DIAGNOSTIC TESTING:  [ ] Echocardiogram:  [ ] Catherterization:  [ ] Stress Test:  OTHER:     LABS:	 	                          9.0    9.64  )-----------( 394      ( 14 Sep 2024 07:41 )             27.6     09-13    129<L>  |  90<L>  |  25<H>  ----------------------------<  211<H>  3.5   |  30  |  0.89    Ca    9.0      13 Sep 2024 06:04  Phos  2.8     09-13  Mg     2.00     09-13            CARDIAC MARKERS:

## 2024-09-15 DIAGNOSIS — R73.9 HYPERGLYCEMIA, UNSPECIFIED: ICD-10-CM

## 2024-09-15 DIAGNOSIS — E11.65 TYPE 2 DIABETES MELLITUS WITH HYPERGLYCEMIA: ICD-10-CM

## 2024-09-15 DIAGNOSIS — I10 ESSENTIAL (PRIMARY) HYPERTENSION: ICD-10-CM

## 2024-09-15 DIAGNOSIS — E78.5 HYPERLIPIDEMIA, UNSPECIFIED: ICD-10-CM

## 2024-09-15 LAB
ANION GAP SERPL CALC-SCNC: 15 MMOL/L — HIGH (ref 7–14)
ANION GAP SERPL CALC-SCNC: 17 MMOL/L — HIGH (ref 7–14)
B-OH-BUTYR SERPL-SCNC: 0.2 MMOL/L — SIGNIFICANT CHANGE UP (ref 0–0.4)
B-OH-BUTYR SERPL-SCNC: <0 MMOL/L — SIGNIFICANT CHANGE UP (ref 0–0.4)
BASOPHILS # BLD AUTO: 0.01 K/UL — SIGNIFICANT CHANGE UP (ref 0–0.2)
BASOPHILS NFR BLD AUTO: 0.2 % — SIGNIFICANT CHANGE UP (ref 0–2)
BLOOD GAS VENOUS COMPREHENSIVE RESULT: SIGNIFICANT CHANGE UP
BLOOD GAS VENOUS COMPREHENSIVE RESULT: SIGNIFICANT CHANGE UP
BUN SERPL-MCNC: 42 MG/DL — HIGH (ref 7–23)
BUN SERPL-MCNC: 44 MG/DL — HIGH (ref 7–23)
CALCIUM SERPL-MCNC: 9.1 MG/DL — SIGNIFICANT CHANGE UP (ref 8.4–10.5)
CALCIUM SERPL-MCNC: 9.7 MG/DL — SIGNIFICANT CHANGE UP (ref 8.4–10.5)
CHLORIDE SERPL-SCNC: 86 MMOL/L — LOW (ref 98–107)
CHLORIDE SERPL-SCNC: 87 MMOL/L — LOW (ref 98–107)
CO2 SERPL-SCNC: 24 MMOL/L — SIGNIFICANT CHANGE UP (ref 22–31)
CO2 SERPL-SCNC: 24 MMOL/L — SIGNIFICANT CHANGE UP (ref 22–31)
CREAT SERPL-MCNC: 1.2 MG/DL — SIGNIFICANT CHANGE UP (ref 0.5–1.3)
CREAT SERPL-MCNC: 1.24 MG/DL — SIGNIFICANT CHANGE UP (ref 0.5–1.3)
EGFR: 41 ML/MIN/1.73M2 — LOW
EGFR: 43 ML/MIN/1.73M2 — LOW
EOSINOPHIL # BLD AUTO: 0 K/UL — SIGNIFICANT CHANGE UP (ref 0–0.5)
EOSINOPHIL NFR BLD AUTO: 0 % — SIGNIFICANT CHANGE UP (ref 0–6)
GLUCOSE SERPL-MCNC: 448 MG/DL — HIGH (ref 70–99)
GLUCOSE SERPL-MCNC: 489 MG/DL — CRITICAL HIGH (ref 70–99)
HCT VFR BLD CALC: 27.9 % — LOW (ref 34.5–45)
HGB BLD-MCNC: 9.2 G/DL — LOW (ref 11.5–15.5)
IANC: 4.88 K/UL — SIGNIFICANT CHANGE UP (ref 1.8–7.4)
IMM GRANULOCYTES NFR BLD AUTO: 1.5 % — HIGH (ref 0–0.9)
LYMPHOCYTES # BLD AUTO: 0.71 K/UL — LOW (ref 1–3.3)
LYMPHOCYTES # BLD AUTO: 12.2 % — LOW (ref 13–44)
MAGNESIUM SERPL-MCNC: 2 MG/DL — SIGNIFICANT CHANGE UP (ref 1.6–2.6)
MAGNESIUM SERPL-MCNC: 2.1 MG/DL — SIGNIFICANT CHANGE UP (ref 1.6–2.6)
MCHC RBC-ENTMCNC: 28 PG — SIGNIFICANT CHANGE UP (ref 27–34)
MCHC RBC-ENTMCNC: 33 GM/DL — SIGNIFICANT CHANGE UP (ref 32–36)
MCV RBC AUTO: 84.8 FL — SIGNIFICANT CHANGE UP (ref 80–100)
MONOCYTES # BLD AUTO: 0.15 K/UL — SIGNIFICANT CHANGE UP (ref 0–0.9)
MONOCYTES NFR BLD AUTO: 2.6 % — SIGNIFICANT CHANGE UP (ref 2–14)
NEUTROPHILS # BLD AUTO: 4.88 K/UL — SIGNIFICANT CHANGE UP (ref 1.8–7.4)
NEUTROPHILS NFR BLD AUTO: 83.5 % — HIGH (ref 43–77)
NRBC # BLD: 0 /100 WBCS — SIGNIFICANT CHANGE UP (ref 0–0)
NRBC # FLD: 0 K/UL — SIGNIFICANT CHANGE UP (ref 0–0)
PHOSPHATE SERPL-MCNC: 3.6 MG/DL — SIGNIFICANT CHANGE UP (ref 2.5–4.5)
PHOSPHATE SERPL-MCNC: 3.9 MG/DL — SIGNIFICANT CHANGE UP (ref 2.5–4.5)
PLATELET # BLD AUTO: 422 K/UL — HIGH (ref 150–400)
POTASSIUM SERPL-MCNC: 4.5 MMOL/L — SIGNIFICANT CHANGE UP (ref 3.5–5.3)
POTASSIUM SERPL-MCNC: 4.9 MMOL/L — SIGNIFICANT CHANGE UP (ref 3.5–5.3)
POTASSIUM SERPL-SCNC: 4.5 MMOL/L — SIGNIFICANT CHANGE UP (ref 3.5–5.3)
POTASSIUM SERPL-SCNC: 4.9 MMOL/L — SIGNIFICANT CHANGE UP (ref 3.5–5.3)
RBC # BLD: 3.29 M/UL — LOW (ref 3.8–5.2)
RBC # FLD: 14.1 % — SIGNIFICANT CHANGE UP (ref 10.3–14.5)
SODIUM SERPL-SCNC: 125 MMOL/L — LOW (ref 135–145)
SODIUM SERPL-SCNC: 128 MMOL/L — LOW (ref 135–145)
WBC # BLD: 5.84 K/UL — SIGNIFICANT CHANGE UP (ref 3.8–10.5)
WBC # FLD AUTO: 5.84 K/UL — SIGNIFICANT CHANGE UP (ref 3.8–10.5)

## 2024-09-15 PROCEDURE — 99223 1ST HOSP IP/OBS HIGH 75: CPT | Mod: GC

## 2024-09-15 RX ORDER — INSULIN GLARGINE 100 [IU]/ML
12 INJECTION, SOLUTION SUBCUTANEOUS AT BEDTIME
Refills: 0 | Status: DISCONTINUED | OUTPATIENT
Start: 2024-09-15 | End: 2024-09-16

## 2024-09-15 RX ORDER — INSULIN GLARGINE 100 [IU]/ML
13 INJECTION, SOLUTION SUBCUTANEOUS AT BEDTIME
Refills: 0 | Status: DISCONTINUED | OUTPATIENT
Start: 2024-09-15 | End: 2024-09-15

## 2024-09-15 RX ORDER — PREDNISONE 10 MG
20 TABLET, DOSE PACK ORAL EVERY 12 HOURS
Refills: 0 | Status: DISCONTINUED | OUTPATIENT
Start: 2024-09-15 | End: 2024-09-17

## 2024-09-15 RX ORDER — INSULIN GLARGINE 100 [IU]/ML
12 INJECTION, SOLUTION SUBCUTANEOUS AT BEDTIME
Refills: 0 | Status: DISCONTINUED | OUTPATIENT
Start: 2024-09-15 | End: 2024-09-15

## 2024-09-15 RX ORDER — HUMAN INSULIN 100 [IU]/ML
5 INJECTION, SUSPENSION SUBCUTANEOUS ONCE
Refills: 0 | Status: COMPLETED | OUTPATIENT
Start: 2024-09-15 | End: 2024-09-15

## 2024-09-15 RX ORDER — ACETAMINOPHEN 325 MG/1
1000 TABLET ORAL ONCE
Refills: 0 | Status: DISCONTINUED | OUTPATIENT
Start: 2024-09-15 | End: 2024-09-17

## 2024-09-15 RX ADMIN — Medication 20 MILLIGRAM(S): at 21:28

## 2024-09-15 RX ADMIN — GUAIFENESIN 200 MILLIGRAM(S): 100 LIQUID ORAL at 06:46

## 2024-09-15 RX ADMIN — Medication 81 MILLIGRAM(S): at 13:16

## 2024-09-15 RX ADMIN — FLUTICASONE PROPIONATE AND SALMETEROL 1 DOSE(S): 250; 50 POWDER RESPIRATORY (INHALATION) at 21:28

## 2024-09-15 RX ADMIN — Medication 20 MILLIGRAM(S): at 06:46

## 2024-09-15 RX ADMIN — Medication 6: at 13:23

## 2024-09-15 RX ADMIN — GUAIFENESIN 200 MILLIGRAM(S): 100 LIQUID ORAL at 13:14

## 2024-09-15 RX ADMIN — HUMAN INSULIN 5 UNIT(S): 100 INJECTION, SUSPENSION SUBCUTANEOUS at 13:07

## 2024-09-15 RX ADMIN — FLUTICASONE PROPIONATE AND SALMETEROL 1 DOSE(S): 250; 50 POWDER RESPIRATORY (INHALATION) at 09:05

## 2024-09-15 RX ADMIN — CEFEPIME 100 MILLIGRAM(S): 2 INJECTION, POWDER, FOR SOLUTION INTRAVENOUS at 13:10

## 2024-09-15 RX ADMIN — Medication 20 MILLIGRAM(S): at 17:59

## 2024-09-15 RX ADMIN — Medication 5 UNIT(S): at 03:31

## 2024-09-15 RX ADMIN — Medication 20 MILLIGRAM(S): at 06:47

## 2024-09-15 RX ADMIN — Medication 6 UNIT(S): at 17:57

## 2024-09-15 RX ADMIN — ACETAMINOPHEN 400 MILLIGRAM(S): 325 TABLET ORAL at 09:49

## 2024-09-15 RX ADMIN — Medication 5000 UNIT(S): at 13:16

## 2024-09-15 RX ADMIN — Medication 5000 UNIT(S): at 06:46

## 2024-09-15 RX ADMIN — FLUTICASONE PROPIONATE 1 SPRAY(S): 50 SPRAY, METERED NASAL at 17:58

## 2024-09-15 RX ADMIN — ACETAMINOPHEN 1000 MILLIGRAM(S): 325 TABLET ORAL at 10:49

## 2024-09-15 RX ADMIN — Medication 40 MILLIGRAM(S): at 06:46

## 2024-09-15 RX ADMIN — Medication 75 MILLILITER(S): at 09:02

## 2024-09-15 RX ADMIN — GUAIFENESIN 200 MILLIGRAM(S): 100 LIQUID ORAL at 17:58

## 2024-09-15 RX ADMIN — Medication 2: at 22:36

## 2024-09-15 RX ADMIN — METOPROLOL TARTRATE 50 MILLIGRAM(S): 100 TABLET ORAL at 17:59

## 2024-09-15 RX ADMIN — Medication 8: at 17:56

## 2024-09-15 RX ADMIN — Medication 400 MILLIGRAM(S): at 21:28

## 2024-09-15 RX ADMIN — Medication 12: at 09:02

## 2024-09-15 RX ADMIN — Medication 5000 UNIT(S): at 22:37

## 2024-09-15 RX ADMIN — SODIUM CHLORIDE 4 MILLILITER(S): 9 INJECTION INTRAMUSCULAR; INTRAVENOUS; SUBCUTANEOUS at 20:23

## 2024-09-15 RX ADMIN — CHLORHEXIDINE GLUCONATE 1 APPLICATION(S): 40 SOLUTION TOPICAL at 13:16

## 2024-09-15 RX ADMIN — METOPROLOL TARTRATE 50 MILLIGRAM(S): 100 TABLET ORAL at 06:46

## 2024-09-15 RX ADMIN — INSULIN GLARGINE 12 UNIT(S): 100 INJECTION, SOLUTION SUBCUTANEOUS at 22:37

## 2024-09-15 NOTE — CONSULT NOTE ADULT - SUBJECTIVE AND OBJECTIVE BOX
Butch Addison MD   |   PGY-4  Endocrinology Fellow  Available on [employer] Teams    HPI:  89 -year-old female with COPD on 2L home O2 PRN, CVA (remote, no deficits), CAD s/p stent ('15), IDDM2, PVD s/p fem-pop bypass ('15) w/occlusion ('22), HLD, HTN, anemia, presenting with (+)UA noted during presurgical testing. Patient is scheduled to have a VATS LLL resection on Thursday. Patient reports going to her PCP and was prescribed abx, of which she took 1 dose prior to presenting t the ED at her daughter's behest. She notes having increased frequency of urination and dysuria for the past 1 week. She has a history of prior UTIs, last UTI 1 month ago. She notes chills but denies any fevers.    In the ED VS:  98.7  98-99  103-107/52-67  25  96-98% on 2-6L NC, received Ceftriaxone 1g IVPB x1 (09 Sep 2024 07:07)      Endocrine History:   Consult for steroid –induced hyperglycemia (500s), a1c only 6.6% (however patient has anemia so may be falsley low)      --Type of Diabetes: T2DM   --Diagnosis: many years   --A1c: 6.6% (however patient has anemia so may be falsely low)   --Outpatient regimen: Tresiba 10 units at bedtime, metformin 500mg 2 tabs daily   --Endocrinologist: Does not have, follows with PCP   --Compliant with medications? Yes   --Side effects to medications? Patient denies diarrhea, decreased appetite, nausea/vomiting, weight gain/loss, lower leg swelling, pancreatitis, thyroid cancer, family hx of thyroid Cancer/MEN. Reports hx of UTI many times, here for one.   --what medications have you tried in the past?  Denies   --who administers medications? herself   --live with: Daughter   --Home sugars/log: AM: 100s. sometimes checks it a second time in the day   --CGM? Denies, not interested   --Any hypoglycemic episodes? Denies   --Any hx of DKA? Denies   --Complications: Reports CAD, CVA, Nephropathy   --Family history: Son T1DM   --Appetite: Low   --% of meals eaten today/yesterday? No breakfast today, ½ of lunch    --Statin: atorvastatin 20mg at home   --ACE/ARB: not on   --Insurance: [employer] medicare,   --Follow up: with PCP   --Inpatient Diet? CC   --Inpatient diabetes regimen? 12/4/mod/mod   --On steroids inpatient? Solumed 20 q8h (9/14 - 9/15) , pred 20 q12h (9/15 - ) x 4 days   --BMI: 24        weight: 54.4kg   --GFR? 43     PAST MEDICAL & SURGICAL HISTORY:  HTN (hypertension)      CVA (cerebral vascular accident)      HLD (hyperlipidemia)      DM2 (diabetes mellitus, type 2)      Anemia      PVD (peripheral vascular disease)      Solitary pulmonary nodule      Chronic diastolic heart failure      H/O hearing loss      History of COPD      CAD (coronary artery disease)      CAD (coronary artery disease)  cardiac stent      S/P vascular bypass  left leg      Bilateral cataracts      H/O rotator cuff surgery          MEDICATIONS  (STANDING):  acetaminophen   IVPB .. 1000 milliGRAM(s) IV Intermittent once  aspirin enteric coated 81 milliGRAM(s) Oral daily  atorvastatin 20 milliGRAM(s) Oral at bedtime  cefepime   IVPB 1000 milliGRAM(s) IV Intermittent every 24 hours  chlorhexidine 2% Cloths 1 Application(s) Topical daily  dextrose 5%. 1000 milliLiter(s) (50 mL/Hr) IV Continuous <Continuous>  dextrose 5%. 1000 milliLiter(s) (100 mL/Hr) IV Continuous <Continuous>  dextrose 50% Injectable 12.5 Gram(s) IV Push once  dextrose 50% Injectable 25 Gram(s) IV Push once  dextrose 50% Injectable 25 Gram(s) IV Push once  fluticasone propionate 50 MICROgram(s)/spray Nasal Spray 1 Spray(s) Both Nostrils two times a day  fluticasone propionate/ salmeterol 250-50 MICROgram(s) Diskus 1 Dose(s) Inhalation two times a day  gabapentin 400 milliGRAM(s) Oral at bedtime  glucagon  Injectable 1 milliGRAM(s) IntraMuscular once  guaiFENesin Oral Liquid (Sugar-Free) 200 milliGRAM(s) Oral every 6 hours  heparin   Injectable 5000 Unit(s) SubCutaneous every 8 hours  insulin glargine Injectable (LANTUS) 12 Unit(s) SubCutaneous at bedtime  insulin lispro (ADMELOG) corrective regimen sliding scale   SubCutaneous at bedtime  insulin lispro (ADMELOG) corrective regimen sliding scale   SubCutaneous three times a day before meals  insulin lispro Injectable (ADMELOG) 6 Unit(s) SubCutaneous three times a day before meals  lactated ringers. 1000 milliLiter(s) (75 mL/Hr) IV Continuous <Continuous>  metoprolol tartrate 50 milliGRAM(s) Oral two times a day  pantoprazole    Tablet 40 milliGRAM(s) Oral before breakfast  predniSONE   Tablet 20 milliGRAM(s) Oral every 12 hours  sodium chloride 3%  Inhalation 4 milliLiter(s) Inhalation every 12 hours    MEDICATIONS  (PRN):  artificial tears (preservative free) Ophthalmic Solution 1 Drop(s) Both EYES two times a day PRN Dry Eyes  dextrose Oral Gel 15 Gram(s) Oral once PRN Blood Glucose LESS THAN 70 milliGRAM(s)/deciliter  hydrocodone/homatropine Syrup 5 milliLiter(s) Oral at bedtime PRN Cough      Allergies    penicillin (Unknown)  macrolide antibiotics (Other)  Biaxin (Unknown)    Intolerances      Review of Systems:  Constitutional: No fever  Eyes: No blurry vision  Neuro: No tremors  HEENT: No pain  Cardiovascular: No chest pain, palpitations  Respiratory: No SOB, no cough  GI: No nausea, vomiting, abdominal pain  : No dysuria  Skin: no rash  Psych: no depression  Endocrine: no polyuria, polydipsia  Hem/lymph: no swelling  Osteoporosis: no fractures    ===================PHYSICAL EXAM=======================  VITALS: T(C): 36.3 (09-15-24 @ 15:50)  T(F): 97.4 (09-15-24 @ 15:50), Max: 98.4 (09-14-24 @ 20:32)  HR: 78 (09-15-24 @ 15:50) (68 - 87)  BP: 125/53 (09-15-24 @ 15:50) (118/41 - 139/55)  RR:  (17 - 18)  SpO2:  (97% - 100%)  Wt(kg): --  GENERAL: NAD  EYES: No proptosis, no lid lag, anicteric  THYROID: Normal size, no palpable nodules  RESPIRATORY: Clear to auscultation bilaterally  CARDIOVASCULAR: Regular rate and rhythm  GI: Soft, nontender, non distended  EXT: b/l feet without wounds; 2+ pulses  PSYCH: Alert and oriented x 3, reactive mood  ======================================================  POCT Blood Glucose.: 287 mg/dL (09-15-24 @ 12:14)  POCT Blood Glucose.: 483 mg/dL (09-15-24 @ 08:24)  POCT Blood Glucose.: 484 mg/dL (09-15-24 @ 08:24)  POCT Blood Glucose.: 508 mg/dL (09-15-24 @ 03:30)  POCT Blood Glucose.: 506 mg/dL (09-14-24 @ 22:00)  POCT Blood Glucose.: 446 mg/dL (09-14-24 @ 21:16)  POCT Blood Glucose.: 562 mg/dL (09-14-24 @ 21:14)  POCT Blood Glucose.: 307 mg/dL (09-14-24 @ 17:19)  POCT Blood Glucose.: 268 mg/dL (09-14-24 @ 12:24)  POCT Blood Glucose.: 261 mg/dL (09-14-24 @ 08:19)  POCT Blood Glucose.: 234 mg/dL (09-13-24 @ 22:45)  POCT Blood Glucose.: 230 mg/dL (09-13-24 @ 20:51)  POCT Blood Glucose.: 291 mg/dL (09-13-24 @ 18:06)  POCT Blood Glucose.: 341 mg/dL (09-13-24 @ 16:54)  POCT Blood Glucose.: 329 mg/dL (09-13-24 @ 12:31)  POCT Blood Glucose.: 221 mg/dL (09-13-24 @ 08:34)  POCT Blood Glucose.: 282 mg/dL (09-12-24 @ 21:10)  POCT Blood Glucose.: 356 mg/dL (09-12-24 @ 17:41)                            9.2    5.84  )-----------( 422      ( 15 Sep 2024 05:49 )             27.9       09-15    128<L>  |  87<L>  |  44<H>  ----------------------------<  448<H>  4.5   |  24  |  1.20    eGFR: 43<L>    Ca    9.7      09-15  Mg     2.10     09-15  Phos  3.9     09-15        Thyroid Function Tests:      A1C with Estimated Average Glucose Result: 6.6 % (09-05-24 @ 13:42)  A1C with Estimated Average Glucose Result: 9.2 % (04-20-24 @ 08:50)          Radiology:

## 2024-09-15 NOTE — CONSULT NOTE ADULT - ATTENDING COMMENTS
Reviewed all pertinent labs, imaging studies. Patient seen and examined with the fellow. Agree with note as written above with the following addendum: 89 -year-old female with COPD on 2L home O2 PRN, CVA (remote, no deficits), CAD s/p stent ('15), IDDM2, PVD s/p fem-pop bypass ('15) w/occlusion ('22), HLD, HTN, anemia, p/w uti and also found to have PNA. Consult for steroid –induced hyperglycemia (500s).  (high risk patient with severe hyperglycemia BG>500 at high risk of CAD and CVA with high level complexity and high level decision-making). Well controlled diabetes outpatient on Tresiba + metformin, A1c 6.6%. Now with steroid induced severe hyperglycemia + UTI + pneumonia. Start basal bolus while inpatient and titrate according to glucose trends and steroid doses. Discharge on Tresiba + renally dosed metformin. Glycemic goals discussed.     Chen Garcia MD  Attending Physician   Division of Endocrinology, Diabetes and Metabolism   9AM-5PM: Please contact via [employer] 9-5: [email or web address]  After hours and weekends/holidays: [phone]  Please note that this patient may be followed by a different provider tomorrow.   For final dc reccomendations, please call [phone] or page the endocrine fellow on call.  Assistance with non-urgent matters: [email or web address]

## 2024-09-15 NOTE — PROGRESS NOTE ADULT - SUBJECTIVE AND OBJECTIVE BOX
Started on IV solumedrol yesterday afternoon  Fingersticks in 400s-500s subsequently  Started on IVF this morning  Premeal insulin added to basal insulin    Vital Signs Last 24 Hrs  T(C): 36.7 (15 Sep 2024 06:43), Max: 36.9 (14 Sep 2024 20:32)  T(F): 98.1 (15 Sep 2024 06:43), Max: 98.4 (14 Sep 2024 20:32)  HR: 73 (15 Sep 2024 06:43) (73 - 96)  BP: 122/54 (15 Sep 2024 06:43) (111/46 - 130/49)  BP(mean): --  RR: 18 (15 Sep 2024 06:43) (17 - 18)  SpO2: 98% (15 Sep 2024 06:43) (97% - 100%)    I&O's Summary    09-14-24 @ 07:01  -  09-15-24 @ 07:00  --------------------------------------------------------  IN: 0 mL / OUT: 1500 mL / NET: -1500 mL          Gen: NAD  Head: NCAT, EOMI  Neck: supple without JVD  Lungs: decreased BS @ bases, no wheezes or rales  Heart: RRR, nl S1/S2, no murmurs  Abd: soft, NTND, NABS, no HSM  Neuro: A+O x 3, speech and comprehension are normal  Exts: no LE edema b/l  Psych: nl affect + mood  Skin: no rashes or jaundice    LABS:                        9.2    5.84  )-----------( 422      ( 15 Sep 2024 05:49 )             27.9     09-15    128<L>  |  87<L>  |  44<H>  ----------------------------<  448<H>  4.5   |  24  |  1.20    Ca    9.7      15 Sep 2024 05:49  Phos  3.9     09-15  Mg     2.10     09-15        CAPILLARY BLOOD GLUCOSE      POCT Blood Glucose.: 483 mg/dL (15 Sep 2024 08:24)  POCT Blood Glucose.: 484 mg/dL (15 Sep 2024 08:24)  POCT Blood Glucose.: 508 mg/dL (15 Sep 2024 03:30)  POCT Blood Glucose.: 506 mg/dL (14 Sep 2024 22:00)  POCT Blood Glucose.: 446 mg/dL (14 Sep 2024 21:16)  POCT Blood Glucose.: 562 mg/dL (14 Sep 2024 21:14)  POCT Blood Glucose.: 307 mg/dL (14 Sep 2024 17:19)  POCT Blood Glucose.: 268 mg/dL (14 Sep 2024 12:24)        Urinalysis Basic - ( 15 Sep 2024 05:49 )    Color: x / Appearance: x / SG: x / pH: x  Gluc: 448 mg/dL / Ketone: x  / Bili: x / Urobili: x   Blood: x / Protein: x / Nitrite: x   Leuk Esterase: x / RBC: x / WBC x   Sq Epi: x / Non Sq Epi: x / Bacteria: x        RADIOLOGY & ADDITIONAL TESTS:    Imaging Personally Reviewed:  [x] YES  [ ] NO    Case discussed with NPP:  [X] YES  [ ] NO

## 2024-09-15 NOTE — PROGRESS NOTE ADULT - SUBJECTIVE AND OBJECTIVE BOX
CARDIOLOGY FOLLOW UP - Dr. Valenzuela  Date of Service: 09-15-24 @ 12:42    CC: no events    Review of Systems:  Constitutional: No fever, weight loss, or fatigue  Respiratory: No cough, wheezing, or hemoptysis, no shortness of breath  Cardiovascular: No chest pain, palpitations, passing out, dizziness, or leg swelling  Gastrointestinal: No abd or epigastric pain. No nausea, vomiting, or hematemesis; no diarrhea or consiptaiton, no melena or hematochezia  Vascular: No edema     TELEMETRY:    PHYSICAL EXAM:  T(C): 36.4 (09-15-24 @ 11:51), Max: 36.9 (09-14-24 @ 20:32)  HR: 70 (09-15-24 @ 11:51) (68 - 96)  BP: 139/51 (09-15-24 @ 11:51) (111/46 - 139/55)  RR: 18 (09-15-24 @ 11:51) (17 - 18)  SpO2: 100% (09-15-24 @ 11:51) (97% - 100%)  Wt(kg): --  I&O's Summary    14 Sep 2024 07:01  -  15 Sep 2024 07:00  --------------------------------------------------------  IN: 0 mL / OUT: 1500 mL / NET: -1500 mL        Appearance: Normal	  Cardiovascular: Normal S1 S2,RRR, No JVD, No murmurs  Respiratory: Lungs clear to auscultation	  Gastrointestinal:  Soft, Non-tender, + BS	  Extremities: Normal range of motion, No clubbing, cyanosis or edema  Vascular: Peripheral pulses palpable 2+ bilaterally       Home Medications:  Advair Diskus 250 mcg-50 mcg inhalation powder: 1 puff(s) inhaled 2 times a day (09 Sep 2024 06:08)  aspirin 81 mg oral delayed release tablet: 1 tab(s) orally once a day (at bedtime) (09 Sep 2024 06:08)  atorvastatin 20 mg oral tablet: 1 tab(s) orally once a day (at bedtime) (09 Sep 2024 06:08)  Cranberry 500mg orally once a day- LD-09/5/24:  (09 Sep 2024 06:08)  fluticasone 50 mcg/inh inhalation powder: 1 puff(s) in each nostril 2 times a day (09 Sep 2024 06:08)  furosemide 20 mg oral tablet: 1 tab(s) orally once a day taking 1-2x daily depending on weight; occasionally skips medication if she has plans to go out (09 Sep 2024 06:06)  gabapentin 400 mg oral capsule: 1 cap(s) orally once a day (at bedtime) (09 Sep 2024 06:08)  ipratropium 42 mcg/inh (0.06%) nasal spray: 2 spray(s) intranasally 2 times a day (09 Sep 2024 06:08)  iron 65 mg orally twice daily:  (09 Sep 2024 06:08)  magnesium 250 mg orally twice a daily:  (09 Sep 2024 06:08)  MetFORMIN (Eqv-Fortamet) 500 mg oral tablet, extended release: 2 tab(s) orally once a day (09 Sep 2024 06:08)  metoprolol tartrate 50 mg oral tablet: 1 tab(s) orally 2 times a day (09 Sep 2024 06:08)  Multiple Vitamins oral tablet: 1 tab(s) orally once a day LD - 09/5/24 (09 Sep 2024 06:08)  omeprazole 40 mg oral delayed release capsule: 1 cap(s) orally once a day (09 Sep 2024 06:08)  PreserVision AREDS 2 oral capsule: 1 cap(s) orally 2 times a day (09 Sep 2024 06:08)  Systane eye drops 2 drop each eye  as needed:  (09 Sep 2024 06:08)  Tresiba FlexTouch 100 units/mL subcutaneous solution: 10 unit(s) subcutaneous once a day (at bedtime) (09 Sep 2024 06:08)  vitamin B12 2500mg orally once a day:  (09 Sep 2024 06:08)  Vitamin C 500mg orally once a day at night:  (09 Sep 2024 06:08)        MEDICATIONS  (STANDING):  acetaminophen   IVPB .. 1000 milliGRAM(s) IV Intermittent once  aspirin enteric coated 81 milliGRAM(s) Oral daily  atorvastatin 20 milliGRAM(s) Oral at bedtime  cefepime   IVPB 1000 milliGRAM(s) IV Intermittent every 24 hours  chlorhexidine 2% Cloths 1 Application(s) Topical daily  dextrose 5%. 1000 milliLiter(s) (50 mL/Hr) IV Continuous <Continuous>  dextrose 5%. 1000 milliLiter(s) (100 mL/Hr) IV Continuous <Continuous>  dextrose 50% Injectable 25 Gram(s) IV Push once  dextrose 50% Injectable 25 Gram(s) IV Push once  dextrose 50% Injectable 12.5 Gram(s) IV Push once  fluticasone propionate 50 MICROgram(s)/spray Nasal Spray 1 Spray(s) Both Nostrils two times a day  fluticasone propionate/ salmeterol 250-50 MICROgram(s) Diskus 1 Dose(s) Inhalation two times a day  gabapentin 400 milliGRAM(s) Oral at bedtime  glucagon  Injectable 1 milliGRAM(s) IntraMuscular once  guaiFENesin Oral Liquid (Sugar-Free) 200 milliGRAM(s) Oral every 6 hours  heparin   Injectable 5000 Unit(s) SubCutaneous every 8 hours  insulin glargine Injectable (LANTUS) 13 Unit(s) SubCutaneous at bedtime  insulin lispro (ADMELOG) corrective regimen sliding scale   SubCutaneous every 4 hours  insulin NPH human recombinant 5 Unit(s) SubCutaneous once  lactated ringers. 1000 milliLiter(s) (75 mL/Hr) IV Continuous <Continuous>  metoprolol tartrate 50 milliGRAM(s) Oral two times a day  pantoprazole    Tablet 40 milliGRAM(s) Oral before breakfast  predniSONE   Tablet 20 milliGRAM(s) Oral every 12 hours  sodium chloride 3%  Inhalation 4 milliLiter(s) Inhalation every 12 hours        EKG:  RADIOLOGY:  DIAGNOSTIC TESTING:  [ ] Echocardiogram:  [ ] Catherterization:  [ ] Stress Test:  OTHER:     LABS:	 	                          9.2    5.84  )-----------( 422      ( 15 Sep 2024 05:49 )             27.9     09-15    128<L>  |  87<L>  |  44<H>  ----------------------------<  448<H>  4.5   |  24  |  1.20    Ca    9.7      15 Sep 2024 05:49  Phos  3.9     09-15  Mg     2.10     09-15            CARDIAC MARKERS:

## 2024-09-15 NOTE — PROGRESS NOTE ADULT - SUBJECTIVE AND OBJECTIVE BOX
Date of Service: 09-15-24 @ 10:40    Patient is a 89y old  Female who presents with a chief complaint of UTI (15 Sep 2024 09:41)      Any change in ROS: seems OK:  no sob:  on 3 L of oxygen : today she feels slightly better     MEDICATIONS  (STANDING):  acetaminophen   IVPB .. 1000 milliGRAM(s) IV Intermittent once  aspirin enteric coated 81 milliGRAM(s) Oral daily  atorvastatin 20 milliGRAM(s) Oral at bedtime  cefepime   IVPB 1000 milliGRAM(s) IV Intermittent every 24 hours  chlorhexidine 2% Cloths 1 Application(s) Topical daily  dextrose 5%. 1000 milliLiter(s) (100 mL/Hr) IV Continuous <Continuous>  dextrose 5%. 1000 milliLiter(s) (50 mL/Hr) IV Continuous <Continuous>  dextrose 50% Injectable 25 Gram(s) IV Push once  dextrose 50% Injectable 25 Gram(s) IV Push once  dextrose 50% Injectable 12.5 Gram(s) IV Push once  fluticasone propionate 50 MICROgram(s)/spray Nasal Spray 1 Spray(s) Both Nostrils two times a day  fluticasone propionate/ salmeterol 250-50 MICROgram(s) Diskus 1 Dose(s) Inhalation two times a day  gabapentin 400 milliGRAM(s) Oral at bedtime  glucagon  Injectable 1 milliGRAM(s) IntraMuscular once  guaiFENesin Oral Liquid (Sugar-Free) 200 milliGRAM(s) Oral every 6 hours  heparin   Injectable 5000 Unit(s) SubCutaneous every 8 hours  insulin glargine Injectable (LANTUS) 12 Unit(s) SubCutaneous at bedtime  insulin lispro (ADMELOG) corrective regimen sliding scale   SubCutaneous three times a day before meals  insulin lispro Injectable (ADMELOG) 4 Unit(s) SubCutaneous three times a day before meals  lactated ringers. 1000 milliLiter(s) (75 mL/Hr) IV Continuous <Continuous>  methylPREDNISolone sodium succinate Injectable 20 milliGRAM(s) IV Push every 8 hours  metoprolol tartrate 50 milliGRAM(s) Oral two times a day  pantoprazole    Tablet 40 milliGRAM(s) Oral before breakfast  sodium chloride 3%  Inhalation 4 milliLiter(s) Inhalation every 12 hours    MEDICATIONS  (PRN):  artificial tears (preservative free) Ophthalmic Solution 1 Drop(s) Both EYES two times a day PRN Dry Eyes  dextrose Oral Gel 15 Gram(s) Oral once PRN Blood Glucose LESS THAN 70 milliGRAM(s)/deciliter  hydrocodone/homatropine Syrup 5 milliLiter(s) Oral at bedtime PRN Cough    Vital Signs Last 24 Hrs  T(C): 36.7 (15 Sep 2024 06:43), Max: 36.9 (14 Sep 2024 20:32)  T(F): 98.1 (15 Sep 2024 06:43), Max: 98.4 (14 Sep 2024 20:32)  HR: 73 (15 Sep 2024 06:43) (73 - 96)  BP: 122/54 (15 Sep 2024 06:43) (111/46 - 130/49)  BP(mean): --  RR: 18 (15 Sep 2024 06:43) (17 - 18)  SpO2: 98% (15 Sep 2024 06:43) (97% - 100%)    Parameters below as of 15 Sep 2024 06:43  Patient On (Oxygen Delivery Method): nasal cannula  O2 Flow (L/min): 3      I&O's Summary    14 Sep 2024 07:01  -  15 Sep 2024 07:00  --------------------------------------------------------  IN: 0 mL / OUT: 1500 mL / NET: -1500 mL          Physical Exam:   GENERAL: NAD, well-groomed, well-developed  HEENT: MARCIO/   Atraumatic, Normocephalic  ENMT: No tonsillar erythema, exudates, or enlargement; Moist mucous membranes, Good dentition, No lesions  NECK: Supple, No JVD, Normal thyroid  CHEST/LUNG: Clear to auscultaion- no wheezing  CVS: Regular rate and rhythm; No murmurs, rubs, or gallops  GI: : Soft, Nontender, Nondistended; Bowel sounds present  NERVOUS SYSTEM:  Alert & Oriented X3  EXTREMITIES: - edema  LYMPH: No lymphadenopathy noted  SKIN: No rashes or lesions  ENDOCRINOLOGY: No Thyromegaly  PSYCH: Appropriate    Labs:  27, 27, 31, 31                            9.2    5.84  )-----------( 422      ( 15 Sep 2024 05:49 )             27.9                         9.0    9.64  )-----------( 394      ( 14 Sep 2024 07:41 )             27.6                         9.6    12.94 )-----------( 391      ( 13 Sep 2024 06:04 )             29.1                         11.1   17.06 )-----------( 432      ( 12 Sep 2024 05:26 )             34.4     09-15    128<L>  |  87<L>  |  44<H>  ----------------------------<  448<H>  4.5   |  24  |  1.20  09-15    125<L>  |  86<L>  |  42<H>  ----------------------------<  489<HH>  4.9   |  24  |  1.24  09-14    131<L>  |  92<L>  |  26<H>  ----------------------------<  231<H>  4.1   |  27  |  0.99  09-13    129<L>  |  90<L>  |  25<H>  ----------------------------<  211<H>  3.5   |  30  |  0.89  09-12    135  |  93<L>  |  27<H>  ----------------------------<  232<H>  3.6   |  25  |  0.89    Ca    9.7      15 Sep 2024 05:49  Ca    9.1      15 Sep 2024 00:43  Ca    9.0      14 Sep 2024 07:41  Phos  3.9     09-15  Phos  3.6     09-15  Phos  3.1     09-14  Mg     2.10     09-15  Mg     2.00     09-15  Mg     2.20     09-14      CAPILLARY BLOOD GLUCOSE      POCT Blood Glucose.: 483 mg/dL (15 Sep 2024 08:24)  POCT Blood Glucose.: 484 mg/dL (15 Sep 2024 08:24)  POCT Blood Glucose.: 508 mg/dL (15 Sep 2024 03:30)  POCT Blood Glucose.: 506 mg/dL (14 Sep 2024 22:00)  POCT Blood Glucose.: 446 mg/dL (14 Sep 2024 21:16)  POCT Blood Glucose.: 562 mg/dL (14 Sep 2024 21:14)  POCT Blood Glucose.: 307 mg/dL (14 Sep 2024 17:19)  POCT Blood Glucose.: 268 mg/dL (14 Sep 2024 12:24)          Urinalysis Basic - ( 15 Sep 2024 05:49 )    Color: x / Appearance: x / SG: x / pH: x  Gluc: 448 mg/dL / Ketone: x  / Bili: x / Urobili: x   Blood: x / Protein: x / Nitrite: x   Leuk Esterase: x / RBC: x / WBC x   Sq Epi: x / Non Sq Epi: x / Bacteria: x            RECENT CULTURES:  09-09 @ 10:55 .Blood Blood-Peripheral         rad< from: CT Chest w/ IV Cont (09.13.24 @ 09:37) >  ABDOMINAL WALL: Post surgicalchanges in the left groin.  BONES: Degenerative changes and osteopenia.    IMPRESSION:  Likely multifocal pneumonia. New lymphadenopathy can be reactive. A   short-term follow-up CT chest in 6-8 weeks is advised.  Previously FDG avid left lower lobe pulmonary nodule is minimally   increased.  No acute abnormality in the abdomen and pelvis.    --- End of Report ---            GIOVANNY KOTHARI MD; Attending Radiologist  This document has been electronically signed. Sep 13 2024  1:19PM    < end of copied text >         No growth at 5 days    09-09 @ 10:45 .Blood Blood-Peripheral                No growth at 5 days    09-08 @ 21:17 Clean Catch   [email or web address]      Citrobacter freundii complex  Enterococcus faecium  Citrobacter freundii complex     10,000 - 49,000 CFU/mL Citrobacter freundii complex  50,000 - 99,000 CFU/mL Enterococcus faecium          RESPIRATORY CULTURES:          Studies  Chest X-RAY  CT SCAN Chest   Venous Dopplers: LE:   CT Abdomen  Others

## 2024-09-15 NOTE — PROGRESS NOTE ADULT - ASSESSMENT
89 y/o F w/COPD, former smoker, as needed O2, CVA, CAD status post stent, DM, HTN, HLD, lung cancer scheduled for VATS on Thursday presenting w CHF and possible UTI    #HFpEF  -recent TTW noted  -normal LVEF and mild AS  -cont to hold lasix    #CAD s/p PCI  -stable  -cont asa, statin, BB    #Lung CA  -preop for LLL VATS w thoracic when resp status improves      #Hypoxia  -mulifactorial  --pulm eval noted  -CT chest noted        35 minutes spent on total encounter; more than 50% of the visit was spent counseling and/or coordinating care by the attending physician.

## 2024-09-15 NOTE — PROGRESS NOTE ADULT - ASSESSMENT
89 -year-old female with COPD on 2L home O2 PRN, CVA (remote, no deficits), CAD s/p stent ('15), IDDM2, PVD s/p fem-pop bypass ('15) w/occlusion ('22), HLD, HTN, anemia, presenting with (+)UA noted during presurgical testing. Patient is scheduled to have a VATS LLL resection on Thursday. Patient reports going to her PCP and was prescribed abx, of which she took 1 dose prior to presenting t the ED at her daughter's behest. She notes having increased frequency of urination and dysuria for the past 1 week. She has a history of prior UTIs, last UTI 1 month ago. She notes chills but denies any fevers.  In the ED VS:  98.7  98-99  103-107/52-67  25  96-98% on 2-6L NC, received Ceftriaxone 1g IVPB x1 (09 Sep 2024 07:07):  She has copd and uses oxygen at home on an prn basis:  she felt feverish and chills at home and denies any sob:  on 32 L of oxygen:   -no wheezing  reportedly she had pet scan chest as an outpt and she has malignancy ;  she is scheduled for vats and left lower lobectomy on coming 12th , but admitted here for fever,  chills      UTI;  COPD;  PULMONARY NODULE: SUSPECTED MALIGNANCY ;   cad/s/p pci  DM2  PVD:  S/P FEM-POP BYPASS  HTN  HLD  ANEMIA      UTI;  -she is being  treated for uti with ceftriaxone   -likely reason for her chills at home;     9/10;  -she is on ceftriaxone for 5 days  9/11/ on ceftriaxone     9/12: : reced  on e dose of vanco : on nitrofurantoin for short term :  -would not advise  to cont nitrofurantoin for long term as she already had ILD and bronchiectasis:   9/13:  -cont current medications:  dw i d:  no long term nitrofurantoin   9/15: seems stable:      COPD;  -she looks OK;   -no sob:  or wheezing   -VBG seems to be doing  ok   -on Adviar    9/14:  -she became more tachypneic and sob today am:   -ct chest with suspected pneumonia and has underlying emphysema:   -she is already on antibiotics and would add steroids also   -now on vanc and zosyn     9/15:  -she is not wheezing:   -decrease steroids dosages today  :  she say says her breathing  is ok :     PULMONARY NODULE: SUSPECTED MALIGNANCY ;   -she had 1.5 cm smnoud l e in left lower lobe;   -she says she had pet scan in April 2024;  showed; No acute pulmonary embolism. Mucoid secretions with impaction right middle lobe and lower lobe bronchi. Partial right middle lobe atelectasis, new from prior exam. Persistent atelectasis/airspace consolidation right lower lobe. Few scattered groundglass opacities left upper lobe, could reflect acute respiratory infection. Probable chronic interstitial lung disease superimposed on background of emphysema. Stable 1.5 cm nodule left lower lobe.  -now she is scheduled fr vats left lower lung resection/;  given postive pet scan as an outpt:   -cxr this time also suggests pulm edema;  and her EF was normal though ? could the changes e due t ILD?   -cont ADVAIR here   9/10:   cont diuresis:   -cards following  9/11: a above;  for possible surgery in am    9/12: she has 1.5 cm nodule in left lower lobe:  she was supposed to get surgery during this admission,   but now cancelled secondary to UTI and increasing WBC   9/13-ct chest is pending;   -the ct scan chest from three months ago showed; Bilateral septal thickening, groundglass opacities, peripheral consolidation and pleural effusions. These findings could occur inthe   setting of pulmonary edema, however, if there are no signs of pulmonary edema, other entities including pneumonia, could be considered.  rpt ct chest pending today   9/14:  -the rpt ct scan chest showed mild increase in the nodule size:  would defer to thoracic  primary team  : currently surgery is postponed    cad/s/p pci  -cnt current meds;     DM2  -monitor and control     PVD:  S/P FEM-POP BYPASS  -cont current meds!    HTN  -controlled    HLD  -atorvastatin 20 milliGRAM(s) Oral at bedtime    ANEMIA  -She is mildly anemic ; monitor and transfuse as needed dw team     dw id/  PMD

## 2024-09-15 NOTE — CHART NOTE - NSCHARTNOTEFT_GEN_A_CORE
Consulted for steroid induced hyperglycemia.    POCT Blood Glucose:  483 mg/dL (09-15-24 @ 08:24)  484 mg/dL (09-15-24 @ 08:24)  508 mg/dL (09-15-24 @ 03:30)  506 mg/dL (09-14-24 @ 22:00)  446 mg/dL (09-14-24 @ 21:16)  562 mg/dL (09-14-24 @ 21:14)  307 mg/dL (09-14-24 @ 17:19)  268 mg/dL (09-14-24 @ 12:24)      eMAR:atorvastatin   20 milliGRAM(s) Oral (09-14-24 @ 21:42)    insulin glargine Injectable (LANTUS)   8 Unit(s) SubCutaneous (09-14-24 @ 21:41)    insulin lispro (ADMELOG) corrective regimen sliding scale   12 Unit(s) SubCutaneous (09-15-24 @ 09:02)    insulin lispro (ADMELOG) corrective regimen sliding scale   4 Unit(s) SubCutaneous (09-14-24 @ 18:07)   3 Unit(s) SubCutaneous (09-14-24 @ 12:51)    insulin lispro (ADMELOG) corrective regimen sliding scale   4 Unit(s) SubCutaneous (09-14-24 @ 21:40)    insulin lispro Injectable (ADMELOG).   5 Unit(s) SubCutaneous (09-15-24 @ 03:31)    methylPREDNISolone sodium succinate Injectable   20 milliGRAM(s) IV Push (09-15-24 @ 06:47)   20 milliGRAM(s) IV Push (09-14-24 @ 21:50)   20 milliGRAM(s) IV Push (09-14-24 @ 14:20)    - no DKA per labs this morning    Prelim recommendations:  - please make NPO STAT to prevent worsening hyperglycemia  - change to moderate admelog correctional scale q4h with FS q4h  - please give fluids if patient is hypovolemic  - when FS <300, patient can patient can start on consistent carb diet with Lantus 13 units at bedtime and Admelog 6 units TID premeal and change the correctional scale to moderate premeal and moderate at bedtime (2 separate scales for premeal and bedtime  - Full consult to follow later in the day      Recommendations:  - Please obtain weight  - Start weight based basal/bolus insulin:  - Weight in kg x 0.2 = lantus dose; give stat dose and then q24h (For example 60kg x 0.2 = 12 units of lantus)  - Weight in kg x 0.2 / 3 = admelog dose TIDAC (For example 60kg x 0.2 /3 = 4 units of admelog TIDAC)  - recommend low dose admelog scale TIDAC  - recommend low dose admelog scale QHS Consulted for steroid induced hyperglycemia.    POCT Blood Glucose:  483 mg/dL (09-15-24 @ 08:24)  484 mg/dL (09-15-24 @ 08:24)  508 mg/dL (09-15-24 @ 03:30)  506 mg/dL (09-14-24 @ 22:00)  446 mg/dL (09-14-24 @ 21:16)  562 mg/dL (09-14-24 @ 21:14)  307 mg/dL (09-14-24 @ 17:19)  268 mg/dL (09-14-24 @ 12:24)      eMAR:atorvastatin   20 milliGRAM(s) Oral (09-14-24 @ 21:42)    insulin glargine Injectable (LANTUS)   8 Unit(s) SubCutaneous (09-14-24 @ 21:41)    insulin lispro (ADMELOG) corrective regimen sliding scale   12 Unit(s) SubCutaneous (09-15-24 @ 09:02)    insulin lispro (ADMELOG) corrective regimen sliding scale   4 Unit(s) SubCutaneous (09-14-24 @ 18:07)   3 Unit(s) SubCutaneous (09-14-24 @ 12:51)    insulin lispro (ADMELOG) corrective regimen sliding scale   4 Unit(s) SubCutaneous (09-14-24 @ 21:40)    insulin lispro Injectable (ADMELOG).   5 Unit(s) SubCutaneous (09-15-24 @ 03:31)    methylPREDNISolone sodium succinate Injectable   20 milliGRAM(s) IV Push (09-15-24 @ 06:47)   20 milliGRAM(s) IV Push (09-14-24 @ 21:50)   20 milliGRAM(s) IV Push (09-14-24 @ 14:20)    - no DKA per labs this morning    Prelim recommendations:  - please make NPO STAT to prevent worsening hyperglycemia  - give NPH 5 units x 1 STAT as 8 units lantus likely was not enough last night  - change to moderate admelog correctional scale q4h with FS q4h  - please give fluids if patient is hypovolemic  - when FS <300, patient can patient can start on consistent carb diet with Lantus 13 units at bedtime and Admelog 6 units TID premeal and change the correctional scale to moderate premeal and moderate at bedtime (2 separate scales for premeal and bedtime  - Full consult to follow later in the day      Recommendations:  - Please obtain weight  - Start weight based basal/bolus insulin:  - Weight in kg x 0.2 = lantus dose; give stat dose and then q24h (For example 60kg x 0.2 = 12 units of lantus)  - Weight in kg x 0.2 / 3 = admelog dose TIDAC (For example 60kg x 0.2 /3 = 4 units of admelog TIDAC)  - recommend low dose admelog scale TIDAC  - recommend low dose admelog scale QHS

## 2024-09-15 NOTE — CONSULT NOTE ADULT - CONSULT REASON
UTI ; FEVER:  COPD:  HYPOXIC RESP FAILURE : outpt pulm : Dr Douglass
Scheduled VATS
preop/chf
UTI
T2DM

## 2024-09-15 NOTE — CONSULT NOTE ADULT - ASSESSMENT
89 -year-old female with COPD on 2L home O2 PRN, CVA (remote, no deficits), CAD s/p stent ('15), IDDM2, PVD s/p fem-pop bypass ('15) w/occlusion ('22), HLD, HTN, anemia, p/w uti and also found to have PNA. Consult for steroid –induced hyperglycemia (500s), a1c only 6.6% (however patient has anemia so may be falsely low)     #Steroid Induced Hyperglycemia  #T2DM   --A1c: 6.6% (however patient has anemia so may be falsely low)   --Outpatient regimen: Tresiba 10 units at bedtime, metformin 500mg 2 tabs daily   --Endocrinologist: Does not have, follows with PCP    INPATIENT RECOMMENDATIONS:  - start Lantus 12 units at bedtime  - start Admelog 6 units premeal TID (HOLD IF NPO)  - start low admelog correctional scale premeal   - start low admelog correctional scale at bedtime  - Steroids: Solumed 20 q8h (9/14 - 9/15) , pred 20 q12h (9/15 - ) x 4 days - please notify us if steroid dose is changing: [email or web address]    DISCHARGE RECOMMENDATIONS:  - Pending clinical course: If off steroids at the time of steroids, can resume home regimen (Tresiba 10 units at bedtime, metformin 500mg 2 tabs daily). If patient being discharged on steroids then may need another agent for steroid induced hyperglycemia  - CGM: Has not used before, does not want to try, prefers doing fingersticks  - OUT-PATIENT FOLLOW UP: Patient prefers to continue following with PCP. Pt can follow up with Endocrinology office ([phone]) at [address] [place] [address] [address] [place] [id number].    #HLD  - patient on atorvastatin 20mg at home  - goal LDL in diabetes mellitus is <70    #HTN  - goal BP in diabetes mellitus is <130/80  - defer to primary team for management      Discussed recommendations with primary team.    Butch Addison MD  Endocrine Fellow  Can be reached via [employer] teams.    For follow up questions, discharge recommendations, or new consults, please email [email or web address] or [email or web address] ([facility]) or call answering service at [phone] (weekdays); [phone] (nights/weekends).  For emergencies please page fellow on call.

## 2024-09-15 NOTE — PROGRESS NOTE ADULT - ASSESSMENT
89 -year-old female with COPD on 2L home O2 PRN, CVA (remote, no deficits), CAD s/p stent ('15), IDDM2, PVD s/p fem-pop bypass ('15) w/occlusion ('22), HLD, HTN, anemia, presenting with (+)UA noted during presurgical testing.      Sepsis due to urinary tract infection based on WBC>12, HR>90 and G(+)PNA b/l lower lobes and RML  - lactate 2.7->1.2  - ceftriaxone 9/10/24 --> cefepime 9/12/24  - macrobid 9/11/24 --> vancomycin x 1 dose 9/12/24 + 9/13/24   - urine cx 9/8/24 --> 10K-49K Citrobacter freundii + 50K-99K Enterococcus faecium  - blood cxs 9/9/24 --> negative  - MRSA PCR 9/10/24 --> negative  - CT chest w/o contrast, abd/pelvis w/contrast 9/13/24 --> PNA b/l lower lobes and RML  - incentive spirometer  - nebs prn  - antitussives prn  - daily CBC  - sepsis resolved  - appreciate ID    Acute-on-chronic HFpEF  - recent outpt TTE pasted into [place]  - daily weights  - initially started on lasix 20 mg IV bid, lasix held 9/15/24 2' rising BUN  - appreciate cardiology    Type 2 diabetes mellitus, with steroid-induced hyperglycemia  - c/w basal insulin, premeal insulin   - monitor FS QAC/HS w/moderate severity ISS  - recent A1c 6.6%  - hold home metformin  - endocrinology consult 9/15/24    Essential hypertension  - c/w metoprolol tartrate  - c/w furosemide PO, reportedly supposed to be taking furosemide 20 mg bid however takes 0-2x daily, reports her cardiologist wants her taking bid  - appreciate cardiology    Chronic obstructive pulmonary disease without acute exacerbation  - c/w LABA/ICS  - O2 PRN  - incentive spirometry  - appreciate pulmonology    Hyperlipidemia  - c/w statin    CAD of native arteries without angina pectoris   - c/w ASA, statin, metoprolol    LLL pulmonary nodule  - CTS to reschedule VATS as outpt    Need for prophylactic measure  - SC heparin 5000 units q8h

## 2024-09-16 LAB
ANION GAP SERPL CALC-SCNC: 14 MMOL/L — SIGNIFICANT CHANGE UP (ref 7–14)
BASOPHILS # BLD AUTO: 0.02 K/UL — SIGNIFICANT CHANGE UP (ref 0–0.2)
BASOPHILS NFR BLD AUTO: 0.2 % — SIGNIFICANT CHANGE UP (ref 0–2)
BUN SERPL-MCNC: 35 MG/DL — HIGH (ref 7–23)
CALCIUM SERPL-MCNC: 10.1 MG/DL — SIGNIFICANT CHANGE UP (ref 8.4–10.5)
CHLORIDE SERPL-SCNC: 93 MMOL/L — LOW (ref 98–107)
CO2 SERPL-SCNC: 25 MMOL/L — SIGNIFICANT CHANGE UP (ref 22–31)
CREAT SERPL-MCNC: 0.93 MG/DL — SIGNIFICANT CHANGE UP (ref 0.5–1.3)
EGFR: 58 ML/MIN/1.73M2 — LOW
EOSINOPHIL # BLD AUTO: 0 K/UL — SIGNIFICANT CHANGE UP (ref 0–0.5)
EOSINOPHIL NFR BLD AUTO: 0 % — SIGNIFICANT CHANGE UP (ref 0–6)
GLUCOSE SERPL-MCNC: 302 MG/DL — HIGH (ref 70–99)
HCT VFR BLD CALC: 29.5 % — LOW (ref 34.5–45)
HGB BLD-MCNC: 9.6 G/DL — LOW (ref 11.5–15.5)
IANC: 6.54 K/UL — SIGNIFICANT CHANGE UP (ref 1.8–7.4)
IMM GRANULOCYTES NFR BLD AUTO: 1.6 % — HIGH (ref 0–0.9)
LYMPHOCYTES # BLD AUTO: 1.09 K/UL — SIGNIFICANT CHANGE UP (ref 1–3.3)
LYMPHOCYTES # BLD AUTO: 13.2 % — SIGNIFICANT CHANGE UP (ref 13–44)
MAGNESIUM SERPL-MCNC: 2.3 MG/DL — SIGNIFICANT CHANGE UP (ref 1.6–2.6)
MCHC RBC-ENTMCNC: 28.1 PG — SIGNIFICANT CHANGE UP (ref 27–34)
MCHC RBC-ENTMCNC: 32.5 GM/DL — SIGNIFICANT CHANGE UP (ref 32–36)
MCV RBC AUTO: 86.3 FL — SIGNIFICANT CHANGE UP (ref 80–100)
MONOCYTES # BLD AUTO: 0.5 K/UL — SIGNIFICANT CHANGE UP (ref 0–0.9)
MONOCYTES NFR BLD AUTO: 6 % — SIGNIFICANT CHANGE UP (ref 2–14)
NEUTROPHILS # BLD AUTO: 6.54 K/UL — SIGNIFICANT CHANGE UP (ref 1.8–7.4)
NEUTROPHILS NFR BLD AUTO: 79 % — HIGH (ref 43–77)
NRBC # BLD: 0 /100 WBCS — SIGNIFICANT CHANGE UP (ref 0–0)
NRBC # FLD: 0 K/UL — SIGNIFICANT CHANGE UP (ref 0–0)
PHOSPHATE SERPL-MCNC: 3.6 MG/DL — SIGNIFICANT CHANGE UP (ref 2.5–4.5)
PLATELET # BLD AUTO: 430 K/UL — HIGH (ref 150–400)
POTASSIUM SERPL-MCNC: 4.8 MMOL/L — SIGNIFICANT CHANGE UP (ref 3.5–5.3)
POTASSIUM SERPL-SCNC: 4.8 MMOL/L — SIGNIFICANT CHANGE UP (ref 3.5–5.3)
RBC # BLD: 3.42 M/UL — LOW (ref 3.8–5.2)
RBC # FLD: 14.4 % — SIGNIFICANT CHANGE UP (ref 10.3–14.5)
SODIUM SERPL-SCNC: 132 MMOL/L — LOW (ref 135–145)
WBC # BLD: 8.28 K/UL — SIGNIFICANT CHANGE UP (ref 3.8–10.5)
WBC # FLD AUTO: 8.28 K/UL — SIGNIFICANT CHANGE UP (ref 3.8–10.5)

## 2024-09-16 PROCEDURE — 99232 SBSQ HOSP IP/OBS MODERATE 35: CPT

## 2024-09-16 RX ORDER — CEFEPIME 2 G/1
2000 INJECTION, POWDER, FOR SOLUTION INTRAVENOUS EVERY 24 HOURS
Refills: 0 | Status: COMPLETED | OUTPATIENT
Start: 2024-09-16 | End: 2024-09-16

## 2024-09-16 RX ORDER — INSULIN GLARGINE 100 [IU]/ML
18 INJECTION, SOLUTION SUBCUTANEOUS AT BEDTIME
Refills: 0 | Status: DISCONTINUED | OUTPATIENT
Start: 2024-09-16 | End: 2024-09-17

## 2024-09-16 RX ADMIN — Medication 40 MILLIGRAM(S): at 05:11

## 2024-09-16 RX ADMIN — Medication 20 MILLIGRAM(S): at 05:11

## 2024-09-16 RX ADMIN — GUAIFENESIN 200 MILLIGRAM(S): 100 LIQUID ORAL at 00:16

## 2024-09-16 RX ADMIN — Medication 6 UNIT(S): at 08:59

## 2024-09-16 RX ADMIN — Medication 400 MILLIGRAM(S): at 21:54

## 2024-09-16 RX ADMIN — Medication 75 MILLILITER(S): at 02:53

## 2024-09-16 RX ADMIN — CEFEPIME 100 MILLIGRAM(S): 2 INJECTION, POWDER, FOR SOLUTION INTRAVENOUS at 12:59

## 2024-09-16 RX ADMIN — METOPROLOL TARTRATE 50 MILLIGRAM(S): 100 TABLET ORAL at 18:01

## 2024-09-16 RX ADMIN — Medication 4: at 18:04

## 2024-09-16 RX ADMIN — Medication 8: at 08:59

## 2024-09-16 RX ADMIN — CHLORHEXIDINE GLUCONATE 1 APPLICATION(S): 40 SOLUTION TOPICAL at 12:59

## 2024-09-16 RX ADMIN — Medication 5000 UNIT(S): at 21:49

## 2024-09-16 RX ADMIN — Medication 20 MILLIGRAM(S): at 18:03

## 2024-09-16 RX ADMIN — FLUTICASONE PROPIONATE AND SALMETEROL 1 DOSE(S): 250; 50 POWDER RESPIRATORY (INHALATION) at 08:57

## 2024-09-16 RX ADMIN — Medication 12: at 13:01

## 2024-09-16 RX ADMIN — Medication 81 MILLIGRAM(S): at 12:59

## 2024-09-16 RX ADMIN — FLUTICASONE PROPIONATE 1 SPRAY(S): 50 SPRAY, METERED NASAL at 18:01

## 2024-09-16 RX ADMIN — METOPROLOL TARTRATE 50 MILLIGRAM(S): 100 TABLET ORAL at 05:11

## 2024-09-16 RX ADMIN — Medication 20 MILLIGRAM(S): at 21:50

## 2024-09-16 RX ADMIN — INSULIN GLARGINE 18 UNIT(S): 100 INJECTION, SOLUTION SUBCUTANEOUS at 23:50

## 2024-09-16 RX ADMIN — Medication 10 UNIT(S): at 18:04

## 2024-09-16 RX ADMIN — Medication 5000 UNIT(S): at 05:12

## 2024-09-16 RX ADMIN — Medication 6 UNIT(S): at 13:01

## 2024-09-16 RX ADMIN — Medication 0: at 23:49

## 2024-09-16 RX ADMIN — Medication 5000 UNIT(S): at 13:00

## 2024-09-16 RX ADMIN — FLUTICASONE PROPIONATE AND SALMETEROL 1 DOSE(S): 250; 50 POWDER RESPIRATORY (INHALATION) at 21:49

## 2024-09-16 NOTE — PROGRESS NOTE ADULT - SUBJECTIVE AND OBJECTIVE BOX
Date of Service: 09-16-24 @ 10:27    Patient is a 89y old  Female who presents with a chief complaint of UTI (15 Sep 2024 17:31)      Any change in ROS: seems OK:  pretty conditioned:   on 2 L of oxygen      MEDICATIONS  (STANDING):  acetaminophen   IVPB .. 1000 milliGRAM(s) IV Intermittent once  aspirin enteric coated 81 milliGRAM(s) Oral daily  atorvastatin 20 milliGRAM(s) Oral at bedtime  cefepime   IVPB 2000 milliGRAM(s) IV Intermittent every 24 hours  chlorhexidine 2% Cloths 1 Application(s) Topical daily  dextrose 5%. 1000 milliLiter(s) (50 mL/Hr) IV Continuous <Continuous>  dextrose 5%. 1000 milliLiter(s) (100 mL/Hr) IV Continuous <Continuous>  dextrose 50% Injectable 12.5 Gram(s) IV Push once  dextrose 50% Injectable 25 Gram(s) IV Push once  dextrose 50% Injectable 25 Gram(s) IV Push once  fluticasone propionate 50 MICROgram(s)/spray Nasal Spray 1 Spray(s) Both Nostrils two times a day  fluticasone propionate/ salmeterol 250-50 MICROgram(s) Diskus 1 Dose(s) Inhalation two times a day  gabapentin 400 milliGRAM(s) Oral at bedtime  glucagon  Injectable 1 milliGRAM(s) IntraMuscular once  heparin   Injectable 5000 Unit(s) SubCutaneous every 8 hours  insulin glargine Injectable (LANTUS) 12 Unit(s) SubCutaneous at bedtime  insulin lispro (ADMELOG) corrective regimen sliding scale   SubCutaneous at bedtime  insulin lispro (ADMELOG) corrective regimen sliding scale   SubCutaneous three times a day before meals  insulin lispro Injectable (ADMELOG) 6 Unit(s) SubCutaneous three times a day before meals  lactated ringers. 1000 milliLiter(s) (75 mL/Hr) IV Continuous <Continuous>  metoprolol tartrate 50 milliGRAM(s) Oral two times a day  pantoprazole    Tablet 40 milliGRAM(s) Oral before breakfast  predniSONE   Tablet 20 milliGRAM(s) Oral every 12 hours  sodium chloride 3%  Inhalation 4 milliLiter(s) Inhalation every 12 hours    MEDICATIONS  (PRN):  artificial tears (preservative free) Ophthalmic Solution 1 Drop(s) Both EYES two times a day PRN Dry Eyes  dextrose Oral Gel 15 Gram(s) Oral once PRN Blood Glucose LESS THAN 70 milliGRAM(s)/deciliter  hydrocodone/homatropine Syrup 5 milliLiter(s) Oral at bedtime PRN Cough    Vital Signs Last 24 Hrs  T(C): 36.7 (16 Sep 2024 05:12), Max: 36.7 (16 Sep 2024 05:12)  T(F): 98.1 (16 Sep 2024 05:12), Max: 98.1 (16 Sep 2024 05:12)  HR: 80 (16 Sep 2024 10:00) (70 - 81)  BP: 107/71 (16 Sep 2024 10:00) (107/71 - 139/51)  BP(mean): --  RR: 17 (16 Sep 2024 10:00) (17 - 18)  SpO2: 100% (16 Sep 2024 10:00) (99% - 100%)    Parameters below as of 16 Sep 2024 10:00  Patient On (Oxygen Delivery Method): nasal cannula  O2 Flow (L/min): 3      I&O's Summary    15 Sep 2024 07:01  -  16 Sep 2024 07:00  --------------------------------------------------------  IN: 0 mL / OUT: 1300 mL / NET: -1300 mL          Physical Exam:   GENERAL: NAD, well-groomed, well-developed  HEENT: MARCIO/   Atraumatic, Normocephalic  ENMT: No tonsillar erythema, exudates, or enlargement; Moist mucous membranes, Good dentition, No lesions  NECK: Supple, No JVD, Normal thyroid  CHEST/LUNG: scattered cracekls+  CVS: Regular rate and rhythm; No murmurs, rubs, or gallops  GI: : Soft, Nontender, Nondistended; Bowel sounds present  NERVOUS SYSTEM:  Alert & Oriented X3  EXTREMITIES:  - edema  LYMPH: No lymphadenopathy noted  SKIN: No rashes or lesions  ENDOCRINOLOGY: No Thyromegaly  PSYCH: Appropriate    Labs:  27, 27                            9.6    8.28  )-----------( 430      ( 16 Sep 2024 06:11 )             29.5                         9.2    5.84  )-----------( 422      ( 15 Sep 2024 05:49 )             27.9                         9.0    9.64  )-----------( 394      ( 14 Sep 2024 07:41 )             27.6                         9.6    12.94 )-----------( 391      ( 13 Sep 2024 06:04 )             29.1     09-16    132<L>  |  93<L>  |  35<H>  ----------------------------<  302<H>  4.8   |  25  |  0.93  09-15    128<L>  |  87<L>  |  44<H>  ----------------------------<  448<H>  4.5   |  24  |  1.20  09-15    125<L>  |  86<L>  |  42<H>  ----------------------------<  489<HH>  4.9   |  24  |  1.24  09-14    131<L>  |  92<L>  |  26<H>  ----------------------------<  231<H>  4.1   |  27  |  0.99  09-13    129<L>  |  90<L>  |  25<H>  ----------------------------<  211<H>  3.5   |  30  |  0.89    Ca    10.1      16 Sep 2024 06:11  Ca    9.7      15 Sep 2024 05:49  Ca    9.1      15 Sep 2024 00:43  Phos  3.6     09-16  Phos  3.9     09-15  Phos  3.6     09-15  Mg     2.30     09-16  Mg     2.10     09-15  Mg     2.00     09-15      CAPILLARY BLOOD GLUCOSE      POCT Blood Glucose.: 322 mg/dL (16 Sep 2024 08:17)  POCT Blood Glucose.: 280 mg/dL (15 Sep 2024 21:42)  POCT Blood Glucose.: 307 mg/dL (15 Sep 2024 17:39)  POCT Blood Glucose.: 287 mg/dL (15 Sep 2024 12:14)          Urinalysis Basic - ( 16 Sep 2024 06:11 )    Color: x / Appearance: x / SG: x / pH: x  Gluc: 302 mg/dL / Ketone: x  / Bili: x / Urobili: x   Blood: x / Protein: x / Nitrite: x   Leuk Esterase: x / RBC: x / WBC x   Sq Epi: x / Non Sq Epi: x / Bacteria: x      rad< from: CT Chest w/ IV Cont (09.13.24 @ 09:37) >  ABDOMINAL WALL: Post surgicalchanges in the left groin.  BONES: Degenerative changes and osteopenia.    IMPRESSION:  Likely multifocal pneumonia. New lymphadenopathy can be reactive. A   short-term follow-up CT chest in 6-8 weeks is advised.  Previously FDG avid left lower lobe pulmonary nodule is minimally   increased.  No acute abnormality in the abdomen and pelvis.    --- End of Report ---    < end of copied text >        RECENT CULTURES:  09-09 @ 10:55 .Blood Blood-Peripheral                No growth at 5 days    09-09 @ 10:45 .Blood Blood-Peripheral                No growth at 5 days          RESPIRATORY CULTURES:          Studies  Chest X-RAY  CT SCAN Chest   Venous Dopplers: LE:   CT Abdomen  Others

## 2024-09-16 NOTE — DIETITIAN INITIAL EVALUATION ADULT - PERTINENT LABORATORY DATA
09-16    132<L>  |  93<L>  |  35<H>  ----------------------------<  302<H>  4.8   |  25  |  0.93    Ca    10.1      16 Sep 2024 06:11  Phos  3.6     09-16  Mg     2.30     09-16    POCT Blood Glucose.: 414 mg/dL (09-16-24 @ 12:37)  A1C with Estimated Average Glucose Result: 6.6 % (09-05-24 @ 13:42)  A1C with Estimated Average Glucose Result: 9.2 % (04-20-24 @ 08:50)

## 2024-09-16 NOTE — PROGRESS NOTE ADULT - ASSESSMENT
89 -year-old female with COPD on 2L home O2 PRN, CVA (remote, no deficits), CAD s/p stent ('15), IDDM2, PVD s/p fem-pop bypass ('15) w/occlusion ('22), HLD, HTN, anemia, presenting with (+)UA noted during presurgical testing. Patient is scheduled to have a VATS LLL resection on Thursday. Patient reports going to her PCP and was prescribed abx, of which she took 1 dose prior to presenting t the ED at her daughter's behest. She notes having increased frequency of urination and dysuria for the past 1 week. She has a history of prior UTIs, last UTI 1 month ago. She notes chills but denies any fevers.  In the ED VS:  98.7  98-99  103-107/52-67  25  96-98% on 2-6L NC, received Ceftriaxone 1g IVPB x1 (09 Sep 2024 07:07):  She has copd and uses oxygen at home on an prn basis:  she felt feverish and chills at home and denies any sob:  on 32 L of oxygen:   -no wheezing  reportedly she had pet scan chest as an outpt and she has malignancy ;  she is scheduled for vats and left lower lobectomy on coming 12th , but admitted here for fever,  chills      UTI;  COPD;  PULMONARY NODULE: SUSPECTED MALIGNANCY ;   cad/s/p pci  DM2  PVD:  S/P FEM-POP BYPASS  HTN  HLD  ANEMIA      UTI;  -she is being  treated for uti with ceftriaxone   -likely reason for her chills at home;     9/10;  -she is on ceftriaxone for 5 days  9/11/ on ceftriaxone     9/12: : reced  on e dose of vanco : on nitrofurantoin for short term :  -would not advise  to cont nitrofurantoin for long term as she already had ILD and bronchiectasis:   9/13:  -cont current medications:  dw i d:  no long term nitrofurantoin   9/15: seems stable:    9/16:  -seems OK;  still with mild sob:  but is very deconditioned;   -cont antibtiocs per id     COPD;  -she looks OK;   -no sob:  or wheezing   -VBG seems to be doing  ok   -on Adviar    9/14:  -she became more tachypneic and sob today am:   -ct chest with suspected pneumonia and has underlying emphysema:   -she is already on antibiotics and would add steroids also   -now on vanc and zosyn     9/15:  -she is not wheezing:   -decrease steroids dosages today  :  she say says her breathing  is ok :     9/16:  steroids decreased:  son is slightly better :  -cont prednisone as noted:     PULMONARY NODULE: SUSPECTED MALIGNANCY ;   -she had 1.5 cm smnoud l e in left lower lobe;   -she says she had pet scan in April 2024;  showed; No acute pulmonary embolism. Mucoid secretions with impaction right middle lobe and lower lobe bronchi. Partial right middle lobe atelectasis, new from prior exam. Persistent atelectasis/airspace consolidation right lower lobe. Few scattered groundglass opacities left upper lobe, could reflect acute respiratory infection. Probable chronic interstitial lung disease superimposed on background of emphysema. Stable 1.5 cm nodule left lower lobe.  -now she is scheduled fr vats left lower lung resection/;  given postive pet scan as an outpt:   -cxr this time also suggests pulm edema;  and her EF was normal though ? could the changes e due t ILD?   -cont ADVAIR here   9/10:   cont diuresis:   -cards following  9/11: a above;  for possible surgery in am    9/12: she has 1.5 cm nodule in left lower lobe:  she was supposed to get surgery during this admission,   but now cancelled secondary to UTI and increasing WBC   9/13-ct chest is pending;   -the ct scan chest from three months ago showed; Bilateral septal thickening, groundglass opacities, peripheral consolidation and pleural effusions. These findings could occur inthe   setting of pulmonary edema, however, if there are no signs of pulmonary edema, other entities including pneumonia, could be considered.  rpt ct chest pending today   9/14:  -the rpt ct scan chest showed mild increase in the nodule size:  would defer to thoracic  primary team  : currently surgery is postponed  9/16; as above    cad/s/p pci  -cnt current meds;     DM2  -monitor and control     PVD:  S/P FEM-POP BYPASS  -cont current meds!    HTN  -controlled    HLD  -atorvastatin 20 milliGRAM(s) Oral at bedtime    ANEMIA  -She is mildly anemic ; monitor and transfuse as needed dw team     dw id/  PMD

## 2024-09-16 NOTE — DIETITIAN INITIAL EVALUATION ADULT - PERTINENT MEDS FT
MEDICATIONS  (STANDING):  acetaminophen   IVPB .. 1000 milliGRAM(s) IV Intermittent once  aspirin enteric coated 81 milliGRAM(s) Oral daily  atorvastatin 20 milliGRAM(s) Oral at bedtime  chlorhexidine 2% Cloths 1 Application(s) Topical daily  dextrose 5%. 1000 milliLiter(s) (100 mL/Hr) IV Continuous <Continuous>  dextrose 5%. 1000 milliLiter(s) (50 mL/Hr) IV Continuous <Continuous>  dextrose 50% Injectable 12.5 Gram(s) IV Push once  dextrose 50% Injectable 25 Gram(s) IV Push once  dextrose 50% Injectable 25 Gram(s) IV Push once  fluticasone propionate 50 MICROgram(s)/spray Nasal Spray 1 Spray(s) Both Nostrils two times a day  fluticasone propionate/ salmeterol 250-50 MICROgram(s) Diskus 1 Dose(s) Inhalation two times a day  gabapentin 400 milliGRAM(s) Oral at bedtime  glucagon  Injectable 1 milliGRAM(s) IntraMuscular once  heparin   Injectable 5000 Unit(s) SubCutaneous every 8 hours  insulin glargine Injectable (LANTUS) 18 Unit(s) SubCutaneous at bedtime  insulin lispro (ADMELOG) corrective regimen sliding scale   SubCutaneous at bedtime  insulin lispro (ADMELOG) corrective regimen sliding scale   SubCutaneous three times a day before meals  insulin lispro Injectable (ADMELOG) 10 Unit(s) SubCutaneous three times a day before meals  lactated ringers. 1000 milliLiter(s) (75 mL/Hr) IV Continuous <Continuous>  metoprolol tartrate 50 milliGRAM(s) Oral two times a day  pantoprazole    Tablet 40 milliGRAM(s) Oral before breakfast  predniSONE   Tablet 20 milliGRAM(s) Oral every 12 hours  sodium chloride 3%  Inhalation 4 milliLiter(s) Inhalation every 12 hours    MEDICATIONS  (PRN):  artificial tears (preservative free) Ophthalmic Solution 1 Drop(s) Both EYES two times a day PRN Dry Eyes  dextrose Oral Gel 15 Gram(s) Oral once PRN Blood Glucose LESS THAN 70 milliGRAM(s)/deciliter  hydrocodone/homatropine Syrup 5 milliLiter(s) Oral at bedtime PRN Cough

## 2024-09-16 NOTE — DIETITIAN INITIAL EVALUATION ADULT - REASON
1. \"Have you been to the ER, urgent care clinic since your last visit? Hospitalized since your last visit? \" no    2. \"Have you seen or consulted any other health care providers outside of the [address] [place] [address] since your last visit? \"  no    3. For patients aged 39-70: Has the patient had a colonoscopy / FIT/ Cologuard? no      If the patient is female:    4. For patients aged 41-77: Has the patient had a mammogram within the past 2 years? no      5. For patients aged 21-65: Has the patient had a pap smear?  no Appearance consistent with advanced age

## 2024-09-16 NOTE — DIETITIAN INITIAL EVALUATION ADULT - NS FNS DIET ORDER
Consistent Carbohydrate w/Evening Snack: DASH/TLC {Sodium & Cholesterol Restricted} (DASH)  1200mL Fluid Restriction ([id number]) (09-15-24 @ 16:06)

## 2024-09-16 NOTE — PROVIDER CONTACT NOTE (OTHER) - ASSESSMENT
Patient asymptomatic. Patient denies SOB, chest pain, and discomfort. No acute changes noted. Patient is currently resting comfortably in bed. Patient safety, comfort and fall risk maintained
Pt complaining of chest discomfort when taking in deep breaths as well as a headache.
patient denies any symptoms  of Hyperglycemia, no distress at this time
PRE-OP DIAGNOSIS:  Endometrial cancer 08-Aug-2019 16:12:18  Charlene Flores

## 2024-09-16 NOTE — DIETITIAN INITIAL EVALUATION ADULT - ADD RECOMMEND
1. Defer fluid management to medical team   2. Monitor weights, labs, BM's, skin integrity, PO intake   3. Please monitor % PO intake on flowsheets   4. Honor food preferences as able within therapeutic diet order.

## 2024-09-16 NOTE — PROGRESS NOTE ADULT - ASSESSMENT
89 y/o F w/COPD, former smoker, as needed O2, CVA, CAD status post stent, DM, HTN, HLD, lung cancer scheduled for VATS on Thursday presenting w CHF and possible UTI    #HFpEF  -recent TTW noted  -normal LVEF and mild AS  -cont to hold lasix    #CAD s/p PCI  -stable  -cont asa, statin, BB    #Lung CA  -preop for LLL VATS w thoracic when resp status improves      #Hypoxia  -multifactorial  -pulm eval noted  -CT chest noted, Likely multifocal pneumonia. New lymphadenopathy can be reactive. increased in pulm nodule   -on iv abx    89 y/o F w/COPD, former smoker, as needed O2, CVA, CAD status post stent, DM, HTN, HLD, lung cancer scheduled for VATS on Thursday presenting w CHF and possible UTI    #HFpEF  -recent TTE noted  -normal LVEF and mild AS  -cont to hold lasix    #CAD s/p PCI  -stable  -cont asa, statin, BB    #Lung CA  -preop for LLL VATS w thoracic when resp status improves      #Hypoxia  -multifactorial  -pulm eval noted  -CT chest noted, Likely multifocal pneumonia. New lymphadenopathy can be reactive. increased in pulm nodule   -on iv abx

## 2024-09-16 NOTE — PROGRESS NOTE ADULT - SUBJECTIVE AND OBJECTIVE BOX
Chief Complaint: Type 2 DM; Steroid induced hyperglycemia     History: Pt seen at bedside. Pt tolerating oral diet. Pt denies nausea and vomiting/any signs of hypoglycemia. Pt reports an adequate appetite.     MEDICATIONS  (STANDING):  acetaminophen   IVPB .. 1000 milliGRAM(s) IV Intermittent once  aspirin enteric coated 81 milliGRAM(s) Oral daily  atorvastatin 20 milliGRAM(s) Oral at bedtime  chlorhexidine 2% Cloths 1 Application(s) Topical daily  dextrose 5%. 1000 milliLiter(s) (100 mL/Hr) IV Continuous <Continuous>  dextrose 5%. 1000 milliLiter(s) (50 mL/Hr) IV Continuous <Continuous>  dextrose 50% Injectable 25 Gram(s) IV Push once  dextrose 50% Injectable 25 Gram(s) IV Push once  dextrose 50% Injectable 12.5 Gram(s) IV Push once  fluticasone propionate 50 MICROgram(s)/spray Nasal Spray 1 Spray(s) Both Nostrils two times a day  fluticasone propionate/ salmeterol 250-50 MICROgram(s) Diskus 1 Dose(s) Inhalation two times a day  gabapentin 400 milliGRAM(s) Oral at bedtime  glucagon  Injectable 1 milliGRAM(s) IntraMuscular once  heparin   Injectable 5000 Unit(s) SubCutaneous every 8 hours  insulin glargine Injectable (LANTUS) 12 Unit(s) SubCutaneous at bedtime  insulin lispro (ADMELOG) corrective regimen sliding scale   SubCutaneous three times a day before meals  insulin lispro (ADMELOG) corrective regimen sliding scale   SubCutaneous at bedtime  insulin lispro Injectable (ADMELOG) 6 Unit(s) SubCutaneous three times a day before meals  lactated ringers. 1000 milliLiter(s) (75 mL/Hr) IV Continuous <Continuous>  metoprolol tartrate 50 milliGRAM(s) Oral two times a day  pantoprazole    Tablet 40 milliGRAM(s) Oral before breakfast  predniSONE   Tablet 20 milliGRAM(s) Oral every 12 hours  sodium chloride 3%  Inhalation 4 milliLiter(s) Inhalation every 12 hours    MEDICATIONS  (PRN):  artificial tears (preservative free) Ophthalmic Solution 1 Drop(s) Both EYES two times a day PRN Dry Eyes  dextrose Oral Gel 15 Gram(s) Oral once PRN Blood Glucose LESS THAN 70 milliGRAM(s)/deciliter  hydrocodone/homatropine Syrup 5 milliLiter(s) Oral at bedtime PRN Cough      Allergies: penicillin (Unknown)  macrolide antibiotics (Other)  Biaxin (Unknown)    Review of Systems:  Respiratory: No SOB, no cough  GI: No nausea, vomiting, abdominal pain  Endocrine: no polyuria, polydipsia    PHYSICAL EXAM:  VITALS: T(C): 36.7 (09-16-24 @ 05:12)  T(F): 98.1 (09-16-24 @ 05:12), Max: 98.1 (09-16-24 @ 05:12)  HR: 80 (09-16-24 @ 10:00) (74 - 81)  BP: 107/71 (09-16-24 @ 10:00) (107/71 - 135/58)  RR:  (17 - 18)  SpO2:  (99% - 100%)  Wt(kg): --  GENERAL: NAD, well-groomed, well-developed  RESPIRATORY: No labored breathing   GI: Soft, nontender, non distended  PSYCH: Alert and oriented x 3, normal affect, normal mood      CAPILLARY BLOOD GLUCOSE  POCT Blood Glucose.: 414 mg/dL (16 Sep 2024 12:37)  POCT Blood Glucose.: 444 mg/dL (16 Sep 2024 12:36)  POCT Blood Glucose.: 322 mg/dL (16 Sep 2024 08:17)  POCT Blood Glucose.: 280 mg/dL (15 Sep 2024 21:42)  POCT Blood Glucose.: 307 mg/dL (15 Sep 2024 17:39)    A1C with Estimated Average Glucose (09.05.24 @ 13:42)    A1C with Estimated Average Glucose Result: 6.6   Estimated Average Glucose: 143      09-16    132<L>  |  93<L>  |  35<H>  ----------------------------<  302<H>  4.8   |  25  |  0.93    eGFR: 58<L>    Ca    10.1      09-16  Mg     2.30     09-16  Phos  3.6     09-16    Diet, Consistent Carbohydrate w/Evening Snack:   DASH/TLC Sodium & Cholesterol Restricted (DASH)  1200mL Fluid Restriction ([id number]) (09-15-24 @ 16:06) [Active]

## 2024-09-16 NOTE — DIETITIAN INITIAL EVALUATION ADULT - OTHER INFO
Per chart review, patient is a 89y Female with PMH COPD, CVA (remote, no deficits), coronary artery disease s/p stent (2015), IDDM2, PVD s/p fem-pop bypass (2015) with occlusion (2022), HLD, HTN, anemia, presenting with (+)UA noted during presurgical testing.    Patient is currently ordered for a PO diet. Patient reports a fair-good appetite and PO intake at meals during course of admission. Documented on RN flowsheet as consuming % of meals. Patient denies any chewing or swallowing difficulty with current diet order. No report of GI distress (nausea, vomiting, diarrhea, constipation). Last BM 9/14/24 per RN flowsheet documentation. Not noted to be on a bowel regimen. Noted provision of IV hydration (lactated ringers) per medication list. Noted HbA1c 6.6% (9/5). POCT blood glucose x24hrs ranges 280-444mg/dL; currently on steroids.     Writer provided verbal education regarding current diet order and nutrition recommendations for after discharge. Patient verbalized understanding to the discussion.

## 2024-09-16 NOTE — DIETITIAN INITIAL EVALUATION ADULT - ORAL INTAKE PTA/DIET HISTORY
Patient reports no known food allergies or food intolerances. Nutrition supplementation at home includes a multivitamin, vitamin C, iron, vitamin B12, magnesium. Patient reports she typically consumes three small portioned meals daily. Example meals include a hard boiled eggs with coffee or 1/2 bagel with cream cheese. Often enjoys soups. Lives with her daughter who helps prepare her meals.

## 2024-09-16 NOTE — PROVIDER CONTACT NOTE (OTHER) - SITUATION
Pt complaining of chest discomfort when taking in deep breaths as well as a headache.
Patients lunch FS was 444, repeat 414
Received critical lab result of Glucose 518

## 2024-09-16 NOTE — PROGRESS NOTE ADULT - SUBJECTIVE AND OBJECTIVE BOX
CARDIOLOGY FOLLOW UP - Dr. Valenzuela  DATE OF SERVICE: 9/16/24    CC no acute cv events       REVIEW OF SYSTEMS:  CONSTITUTIONAL: No fever, weight loss, or fatigue  RESPIRATORY: No cough, wheezing, chills or hemoptysis; No Shortness of Breath  CARDIOVASCULAR: No chest pain, palpitations, passing out, dizziness  GASTROINTESTINAL: No abdominal or epigastric pain. No nausea, vomiting, or hematemesis; No diarrhea or constipation. No melena or hematochezia.      PHYSICAL EXAM:  T(C): 36.7 (09-16-24 @ 05:12), Max: 36.7 (09-16-24 @ 05:12)  HR: 80 (09-16-24 @ 10:00) (70 - 81)  BP: 107/71 (09-16-24 @ 10:00) (107/71 - 139/51)  RR: 17 (09-16-24 @ 10:00) (17 - 18)  SpO2: 100% (09-16-24 @ 10:00) (99% - 100%)  Wt(kg): --  I&O's Summary    15 Sep 2024 07:01  -  16 Sep 2024 07:00  --------------------------------------------------------  IN: 0 mL / OUT: 1300 mL / NET: -1300 mL        Appearance: Normal	  Cardiovascular: Normal S1 S2,RRR, + murmurs  Respiratory: Lungs clear to auscultation	  Gastrointestinal:  Soft, Non-tender, + BS	  Extremities: Normal range of motion, No clubbing, cyanosis or edema      Home Medications:  Advair Diskus 250 mcg-50 mcg inhalation powder: 1 puff(s) inhaled 2 times a day (09 Sep 2024 06:08)  aspirin 81 mg oral delayed release tablet: 1 tab(s) orally once a day (at bedtime) (09 Sep 2024 06:08)  atorvastatin 20 mg oral tablet: 1 tab(s) orally once a day (at bedtime) (09 Sep 2024 06:08)  Cranberry 500mg orally once a day- LD-09/5/24:  (09 Sep 2024 06:08)  fluticasone 50 mcg/inh inhalation powder: 1 puff(s) in each nostril 2 times a day (09 Sep 2024 06:08)  furosemide 20 mg oral tablet: 1 tab(s) orally once a day taking 1-2x daily depending on weight; occasionally skips medication if she has plans to go out (09 Sep 2024 06:06)  gabapentin 400 mg oral capsule: 1 cap(s) orally once a day (at bedtime) (09 Sep 2024 06:08)  ipratropium 42 mcg/inh (0.06%) nasal spray: 2 spray(s) intranasally 2 times a day (09 Sep 2024 06:08)  iron 65 mg orally twice daily:  (09 Sep 2024 06:08)  magnesium 250 mg orally twice a daily:  (09 Sep 2024 06:08)  MetFORMIN (Eqv-Fortamet) 500 mg oral tablet, extended release: 2 tab(s) orally once a day (09 Sep 2024 06:08)  metoprolol tartrate 50 mg oral tablet: 1 tab(s) orally 2 times a day (09 Sep 2024 06:08)  Multiple Vitamins oral tablet: 1 tab(s) orally once a day LD  - 09/5/24 (09 Sep 2024 06:08)  omeprazole 40 mg oral delayed release capsule: 1 cap(s) orally once a day (09 Sep 2024 06:08)  PreserVision AREDS 2 oral capsule: 1 cap(s) orally 2 times a day (09 Sep 2024 06:08)  Systane eye drops 2 drop each eye  as needed:  (09 Sep 2024 06:08)  Tresiba FlexTouch 100 units/mL subcutaneous solution: 10 unit(s) subcutaneous once a day (at bedtime) (09 Sep 2024 06:08)  vitamin B12 2500mg orally once a day:  (09 Sep 2024 06:08)  Vitamin C 500mg orally once a day at night:  (09 Sep 2024 06:08)      MEDICATIONS  (STANDING):  acetaminophen   IVPB .. 1000 milliGRAM(s) IV Intermittent once  aspirin enteric coated 81 milliGRAM(s) Oral daily  atorvastatin 20 milliGRAM(s) Oral at bedtime  cefepime   IVPB 2000 milliGRAM(s) IV Intermittent every 24 hours  chlorhexidine 2% Cloths 1 Application(s) Topical daily  dextrose 5%. 1000 milliLiter(s) (100 mL/Hr) IV Continuous <Continuous>  dextrose 5%. 1000 milliLiter(s) (50 mL/Hr) IV Continuous <Continuous>  dextrose 50% Injectable 12.5 Gram(s) IV Push once  dextrose 50% Injectable 25 Gram(s) IV Push once  dextrose 50% Injectable 25 Gram(s) IV Push once  fluticasone propionate 50 MICROgram(s)/spray Nasal Spray 1 Spray(s) Both Nostrils two times a day  fluticasone propionate/ salmeterol 250-50 MICROgram(s) Diskus 1 Dose(s) Inhalation two times a day  gabapentin 400 milliGRAM(s) Oral at bedtime  glucagon  Injectable 1 milliGRAM(s) IntraMuscular once  heparin   Injectable 5000 Unit(s) SubCutaneous every 8 hours  insulin glargine Injectable (LANTUS) 12 Unit(s) SubCutaneous at bedtime  insulin lispro (ADMELOG) corrective regimen sliding scale   SubCutaneous at bedtime  insulin lispro (ADMELOG) corrective regimen sliding scale   SubCutaneous three times a day before meals  insulin lispro Injectable (ADMELOG) 6 Unit(s) SubCutaneous three times a day before meals  lactated ringers. 1000 milliLiter(s) (75 mL/Hr) IV Continuous <Continuous>  metoprolol tartrate 50 milliGRAM(s) Oral two times a day  pantoprazole    Tablet 40 milliGRAM(s) Oral before breakfast  predniSONE   Tablet 20 milliGRAM(s) Oral every 12 hours  sodium chloride 3%  Inhalation 4 milliLiter(s) Inhalation every 12 hours      TELEMETRY: 	nsr     ECG:  	  RADIOLOGY: < from: CT Chest w/ IV Cont (09.13.24 @ 09:37) >  IMPRESSION:  Likely multifocal pneumonia. New lymphadenopathy can be reactive. A   short-term follow-up CT chest in 6-8 weeks is advised.  Previously FDG avid left lower lobe pulmonary nodule is minimally   increased.  No acute abnormality in the abdomen and pelvis.    --- End of Report ---      < end of copied text >    DIAGNOSTIC TESTING:  [ ] Echocardiogram:  [ ]  Catheterization:  [ ] Stress Test:    OTHER: 	    LABS:	 	    Troponin T, High Sensitivity Result: 21 ng/L (09-12 @ 11:14)                          9.6    8.28  )-----------( 430      ( 16 Sep 2024 06:11 )             29.5     09-16    132<L>  |  93<L>  |  35<H>  ----------------------------<  302<H>  4.8   |  25  |  0.93    Ca    10.1      16 Sep 2024 06:11  Phos  3.6     09-16  Mg     2.30     09-16

## 2024-09-16 NOTE — PROGRESS NOTE ADULT - SUBJECTIVE AND OBJECTIVE BOX
[facility], Division of Infectious Diseases  KRISSY Luong Y. Patel, S. Shah, G. Dax  [phone]  ([phone] - weekdays after 5pm and weekends)    Name: FEDERICO DOS SANTOS  Age/Gender: 89y Female  MRN: [id number]    Interval History:  Notes reviewed.   No concerning overnight events.  Afebrile.   states feels fine today  still constipated    Allergies: penicillin (Unknown)  macrolide antibiotics (Other)  Biaxin (Unknown)      Objective:  Vitals:   T(F): 98.1 (09-16-24 @ 05:12), Max: 98.1 (09-16-24 @ 05:12)  HR: 80 (09-16-24 @ 10:00) (70 - 81)  BP: 107/71 (09-16-24 @ 10:00) (107/71 - 139/51)  RR: 17 (09-16-24 @ 10:00) (17 - 18)  SpO2: 100% (09-16-24 @ 10:00) (99% - 100%)  Physical Examination:  General: no acute distress  HEENT: anicteric, NC  Cardio: S1, S2, normal rate  Resp: clear to auscultation anteriorly  Abd: soft, distended, NT  Ext: no LE edema  Skin: warm, dry    Laboratory Studies:  CBC:                       9.6    8.28  )-----------( 430      ( 16 Sep 2024 06:11 )             29.5     WBC Trend:  8.28 09-16-24 @ 06:11  5.84 09-15-24 @ 05:49  9.64 09-14-24 @ 07:41  12.94 09-13-24 @ 06:04  17.06 09-12-24 @ 05:26  12.45 09-11-24 @ 05:20  9.44 09-10-24 @ 05:50    CMP: 09-16    132<L>  |  93<L>  |  35<H>  ----------------------------<  302<H>  4.8   |  25  |  0.93    Ca    10.1      16 Sep 2024 06:11  Phos  3.6     09-16  Mg     2.30     09-16          Vancomycin Level, Random: 5.4 ug/mL (09-13-24 @ 06:04)    Urinalysis Basic - ( 16 Sep 2024 06:11 )    Color: x / Appearance: x / SG: x / pH: x  Gluc: 302 mg/dL / Ketone: x  / Bili: x / Urobili: x   Blood: x / Protein: x / Nitrite: x   Leuk Esterase: x / RBC: x / WBC x   Sq Epi: x / Non Sq Epi: x / Bacteria: x      Microbiology: reviewed     Culture - Blood (collected 09-09-24 @ 10:55)  Source: .Blood Blood-Peripheral  Final Report (09-14-24 @ 17:01):    No growth at 5 days    Culture - Blood (collected 09-09-24 @ 10:45)  Source: .Blood Blood-Peripheral  Final Report (09-14-24 @ 17:00):    No growth at 5 days    Urinalysis with Rflx Culture (collected 09-08-24 @ 21:17)    Culture - Urine (collected 09-08-24 @ 21:17)  Source: Clean Catch  Final Report (09-12-24 @ 20:58):    10,000 - 49,000 CFU/mL Citrobacter freundii complex    50,000 - 99,000 CFU/mL Enterococcus faecium  Organism: Citrobacter freundii complex  Enterococcus faecium (09-12-24 @ 20:58)  Organism: Enterococcus faecium (09-12-24 @ 20:58)      Method Type: KHADIJAH      -  Ampicillin: S <=2 Predicts results to ampicillin/sulbactam, amoxacillin-clavulanate and  piperacillin-tazobactam.      -  Ciprofloxacin: I 2      -  Levofloxacin: I 4      -  Nitrofurantoin: S <=32 Should not be used to treat pyelonephritis.      -  Tetracycline: S <=1      -  Vancomycin: S 0.5  Organism: Citrobacter freundii complex (09-12-24 @ 20:58)      Method Type: KHADIJAH      -  Amoxicillin/Clavulanic Acid: R >16/8      -  Ampicillin: R 16 These ampicillin results predict results for amoxicillin      -  Ampicillin/Sulbactam: R 16/8      -  Aztreonam: S <=4      -  Cefazolin: R >16      -  Cefepime: S <=2      -  Cefoxitin: R >16      -  Ceftriaxone: I 2 Enterobacter cloacae, Klebsiella aerogenes, and Citrobacter freundii may develop resistance during prolonged therapy.      -  Ciprofloxacin: S <=0.25      -  Ertapenem: S <=0.5      -  Gentamicin: S <=2      -  Imipenem: S <=1      -  Levofloxacin: S <=0.5      -  Meropenem: S <=1      -  Nitrofurantoin: S <=32 Should not be used to treat pyelonephritis      -  Piperacillin/Tazobactam: S <=8 Treatment with Pipercillin/Tazobactam is not recommended in severe infections casued by Klebsiella aerogenes, Enterobacter cloacae complex, and Citrobacter freundii complex.      -  Tobramycin: S <=2      -  Trimethoprim/Sulfamethoxazole: S <=0.5/9.5        Radiology: reviewed     Medications:  acetaminophen   IVPB .. 1000 milliGRAM(s) IV Intermittent once  artificial tears (preservative free) Ophthalmic Solution 1 Drop(s) Both EYES two times a day PRN  aspirin enteric coated 81 milliGRAM(s) Oral daily  atorvastatin 20 milliGRAM(s) Oral at bedtime  cefepime   IVPB 2000 milliGRAM(s) IV Intermittent every 24 hours  chlorhexidine 2% Cloths 1 Application(s) Topical daily  dextrose 5%. 1000 milliLiter(s) IV Continuous <Continuous>  dextrose 5%. 1000 milliLiter(s) IV Continuous <Continuous>  dextrose 50% Injectable 12.5 Gram(s) IV Push once  dextrose 50% Injectable 25 Gram(s) IV Push once  dextrose 50% Injectable 25 Gram(s) IV Push once  dextrose Oral Gel 15 Gram(s) Oral once PRN  fluticasone propionate 50 MICROgram(s)/spray Nasal Spray 1 Spray(s) Both Nostrils two times a day  fluticasone propionate/ salmeterol 250-50 MICROgram(s) Diskus 1 Dose(s) Inhalation two times a day  gabapentin 400 milliGRAM(s) Oral at bedtime  glucagon  Injectable 1 milliGRAM(s) IntraMuscular once  heparin   Injectable 5000 Unit(s) SubCutaneous every 8 hours  hydrocodone/homatropine Syrup 5 milliLiter(s) Oral at bedtime PRN  insulin glargine Injectable (LANTUS) 12 Unit(s) SubCutaneous at bedtime  insulin lispro (ADMELOG) corrective regimen sliding scale   SubCutaneous three times a day before meals  insulin lispro (ADMELOG) corrective regimen sliding scale   SubCutaneous at bedtime  insulin lispro Injectable (ADMELOG) 6 Unit(s) SubCutaneous three times a day before meals  lactated ringers. 1000 milliLiter(s) IV Continuous <Continuous>  metoprolol tartrate 50 milliGRAM(s) Oral two times a day  pantoprazole    Tablet 40 milliGRAM(s) Oral before breakfast  predniSONE   Tablet 20 milliGRAM(s) Oral every 12 hours  sodium chloride 3%  Inhalation 4 milliLiter(s) Inhalation every 12 hours    Antimicrobials:  cefepime   IVPB 2000 milliGRAM(s) IV Intermittent every 24 hours

## 2024-09-16 NOTE — PROGRESS NOTE ADULT - ASSESSMENT
89 -year-old female with COPD on 2L home O2 PRN, CVA (remote, no deficits), CAD s/p stent ('15), IDDM2, PVD s/p fem-pop bypass ('15) w/occlusion ('22), HLD, HTN, anemia, presenting with (+)UA noted during presurgical testing.      Sepsis due to urinary tract infection based on WBC>12, HR>90 and G(+)PNA b/l lower lobes and RML  - lactate 2.7->1.2  - ceftriaxone 9/10/24 --> cefepime 9/12/24  - macrobid 9/11/24 --> vancomycin x 1 dose 9/12/24 + 9/13/24   - urine cx 9/8/24 --> 10K-49K Citrobacter freundii + 50K-99K Enterococcus faecium  - blood cxs 9/9/24 --> negative  - MRSA PCR 9/10/24 --> negative  - CT chest w/o contrast, abd/pelvis w/contrast 9/13/24 --> PNA b/l lower lobes and RML  - incentive spirometer  - nebs prn  - antitussives prn  - daily CBC  - sepsis resolved  - appreciate ID    Acute-on-chronic HFpEF  - recent outpt TTE pasted into [facility]  - daily weights  - initially started on lasix 20 mg IV bid, lasix held 9/15/24 2' rising BUN  - appreciate cardiology    Type 2 diabetes mellitus, with steroid-induced hyperglycemia  - c/w basal insulin, premeal insulin   - monitor FS QAC/HS w/moderate severity ISS  - recent A1c 6.6%  - hold home metformin  - appreciate endocrinology     Essential hypertension  - c/w metoprolol tartrate  - c/w furosemide PO, reportedly supposed to be taking furosemide 20 mg bid however takes 0-2x daily, reports her cardiologist wants her taking bid  - appreciate cardiology    Chronic obstructive pulmonary disease with acute lower respiratory tract infection  - c/w LABA/ICS  - O2 PRN  - steroid taper as per pulmonology  - incentive spirometry  - appreciate pulmonology    Hyperlipidemia, unspecified  - c/w statin    CAD of native arteries without angina pectoris   - c/w ASA, statin, metoprolol    LLL pulmonary nodule  - CTS to reschedule VATS as outpt    Need for prophylactic measure  - SC heparin 5000 units q8h    Disposition  - PT initially recommended home PT

## 2024-09-16 NOTE — PROVIDER CONTACT NOTE (OTHER) - BACKGROUND
Patient type 2 DM , recent high blood glucose levels
Patient is a 89 y/o F w/COPD, former smoker, as needed O2, CVA, CAD status post stent, DM, HTN, HLD, lung cancer scheduled for VATS on Thursday presenting for abnormal urine test outpatient.
pt admitted for UTI

## 2024-09-16 NOTE — PROGRESS NOTE ADULT - ASSESSMENT
89 y/o F PMhx COPD on 2L home O2 PRN, CVA (remote, no deficits), CAD s/p stent, DM II, PVD s/p fem-pop bypass ('15) w/occlusion ('22), HTN who presented w/ dysuria and rigors  planned for LVATS, Lung Resection 9/12    PNA, UTI, leukocytosis, acute on chronic hypoxic resp failure  symptoms improved but leukocytosis worsened  no flank pain  urine cx w/ citrobacter freundii and E faecium, sensitivities reviewed  s/p nitrofurantoin/ vanc x 3 days  blood cultures- NGTD  CT c/f multifocal pneumonia    penicillin allergy  reaction is rash  tolerated ceftriaxone and cefepime without issues    Recommendations  c/w cefepime x 5 days w/ last day today  - dose adjusted  bowel regimen  discharge planning    Aldo Verduzco M.D.  [facility], Division of Infectious Diseases  [phone]  After 5pm on weekdays and all day on weekends - please call [phone]

## 2024-09-16 NOTE — PROVIDER CONTACT NOTE (OTHER) - ACTION/TREATMENT ORDERED:
ACP Kelsi Watters made aware. ACP advised to recheck fingerstick 2 hours after insulin coverage. Repeat fingerstick 292. No other new orders at this time.
provider made aware, EKG, labs and chest x-ray ordered.
Provider aware will f/u with further instructions

## 2024-09-16 NOTE — PROGRESS NOTE ADULT - SUBJECTIVE AND OBJECTIVE BOX
No acute shortness of breath  Cough so far not loosening up  Fingersticks improved  Switched to prednisone last evening    Vital Signs Last 24 Hrs  T(C): 36.7 (16 Sep 2024 05:12), Max: 36.7 (16 Sep 2024 05:12)  T(F): 98.1 (16 Sep 2024 05:12), Max: 98.1 (16 Sep 2024 05:12)  HR: 80 (16 Sep 2024 10:00) (70 - 81)  BP: 107/71 (16 Sep 2024 10:00) (107/71 - 139/51)  BP(mean): --  RR: 17 (16 Sep 2024 10:00) (17 - 18)  SpO2: 100% (16 Sep 2024 10:00) (99% - 100%)    I&O's Summary    09-15-24 @ 07:01  -  09-16-24 @ 07:00  --------------------------------------------------------  IN: 0 mL / OUT: 1300 mL / NET: -1300 mL    09-16-24 @ 07:01  -  09-16-24 @ 11:14  --------------------------------------------------------  IN: 0 mL / OUT: 1100 mL / NET: -1100 mL        Gen: NAD  Head: NCAT, EOMI  Neck: supple without JVD  Lungs: decreased BS @ bases, no wheezes or rales  Heart: RRR, nl S1/S2, no murmurs  Abd: soft, NTND, NABS, no HSM  Neuro: A+O x 3, speech and comprehension are normal  Exts: no LE edema b/l  Psych: nl affect + mood    LABS:                        9.6    8.28  )-----------( 430      ( 16 Sep 2024 06:11 )             29.5     09-16    132<L>  |  93<L>  |  35<H>  ----------------------------<  302<H>  4.8   |  25  |  0.93    Ca    10.1      16 Sep 2024 06:11  Phos  3.6     09-16  Mg     2.30     09-16        CAPILLARY BLOOD GLUCOSE      POCT Blood Glucose.: 322 mg/dL (16 Sep 2024 08:17)  POCT Blood Glucose.: 280 mg/dL (15 Sep 2024 21:42)  POCT Blood Glucose.: 307 mg/dL (15 Sep 2024 17:39)  POCT Blood Glucose.: 287 mg/dL (15 Sep 2024 12:14)        Urinalysis Basic - ( 16 Sep 2024 06:11 )    Color: x / Appearance: x / SG: x / pH: x  Gluc: 302 mg/dL / Ketone: x  / Bili: x / Urobili: x   Blood: x / Protein: x / Nitrite: x   Leuk Esterase: x / RBC: x / WBC x   Sq Epi: x / Non Sq Epi: x / Bacteria: x        RADIOLOGY & ADDITIONAL TESTS:    Imaging Personally Reviewed:  [x] YES  [ ] NO    Case discussed with NPP:  [X] YES  [ ] NO

## 2024-09-16 NOTE — PROVIDER CONTACT NOTE (OTHER) - REASON
Pt complaining of chest discomfort when taking in deep breaths as well as a headache.
Patient had critical lab value
Patients lunch FS was 444, repeat 414

## 2024-09-16 NOTE — PROGRESS NOTE ADULT - ASSESSMENT
89 -year-old female with COPD on 2L home O2 PRN, CVA (remote, no deficits), CAD s/p stent ('15), IDDM2, PVD s/p fem-pop bypass ('15) w/occlusion ('22), HLD, HTN, anemia, p/w uti and also found to have PNA. Consult for steroid –induced hyperglycemia (500s), a1c only 6.6% (however patient has anemia so may be falsely low)     #Steroid Induced Hyperglycemia  #T2DM   --A1c: 6.6% (however patient has anemia so may be falsely low)   --Outpatient regimen: Tresiba 10 units at bedtime, metformin 500mg 2 tabs daily   --Endocrinologist: Does not have, follows with PCP    INPATIENT RECOMMENDATIONS:  - Increase Lantus to 18 units at bedtime  - Increase Admelog to 12 units premeal TID (HOLD IF NPO)  - Continue admelog moderate correctional scale premeal   - Continue admelog moderate correctional scale at bedtime  - Steroids: Solumed 20 q8h (9/14 - 9/15) , pred 20 q12h (9/15 - ) x 4 days  - Please notify Endocrine if there is any change in steroid dosing as this may affect insulin dosing: [email or web address]    DISCHARGE RECOMMENDATIONS:  - Please clarify dc recs with Endocrine on day of discharge   - Pt administers her insulin herself   - Daughter live with pt   - Please clarify steroid plan   - Pending clinical course: If off steroids at the time of steroids, can resume home regimen (Tresiba 10 units at bedtime, metformin 500mg 2 tabs daily). If patient being discharged on steroids then may need another agent for steroid induced hyperglycemia  - CGM: Has not used before, does not want to try, prefers doing fingersticks  - Pt prefers to follow up with PCP for DM management   - OUT-PATIENT FOLLOW UP: Patient prefers to continue following with PCP. Pt can follow up with Endocrinology office ([phone]) at [address] [place] [address] [address] [place] [id number].    #HLD  - patient on atorvastatin 20mg at home  - goal LDL in diabetes mellitus is <70    #HTN  - goal BP in diabetes mellitus is <130/80  - defer to primary team for management    d/w Kelsi Palacios  Nurse Practitioner  Division of Endocrinology & Diabetes  In house pager #[phone]    If before 9AM or after 6PM, or on weekends/holidays, please call endocrine answering service for assistance ([phone]).For nonurgent matters email [email or web address] for assistance.    89 -year-old female with COPD on 2L home O2 PRN, CVA (remote, no deficits), CAD s/p stent ('15), IDDM2, PVD s/p fem-pop bypass ('15) w/occlusion ('22), HLD, HTN, anemia, p/w uti and also found to have PNA. Consult for steroid –induced hyperglycemia (500s), a1c only 6.6% (however patient has anemia so may be falsely low)     #Steroid Induced Hyperglycemia  #T2DM   --A1c: 6.6% (however patient has anemia so may be falsely low)   --Outpatient regimen: Tresiba 10 units at bedtime, metformin 500mg 2 tabs daily   --Endocrinologist: Does not have, follows with PCP    INPATIENT RECOMMENDATIONS:  - Glucose Target 100-180 mg/dl: glucose 400s at lunch. As per RN pt was already eating lunch. Reviewed importance of following a consistent carb diet. Advised pt to avoid eating in between meals.   - Increase Lantus to 18 units at bedtime  - Increase Admelog to 10 units premeal TID (HOLD IF NPO)  - Continue Admelog moderate correctional scale premeal   - Continue Admelog moderate correctional scale at bedtime  - FS before meals and at bedtime   - Consistent Carb diet   - Hypoglycemia Protocol   - Steroids: Solumed 20 q8h (9/14 - 9/15) , pred 20 q12h (9/15 - ) x 4 days  - Please notify Endocrine if there is any change in steroid dosing as this may affect insulin dosing: [email or web address]  - Although pts steroids reduced pt glucose are above 400 therefore will adjust insulin accordingly     DISCHARGE RECOMMENDATIONS:  - Please clarify dc recs with Endocrine on day of discharge   - Pt administers her insulin herself   - Daughter live with pt   - Please clarify steroid plan   - Pending clinical course: If off steroids at the time of steroids, can resume home regimen (Tresiba 8 units at bedtime (mainly takes 8 units but varies at times from 8-10), metformin 500mg 2 tabs daily if GFR allows). If patient being discharged on steroids then may need another agent for steroid induced hyperglycemia  - CGM: Has not used before, does not want to try, prefers doing fingersticks  - Pt prefers to follow up with PCP for DM management   - OUT-PATIENT FOLLOW UP: Patient prefers to continue following with PCP. Pt can follow up with Endocrinology office ([phone]) at [address] [place] [address] [address] [facility], [place] [address].    #HLD  - Continue Atorvastatin 20mg p.o. qhs if no contraindications   - goal LDL in diabetes mellitus is <70  - Please obtain lipid profile as an outpt     #HTN  - goal BP in diabetes mellitus is <130/80  - Please obtain urine micro albumin cr ratio as an outpt   - defer to primary team for management    d/w Kelsi Palacios  Nurse Practitioner  Division of Endocrinology & Diabetes  In house pager #[phone]    If before 9AM or after 6PM, or on weekends/holidays, please call endocrine answering service for assistance ([phone]).For nonurgent matters email [email or web address] for assistance.

## 2024-09-17 ENCOUNTER — TRANSCRIPTION ENCOUNTER (OUTPATIENT)
Age: 89
End: 2024-09-17

## 2024-09-17 VITALS
HEART RATE: 72 BPM | DIASTOLIC BLOOD PRESSURE: 52 MMHG | SYSTOLIC BLOOD PRESSURE: 131 MMHG | OXYGEN SATURATION: 94 % | RESPIRATION RATE: 18 BRPM | TEMPERATURE: 98 F

## 2024-09-17 LAB
ANION GAP SERPL CALC-SCNC: 10 MMOL/L — SIGNIFICANT CHANGE UP (ref 7–14)
BASOPHILS # BLD AUTO: 0.04 K/UL — SIGNIFICANT CHANGE UP (ref 0–0.2)
BASOPHILS NFR BLD AUTO: 0.5 % — SIGNIFICANT CHANGE UP (ref 0–2)
BUN SERPL-MCNC: 36 MG/DL — HIGH (ref 7–23)
CALCIUM SERPL-MCNC: 8.9 MG/DL — SIGNIFICANT CHANGE UP (ref 8.4–10.5)
CHLORIDE SERPL-SCNC: 95 MMOL/L — LOW (ref 98–107)
CO2 SERPL-SCNC: 25 MMOL/L — SIGNIFICANT CHANGE UP (ref 22–31)
CREAT SERPL-MCNC: 1 MG/DL — SIGNIFICANT CHANGE UP (ref 0.5–1.3)
EGFR: 53 ML/MIN/1.73M2 — LOW
EOSINOPHIL # BLD AUTO: 0.01 K/UL — SIGNIFICANT CHANGE UP (ref 0–0.5)
EOSINOPHIL NFR BLD AUTO: 0.1 % — SIGNIFICANT CHANGE UP (ref 0–6)
GLUCOSE SERPL-MCNC: 260 MG/DL — HIGH (ref 70–99)
HCT VFR BLD CALC: 27.5 % — LOW (ref 34.5–45)
HGB BLD-MCNC: 8.8 G/DL — LOW (ref 11.5–15.5)
IANC: 5.63 K/UL — SIGNIFICANT CHANGE UP (ref 1.8–7.4)
IMM GRANULOCYTES NFR BLD AUTO: 3 % — HIGH (ref 0–0.9)
LYMPHOCYTES # BLD AUTO: 1.14 K/UL — SIGNIFICANT CHANGE UP (ref 1–3.3)
LYMPHOCYTES # BLD AUTO: 15.1 % — SIGNIFICANT CHANGE UP (ref 13–44)
MAGNESIUM SERPL-MCNC: 2.1 MG/DL — SIGNIFICANT CHANGE UP (ref 1.6–2.6)
MCHC RBC-ENTMCNC: 27.8 PG — SIGNIFICANT CHANGE UP (ref 27–34)
MCHC RBC-ENTMCNC: 32 GM/DL — SIGNIFICANT CHANGE UP (ref 32–36)
MCV RBC AUTO: 87 FL — SIGNIFICANT CHANGE UP (ref 80–100)
MONOCYTES # BLD AUTO: 0.5 K/UL — SIGNIFICANT CHANGE UP (ref 0–0.9)
MONOCYTES NFR BLD AUTO: 6.6 % — SIGNIFICANT CHANGE UP (ref 2–14)
NEUTROPHILS # BLD AUTO: 5.63 K/UL — SIGNIFICANT CHANGE UP (ref 1.8–7.4)
NEUTROPHILS NFR BLD AUTO: 74.7 % — SIGNIFICANT CHANGE UP (ref 43–77)
NRBC # BLD: 0 /100 WBCS — SIGNIFICANT CHANGE UP (ref 0–0)
NRBC # FLD: 0 K/UL — SIGNIFICANT CHANGE UP (ref 0–0)
PHOSPHATE SERPL-MCNC: 2.7 MG/DL — SIGNIFICANT CHANGE UP (ref 2.5–4.5)
PLATELET # BLD AUTO: 403 K/UL — HIGH (ref 150–400)
POTASSIUM SERPL-MCNC: 5.2 MMOL/L — SIGNIFICANT CHANGE UP (ref 3.5–5.3)
POTASSIUM SERPL-SCNC: 5.2 MMOL/L — SIGNIFICANT CHANGE UP (ref 3.5–5.3)
RBC # BLD: 3.16 M/UL — LOW (ref 3.8–5.2)
RBC # FLD: 14.4 % — SIGNIFICANT CHANGE UP (ref 10.3–14.5)
SODIUM SERPL-SCNC: 130 MMOL/L — LOW (ref 135–145)
WBC # BLD: 7.55 K/UL — SIGNIFICANT CHANGE UP (ref 3.8–10.5)
WBC # FLD AUTO: 7.55 K/UL — SIGNIFICANT CHANGE UP (ref 3.8–10.5)

## 2024-09-17 PROCEDURE — 99232 SBSQ HOSP IP/OBS MODERATE 35: CPT

## 2024-09-17 RX ORDER — INSULIN DEGLUDEC INJECTION 100 U/ML
10 INJECTION, SOLUTION SUBCUTANEOUS
Qty: 0 | Refills: 0 | DISCHARGE

## 2024-09-17 RX ORDER — FLUTICASONE FUROATE 100 UG/1
1 POWDER RESPIRATORY (INHALATION)
Refills: 0 | DISCHARGE

## 2024-09-17 RX ORDER — ACETAMINOPHEN 325 MG/1
1000 TABLET ORAL ONCE
Refills: 0 | Status: COMPLETED | OUTPATIENT
Start: 2024-09-17 | End: 2024-09-17

## 2024-09-17 RX ORDER — POVIDONE, PROPYLENE GLYCOL 6.8; 3 MG/ML; MG/ML
1 LIQUID OPHTHALMIC
Qty: 0 | Refills: 0 | DISCHARGE
Start: 2024-09-17

## 2024-09-17 RX ADMIN — Medication 40 MILLIGRAM(S): at 05:38

## 2024-09-17 RX ADMIN — Medication 10 UNIT(S): at 12:35

## 2024-09-17 RX ADMIN — CHLORHEXIDINE GLUCONATE 1 APPLICATION(S): 40 SOLUTION TOPICAL at 12:36

## 2024-09-17 RX ADMIN — Medication 5000 UNIT(S): at 12:36

## 2024-09-17 RX ADMIN — Medication 6: at 09:12

## 2024-09-17 RX ADMIN — Medication 5000 UNIT(S): at 05:38

## 2024-09-17 RX ADMIN — FLUTICASONE PROPIONATE AND SALMETEROL 1 DOSE(S): 250; 50 POWDER RESPIRATORY (INHALATION) at 09:11

## 2024-09-17 RX ADMIN — Medication 10 UNIT(S): at 09:12

## 2024-09-17 RX ADMIN — METOPROLOL TARTRATE 50 MILLIGRAM(S): 100 TABLET ORAL at 05:38

## 2024-09-17 RX ADMIN — Medication 81 MILLIGRAM(S): at 12:36

## 2024-09-17 RX ADMIN — Medication 8: at 12:34

## 2024-09-17 RX ADMIN — Medication 20 MILLIGRAM(S): at 05:37

## 2024-09-17 RX ADMIN — FLUTICASONE PROPIONATE 1 SPRAY(S): 50 SPRAY, METERED NASAL at 05:37

## 2024-09-17 RX ADMIN — ACETAMINOPHEN 400 MILLIGRAM(S): 325 TABLET ORAL at 03:54

## 2024-09-17 NOTE — DISCHARGE NOTE PROVIDER - CARE PROVIDER_API CALL
Raymundo Moreira  Internal Medicine  [address] [place] [id number]  Phone: [phone]  Fax: [phone]  Follow Up Time: 1 week    Zelalem Lazar  Thoracic Surgery  [address] [place] [id number]  Phone: [phone]  Fax: [phone]  Follow Up Time:

## 2024-09-17 NOTE — DISCHARGE NOTE PROVIDER - NSDCMRMEDTOKEN_GEN_ALL_CORE_FT
Advair Diskus 250 mcg-50 mcg inhalation powder: 1 puff(s) inhaled 2 times a day  aspirin 81 mg oral delayed release tablet: 1 tab(s) orally once a day (at bedtime)  atorvastatin 20 mg oral tablet: 1 tab(s) orally once a day (at bedtime)  Cranberry 500mg orally once a day- LD-09/5/24:   furosemide 20 mg oral tablet: 1 tab(s) orally once a day taking 1-2x daily depending on weight; occasionally skips medication if she has plans to go out  gabapentin 400 mg oral capsule: 1 cap(s) orally once a day (at bedtime)  ipratropium 42 mcg/inh (0.06%) nasal spray: 2 spray(s) intranasally 2 times a day  iron 65 mg orally twice daily:   magnesium 250 mg orally twice a daily:   MetFORMIN (Eqv-Fortamet) 500 mg oral tablet, extended release: 2 tab(s) orally once a day  metoprolol tartrate 50 mg oral tablet: 1 tab(s) orally 2 times a day  Multiple Vitamins oral tablet: 1 tab(s) orally once a day LD - 09/5/24  ocular lubricant ophthalmic solution: 1 drop(s) to each affected eye 2 times a day As needed Dry Eyes  omeprazole 40 mg oral delayed release capsule: 1 cap(s) orally once a day  PreserVision AREDS 2 oral capsule: 1 cap(s) orally 2 times a day  Systane eye drops 2 drop each eye  as needed:   Tresiba FlexTouch 100 units/mL subcutaneous solution: 10 unit(s) subcutaneous once a day (at bedtime)  vitamin B12 2500mg orally once a day:   Vitamin C 500mg orally once a day at night:    Advair Diskus 250 mcg-50 mcg inhalation powder: 1 puff(s) inhaled 2 times a day  aspirin 81 mg oral delayed release tablet: 1 tab(s) orally once a day (at bedtime)  atorvastatin 20 mg oral tablet: 1 tab(s) orally once a day (at bedtime)  Cranberry 500mg orally once a day- LD-09/5/24:   gabapentin 400 mg oral capsule: 1 cap(s) orally once a day (at bedtime)  ipratropium 42 mcg/inh (0.06%) nasal spray: 2 spray(s) intranasally 2 times a day  iron 65 mg orally twice daily:   magnesium 250 mg orally twice a daily:   MetFORMIN (Eqv-Fortamet) 500 mg oral tablet, extended release: 2 tab(s) orally once a day  metoprolol tartrate 50 mg oral tablet: 1 tab(s) orally 2 times a day  Multiple Vitamins oral tablet: 1 tab(s) orally once a day LD - 09/5/24  ocular lubricant ophthalmic solution: 1 drop(s) to each affected eye 2 times a day As needed Dry Eyes  omeprazole 40 mg oral delayed release capsule: 1 cap(s) orally once a day  PreserVision AREDS 2 oral capsule: 1 cap(s) orally 2 times a day  Systane eye drops 2 drop each eye  as needed:   Tresiba FlexTouch 100 units/mL subcutaneous solution: 10 unit(s) subcutaneous once a day (at bedtime) Please take tresiba 13 units subcutaneous at bedtime tonight 9/17  Then take tresiba 10 units subcutaneous at bedtime starting on 9/18  vitamin B12 2500mg orally once a day:   Vitamin C 500mg orally once a day at night:

## 2024-09-17 NOTE — PROGRESS NOTE ADULT - ASSESSMENT
89 -year-old female with COPD on 2L home O2 PRN, CVA (remote, no deficits), CAD s/p stent ('15), IDDM2, PVD s/p fem-pop bypass ('15) w/occlusion ('22), HLD, HTN, anemia, p/w uti and also found to have PNA. Consult for steroid –induced hyperglycemia (500s), a1c only 6.6% (however patient has anemia so may be falsely low)     #Steroid Induced Hyperglycemia  #T2DM   --A1c: 6.6% (however patient has anemia so may be falsely low)   --Outpatient regimen: Tresiba 10 units at bedtime, metformin 500mg 2 tabs daily   --Endocrinologist: Does not have, follows with PCP    INPATIENT RECOMMENDATIONS:  - Glucose Target 150-250 mg/dl: due to advanced age and decrease gfr: above goal.   - Decrease Lantus to 14 units at bedtime  - Continue Admelog 10 units premeal TID (HOLD IF NPO) will likely need less premeal insulin starting tomorrow   - Continue Admelog moderate correctional scale premeal   - Continue Admelog moderate correctional scale at bedtime  - FS before meals and at bedtime   - Consistent Carb diet   - Hypoglycemia Protocol   - Steroids: received Prednisone 20mg p.o. BID with meals last dose on 9/17; Now stopped as per pulm and primary team    DISCHARGE RECOMMENDATIONS:  - Please repeat Fs prior to dc glucose level should be 250 or less   - Pt administers her insulin herself   - Daughter live with pt   - As per primary team plan is to discharge pt today off steroids. Last dose of prednisone 20mg given this am.  Would advise pt to take Lantus 13 units sq tonight (as pt received steroid this am and is likely still in effect) then on 9/18 continue (Tresiba 10 units at bedtime plus  metformin 500mg 2 tabs daily if GFR allows)  - CGM: Has not used before, does not want to try, prefers doing fingersticks  - Pt prefers to follow up with PCP for DM management   - Please call your doctor if you fs is 70 or below and or 250 and above   - Reviewed importance of medication adherence,  glucose monitoring, and following a consistent carb diet   - Hypoglycemia and intervention reviewed   - Please follow up with your pcp, podiatry, endocrinology, and opthalmology as an outpt   - OUT-PATIENT FOLLOW UP: Patient prefers to continue following with PCP.  - If pt wishes he can follow up with Endocrinology office [phone]) at [address] [place] [address] [address] [place] [id number].  - CM and IRAJ for safe dc plan     #HLD  - Continue Atorvastatin 20mg p.o. qhs if no contraindications   - goal LDL in diabetes mellitus is <70  - Please obtain lipid profile as an outpt     #HTN  - goal BP in diabetes mellitus is <130/80  - Please obtain urine micro albumin cr ratio as an outpt   - defer to primary team for management    d/w Kelsi Palacios  Nurse Practitioner  Division of Endocrinology & Diabetes  In house pager #[phone]    If before 9AM or after 6PM, or on weekends/holidays, please call endocrine answering service for assistance ([phone]).For nonurgent matters email [email or web address] for assistance.    89 -year-old female with COPD on 2L home O2 PRN, CVA (remote, no deficits), CAD s/p stent ('15), IDDM2, PVD s/p fem-pop bypass ('15) w/occlusion ('22), HLD, HTN, anemia, p/w uti and also found to have PNA. Consult for steroid –induced hyperglycemia (500s), a1c only 6.6% (however patient has anemia so may be falsely low)     #Steroid Induced Hyperglycemia  #T2DM   --A1c: 6.6% (however patient has anemia so may be falsely low)   --Outpatient regimen: Tresiba 10 units at bedtime, metformin 500mg 2 tabs daily   --Endocrinologist: Does not have, follows with PCP    INPATIENT RECOMMENDATIONS:  - Glucose Target 150-250 mg/dl: due to advanced age and decrease gfr: above goal.   - Decrease Lantus to 14 units at bedtime  - Continue Admelog 10 units premeal TID (HOLD IF NPO) will likely need less premeal insulin starting tomorrow   - Continue Admelog moderate correctional scale premeal   - Continue Admelog moderate correctional scale at bedtime  - FS before meals and at bedtime   - Consistent Carb diet   - Hypoglycemia Protocol   - Steroids: received Prednisone 20mg p.o. BID with meals last dose on 9/17; Now stopped as per pulm and primary team    DISCHARGE RECOMMENDATIONS:  - Please repeat Fs prior to dc glucose level should be 250 or less   - Pt administers her insulin herself   - Daughter live with pt   - As per primary team plan is to discharge pt today off steroids. Last dose of prednisone 20mg given this am.  Would advise pt to take Tresiba pen 13 units sq tonight (as pt received steroid this am and is likely still in effect) then on 9/18 continue (Tresiba 10 units at bedtime plus  metformin 500mg 2 tabs daily if GFR allows)  - CGM: Has not used before, does not want to try, prefers doing fingersticks  - Pt prefers to follow up with PCP for DM management   - Please call your doctor if you fs is 70 or below and or 250 and above   - Reviewed importance of medication adherence,  glucose monitoring, and following a consistent carb diet   - Hypoglycemia and intervention reviewed   - Please follow up with your pcp, podiatry, endocrinology, and opthalmology as an outpt   - OUT-PATIENT FOLLOW UP: Patient prefers to continue following with PCP.  - If pt wishes he can follow up with Endocrinology office ([phone]) at [address] [place] [address] [address] [place] [id number].  - CM and SW for safe dc plan     #HLD  - Continue Atorvastatin 20mg p.o. qhs if no contraindications   - goal LDL in diabetes mellitus is <70  - Please obtain lipid profile as an outpt     #HTN  - goal BP in diabetes mellitus is <130/80  - Please obtain urine micro albumin cr ratio as an outpt   - defer to primary team for management    d/w Kelsi Palacios  Nurse Practitioner  Division of Endocrinology & Diabetes  In house pager #[phone]    If before 9AM or after 6PM, or on weekends/holidays, please call endocrine answering service for assistance ([phone]).For nonurgent matters email [email or web address] for assistance.

## 2024-09-17 NOTE — PROGRESS NOTE ADULT - SUBJECTIVE AND OBJECTIVE BOX
Chief Complaint: Type 2 DM; Steroid induced hyperglycemia     History: Pt reports an adequate appetite. Pt tolerating oral diet. Pt seen at bedside. Pt denies nausea and vomiting/any signs of hypoglycemia.    MEDICATIONS  (STANDING):  aspirin enteric coated 81 milliGRAM(s) Oral daily  atorvastatin 20 milliGRAM(s) Oral at bedtime  chlorhexidine 2% Cloths 1 Application(s) Topical daily  dextrose 5%. 1000 milliLiter(s) (50 mL/Hr) IV Continuous <Continuous>  dextrose 5%. 1000 milliLiter(s) (100 mL/Hr) IV Continuous <Continuous>  dextrose 50% Injectable 25 Gram(s) IV Push once  dextrose 50% Injectable 25 Gram(s) IV Push once  dextrose 50% Injectable 12.5 Gram(s) IV Push once  fluticasone propionate 50 MICROgram(s)/spray Nasal Spray 1 Spray(s) Both Nostrils two times a day  fluticasone propionate/ salmeterol 250-50 MICROgram(s) Diskus 1 Dose(s) Inhalation two times a day  gabapentin 400 milliGRAM(s) Oral at bedtime  glucagon  Injectable 1 milliGRAM(s) IntraMuscular once  heparin   Injectable 5000 Unit(s) SubCutaneous every 8 hours  insulin glargine Injectable (LANTUS) 18 Unit(s) SubCutaneous at bedtime  insulin lispro (ADMELOG) corrective regimen sliding scale   SubCutaneous at bedtime  insulin lispro (ADMELOG) corrective regimen sliding scale   SubCutaneous three times a day before meals  insulin lispro Injectable (ADMELOG) 10 Unit(s) SubCutaneous three times a day before meals  lactated ringers. 1000 milliLiter(s) (75 mL/Hr) IV Continuous <Continuous>  metoprolol tartrate 50 milliGRAM(s) Oral two times a day  pantoprazole    Tablet 40 milliGRAM(s) Oral before breakfast  sodium chloride 3%  Inhalation 4 milliLiter(s) Inhalation every 12 hours    MEDICATIONS  (PRN):  artificial tears (preservative free) Ophthalmic Solution 1 Drop(s) Both EYES two times a day PRN Dry Eyes  dextrose Oral Gel 15 Gram(s) Oral once PRN Blood Glucose LESS THAN 70 milliGRAM(s)/deciliter  hydrocodone/homatropine Syrup 5 milliLiter(s) Oral at bedtime PRN Cough      Allergies: penicillin (Unknown)  macrolide antibiotics (Other)  Biaxin (Unknown)    Review of Systems:  Respiratory: No SOB, no cough  GI: No nausea, vomiting, abdominal pain  Endocrine: no polyuria, polydipsia    PHYSICAL EXAM:  VITALS: T(C): 36.8 (09-17-24 @ 15:51)  T(F): 98.3 (09-17-24 @ 15:51), Max: 98.3 (09-17-24 @ 15:51)  HR: 72 (09-17-24 @ 15:51) (65 - 82)  BP: 131/52 (09-17-24 @ 15:51) (110/58 - 131/52)  RR:  (17 - 18)  SpO2:  (92% - 98%)  Wt(kg): --  GENERAL: NAD, well-groomed, well-developed  RESPIRATORY: No labored breathing   GI: Soft, nontender, non distended  PSYCH: Alert and oriented x 3, normal affect, normal mood      CAPILLARY BLOOD GLUCOSE  238 (16 Sep 2024 17:23)  POCT Blood Glucose.: 329 mg/dL (17 Sep 2024 12:21)  POCT Blood Glucose.: 283 mg/dL (17 Sep 2024 08:51)  POCT Blood Glucose.: 174 mg/dL (16 Sep 2024 23:11)  POCT Blood Glucose.: 238 mg/dL (16 Sep 2024 17:23)    A1C with Estimated Average Glucose (09.05.24 @ 13:42)    A1C with Estimated Average Glucose Result: 6.6   Estimated Average Glucose: 143      09-17    130[L]  |  95[L]  |  36[H]  ----------------------------<  260[H]  5.2   |  25  |  1.00    eGFR: 53[L]    Ca    8.9      09-17  Mg     2.10     09-17  Phos  2.7     09-17      Diet, Consistent Carbohydrate w/Evening Snack:   DASH/TLC Sodium & Cholesterol Restricted (DASH)  1200mL Fluid Restriction ([id number]) (09-15-24 @ 16:06) [Active]

## 2024-09-17 NOTE — PROGRESS NOTE ADULT - ASSESSMENT
89 -year-old female with COPD on 2L home O2 PRN, CVA (remote, no deficits), CAD s/p stent ('15), IDDM2, PVD s/p fem-pop bypass ('15) w/occlusion ('22), HLD, HTN, anemia, presenting with (+)UA noted during presurgical testing.      Sepsis due to urinary tract infection based on WBC>12, HR>90 and G(+)PNA b/l lower lobes and RML  - lactate 2.7->1.2  - ceftriaxone 9/10/24 --> cefepime 9/12/24 --> 9/16/24  - macrobid 9/11/24 --> vancomycin x 1 dose 9/12/24 + 9/13/24   - urine cx 9/8/24 --> 10K-49K Citrobacter freundii + 50K-99K Enterococcus faecium  - blood cxs 9/9/24 --> negative  - MRSA PCR 9/10/24 --> negative  - CT chest w/o contrast, abd/pelvis w/contrast 9/13/24 --> PNA b/l lower lobes and RML  - incentive spirometer  - nebs prn  - antitussives prn  - daily CBC  - sepsis resolved  - appreciate ID    Acute-on-chronic HFpEF  - recent outpt TTE pasted into [place]  - daily weights  - initially started on lasix 20 mg IV bid, lasix held 9/15/24 2' rising BUN  - appreciate cardiology    Type 2 diabetes mellitus, with steroid-induced hyperglycemia  - c/w basal insulin, premeal insulin   - monitor FS QAC/HS w/moderate severity ISS  - recent A1c 6.6%  - hold home metformin  - f/u endocrinology recs regarding discharge medication regimen  - appreciate endocrinology     Essential hypertension  - c/w metoprolol tartrate  - c/w furosemide PO, reportedly supposed to be taking furosemide 20 mg bid however takes 0-2x daily, reports her cardiologist wants her taking bid  - appreciate cardiology    Chronic obstructive pulmonary disease with acute lower respiratory tract infection  - c/w LABA/ICS  - O2 PRN  - steroid taper as per pulmonology  - incentive spirometry  - appreciate pulmonology    Hyperlipidemia, unspecified  - c/w statin    CAD of native arteries without angina pectoris   - c/w ASA, statin, metoprolol    LLL pulmonary nodule  - CTS to reschedule VATS as outpt    Need for prophylactic measure  - SC heparin 5000 units q8h    Disposition  - PT initially recommended home PT

## 2024-09-17 NOTE — PROGRESS NOTE ADULT - ASSESSMENT
89 -year-old female with COPD on 2L home O2 PRN, CVA (remote, no deficits), CAD s/p stent ('15), IDDM2, PVD s/p fem-pop bypass ('15) w/occlusion ('22), HLD, HTN, anemia, presenting with (+)UA noted during presurgical testing. Patient is scheduled to have a VATS LLL resection on Thursday. Patient reports going to her PCP and was prescribed abx, of which she took 1 dose prior to presenting t the ED at her daughter's behest. She notes having increased frequency of urination and dysuria for the past 1 week. She has a history of prior UTIs, last UTI 1 month ago. She notes chills but denies any fevers.  In the ED VS:  98.7  98-99  103-107/52-67  25  96-98% on 2-6L NC, received Ceftriaxone 1g IVPB x1 (09 Sep 2024 07:07):  She has copd and uses oxygen at home on an prn basis:  she felt feverish and chills at home and denies any sob:  on 32 L of oxygen:   -no wheezing  reportedly she had pet scan chest as an outpt and she has malignancy ;  she is scheduled for vats and left lower lobectomy on coming 12th , but admitted here for fever,  chills      UTI;  COPD;  PULMONARY NODULE: SUSPECTED MALIGNANCY ;   cad/s/p pci  DM2  PVD:  S/P FEM-POP BYPASS  HTN  HLD  ANEMIA      UTI;  -she is being  treated for uti with ceftriaxone   -likely reason for her chills at home;     9/10;  -she is on ceftriaxone for 5 days  9/11/ on ceftriaxone     9/12: : reced  on e dose of vanco : on nitrofurantoin for short term :  -would not advise  to cont nitrofurantoin for long term as she already had ILD and bronchiectasis:   9/13:  -cont current medications:  dw i d:  no long term nitrofurantoin   9/15: seems stable:    9/16:  -seems OK;  still with mild sob:  but is very deconditioned;   -cont antibtiocs per id   9/17:  -finished antibiotics  she looks OK:  on 2 L o f oxygen     COPD;  -she looks OK;   -no sob:  or wheezing   -VBG seems to be doing  ok   -on Adviar    9/14:  -she became more tachypneic and sob today am:   -ct chest with suspected pneumonia and has underlying emphysema:   -she is already on antibiotics and would add steroids also   -now on vanc and zosyn     9/15:  -she is not wheezing:   -decrease steroids dosages today  :  she say says her breathing  is ok :     9/16:  steroids decreased:  son is slightly better :  -cont prednisone as noted:     9/17: no wheezing:   no sob:      PULMONARY NODULE: SUSPECTED MALIGNANCY ;   -she had 1.5 cm smnoud l e in left lower lobe;   -she says she had pet scan in April 2024;  showed; No acute pulmonary embolism. Mucoid secretions with impaction right middle lobe and lower lobe bronchi. Partial right middle lobe atelectasis, new from prior exam. Persistent atelectasis/airspace consolidation right lower lobe. Few scattered groundglass opacities left upper lobe, could reflect acute respiratory infection. Probable chronic interstitial lung disease superimposed on background of emphysema. Stable 1.5 cm nodule left lower lobe.  -now she is scheduled fr vats left lower lung resection/;  given postive pet scan as an outpt:   -cxr this time also suggests pulm edema;  and her EF was normal though ? could the changes e due t ILD?   -cont ADVAIR here   9/10:   cont diuresis:   -cards following  9/11: a above;  for possible surgery in am    9/12: she has 1.5 cm nodule in left lower lobe:  she was supposed to get surgery during this admission,   but now cancelled secondary to UTI and increasing WBC   9/13-ct chest is pending;   -the ct scan chest from three months ago showed; Bilateral septal thickening, groundglass opacities, peripheral consolidation and pleural effusions. These findings could occur inthe   setting of pulmonary edema, however, if there are no signs of pulmonary edema, other entities including pneumonia, could be considered.  rpt ct chest pending today   9/14:  -the rpt ct scan chest showed mild increase in the nodule size:  would defer to thoracic  primary team  : currently surgery is postponed  9/16; as above  9/17; no n ew change in plan    cad/s/p pci  -cnt current meds;     DM2  -monitor and control     PVD:  S/P FEM-POP BYPASS  -cont current meds!    HTN  -controlled    HLD  -atorvastatin 20 milliGRAM(s) Oral at bedtime    ANEMIA  -She is mildly anemic ; monitor and transfuse as needed dw team     dw id/  PMD

## 2024-09-17 NOTE — PROGRESS NOTE ADULT - REASON FOR ADMISSION
UTI

## 2024-09-17 NOTE — PROGRESS NOTE ADULT - SUBJECTIVE AND OBJECTIVE BOX
[facility], Division of Infectious Diseases  KRISSY Luong Y. Patel, S. Shah, G. Dax  [phone]  ([phone] - weekdays after 5pm and weekends)    Name: FEDERICO DOS SANTOS  Age/Gender: 89y Female  MRN: [id number]    Interval History:  Notes reviewed.   No concerning overnight events.  Afebrile.   has BM yesterday  feels good  she is eager to go home soon    Allergies: penicillin (Unknown)  macrolide antibiotics (Other)  Biaxin (Unknown)      Objective:  Vitals:   T(F): 97.4 (09-17-24 @ 05:34), Max: 98.4 (09-16-24 @ 10:00)  HR: 65 (09-17-24 @ 05:34) (65 - 82)  BP: 110/58 (09-17-24 @ 05:34) (107/71 - 126/59)  RR: 18 (09-17-24 @ 05:34) (17 - 18)  SpO2: 96% (09-17-24 @ 05:34) (96% - 100%)  Physical Examination:  General: no acute distress  HEENT: anicteric, NC  Cardio: S1, S2, normal rate  Resp: clear to auscultation anteriorly  Abd: soft, distended, NT  Ext: no LE edema  Skin: warm, dry    Laboratory Studies:  CBC:                       8.8    7.55  )-----------( 403      ( 17 Sep 2024 06:13 )             27.5     WBC Trend:  7.55 09-17-24 @ 06:13  8.28 09-16-24 @ 06:11  5.84 09-15-24 @ 05:49  9.64 09-14-24 @ 07:41  12.94 09-13-24 @ 06:04  17.06 09-12-24 @ 05:26  12.45 09-11-24 @ 05:20    CMP: 09-17    130[L]  |  95[L]  |  36[H]  ----------------------------<  260[H]  5.2   |  25  |  1.00    Ca    8.9      17 Sep 2024 06:13  Phos  2.7     09-17  Mg     2.10     09-17          Vancomycin Level, Random: 5.4 ug/mL (09-13-24 @ 06:04)    Urinalysis Basic - ( 17 Sep 2024 06:13 )    Color: x / Appearance: x / SG: x / pH: x  Gluc: 260 mg/dL / Ketone: x  / Bili: x / Urobili: x   Blood: x / Protein: x / Nitrite: x   Leuk Esterase: x / RBC: x / WBC x   Sq Epi: x / Non Sq Epi: x / Bacteria: x      Microbiology: reviewed     Culture - Blood (collected 09-09-24 @ 10:55)  Source: .Blood Blood-Peripheral  Final Report (09-14-24 @ 17:01):    No growth at 5 days    Culture - Blood (collected 09-09-24 @ 10:45)  Source: .Blood Blood-Peripheral  Final Report (09-14-24 @ 17:00):    No growth at 5 days    Urinalysis with Rflx Culture (collected 09-08-24 @ 21:17)    Culture - Urine (collected 09-08-24 @ 21:17)  Source: Clean Catch  Final Report (09-12-24 @ 20:58):    10,000 - 49,000 CFU/mL Citrobacter freundii complex    50,000 - 99,000 CFU/mL Enterococcus faecium  Organism: Citrobacter freundii complex  Enterococcus faecium (09-12-24 @ 20:58)  Organism: Enterococcus faecium (09-12-24 @ 20:58)      Method Type: KHADIJAH      -  Ampicillin: S <=2 Predicts results to ampicillin/sulbactam, amoxacillin-clavulanate and  piperacillin-tazobactam.      -  Ciprofloxacin: I 2      -  Levofloxacin: I 4      -  Nitrofurantoin: S <=32 Should not be used to treat pyelonephritis.      -  Tetracycline: S <=1      -  Vancomycin: S 0.5  Organism: Citrobacter freundii complex (09-12-24 @ 20:58)      Method Type: KHADIJAH      -  Amoxicillin/Clavulanic Acid: R >16/8      -  Ampicillin: R 16 These ampicillin results predict results for amoxicillin      -  Ampicillin/Sulbactam: R 16/8      -  Aztreonam: S <=4      -  Cefazolin: R >16      -  Cefepime: S <=2      -  Cefoxitin: R >16      -  Ceftriaxone: I 2 Enterobacter cloacae, Klebsiella aerogenes, and Citrobacter freundii may develop resistance during prolonged therapy.      -  Ciprofloxacin: S <=0.25      -  Ertapenem: S <=0.5      -  Gentamicin: S <=2      -  Imipenem: S <=1      -  Levofloxacin: S <=0.5      -  Meropenem: S <=1      -  Nitrofurantoin: S <=32 Should not be used to treat pyelonephritis      -  Piperacillin/Tazobactam: S <=8 Treatment with Pipercillin/Tazobactam is not recommended in severe infections casued by Klebsiella aerogenes, Enterobacter cloacae complex, and Citrobacter freundii complex.      -  Tobramycin: S <=2      -  Trimethoprim/Sulfamethoxazole: S <=0.5/9.5        Radiology: reviewed     Medications:  artificial tears (preservative free) Ophthalmic Solution 1 Drop(s) Both EYES two times a day PRN  aspirin enteric coated 81 milliGRAM(s) Oral daily  atorvastatin 20 milliGRAM(s) Oral at bedtime  chlorhexidine 2% Cloths 1 Application(s) Topical daily  dextrose 5%. 1000 milliLiter(s) IV Continuous <Continuous>  dextrose 5%. 1000 milliLiter(s) IV Continuous <Continuous>  dextrose 50% Injectable 12.5 Gram(s) IV Push once  dextrose 50% Injectable 25 Gram(s) IV Push once  dextrose 50% Injectable 25 Gram(s) IV Push once  dextrose Oral Gel 15 Gram(s) Oral once PRN  fluticasone propionate 50 MICROgram(s)/spray Nasal Spray 1 Spray(s) Both Nostrils two times a day  fluticasone propionate/ salmeterol 250-50 MICROgram(s) Diskus 1 Dose(s) Inhalation two times a day  gabapentin 400 milliGRAM(s) Oral at bedtime  glucagon  Injectable 1 milliGRAM(s) IntraMuscular once  heparin   Injectable 5000 Unit(s) SubCutaneous every 8 hours  hydrocodone/homatropine Syrup 5 milliLiter(s) Oral at bedtime PRN  insulin glargine Injectable (LANTUS) 18 Unit(s) SubCutaneous at bedtime  insulin lispro (ADMELOG) corrective regimen sliding scale   SubCutaneous three times a day before meals  insulin lispro (ADMELOG) corrective regimen sliding scale   SubCutaneous at bedtime  insulin lispro Injectable (ADMELOG) 10 Unit(s) SubCutaneous three times a day before meals  lactated ringers. 1000 milliLiter(s) IV Continuous <Continuous>  metoprolol tartrate 50 milliGRAM(s) Oral two times a day  pantoprazole    Tablet 40 milliGRAM(s) Oral before breakfast  predniSONE   Tablet 20 milliGRAM(s) Oral every 12 hours  sodium chloride 3%  Inhalation 4 milliLiter(s) Inhalation every 12 hours    Antimicrobials:

## 2024-09-17 NOTE — DISCHARGE NOTE PROVIDER - CARE PROVIDERS DIRECT ADDRESSES
[email or web address]

## 2024-09-17 NOTE — PROGRESS NOTE ADULT - SUBJECTIVE AND OBJECTIVE BOX
CARDIOLOGY FOLLOW UP - Dr. Valenzuela  DATE OF SERVICE: 9/17/24    CC no cp or sob       REVIEW OF SYSTEMS:  CONSTITUTIONAL: No fever, weight loss, or fatigue  RESPIRATORY: No cough, wheezing, chills or hemoptysis; No Shortness of Breath  CARDIOVASCULAR: No chest pain, palpitations, passing out, dizziness  GASTROINTESTINAL: No abdominal or epigastric pain. No nausea, vomiting, or hematemesis; No diarrhea or constipation. No melena or hematochezia.      PHYSICAL EXAM:  T(C): 36.3 (09-17-24 @ 05:34), Max: 36.5 (09-16-24 @ 19:26)  HR: 65 (09-17-24 @ 05:34) (65 - 82)  BP: 110/58 (09-17-24 @ 05:34) (110/58 - 126/59)  RR: 18 (09-17-24 @ 05:34) (17 - 18)  SpO2: 96% (09-17-24 @ 05:34) (96% - 98%)  Wt(kg): --  I&O's Summary    16 Sep 2024 07:01  -  17 Sep 2024 07:00  --------------------------------------------------------  IN: 0 mL / OUT: 1100 mL / NET: -1100 mL        Appearance: Normal	  Cardiovascular: Normal S1 S2,RR + murmur   Respiratory: decreased   Gastrointestinal:  Soft, Non-tender, + BS	  Extremities: Normal range of motion, No clubbing, cyanosis or edema      Home Medications:  Advair Diskus 250 mcg-50 mcg inhalation powder: 1 puff(s) inhaled 2 times a day (09 Sep 2024 06:08)  aspirin 81 mg oral delayed release tablet: 1 tab(s) orally once a day (at bedtime) (09 Sep 2024 06:08)  atorvastatin 20 mg oral tablet: 1 tab(s) orally once a day (at bedtime) (09 Sep 2024 06:08)  Cranberry 500mg orally once a day- LD-09/5/24:  (09 Sep 2024 06:08)  fluticasone 50 mcg/inh inhalation powder: 1 puff(s) in each nostril 2 times a day (09 Sep 2024 06:08)  furosemide 20 mg oral tablet: 1 tab(s) orally once a day taking 1-2x daily depending on weight; occasionally skips medication if she has plans to go out (09 Sep 2024 06:06)  gabapentin 400 mg oral capsule: 1 cap(s) orally once a day (at bedtime) (09 Sep 2024 06:08)  ipratropium 42 mcg/inh (0.06%) nasal spray: 2 spray(s) intranasally 2 times a day (09 Sep 2024 06:08)  iron 65 mg orally twice daily:  (09 Sep 2024 06:08)  magnesium 250 mg orally twice a daily:  (09 Sep 2024 06:08)  MetFORMIN (Eqv-Fortamet) 500 mg oral tablet, extended release: 2 tab(s) orally once a day (09 Sep 2024 06:08)  metoprolol tartrate 50 mg oral tablet: 1 tab(s) orally 2 times a day (09 Sep 2024 06:08)  Multiple Vitamins oral tablet: 1 tab(s) orally once a day LD  - 09/5/24 (09 Sep 2024 06:08)  omeprazole 40 mg oral delayed release capsule: 1 cap(s) orally once a day (09 Sep 2024 06:08)  PreserVision AREDS 2 oral capsule: 1 cap(s) orally 2 times a day (09 Sep 2024 06:08)  Systane eye drops 2 drop each eye  as needed:  (09 Sep 2024 06:08)  Tresiba FlexTouch 100 units/mL subcutaneous solution: 10 unit(s) subcutaneous once a day (at bedtime) (09 Sep 2024 06:08)  vitamin B12 2500mg orally once a day:  (09 Sep 2024 06:08)  Vitamin C 500mg orally once a day at night:  (09 Sep 2024 06:08)      MEDICATIONS  (STANDING):  aspirin enteric coated 81 milliGRAM(s) Oral daily  atorvastatin 20 milliGRAM(s) Oral at bedtime  chlorhexidine 2% Cloths 1 Application(s) Topical daily  dextrose 5%. 1000 milliLiter(s) (50 mL/Hr) IV Continuous <Continuous>  dextrose 5%. 1000 milliLiter(s) (100 mL/Hr) IV Continuous <Continuous>  dextrose 50% Injectable 25 Gram(s) IV Push once  dextrose 50% Injectable 25 Gram(s) IV Push once  dextrose 50% Injectable 12.5 Gram(s) IV Push once  fluticasone propionate 50 MICROgram(s)/spray Nasal Spray 1 Spray(s) Both Nostrils two times a day  fluticasone propionate/ salmeterol 250-50 MICROgram(s) Diskus 1 Dose(s) Inhalation two times a day  gabapentin 400 milliGRAM(s) Oral at bedtime  glucagon  Injectable 1 milliGRAM(s) IntraMuscular once  heparin   Injectable 5000 Unit(s) SubCutaneous every 8 hours  insulin glargine Injectable (LANTUS) 18 Unit(s) SubCutaneous at bedtime  insulin lispro (ADMELOG) corrective regimen sliding scale   SubCutaneous three times a day before meals  insulin lispro (ADMELOG) corrective regimen sliding scale   SubCutaneous at bedtime  insulin lispro Injectable (ADMELOG) 10 Unit(s) SubCutaneous three times a day before meals  lactated ringers. 1000 milliLiter(s) (75 mL/Hr) IV Continuous <Continuous>  metoprolol tartrate 50 milliGRAM(s) Oral two times a day  pantoprazole    Tablet 40 milliGRAM(s) Oral before breakfast  predniSONE   Tablet 20 milliGRAM(s) Oral every 12 hours  sodium chloride 3%  Inhalation 4 milliLiter(s) Inhalation every 12 hours      TELEMETRY: nsr 	    ECG:  	  RADIOLOGY:   DIAGNOSTIC TESTING:  [ ] Echocardiogram:  [ ]  Catheterization:  [ ] Stress Test:    OTHER: 	    LABS:	 	    Troponin T, High Sensitivity Result: 21 ng/L (09-12 @ 11:14)                          8.8    7.55  )-----------( 403      ( 17 Sep 2024 06:13 )             27.5     09-17    130[L]  |  95[L]  |  36[H]  ----------------------------<  260[H]  5.2   |  25  |  1.00    Ca    8.9      17 Sep 2024 06:13  Phos  2.7     09-17  Mg     2.10     09-17

## 2024-09-17 NOTE — PROGRESS NOTE ADULT - PROVIDER SPECIALTY LIST ADULT
Cardiology
Cardiology
Infectious Disease
Internal Medicine
Pulmonology
Thoracic Surgery
Cardiology
Cardiology
Infectious Disease
Internal Medicine
Internal Medicine
Pulmonology
Endocrinology
Infectious Disease
Infectious Disease
Internal Medicine
Internal Medicine
Pulmonology
Pulmonology
Cardiology
Infectious Disease
Infectious Disease
Internal Medicine
Pulmonology
Pulmonology
Endocrinology

## 2024-09-17 NOTE — PROGRESS NOTE ADULT - TIME BILLING
agree with above ACP note.  cv stable  diuretics on hold   eventual thoracic surgery when optimized  cont current tx
agree with above ACP note.  cv stable  diuretics on hold   eventual thoracic surgery when optimized  cont current tx

## 2024-09-17 NOTE — PROGRESS NOTE ADULT - SUBJECTIVE AND OBJECTIVE BOX
Date of Service: 09-17-24 @ 11:23    Patient is a 89y old  Female who presents with a chief complaint of UTI (17 Sep 2024 11:05)      Any change in ROS: seems to be doing  ok : no sob:     MEDICATIONS  (STANDING):  aspirin enteric coated 81 milliGRAM(s) Oral daily  atorvastatin 20 milliGRAM(s) Oral at bedtime  chlorhexidine 2% Cloths 1 Application(s) Topical daily  dextrose 5%. 1000 milliLiter(s) (50 mL/Hr) IV Continuous <Continuous>  dextrose 5%. 1000 milliLiter(s) (100 mL/Hr) IV Continuous <Continuous>  dextrose 50% Injectable 12.5 Gram(s) IV Push once  dextrose 50% Injectable 25 Gram(s) IV Push once  dextrose 50% Injectable 25 Gram(s) IV Push once  fluticasone propionate 50 MICROgram(s)/spray Nasal Spray 1 Spray(s) Both Nostrils two times a day  fluticasone propionate/ salmeterol 250-50 MICROgram(s) Diskus 1 Dose(s) Inhalation two times a day  gabapentin 400 milliGRAM(s) Oral at bedtime  glucagon  Injectable 1 milliGRAM(s) IntraMuscular once  heparin   Injectable 5000 Unit(s) SubCutaneous every 8 hours  insulin glargine Injectable (LANTUS) 18 Unit(s) SubCutaneous at bedtime  insulin lispro (ADMELOG) corrective regimen sliding scale   SubCutaneous three times a day before meals  insulin lispro (ADMELOG) corrective regimen sliding scale   SubCutaneous at bedtime  insulin lispro Injectable (ADMELOG) 10 Unit(s) SubCutaneous three times a day before meals  lactated ringers. 1000 milliLiter(s) (75 mL/Hr) IV Continuous <Continuous>  metoprolol tartrate 50 milliGRAM(s) Oral two times a day  pantoprazole    Tablet 40 milliGRAM(s) Oral before breakfast  predniSONE   Tablet 20 milliGRAM(s) Oral every 12 hours  sodium chloride 3%  Inhalation 4 milliLiter(s) Inhalation every 12 hours    MEDICATIONS  (PRN):  artificial tears (preservative free) Ophthalmic Solution 1 Drop(s) Both EYES two times a day PRN Dry Eyes  dextrose Oral Gel 15 Gram(s) Oral once PRN Blood Glucose LESS THAN 70 milliGRAM(s)/deciliter  hydrocodone/homatropine Syrup 5 milliLiter(s) Oral at bedtime PRN Cough    Vital Signs Last 24 Hrs  T(C): 36.3 (17 Sep 2024 05:34), Max: 36.5 (16 Sep 2024 19:26)  T(F): 97.4 (17 Sep 2024 05:34), Max: 97.7 (16 Sep 2024 19:26)  HR: 65 (17 Sep 2024 05:34) (65 - 82)  BP: 110/58 (17 Sep 2024 05:34) (110/58 - 126/59)  BP(mean): --  RR: 18 (17 Sep 2024 05:34) (17 - 18)  SpO2: 96% (17 Sep 2024 05:34) (96% - 98%)    Parameters below as of 17 Sep 2024 05:34  Patient On (Oxygen Delivery Method): nasal cannula  O2 Flow (L/min): 3      I&O's Summary    16 Sep 2024 07:01  -  17 Sep 2024 07:00  --------------------------------------------------------  IN: 0 mL / OUT: 1100 mL / NET: -1100 mL          Physical Exam:   GENERAL: NAD, well-groomed, well-developed  HEENT: MARCIO/   Atraumatic, Normocephalic  ENMT: No tonsillar erythema, exudates, or enlargement; Moist mucous membranes, Good dentition, No lesions  NECK: Supple, No JVD, Normal thyroid  CHEST/LUNG: scattered cracekls+  CVS: Regular rate and rhythm; No murmurs, rubs, or gallops  GI: : Soft, Nontender, Nondistended; Bowel sounds present  NERVOUS SYSTEM:  Alert & Oriented X3  EXTREMITIES: -edema  LYMPH: No lymphadenopathy noted  SKIN: No rashes or lesions  ENDOCRINOLOGY: No Thyromegaly  PSYCH: Appropriate    Labs:  27, 27                            8.8    7.55  )-----------( 403      ( 17 Sep 2024 06:13 )             27.5                         9.6    8.28  )-----------( 430      ( 16 Sep 2024 06:11 )             29.5                         9.2    5.84  )-----------( 422      ( 15 Sep 2024 05:49 )             27.9                         9.0    9.64  )-----------( 394      ( 14 Sep 2024 07:41 )             27.6     09-17    130[L]  |  95[L]  |  36[H]  ----------------------------<  260[H]  5.2   |  25  |  1.00  09-16    132[L]  |  93[L]  |  35[H]  ----------------------------<  302[H]  4.8   |  25  |  0.93  09-15    128[L]  |  87[L]  |  44[H]  ----------------------------<  448[H]  4.5   |  24  |  1.20  09-15    125[L]  |  86[L]  |  42[H]  ----------------------------<  489[HH]  4.9   |  24  |  1.24  09-14    131[L]  |  92[L]  |  26[H]  ----------------------------<  231[H]  4.1   |  27  |  0.99    Ca    8.9      17 Sep 2024 06:13  Ca    10.1      16 Sep 2024 06:11  Phos  2.7     09-17  Phos  3.6     09-16  Mg     2.10     09-17  Mg     2.30     09-16      CAPILLARY BLOOD GLUCOSE  238 (16 Sep 2024 17:23)      POCT Blood Glucose.: 283 mg/dL (17 Sep 2024 08:51)  POCT Blood Glucose.: 174 mg/dL (16 Sep 2024 23:11)  POCT Blood Glucose.: 238 mg/dL (16 Sep 2024 17:23)  POCT Blood Glucose.: 292 mg/dL (16 Sep 2024 15:31)  POCT Blood Glucose.: 414 mg/dL (16 Sep 2024 12:37)  POCT Blood Glucose.: 444 mg/dL (16 Sep 2024 12:36)          Urinalysis Basic - ( 17 Sep 2024 06:13 )    Color: x / Appearance: x / SG: x / pH: x  Gluc: 260 mg/dL / Ketone: x  / Bili: x / Urobili: x   Blood: x / Protein: x / Nitrite: x   Leuk Esterase: x / RBC: x / WBC x   Sq Epi: x / Non Sq Epi: x / Bacteria: x  rad< from: CT Chest w/ IV Cont (09.13.24 @ 09:37) >  ABDOMINAL WALL: Post surgicalchanges in the left groin.  BONES: Degenerative changes and osteopenia.    IMPRESSION:  Likely multifocal pneumonia. New lymphadenopathy can be reactive. A   short-term follow-up CT chest in 6-8 weeks is advised.  Previously FDG avid left lower lobe pulmonary nodule is minimally   increased.  No acute abnormality in the abdomen and pelvis.    --- End of Report ---            GIOVANNY KOTHARI MD; Attending Radiologist  This document has been electronically signed. Sep 13 2024  1:19PM    < end of copied text >            RECENT CULTURES:        RESPIRATORY CULTURES:          Studies  Chest X-RAY  CT SCAN Chest   Venous Dopplers: LE:   CT Abdomen  Others

## 2024-09-17 NOTE — PROGRESS NOTE ADULT - ASSESSMENT
89 y/o F PMhx COPD on 2L home O2 PRN, CVA (remote, no deficits), CAD s/p stent, DM II, PVD s/p fem-pop bypass ('15) w/occlusion ('22), HTN who presented w/ dysuria and rigors  planned for LVATS, Lung Resection 9/12    PNA, UTI, leukocytosis, acute on chronic hypoxic resp failure  urine cx w/ citrobacter freundii and E faecium, sensitivities reviewed  s/p nitrofurantoin/ vanc x 3 days  blood cultures- NGTD  CT c/f multifocal pneumonia    penicillin allergy  reaction is rash  tolerated ceftriaxone and cefepime without issues    Recommendations  s/p cefepime x 5 days, completed 9/16  continue off antibiotics  discharge planning    Aldo Verduzco M.D.  [facility], Division of Infectious Diseases  [phone]  After 5pm on weekdays and all day on weekends - please call [phone]

## 2024-09-17 NOTE — DISCHARGE NOTE PROVIDER - NSDCCPCAREPLAN_GEN_ALL_CORE_FT
PRINCIPAL DISCHARGE DIAGNOSIS  Diagnosis: Acute UTI  Assessment and Plan of Treatment: You were diagnosed with a urinary tract infection and completed antibiotics while inpatient.      SECONDARY DISCHARGE DIAGNOSES  Diagnosis: PNA (pneumonia)  Assessment and Plan of Treatment: You were also diagnosed with pneumonia and were treated with intravenous antibiotics.    Diagnosis: Steroid-induced hyperglycemia  Assessment and Plan of Treatment: Your sugars were elevated likely secondary to prednisone use for your COPD/PNA. Please continue your diabetic medications as ordered   Monitor finger sticks pre-meal and bedtime, low salt, fat and carbohydrate diet, minimize glucose intake.  Follow up with primary care physician and endocrinologist for routine Hemoglobin A1C checks and management.  Follow up with your ophthalmologist for routine yearly vision exams.      Diagnosis: Lung cancer  Assessment and Plan of Treatment: Pleleonidasse follow up with Dr. Lazar for VATs procedure    Diagnosis: Chronic obstructive pulmonary disease (COPD)  Assessment and Plan of Treatment: continue current medications as prescribed.

## 2024-09-17 NOTE — PROGRESS NOTE ADULT - ASSESSMENT
89 y/o F w/COPD, former smoker, as needed O2, CVA, CAD status post stent, DM, HTN, HLD, lung cancer scheduled for VATS on Thursday presenting w CHF and possible UTI    #HFpEF  -recent TTE noted  -normal LVEF and mild AS  -cont to hold lasix    #CAD s/p PCI  -stable  -cont asa, statin, BB    #Lung CA  -preop for LLL VATS w thoracic when resp status improves as outpt       #Hypoxia  -multifactorial  -pulm eval noted  -CT chest noted, Likely multifocal pneumonia. New lymphadenopathy can be reactive. increased in pulm nodule   -sp iv abx   -po prednisone per pulm /med

## 2024-09-17 NOTE — DISCHARGE NOTE NURSING/CASE MANAGEMENT/SOCIAL WORK - PATIENT PORTAL LINK FT
You can access the FollowMyHealth Patient Portal offered by [facility] by registering at the following website: [email or web address]. By joining [employer]’s FollowMyHealth portal, you will also be able to view your health information using other applications (apps) compatible with our system.

## 2024-09-17 NOTE — DISCHARGE NOTE PROVIDER - HOSPITAL COURSE
89 -year-old female with COPD on 2L home O2 PRN, CVA (remote, no deficits), CAD s/p stent ('15), IDDM2, PVD s/p fem-pop bypass ('15) w/occlusion ('22), HLD, HTN, anemia, presenting with (+)UA noted during presurgical testing.      Sepsis due to urinary tract infection based on WBC>12, HR>90 and G(+)PNA b/l lower lobes and RML  - lactate 2.7->1.2  - ceftriaxone 9/10/24 --> cefepime 9/12/24 --> 9/16/24  - macrobid 9/11/24 --> vancomycin x 1 dose 9/12/24 + 9/13/24   - urine cx 9/8/24 --> 10K-49K Citrobacter freundii + 50K-99K Enterococcus faecium  - blood cxs 9/9/24 --> negative  - MRSA PCR 9/10/24 --> negative  - CT chest w/o contrast, abd/pelvis w/contrast 9/13/24 --> PNA b/l lower lobes and RML  - incentive spirometer  - nebs prn  - antitussives prn  - daily CBC  - sepsis resolved  - appreciate ID    Acute-on-chronic HFpEF  - recent outpt TTE pasted into [facility]  - daily weights  - initially started on lasix 20 mg IV bid, lasix held 9/15/24 2' rising BUN      Type 2 diabetes mellitus, with steroid-induced hyperglycemia  - c/w basal insulin, premeal insulin   - monitor FS QAC/HS w/moderate severity ISS  - recent A1c 6.6%  - hold home metformin  - f/u endocrinology recs regarding discharge medication regimen  - appreciate endocrinology     Essential hypertension  - c/w metoprolol tartrate  - c/w furosemide PO, reportedly supposed to be taking furosemide 20 mg bid however takes 0-2x daily, reports her cardiologist wants her taking bid  - appreciate cardiology    Chronic obstructive pulmonary disease with acute lower respiratory tract infection  - c/w LABA/ICS  - O2 PRN  - steroid taper as per pulmonology  - incentive spirometry  - appreciate pulmonology    Hyperlipidemia, unspecified  - c/w statin    CAD of native arteries without angina pectoris   - c/w ASA, statin, metoprolol    LLL pulmonary nodule  - CTS to reschedule VATS as outpt    Need for prophylactic measure  - SC heparin 5000 units q8h    Disposition  - PT initially recommended home PT      Patient medically stable for discharge home on 9/17 d/w Dr. Gilbert and other consulting services. 89 -year-old female with COPD on 2L home O2 PRN, CVA (remote, no deficits), CAD s/p stent ('15), IDDM2, PVD s/p fem-pop bypass ('15) w/occlusion ('22), HLD, HTN, anemia, presenting with (+)UA noted during presurgical testing.      Sepsis due to urinary tract infection based on WBC>12, HR>90 and G(+)PNA b/l lower lobes and RML  - lactate 2.7->1.2  - ceftriaxone 9/10/24 --> cefepime 9/12/24 --> 9/16/24  - macrobid 9/11/24 --> vancomycin x 1 dose 9/12/24 + 9/13/24   - urine cx 9/8/24 --> 10K-49K Citrobacter freundii + 50K-99K Enterococcus faecium  - blood cxs 9/9/24 --> negative  - MRSA PCR 9/10/24 --> negative  - CT chest w/o contrast, abd/pelvis w/contrast 9/13/24 --> PNA b/l lower lobes and RML  - incentive spirometer  - nebs prn  - antitussives prn  - daily CBC  - sepsis resolved  - appreciate ID    Acute-on-chronic HFpEF  - recent outpt TTE pasted into [facility]  - daily weights  - initially started on lasix 20 mg IV bid, lasix held 9/15/24 2' rising BUN  - per Cardiology continue to hold lasix for discharge       Type 2 diabetes mellitus, with steroid-induced hyperglycemia  - c/w basal insulin, premeal insulin   - monitor FS QAC/HS w/moderate severity ISS  - recent A1c 6.6%  -per Endocrine rec Please take tresiba 13 units subcutaneous at bedtime tonight 9/17  Then take tresiba 10 units subcutaneous at bedtime starting on 9/18 then continue Metformin 1000mb qd    Essential hypertension  - c/w metoprolol tartrate  - c/w furosemide PO, reportedly supposed to be taking furosemide 20 mg bid however takes 0-2x daily, reports her cardiologist wants her taking bid  - appreciate cardiology    Chronic obstructive pulmonary disease with acute lower respiratory tract infection  - c/w LABA/ICS  - O2 PRN  - steroid taper as per pulmonology  - incentive spirometry  - appreciate pulmonology    Hyperlipidemia, unspecified  - c/w statin    CAD of native arteries without angina pectoris   - c/w ASA, statin, metoprolol    LLL pulmonary nodule  - CTS to reschedule VATS as outpt    Need for prophylactic measure  - SC heparin 5000 units q8h    Disposition  - PT initially recommended home PT      Patient medically stable for discharge home on 9/17 d/w Dr. Gilbert and other consulting services.

## 2024-09-17 NOTE — PROGRESS NOTE ADULT - SUBJECTIVE AND OBJECTIVE BOX
No acute shortness of breath  Minimal cough  Saturating high 90s on 2LNCO2  She says that she normally uses 2L nocturnally and prn daytime after exertion    Vital Signs Last 24 Hrs  T(C): 36.3 (17 Sep 2024 05:34), Max: 36.5 (16 Sep 2024 19:26)  T(F): 97.4 (17 Sep 2024 05:34), Max: 97.7 (16 Sep 2024 19:26)  HR: 65 (17 Sep 2024 05:34) (65 - 82)  BP: 110/58 (17 Sep 2024 05:34) (110/58 - 126/59)  BP(mean): --  RR: 18 (17 Sep 2024 05:34) (17 - 18)  SpO2: 96% (17 Sep 2024 05:34) (96% - 98%)    I&O's Summary    09-16-24 @ 07:01  -  09-17-24 @ 07:00  --------------------------------------------------------  IN: 0 mL / OUT: 1100 mL / NET: -1100 mL        Gen: NAD  Head: NCAT, EOMI  Neck: supple without JVD  Lungs: decreased BS @ bases, no wheezes or rales  Heart: RRR, nl S1/S2, no murmurs  Abd: soft, NTND, NABS, no HSM  Neuro: A+O x 3, speech and comprehension are normal  Exts: no LE edema b/l  Psych: nl affect + mood      LABS:                        8.8    7.55  )-----------( 403      ( 17 Sep 2024 06:13 )             27.5     09-17    130[L]  |  95[L]  |  36[H]  ----------------------------<  260[H]  5.2   |  25  |  1.00    Ca    8.9      17 Sep 2024 06:13  Phos  2.7     09-17  Mg     2.10     09-17        CAPILLARY BLOOD GLUCOSE  238 (16 Sep 2024 17:23)      POCT Blood Glucose.: 283 mg/dL (17 Sep 2024 08:51)  POCT Blood Glucose.: 174 mg/dL (16 Sep 2024 23:11)  POCT Blood Glucose.: 238 mg/dL (16 Sep 2024 17:23)  POCT Blood Glucose.: 292 mg/dL (16 Sep 2024 15:31)  POCT Blood Glucose.: 414 mg/dL (16 Sep 2024 12:37)  POCT Blood Glucose.: 444 mg/dL (16 Sep 2024 12:36)        Urinalysis Basic - ( 17 Sep 2024 06:13 )    Color: x / Appearance: x / SG: x / pH: x  Gluc: 260 mg/dL / Ketone: x  / Bili: x / Urobili: x   Blood: x / Protein: x / Nitrite: x   Leuk Esterase: x / RBC: x / WBC x   Sq Epi: x / Non Sq Epi: x / Bacteria: x        RADIOLOGY & ADDITIONAL TESTS:    Imaging Personally Reviewed:  [x] YES  [ ] NO    Case discussed with NPP:  [X] YES  [ ] NO

## 2024-09-23 ENCOUNTER — TRANSCRIPTION ENCOUNTER (OUTPATIENT)
Age: 89
End: 2024-09-23

## 2024-10-02 ENCOUNTER — APPOINTMENT (OUTPATIENT)
Dept: THORACIC SURGERY | Facility: CLINIC | Age: 89
End: 2024-10-02
Payer: MEDICARE

## 2024-10-02 VITALS
BODY MASS INDEX: 23.99 KG/M2 | HEART RATE: 92 BPM | OXYGEN SATURATION: 91 % | DIASTOLIC BLOOD PRESSURE: 63 MMHG | HEIGHT: 59 IN | SYSTOLIC BLOOD PRESSURE: 111 MMHG | WEIGHT: 119 LBS

## 2024-10-02 DIAGNOSIS — R91.1 SOLITARY PULMONARY NODULE: ICD-10-CM

## 2024-10-02 PROCEDURE — G2211 COMPLEX E/M VISIT ADD ON: CPT | Mod: PD

## 2024-10-02 PROCEDURE — 99213 OFFICE O/P EST LOW 20 MIN: CPT

## 2024-10-04 ENCOUNTER — INPATIENT (INPATIENT)
Facility: HOSPITAL | Age: 89
LOS: 4 days | Discharge: HOME | End: 2024-10-09
Attending: HOSPITALIST | Admitting: HOSPITALIST
Payer: MEDICARE

## 2024-10-04 VITALS
TEMPERATURE: 98 F | OXYGEN SATURATION: 98 % | WEIGHT: 117.95 LBS | HEIGHT: 59 IN | DIASTOLIC BLOOD PRESSURE: 73 MMHG | HEART RATE: 90 BPM | RESPIRATION RATE: 18 BRPM | SYSTOLIC BLOOD PRESSURE: 131 MMHG

## 2024-10-04 DIAGNOSIS — I25.10 ATHEROSCLEROTIC HEART DISEASE OF NATIVE CORONARY ARTERY WITHOUT ANGINA PECTORIS: Chronic | ICD-10-CM

## 2024-10-04 DIAGNOSIS — Z95.828 PRESENCE OF OTHER VASCULAR IMPLANTS AND GRAFTS: Chronic | ICD-10-CM

## 2024-10-04 LAB
ALBUMIN SERPL ELPH-MCNC: 3.7 G/DL — SIGNIFICANT CHANGE UP (ref 3.3–5)
ALP SERPL-CCNC: 102 U/L — SIGNIFICANT CHANGE UP (ref 40–120)
ALT FLD-CCNC: 12 U/L — SIGNIFICANT CHANGE UP (ref 4–33)
ANION GAP SERPL CALC-SCNC: 12 MMOL/L — SIGNIFICANT CHANGE UP (ref 7–14)
APPEARANCE UR: ABNORMAL
AST SERPL-CCNC: 30 U/L — SIGNIFICANT CHANGE UP (ref 4–32)
BACTERIA # UR AUTO: NEGATIVE /HPF — SIGNIFICANT CHANGE UP
BASOPHILS # BLD AUTO: 0.07 K/UL — SIGNIFICANT CHANGE UP (ref 0–0.2)
BASOPHILS NFR BLD AUTO: 1 % — SIGNIFICANT CHANGE UP (ref 0–2)
BILIRUB SERPL-MCNC: 0.2 MG/DL — SIGNIFICANT CHANGE UP (ref 0.2–1.2)
BILIRUB UR-MCNC: NEGATIVE — SIGNIFICANT CHANGE UP
BUN SERPL-MCNC: 18 MG/DL — SIGNIFICANT CHANGE UP (ref 7–23)
CALCIUM SERPL-MCNC: 9.4 MG/DL — SIGNIFICANT CHANGE UP (ref 8.4–10.5)
CAST: 1 /LPF — SIGNIFICANT CHANGE UP (ref 0–4)
CHLORIDE SERPL-SCNC: 97 MMOL/L — LOW (ref 98–107)
CO2 SERPL-SCNC: 23 MMOL/L — SIGNIFICANT CHANGE UP (ref 22–31)
COLOR SPEC: YELLOW — SIGNIFICANT CHANGE UP
CREAT SERPL-MCNC: 0.77 MG/DL — SIGNIFICANT CHANGE UP (ref 0.5–1.3)
DIFF PNL FLD: ABNORMAL
EGFR: 73 ML/MIN/1.73M2 — SIGNIFICANT CHANGE UP
EOSINOPHIL # BLD AUTO: 0.33 K/UL — SIGNIFICANT CHANGE UP (ref 0–0.5)
EOSINOPHIL NFR BLD AUTO: 4.6 % — SIGNIFICANT CHANGE UP (ref 0–6)
FLUAV AG NPH QL: SIGNIFICANT CHANGE UP
FLUBV AG NPH QL: SIGNIFICANT CHANGE UP
GLUCOSE SERPL-MCNC: 160 MG/DL — HIGH (ref 70–99)
GLUCOSE UR QL: NEGATIVE MG/DL — SIGNIFICANT CHANGE UP
HCT VFR BLD CALC: 30.2 % — LOW (ref 34.5–45)
HGB BLD-MCNC: 9.6 G/DL — LOW (ref 11.5–15.5)
IANC: 4.5 K/UL — SIGNIFICANT CHANGE UP (ref 1.8–7.4)
IMM GRANULOCYTES NFR BLD AUTO: 0.4 % — SIGNIFICANT CHANGE UP (ref 0–0.9)
KETONES UR-MCNC: NEGATIVE MG/DL — SIGNIFICANT CHANGE UP
LEUKOCYTE ESTERASE UR-ACNC: ABNORMAL
LYMPHOCYTES # BLD AUTO: 1.51 K/UL — SIGNIFICANT CHANGE UP (ref 1–3.3)
LYMPHOCYTES # BLD AUTO: 21.2 % — SIGNIFICANT CHANGE UP (ref 13–44)
MCHC RBC-ENTMCNC: 28.2 PG — SIGNIFICANT CHANGE UP (ref 27–34)
MCHC RBC-ENTMCNC: 31.8 GM/DL — LOW (ref 32–36)
MCV RBC AUTO: 88.6 FL — SIGNIFICANT CHANGE UP (ref 80–100)
MONOCYTES # BLD AUTO: 0.69 K/UL — SIGNIFICANT CHANGE UP (ref 0–0.9)
MONOCYTES NFR BLD AUTO: 9.7 % — SIGNIFICANT CHANGE UP (ref 2–14)
NEUTROPHILS # BLD AUTO: 4.5 K/UL — SIGNIFICANT CHANGE UP (ref 1.8–7.4)
NEUTROPHILS NFR BLD AUTO: 63.1 % — SIGNIFICANT CHANGE UP (ref 43–77)
NITRITE UR-MCNC: NEGATIVE — SIGNIFICANT CHANGE UP
NRBC # BLD: 0 /100 WBCS — SIGNIFICANT CHANGE UP (ref 0–0)
NRBC # FLD: 0 K/UL — SIGNIFICANT CHANGE UP (ref 0–0)
PH UR: 8 — SIGNIFICANT CHANGE UP (ref 5–8)
PLATELET # BLD AUTO: 310 K/UL — SIGNIFICANT CHANGE UP (ref 150–400)
POTASSIUM SERPL-MCNC: 5.5 MMOL/L — HIGH (ref 3.5–5.3)
POTASSIUM SERPL-SCNC: 5.5 MMOL/L — HIGH (ref 3.5–5.3)
PROT SERPL-MCNC: 7 G/DL — SIGNIFICANT CHANGE UP (ref 6–8.3)
PROT UR-MCNC: SIGNIFICANT CHANGE UP MG/DL
RBC # BLD: 3.41 M/UL — LOW (ref 3.8–5.2)
RBC # FLD: 15.8 % — HIGH (ref 10.3–14.5)
RBC CASTS # UR COMP ASSIST: 1 /HPF — SIGNIFICANT CHANGE UP (ref 0–4)
RSV RNA NPH QL NAA+NON-PROBE: SIGNIFICANT CHANGE UP
SARS-COV-2 RNA SPEC QL NAA+PROBE: SIGNIFICANT CHANGE UP
SODIUM SERPL-SCNC: 132 MMOL/L — LOW (ref 135–145)
SP GR SPEC: 1.01 — SIGNIFICANT CHANGE UP (ref 1–1.03)
SQUAMOUS # UR AUTO: 0 /HPF — SIGNIFICANT CHANGE UP (ref 0–5)
TROPONIN T, HIGH SENSITIVITY RESULT: 19 NG/L — SIGNIFICANT CHANGE UP
TROPONIN T, HIGH SENSITIVITY RESULT: 20 NG/L — SIGNIFICANT CHANGE UP
UROBILINOGEN FLD QL: 0.2 MG/DL — SIGNIFICANT CHANGE UP (ref 0.2–1)
WBC # BLD: 7.13 K/UL — SIGNIFICANT CHANGE UP (ref 3.8–10.5)
WBC # FLD AUTO: 7.13 K/UL — SIGNIFICANT CHANGE UP (ref 3.8–10.5)
WBC UR QL: 471 /HPF — HIGH (ref 0–5)

## 2024-10-04 PROCEDURE — 99285 EMERGENCY DEPT VISIT HI MDM: CPT

## 2024-10-04 PROCEDURE — 71046 X-RAY EXAM CHEST 2 VIEWS: CPT | Mod: 26

## 2024-10-04 RX ORDER — CEFTRIAXONE SODIUM 1 G
1000 VIAL (EA) INJECTION ONCE
Refills: 0 | Status: COMPLETED | OUTPATIENT
Start: 2024-10-04 | End: 2024-10-04

## 2024-10-04 RX ORDER — INSULIN GLARGINE 300 U/ML
10 INJECTION, SOLUTION SUBCUTANEOUS AT BEDTIME
Refills: 0 | Status: DISCONTINUED | OUTPATIENT
Start: 2024-10-04 | End: 2024-10-09

## 2024-10-04 NOTE — ED ADULT NURSE NOTE - OBJECTIVE STATEMENT
A&OX4, awake, and alert, ambulatory with cane. h/o DM2, CAD, COPD. Patient is coming to ED in regards to left sided CP. As per patient she was seeing her PCP in regards to UTI and told her MD about her symptoms and was urged to come to ED for further eval. Patient endorses left sided CP and SOB with ambulation, non radiating, improves with rest. 22G IV placed to L hand, NSR, RA, will continue to monitor.

## 2024-10-04 NOTE — ED ADULT TRIAGE NOTE - CHIEF COMPLAINT QUOTE
c/o left sided chest pain onset this morning, sent in by PMD for further eval, Phx of DM type 2, CAD, COPD,

## 2024-10-04 NOTE — ED ADULT TRIAGE NOTE - MODE OF ARRIVAL
EMILIANO Plan:                 *Home w/family              * to transport at d/c   *IM letter signed & placed on chart    Patient is discharging home today without any needs or concerns. Follow-up appointment is on the AVS.  Patient is ready for d/c from CM standpoint. Care Management Interventions  PCP Verified by CM: Yes(Alina Hedirck MD)  Last Visit to PCP: 11/09/20  Mode of Transport at Discharge:  Other (see comment)()  Transition of Care Consult (CM Consult): Discharge Planning  Discharge Durable Medical Equipment: No(no DME use)  Physical Therapy Consult: No  Occupational Therapy Consult: No  Speech Therapy Consult: No  Current Support Network: Lives with Spouse, Family Lives Nearby(Lives in a one story home with 3 ANDRE)  Confirm Follow Up Transport: Self  Discharge Location  Discharge Placement: Home with family assistance      Mauro Rae  Ext [phone] Walk in

## 2024-10-04 NOTE — ED ADULT NURSE REASSESSMENT NOTE - NSFALLHARMRISKINTERV_ED_ALL_ED

## 2024-10-04 NOTE — ED ADULT NURSE NOTE - NSFALLUNIVINTERV_ED_ALL_ED
Bed/Stretcher in lowest position, wheels locked, appropriate side rails in place/Call bell, personal items and telephone in reach/Instruct patient to call for assistance before getting out of bed/chair/stretcher/Non-slip footwear applied when patient is off stretcher/[place] to call system/Physically safe environment - no spills, clutter or unnecessary equipment/Purposeful proactive rounding/Room/bathroom lighting operational, light cord in reach

## 2024-10-05 DIAGNOSIS — I25.10 ATHEROSCLEROTIC HEART DISEASE OF NATIVE CORONARY ARTERY WITHOUT ANGINA PECTORIS: ICD-10-CM

## 2024-10-05 DIAGNOSIS — R91.8 OTHER NONSPECIFIC ABNORMAL FINDING OF LUNG FIELD: ICD-10-CM

## 2024-10-05 DIAGNOSIS — E11.9 TYPE 2 DIABETES MELLITUS WITHOUT COMPLICATIONS: ICD-10-CM

## 2024-10-05 DIAGNOSIS — I73.9 PERIPHERAL VASCULAR DISEASE, UNSPECIFIED: ICD-10-CM

## 2024-10-05 DIAGNOSIS — I50.32 CHRONIC DIASTOLIC (CONGESTIVE) HEART FAILURE: ICD-10-CM

## 2024-10-05 DIAGNOSIS — R82.90 UNSPECIFIED ABNORMAL FINDINGS IN URINE: ICD-10-CM

## 2024-10-05 DIAGNOSIS — R63.8 OTHER SYMPTOMS AND SIGNS CONCERNING FOOD AND FLUID INTAKE: ICD-10-CM

## 2024-10-05 DIAGNOSIS — N39.0 URINARY TRACT INFECTION, SITE NOT SPECIFIED: ICD-10-CM

## 2024-10-05 DIAGNOSIS — J44.9 CHRONIC OBSTRUCTIVE PULMONARY DISEASE, UNSPECIFIED: ICD-10-CM

## 2024-10-05 DIAGNOSIS — R07.9 CHEST PAIN, UNSPECIFIED: ICD-10-CM

## 2024-10-05 LAB
ADD ON TEST-SPECIMEN IN LAB: SIGNIFICANT CHANGE UP
ADD ON TEST-SPECIMEN IN LAB: SIGNIFICANT CHANGE UP
ALBUMIN SERPL ELPH-MCNC: 3.5 G/DL — SIGNIFICANT CHANGE UP (ref 3.3–5)
ALP SERPL-CCNC: 91 U/L — SIGNIFICANT CHANGE UP (ref 40–120)
ALT FLD-CCNC: 7 U/L — SIGNIFICANT CHANGE UP (ref 4–33)
ANION GAP SERPL CALC-SCNC: 12 MMOL/L — SIGNIFICANT CHANGE UP (ref 7–14)
APTT BLD: 28.7 SEC — SIGNIFICANT CHANGE UP (ref 24.5–35.6)
AST SERPL-CCNC: 16 U/L — SIGNIFICANT CHANGE UP (ref 4–32)
BASOPHILS # BLD AUTO: 0.05 K/UL — SIGNIFICANT CHANGE UP (ref 0–0.2)
BASOPHILS NFR BLD AUTO: 0.9 % — SIGNIFICANT CHANGE UP (ref 0–2)
BILIRUB SERPL-MCNC: <0.2 MG/DL — SIGNIFICANT CHANGE UP (ref 0.2–1.2)
BLD GP AB SCN SERPL QL: NEGATIVE — SIGNIFICANT CHANGE UP
BLOOD GAS VENOUS COMPREHENSIVE RESULT: SIGNIFICANT CHANGE UP
BUN SERPL-MCNC: 16 MG/DL — SIGNIFICANT CHANGE UP (ref 7–23)
CALCIUM SERPL-MCNC: 9.1 MG/DL — SIGNIFICANT CHANGE UP (ref 8.4–10.5)
CHLORIDE SERPL-SCNC: 101 MMOL/L — SIGNIFICANT CHANGE UP (ref 98–107)
CO2 SERPL-SCNC: 25 MMOL/L — SIGNIFICANT CHANGE UP (ref 22–31)
CREAT SERPL-MCNC: 0.85 MG/DL — SIGNIFICANT CHANGE UP (ref 0.5–1.3)
EGFR: 65 ML/MIN/1.73M2 — SIGNIFICANT CHANGE UP
EOSINOPHIL # BLD AUTO: 0.33 K/UL — SIGNIFICANT CHANGE UP (ref 0–0.5)
EOSINOPHIL NFR BLD AUTO: 5.9 % — SIGNIFICANT CHANGE UP (ref 0–6)
GLUCOSE SERPL-MCNC: 163 MG/DL — HIGH (ref 70–99)
HCT VFR BLD CALC: 29.2 % — LOW (ref 34.5–45)
HGB BLD-MCNC: 9 G/DL — LOW (ref 11.5–15.5)
IANC: 3.16 K/UL — SIGNIFICANT CHANGE UP (ref 1.8–7.4)
IMM GRANULOCYTES NFR BLD AUTO: 0.4 % — SIGNIFICANT CHANGE UP (ref 0–0.9)
INR BLD: 0.92 RATIO — SIGNIFICANT CHANGE UP (ref 0.85–1.16)
LACTATE SERPL-SCNC: 1.4 MMOL/L — SIGNIFICANT CHANGE UP (ref 0.5–2)
LYMPHOCYTES # BLD AUTO: 1.44 K/UL — SIGNIFICANT CHANGE UP (ref 1–3.3)
LYMPHOCYTES # BLD AUTO: 25.9 % — SIGNIFICANT CHANGE UP (ref 13–44)
MAGNESIUM SERPL-MCNC: 2.1 MG/DL — SIGNIFICANT CHANGE UP (ref 1.6–2.6)
MCHC RBC-ENTMCNC: 27.9 PG — SIGNIFICANT CHANGE UP (ref 27–34)
MCHC RBC-ENTMCNC: 30.8 GM/DL — LOW (ref 32–36)
MCV RBC AUTO: 90.4 FL — SIGNIFICANT CHANGE UP (ref 80–100)
MONOCYTES # BLD AUTO: 0.55 K/UL — SIGNIFICANT CHANGE UP (ref 0–0.9)
MONOCYTES NFR BLD AUTO: 9.9 % — SIGNIFICANT CHANGE UP (ref 2–14)
NEUTROPHILS # BLD AUTO: 3.16 K/UL — SIGNIFICANT CHANGE UP (ref 1.8–7.4)
NEUTROPHILS NFR BLD AUTO: 57 % — SIGNIFICANT CHANGE UP (ref 43–77)
NRBC # BLD: 0 /100 WBCS — SIGNIFICANT CHANGE UP (ref 0–0)
NRBC # FLD: 0 K/UL — SIGNIFICANT CHANGE UP (ref 0–0)
PHOSPHATE SERPL-MCNC: 2.9 MG/DL — SIGNIFICANT CHANGE UP (ref 2.5–4.5)
PLATELET # BLD AUTO: 289 K/UL — SIGNIFICANT CHANGE UP (ref 150–400)
POTASSIUM SERPL-MCNC: 4.4 MMOL/L — SIGNIFICANT CHANGE UP (ref 3.5–5.3)
POTASSIUM SERPL-SCNC: 4.4 MMOL/L — SIGNIFICANT CHANGE UP (ref 3.5–5.3)
PROT SERPL-MCNC: 6.2 G/DL — SIGNIFICANT CHANGE UP (ref 6–8.3)
PROTHROM AB SERPL-ACNC: 11 SEC — SIGNIFICANT CHANGE UP (ref 9.9–13.4)
RBC # BLD: 3.23 M/UL — LOW (ref 3.8–5.2)
RBC # FLD: 15.8 % — HIGH (ref 10.3–14.5)
RH IG SCN BLD-IMP: POSITIVE — SIGNIFICANT CHANGE UP
SODIUM SERPL-SCNC: 138 MMOL/L — SIGNIFICANT CHANGE UP (ref 135–145)
WBC # BLD: 5.55 K/UL — SIGNIFICANT CHANGE UP (ref 3.8–10.5)
WBC # FLD AUTO: 5.55 K/UL — SIGNIFICANT CHANGE UP (ref 3.8–10.5)

## 2024-10-05 PROCEDURE — 99223 1ST HOSP IP/OBS HIGH 75: CPT

## 2024-10-05 RX ORDER — GABAPENTIN 800 MG/1
400 TABLET, FILM COATED ORAL AT BEDTIME
Refills: 0 | Status: DISCONTINUED | OUTPATIENT
Start: 2024-10-05 | End: 2024-10-09

## 2024-10-05 RX ORDER — ALCOHOL ANTISEPTIC PADS
25 PADS, MEDICATED (EA) TOPICAL ONCE
Refills: 0 | Status: DISCONTINUED | OUTPATIENT
Start: 2024-10-05 | End: 2024-10-09

## 2024-10-05 RX ORDER — METOPROLOL TARTRATE 50 MG
25 TABLET ORAL EVERY 12 HOURS
Refills: 0 | Status: DISCONTINUED | OUTPATIENT
Start: 2024-10-05 | End: 2024-10-09

## 2024-10-05 RX ORDER — SODIUM CHLORIDE IRRIG SOLUTION 0.9 %
1000 SOLUTION, IRRIGATION IRRIGATION
Refills: 0 | Status: DISCONTINUED | OUTPATIENT
Start: 2024-10-05 | End: 2024-10-09

## 2024-10-05 RX ORDER — ASPIRIN 325 MG
81 TABLET ORAL DAILY
Refills: 0 | Status: DISCONTINUED | OUTPATIENT
Start: 2024-10-05 | End: 2024-10-09

## 2024-10-05 RX ORDER — FERROUS SULFATE 325(65) MG
325 TABLET ORAL DAILY
Refills: 0 | Status: DISCONTINUED | OUTPATIENT
Start: 2024-10-05 | End: 2024-10-09

## 2024-10-05 RX ORDER — GABAPENTIN 800 MG/1
400 TABLET, FILM COATED ORAL ONCE
Refills: 0 | Status: COMPLETED | OUTPATIENT
Start: 2024-10-05 | End: 2024-10-05

## 2024-10-05 RX ORDER — ALCOHOL ANTISEPTIC PADS
12.5 PADS, MEDICATED (EA) TOPICAL ONCE
Refills: 0 | Status: DISCONTINUED | OUTPATIENT
Start: 2024-10-05 | End: 2024-10-09

## 2024-10-05 RX ORDER — CYANOCOBALAMIN (VITAMIN B-12) 1000MCG/ML
1000 VIAL (ML) INJECTION DAILY
Refills: 0 | Status: DISCONTINUED | OUTPATIENT
Start: 2024-10-05 | End: 2024-10-09

## 2024-10-05 RX ORDER — CEFEPIME 2 G/1
1000 INJECTION, POWDER, FOR SOLUTION INTRAVENOUS ONCE
Refills: 0 | Status: COMPLETED | OUTPATIENT
Start: 2024-10-05 | End: 2024-10-05

## 2024-10-05 RX ORDER — INSULIN LISPRO 100/ML
VIAL (ML) SUBCUTANEOUS AT BEDTIME
Refills: 0 | Status: DISCONTINUED | OUTPATIENT
Start: 2024-10-05 | End: 2024-10-09

## 2024-10-05 RX ORDER — ALCOHOL ANTISEPTIC PADS
15 PADS, MEDICATED (EA) TOPICAL ONCE
Refills: 0 | Status: DISCONTINUED | OUTPATIENT
Start: 2024-10-05 | End: 2024-10-09

## 2024-10-05 RX ORDER — FUROSEMIDE 10 MG/ML
20 INJECTION INTRAVENOUS EVERY 24 HOURS
Refills: 0 | Status: DISCONTINUED | OUTPATIENT
Start: 2024-10-05 | End: 2024-10-09

## 2024-10-05 RX ORDER — PANTOPRAZOLE SODIUM 40 MG/1
40 TABLET, DELAYED RELEASE ORAL DAILY
Refills: 0 | Status: DISCONTINUED | OUTPATIENT
Start: 2024-10-05 | End: 2024-10-09

## 2024-10-05 RX ORDER — ALBUTEROL 90 MCG
2 AEROSOL (GRAM) INHALATION EVERY 6 HOURS
Refills: 0 | Status: DISCONTINUED | OUTPATIENT
Start: 2024-10-05 | End: 2024-10-09

## 2024-10-05 RX ORDER — ATORVASTATIN CALCIUM 10 MG/1
20 TABLET, FILM COATED ORAL AT BEDTIME
Refills: 0 | Status: DISCONTINUED | OUTPATIENT
Start: 2024-10-05 | End: 2024-10-09

## 2024-10-05 RX ORDER — POLYVINYL ALCOHOL 1 %
1 DROPS OPHTHALMIC (EYE) EVERY 12 HOURS
Refills: 0 | Status: DISCONTINUED | OUTPATIENT
Start: 2024-10-05 | End: 2024-10-09

## 2024-10-05 RX ORDER — METOPROLOL TARTRATE 50 MG
50 TABLET ORAL EVERY 12 HOURS
Refills: 0 | Status: DISCONTINUED | OUTPATIENT
Start: 2024-10-05 | End: 2024-10-05

## 2024-10-05 RX ORDER — VANCOMYCIN HCL-SODIUM CHLORIDE IV SOLN 1.5 GM/250ML-0.9% 1.5-0.9/25 GM/ML-%
750 SOLUTION INTRAVENOUS EVERY 24 HOURS
Refills: 0 | Status: DISCONTINUED | OUTPATIENT
Start: 2024-10-05 | End: 2024-10-07

## 2024-10-05 RX ORDER — INSULIN LISPRO 100/ML
VIAL (ML) SUBCUTANEOUS
Refills: 0 | Status: DISCONTINUED | OUTPATIENT
Start: 2024-10-05 | End: 2024-10-09

## 2024-10-05 RX ORDER — FLUTICASONE PROPION/SALMETEROL 100-50 MCG
1 BLISTER, WITH INHALATION DEVICE INHALATION
Refills: 0 | Status: DISCONTINUED | OUTPATIENT
Start: 2024-10-05 | End: 2024-10-09

## 2024-10-05 RX ORDER — GLUCAGON INJECTION, SOLUTION 0.5 MG/.1ML
1 INJECTION, SOLUTION SUBCUTANEOUS ONCE
Refills: 0 | Status: DISCONTINUED | OUTPATIENT
Start: 2024-10-05 | End: 2024-10-09

## 2024-10-05 RX ORDER — ACETAMINOPHEN 325 MG
1000 TABLET ORAL ONCE
Refills: 0 | Status: COMPLETED | OUTPATIENT
Start: 2024-10-05 | End: 2024-10-05

## 2024-10-05 RX ADMIN — Medication 200 MILLIGRAM(S): at 17:31

## 2024-10-05 RX ADMIN — INSULIN GLARGINE 10 UNIT(S): 300 INJECTION, SOLUTION SUBCUTANEOUS at 21:53

## 2024-10-05 RX ADMIN — Medication 25 MILLIGRAM(S): at 06:54

## 2024-10-05 RX ADMIN — Medication 2: at 08:58

## 2024-10-05 RX ADMIN — Medication 25 MILLIGRAM(S): at 17:32

## 2024-10-05 RX ADMIN — FUROSEMIDE 20 MILLIGRAM(S): 10 INJECTION INTRAVENOUS at 06:54

## 2024-10-05 RX ADMIN — Medication 5000 UNIT(S): at 20:59

## 2024-10-05 RX ADMIN — Medication 200 MILLIGRAM(S): at 08:04

## 2024-10-05 RX ADMIN — Medication 4: at 17:33

## 2024-10-05 RX ADMIN — Medication 2: at 12:34

## 2024-10-05 RX ADMIN — Medication 81 MILLIGRAM(S): at 11:51

## 2024-10-05 RX ADMIN — Medication 400 MILLIGRAM(S): at 01:11

## 2024-10-05 RX ADMIN — CEFEPIME 100 MILLIGRAM(S): 2 INJECTION, POWDER, FOR SOLUTION INTRAVENOUS at 03:27

## 2024-10-05 RX ADMIN — Medication 325 MILLIGRAM(S): at 11:51

## 2024-10-05 RX ADMIN — Medication 100 MILLIGRAM(S): at 00:08

## 2024-10-05 RX ADMIN — PANTOPRAZOLE SODIUM 40 MILLIGRAM(S): 40 TABLET, DELAYED RELEASE ORAL at 11:51

## 2024-10-05 RX ADMIN — Medication 5000 UNIT(S): at 11:52

## 2024-10-05 RX ADMIN — INSULIN GLARGINE 10 UNIT(S): 300 INJECTION, SOLUTION SUBCUTANEOUS at 00:20

## 2024-10-05 RX ADMIN — ATORVASTATIN CALCIUM 20 MILLIGRAM(S): 10 TABLET, FILM COATED ORAL at 22:14

## 2024-10-05 RX ADMIN — GABAPENTIN 400 MILLIGRAM(S): 800 TABLET, FILM COATED ORAL at 01:11

## 2024-10-05 RX ADMIN — GABAPENTIN 400 MILLIGRAM(S): 800 TABLET, FILM COATED ORAL at 21:52

## 2024-10-05 RX ADMIN — VANCOMYCIN HCL-SODIUM CHLORIDE IV SOLN 1.5 GM/250ML-0.9% 250 MILLIGRAM(S): 1.5-0.9/25 SOLUTION at 08:58

## 2024-10-05 RX ADMIN — Medication 1000 MICROGRAM(S): at 11:51

## 2024-10-05 RX ADMIN — Medication 500 MILLIGRAM(S): at 11:52

## 2024-10-05 NOTE — PATIENT PROFILE ADULT - NSTRANSFERBELONGINGSRESP_GEN_A_NUR
PT IS A/OX4. PLEASANT AND COOPERATIVE. THE PT IS UP WITH MINIMAL ASSIST. PT
HAS A THORO-VENT INPLACE SET UP TO CONTINUOUS SUCTION IN PLACE AND SECURE. PT
APPEARS TO BE BREATHING EASILY ON RA AT THIS TIME. THE PT DENIES SOB AND
REPORTS MINIMAL PAIN AT THIS TIME.NO NEURO DEFICTS NOTICED THIS SHIFT. CALL
LIGHT IN REACH, WILL CONTINUE TO MONITOR AND ASSESS FOR CHANGES yes

## 2024-10-05 NOTE — SWALLOW BEDSIDE ASSESSMENT ADULT - COMMENTS
Pt. seated upright in bed in NAD upon SLP arrival. Pt. receiving supplemental O2 via NC and in NARD. Pt. AAOx3 and cooperative.     Per chart:   "89 -year-old female with COPD on 2L home O2 PRN, CVA (remote, no deficits), CAD s/p stent ('15), IDDM2, PVD s/p fem-pop bypass ('15) w/occlusion ('22), HLD, HTN, anemia, pulmonary fibrosis 2/2 emphysema, LLL nodule concern for malignancy on PET, dCHF, urinary incontinence here w/ dull cp on exertion."    -WBC WNL per lab review (10/5)  -CXR: "Consolidations in the lower lobes with blunting of the hemidiaphragms, likely represent moderate-sized pleural effusions with passive atelectasis. Chronic fibrotic lung changes."    Pt. denied any difficulty swallowing PTA or currently. At home, pt. tolerates a Regular diet with Thin liquids with no report of coughing, dysphagia or globus sensation. No history of dysphagia or aspiration PNA. RN denied difficulty and requested diet upgrade this morning from Minced and Moist to Regular solids.

## 2024-10-05 NOTE — CONSULT NOTE ADULT - ASSESSMENT
[facility]  Chart Reviewed  agree with current abx pending further micro  -Full Consult to follow for any immediate concerns please fell free to contact us directly at  [phone] and have us paged or text my cell # [phone]  Cody Smith MD PhD

## 2024-10-05 NOTE — SWALLOW BEDSIDE ASSESSMENT ADULT - SWALLOW EVAL: DIAGNOSIS
Pt. was given trials of Thin liquids, Puree and Regular solids. 1. Functional oral stage across trials marked by adequate bolus retrieval with complete oral containment. Prompt swallow initiation with efficient bolus manipulation/formation and mastication patterns noted. Timely posterior transfer with complete oral clearance after primary swallow. 2. Functional pharyngeal stage across trials marked by present swallow trigger judged via hyolaryngeal digital palpation. No overt s/s of airway compromise with any consistency provided. 1 swallow palpated per bolus trial.

## 2024-10-05 NOTE — PATIENT PROFILE ADULT - PATIENT'S SEXUAL ORIENTATION
[No Acute Distress] : no acute distress [Well Nourished] : well nourished [Well Developed] : well developed [Well-Appearing] : well-appearing [Normal Sclera/Conjunctiva] : normal sclera/conjunctiva [PERRL] : pupils equal round and reactive to light [EOMI] : extraocular movements intact [Normal Outer Ear/Nose] : the outer ears and nose were normal in appearance [Normal Oropharynx] : the oropharynx was normal [No JVD] : no jugular venous distention [No Lymphadenopathy] : no lymphadenopathy [Supple] : supple [Thyroid Normal, No Nodules] : the thyroid was normal and there were no nodules present [No Respiratory Distress] : no respiratory distress  [No Accessory Muscle Use] : no accessory muscle use [Clear to Auscultation] : lungs were clear to auscultation bilaterally [Normal Rate] : normal rate  [Regular Rhythm] : with a regular rhythm [Normal S1, S2] : normal S1 and S2 [No Murmur] : no murmur heard [No Carotid Bruits] : no carotid bruits [No Abdominal Bruit] : a ~M bruit was not heard ~T in the abdomen [No Varicosities] : no varicosities [Pedal Pulses Present] : the pedal pulses are present [No Edema] : there was no peripheral edema [No Palpable Aorta] : no palpable aorta [No Extremity Clubbing/Cyanosis] : no extremity clubbing/cyanosis [Normal Appearance] : normal in appearance [Soft] : abdomen soft [Non Tender] : non-tender [Non-distended] : non-distended [No Masses] : no abdominal mass palpated [No HSM] : no HSM [Normal Bowel Sounds] : normal bowel sounds [No Stool to Guaiac] : no stool to guaiac [Normal Posterior Cervical Nodes] : no posterior cervical lymphadenopathy [Normal Anterior Cervical Nodes] : no anterior cervical lymphadenopathy [No CVA Tenderness] : no CVA  tenderness [No Spinal Tenderness] : no spinal tenderness [No Joint Swelling] : no joint swelling [Grossly Normal Strength/Tone] : grossly normal strength/tone [No Rash] : no rash [Coordination Grossly Intact] : coordination grossly intact [No Focal Deficits] : no focal deficits [Normal Gait] : normal gait [Normal Affect] : the affect was normal [Normal Insight/Judgement] : insight and judgment were intact [Stool Occult Blood] : stool negative for occult blood Heterosexual

## 2024-10-05 NOTE — H&P ADULT - PROBLEM SELECTOR PLAN 3
-1 L fluid restriction  -TTE as above  -can diurese lasix 20 mg iv daily given worsening pleural effusions

## 2024-10-05 NOTE — H&P ADULT - HISTORY OF PRESENT ILLNESS
89 -year-old female with COPD on 2L home O2 PRN, CVA (remote, no deficits), CAD s/p stent ('15), IDDM2, PVD s/p fem-pop bypass ('15) w/occlusion ('22), HLD, HTN, anemia, pulmonary fibrosis 2/2 emphysema, LLL nodule concern for malignancy on PET, dCHF, urinary incontinence here w/ chest pain that is central, dull, and present w/ ambulation. ROS otherwise negative. Denies LE swelling, fevers, chills, nausea, vomiting, diarrhea, sob, headaches, visual changes, palpitations, melena, or hematochezia. Has plan for outpatient LVATS, was recently admitted for UTI w/ citrobacter freundii, and e. faecium.     troponin negative times 2, EKG nonischemic.    Vital Signs Last 24 Hrs  T(C): 36.7 (05 Oct 2024 03:34), Max: 37.2 (04 Oct 2024 18:06)  T(F): 98 (05 Oct 2024 03:34), Max: 99 (04 Oct 2024 18:06)  HR: 86 (05 Oct 2024 03:34) (72 - 92)  BP: 108/43 (05 Oct 2024 03:34) (108/43 - 135/54)  BP(mean): 104 (05 Oct 2024 00:25) (104 - 104)  RR: 17 (05 Oct 2024 03:34) (17 - 19)  SpO2: 98% (05 Oct 2024 03:34) (95% - 98%)    Parameters below as of 05 Oct 2024 03:34  Patient On (Oxygen Delivery Method): nasal cannula  O2 Flow (L/min): 2

## 2024-10-05 NOTE — ED PROVIDER NOTE - CLINICAL SUMMARY MEDICAL DECISION MAKING FREE TEXT BOX
89F h/o Anemia, CAD, HFpEF, prior CVA, type 2 DM, COPD, HTN, HLD, frequent UTIs sent by PMD after reporting chest pain. Given h/o recurrent UTIs can send repeat UA/UCx. Chest pain non-specific, though can check EKG and trop. Troponin during last admission 20. Obtain CXR to assess for PNA and PTX though these seem less likely given lack or respiratory symptoms. Dispo pending workup though anticipate potential dc if cardiac markers not remarkable.

## 2024-10-05 NOTE — ED PROVIDER NOTE - NS ED MD DISPO SPECIAL CONSIDERATION1
09/27/23 11:09 AM    Patient contacted (spoke to patient) to bring Advance Directive, POLST, or Living Will document to next scheduled pcp visit. Thank you.   Margot Parra PG VALUE BASED VIR None

## 2024-10-05 NOTE — SWALLOW BEDSIDE ASSESSMENT ADULT - ASR SWALLOW RECOMMEND DIAG
Instrumental study is not indicated given no overt s/s of aspiration, tolerance of trials at bedside, no report of dysphagia, WNL WBC and no acute concern for aspiration on chest imaging

## 2024-10-05 NOTE — SWALLOW BEDSIDE ASSESSMENT ADULT - ADDITIONAL RECOMMENDATIONS
1). This service to f/u as scheduling permits to assess diet tolerance  2). Medical team to re-consult if concern for diet tolerance and/or change in medical status occurs

## 2024-10-05 NOTE — CONSULT NOTE ADULT - TIME BILLING
agree with above  89 -year-old female with COPD on 2L home O2 PRN, CVA (remote, no deficits), CAD s/p stent ('15), IDDM2, PVD s/p fem-pop bypass ('15) w/occlusion ('22), HLD, HTN, anemia, pulmonary fibrosis 2/2 emphysema, LLL nodule concern for malignancy on PET, dCHF, urinary incontinence here w/ chest pain     #atypical chest pain   -resolved   -likely in the setting of pulm disease  -chest xray Consolidations in the lower lobes with blunting of the hemidiaphragms,  likely represent moderate-sized pleural effusions with passive  atelectasis.  -ecg with no ischemic changes  -hs trop negative   -c/w lasix 20 mg IVP daily   -recent TTE 4/22/24  with normal LVEF and mild AS    #CAD s/p PCI  -stable  -cont asa, statin, BB    #HFpEF  -recent TTE with normal LVEF and mild AS  -c/w lasix 20 mg IVP daily     #Lung CA  -preop for LLL VATS w thoracic  as outpt   -med fu / pulm eval     #UTI   -management per med

## 2024-10-05 NOTE — ED ADULT NURSE REASSESSMENT NOTE - NS ED NURSE REASSESS COMMENT FT1
Patient resting comfortably on stretcher. Denies any pain, not in acute distress. alert and oriented x 4. Vitals checked and documented. Patient on 2L o2 via nasal cannula. Blood cultures taken. IV abx given as ordered.
Pt received from PREVIOUS RN on stretcher w/ daughter, A/Ox4 answers all questions appropriately. Pt denies chest pain and SOB during reassessment, breathing is even and unlabored on room air. Abdomen is soft and non-distended. Pt ambulates assisted by her cane/walker at baseline, moves all four extremities on command, skin is intact. Pt resting comfortably at the bedside, No apparent acute visible distress noted on reassessment. Vital signs stable, NKA to medications. Pending UA/UC and repeat trop

## 2024-10-05 NOTE — H&P ADULT - NSHPLABSRESULTS_GEN_ALL_CORE
.  LABS:                         9.6    7.13  )-----------( 310      ( 04 Oct 2024 19:47 )             30.2     10    132[L]  |  97[L]  |  18  ----------------------------<  160[H]  5.5[H]   |  23  |  0.77    Ca    9.4      04 Oct 2024 19:47    TPro  7.0  /  Alb  3.7  /  TBili  0.2  /  DBili  x   /  AST  30  /  ALT  12  /  AlkPhos  102  10-04      Urinalysis Basic - ( 04 Oct 2024 21:30 )    Color: Yellow / Appearance: Cloudy / S.010 / pH: x  Gluc: x / Ketone: Negative mg/dL  / Bili: Negative / Urobili: 0.2 mg/dL   Blood: x / Protein: Trace mg/dL / Nitrite: Negative   Leuk Esterase: Large / RBC: 1 /HPF /  /HPF   Sq Epi: x / Non Sq Epi: 0 /HPF / Bacteria: Negative /HPF        < from: Xray Chest 2 Views PA/Lat (10.04.24 @ 21:01) >    IMPRESSION:  Consolidations in the lower lobes with blunting of the hemidiaphragms,   likely represent moderate-sized pleural effusions with passive   atelectasis.  Chronic fibrotic lung changes.    < end of copied text >

## 2024-10-05 NOTE — H&P ADULT - PROBLEM SELECTOR PLAN 8
F-1 L restrict  E-replete prn  N-soft diet prior mucoid impaction right sided, obtain formal S/S eval  heparin subQ -lantus 10 units at bedtime, sliding scale

## 2024-10-05 NOTE — ED PROVIDER NOTE - OBJECTIVE STATEMENT
89F h/o Anemia, CAD, HFpEF, prior CVA, type 2 DM, COPD, HTN, HLD, frequent UTIs sent by PMD after reporting chest pain. She had recently been admitted for UTI and had initially presented to PMD due to recurrent UTI symptoms for which abx sent to pharmacy. Pt reports mild mid-sternal chest pain without radiation and has since resolved. Denies sob, difficulty breathing, cough, fever/chills, abd pain, n/v/d. No known sick contacts or recent travels.

## 2024-10-05 NOTE — CONSULT NOTE ADULT - ASSESSMENT
89 -year-old female with COPD on 2L home O2 PRN, CVA (remote, no deficits), CAD s/p stent ('15), IDDM2, PVD s/p fem-pop bypass ('15) w/occlusion ('22), HLD, HTN, anemia, pulmonary fibrosis 2/2 emphysema, LLL nodule concern for malignancy on PET, dCHF, urinary incontinence here w/ chest pain    89 -year-old female with COPD on 2L home O2 PRN, CVA (remote, no deficits), CAD s/p stent ('15), IDDM2, PVD s/p fem-pop bypass ('15) w/occlusion ('22), HLD, HTN, anemia, pulmonary fibrosis 2/2 emphysema, LLL nodule concern for malignancy on PET, dCHF, urinary incontinence here w/ chest pain     #atypical chest pain   -resolved   -likely in the setting of pulm disease  -chest xray Consolidations in the lower lobes with blunting of the hemidiaphragms,  likely represent moderate-sized pleural effusions with passive  atelectasis.  -ecg with no ischemic changes  -hs trop negative   -c/w lasix 20 mg IVP daily   -recent TTE 4/22/24  with normal LVEF and mild AS    #CAD s/p PCI  -stable  -cont asa, statin, BB    #HFpEF  -recent TTE with normal LVEF and mild AS  -c/w lasix 20 mg IVP daily     #Lung CA  -preop for LLL VATS w thoracic  as outpt   -med fu / pulm eval     #UTI   -management per med

## 2024-10-05 NOTE — CONSULT NOTE ADULT - SUBJECTIVE AND OBJECTIVE BOX
CARDIOLOGY CONSULT - Dr. Valenzuela         HPI:  89 -year-old female with COPD on 2L home O2 PRN, CVA (remote, no deficits), CAD s/p stent ('15), IDDM2, PVD s/p fem-pop bypass ('15) w/occlusion ('22), HLD, HTN, anemia, pulmonary fibrosis 2/2 emphysema, LLL nodule concern for malignancy on PET, dCHF, urinary incontinence here w/ chest pain. Chest pain is  central, dull, and present w/ ambulation. ROS otherwise negative. Denies LE swelling, fevers, chills, nausea, vomiting, diarrhea, sob, headaches, visual changes, palpitations, melena, or hematochezia. Has plan for outpatient LVATS, was recently admitted for UTI w/ citrobacter freundii, and e. faecium.   troponin negative times 2, EKG nonischemic. pt is known from prior admissions . recent echo with nl LVEF, mild as . was recently admitted with chf, pna/ uti- treated with abx        PAST MEDICAL & SURGICAL HISTORY:  HTN (hypertension)      CVA (cerebral vascular accident)      HLD (hyperlipidemia)      DM2 (diabetes mellitus, type 2)      Anemia      PVD (peripheral vascular disease)      Solitary pulmonary nodule      Chronic diastolic heart failure      H/O hearing loss      History of COPD      CAD (coronary artery disease)      CAD (coronary artery disease)  cardiac stent      S/P vascular bypass  left leg      Bilateral cataracts      H/O rotator cuff surgery              PREVIOUS DIAGNOSTIC TESTING:    [ ] Echocardiogram:  [ ]  Catheterization:  [ ] Stress Test:  	    MEDICATIONS:  Home Medications:  Advair Diskus 250 mcg-50 mcg inhalation powder: 1 puff(s) inhaled 2 times a day (09 Sep 2024 06:08)  aspirin 81 mg oral delayed release tablet: 1 tab(s) orally once a day (at bedtime) (09 Sep 2024 06:08)  atorvastatin 20 mg oral tablet: 1 tab(s) orally once a day (at bedtime) (09 Sep 2024 06:08)  Cranberry 500mg orally once a day- LD-09/5/24:  (09 Sep 2024 06:08)  gabapentin 400 mg oral capsule: 1 cap(s) orally once a day (at bedtime) (09 Sep 2024 06:08)  ipratropium 42 mcg/inh (0.06%) nasal spray: 2 spray(s) intranasally 2 times a day (09 Sep 2024 06:08)  iron 65 mg orally twice daily:  (09 Sep 2024 06:08)  magnesium 250 mg orally twice a daily:  (09 Sep 2024 06:08)  MetFORMIN (Eqv-Fortamet) 500 mg oral tablet, extended release: 2 tab(s) orally once a day (09 Sep 2024 06:08)  metoprolol tartrate 50 mg oral tablet: 1 tab(s) orally 2 times a day (09 Sep 2024 06:08)  Multiple Vitamins oral tablet: 1 tab(s) orally once a day LD  - 09/5/24 (09 Sep 2024 06:08)  ocular lubricant ophthalmic solution: 1 drop(s) to each affected eye 2 times a day As needed Dry Eyes (17 Sep 2024 15:45)  omeprazole 40 mg oral delayed release capsule: 1 cap(s) orally once a day (09 Sep 2024 06:08)  PreserVision AREDS 2 oral capsule: 1 cap(s) orally 2 times a day (09 Sep 2024 06:08)  Systane eye drops 2 drop each eye  as needed:  (09 Sep 2024 06:08)  Tresiba FlexTouch 100 units/mL subcutaneous solution: 10 unit(s) subcutaneous once a day (at bedtime) Please take tresiba 13 units subcutaneous at bedtime tonight 9/17  Then take tresiba 10 units subcutaneous at bedtime starting on 9/18 (17 Sep 2024 16:44)  vitamin B12 2500mg orally once a day:  (09 Sep 2024 06:08)  Vitamin C 500mg orally once a day at night:  (09 Sep 2024 06:08)      MEDICATIONS  (STANDING):  ascorbic acid 500 milliGRAM(s) Oral daily  aspirin enteric coated 81 milliGRAM(s) Oral daily  atorvastatin 20 milliGRAM(s) Oral at bedtime  ciprofloxacin   IVPB 400 milliGRAM(s) IV Intermittent every 12 hours  ciprofloxacin   IVPB      ciprofloxacin   IVPB 400 milliGRAM(s) IV Intermittent once  cyanocobalamin 1000 MICROGram(s) Oral daily  dextrose 5%. 1000 milliLiter(s) (50 mL/Hr) IV Continuous <Continuous>  dextrose 5%. 1000 milliLiter(s) (100 mL/Hr) IV Continuous <Continuous>  dextrose 50% Injectable 25 Gram(s) IV Push once  dextrose 50% Injectable 25 Gram(s) IV Push once  dextrose 50% Injectable 12.5 Gram(s) IV Push once  ferrous    sulfate 325 milliGRAM(s) Oral daily  fluticasone propionate/ salmeterol 250-50 MICROgram(s) Diskus 1 Dose(s) Inhalation two times a day  furosemide   Injectable 20 milliGRAM(s) IV Push every 24 hours  gabapentin 400 milliGRAM(s) Oral at bedtime  glucagon  Injectable 1 milliGRAM(s) IntraMuscular once  heparin   Injectable 5000 Unit(s) SubCutaneous every 8 hours  insulin glargine Injectable (LANTUS) 10 Unit(s) SubCutaneous at bedtime  insulin lispro (ADMELOG) corrective regimen sliding scale   SubCutaneous at bedtime  insulin lispro (ADMELOG) corrective regimen sliding scale   SubCutaneous three times a day before meals  metoprolol tartrate 25 milliGRAM(s) Oral every 12 hours  pantoprazole   Suspension 40 milliGRAM(s) Oral daily  vancomycin  IVPB 750 milliGRAM(s) IV Intermittent every 24 hours      FAMILY HISTORY:  FH: heart disease (Child)        SOCIAL HISTORY:    [ ] Non-smoker  [ ] Smoker  [ ] Alcohol    Allergies    penicillin (Unknown)  macrolide antibiotics (Other)  Biaxin (Unknown)    Intolerances    	    REVIEW OF SYSTEMS:  CONSTITUTIONAL: No fever, weight loss, or fatigue  EYES: No eye pain, visual disturbances, or discharge  ENMT:  No difficulty hearing, tinnitus, vertigo; No sinus or throat pain  NECK: No pain or stiffness  RESPIRATORY: No cough, wheezing, chills or hemoptysis; No Shortness of Breath  CARDIOVASCULAR: No chest pain, palpitations, passing out, dizziness, or leg swelling  GASTROINTESTINAL: No abdominal or epigastric pain. No nausea, vomiting, or hematemesis; No diarrhea or constipation. No melena or hematochezia.  GENITOURINARY: No dysuria, frequency, hematuria, or incontinence  NEUROLOGICAL: No headaches, memory loss, loss of strength, numbness, or tremors  SKIN: No itching, burning, rashes, or lesions   	    [ ] All others negative	  [ ] Unable to obtain    PHYSICAL EXAM:  T(C): 37.1 (10-05-24 @ 05:55), Max: 37.2 (10-04-24 @ 18:06)  HR: 78 (10-05-24 @ 06:54) (72 - 92)  BP: 138/62 (10-05-24 @ 06:54) (108/43 - 138/62)  RR: 18 (10-05-24 @ 05:55) (17 - 19)  SpO2: 100% (10-05-24 @ 05:55) (95% - 100%)  Wt(kg): --  I&O's Summary      Appearance: Normal	  Psychiatry: A & O x 3, Mood & affect appropriate  HEENT:   Normal oral mucosa, PERRL, EOMI	  Lymphatic: No lymphadenopathy  Cardiovascular: Normal S1 S2,RRR, No JVD, No murmurs  Respiratory: Lungs clear to auscultation	  Gastrointestinal:  Soft, Non-tender, + BS	  Skin: No rashes, No ecchymoses, No cyanosis	  Neurologic: Non-focal  Extremities: Normal range of motion, No clubbing, cyanosis or edema  Vascular: Peripheral pulses palpable 2+ bilaterally    TELEMETRY: 	    ECG:  	  RADIOLOGY:  < from: Xray Chest 2 Views PA/Lat (10.04.24 @ 21:01) >  FINDINGS:    The heart size is not accurately assessed on this projection.  Again seen are bilateral lower lobe consolidations with blunting of the   hemidiaphragms. Chronic reticulonodular markings.  There is no pneumothorax.  Right axillary surgical clips.    IMPRESSION:  Consolidations in the lower lobes with blunting of the hemidiaphragms,   likely represent moderate-sized pleural effusions with passive   atelectasis.  Chronic fibrotic lung changes.        ******PRELIMINARY REPORT******      ******PRELIMINARY REPORT******     < end of copied text >    OTHER: 	  	  LABS:	 	    CARDIAC MARKERS:  Troponin T, High Sensitivity Result: 19 ng/L (10-04 @ 22:48)  Troponin T, High Sensitivity Result: 20 ng/L (10-04 @ 19:47)                                  9.6    7.13  )-----------( 310      ( 04 Oct 2024 19:47 )             30.2     10-04    132[L]  |  97[L]  |  18  ----------------------------<  160[H]  5.5[H]   |  23  |  0.77    Ca    9.4      04 Oct 2024 19:47    TPro  7.0  /  Alb  3.7  /  TBili  0.2  /  DBili  x   /  AST  30  /  ALT  12  /  AlkPhos  102  10-04      proBNP:   Lipid Profile:   HgA1c:   TSH:        CARDIOLOGY CONSULT - Dr. Valenzuela         HPI:  89 -year-old female with COPD on 2L home O2 PRN, CVA (remote, no deficits), CAD s/p stent ('15), IDDM2, PVD s/p fem-pop bypass ('15) w/occlusion ('22), HLD, HTN, anemia, pulmonary fibrosis 2/2 emphysema, LLL nodule concern for malignancy on PET, dCHF, urinary incontinence here w/ chest pain. Chest pain is  central, dull, and present w/ ambulation. ROS otherwise negative. Denies LE swelling, fevers, chills, nausea, vomiting, diarrhea, sob, headaches, visual changes, palpitations, melena, or hematochezia. Has plan for outpatient LVATS, was recently admitted for UTI w/ citrobacter freundii, and e. faecium.   troponin negative times 2, EKG nonischemic. pt is known from prior admissions . recent echo with nl LVEF, mild as . was recently admitted with chf, pna/ uti- treated with abx  . on exam pt denies any cp or sob, states she is here for a uti   ros otherwise negative       PAST MEDICAL & SURGICAL HISTORY:  HTN (hypertension)      CVA (cerebral vascular accident)      HLD (hyperlipidemia)      DM2 (diabetes mellitus, type 2)      Anemia      PVD (peripheral vascular disease)      Solitary pulmonary nodule      Chronic diastolic heart failure      H/O hearing loss      History of COPD      CAD (coronary artery disease)      CAD (coronary artery disease)  cardiac stent      S/P vascular bypass  left leg      Bilateral cataracts      H/O rotator cuff surgery              PREVIOUS DIAGNOSTIC TESTING:    [ ] Echocardiogram:    < from: TTE W or WO Ultrasound Enhancing Agent (04.22.24 @ 15:29) >  CONCLUSIONS:      1. Left ventricular systolic function is normal with an ejection fraction of 57 % by Gomez's method of disks.   2. The left atrium is mildly dilated.    < end of copied text >    [ ]  Catheterization:    [ ] Stress Test:  	    MEDICATIONS:  Home Medications:  Advair Diskus 250 mcg-50 mcg inhalation powder: 1 puff(s) inhaled 2 times a day (09 Sep 2024 06:08)  aspirin 81 mg oral delayed release tablet: 1 tab(s) orally once a day (at bedtime) (09 Sep 2024 06:08)  atorvastatin 20 mg oral tablet: 1 tab(s) orally once a day (at bedtime) (09 Sep 2024 06:08)  Cranberry 500mg orally once a day- LD-09/5/24:  (09 Sep 2024 06:08)  gabapentin 400 mg oral capsule: 1 cap(s) orally once a day (at bedtime) (09 Sep 2024 06:08)  ipratropium 42 mcg/inh (0.06%) nasal spray: 2 spray(s) intranasally 2 times a day (09 Sep 2024 06:08)  iron 65 mg orally twice daily:  (09 Sep 2024 06:08)  magnesium 250 mg orally twice a daily:  (09 Sep 2024 06:08)  MetFORMIN (Eqv-Fortamet) 500 mg oral tablet, extended release: 2 tab(s) orally once a day (09 Sep 2024 06:08)  metoprolol tartrate 50 mg oral tablet: 1 tab(s) orally 2 times a day (09 Sep 2024 06:08)  Multiple Vitamins oral tablet: 1 tab(s) orally once a day LD - 09/5/24 (09 Sep 2024 06:08)  ocular lubricant ophthalmic solution: 1 drop(s) to each affected eye 2 times a day As needed Dry Eyes (17 Sep 2024 15:45)  omeprazole 40 mg oral delayed release capsule: 1 cap(s) orally once a day (09 Sep 2024 06:08)  PreserVision AREDS 2 oral capsule: 1 cap(s) orally 2 times a day (09 Sep 2024 06:08)  Systane eye drops 2 drop each eye  as needed:  (09 Sep 2024 06:08)  Tresiba FlexTouch 100 units/mL subcutaneous solution: 10 unit(s) subcutaneous once a day (at bedtime) Please take tresiba 13 units subcutaneous at bedtime tonight 9/17  Then take tresiba 10 units subcutaneous at bedtime starting on 9/18 (17 Sep 2024 16:44)  vitamin B12 2500mg orally once a day:  (09 Sep 2024 06:08)  Vitamin C 500mg orally once a day at night:  (09 Sep 2024 06:08)      MEDICATIONS  (STANDING):  ascorbic acid 500 milliGRAM(s) Oral daily  aspirin enteric coated 81 milliGRAM(s) Oral daily  atorvastatin 20 milliGRAM(s) Oral at bedtime  ciprofloxacin   IVPB 400 milliGRAM(s) IV Intermittent every 12 hours  ciprofloxacin   IVPB      ciprofloxacin   IVPB 400 milliGRAM(s) IV Intermittent once  cyanocobalamin 1000 MICROGram(s) Oral daily  dextrose 5%. 1000 milliLiter(s) (50 mL/Hr) IV Continuous <Continuous>  dextrose 5%. 1000 milliLiter(s) (100 mL/Hr) IV Continuous <Continuous>  dextrose 50% Injectable 25 Gram(s) IV Push once  dextrose 50% Injectable 25 Gram(s) IV Push once  dextrose 50% Injectable 12.5 Gram(s) IV Push once  ferrous    sulfate 325 milliGRAM(s) Oral daily  fluticasone propionate/ salmeterol 250-50 MICROgram(s) Diskus 1 Dose(s) Inhalation two times a day  furosemide   Injectable 20 milliGRAM(s) IV Push every 24 hours  gabapentin 400 milliGRAM(s) Oral at bedtime  glucagon  Injectable 1 milliGRAM(s) IntraMuscular once  heparin   Injectable 5000 Unit(s) SubCutaneous every 8 hours  insulin glargine Injectable (LANTUS) 10 Unit(s) SubCutaneous at bedtime  insulin lispro (ADMELOG) corrective regimen sliding scale   SubCutaneous at bedtime  insulin lispro (ADMELOG) corrective regimen sliding scale   SubCutaneous three times a day before meals  metoprolol tartrate 25 milliGRAM(s) Oral every 12 hours  pantoprazole   Suspension 40 milliGRAM(s) Oral daily  vancomycin  IVPB 750 milliGRAM(s) IV Intermittent every 24 hours      FAMILY HISTORY:  FH: heart disease (Child)        SOCIAL HISTORY:    [ ] Non-smoker  [ ] Smoker  [ ] Alcohol    Allergies    penicillin (Unknown)  macrolide antibiotics (Other)  Biaxin (Unknown)    Intolerances    	    REVIEW OF SYSTEMS:  CONSTITUTIONAL: No fever, weight loss, or fatigue  EYES: No eye pain, visual disturbances, or discharge  ENMT:  No difficulty hearing, tinnitus, vertigo; No sinus or throat pain  NECK: No pain or stiffness  RESPIRATORY: No cough, wheezing, chills or hemoptysis; No Shortness of Breath  CARDIOVASCULAR: + chest pain,  NO palpitations, passing out, dizziness, or leg swelling  GASTROINTESTINAL: No abdominal or epigastric pain. No nausea, vomiting, or hematemesis; No diarrhea or constipation. No melena or hematochezia.  GENITOURINARY: +dysuria, frequency, no  hematuria, or incontinence  NEUROLOGICAL: No headaches, memory loss, loss of strength, numbness, or tremors  SKIN: No itching, burning, rashes, or lesions   	    [x ] All others negative	  [ ] Unable to obtain    PHYSICAL EXAM:  T(C): 37.1 (10-05-24 @ 05:55), Max: 37.2 (10-04-24 @ 18:06)  HR: 78 (10-05-24 @ 06:54) (72 - 92)  BP: 138/62 (10-05-24 @ 06:54) (108/43 - 138/62)  RR: 18 (10-05-24 @ 05:55) (17 - 19)  SpO2: 100% (10-05-24 @ 05:55) (95% - 100%)  Wt(kg): --  I&O's Summary      Appearance: Normal	  Psychiatry: A & O x 3, Mood & affect appropriate  HEENT:   Normal oral mucosa, PERRL, EOMI	  Lymphatic: No lymphadenopathy  Cardiovascular: Normal S1 S2,RRR, No JVD, No murmurs  Respiratory:  diminished   Gastrointestinal:  Soft, Non-tender, + BS	  Skin: No rashes, No ecchymoses, No cyanosis	  Neurologic: Non-focal  Extremities: Normal range of motion, No clubbing, cyanosis or edema  Vascular: Peripheral pulses palpable 2+ bilaterally    TELEMETRY: nsr  	    ECG:  	nsr   RADIOLOGY:  < from: Xray Chest 2 Views PA/Lat (10.04.24 @ 21:01) >  FINDINGS:    The heart size is not accurately assessed on this projection.  Again seen are bilateral lower lobe consolidations with blunting of the   hemidiaphragms. Chronic reticulonodular markings.  There is no pneumothorax.  Right axillary surgical clips.    IMPRESSION:  Consolidations in the lower lobes with blunting of the hemidiaphragms,   likely represent moderate-sized pleural effusions with passive   atelectasis.  Chronic fibrotic lung changes.        ******PRELIMINARY REPORT******      ******PRELIMINARY REPORT******     < end of copied text >    OTHER: 	  	  LABS:	 	    CARDIAC MARKERS:  Troponin T, High Sensitivity Result: 19 ng/L (10-04 @ 22:48)  Troponin T, High Sensitivity Result: 20 ng/L (10-04 @ 19:47)                                  9.6    7.13  )-----------( 310      ( 04 Oct 2024 19:47 )             30.2     10-04    132[L]  |  97[L]  |  18  ----------------------------<  160[H]  5.5[H]   |  23  |  0.77    Ca    9.4      04 Oct 2024 19:47    TPro  7.0  /  Alb  3.7  /  TBili  0.2  /  DBili  x   /  AST  30  /  ALT  12  /  AlkPhos  102  10-04      proBNP:   Lipid Profile:   HgA1c:   TSH:

## 2024-10-05 NOTE — H&P ADULT - PROBLEM SELECTOR PLAN 9
F-1 L restrict  E-replete prn  N-soft diet prior mucoid impaction right sided, obtain formal S/S eval  heparin subQ

## 2024-10-05 NOTE — H&P ADULT - NSICDXPASTSURGICALHX_GEN_ALL_CORE_FT
Issues today: not sleeping well now, but not many ctxs; GBS negative; yeast symptoms completely gone with diflucan.  No Vag bleeding/LOF  Occas BH Ctx/abd pain  No Dysuria/abn discharge  FM's: active  Discussed labor precautions/FKC's and when to call  F/U 1 week  Ivanna Lane, APRN-CNP    
PAST SURGICAL HISTORY:  Bilateral cataracts     CAD (coronary artery disease) cardiac stent    H/O rotator cuff surgery     S/P vascular bypass left leg

## 2024-10-05 NOTE — H&P ADULT - PROBLEM SELECTOR PLAN 2
-+urinalysis, w/ wbc, though no bacteria (f/u official culture data)  -prior e. faecium--->tx w/ vancomycin for now, prior citrobacter freundii--> treat w/ ciprofloxacin  -bladder scan to assure no urinary retention, straight cath prn

## 2024-10-05 NOTE — H&P ADULT - PROBLEM SELECTOR PLAN 1
-dull chest pain, troponin negative times 2 low concern for ACS. Worse on ambulation, possible anginal pain  -oxygen requirements not greater than usual, no LE swelling low concern for PE  -check probnp, can diurese w/ lasix 20 mg iv daily for now

## 2024-10-05 NOTE — H&P ADULT - ASSESSMENT
89 -year-old female with COPD on 2L home O2 PRN, CVA (remote, no deficits), CAD s/p stent ('15), IDDM2, PVD s/p fem-pop bypass ('15) w/occlusion ('22), HLD, HTN, anemia, pulmonary fibrosis 2/2 emphysema, LLL nodule concern for malignancy on PET, dCHF, urinary incontinence here w/ dull cp on exertion.

## 2024-10-05 NOTE — PATIENT PROFILE ADULT - FALL HARM RISK - HARM RISK INTERVENTIONS

## 2024-10-05 NOTE — H&P ADULT - NSHPPHYSICALEXAM_GEN_ALL_CORE
PHYSICAL EXAM:  GENERAL: NAD, well-developed  HEAD:  Atraumatic, Normocephalic  EYES: EOMI, PERRLA, conjunctiva and sclera clear  NECK: Supple, No JVD  CHEST/LUNG: diminished lung sounds b/l lower lobes, crackles auscultated   HEART: Regular rate and rhythm; No murmurs, rubs, or gallops  ABDOMEN: Soft, Nontender, Nondistended; Bowel sounds present  EXTREMITIES:  2+ Peripheral Pulses, No clubbing, cyanosis, or edema  PSYCH: AAOx3  NEUROLOGY: non-focal  SKIN: No rashes or lesions

## 2024-10-06 LAB
ANION GAP SERPL CALC-SCNC: 12 MMOL/L — SIGNIFICANT CHANGE UP (ref 7–14)
BUN SERPL-MCNC: 17 MG/DL — SIGNIFICANT CHANGE UP (ref 7–23)
CALCIUM SERPL-MCNC: 9 MG/DL — SIGNIFICANT CHANGE UP (ref 8.4–10.5)
CHLORIDE SERPL-SCNC: 98 MMOL/L — SIGNIFICANT CHANGE UP (ref 98–107)
CO2 SERPL-SCNC: 26 MMOL/L — SIGNIFICANT CHANGE UP (ref 22–31)
CREAT SERPL-MCNC: 0.84 MG/DL — SIGNIFICANT CHANGE UP (ref 0.5–1.3)
EGFR: 66 ML/MIN/1.73M2 — SIGNIFICANT CHANGE UP
GLUCOSE SERPL-MCNC: 89 MG/DL — SIGNIFICANT CHANGE UP (ref 70–99)
HCT VFR BLD CALC: 28.2 % — LOW (ref 34.5–45)
HGB BLD-MCNC: 9 G/DL — LOW (ref 11.5–15.5)
MAGNESIUM SERPL-MCNC: 1.9 MG/DL — SIGNIFICANT CHANGE UP (ref 1.6–2.6)
MCHC RBC-ENTMCNC: 28.4 PG — SIGNIFICANT CHANGE UP (ref 27–34)
MCHC RBC-ENTMCNC: 31.9 GM/DL — LOW (ref 32–36)
MCV RBC AUTO: 89 FL — SIGNIFICANT CHANGE UP (ref 80–100)
NRBC # BLD: 0 /100 WBCS — SIGNIFICANT CHANGE UP (ref 0–0)
NRBC # FLD: 0 K/UL — SIGNIFICANT CHANGE UP (ref 0–0)
PHOSPHATE SERPL-MCNC: 4.2 MG/DL — SIGNIFICANT CHANGE UP (ref 2.5–4.5)
PLATELET # BLD AUTO: 296 K/UL — SIGNIFICANT CHANGE UP (ref 150–400)
POTASSIUM SERPL-MCNC: 4.1 MMOL/L — SIGNIFICANT CHANGE UP (ref 3.5–5.3)
POTASSIUM SERPL-SCNC: 4.1 MMOL/L — SIGNIFICANT CHANGE UP (ref 3.5–5.3)
RBC # BLD: 3.17 M/UL — LOW (ref 3.8–5.2)
RBC # FLD: 15.8 % — HIGH (ref 10.3–14.5)
SODIUM SERPL-SCNC: 136 MMOL/L — SIGNIFICANT CHANGE UP (ref 135–145)
WBC # BLD: 5.69 K/UL — SIGNIFICANT CHANGE UP (ref 3.8–10.5)
WBC # FLD AUTO: 5.69 K/UL — SIGNIFICANT CHANGE UP (ref 3.8–10.5)

## 2024-10-06 RX ADMIN — Medication 25 MILLIGRAM(S): at 18:06

## 2024-10-06 RX ADMIN — PANTOPRAZOLE SODIUM 40 MILLIGRAM(S): 40 TABLET, DELAYED RELEASE ORAL at 12:56

## 2024-10-06 RX ADMIN — Medication 325 MILLIGRAM(S): at 12:55

## 2024-10-06 RX ADMIN — Medication 5000 UNIT(S): at 05:18

## 2024-10-06 RX ADMIN — Medication 81 MILLIGRAM(S): at 12:55

## 2024-10-06 RX ADMIN — VANCOMYCIN HCL-SODIUM CHLORIDE IV SOLN 1.5 GM/250ML-0.9% 250 MILLIGRAM(S): 1.5-0.9/25 SOLUTION at 05:16

## 2024-10-06 RX ADMIN — Medication 25 MILLIGRAM(S): at 05:23

## 2024-10-06 RX ADMIN — Medication 6: at 12:55

## 2024-10-06 RX ADMIN — GABAPENTIN 400 MILLIGRAM(S): 800 TABLET, FILM COATED ORAL at 21:43

## 2024-10-06 RX ADMIN — Medication 5000 UNIT(S): at 21:48

## 2024-10-06 RX ADMIN — Medication 2: at 21:41

## 2024-10-06 RX ADMIN — Medication 500 MILLIGRAM(S): at 12:55

## 2024-10-06 RX ADMIN — INSULIN GLARGINE 10 UNIT(S): 300 INJECTION, SOLUTION SUBCUTANEOUS at 21:43

## 2024-10-06 RX ADMIN — Medication 2: at 08:47

## 2024-10-06 RX ADMIN — FUROSEMIDE 20 MILLIGRAM(S): 10 INJECTION INTRAVENOUS at 05:24

## 2024-10-06 RX ADMIN — Medication 200 MILLIGRAM(S): at 18:05

## 2024-10-06 RX ADMIN — Medication 200 MILLIGRAM(S): at 05:18

## 2024-10-06 RX ADMIN — Medication 2: at 18:05

## 2024-10-06 RX ADMIN — Medication 1000 MICROGRAM(S): at 12:55

## 2024-10-06 RX ADMIN — ATORVASTATIN CALCIUM 20 MILLIGRAM(S): 10 TABLET, FILM COATED ORAL at 21:42

## 2024-10-06 NOTE — PROGRESS NOTE ADULT - ASSESSMENT
89 -year-old female with COPD on 2L home O2 PRN, CVA (remote, no deficits), CAD s/p stent ('15), IDDM2, PVD s/p fem-pop bypass ('15) w/occlusion ('22), HLD, HTN, anemia, pulmonary fibrosis 2/2 emphysema, LLL nodule concern for malignancy on PET, dCHF, urinary incontinence here w/ chest pain     #atypical chest pain   -resolved   -likely in the setting of pulm disease  -chest xray Consolidations in the lower lobes with blunting of the hemidiaphragms,  likely represent moderate-sized pleural effusions with passive  atelectasis.  -ecg with no ischemic changes  -hs trop negative   -c/w lasix 20 mg IVP daily   -recent TTE 4/22/24  with normal LVEF and mild AS  -can repeat echo    #CAD s/p PCI  -stable  -cont asa, statin, BB    #HFpEF  -recent TTE with normal LVEF and mild AS  -c/w lasix 20 mg IVP daily   -pending repeat echo     #Lung CA  -preop for LLL VATS w thoracic  as outpt   -med fu / pulm eval     #UTI   -management per med /ID

## 2024-10-06 NOTE — CONSULT NOTE ADULT - ASSESSMENT
89 -year-old female with COPD on 2L home O2 PRN, CVA (remote, no deficits), CAD s/p stent ('15), IDDM2, PVD s/p fem-pop bypass ('15) w/occlusion ('22), HLD, HTN, anemia, pulmonary fibrosis 2/2 emphysema, LLL nodule concern for malignancy on PET, dCHF, urinary incontinence here w/ chest pain that is central, dull, and present w/ ambulation. Reports her usual UTI symps with burning on uriantion Has plan for outpatient LVATS, was recently admitted for UTI w/ citrobacter freundii, and e. faecium.     RECOMMENDATIONS  Recurrent UTI  Reviewed prior micro with 10,000 - 49,000 CFU/mL Citrobacter freundii complex, 50,000 - 99,000 CFU/mL Enterococcus faecium and of note reported rather significant PCN allergy with reported swelling  Urine culture collected-NGTD  ciprofloxacin   IVPB 400 milliGRAM(s) IV Intermittent every 12 hours  vancomycin  IVPB 750 milliGRAM(s) IV Intermittent every 24 hours  -continue for now with recs to follow    Thank you for consulting us and involving us in the management of this most interesting and challenging case.  We will follow along in the care of this patient. Please call us at [phone] or text me directly on my cell# at [phone] with any concerns.    Starting tomorrow Dr Tabby Duarte will be assuming care of this patient so please contact her with any questions, concerns or new micro data.

## 2024-10-06 NOTE — CONSULT NOTE ADULT - SUBJECTIVE AND OBJECTIVE BOX
[facility] DIVISION of INFECTIOUS DISEASE  Cody Smith MD PhD, Kiana Farias MD, Tabby Duarte MD, Roosevelt Kaplan MD, Aldo Verduzco MD  and providing coverage with Jeannette Terry MD  Providing Infectious Disease Consultations at [facility], [facility], [place], [place], [facility], [facility]'s    Office# [phone] to schedule follow up appointments  Answering Service for urgent calls or New Consults [phone]  Cell# to text for urgent issues Cody Smith [phone]     HPI:  89 -year-old female with COPD on 2L home O2 PRN, CVA (remote, no deficits), CAD s/p stent ('15), IDDM2, PVD s/p fem-pop bypass ('15) w/occlusion ('22), HLD, HTN, anemia, pulmonary fibrosis 2/2 emphysema, LLL nodule concern for malignancy on PET, dCHF, urinary incontinence here w/ chest pain that is central, dull, and present w/ ambulation. Reports her usual UTI symps with burning on uriantion Has plan for outpatient LVATS, was recently admitted for UTI w/ citrobacter freundii, and e. faecium.     troponin negative times 2, EKG nonischemic.    Vital Signs Last 24 Hrs  T(C): 36.7 (05 Oct 2024 03:34), Max: 37.2 (04 Oct 2024 18:06)  T(F): 98 (05 Oct 2024 03:34), Max: 99 (04 Oct 2024 18:06)  HR: 86 (05 Oct 2024 03:34) (72 - 92)  BP: 108/43 (05 Oct 2024 03:34) (108/43 - 135/54)  BP(mean): 104 (05 Oct 2024 00:25) (104 - 104)  RR: 17 (05 Oct 2024 03:34) (17 - 19)  SpO2: 98% (05 Oct 2024 03:34) (95% - 98%)    Parameters below as of 05 Oct 2024 03:34  Patient On (Oxygen Delivery Method): nasal cannula  O2 Flow (L/min): 2     (05 Oct 2024 06:34)      PAST MEDICAL & SURGICAL HISTORY:  HTN (hypertension)      CVA (cerebral vascular accident)      HLD (hyperlipidemia)      DM2 (diabetes mellitus, type 2)      Anemia      PVD (peripheral vascular disease)      Solitary pulmonary nodule      Chronic diastolic heart failure      H/O hearing loss      History of COPD      CAD (coronary artery disease)      CAD (coronary artery disease)  cardiac stent      S/P vascular bypass  left leg      Bilateral cataracts      H/O rotator cuff surgery          Antimicrobials  ciprofloxacin   IVPB 400 milliGRAM(s) IV Intermittent every 12 hours  ciprofloxacin   IVPB      vancomycin  IVPB 750 milliGRAM(s) IV Intermittent every 24 hours      Immunological      Other  albuterol    90 MICROgram(s) HFA Inhaler 2 Puff(s) Inhalation every 6 hours PRN  artificial  tears Solution 1 Drop(s) Both EYES every 12 hours PRN  ascorbic acid 500 milliGRAM(s) Oral daily  aspirin enteric coated 81 milliGRAM(s) Oral daily  atorvastatin 20 milliGRAM(s) Oral at bedtime  cyanocobalamin 1000 MICROGram(s) Oral daily  dextrose 5%. 1000 milliLiter(s) IV Continuous <Continuous>  dextrose 5%. 1000 milliLiter(s) IV Continuous <Continuous>  dextrose 50% Injectable 25 Gram(s) IV Push once  dextrose 50% Injectable 25 Gram(s) IV Push once  dextrose 50% Injectable 12.5 Gram(s) IV Push once  dextrose Oral Gel 15 Gram(s) Oral once PRN  ferrous    sulfate 325 milliGRAM(s) Oral daily  fluticasone propionate/ salmeterol 250-50 MICROgram(s) Diskus 1 Dose(s) Inhalation two times a day  furosemide   Injectable 20 milliGRAM(s) IV Push every 24 hours  gabapentin 400 milliGRAM(s) Oral at bedtime  glucagon  Injectable 1 milliGRAM(s) IntraMuscular once  heparin   Injectable 5000 Unit(s) SubCutaneous every 8 hours  insulin glargine Injectable (LANTUS) 10 Unit(s) SubCutaneous at bedtime  insulin lispro (ADMELOG) corrective regimen sliding scale   SubCutaneous at bedtime  insulin lispro (ADMELOG) corrective regimen sliding scale   SubCutaneous three times a day before meals  metoprolol tartrate 25 milliGRAM(s) Oral every 12 hours  pantoprazole   Suspension 40 milliGRAM(s) Oral daily      Allergies    penicillin (Unknown)  macrolide antibiotics (Other)  Biaxin (Unknown)    Intolerances        SOCIAL HISTORY:  no toxic habits reported      FAMILY HISTORY:  FH: heart disease (Child)        ROS:    EYES:  Negative  blurry vision or double vision  GASTROINTESTINAL:  Negative for nausea, vomiting, diarrhea  -otherwise negative except for subjective    Vital Signs Last 24 Hrs  T(C): 36.5 (06 Oct 2024 05:20), Max: 36.6 (05 Oct 2024 19:42)  T(F): 97.7 (06 Oct 2024 05:20), Max: 97.9 (05 Oct 2024 19:42)  HR: 91 (06 Oct 2024 05:20) (75 - 97)  BP: 120/54 (06 Oct 2024 05:20) (117/54 - 135/60)  BP(mean): --  RR: 16 (06 Oct 2024 05:20) (16 - 18)  SpO2: 99% (06 Oct 2024 05:20) (92% - 100%)    Parameters below as of 06 Oct 2024 05:20  Patient On (Oxygen Delivery Method): nasal cannula  O2 Flow (L/min): 2      PE:  In no distress  HEENT:  NC, PERRL, sclerae anicteric, conjunctivae clear, EOMI.  Sinuses nontender, no nasal exudate.  No buccal or pharyngeal lesions, erythema or exudate  Neck:  Supple, no adenopathy  Lungs:  No accessory muscle use, bilaterally clear to auscultation  Cor:  distant  Abd:  Symmetric, normoactive BS.  Soft, nontender, no masses, guarding or rebound.  Liver and spleen not enlarged  Extrem:  No cyanosis or edema  Skin:  No rashes.  Neuro: grossly intact  Musc: moving all limbs freely, no focal deficits        LABS:                        9.0    5.69  )-----------( 296      ( 06 Oct 2024 06:02 )             28.2       WBC Count: 5.69 K/uL (10-06-24 @ 06:02)  WBC Count: 5.55 K/uL (10-05-24 @ 06:45)  WBC Count: 7.13 K/uL (10-04-24 @ 19:47)      10-06    136  |  98  |  17  ----------------------------<  89  4.1   |  26  |  0.84    Ca    9.0      06 Oct 2024 06:02  Phos  4.2     10-06  Mg     1.90     10-06    TPro  6.2  /  Alb  3.5  /  TBili  <0.2  /  DBili  x   /  AST  16  /  ALT  7   /  AlkPhos  91  10-05      Creatinine: 0.84 mg/dL (10-06-24 @ 06:02)  Creatinine: 0.85 mg/dL (10-05-24 @ 06:45)  Creatinine: 0.77 mg/dL (10-04-24 @ 19:47)      Urinalysis Basic - ( 06 Oct 2024 06:02 )    Color: x / Appearance: x / SG: x / pH: x  Gluc: 89 mg/dL / Ketone: x  / Bili: x / Urobili: x   Blood: x / Protein: x / Nitrite: x   Leuk Esterase: x / RBC: x / WBC x   Sq Epi: x / Non Sq Epi: x / Bacteria: x              MICROBIOLOGY:    Culture - Urine (09.08.24 @ 21:17)    -  Amoxicillin/Clavulanic Acid: R >16/8   -  Ampicillin: R 16 These ampicillin results predict results for amoxicillin   -  Ampicillin: S <=2 Predicts results to ampicillin/sulbactam, amoxacillin-clavulanate and  piperacillin-tazobactam.   -  Ampicillin/Sulbactam: R 16/8   -  Aztreonam: S <=4   -  Cefazolin: R >16   -  Cefepime: S <=2   -  Cefoxitin: R >16   -  Ceftriaxone: I 2 Enterobacter cloacae, Klebsiella aerogenes, and Citrobacter freundii may develop resistance during prolonged therapy.   -  Ciprofloxacin: S <=0.25   -  Ciprofloxacin: I 2   -  Ertapenem: S <=0.5   -  Gentamicin: S <=2   -  Imipenem: S <=1   -  Levofloxacin: S <=0.5   -  Levofloxacin: I 4   -  Meropenem: S <=1   -  Nitrofurantoin: S <=32 Should not be used to treat pyelonephritis   -  Nitrofurantoin: S <=32 Should not be used to treat pyelonephritis.   -  Piperacillin/Tazobactam: S <=8 Treatment with Pipercillin/Tazobactam is not recommended in severe infections casued by Klebsiella aerogenes, Enterobacter cloacae complex, and Citrobacter freundii complex.   -  Tetracycline: S <=1   -  Tobramycin: S <=2   -  Trimethoprim/Sulfamethoxazole: S <=0.5/9.5   -  Vancomycin: S 0.5   Specimen Source: Clean Catch   Culture Results:   10,000 - 49,000 CFU/mL Citrobacter freundii complex  50,000 - 99,000 CFU/mL Enterococcus faecium   Organism Identification: Citrobacter freundii complex  Enterococcus faecium   Organism: Citrobacter freundii complex   Organism: Enterococcus faecium   Method Type: KHADIJAH   Method Type: KHADIJAH        RADIOLOGY & ADDITIONAL STUDIES:    --

## 2024-10-07 ENCOUNTER — RESULT REVIEW (OUTPATIENT)
Age: 89
End: 2024-10-07

## 2024-10-07 ENCOUNTER — TRANSCRIPTION ENCOUNTER (OUTPATIENT)
Age: 89
End: 2024-10-07

## 2024-10-07 LAB
ANION GAP SERPL CALC-SCNC: 13 MMOL/L — SIGNIFICANT CHANGE UP (ref 7–14)
BUN SERPL-MCNC: 17 MG/DL — SIGNIFICANT CHANGE UP (ref 7–23)
CALCIUM SERPL-MCNC: 8.9 MG/DL — SIGNIFICANT CHANGE UP (ref 8.4–10.5)
CHLORIDE SERPL-SCNC: 97 MMOL/L — LOW (ref 98–107)
CO2 SERPL-SCNC: 24 MMOL/L — SIGNIFICANT CHANGE UP (ref 22–31)
CREAT SERPL-MCNC: 0.85 MG/DL — SIGNIFICANT CHANGE UP (ref 0.5–1.3)
EGFR: 65 ML/MIN/1.73M2 — SIGNIFICANT CHANGE UP
GLUCOSE SERPL-MCNC: 147 MG/DL — HIGH (ref 70–99)
HCT VFR BLD CALC: 30.1 % — LOW (ref 34.5–45)
HGB BLD-MCNC: 9.3 G/DL — LOW (ref 11.5–15.5)
MAGNESIUM SERPL-MCNC: 1.9 MG/DL — SIGNIFICANT CHANGE UP (ref 1.6–2.6)
MCHC RBC-ENTMCNC: 27.3 PG — SIGNIFICANT CHANGE UP (ref 27–34)
MCHC RBC-ENTMCNC: 30.9 GM/DL — LOW (ref 32–36)
MCV RBC AUTO: 88.3 FL — SIGNIFICANT CHANGE UP (ref 80–100)
NRBC # BLD: 0 /100 WBCS — SIGNIFICANT CHANGE UP (ref 0–0)
NRBC # FLD: 0 K/UL — SIGNIFICANT CHANGE UP (ref 0–0)
PHOSPHATE SERPL-MCNC: 3.4 MG/DL — SIGNIFICANT CHANGE UP (ref 2.5–4.5)
PLATELET # BLD AUTO: 301 K/UL — SIGNIFICANT CHANGE UP (ref 150–400)
POTASSIUM SERPL-MCNC: 4 MMOL/L — SIGNIFICANT CHANGE UP (ref 3.5–5.3)
POTASSIUM SERPL-SCNC: 4 MMOL/L — SIGNIFICANT CHANGE UP (ref 3.5–5.3)
RBC # BLD: 3.41 M/UL — LOW (ref 3.8–5.2)
RBC # FLD: 15.8 % — HIGH (ref 10.3–14.5)
SODIUM SERPL-SCNC: 134 MMOL/L — LOW (ref 135–145)
VANCOMYCIN TROUGH SERPL-MCNC: 7.7 UG/ML — LOW (ref 10–20)
WBC # BLD: 5.35 K/UL — SIGNIFICANT CHANGE UP (ref 3.8–10.5)
WBC # FLD AUTO: 5.35 K/UL — SIGNIFICANT CHANGE UP (ref 3.8–10.5)

## 2024-10-07 PROCEDURE — 93306 TTE W/DOPPLER COMPLETE: CPT | Mod: 26

## 2024-10-07 RX ORDER — BENZONATATE 150 MG/1
200 CAPSULE ORAL EVERY 8 HOURS
Refills: 0 | Status: DISCONTINUED | OUTPATIENT
Start: 2024-10-07 | End: 2024-10-09

## 2024-10-07 RX ORDER — VANCOMYCIN HCL-SODIUM CHLORIDE IV SOLN 1.5 GM/250ML-0.9% 1.5-0.9/25 GM/ML-%
1000 SOLUTION INTRAVENOUS EVERY 24 HOURS
Refills: 0 | Status: DISCONTINUED | OUTPATIENT
Start: 2024-10-07 | End: 2024-10-08

## 2024-10-07 RX ADMIN — INSULIN GLARGINE 10 UNIT(S): 300 INJECTION, SOLUTION SUBCUTANEOUS at 21:46

## 2024-10-07 RX ADMIN — Medication 5000 UNIT(S): at 06:33

## 2024-10-07 RX ADMIN — VANCOMYCIN HCL-SODIUM CHLORIDE IV SOLN 1.5 GM/250ML-0.9% 250 MILLIGRAM(S): 1.5-0.9/25 SOLUTION at 06:33

## 2024-10-07 RX ADMIN — Medication 25 MILLIGRAM(S): at 17:52

## 2024-10-07 RX ADMIN — Medication 4: at 08:49

## 2024-10-07 RX ADMIN — Medication 500 MILLIGRAM(S): at 11:43

## 2024-10-07 RX ADMIN — Medication 25 MILLIGRAM(S): at 06:32

## 2024-10-07 RX ADMIN — Medication 200 MILLIGRAM(S): at 17:53

## 2024-10-07 RX ADMIN — Medication 200 MILLIGRAM(S): at 06:33

## 2024-10-07 RX ADMIN — Medication 81 MILLIGRAM(S): at 11:43

## 2024-10-07 RX ADMIN — Medication 325 MILLIGRAM(S): at 11:43

## 2024-10-07 RX ADMIN — Medication 2: at 12:27

## 2024-10-07 RX ADMIN — Medication 5000 UNIT(S): at 11:44

## 2024-10-07 RX ADMIN — BENZONATATE 200 MILLIGRAM(S): 150 CAPSULE ORAL at 13:10

## 2024-10-07 RX ADMIN — ATORVASTATIN CALCIUM 20 MILLIGRAM(S): 10 TABLET, FILM COATED ORAL at 21:46

## 2024-10-07 RX ADMIN — BENZONATATE 200 MILLIGRAM(S): 150 CAPSULE ORAL at 00:12

## 2024-10-07 RX ADMIN — Medication 1000 MICROGRAM(S): at 11:43

## 2024-10-07 RX ADMIN — PANTOPRAZOLE SODIUM 40 MILLIGRAM(S): 40 TABLET, DELAYED RELEASE ORAL at 11:43

## 2024-10-07 RX ADMIN — FUROSEMIDE 20 MILLIGRAM(S): 10 INJECTION INTRAVENOUS at 06:33

## 2024-10-07 RX ADMIN — Medication 4: at 17:51

## 2024-10-07 RX ADMIN — GABAPENTIN 400 MILLIGRAM(S): 800 TABLET, FILM COATED ORAL at 21:46

## 2024-10-07 NOTE — DISCHARGE NOTE PROVIDER - HOSPITAL COURSE
Ladonna Lawson is a 89 -year-old female with COPD on 2L home O2 PRN, CVA (remote, no deficits), CAD s/p stent ('15), IDDM2, PVD s/p fem-pop bypass ('15) w/occlusion ('22), HLD, HTN, anemia, pulmonary fibrosis 2/2 emphysema, LLL nodule concern for malignancy on PET, dCHF, urinary incontinence here w/ dull cp on exertion.     PLAN   # Chest pain.   -dull chest pain, troponin negative times 2 low concern for ACS. Worse on ambulation, possible anginal pain  -oxygen requirements not greater than usual, no LE swelling low concern for PE  - s/p IV Lasix 20 mg IV daily for few days - Ok to be transitioned to PO Lasix on discharge.    - Cardiology  follow up appreciated. TTE reviewed.    # Abnormal urinalysis.   +urinalysis, w/ wbc, though no bacteria   - Prior e. faecium--->tx w/ vancomycin for now, prior citrobacter freundii--> treat w/ ciprofloxacin  - Bladder scan to assure no urinary retention, straight cath prn  - ID eval appreciated. s/p completed Abx until 10/9/24.    # Diastolic CHF, chronic.   -1 L fluid restriction  - TTE as above  - Diuretics as above.   - switch to PO.    # COPD, mild.   - Advair discus daily  - Proair prn.    # Peripheral arterial disease.   - c/w aspirin 81 mg daily.    # Mild CAD.   - Aspirin / Lipitor 20 mg  - c/w Lopressor 25 mg BID for now given lower BPs.    # Lung nodules.   - Outpatient plan for LVATs  - d/w CT surgery (no intervention while here)  - outpt follow up with Dr. Lazar.    # DM2 (diabetes mellitus, type 2).   - Lantus 10 units at bedtime, sliding scale.    # Nutrition, metabolism, and development symptoms.   F-1 L restrict  E-replete prn  N-soft diet prior mucoid impaction right sided, formal S/S eval: regular diet with thin liquid.   Heparin subQ.    As per Medicine Attg, patient is cleared to be discharged home today. Patient instructed to follow up with PCP, Pulmonologist, and Cardiologist upon discharge   Ladonna Lawson is a 89 -year-old female with COPD on 2L home O2 PRN, CVA (remote, no deficits), CAD s/p stent ('15), IDDM2, PVD s/p fem-pop bypass ('15) w/occlusion ('22), HLD, HTN, anemia, pulmonary fibrosis 2/2 emphysema, LLL nodule concern for malignancy on PET, dCHF, urinary incontinence here w/ dull cp on exertion.     PLAN   # Chest pain.   -dull chest pain, troponin negative times 2 low concern for ACS. Worse on ambulation, possible anginal pain  -oxygen requirements not greater than usual, no LE swelling low concern for PE  - s/p IV Lasix 20 mg IV daily for few days - Ok to be transitioned to PO Lasix on discharge.    - Cardiology  follow up appreciated. TTE reviewed.    # Abnormal urinalysis.   +urinalysis, w/ wbc, though no bacteria   - Prior e. faecium--->tx w/ vancomycin for now, prior citrobacter freundii--> treat w/ ciprofloxacin  - Bladder scan to assure no urinary retention, straight cath prn  - ID eval appreciated. s/p completed Abx until 10/9/24.    # Diastolic CHF, chronic.   -1 L fluid restriction  - TTE as above  - Diuretics as above.   - switch to PO.    # COPD, mild.   - Advair discus daily  - Proair prn.    # Peripheral arterial disease.   - c/w aspirin 81 mg daily.    # Mild CAD.   - Aspirin / Lipitor 20 mg  - c/w Lopressor 25 mg BID for now given lower BPs.    # Lung nodules.   - Outpatient plan for LVATs  - d/w CT surgery (no intervention while here)  - outpt follow up with Dr. Lazar.    # DM2 (diabetes mellitus, type 2).   - Lantus 10 units at bedtime, sliding scale.    # Nutrition, metabolism, and development symptoms.   F-1 L restrict  E-replete prn  N-soft diet prior mucoid impaction right sided, formal S/S eval: regular diet with thin liquid.   Heparin subQ.    As per Medicine Attg, patient is cleared to be discharged home today. Patient instructed to follow up with PCP, Pulmonologist, and Cardiologist upon discharge.    Attending Addendum:  Patient seen and examined by me on the discharge day. Medications reviewed. completed abx.  Feeling much better. No cp, no sob. no abd pain. no n/v/d. no HA, no Dizziness.  All questions answered in details. Follow up plan explained. d/w NP/PA. outpt CT surgery follow up for lung surgery.   More than 30 mins were spent evaluating patient and coordinating care for discharge.  Discharge summary sent to pt's primary care physician at [facility].

## 2024-10-07 NOTE — CONSULT LETTER
[Dear  ___] : Dear  [unfilled], [Consult Letter:] : I had the pleasure of evaluating your patient, [unfilled]. [Please see my note below.] : Please see my note below. [Consult Closing:] : Thank you very much for allowing me to participate in the care of this patient.  If you have any questions, please do not hesitate to contact me. [Sincerely,] : Sincerely, [FreeTextEntry2] :  Dr. Nadine Douglass (Pulm/Ref) [FreeTextEntry3] : Zelalem Lazar MD  Attending Surgeon  Division of Thoracic Surgery  , Cardiovascular and Thoracic Surgery  [facility] [employer] [facility] at [facility]

## 2024-10-07 NOTE — ASSESSMENT
[FreeTextEntry1] : Ms. FEDERICO DOS SANTOS, 89-year-old female, former smoker (1/2 PPD x 40yrs, quit 30 yrs ago) , w/ hx of CAD s/p stent, IDDM, PVD s/p femoral-popliteal bypass graft occlusion, HTN, DM and HLD, who referred by Dr. Douglass for a LLL nodule.   Patient was scheduled for Left VATS, LLL wedge resection on 09/12/2024. Patient was hospitalized on 09/09/2024 with (+)UA noted during presurgical testing. Sepsis due to urinary tract infection. + PNA b/l lower lobes and RML. Treated with ceftriaxone 9/10/24 --> cefepime 9/12/24 --> 9/16/24. macrobid 9/11/24 --> vancomycin x 1 dose 9/12/24 + 9/13/24. Acute-on-chronic HFpEF. Discharged on 09/17/2024 with home PT.   I have reviewed the patient's medical records and diagnostic images at time of this office consultation and have made the following recommendation: 1. Patient's respiratory status is stable; she is doing well overall. CT chest shows previously FDG avid left lower lobe nodule is minimally increased from prior scan, significant opacities in bilateral lower lobes and also in right middle lobe- likely due infection/inflammation in etiology. Patient was recently treated with antibiotics for UTI and PNA. Recommended to repeat CT chest in 2 weeks to re-evaluate lung nodules and discuss surgery. Patient is agreeable.   I, OTONIEL Jimenez, personally performed the evaluation and management (E/M) services for this established patient who presents today with (a) new problem(s)/exacerbation of (an) existing condition(s). That E/M includes conducting the examination, assessing all new/exacerbated conditions, and establishing a new plan of care. Today, my ACP, LC Leiva, was here to observe my evaluation and management services for this new problem/exacerbated condition to be followed going forward.

## 2024-10-07 NOTE — CONSULT LETTER
[Dear  ___] : Dear  [unfilled], [Consult Letter:] : I had the pleasure of evaluating your patient, [unfilled]. [Please see my note below.] : Please see my note below. [Consult Closing:] : Thank you very much for allowing me to participate in the care of this patient.  If you have any questions, please do not hesitate to contact me. [Sincerely,] : Sincerely, [FreeTextEntry2] :  Dr. Nadine Douglass (Pulm/Ref) [FreeTextEntry3] : Zelalem Lazar MD  Attending Surgeon  Division of Thoracic Surgery  , Cardiovascular and Thoracic Surgery  [facility] at [facility]

## 2024-10-07 NOTE — DISCHARGE NOTE PROVIDER - NSDCFUADDAPPT_GEN_ALL_CORE_FT
Please follow up with Pulmonology, PCP, and Cardiology ([place] office) upon discharge     Please follow up with a Telehealth Pulmonary outpatient follow up on 10/11@ 11:30AM with Batool Galindo NP  TELEHEALTH INSTRUCTIONS  At the time of your appointment, you will receive a text/email invite for your telehealth session. Please click on the link, enter the patient's name. You will then be redirected to a virtual waiting room, where you will wait until the doctor has connected with you.

## 2024-10-07 NOTE — DISCHARGE NOTE PROVIDER - PROVIDER TOKENS
FREE:[LAST:[Batool Galindo],PHONE:[(   )    -],FAX:[(   )    -],ADDRESS:[Telehealth Pulmonary follow up  Scheduled on 10/11 @ 11:30AM],SCHEDULEDAPPT:[10/11/2024],SCHEDULEDAPPTTIME:[11:30 AM]],FREE:[LAST:[Timothy Nation MD],PHONE:[phone]],FAX:[(   )    -],ADDRESS:[Cardiology Office  [address] [place] [address], [id number]],SCHEDULEDAPPT:[10/17/2024],SCHEDULEDAPPTTIME:[05:00 PM]]

## 2024-10-07 NOTE — CHART NOTE - NSCHARTNOTEFT_GEN_A_CORE
Thoracic team notified of admission. Will f/u recs. Thoracic team notified of admission. Patient planned for outpatient CT chest on 10/16/24 and to f/u w/ Dr. Lazar thereafter. Discussed with Dr. Martinez.

## 2024-10-07 NOTE — DISCHARGE NOTE PROVIDER - NSDCCPCAREPLAN_GEN_ALL_CORE_FT
PRINCIPAL DISCHARGE DIAGNOSIS  Diagnosis: Acute UTI  Assessment and Plan of Treatment: You were admitted to [facility] and was found to have a urinary tract infection. You were seen and evaluated by the Infectious Disease Doctors. You were treated with Antibiotics for few days until 10/9.    - Please call and make appointment with your PCP upon discharge      SECONDARY DISCHARGE DIAGNOSES  Diagnosis: Diastolic CHF, chronic  Assessment and Plan of Treatment: You have history of diastolic heart failure. You were noted to have fluid in your lungs and as per the Cardiology team; you were treated with intravenous Lasix for few days, and now you can resume the Lasix via mouth.   - Low salt diet recommended  - Please continue with 1L fluid restriction daily  - Please follow up with your Cardiology as outpatient on 10/17 @ 5pm    Diagnosis: DM2 (diabetes mellitus, type 2)  Assessment and Plan of Treatment: You have history of DM2 (Diabetes Mellitus, type 2).   - Low carbohydrate die recommended  - Please resume your diabetic medications as indicated on your discharge paper.   - Please check your finger stick blood glucose level 3 times daily    Diagnosis: COPD, mild  Assessment and Plan of Treatment: You have history of COPD, mild.   - Continue with your bronchodilator as scheduled   - Please follow up with the Telehealth Pulmonary appointment as scheduled

## 2024-10-07 NOTE — PROGRESS NOTE ADULT - ASSESSMENT
89 -year-old female with COPD on 2L home O2 PRN, CVA (remote, no deficits), CAD s/p stent ('15), IDDM2, PVD s/p fem-pop bypass ('15) w/occlusion ('22), HLD, HTN, anemia, pulmonary fibrosis 2/2 emphysema, LLL nodule concern for malignancy on PET, dCHF, urinary incontinence here w/ chest pain that is central, dull, and present w/ ambulation. Reports her usual UTI symptoms with burning on urination Has plan for outpatient LVATS, was recently admitted for UTI w/ citrobacter freundii, and e. faecium.     Recurrent UTI  Reviewed prior micro with 10,000 - 49,000 CFU/mL Citrobacter freundii complex, 50,000 - 99,000 CFU/mL Enterococcus faecium  PCN allergy with reported swelling  UCx NGTD  BCx x2 NGTD    Recommendations:   Will treat based on prior cx as all cx here NGTD  ciprofloxacin   IVPB 400 milliGRAM(s) IV Intermittent every 12 hours  vancomycin  IVPB 750 milliGRAM(s) IV Intermittent every 24 hours  Plan x5 day course until 10/9  Trend temps/WBC  Additional care per primary team    Infectious Diseases will follow. Please call with any questions.  Tabby Duarte M.D.  [facility] Division of Infectious Diseases [phone]  For after 5 P.M. and weekends, please call [phone]

## 2024-10-07 NOTE — CONSULT LETTER
[Dear  ___] : Dear  [unfilled], [Consult Letter:] : I had the pleasure of evaluating your patient, [unfilled]. [Please see my note below.] : Please see my note below. [Consult Closing:] : Thank you very much for allowing me to participate in the care of this patient.  If you have any questions, please do not hesitate to contact me. [Sincerely,] : Sincerely, [FreeTextEntry2] :  Dr. Nadine Douglass (Pulm/Ref) [FreeTextEntry3] : Zelalem Lazar MD  Attending Surgeon  Division of Thoracic Surgery  , Cardiovascular and Thoracic Surgery  [facility] at [facility]

## 2024-10-07 NOTE — PROGRESS NOTE ADULT - ASSESSMENT
89 -year-old female with COPD on 2L home O2 PRN, CVA (remote, no deficits), CAD s/p stent ('15), IDDM2, PVD s/p fem-pop bypass ('15) w/occlusion ('22), HLD, HTN, anemia, pulmonary fibrosis 2/2 emphysema, LLL nodule concern for malignancy on PET, dCHF, urinary incontinence here w/ chest pain     #atypical chest pain   -resolved   -likely in the setting of pulm disease  -chest xray Consolidations in the lower lobes with blunting of the hemidiaphragms,  likely represent moderate-sized pleural effusions with passive  atelectasis.  -ecg with no ischemic changes  -hs trop negative   -c/w lasix 20 mg IVP daily   -recent TTE 4/22/24  with normal LVEF and mild AS  -can repeat echo    #CAD s/p PCI  -stable  -cont asa, statin, BB    #HFpEF  -recent TTE with normal LVEF and mild AS  -c/w lasix 20 mg IVP daily   -pending repeat echo     #Lung CA  -preop for LLL VATS w thoracic  as outpt   -med fu / pulm eval     #UTI   -management per med /ID    dvt ppx

## 2024-10-07 NOTE — DISCHARGE NOTE PROVIDER - NSDCFUSCHEDAPPT_GEN_ALL_CORE_FT
Batool Dean  [facility]  [address]  Scheduled Appointment: 10/11/2024

## 2024-10-07 NOTE — DISCHARGE NOTE PROVIDER - NSDCMRMEDTOKEN_GEN_ALL_CORE_FT
Advair Diskus 250 mcg-50 mcg inhalation powder: 1 puff(s) inhaled 2 times a day  aspirin 81 mg oral delayed release tablet: 1 tab(s) orally once a day (at bedtime)  atorvastatin 20 mg oral tablet: 1 tab(s) orally once a day (at bedtime)  Cranberry 500mg orally once a day- LD-09/5/24:   gabapentin 400 mg oral capsule: 1 cap(s) orally once a day (at bedtime)  ipratropium 42 mcg/inh (0.06%) nasal spray: 2 spray(s) intranasally 2 times a day  iron 65 mg orally twice daily:   magnesium 250 mg orally twice a daily:   MetFORMIN (Eqv-Fortamet) 500 mg oral tablet, extended release: 2 tab(s) orally once a day  metoprolol tartrate 50 mg oral tablet: 1 tab(s) orally 2 times a day  Multiple Vitamins oral tablet: 1 tab(s) orally once a day LD - 09/5/24  ocular lubricant ophthalmic solution: 1 drop(s) to each affected eye 2 times a day As needed Dry Eyes  omeprazole 40 mg oral delayed release capsule: 1 cap(s) orally once a day  PreserVision AREDS 2 oral capsule: 1 cap(s) orally 2 times a day  Systane eye drops 2 drop each eye  as needed:   Tresiba FlexTouch 100 units/mL subcutaneous solution: 10 unit(s) subcutaneous once a day (at bedtime) Please take tresiba 13 units subcutaneous at bedtime tonight 9/17  Then take tresiba 10 units subcutaneous at bedtime starting on 9/18  vitamin B12 2500mg orally once a day:   Vitamin C 500mg orally once a day at night:    Advair Diskus 250 mcg-50 mcg inhalation powder: 1 puff(s) inhaled 2 times a day  aspirin 81 mg oral delayed release tablet: 1 tab(s) orally once a day (at bedtime)  atorvastatin 20 mg oral tablet: 1 tab(s) orally once a day (at bedtime)  ferrous sulfate 325 mg (65 mg elemental iron) oral tablet: 1 tab(s) orally once a day  furosemide 20 mg oral tablet: 1 tab(s) orally once a day  gabapentin 400 mg oral capsule: 1 cap(s) orally once a day (at bedtime)  ipratropium 42 mcg/inh (0.06%) nasal spray: 2 spray(s) intranasally 2 times a day  MetFORMIN (Eqv-Fortamet) 500 mg oral tablet, extended release: 2 tab(s) orally once a day  metoprolol tartrate 25 mg oral tablet: 1 tab(s) orally every 12 hours  Multiple Vitamins oral tablet: 1 tab(s) orally once a day LD  - 09/5/24  ocular lubricant ophthalmic solution: 1 drop(s) to each affected eye 2 times a day As needed Dry Eyes  omeprazole 40 mg oral delayed release capsule: 1 cap(s) orally once a day  PreserVision AREDS 2 oral capsule: 1 cap(s) orally 2 times a day  Tresiba FlexTouch 100 units/mL subcutaneous solution: 10 unit(s) subcutaneous once a day (at bedtime) Please take tresiba 13 units subcutaneous at bedtime tonight 9/17  Then take tresiba 10 units subcutaneous at bedtime starting on 9/18   Advair Diskus 250 mcg-50 mcg inhalation powder: 1 puff(s) inhaled 2 times a day  aspirin 81 mg oral delayed release tablet: 1 tab(s) orally once a day (at bedtime)  atorvastatin 20 mg oral tablet: 1 tab(s) orally once a day (at bedtime)  ferrous sulfate 325 mg (65 mg elemental iron) oral tablet: 1 tab(s) orally once a day  furosemide 20 mg oral tablet: 1 tab(s) orally once a day  gabapentin 400 mg oral capsule: 1 cap(s) orally once a day (at bedtime)  ipratropium 42 mcg/inh (0.06%) nasal spray: 2 spray(s) intranasally 2 times a day  MetFORMIN (Eqv-Fortamet) 500 mg oral tablet, extended release: 2 tab(s) orally once a day  metoprolol tartrate 25 mg oral tablet: 1 tab(s) orally every 12 hours  Multiple Vitamins oral tablet: 1 tab(s) orally once a day  ocular lubricant ophthalmic solution: 1 drop(s) to each affected eye 2 times a day As needed Dry Eyes  omeprazole 40 mg oral delayed release capsule: 1 cap(s) orally once a day  PreserVision AREDS 2 oral capsule: 1 cap(s) orally 2 times a day  Tresiba FlexTouch 100 units/mL subcutaneous solution: 10 unit(s) subcutaneous once a day (at bedtime) Take tresiba 10 units subcutaneous at bedtime starting tonight at bedtime

## 2024-10-07 NOTE — HISTORY OF PRESENT ILLNESS
[FreeTextEntry1] : Ms. FEDERICO DOS SANTOS, 89 year old female, former smoker (1/2 PPD x 40yrs, quit 30 yrs ago) , w/ hx of CAD s/p stent, IDDM, PVD s/p femoral-popliteal bypass graft occlusion, HTN, and HLD, who referred by Dr. Nadine Douglass (Pulm) for a LLL nodule.   Spirometry on 03/14/2024:  FEV1 1.40 (99%).   CT chest on 06/07/2024: (PH) - There is extensive peripheral increased interstitial markings, and honeycombing which has progressed since previous chest CT with more involvement of the upper to mid lung fields.Newly appreciated left lower lobe mass series 601 image 22 and 3:65. Measuring 1.4 x 1.5 x 1.8 cm. - 3.33:0.6 cm anterior left upper lobe subpleural nodule stable. Previously noted right upper lobe lung nodule not as well appreciated on today's study. - Stable parenchymal density and pleural thickening adjacent to left major fissure 3:35.Stable tiny nodule periphery left upper lobe 3:56.Stable lingular nodule 3:70 measuring 0.4 cm.Chronic stable changes right upper lobe 3:24. Significant lower lobe bronchiectasis and paraseptal emphysematous changes as previously mentioned. No new consolidations. -  Newly appreciated surgical clips in the right high axillary region and new appreciated tubular graft coursing from right axillary region along the right lateral chest wall and right upper lateral abdomen within the subcutaneous soft tissues distal extent not included on today's exam.  PET/CT on 07/03/2024: (PH) - Left lower lobe spiculated nodule image 75, 16 mm, is hypermetabolic SUV 9.5. This is highly suspicious for malignancy. - Additional scattered smaller lung nodules (left lingula 5 mm, peripheral left upper lobe 6 mm) are non-avid or below PET resolution. - There is bilateral lung base interstitial thickening with mild diffuse nonfocal activity. There is bilateral ill-defined groundglass haziness, non-avid. - Bilateral lung base interstitial thickening with mild activity, suggestive of interstitial lung disease.   Depression screening completed on 7/17/2024  Spirometry on 08/01/2024: FVC 1.79 (95%), FEV1 1.46 (103%).   Cardiac clearance performed by Dr. Rachel  Patient was scheduled for Left VATS, LLL wedge resection on 09/12/2024. Patient was hospitalized on 09/09/2024 with (+)UA noted during presurgical testing. Sepsis due to urinary tract infection. + PNA b/l lower lobes and RML. Treated with ceftriaxone 9/10/24 --> cefepime 9/12/24 --> 9/16/24. macrobid 9/11/24 --> vancomycin x 1 dose 9/12/24 + 9/13/24. Acute-on-chronic HFpEF. Discharged on 09/17/2024 with home PT.   CT chest on 09/13/2024: - Previously FDG avid left lower lobe nodule is minimally increased from 1.6 cm on prior PET/CT to 2 cm (2, 70). Significant opacities in bilateral lower lobes and also in right middle lobe. Emphysema. -  Enlarged bilateral hilar, interlobar and mediastinal lymph nodes. A reference node in right lower paratracheal region measures 1.6 x 1.1 cm (2, 50). - No change in a 1.6 cm right thyroid nodule.  Patient is here today for a follow up. She reports of occasional cough, denies any worsening SOB, chest pain, fever or chills.

## 2024-10-07 NOTE — CONSULT LETTER
[Dear  ___] : Dear  [unfilled], [Consult Letter:] : I had the pleasure of evaluating your patient, [unfilled]. [Please see my note below.] : Please see my note below. [Consult Closing:] : Thank you very much for allowing me to participate in the care of this patient.  If you have any questions, please do not hesitate to contact me. [Sincerely,] : Sincerely, [FreeTextEntry2] :  Dr. Nadine Douglass (Pulm/Ref) [FreeTextEntry3] : Zelalem Lazar MD  Attending Surgeon  Division of Thoracic Surgery  , Cardiovascular and Thoracic Surgery  [facility]

## 2024-10-07 NOTE — DISCHARGE NOTE PROVIDER - CARE PROVIDER_API CALL
Batool Galindo,   Telehealth Pulmonary follow up  Scheduled on 10/11 @ 11:30AM  Phone: (   )    -  Fax: (   )    -  Scheduled Appointment: 10/11/2024 11:30 AM    Timothy Nation MD,   Cardiology Office  [address] [place] [address], [address]  Phone: [phone]  Fax: (   )    -  Scheduled Appointment: 10/17/2024 05:00 PM

## 2024-10-08 LAB
-  AMOXICILLIN/CLAVULANIC ACID: SIGNIFICANT CHANGE UP
-  AMPICILLIN/SULBACTAM: SIGNIFICANT CHANGE UP
-  AMPICILLIN: SIGNIFICANT CHANGE UP
-  AZTREONAM: SIGNIFICANT CHANGE UP
-  CEFAZOLIN: SIGNIFICANT CHANGE UP
-  CEFEPIME: SIGNIFICANT CHANGE UP
-  CEFOXITIN: SIGNIFICANT CHANGE UP
-  CEFTRIAXONE: SIGNIFICANT CHANGE UP
-  CIPROFLOXACIN: SIGNIFICANT CHANGE UP
-  ERTAPENEM: SIGNIFICANT CHANGE UP
-  GENTAMICIN: SIGNIFICANT CHANGE UP
-  IMIPENEM: SIGNIFICANT CHANGE UP
-  LEVOFLOXACIN: SIGNIFICANT CHANGE UP
-  MEROPENEM: SIGNIFICANT CHANGE UP
-  NITROFURANTOIN: SIGNIFICANT CHANGE UP
-  PIPERACILLIN/TAZOBACTAM: SIGNIFICANT CHANGE UP
-  TOBRAMYCIN: SIGNIFICANT CHANGE UP
-  TRIMETHOPRIM/SULFAMETHOXAZOLE: SIGNIFICANT CHANGE UP
ANION GAP SERPL CALC-SCNC: 12 MMOL/L — SIGNIFICANT CHANGE UP (ref 7–14)
BUN SERPL-MCNC: 17 MG/DL — SIGNIFICANT CHANGE UP (ref 7–23)
CALCIUM SERPL-MCNC: 9 MG/DL — SIGNIFICANT CHANGE UP (ref 8.4–10.5)
CHLORIDE SERPL-SCNC: 97 MMOL/L — LOW (ref 98–107)
CO2 SERPL-SCNC: 24 MMOL/L — SIGNIFICANT CHANGE UP (ref 22–31)
CREAT SERPL-MCNC: 0.98 MG/DL — SIGNIFICANT CHANGE UP (ref 0.5–1.3)
CULTURE RESULTS: ABNORMAL
EGFR: 54 ML/MIN/1.73M2 — LOW
GLUCOSE SERPL-MCNC: 113 MG/DL — HIGH (ref 70–99)
HCT VFR BLD CALC: 27.7 % — LOW (ref 34.5–45)
HGB BLD-MCNC: 9 G/DL — LOW (ref 11.5–15.5)
MAGNESIUM SERPL-MCNC: 1.8 MG/DL — SIGNIFICANT CHANGE UP (ref 1.6–2.6)
MCHC RBC-ENTMCNC: 28.6 PG — SIGNIFICANT CHANGE UP (ref 27–34)
MCHC RBC-ENTMCNC: 32.5 GM/DL — SIGNIFICANT CHANGE UP (ref 32–36)
MCV RBC AUTO: 87.9 FL — SIGNIFICANT CHANGE UP (ref 80–100)
METHOD TYPE: SIGNIFICANT CHANGE UP
NRBC # BLD: 0 /100 WBCS — SIGNIFICANT CHANGE UP (ref 0–0)
NRBC # FLD: 0 K/UL — SIGNIFICANT CHANGE UP (ref 0–0)
ORGANISM # SPEC MICROSCOPIC CNT: ABNORMAL
ORGANISM # SPEC MICROSCOPIC CNT: ABNORMAL
PHOSPHATE SERPL-MCNC: 3.1 MG/DL — SIGNIFICANT CHANGE UP (ref 2.5–4.5)
PLATELET # BLD AUTO: 321 K/UL — SIGNIFICANT CHANGE UP (ref 150–400)
POTASSIUM SERPL-MCNC: 4 MMOL/L — SIGNIFICANT CHANGE UP (ref 3.5–5.3)
POTASSIUM SERPL-SCNC: 4 MMOL/L — SIGNIFICANT CHANGE UP (ref 3.5–5.3)
RBC # BLD: 3.15 M/UL — LOW (ref 3.8–5.2)
RBC # FLD: 15.8 % — HIGH (ref 10.3–14.5)
SODIUM SERPL-SCNC: 133 MMOL/L — LOW (ref 135–145)
SPECIMEN SOURCE: SIGNIFICANT CHANGE UP
WBC # BLD: 4.85 K/UL — SIGNIFICANT CHANGE UP (ref 3.8–10.5)
WBC # FLD AUTO: 4.85 K/UL — SIGNIFICANT CHANGE UP (ref 3.8–10.5)

## 2024-10-08 RX ADMIN — Medication 200 MILLIGRAM(S): at 18:01

## 2024-10-08 RX ADMIN — Medication 325 MILLIGRAM(S): at 12:19

## 2024-10-08 RX ADMIN — Medication 81 MILLIGRAM(S): at 12:18

## 2024-10-08 RX ADMIN — Medication 6: at 18:00

## 2024-10-08 RX ADMIN — Medication 4: at 22:13

## 2024-10-08 RX ADMIN — Medication 500 MILLIGRAM(S): at 12:19

## 2024-10-08 RX ADMIN — FUROSEMIDE 20 MILLIGRAM(S): 10 INJECTION INTRAVENOUS at 06:24

## 2024-10-08 RX ADMIN — Medication 25 MILLIGRAM(S): at 18:02

## 2024-10-08 RX ADMIN — Medication 200 MILLIGRAM(S): at 06:30

## 2024-10-08 RX ADMIN — GABAPENTIN 400 MILLIGRAM(S): 800 TABLET, FILM COATED ORAL at 21:52

## 2024-10-08 RX ADMIN — PANTOPRAZOLE SODIUM 40 MILLIGRAM(S): 40 TABLET, DELAYED RELEASE ORAL at 12:19

## 2024-10-08 RX ADMIN — INSULIN GLARGINE 10 UNIT(S): 300 INJECTION, SOLUTION SUBCUTANEOUS at 22:12

## 2024-10-08 RX ADMIN — Medication 1 DOSE(S): at 21:49

## 2024-10-08 RX ADMIN — ATORVASTATIN CALCIUM 20 MILLIGRAM(S): 10 TABLET, FILM COATED ORAL at 21:52

## 2024-10-08 RX ADMIN — Medication 2: at 09:23

## 2024-10-08 RX ADMIN — VANCOMYCIN HCL-SODIUM CHLORIDE IV SOLN 1.5 GM/250ML-0.9% 250 MILLIGRAM(S): 1.5-0.9/25 SOLUTION at 09:26

## 2024-10-08 RX ADMIN — Medication 25 MILLIGRAM(S): at 06:24

## 2024-10-08 RX ADMIN — Medication 1000 MICROGRAM(S): at 12:19

## 2024-10-08 RX ADMIN — Medication 8: at 12:17

## 2024-10-08 NOTE — PROGRESS NOTE ADULT - ASSESSMENT
89 -year-old female with COPD on 2L home O2 PRN, CVA (remote, no deficits), CAD s/p stent ('15), IDDM2, PVD s/p fem-pop bypass ('15) w/occlusion ('22), HLD, HTN, anemia, pulmonary fibrosis 2/2 emphysema, LLL nodule concern for malignancy on PET, dCHF, urinary incontinence here w/ chest pain     #atypical chest pain   -resolved   -likely in the setting of pulm disease  -chest xray Consolidations in the lower lobes with blunting of the hemidiaphragms,  likely represent moderate-sized pleural effusions with passive  atelectasis.  -ecg with no ischemic changes  -hs trop negative   -c/w lasix 20 mg IVP daily   -recent TTE 4/22/24  with normal LVEF and mild AS  -TTE 10/7/24 shows technically difficult image quality; normal LVSF with EF 60%; probably nml RVSF; moderate AS; mild to mod TR    #CAD s/p PCI  -stable  -cont asa, statin, BB    #HFpEF  -recent TTE with normal LVEF and mild AS  -repeat ECHO as above  -c/w lasix 20 mg IVP daily      #Lung CA  -preop for LLL VATS w thoracic  as outpt   -med fu / pulm eval     #UTI   -management per med /ID    dvt ppx

## 2024-10-08 NOTE — PROGRESS NOTE ADULT - PROBLEM SELECTOR PLAN 2
-+urinalysis, w/ wbc, though no bacteria   -prior e. faecium--->tx w/ vancomycin for now, prior citrobacter freundii--> treat w/ ciprofloxacin  -bladder scan to assure no urinary retention, straight cath prn  - ID eval appreciated. abx until 10/9/24
-+urinalysis, w/ wbc, though no bacteria (f/u official culture data)  -prior e. faecium--->tx w/ vancomycin for now, prior citrobacter freundii--> treat w/ ciprofloxacin  -bladder scan to assure no urinary retention, straight cath prn  - ID eval appreciated.
-+urinalysis, w/ wbc, though no bacteria   -prior e. faecium--->tx w/ vancomycin for now, prior citrobacter freundii--> treat w/ ciprofloxacin  -bladder scan to assure no urinary retention, straight cath prn  - ID eval appreciated. abx until 10/9/24

## 2024-10-08 NOTE — PROGRESS NOTE ADULT - PROBLEM SELECTOR PLAN 4
-advair discus daily  -proair prn

## 2024-10-08 NOTE — PHYSICAL THERAPY INITIAL EVALUATION ADULT - ADDITIONAL COMMENTS
Patient report lives with daughter in house, 2 steps to enter, ambulated with out assistive device. Patient owns a cane and walker.

## 2024-10-08 NOTE — PROGRESS NOTE ADULT - PROBLEM SELECTOR PLAN 1
-dull chest pain, troponin negative times 2 low concern for ACS. Worse on ambulation, possible anginal pain  -oxygen requirements not greater than usual, no LE swelling low concern for PE  -c/w IV lasix 20 mg iv daily, feeling better  - card follow up appreciated. PO transition soon.
-dull chest pain, troponin negative times 2 low concern for ACS. Worse on ambulation, possible anginal pain  -oxygen requirements not greater than usual, no LE swelling low concern for PE  -c/w IV lasix 20 mg iv daily, feeling better, can switch back to home PO lasix.   - card follow up appreciated. TTE reviewed.
-dull chest pain, troponin negative times 2 low concern for ACS. Worse on ambulation, possible anginal pain  -oxygen requirements not greater than usual, no LE swelling low concern for PE  -check probnp, can diurese w/ lasix 20 mg iv daily for now

## 2024-10-08 NOTE — PHYSICAL THERAPY INITIAL EVALUATION ADULT - PERTINENT HX OF CURRENT PROBLEM, REHAB EVAL
Patient is 89 -year-old female with COPD on 2L home O2 PRN, CVA (remote, no deficits), CAD s/p stent ('15), IDDM2, PVD s/p fem-pop bypass ('15) with occlusion ('22), HLD, HTN, anemia, pulmonary fibrosis seondary to emphysema, LLL nodule concern for malignancy on PET, DCHF, urinary incontinence here with chest pain that is central, dull, and present with ambulation.

## 2024-10-08 NOTE — PROGRESS NOTE ADULT - PROBLEM SELECTOR PLAN 7
-outpatient plan for LVATs  -d/w CT surgery (no intervention while here)  - outpt follow up with Dr. Lazar.
-outpatient plan for LVATs
-outpatient plan for LVATs  -d/w CT surgery (no intervention while here)

## 2024-10-08 NOTE — PROGRESS NOTE ADULT - PROBLEM SELECTOR PLAN 3
-1 L fluid restriction  -TTE as above  -can diurese lasix 20 mg iv daily given worsening pleural effusions
-1 L fluid restriction  -TTE as above  -can diurese lasix 20 mg iv daily given worsening pleural effusions
-1 L fluid restriction  -TTE as above  -diuretics as above.   -switch to PO

## 2024-10-08 NOTE — PROGRESS NOTE ADULT - PROBLEM SELECTOR PLAN 8
-lantus 10 units at bedtime, sliding scale

## 2024-10-08 NOTE — PROGRESS NOTE ADULT - PROBLEM SELECTOR PLAN 9
F-1 L restrict  E-replete prn  N-soft diet prior mucoid impaction right sided, formal S/S eval: regular diet with thin liquid.   heparin subQ
F-1 L restrict  E-replete prn  N-soft diet prior mucoid impaction right sided, formal S/S eval: regular diet with thin liquid.   heparin subQ
F-1 L restrict  E-replete prn  N-soft diet prior mucoid impaction right sided, obtain formal S/S eval  heparin subQ

## 2024-10-08 NOTE — PROGRESS NOTE ADULT - PROBLEM SELECTOR PLAN 6
-aspirin/lipitor 20 mg  -lopressor 25 mg BID for now given lower BPs

## 2024-10-08 NOTE — PROGRESS NOTE ADULT - NSPROGADDITIONALINFOA_GEN_ALL_CORE
abx per ID  per thoracic, lung cancer surgery will be outpt. (pt of Dr. Lazar)  Physical therapy. Out of bed to chair with assistance.   dc planning home on PO abx per ID.  d/w QUAN Link.     - Dr. LOVETT Htet ([facility])  - [phone]
c/w abx per ID  can consult thoracic for lung cancer surgery which was planned last admission, while she is here.  Physical therapy. Out of bed to chair with assistance.     - Dr. RACHELL Martinez ([facility])  - [phone]
c/w abx per ID  per thoracic, lung cancer surgery will be outpt. (pt of Dr. Lazar)  Physical therapy. Out of bed to chair with assistance.     - Dr. RACHELL Martinez ([facility])  - [phone]

## 2024-10-08 NOTE — PROGRESS NOTE ADULT - ASSESSMENT
89 -year-old female with COPD on 2L home O2 PRN, CVA (remote, no deficits), CAD s/p stent ('15), IDDM2, PVD s/p fem-pop bypass ('15) w/occlusion ('22), HLD, HTN, anemia, pulmonary fibrosis 2/2 emphysema, LLL nodule concern for malignancy on PET, dCHF, urinary incontinence here w/ chest pain that is central, dull, and present w/ ambulation. Reports her usual UTI symptoms with burning on urination Has plan for outpatient LVATS, was recently admitted for UTI w/ citrobacter freundii, and e. faecium.     Recurrent UTI  Reviewed prior micro with 10,000 - 49,000 CFU/mL Citrobacter freundii complex, 50,000 - 99,000 CFU/mL Enterococcus faecium  PCN allergy with reported swelling  UCx   10,000 - 49,000 CFU/mL Citrobacter freundii complex  BCx x2 NGTD    Recommendations:   D/c vancomycin  C/w ciprofloxacin   IVPB 400 milliGRAM(s) IV Intermittent every 12 hours x5 day course until 10/9  Switch to PO ciprofloxacin 500mg BID to complete course if pending d/c  Trend temps/WBC  Additional care per primary team    Stable from ID standpoint  D/c planning per primary team    Infectious Diseases will follow. Please call with any questions.  Tabby Duarte M.D.  \[facility]\"" Division of Infectious Diseases [phone]  For after 5 P.M. and weekends, please call [phone]   89 -year-old female with COPD on 2L home O2 PRN, CVA (remote, no deficits), CAD s/p stent ('15), IDDM2, PVD s/p fem-pop bypass ('15) w/occlusion ('22), HLD, HTN, anemia, pulmonary fibrosis 2/2 emphysema, LLL nodule concern for malignancy on PET, dCHF, urinary incontinence here w/ chest pain that is central, dull, and present w/ ambulation. Reports her usual UTI symptoms with burning on urination Has plan for outpatient LVATS, was recently admitted for UTI w/ citrobacter freundii, and e. faecium.     Recurrent UTI  Reviewed prior micro with 10,000 - 49,000 CFU/mL Citrobacter freundii complex, 50,000 - 99,000 CFU/mL Enterococcus faecium  PCN allergy with reported swelling  UCx   10,000 - 49,000 CFU/mL Citrobacter freundii complex  BCx x2 NGTD    Recommendations:   D/c vancomycin  C/w ciprofloxacin   IVPB 400 milliGRAM(s) IV Intermittent every 12 hours x5 day course until 10/9  Switch to PO ciprofloxacin 500mg BID to complete course if pending d/c  Trend temps/WBC  Additional care per primary team    Stable from ID standpoint  D/c planning per primary team    Please call with any further questions.  Tabby Duarte M.D.  [facility] [place] Division of Infectious Diseases [phone]  For after 5 P.M. and weekends, please call [phone]   89 -year-old female with COPD on 2L home O2 PRN, CVA (remote, no deficits), CAD s/p stent ('15), IDDM2, PVD s/p fem-pop bypass ('15) w/occlusion ('22), HLD, HTN, anemia, pulmonary fibrosis 2/2 emphysema, LLL nodule concern for malignancy on PET, dCHF, urinary incontinence here w/ chest pain that is central, dull, and present w/ ambulation. Reports her usual UTI symptoms with burning on urination Has plan for outpatient LVATS, was recently admitted for UTI w/ citrobacter freundii, and e. faecium.     Recurrent UTI  Reviewed prior micro with 10,000 - 49,000 CFU/mL Citrobacter freundii complex, 50,000 - 99,000 CFU/mL Enterococcus faecium  PCN allergy with reported swelling  UCx   10,000 - 49,000 CFU/mL Citrobacter freundii complex  BCx x2 NGTD    Recommendations:   D/c vancomycin  C/w ciprofloxacin   IVPB 400 milliGRAM(s) IV Intermittent every 12 hours x5 day course until 10/9  Switch to PO ciprofloxacin 500mg BID to complete course if pending d/c  Trend temps/WBC  Additional care per primary team    Stable from ID standpoint  D/c planning per primary team  D/w Dr. Martinez  Please call with any further questions.  Tabby Duarte M.D.  [facility] Division of Infectious Diseases [phone]  For after 5 P.M. and weekends, please call [phone]

## 2024-10-09 ENCOUNTER — TRANSCRIPTION ENCOUNTER (OUTPATIENT)
Age: 89
End: 2024-10-09

## 2024-10-09 VITALS — SYSTOLIC BLOOD PRESSURE: 121 MMHG | DIASTOLIC BLOOD PRESSURE: 52 MMHG | HEART RATE: 79 BPM

## 2024-10-09 LAB
ANION GAP SERPL CALC-SCNC: 13 MMOL/L — SIGNIFICANT CHANGE UP (ref 7–14)
BUN SERPL-MCNC: 22 MG/DL — SIGNIFICANT CHANGE UP (ref 7–23)
CALCIUM SERPL-MCNC: 8.9 MG/DL — SIGNIFICANT CHANGE UP (ref 8.4–10.5)
CHLORIDE SERPL-SCNC: 94 MMOL/L — LOW (ref 98–107)
CO2 SERPL-SCNC: 23 MMOL/L — SIGNIFICANT CHANGE UP (ref 22–31)
CREAT SERPL-MCNC: 0.93 MG/DL — SIGNIFICANT CHANGE UP (ref 0.5–1.3)
EGFR: 58 ML/MIN/1.73M2 — LOW
GLUCOSE SERPL-MCNC: 243 MG/DL — HIGH (ref 70–99)
HCT VFR BLD CALC: 32.5 % — LOW (ref 34.5–45)
HGB BLD-MCNC: 10.4 G/DL — LOW (ref 11.5–15.5)
MAGNESIUM SERPL-MCNC: 1.8 MG/DL — SIGNIFICANT CHANGE UP (ref 1.6–2.6)
MCHC RBC-ENTMCNC: 28.7 PG — SIGNIFICANT CHANGE UP (ref 27–34)
MCHC RBC-ENTMCNC: 32 GM/DL — SIGNIFICANT CHANGE UP (ref 32–36)
MCV RBC AUTO: 89.8 FL — SIGNIFICANT CHANGE UP (ref 80–100)
NRBC # BLD: 0 /100 WBCS — SIGNIFICANT CHANGE UP (ref 0–0)
NRBC # FLD: 0 K/UL — SIGNIFICANT CHANGE UP (ref 0–0)
PHOSPHATE SERPL-MCNC: 2.9 MG/DL — SIGNIFICANT CHANGE UP (ref 2.5–4.5)
PLATELET # BLD AUTO: 282 K/UL — SIGNIFICANT CHANGE UP (ref 150–400)
POTASSIUM SERPL-MCNC: 4.3 MMOL/L — SIGNIFICANT CHANGE UP (ref 3.5–5.3)
POTASSIUM SERPL-SCNC: 4.3 MMOL/L — SIGNIFICANT CHANGE UP (ref 3.5–5.3)
RBC # BLD: 3.62 M/UL — LOW (ref 3.8–5.2)
RBC # FLD: 15.5 % — HIGH (ref 10.3–14.5)
SODIUM SERPL-SCNC: 130 MMOL/L — LOW (ref 135–145)
WBC # BLD: 5.5 K/UL — SIGNIFICANT CHANGE UP (ref 3.8–10.5)
WBC # FLD AUTO: 5.5 K/UL — SIGNIFICANT CHANGE UP (ref 3.8–10.5)

## 2024-10-09 RX ORDER — FERROUS SULFATE 325(65) MG
1 TABLET ORAL
Qty: 30 | Refills: 0
Start: 2024-10-09 | End: 2024-11-07

## 2024-10-09 RX ORDER — METOPROLOL TARTRATE 50 MG
1 TABLET ORAL
Qty: 60 | Refills: 0
Start: 2024-10-09 | End: 2024-11-07

## 2024-10-09 RX ORDER — FUROSEMIDE 40 MG/1
2 TABLET ORAL
Qty: 30 | Refills: 0 | DISCHARGE
Start: 2024-10-09 | End: 2024-11-07

## 2024-10-09 RX ORDER — FUROSEMIDE 10 MG/ML
1 INJECTION INTRAVENOUS
Qty: 30 | Refills: 0
Start: 2024-10-09 | End: 2024-11-07

## 2024-10-09 RX ADMIN — Medication 81 MILLIGRAM(S): at 11:34

## 2024-10-09 RX ADMIN — Medication 6: at 12:30

## 2024-10-09 RX ADMIN — Medication 5000 UNIT(S): at 00:57

## 2024-10-09 RX ADMIN — Medication 4: at 08:41

## 2024-10-09 RX ADMIN — Medication 325 MILLIGRAM(S): at 11:33

## 2024-10-09 RX ADMIN — Medication 4: at 17:37

## 2024-10-09 RX ADMIN — FUROSEMIDE 20 MILLIGRAM(S): 10 INJECTION INTRAVENOUS at 05:52

## 2024-10-09 RX ADMIN — Medication 5000 UNIT(S): at 11:34

## 2024-10-09 RX ADMIN — Medication 25 MILLIGRAM(S): at 05:52

## 2024-10-09 RX ADMIN — Medication 1 DOSE(S): at 08:42

## 2024-10-09 RX ADMIN — Medication 200 MILLIGRAM(S): at 05:52

## 2024-10-09 RX ADMIN — Medication 1000 MICROGRAM(S): at 11:34

## 2024-10-09 RX ADMIN — Medication 500 MILLIGRAM(S): at 11:34

## 2024-10-09 RX ADMIN — PANTOPRAZOLE SODIUM 40 MILLIGRAM(S): 40 TABLET, DELAYED RELEASE ORAL at 11:33

## 2024-10-09 RX ADMIN — Medication 25 MILLIGRAM(S): at 17:09

## 2024-10-09 NOTE — PROGRESS NOTE ADULT - SUBJECTIVE AND OBJECTIVE BOX
CARDIOLOGY FOLLOW UP - Dr. Valenzuela  DATE OF SERVICE: 10/7/24    CC  no CP or SOB    REVIEW OF SYSTEMS:  CONSTITUTIONAL: No fever, weight loss, or fatigue  RESPIRATORY: No cough, wheezing, chills or hemoptysis; No Shortness of Breath  CARDIOVASCULAR: No chest pain, palpitations, passing out, dizziness  GASTROINTESTINAL: No abdominal or epigastric pain. No nausea, vomiting, or hematemesis; No diarrhea or constipation. No melena or hematochezia.      PHYSICAL EXAM:  T(C): 36.2 (10-07-24 @ 06:20), Max: 36.9 (10-06-24 @ 18:05)  HR: 83 (10-07-24 @ 06:20) (83 - 100)  BP: 113/50 (10-07-24 @ 06:20) (104/58 - 116/62)  RR: 16 (10-07-24 @ 06:20) (16 - 18)  SpO2: 97% (10-07-24 @ 06:20) (95% - 98%)  Wt(kg): --  I&O's Summary    06 Oct 2024 07:01  -  07 Oct 2024 07:00  --------------------------------------------------------  IN: 480 mL / OUT: 2550 mL / NET: -2070 mL        Appearance: Normal	  Cardiovascular: Normal S1 S2,RRR, No JVD, No murmurs  Respiratory: Lungs clear to auscultation b/l  Gastrointestinal:  Soft, Non-tender, + BS	  Extremities: Normal range of motion, No clubbing, cyanosis or edema      Home Medications:  Advair Diskus 250 mcg-50 mcg inhalation powder: 1 puff(s) inhaled 2 times a day (09 Sep 2024 06:08)  aspirin 81 mg oral delayed release tablet: 1 tab(s) orally once a day (at bedtime) (09 Sep 2024 06:08)  atorvastatin 20 mg oral tablet: 1 tab(s) orally once a day (at bedtime) (09 Sep 2024 06:08)  Cranberry 500mg orally once a day- LD-09/5/24:  (09 Sep 2024 06:08)  gabapentin 400 mg oral capsule: 1 cap(s) orally once a day (at bedtime) (09 Sep 2024 06:08)  ipratropium 42 mcg/inh (0.06%) nasal spray: 2 spray(s) intranasally 2 times a day (09 Sep 2024 06:08)  iron 65 mg orally twice daily:  (09 Sep 2024 06:08)  magnesium 250 mg orally twice a daily:  (09 Sep 2024 06:08)  MetFORMIN (Eqv-Fortamet) 500 mg oral tablet, extended release: 2 tab(s) orally once a day (09 Sep 2024 06:08)  metoprolol tartrate 50 mg oral tablet: 1 tab(s) orally 2 times a day (09 Sep 2024 06:08)  Multiple Vitamins oral tablet: 1 tab(s) orally once a day LD  - 09/5/24 (09 Sep 2024 06:08)  ocular lubricant ophthalmic solution: 1 drop(s) to each affected eye 2 times a day As needed Dry Eyes (17 Sep 2024 15:45)  omeprazole 40 mg oral delayed release capsule: 1 cap(s) orally once a day (09 Sep 2024 06:08)  PreserVision AREDS 2 oral capsule: 1 cap(s) orally 2 times a day (09 Sep 2024 06:08)  Systane eye drops 2 drop each eye  as needed:  (09 Sep 2024 06:08)  Tresiba FlexTouch 100 units/mL subcutaneous solution: 10 unit(s) subcutaneous once a day (at bedtime) Please take tresiba 13 units subcutaneous at bedtime tonight 9/17  Then take tresiba 10 units subcutaneous at bedtime starting on 9/18 (17 Sep 2024 16:44)  vitamin B12 2500mg orally once a day:  (09 Sep 2024 06:08)  Vitamin C 500mg orally once a day at night:  (09 Sep 2024 06:08)      MEDICATIONS  (STANDING):  ascorbic acid 500 milliGRAM(s) Oral daily  aspirin enteric coated 81 milliGRAM(s) Oral daily  atorvastatin 20 milliGRAM(s) Oral at bedtime  ciprofloxacin   IVPB      ciprofloxacin   IVPB 400 milliGRAM(s) IV Intermittent every 12 hours  cyanocobalamin 1000 MICROGram(s) Oral daily  dextrose 5%. 1000 milliLiter(s) (50 mL/Hr) IV Continuous <Continuous>  dextrose 5%. 1000 milliLiter(s) (100 mL/Hr) IV Continuous <Continuous>  dextrose 50% Injectable 25 Gram(s) IV Push once  dextrose 50% Injectable 25 Gram(s) IV Push once  dextrose 50% Injectable 12.5 Gram(s) IV Push once  ferrous    sulfate 325 milliGRAM(s) Oral daily  fluticasone propionate/ salmeterol 250-50 MICROgram(s) Diskus 1 Dose(s) Inhalation two times a day  furosemide   Injectable 20 milliGRAM(s) IV Push every 24 hours  gabapentin 400 milliGRAM(s) Oral at bedtime  glucagon  Injectable 1 milliGRAM(s) IntraMuscular once  heparin   Injectable 5000 Unit(s) SubCutaneous every 8 hours  insulin glargine Injectable (LANTUS) 10 Unit(s) SubCutaneous at bedtime  insulin lispro (ADMELOG) corrective regimen sliding scale   SubCutaneous three times a day before meals  insulin lispro (ADMELOG) corrective regimen sliding scale   SubCutaneous at bedtime  metoprolol tartrate 25 milliGRAM(s) Oral every 12 hours  pantoprazole   Suspension 40 milliGRAM(s) Oral daily  vancomycin  IVPB 1000 milliGRAM(s) IV Intermittent every 24 hours      TELEMETRY: 	  NSR  ECG:  	  RADIOLOGY:   DIAGNOSTIC TESTING:  [ ] Echocardiogram:  [ ]  Catheterization:  [ ] Stress Test:    OTHER: 	    LABS:	 	    Troponin T, High Sensitivity Result: 19 ng/L (10-04 @ 22:48)  Troponin T, High Sensitivity Result: 20 ng/L (10-04 @ 19:47)                          9.3    5.35  )-----------( 301      ( 07 Oct 2024 04:44 )             30.1     10-07    134[L]  |  97[L]  |  17  ----------------------------<  147[H]  4.0   |  24  |  0.85    Ca    8.9      07 Oct 2024 04:44  Phos  3.4     10-07  Mg     1.90     10-07              
CARDIOLOGY FOLLOW UP - Dr. Valenzuela  DATE OF SERVICE: 10/6/24    CC no acute cv events       REVIEW OF SYSTEMS:  CONSTITUTIONAL: No fever, weight loss, or fatigue  RESPIRATORY: No cough, wheezing, chills or hemoptysis; No Shortness of Breath  CARDIOVASCULAR: No chest pain, palpitations, passing out, dizziness  GASTROINTESTINAL: No abdominal or epigastric pain. No nausea, vomiting, or hematemesis; No diarrhea or constipation. No melena or hematochezia.      PHYSICAL EXAM:  T(C): 36.5 (10-06-24 @ 05:20), Max: 36.6 (10-05-24 @ 19:42)  HR: 91 (10-06-24 @ 05:20) (75 - 97)  BP: 120/54 (10-06-24 @ 05:20) (117/54 - 135/60)  RR: 16 (10-06-24 @ 05:20) (16 - 18)  SpO2: 99% (10-06-24 @ 05:20) (92% - 100%)  Wt(kg): --  I&O's Summary    05 Oct 2024 07:01  -  06 Oct 2024 07:00  --------------------------------------------------------  IN: 750 mL / OUT: 1250 mL / NET: -500 mL        Appearance: Normal	  Cardiovascular: Normal S1 S2,RRR, No JVD, No murmurs  Respiratory: diminished   Gastrointestinal:  Soft, Non-tender, + BS	  Extremities: Normal range of motion, No clubbing, cyanosis or edema      Home Medications:  Advair Diskus 250 mcg-50 mcg inhalation powder: 1 puff(s) inhaled 2 times a day (09 Sep 2024 06:08)  aspirin 81 mg oral delayed release tablet: 1 tab(s) orally once a day (at bedtime) (09 Sep 2024 06:08)  atorvastatin 20 mg oral tablet: 1 tab(s) orally once a day (at bedtime) (09 Sep 2024 06:08)  Cranberry 500mg orally once a day- LD-09/5/24:  (09 Sep 2024 06:08)  gabapentin 400 mg oral capsule: 1 cap(s) orally once a day (at bedtime) (09 Sep 2024 06:08)  ipratropium 42 mcg/inh (0.06%) nasal spray: 2 spray(s) intranasally 2 times a day (09 Sep 2024 06:08)  iron 65 mg orally twice daily:  (09 Sep 2024 06:08)  magnesium 250 mg orally twice a daily:  (09 Sep 2024 06:08)  MetFORMIN (Eqv-Fortamet) 500 mg oral tablet, extended release: 2 tab(s) orally once a day (09 Sep 2024 06:08)  metoprolol tartrate 50 mg oral tablet: 1 tab(s) orally 2 times a day (09 Sep 2024 06:08)  Multiple Vitamins oral tablet: 1 tab(s) orally once a day LD  - 09/5/24 (09 Sep 2024 06:08)  ocular lubricant ophthalmic solution: 1 drop(s) to each affected eye 2 times a day As needed Dry Eyes (17 Sep 2024 15:45)  omeprazole 40 mg oral delayed release capsule: 1 cap(s) orally once a day (09 Sep 2024 06:08)  PreserVision AREDS 2 oral capsule: 1 cap(s) orally 2 times a day (09 Sep 2024 06:08)  Systane eye drops 2 drop each eye  as needed:  (09 Sep 2024 06:08)  Tresiba FlexTouch 100 units/mL subcutaneous solution: 10 unit(s) subcutaneous once a day (at bedtime) Please take tresiba 13 units subcutaneous at bedtime tonight 9/17  Then take tresiba 10 units subcutaneous at bedtime starting on 9/18 (17 Sep 2024 16:44)  vitamin B12 2500mg orally once a day:  (09 Sep 2024 06:08)  Vitamin C 500mg orally once a day at night:  (09 Sep 2024 06:08)      MEDICATIONS  (STANDING):  ascorbic acid 500 milliGRAM(s) Oral daily  aspirin enteric coated 81 milliGRAM(s) Oral daily  atorvastatin 20 milliGRAM(s) Oral at bedtime  ciprofloxacin   IVPB      ciprofloxacin   IVPB 400 milliGRAM(s) IV Intermittent every 12 hours  cyanocobalamin 1000 MICROGram(s) Oral daily  dextrose 5%. 1000 milliLiter(s) (50 mL/Hr) IV Continuous <Continuous>  dextrose 5%. 1000 milliLiter(s) (100 mL/Hr) IV Continuous <Continuous>  dextrose 50% Injectable 12.5 Gram(s) IV Push once  dextrose 50% Injectable 25 Gram(s) IV Push once  dextrose 50% Injectable 25 Gram(s) IV Push once  ferrous    sulfate 325 milliGRAM(s) Oral daily  fluticasone propionate/ salmeterol 250-50 MICROgram(s) Diskus 1 Dose(s) Inhalation two times a day  furosemide   Injectable 20 milliGRAM(s) IV Push every 24 hours  gabapentin 400 milliGRAM(s) Oral at bedtime  glucagon  Injectable 1 milliGRAM(s) IntraMuscular once  heparin   Injectable 5000 Unit(s) SubCutaneous every 8 hours  insulin glargine Injectable (LANTUS) 10 Unit(s) SubCutaneous at bedtime  insulin lispro (ADMELOG) corrective regimen sliding scale   SubCutaneous at bedtime  insulin lispro (ADMELOG) corrective regimen sliding scale   SubCutaneous three times a day before meals  metoprolol tartrate 25 milliGRAM(s) Oral every 12 hours  pantoprazole   Suspension 40 milliGRAM(s) Oral daily  vancomycin  IVPB 750 milliGRAM(s) IV Intermittent every 24 hours      TELEMETRY: nsr	    ECG:  	  RADIOLOGY:   DIAGNOSTIC TESTING:  [ ] Echocardiogram:  [ ]  Catheterization:  [ ] Stress Test:    OTHER: 	    LABS:	 	    Troponin T, High Sensitivity Result: 19 ng/L (10-04 @ 22:48)  Troponin T, High Sensitivity Result: 20 ng/L (10-04 @ 19:47)                          9.0    5.69  )-----------( 296      ( 06 Oct 2024 06:02 )             28.2     10-06    136  |  98  |  17  ----------------------------<  89  4.1   |  26  |  0.84    Ca    9.0      06 Oct 2024 06:02  Phos  4.2     10-06  Mg     1.90     10-06    TPro  6.2  /  Alb  3.5  /  TBili  <0.2  /  DBili  x   /  AST  16  /  ALT  7   /  AlkPhos  91  10-05    PT/INR - ( 05 Oct 2024 06:45 )   PT: 11.0 sec;   INR: 0.92 ratio         PTT - ( 05 Oct 2024 06:45 )  PTT:28.7 sec        
[facility], Division of Infectious Diseases  KRISSY Luong Y. Patel, S. Shah, G. [facility]  [phone]    Name: FEDERICO DOS SANTOS  Age: 89y  Gender: Female  MRN: [id number]    Interval History:  Patient seen and examined at bedside this morning  No acute overnight events. Afebrile  No complaints  Notes reviewed    Antibiotics:  ciprofloxacin   IVPB 400 milliGRAM(s) IV Intermittent every 12 hours  ciprofloxacin   IVPB      vancomycin  IVPB 1000 milliGRAM(s) IV Intermittent every 24 hours      Medications:  albuterol    90 MICROgram(s) HFA Inhaler 2 Puff(s) Inhalation every 6 hours PRN  artificial  tears Solution 1 Drop(s) Both EYES every 12 hours PRN  ascorbic acid 500 milliGRAM(s) Oral daily  aspirin enteric coated 81 milliGRAM(s) Oral daily  atorvastatin 20 milliGRAM(s) Oral at bedtime  benzonatate 200 milliGRAM(s) Oral every 8 hours PRN  ciprofloxacin   IVPB      ciprofloxacin   IVPB 400 milliGRAM(s) IV Intermittent every 12 hours  cyanocobalamin 1000 MICROGram(s) Oral daily  dextrose 5%. 1000 milliLiter(s) IV Continuous <Continuous>  dextrose 5%. 1000 milliLiter(s) IV Continuous <Continuous>  dextrose 50% Injectable 25 Gram(s) IV Push once  dextrose 50% Injectable 25 Gram(s) IV Push once  dextrose 50% Injectable 12.5 Gram(s) IV Push once  dextrose Oral Gel 15 Gram(s) Oral once PRN  ferrous    sulfate 325 milliGRAM(s) Oral daily  fluticasone propionate/ salmeterol 250-50 MICROgram(s) Diskus 1 Dose(s) Inhalation two times a day  furosemide   Injectable 20 milliGRAM(s) IV Push every 24 hours  gabapentin 400 milliGRAM(s) Oral at bedtime  glucagon  Injectable 1 milliGRAM(s) IntraMuscular once  heparin   Injectable 5000 Unit(s) SubCutaneous every 8 hours  insulin glargine Injectable (LANTUS) 10 Unit(s) SubCutaneous at bedtime  insulin lispro (ADMELOG) corrective regimen sliding scale   SubCutaneous at bedtime  insulin lispro (ADMELOG) corrective regimen sliding scale   SubCutaneous three times a day before meals  metoprolol tartrate 25 milliGRAM(s) Oral every 12 hours  pantoprazole   Suspension 40 milliGRAM(s) Oral daily  vancomycin  IVPB 1000 milliGRAM(s) IV Intermittent every 24 hours      Review of Systems:  A 10-point review of systems was obtained.   Review of systems otherwise negative except as previously noted.    Allergies: penicillin (Unknown)  macrolide antibiotics (Other)  Biaxin (Unknown)    For details regarding the patient's past medical history, social history, family history, and other miscellaneous elements, please refer the initial infectious diseases consultation and/or the admitting history and physical examination for this admission.    Objective:  Vitals:   T(C): 36.2 (10-07-24 @ 06:20), Max: 36.9 (10-06-24 @ 18:05)  HR: 83 (10-07-24 @ 06:20) (83 - 100)  BP: 113/50 (10-07-24 @ 06:20) (104/58 - 113/50)  RR: 16 (10-07-24 @ 06:20) (16 - 18)  SpO2: 97% (10-07-24 @ 06:20) (97% - 98%)    Physical Examination:  General: no acute distress  HEENT: NC/AT, EOMI  Cardio: S1, S2 heard, RRR, no murmurs  Resp: breath sounds heard bilaterally, no rales, wheezes or rhonchi  Abd: soft, NT, ND,  Ext: no edema or cyanosis  Skin: warm, dry, no visible rash      Laboratory Studies:  CBC:                       9.3    5.35  )-----------( 301      ( 07 Oct 2024 04:44 )             30.1     CMP: 10-07    134[L]  |  97[L]  |  17  ----------------------------<  147[H]  4.0   |  24  |  0.85    Ca    8.9      07 Oct 2024 04:44  Phos  3.4     10-07  Mg     1.90     10-07        Urinalysis Basic - ( 07 Oct 2024 04:44 )    Color: x / Appearance: x / SG: x / pH: x  Gluc: 147 mg/dL / Ketone: x  / Bili: x / Urobili: x   Blood: x / Protein: x / Nitrite: x   Leuk Esterase: x / RBC: x / WBC x   Sq Epi: x / Non Sq Epi: x / Bacteria: x        Microbiology: reviewed    Culture - Blood (collected 10-05-24 @ 03:20)  Source: .Blood BLOOD  Preliminary Report (10-07-24 @ 07:01):    No growth at 48 Hours    Culture - Blood (collected 10-05-24 @ 03:10)  Source: .Blood BLOOD  Preliminary Report (10-07-24 @ 07:01):    No growth at 48 Hours    Culture - Urine (collected 10-04-24 @ 21:30)  Source: Clean Catch Clean Catch (Midstream)  Preliminary Report (10-06-24 @ 11:44):    10,000 - 49,000 CFU/mL Gram Negative Rods    <10,000 CFU/ml Normal Urogenital vonnie present          Radiology: reviewed      
[facility], Division of Infectious Diseases  KRISSY Luong Y. Patel, S. Shah, G. [facility]  [phone]    Name: FEDERICO DOS SANTOS  Age: 89y  Gender: Female  MRN: [id number]    Interval History:  Patient seen and examined at bedside this morning  No acute overnight events.   Notes reviewed    Antibiotics:  ciprofloxacin   IVPB 400 milliGRAM(s) IV Intermittent every 12 hours  ciprofloxacin   IVPB      vancomycin  IVPB 1000 milliGRAM(s) IV Intermittent every 24 hours      Medications:  albuterol    90 MICROgram(s) HFA Inhaler 2 Puff(s) Inhalation every 6 hours PRN  artificial  tears Solution 1 Drop(s) Both EYES every 12 hours PRN  ascorbic acid 500 milliGRAM(s) Oral daily  aspirin enteric coated 81 milliGRAM(s) Oral daily  atorvastatin 20 milliGRAM(s) Oral at bedtime  benzonatate 200 milliGRAM(s) Oral every 8 hours PRN  ciprofloxacin   IVPB 400 milliGRAM(s) IV Intermittent every 12 hours  ciprofloxacin   IVPB      cyanocobalamin 1000 MICROGram(s) Oral daily  dextrose 5%. 1000 milliLiter(s) IV Continuous <Continuous>  dextrose 5%. 1000 milliLiter(s) IV Continuous <Continuous>  dextrose 50% Injectable 12.5 Gram(s) IV Push once  dextrose 50% Injectable 25 Gram(s) IV Push once  dextrose 50% Injectable 25 Gram(s) IV Push once  dextrose Oral Gel 15 Gram(s) Oral once PRN  ferrous    sulfate 325 milliGRAM(s) Oral daily  fluticasone propionate/ salmeterol 250-50 MICROgram(s) Diskus 1 Dose(s) Inhalation two times a day  furosemide   Injectable 20 milliGRAM(s) IV Push every 24 hours  gabapentin 400 milliGRAM(s) Oral at bedtime  glucagon  Injectable 1 milliGRAM(s) IntraMuscular once  heparin   Injectable 5000 Unit(s) SubCutaneous every 8 hours  insulin glargine Injectable (LANTUS) 10 Unit(s) SubCutaneous at bedtime  insulin lispro (ADMELOG) corrective regimen sliding scale   SubCutaneous three times a day before meals  insulin lispro (ADMELOG) corrective regimen sliding scale   SubCutaneous at bedtime  metoprolol tartrate 25 milliGRAM(s) Oral every 12 hours  pantoprazole   Suspension 40 milliGRAM(s) Oral daily  vancomycin  IVPB 1000 milliGRAM(s) IV Intermittent every 24 hours      Review of Systems:  A 10-point review of systems was obtained.     Pertinent positives and negatives--  Constitutional: No fevers. No Chills. No Rigors.   Cardiovascular: No chest pain. No palpitations.  Respiratory: No shortness of breath. No cough.  Gastrointestinal: No nausea or vomiting. No diarrhea or constipation.   Psychiatric: Pleasant. Appropriate affect.    Review of systems otherwise negative except as previously noted.    Allergies: penicillin (Unknown)  macrolide antibiotics (Other)  Biaxin (Unknown)    For details regarding the patient's past medical history, social history, family history, and other miscellaneous elements, please refer the initial infectious diseases consultation and/or the admitting history and physical examination for this admission.    Objective:  Vitals:   T(C): 36.7 (10-08-24 @ 11:28), Max: 36.7 (10-08-24 @ 11:28)  HR: 77 (10-08-24 @ 11:28) (70 - 87)  BP: 108/86 (10-08-24 @ 11:28) (108/46 - 122/68)  RR: 18 (10-08-24 @ 11:28) (18 - 18)  SpO2: 94% (10-08-24 @ 11:28) (94% - 97%)    Physical Examination:  General: no acute distress  HEENT: NC/AT, EOMI, anicteric, no oral lesions  Neck: supple, no palpable LAD  Cardio: S1, S2 heard, RRR, no murmurs  Resp: breath sounds heard bilaterally, no rales, wheezes or rhonchi  Abd: soft, NT, ND, + bowel sounds  Neuro: no obvious focal deficits  Ext: no edema or cyanosis  Skin: warm, dry, no visible rash      Laboratory Studies:  CBC:                       9.0    4.85  )-----------( 321      ( 08 Oct 2024 05:44 )             27.7     CMP: 10-08    133[L]  |  97[L]  |  17  ----------------------------<  113[H]  4.0   |  24  |  0.98    Ca    9.0      08 Oct 2024 07:50  Phos  3.1     10-08  Mg     1.80     10-08        Urinalysis Basic - ( 08 Oct 2024 07:50 )    Color: x / Appearance: x / SG: x / pH: x  Gluc: 113 mg/dL / Ketone: x  / Bili: x / Urobili: x   Blood: x / Protein: x / Nitrite: x   Leuk Esterase: x / RBC: x / WBC x   Sq Epi: x / Non Sq Epi: x / Bacteria: x        Microbiology: reviewed    Culture - Blood (collected 10-05-24 @ 03:20)  Source: .Blood BLOOD  Preliminary Report (10-08-24 @ 07:01):    No growth at 72 Hours    Culture - Blood (collected 10-05-24 @ 03:10)  Source: .Blood BLOOD  Preliminary Report (10-08-24 @ 07:01):    No growth at 72 Hours    Culture - Urine (collected 10-04-24 @ 21:30)  Source: Clean Catch Clean Catch (Midstream)  Final Report (10-08-24 @ 11:34):    10,000 - 49,000 CFU/mL Citrobacter freundii complex    <10,000 CFU/ml Normal Urogenital vonnie present  Organism: Citrobacter freundii complex (10-08-24 @ 11:34)  Organism: Citrobacter freundii complex (10-08-24 @ 11:34)      Method Type: KHADIJAH      -  Amoxicillin/Clavulanic Acid: R >16/8      -  Ampicillin: R <=8 These ampicillin results predict results for amoxicillin      -  Ampicillin/Sulbactam: R <=4/2      -  Aztreonam: S <=4      -  Cefazolin: R >16      -  Cefepime: S <=2      -  Cefoxitin: R >16      -  Ceftriaxone: S <=1 Enterobacter cloacae, Klebsiella aerogenes, and Citrobacter freundii may develop resistance during prolonged therapy.      -  Ciprofloxacin: S <=0.25      -  Ertapenem: S <=0.5      -  Gentamicin: S <=2      -  Imipenem: S <=1      -  Levofloxacin: S <=0.5      -  Meropenem: S <=1      -  Nitrofurantoin: S <=32 Should not be used to treat pyelonephritis      -  Piperacillin/Tazobactam: S <=8 Treatment with Pipercillin/Tazobactam is not recommended in severe infections casued by Klebsiella aerogenes, Enterobacter cloacae complex, and Citrobacter freundii complex.      -  Tobramycin: S <=2      -  Trimethoprim/Sulfamethoxazole: S <=0.5/9.5          Radiology: reviewed      
[facility], Division of Infectious Diseases  KRISSY Luong Y. Patel, S. Shah, G. [facility]  [phone]    Name: FEDERICO DOS SANTOS  Age: 89y  Gender: Female  MRN: [id number]    Interval History:  Patient seen and examined at bedside this morning  No acute overnight events. Afebrile  No complaints  Notes reviewed    Antibiotics:  ciprofloxacin   IVPB      ciprofloxacin   IVPB 400 milliGRAM(s) IV Intermittent every 12 hours      Medications:  albuterol    90 MICROgram(s) HFA Inhaler 2 Puff(s) Inhalation every 6 hours PRN  artificial  tears Solution 1 Drop(s) Both EYES every 12 hours PRN  ascorbic acid 500 milliGRAM(s) Oral daily  aspirin enteric coated 81 milliGRAM(s) Oral daily  atorvastatin 20 milliGRAM(s) Oral at bedtime  benzonatate 200 milliGRAM(s) Oral every 8 hours PRN  ciprofloxacin   IVPB 400 milliGRAM(s) IV Intermittent every 12 hours  ciprofloxacin   IVPB      cyanocobalamin 1000 MICROGram(s) Oral daily  dextrose 5%. 1000 milliLiter(s) IV Continuous <Continuous>  dextrose 5%. 1000 milliLiter(s) IV Continuous <Continuous>  dextrose 50% Injectable 25 Gram(s) IV Push once  dextrose 50% Injectable 25 Gram(s) IV Push once  dextrose 50% Injectable 12.5 Gram(s) IV Push once  dextrose Oral Gel 15 Gram(s) Oral once PRN  ferrous    sulfate 325 milliGRAM(s) Oral daily  fluticasone propionate/ salmeterol 250-50 MICROgram(s) Diskus 1 Dose(s) Inhalation two times a day  furosemide   Injectable 20 milliGRAM(s) IV Push every 24 hours  gabapentin 400 milliGRAM(s) Oral at bedtime  glucagon  Injectable 1 milliGRAM(s) IntraMuscular once  heparin   Injectable 5000 Unit(s) SubCutaneous every 8 hours  insulin glargine Injectable (LANTUS) 10 Unit(s) SubCutaneous at bedtime  insulin lispro (ADMELOG) corrective regimen sliding scale   SubCutaneous three times a day before meals  insulin lispro (ADMELOG) corrective regimen sliding scale   SubCutaneous at bedtime  metoprolol tartrate 25 milliGRAM(s) Oral every 12 hours  pantoprazole   Suspension 40 milliGRAM(s) Oral daily      Review of Systems:  A 10-point review of systems was obtained.     Pertinent positives and negatives--  Constitutional: No fevers. No Chills. No Rigors.   Cardiovascular: No chest pain. No palpitations.  Respiratory: No shortness of breath. No cough.  Gastrointestinal: No nausea or vomiting. No diarrhea or constipation.   Psychiatric: Pleasant. Appropriate affect.    Review of systems otherwise negative except as previously noted.    Allergies: penicillin (Unknown)  macrolide antibiotics (Other)  Biaxin (Unknown)    For details regarding the patient's past medical history, social history, family history, and other miscellaneous elements, please refer the initial infectious diseases consultation and/or the admitting history and physical examination for this admission.    Objective:  Vitals:   T(C): 36.4 (10-09-24 @ 11:06), Max: 36.7 (10-08-24 @ 18:00)  HR: 82 (10-09-24 @ 11:06) (76 - 91)  BP: 128/43 (10-09-24 @ 11:06) (114/58 - 131/59)  RR: 19 (10-09-24 @ 11:06) (18 - 19)  SpO2: 96% (10-09-24 @ 11:06) (96% - 100%)    Physical Examination:  General: no acute distress  HEENT: NC/AT, EOMI,   Cardio: S1, S2 heard, RRR, no murmurs  Resp: no use resp acc muscles  Abd: soft, NT, ND  Ext: no edema or cyanosis  Skin: warm, dry, no visible rash      Laboratory Studies:  CBC:                       10.4   5.50  )-----------( 282      ( 09 Oct 2024 06:33 )             32.5     CMP: 10-09    130[L]  |  94[L]  |  22  ----------------------------<  243[H]  4.3   |  23  |  0.93    Ca    8.9      09 Oct 2024 06:33  Phos  2.9     10-09  Mg     1.80     10-09        Urinalysis Basic - ( 09 Oct 2024 06:33 )    Color: x / Appearance: x / SG: x / pH: x  Gluc: 243 mg/dL / Ketone: x  / Bili: x / Urobili: x   Blood: x / Protein: x / Nitrite: x   Leuk Esterase: x / RBC: x / WBC x   Sq Epi: x / Non Sq Epi: x / Bacteria: x        Microbiology: reviewed    Culture - Blood (collected 10-05-24 @ 03:20)  Source: .Blood BLOOD  Preliminary Report (10-09-24 @ 07:01):    No growth at 4 days    Culture - Blood (collected 10-05-24 @ 03:10)  Source: .Blood BLOOD  Preliminary Report (10-09-24 @ 07:01):    No growth at 4 days    Culture - Urine (collected 10-04-24 @ 21:30)  Source: Clean Catch Clean Catch (Midstream)  Final Report (10-08-24 @ 11:34):    10,000 - 49,000 CFU/mL Citrobacter freundii complex    <10,000 CFU/ml Normal Urogenital vonnie present  Organism: Citrobacter freundii complex (10-08-24 @ 11:34)  Organism: Citrobacter freundii complex (10-08-24 @ 11:34)      -  Levofloxacin: S <=0.5      -  Tobramycin: S <=2      -  Nitrofurantoin: S <=32 Should not be used to treat pyelonephritis      -  Aztreonam: S <=4      -  Gentamicin: S <=2      -  Cefazolin: R >16      -  Cefepime: S <=2      -  Piperacillin/Tazobactam: S <=8 Treatment with Pipercillin/Tazobactam is not recommended in severe infections casued by Klebsiella aerogenes, Enterobacter cloacae complex, and Citrobacter freundii complex.      -  Ciprofloxacin: S <=0.25      -  Imipenem: S <=1      -  Ceftriaxone: S <=1 Enterobacter cloacae, Klebsiella aerogenes, and Citrobacter freundii may develop resistance during prolonged therapy.      -  Ampicillin: R <=8 These ampicillin results predict results for amoxicillin      Method Type: KHADIJAH      -  Meropenem: S <=1      -  Ampicillin/Sulbactam: R <=4/2      -  Cefoxitin: R >16      -  Amoxicillin/Clavulanic Acid: R >16/8      -  Trimethoprim/Sulfamethoxazole: S <=0.5/9.5      -  Ertapenem: S <=0.5          Radiology: reviewed      
CARDIOLOGY FOLLOW UP - Dr. Valenzuela  DATE OF SERVICE: 10/8/24    CC no CP or SOB      REVIEW OF SYSTEMS:  CONSTITUTIONAL: No fever, weight loss, or fatigue  RESPIRATORY: No cough, wheezing, chills or hemoptysis; No Shortness of Breath  CARDIOVASCULAR: No chest pain, palpitations, passing out, dizziness  GASTROINTESTINAL: No abdominal or epigastric pain. No nausea, vomiting, or hematemesis; No diarrhea or constipation. No melena or hematochezia.      PHYSICAL EXAM:  T(C): 36.3 (10-08-24 @ 05:38), Max: 36.7 (10-07-24 @ 11:36)  HR: 70 (10-08-24 @ 05:38) (70 - 87)  BP: 110/54 (10-08-24 @ 05:38) (108/46 - 122/68)  RR: 18 (10-08-24 @ 05:38) (18 - 18)  SpO2: 97% (10-08-24 @ 05:38) (95% - 98%)  Wt(kg): --  I&O's Summary    07 Oct 2024 07:01  -  08 Oct 2024 07:00  --------------------------------------------------------  IN: 980 mL / OUT: 1100 mL / NET: -120 mL        Appearance: Normal	  Cardiovascular: Normal S1 S2,RRR, No JVD, No murmurs  Respiratory: Lungs clear to auscultation b/l  Gastrointestinal:  Soft, Non-tender, + BS	  Extremities: Normal range of motion, No clubbing, cyanosis or edema      Home Medications:  Advair Diskus 250 mcg-50 mcg inhalation powder: 1 puff(s) inhaled 2 times a day (09 Sep 2024 06:08)  aspirin 81 mg oral delayed release tablet: 1 tab(s) orally once a day (at bedtime) (09 Sep 2024 06:08)  atorvastatin 20 mg oral tablet: 1 tab(s) orally once a day (at bedtime) (09 Sep 2024 06:08)  Cranberry 500mg orally once a day- LD-09/5/24:  (09 Sep 2024 06:08)  gabapentin 400 mg oral capsule: 1 cap(s) orally once a day (at bedtime) (09 Sep 2024 06:08)  ipratropium 42 mcg/inh (0.06%) nasal spray: 2 spray(s) intranasally 2 times a day (09 Sep 2024 06:08)  iron 65 mg orally twice daily:  (09 Sep 2024 06:08)  magnesium 250 mg orally twice a daily:  (09 Sep 2024 06:08)  MetFORMIN (Eqv-Fortamet) 500 mg oral tablet, extended release: 2 tab(s) orally once a day (09 Sep 2024 06:08)  metoprolol tartrate 50 mg oral tablet: 1 tab(s) orally 2 times a day (09 Sep 2024 06:08)  Multiple Vitamins oral tablet: 1 tab(s) orally once a day LD  - 09/5/24 (09 Sep 2024 06:08)  ocular lubricant ophthalmic solution: 1 drop(s) to each affected eye 2 times a day As needed Dry Eyes (17 Sep 2024 15:45)  omeprazole 40 mg oral delayed release capsule: 1 cap(s) orally once a day (09 Sep 2024 06:08)  PreserVision AREDS 2 oral capsule: 1 cap(s) orally 2 times a day (09 Sep 2024 06:08)  Systane eye drops 2 drop each eye  as needed:  (09 Sep 2024 06:08)  Tresiba FlexTouch 100 units/mL subcutaneous solution: 10 unit(s) subcutaneous once a day (at bedtime) Please take tresiba 13 units subcutaneous at bedtime tonight 9/17  Then take tresiba 10 units subcutaneous at bedtime starting on 9/18 (17 Sep 2024 16:44)  vitamin B12 2500mg orally once a day:  (09 Sep 2024 06:08)  Vitamin C 500mg orally once a day at night:  (09 Sep 2024 06:08)      MEDICATIONS  (STANDING):  ascorbic acid 500 milliGRAM(s) Oral daily  aspirin enteric coated 81 milliGRAM(s) Oral daily  atorvastatin 20 milliGRAM(s) Oral at bedtime  ciprofloxacin   IVPB      ciprofloxacin   IVPB 400 milliGRAM(s) IV Intermittent every 12 hours  cyanocobalamin 1000 MICROGram(s) Oral daily  dextrose 5%. 1000 milliLiter(s) (50 mL/Hr) IV Continuous <Continuous>  dextrose 5%. 1000 milliLiter(s) (100 mL/Hr) IV Continuous <Continuous>  dextrose 50% Injectable 12.5 Gram(s) IV Push once  dextrose 50% Injectable 25 Gram(s) IV Push once  dextrose 50% Injectable 25 Gram(s) IV Push once  ferrous    sulfate 325 milliGRAM(s) Oral daily  fluticasone propionate/ salmeterol 250-50 MICROgram(s) Diskus 1 Dose(s) Inhalation two times a day  furosemide   Injectable 20 milliGRAM(s) IV Push every 24 hours  gabapentin 400 milliGRAM(s) Oral at bedtime  glucagon  Injectable 1 milliGRAM(s) IntraMuscular once  heparin   Injectable 5000 Unit(s) SubCutaneous every 8 hours  insulin glargine Injectable (LANTUS) 10 Unit(s) SubCutaneous at bedtime  insulin lispro (ADMELOG) corrective regimen sliding scale   SubCutaneous at bedtime  insulin lispro (ADMELOG) corrective regimen sliding scale   SubCutaneous three times a day before meals  metoprolol tartrate 25 milliGRAM(s) Oral every 12 hours  pantoprazole   Suspension 40 milliGRAM(s) Oral daily  vancomycin  IVPB 1000 milliGRAM(s) IV Intermittent every 24 hours      TELEMETRY: 	  NSR  ECG:  	  RADIOLOGY:   DIAGNOSTIC TESTING:  [ x] Echocardiogram:  < from: TTE W or WO Ultrasound Enhancing Agent (10.07.24 @ 15:53) >  CONCLUSIONS:      1. Technically difficult image quality.   2. Left ventricular systolic function is normal with an ejection fraction of 60 % by Gomez's method of disks.   3. Normal left and right atrial size.   4. Normal right ventricular cavity size and probably normal right ventricular systolic function.   5. There is calcification of the mitral valve annulus.   6. Mild mitral valve stenosis.   7. Trileaflet aortic valve with reduced systolic excursion. There is calcification of the aortic valve leaflets. Moderate aortic stenosis.   8. The peak transaortic velocity is 1.95 m/s, peak transaortic gradient is 15.2 mmHg and mean transaortic gradient is 9.0 mmHg with an LVOT/aortic valve VTI ratio of 0.47. The effective orifice area is estimated at 1.47 cm² by the continuity equation and 1.38 cm² by 2D planimetry.   9. Mild to moderate tricuspid regurgitation.  10. No pericardial effusion seen.    < end of copied text >    [ ]  Catheterization:  [ ] Stress Test:    OTHER: 	    LABS:	 	    Troponin T, High Sensitivity Result: 19 ng/L (10-04 @ 22:48)  Troponin T, High Sensitivity Result: 20 ng/L (10-04 @ 19:47)                          9.0    4.85  )-----------( 321      ( 08 Oct 2024 05:44 )             27.7     10-08    133[L]  |  97[L]  |  17  ----------------------------<  113[H]  4.0   |  24  |  0.98    Ca    9.0      08 Oct 2024 07:50  Phos  3.1     10-08  Mg     1.80     10-08              
CARDIOLOGY FOLLOW UP - Dr. Valenzuela  DATE OF SERVICE: 10/9/24    CC no CP or SOB      REVIEW OF SYSTEMS:  CONSTITUTIONAL: No fever, weight loss, or fatigue  RESPIRATORY: No cough, wheezing, chills or hemoptysis; No Shortness of Breath  CARDIOVASCULAR: No chest pain, palpitations, passing out, dizziness  GASTROINTESTINAL: No abdominal or epigastric pain. No nausea, vomiting, or hematemesis; No diarrhea or constipation. No melena or hematochezia.      PHYSICAL EXAM:  T(C): 36.6 (10-09-24 @ 05:50), Max: 36.7 (10-08-24 @ 11:28)  HR: 76 (10-09-24 @ 05:50) (76 - 91)  BP: 131/59 (10-09-24 @ 05:50) (108/86 - 131/59)  RR: 18 (10-09-24 @ 05:50) (18 - 18)  SpO2: 100% (10-09-24 @ 05:50) (94% - 100%)  Wt(kg): --  I&O's Summary    08 Oct 2024 07:01  -  09 Oct 2024 07:00  --------------------------------------------------------  IN: 0 mL / OUT: 2080 mL / NET: -2080 mL        Appearance: Normal	  Cardiovascular: Normal S1 S2,RRR, No JVD, +murmur  Respiratory: Lungs clear to auscultation	  Gastrointestinal:  Soft, Non-tender, + BS	  Extremities: Normal range of motion, No clubbing, cyanosis or edema      Home Medications:  Advair Diskus 250 mcg-50 mcg inhalation powder: 1 puff(s) inhaled 2 times a day (09 Sep 2024 06:08)  aspirin 81 mg oral delayed release tablet: 1 tab(s) orally once a day (at bedtime) (09 Sep 2024 06:08)  atorvastatin 20 mg oral tablet: 1 tab(s) orally once a day (at bedtime) (09 Sep 2024 06:08)  Cranberry 500mg orally once a day- LD-09/5/24:  (09 Sep 2024 06:08)  gabapentin 400 mg oral capsule: 1 cap(s) orally once a day (at bedtime) (09 Sep 2024 06:08)  ipratropium 42 mcg/inh (0.06%) nasal spray: 2 spray(s) intranasally 2 times a day (09 Sep 2024 06:08)  iron 65 mg orally twice daily:  (09 Sep 2024 06:08)  magnesium 250 mg orally twice a daily:  (09 Sep 2024 06:08)  MetFORMIN (Eqv-Fortamet) 500 mg oral tablet, extended release: 2 tab(s) orally once a day (09 Sep 2024 06:08)  metoprolol tartrate 50 mg oral tablet: 1 tab(s) orally 2 times a day (09 Sep 2024 06:08)  Multiple Vitamins oral tablet: 1 tab(s) orally once a day LD  - 09/5/24 (09 Sep 2024 06:08)  ocular lubricant ophthalmic solution: 1 drop(s) to each affected eye 2 times a day As needed Dry Eyes (17 Sep 2024 15:45)  omeprazole 40 mg oral delayed release capsule: 1 cap(s) orally once a day (09 Sep 2024 06:08)  PreserVision AREDS 2 oral capsule: 1 cap(s) orally 2 times a day (09 Sep 2024 06:08)  Systane eye drops 2 drop each eye  as needed:  (09 Sep 2024 06:08)  Tresiba FlexTouch 100 units/mL subcutaneous solution: 10 unit(s) subcutaneous once a day (at bedtime) Please take tresiba 13 units subcutaneous at bedtime tonight 9/17  Then take tresiba 10 units subcutaneous at bedtime starting on 9/18 (17 Sep 2024 16:44)  vitamin B12 2500mg orally once a day:  (09 Sep 2024 06:08)  Vitamin C 500mg orally once a day at night:  (09 Sep 2024 06:08)      MEDICATIONS  (STANDING):  ascorbic acid 500 milliGRAM(s) Oral daily  aspirin enteric coated 81 milliGRAM(s) Oral daily  atorvastatin 20 milliGRAM(s) Oral at bedtime  ciprofloxacin   IVPB      ciprofloxacin   IVPB 400 milliGRAM(s) IV Intermittent every 12 hours  cyanocobalamin 1000 MICROGram(s) Oral daily  dextrose 5%. 1000 milliLiter(s) (50 mL/Hr) IV Continuous <Continuous>  dextrose 5%. 1000 milliLiter(s) (100 mL/Hr) IV Continuous <Continuous>  dextrose 50% Injectable 25 Gram(s) IV Push once  dextrose 50% Injectable 25 Gram(s) IV Push once  dextrose 50% Injectable 12.5 Gram(s) IV Push once  ferrous    sulfate 325 milliGRAM(s) Oral daily  fluticasone propionate/ salmeterol 250-50 MICROgram(s) Diskus 1 Dose(s) Inhalation two times a day  furosemide   Injectable 20 milliGRAM(s) IV Push every 24 hours  gabapentin 400 milliGRAM(s) Oral at bedtime  glucagon  Injectable 1 milliGRAM(s) IntraMuscular once  heparin   Injectable 5000 Unit(s) SubCutaneous every 8 hours  insulin glargine Injectable (LANTUS) 10 Unit(s) SubCutaneous at bedtime  insulin lispro (ADMELOG) corrective regimen sliding scale   SubCutaneous at bedtime  insulin lispro (ADMELOG) corrective regimen sliding scale   SubCutaneous three times a day before meals  metoprolol tartrate 25 milliGRAM(s) Oral every 12 hours  pantoprazole   Suspension 40 milliGRAM(s) Oral daily      TELEMETRY: 	  NSR  ECG:  	  RADIOLOGY:   DIAGNOSTIC TESTING:  [ ] Echocardiogram:  [ ]  Catheterization:  [ ] Stress Test:    OTHER: 	    LABS:	 	    Troponin T, High Sensitivity Result: 19 ng/L (10-04 @ 22:48)  Troponin T, High Sensitivity Result: 20 ng/L (10-04 @ 19:47)                          10.4   5.50  )-----------( 282      ( 09 Oct 2024 06:33 )             32.5     10-09    130[L]  |  94[L]  |  22  ----------------------------<  243[H]  4.3   |  23  |  0.93    Ca    8.9      09 Oct 2024 06:33  Phos  2.9     10-09  Mg     1.80     10-09              
SUBJECTIVE/ OVERNIGHT EVENTS:  Pt seen and examined at bedside.   No overnight event.  Feeling better.  no cp, no sob, no n/v/d. no abdominal pain.  no headache, no dizziness.   no dysuria, no urinary urgency/frequency. no bowel/bladder incontinence.       --------------------------------------------------------------------------------------------  LABS:                        9.0    5.69  )-----------( 296      ( 06 Oct 2024 06:02 )             28.2     10-06    136  |  98  |  17  ----------------------------<  89  4.1   |  26  |  0.84    Ca    9.0      06 Oct 2024 06:02  Phos  4.2     10-06  Mg     1.90     10-06    TPro  6.2  /  Alb  3.5  /  TBili  <0.2  /  DBili  x   /  AST  16  /  ALT  7   /  AlkPhos  91  10-05    PT/INR - ( 05 Oct 2024 06:45 )   PT: 11.0 sec;   INR: 0.92 ratio         PTT - ( 05 Oct 2024 06:45 )  PTT:28.7 sec  CAPILLARY BLOOD GLUCOSE      POCT Blood Glucose.: 185 mg/dL (06 Oct 2024 08:45)  POCT Blood Glucose.: 185 mg/dL (05 Oct 2024 21:06)  POCT Blood Glucose.: 207 mg/dL (05 Oct 2024 17:27)  POCT Blood Glucose.: 173 mg/dL (05 Oct 2024 12:26)        Urinalysis Basic - ( 06 Oct 2024 06:02 )    Color: x / Appearance: x / SG: x / pH: x  Gluc: 89 mg/dL / Ketone: x  / Bili: x / Urobili: x   Blood: x / Protein: x / Nitrite: x   Leuk Esterase: x / RBC: x / WBC x   Sq Epi: x / Non Sq Epi: x / Bacteria: x        RADIOLOGY & ADDITIONAL TESTS:    Imaging Personally Reviewed:  [x] YES  [ ] NO    Consultant(s) Notes Reviewed:  [x] YES  [ ] NO    MEDICATIONS  (STANDING):  ascorbic acid 500 milliGRAM(s) Oral daily  aspirin enteric coated 81 milliGRAM(s) Oral daily  atorvastatin 20 milliGRAM(s) Oral at bedtime  ciprofloxacin   IVPB 400 milliGRAM(s) IV Intermittent every 12 hours  ciprofloxacin   IVPB      cyanocobalamin 1000 MICROGram(s) Oral daily  dextrose 5%. 1000 milliLiter(s) (50 mL/Hr) IV Continuous <Continuous>  dextrose 5%. 1000 milliLiter(s) (100 mL/Hr) IV Continuous <Continuous>  dextrose 50% Injectable 25 Gram(s) IV Push once  dextrose 50% Injectable 25 Gram(s) IV Push once  dextrose 50% Injectable 12.5 Gram(s) IV Push once  ferrous    sulfate 325 milliGRAM(s) Oral daily  fluticasone propionate/ salmeterol 250-50 MICROgram(s) Diskus 1 Dose(s) Inhalation two times a day  furosemide   Injectable 20 milliGRAM(s) IV Push every 24 hours  gabapentin 400 milliGRAM(s) Oral at bedtime  glucagon  Injectable 1 milliGRAM(s) IntraMuscular once  heparin   Injectable 5000 Unit(s) SubCutaneous every 8 hours  insulin glargine Injectable (LANTUS) 10 Unit(s) SubCutaneous at bedtime  insulin lispro (ADMELOG) corrective regimen sliding scale   SubCutaneous three times a day before meals  insulin lispro (ADMELOG) corrective regimen sliding scale   SubCutaneous at bedtime  metoprolol tartrate 25 milliGRAM(s) Oral every 12 hours  pantoprazole   Suspension 40 milliGRAM(s) Oral daily  vancomycin  IVPB 750 milliGRAM(s) IV Intermittent every 24 hours    MEDICATIONS  (PRN):  albuterol    90 MICROgram(s) HFA Inhaler 2 Puff(s) Inhalation every 6 hours PRN Shortness of Breath and/or Wheezing  artificial  tears Solution 1 Drop(s) Both EYES every 12 hours PRN Dry Eyes  dextrose Oral Gel 15 Gram(s) Oral once PRN Blood Glucose LESS THAN 70 milliGRAM(s)/deciliter      Care Discussed with Consultants/Other Providers [x] YES  [ ] NO    Vital Signs Last 24 Hrs  T(C): 36.4 (06 Oct 2024 11:01), Max: 36.6 (05 Oct 2024 19:42)  T(F): 97.6 (06 Oct 2024 11:01), Max: 97.9 (05 Oct 2024 19:42)  HR: 94 (06 Oct 2024 11:01) (89 - 97)  BP: 116/62 (06 Oct 2024 11:01) (116/62 - 120/54)  BP(mean): --  RR: 16 (06 Oct 2024 11:01) (16 - 18)  SpO2: 95% (06 Oct 2024 11:01) (92% - 99%)    Parameters below as of 06 Oct 2024 11:01  Patient On (Oxygen Delivery Method): nasal cannula  O2 Flow (L/min): 2    I&O's Summary    05 Oct 2024 07:01  -  06 Oct 2024 07:00  --------------------------------------------------------  IN: 750 mL / OUT: 1250 mL / NET: -500 mL    06 Oct 2024 07:01  -  06 Oct 2024 11:35  --------------------------------------------------------  IN: 480 mL / OUT: 1700 mL / NET: -1220 mL    PHYSICAL EXAM:  GENERAL: NAD, thin-elderly, comfortable on NC  HEAD:  Atraumatic, Normocephalic  EYES: EOMI, PERRLA, conjunctiva and sclera clear  NECK: Supple, No JVD  CHEST/LUNG: mild decrease breath sounds bilaterally; No wheeze   HEART: Regular rate and rhythm; No murmurs, rubs, or gallops  ABDOMEN: Soft, Nontender, Nondistended; Bowel sounds present  Neuro: AAOx3, no focal weakness   EXTREMITIES:  2+ Peripheral Pulses, No clubbing, cyanosis, or edema  SKIN: No rashes or lesions     
SUBJECTIVE/ OVERNIGHT EVENTS:  No overnight events.  Pt feels well.  Denied cp, sob, n/v/d.  no abdominal pain. No HA/dizziness.   no dysuria, no urinary urgency/frequency. no bowel/bladder incontinence.   okay to go home from ID perspective.    --------------------------------------------------------------------------------------------  LABS:                        9.0    4.85  )-----------( 321      ( 08 Oct 2024 05:44 )             27.7     10-08    133[L]  |  97[L]  |  17  ----------------------------<  113[H]  4.0   |  24  |  0.98    Ca    9.0      08 Oct 2024 07:50  Phos  3.1     10-08  Mg     1.80     10-08        CAPILLARY BLOOD GLUCOSE      POCT Blood Glucose.: 316 mg/dL (08 Oct 2024 12:14)  POCT Blood Glucose.: 174 mg/dL (08 Oct 2024 08:25)  POCT Blood Glucose.: 200 mg/dL (07 Oct 2024 21:36)  POCT Blood Glucose.: 217 mg/dL (07 Oct 2024 17:46)        Urinalysis Basic - ( 08 Oct 2024 07:50 )    Color: x / Appearance: x / SG: x / pH: x  Gluc: 113 mg/dL / Ketone: x  / Bili: x / Urobili: x   Blood: x / Protein: x / Nitrite: x   Leuk Esterase: x / RBC: x / WBC x   Sq Epi: x / Non Sq Epi: x / Bacteria: x        RADIOLOGY & ADDITIONAL TESTS:    Imaging Personally Reviewed:  [x] YES  [ ] NO    Consultant(s) Notes Reviewed:  [x] YES  [ ] NO    MEDICATIONS  (STANDING):  ascorbic acid 500 milliGRAM(s) Oral daily  aspirin enteric coated 81 milliGRAM(s) Oral daily  atorvastatin 20 milliGRAM(s) Oral at bedtime  ciprofloxacin   IVPB 400 milliGRAM(s) IV Intermittent every 12 hours  ciprofloxacin   IVPB      cyanocobalamin 1000 MICROGram(s) Oral daily  dextrose 5%. 1000 milliLiter(s) (50 mL/Hr) IV Continuous <Continuous>  dextrose 5%. 1000 milliLiter(s) (100 mL/Hr) IV Continuous <Continuous>  dextrose 50% Injectable 25 Gram(s) IV Push once  dextrose 50% Injectable 25 Gram(s) IV Push once  dextrose 50% Injectable 12.5 Gram(s) IV Push once  ferrous    sulfate 325 milliGRAM(s) Oral daily  fluticasone propionate/ salmeterol 250-50 MICROgram(s) Diskus 1 Dose(s) Inhalation two times a day  furosemide   Injectable 20 milliGRAM(s) IV Push every 24 hours  gabapentin 400 milliGRAM(s) Oral at bedtime  glucagon  Injectable 1 milliGRAM(s) IntraMuscular once  heparin   Injectable 5000 Unit(s) SubCutaneous every 8 hours  insulin glargine Injectable (LANTUS) 10 Unit(s) SubCutaneous at bedtime  insulin lispro (ADMELOG) corrective regimen sliding scale   SubCutaneous three times a day before meals  insulin lispro (ADMELOG) corrective regimen sliding scale   SubCutaneous at bedtime  metoprolol tartrate 25 milliGRAM(s) Oral every 12 hours  pantoprazole   Suspension 40 milliGRAM(s) Oral daily    MEDICATIONS  (PRN):  albuterol    90 MICROgram(s) HFA Inhaler 2 Puff(s) Inhalation every 6 hours PRN Shortness of Breath and/or Wheezing  artificial  tears Solution 1 Drop(s) Both EYES every 12 hours PRN Dry Eyes  benzonatate 200 milliGRAM(s) Oral every 8 hours PRN Cough  dextrose Oral Gel 15 Gram(s) Oral once PRN Blood Glucose LESS THAN 70 milliGRAM(s)/deciliter      Care Discussed with Consultants/Other Providers [x] YES  [ ] NO    Vital Signs Last 24 Hrs  T(C): 36.7 (08 Oct 2024 11:28), Max: 36.7 (08 Oct 2024 11:28)  T(F): 98 (08 Oct 2024 11:28), Max: 98 (08 Oct 2024 11:28)  HR: 77 (08 Oct 2024 11:28) (70 - 87)  BP: 108/86 (08 Oct 2024 11:28) (108/46 - 122/68)  BP(mean): --  RR: 18 (08 Oct 2024 11:28) (18 - 18)  SpO2: 94% (08 Oct 2024 11:28) (94% - 97%)    Parameters below as of 08 Oct 2024 11:28  Patient On (Oxygen Delivery Method): room air      I&O's Summary    07 Oct 2024 07:01  -  08 Oct 2024 07:00  --------------------------------------------------------  IN: 980 mL / OUT: 1100 mL / NET: -120 mL    08 Oct 2024 07:01  -  08 Oct 2024 14:19  --------------------------------------------------------  IN: 0 mL / OUT: 1480 mL / NET: -1480 mL        PHYSICAL EXAM:  GENERAL: NAD, thin-elderly, comfortable on NC PRN --> room air  HEAD:  Atraumatic, Normocephalic  EYES: EOMI, PERRLA, conjunctiva and sclera clear  NECK: Supple, No JVD  CHEST/LUNG: mild decrease breath sounds bilaterally; No wheeze   HEART: Regular rate and rhythm; No murmurs, rubs, or gallops  ABDOMEN: Soft, Nontender, Nondistended; Bowel sounds present  Neuro: AAOx3, no focal weakness   EXTREMITIES:  2+ Peripheral Pulses, No clubbing, cyanosis, or edema  SKIN: No rashes or lesions     
SUBJECTIVE/ OVERNIGHT EVENTS:  feeling much better  now on room air  uses PRN home O2 at night  no cp, no sob, no n/v/d. no abdominal pain.  no headache, no dizziness.   eating lunch    --------------------------------------------------------------------------------------------  LABS:                        9.3    5.35  )-----------( 301      ( 07 Oct 2024 04:44 )             30.1     10-07    134[L]  |  97[L]  |  17  ----------------------------<  147[H]  4.0   |  24  |  0.85    Ca    8.9      07 Oct 2024 04:44  Phos  3.4     10-07  Mg     1.90     10-07        CAPILLARY BLOOD GLUCOSE      POCT Blood Glucose.: 198 mg/dL (07 Oct 2024 12:19)  POCT Blood Glucose.: 249 mg/dL (07 Oct 2024 08:36)  POCT Blood Glucose.: 291 mg/dL (06 Oct 2024 21:40)  POCT Blood Glucose.: 158 mg/dL (06 Oct 2024 17:34)        Urinalysis Basic - ( 07 Oct 2024 04:44 )    Color: x / Appearance: x / SG: x / pH: x  Gluc: 147 mg/dL / Ketone: x  / Bili: x / Urobili: x   Blood: x / Protein: x / Nitrite: x   Leuk Esterase: x / RBC: x / WBC x   Sq Epi: x / Non Sq Epi: x / Bacteria: x        RADIOLOGY & ADDITIONAL TESTS:    Imaging Personally Reviewed:  [x] YES  [ ] NO    Consultant(s) Notes Reviewed:  [x] YES  [ ] NO    MEDICATIONS  (STANDING):  ascorbic acid 500 milliGRAM(s) Oral daily  aspirin enteric coated 81 milliGRAM(s) Oral daily  atorvastatin 20 milliGRAM(s) Oral at bedtime  ciprofloxacin   IVPB 400 milliGRAM(s) IV Intermittent every 12 hours  ciprofloxacin   IVPB      cyanocobalamin 1000 MICROGram(s) Oral daily  dextrose 5%. 1000 milliLiter(s) (50 mL/Hr) IV Continuous <Continuous>  dextrose 5%. 1000 milliLiter(s) (100 mL/Hr) IV Continuous <Continuous>  dextrose 50% Injectable 25 Gram(s) IV Push once  dextrose 50% Injectable 25 Gram(s) IV Push once  dextrose 50% Injectable 12.5 Gram(s) IV Push once  ferrous    sulfate 325 milliGRAM(s) Oral daily  fluticasone propionate/ salmeterol 250-50 MICROgram(s) Diskus 1 Dose(s) Inhalation two times a day  furosemide   Injectable 20 milliGRAM(s) IV Push every 24 hours  gabapentin 400 milliGRAM(s) Oral at bedtime  glucagon  Injectable 1 milliGRAM(s) IntraMuscular once  heparin   Injectable 5000 Unit(s) SubCutaneous every 8 hours  insulin glargine Injectable (LANTUS) 10 Unit(s) SubCutaneous at bedtime  insulin lispro (ADMELOG) corrective regimen sliding scale   SubCutaneous three times a day before meals  insulin lispro (ADMELOG) corrective regimen sliding scale   SubCutaneous at bedtime  metoprolol tartrate 25 milliGRAM(s) Oral every 12 hours  pantoprazole   Suspension 40 milliGRAM(s) Oral daily  vancomycin  IVPB 1000 milliGRAM(s) IV Intermittent every 24 hours    MEDICATIONS  (PRN):  albuterol    90 MICROgram(s) HFA Inhaler 2 Puff(s) Inhalation every 6 hours PRN Shortness of Breath and/or Wheezing  artificial  tears Solution 1 Drop(s) Both EYES every 12 hours PRN Dry Eyes  benzonatate 200 milliGRAM(s) Oral every 8 hours PRN Cough  dextrose Oral Gel 15 Gram(s) Oral once PRN Blood Glucose LESS THAN 70 milliGRAM(s)/deciliter      Care Discussed with Consultants/Other Providers [x] YES  [ ] NO    Vital Signs Last 24 Hrs  T(C): 36.7 (07 Oct 2024 11:36), Max: 36.9 (06 Oct 2024 18:05)  T(F): 98 (07 Oct 2024 11:36), Max: 98.5 (06 Oct 2024 18:05)  HR: 78 (07 Oct 2024 11:36) (78 - 100)  BP: 120/61 (07 Oct 2024 11:36) (104/58 - 120/61)  BP(mean): --  RR: 18 (07 Oct 2024 11:36) (16 - 18)  SpO2: 98% (07 Oct 2024 11:36) (97% - 98%)    Parameters below as of 07 Oct 2024 11:36  Patient On (Oxygen Delivery Method): room air      I&O's Summary    06 Oct 2024 07:01  -  07 Oct 2024 07:00  --------------------------------------------------------  IN: 480 mL / OUT: 2550 mL / NET: -2070 mL        PHYSICAL EXAM:  GENERAL: NAD, thin-elderly, comfortable on NC --> room air  HEAD:  Atraumatic, Normocephalic  EYES: EOMI, PERRLA, conjunctiva and sclera clear  NECK: Supple, No JVD  CHEST/LUNG: mild decrease breath sounds bilaterally; No wheeze   HEART: Regular rate and rhythm; No murmurs, rubs, or gallops  ABDOMEN: Soft, Nontender, Nondistended; Bowel sounds present  Neuro: AAOx3, no focal weakness   EXTREMITIES:  2+ Peripheral Pulses, No clubbing, cyanosis, or edema  SKIN: No rashes or lesions

## 2024-10-09 NOTE — PROGRESS NOTE ADULT - TIME BILLING
Agree with above ACP note.  cv stable  cont iv lasix daily   likely transition to po in 1-2 days   med f/u   f/u echo
agree with above  cv stable  cont current mgmt
Agree with above ACP note.  cv stable  change lasix to PO as above  med f/u   TTE 10/7/24 shows technically difficult image quality; normal LVSF with EF 60%; probably nml RVSF; moderate AS; mild to mod TR
Agree with above ACP note.  cv stable  cont iv lasix daily   likely transition to po in 1-2 days   med f/u   TTE 10/7/24 shows technically difficult image quality; normal LVSF with EF 60%; probably nml RVSF; moderate AS; mild to mod TR

## 2024-10-09 NOTE — DISCHARGE NOTE NURSING/CASE MANAGEMENT/SOCIAL WORK - NSDCFUADDAPPT_GEN_ALL_CORE_FT
Please follow up with Pulmonology, PCP, and Cardiology ([place] office) upon discharge     Please follow up with a Telehealth Pulmonary outpatient follow up on 10/11@ 11:30AM with Batool Galindo NP  TELEHEALTH INSTRUCTIONS  At the time of your appointment, you will receive a text/email invite for your telehealth session. Please click on the link, enter the patient's name. You will then be redirected to a virtual waiting room, where you will wait until the doctor has connected with you.

## 2024-10-09 NOTE — PROGRESS NOTE ADULT - ASSESSMENT
89 -year-old female with COPD on 2L home O2 PRN, CVA (remote, no deficits), CAD s/p stent ('15), IDDM2, PVD s/p fem-pop bypass ('15) w/occlusion ('22), HLD, HTN, anemia, pulmonary fibrosis 2/2 emphysema, LLL nodule concern for malignancy on PET, dCHF, urinary incontinence here w/ chest pain     #atypical chest pain   -resolved   -likely in the setting of pulm disease  -chest xray Consolidations in the lower lobes with blunting of the hemidiaphragms,  likely represent moderate-sized pleural effusions with passive  atelectasis.  -ecg with no ischemic changes  -hs trop negative   -transition lasix IVP daily to PO  -recent TTE 4/22/24  with normal LVEF and mild AS  -TTE 10/7/24 shows technically difficult image quality; normal LVSF with EF 60%; probably nml RVSF; moderate AS; mild to mod TR    #CAD s/p PCI  -stable  -cont asa, statin, BB    #HFpEF  -recent TTE with normal LVEF and mild AS  -repeat ECHO as above  -change IV Lasix to PO    #Lung CA  -preop for LLL VATS w thoracic  as outpt   -med fu / pulm eval     #UTI   -management per med /ID    dvt ppx

## 2024-10-09 NOTE — PHARMACOTHERAPY INTERVENTION NOTE - COMMENTS
Discharge medications reviewed with the patient. Current medication schedule was discussed in detail including: medication name, indication, dose, administration times, treatment duration, side effects, and special instructions. Also discussed CHF management. Patient counseled on heart failure medication indications, administration, and side effects. Also discussed fluid and salt restrictions, and maintaining a log of daily weights. Discussed warning signs of fluid overload (SOB, orthopnea, JACKSON, edema, etc.) and when to seek medical attention. Patient reports she either takes 1-2 pills of furosemide depending on how much weight she gains, and has close followup with her doctor. Patient verbalized understanding of everything discussed. Questions and concerns were answered and addressed. Patient declined CHF booklet, states she is very familiar with CHF management.    Sue Hernandez, PharmD, [facility]  Clinical Pharmacy Specialist  [id number]

## 2024-10-09 NOTE — DISCHARGE NOTE NURSING/CASE MANAGEMENT/SOCIAL WORK - PATIENT PORTAL LINK FT
You can access the FollowMyHealth Patient Portal offered by [facility] by registering at the following website: [email or web address]. By joining [facility]’s FollowMyHealth portal, you will also be able to view your health information using other applications (apps) compatible with our system.

## 2024-10-09 NOTE — PROGRESS NOTE ADULT - ASSESSMENT
89 -year-old female with COPD on 2L home O2 PRN, CVA (remote, no deficits), CAD s/p stent ('15), IDDM2, PVD s/p fem-pop bypass ('15) w/occlusion ('22), HLD, HTN, anemia, pulmonary fibrosis 2/2 emphysema, LLL nodule concern for malignancy on PET, dCHF, urinary incontinence here w/ chest pain that is central, dull, and present w/ ambulation. Reports her usual UTI symptoms with burning on urination Has plan for outpatient LVATS, was recently admitted for UTI w/ citrobacter freundii, and e. faecium.     Recurrent UTI  Reviewed prior micro with 10,000 - 49,000 CFU/mL Citrobacter freundii complex, 50,000 - 99,000 CFU/mL Enterococcus faecium  PCN allergy with reported swelling  UCx   10,000 - 49,000 CFU/mL Citrobacter freundii complex  BCx x2 NGTD    Recommendations:   C/w ciprofloxacin IVPB 400 milliGRAM(s) IV Intermittent every 12 hours x5 day course until 10/9, last day today  Switch to PO ciprofloxacin 500mg BID to complete course if pending d/c  Trend temps/WBC  Additional care per primary team    Stable from ID standpoint  D/c planning per primary team  Please call with any further questions.  Tabby Duarte M.D.  [facility] Division of Infectious Diseases [phone]  For after 5 P.M. and weekends, please call [phone]

## 2024-10-10 LAB
CULTURE RESULTS: SIGNIFICANT CHANGE UP
CULTURE RESULTS: SIGNIFICANT CHANGE UP
SPECIMEN SOURCE: SIGNIFICANT CHANGE UP
SPECIMEN SOURCE: SIGNIFICANT CHANGE UP

## 2024-10-11 ENCOUNTER — APPOINTMENT (OUTPATIENT)
Dept: PULMONOLOGY | Facility: CLINIC | Age: 89
End: 2024-10-11
Payer: MEDICARE

## 2024-10-11 PROCEDURE — 99443: CPT | Mod: 93

## 2024-10-23 ENCOUNTER — APPOINTMENT (OUTPATIENT)
Dept: THORACIC SURGERY | Facility: CLINIC | Age: 89
End: 2024-10-23
Payer: MEDICARE

## 2024-10-23 ENCOUNTER — NON-APPOINTMENT (OUTPATIENT)
Age: 89
End: 2024-10-23

## 2024-10-23 DIAGNOSIS — R91.1 SOLITARY PULMONARY NODULE: ICD-10-CM

## 2024-10-23 PROCEDURE — 99442: CPT | Mod: 93

## 2024-11-15 NOTE — ED PROVIDER NOTE - EKG #1 DATE/TIME
Occupational Therapy Daily Treatment Note  Date: 11/15/2024   Patient Name: Jose Tate  MRN: [id number]     : 1939    Date of Service: 11/15/2024    Discharge Recommendations:  Continue to assess pending progress, Patient would benefit from continued therapy after discharge       Assessment   Assessment: Tx completed. Pt was willing to work with therapy. Pt completed bed mobility with min A. Pt required CGA for sit to stand and had inital LOB upon standing, but was able to recover with min A. Pt fatigued quickly after functional mobility in the hallway. Pt completed toileting and required recliner be brought to bathroom due to fatigue and increase in HR. Pt continues to be motivated to make gains.                 Patient Diagnosis(es): The primary encounter diagnosis was Atrial fibrillation with RVR (HCC). Diagnoses of Elevated CK, Opacity of lung on imaging study, and Bacteremia due to Streptococcus were also pertinent to this visit.    Past Medical History:   Past Medical History:   Diagnosis Date    Allergic rhinitis     Atrial fibrillation (HCC)     Bronchitis, chronic (HCC)     Carotid artery stenosis     Congenital heart disease     GERD (gastroesophageal reflux disease)     Heart attack (HCC)     Hemorrhoids     Hypercholesterolemia     Palliative care patient 2024    Type II or unspecified type diabetes mellitus without mention of complication, not stated as uncontrolled         Past Surgical History:   Past Surgical History:   Procedure Laterality Date    CARDIAC SURGERY      balloon surgery (angioplasty)    EP DEVICE PROCEDURE Left 2024    Insert PPM dual performed by Kendell Willson MD at [facility] CARDIAC CATH LAB    PORT SURGERY Right 2021       Treatment Diagnosis: community acquired bacterial pneumonia      Restrictions  Restrictions/Precautions  Restrictions/Precautions: Fall Risk    Subjective      Pain Assessment  Pain Assessment: None - Denies Pain  Pain Screening  Pain  02-Nov-2021 13:11

## 2024-11-25 ENCOUNTER — APPOINTMENT (OUTPATIENT)
Dept: THORACIC SURGERY | Facility: CLINIC | Age: 89
End: 2024-11-25
Payer: MEDICARE

## 2024-11-25 DIAGNOSIS — R91.1 SOLITARY PULMONARY NODULE: ICD-10-CM

## 2024-11-25 PROCEDURE — 99442: CPT | Mod: 93

## 2024-12-09 ENCOUNTER — APPOINTMENT (OUTPATIENT)
Dept: THORACIC SURGERY | Facility: CLINIC | Age: 88
End: 2024-12-09
Payer: MEDICARE

## 2024-12-09 PROCEDURE — 99442: CPT | Mod: 93

## 2024-12-13 ENCOUNTER — INPATIENT (INPATIENT)
Facility: HOSPITAL | Age: 88
LOS: 4 days | Discharge: HOME HEALTH | End: 2024-12-18
Attending: HOSPITALIST | Admitting: STUDENT IN AN ORGANIZED HEALTH CARE EDUCATION/TRAINING PROGRAM
Payer: MEDICARE

## 2024-12-13 VITALS
TEMPERATURE: 98 F | WEIGHT: 119.93 LBS | SYSTOLIC BLOOD PRESSURE: 102 MMHG | RESPIRATION RATE: 26 BRPM | HEIGHT: 59 IN | DIASTOLIC BLOOD PRESSURE: 51 MMHG | HEART RATE: 99 BPM | OXYGEN SATURATION: 94 %

## 2024-12-13 DIAGNOSIS — J18.9 PNEUMONIA, UNSPECIFIED ORGANISM: ICD-10-CM

## 2024-12-13 DIAGNOSIS — Z95.828 PRESENCE OF OTHER VASCULAR IMPLANTS AND GRAFTS: Chronic | ICD-10-CM

## 2024-12-13 DIAGNOSIS — I25.10 ATHEROSCLEROTIC HEART DISEASE OF NATIVE CORONARY ARTERY WITHOUT ANGINA PECTORIS: Chronic | ICD-10-CM

## 2024-12-13 LAB
ALBUMIN SERPL ELPH-MCNC: 3.8 G/DL — SIGNIFICANT CHANGE UP (ref 3.3–5)
ALP SERPL-CCNC: 108 U/L — SIGNIFICANT CHANGE UP (ref 40–120)
ALT FLD-CCNC: 10 U/L — SIGNIFICANT CHANGE UP (ref 4–33)
ANION GAP SERPL CALC-SCNC: 15 MMOL/L — HIGH (ref 7–14)
APTT BLD: 32.3 SEC — SIGNIFICANT CHANGE UP (ref 24.5–35.6)
AST SERPL-CCNC: 16 U/L — SIGNIFICANT CHANGE UP (ref 4–32)
BASOPHILS # BLD AUTO: 0.02 K/UL — SIGNIFICANT CHANGE UP (ref 0–0.2)
BASOPHILS NFR BLD AUTO: 0.2 % — SIGNIFICANT CHANGE UP (ref 0–2)
BILIRUB SERPL-MCNC: 0.8 MG/DL — SIGNIFICANT CHANGE UP (ref 0.2–1.2)
BLOOD GAS VENOUS COMPREHENSIVE RESULT: SIGNIFICANT CHANGE UP
BUN SERPL-MCNC: 29 MG/DL — HIGH (ref 7–23)
CALCIUM SERPL-MCNC: 9.5 MG/DL — SIGNIFICANT CHANGE UP (ref 8.4–10.5)
CHLORIDE SERPL-SCNC: 97 MMOL/L — LOW (ref 98–107)
CO2 SERPL-SCNC: 25 MMOL/L — SIGNIFICANT CHANGE UP (ref 22–31)
CREAT SERPL-MCNC: 0.9 MG/DL — SIGNIFICANT CHANGE UP (ref 0.5–1.3)
EGFR: 60 ML/MIN/1.73M2 — SIGNIFICANT CHANGE UP
EOSINOPHIL # BLD AUTO: 0.06 K/UL — SIGNIFICANT CHANGE UP (ref 0–0.5)
EOSINOPHIL NFR BLD AUTO: 0.5 % — SIGNIFICANT CHANGE UP (ref 0–6)
FLUAV AG NPH QL: SIGNIFICANT CHANGE UP
FLUBV AG NPH QL: SIGNIFICANT CHANGE UP
GLUCOSE BLDC GLUCOMTR-MCNC: 366 MG/DL — HIGH (ref 70–99)
GLUCOSE SERPL-MCNC: 246 MG/DL — HIGH (ref 70–99)
HCT VFR BLD CALC: 32.8 % — LOW (ref 34.5–45)
HGB BLD-MCNC: 10.1 G/DL — LOW (ref 11.5–15.5)
IANC: 11.09 K/UL — HIGH (ref 1.8–7.4)
IMM GRANULOCYTES NFR BLD AUTO: 0.5 % — SIGNIFICANT CHANGE UP (ref 0–0.9)
INR BLD: 1.1 RATIO — SIGNIFICANT CHANGE UP (ref 0.85–1.16)
LYMPHOCYTES # BLD AUTO: 0.57 K/UL — LOW (ref 1–3.3)
LYMPHOCYTES # BLD AUTO: 4.6 % — LOW (ref 13–44)
MCHC RBC-ENTMCNC: 27.6 PG — SIGNIFICANT CHANGE UP (ref 27–34)
MCHC RBC-ENTMCNC: 30.8 G/DL — LOW (ref 32–36)
MCV RBC AUTO: 89.6 FL — SIGNIFICANT CHANGE UP (ref 80–100)
MONOCYTES # BLD AUTO: 0.72 K/UL — SIGNIFICANT CHANGE UP (ref 0–0.9)
MONOCYTES NFR BLD AUTO: 5.8 % — SIGNIFICANT CHANGE UP (ref 2–14)
NEUTROPHILS # BLD AUTO: 11.09 K/UL — HIGH (ref 1.8–7.4)
NEUTROPHILS NFR BLD AUTO: 88.4 % — HIGH (ref 43–77)
NRBC # BLD: 0 /100 WBCS — SIGNIFICANT CHANGE UP (ref 0–0)
NRBC # FLD: 0 K/UL — SIGNIFICANT CHANGE UP (ref 0–0)
NT-PROBNP SERPL-SCNC: 1578 PG/ML — HIGH
PLATELET # BLD AUTO: 346 K/UL — SIGNIFICANT CHANGE UP (ref 150–400)
POTASSIUM SERPL-MCNC: 4.4 MMOL/L — SIGNIFICANT CHANGE UP (ref 3.5–5.3)
POTASSIUM SERPL-SCNC: 4.4 MMOL/L — SIGNIFICANT CHANGE UP (ref 3.5–5.3)
PROT SERPL-MCNC: 7.7 G/DL — SIGNIFICANT CHANGE UP (ref 6–8.3)
PROTHROM AB SERPL-ACNC: 12.8 SEC — SIGNIFICANT CHANGE UP (ref 9.9–13.4)
RBC # BLD: 3.66 M/UL — LOW (ref 3.8–5.2)
RBC # FLD: 15.8 % — HIGH (ref 10.3–14.5)
RSV RNA NPH QL NAA+NON-PROBE: SIGNIFICANT CHANGE UP
SARS-COV-2 RNA SPEC QL NAA+PROBE: SIGNIFICANT CHANGE UP
SODIUM SERPL-SCNC: 137 MMOL/L — SIGNIFICANT CHANGE UP (ref 135–145)
TROPONIN T, HIGH SENSITIVITY RESULT: 38 NG/L — SIGNIFICANT CHANGE UP
WBC # BLD: 12.52 K/UL — HIGH (ref 3.8–10.5)
WBC # FLD AUTO: 12.52 K/UL — HIGH (ref 3.8–10.5)

## 2024-12-13 PROCEDURE — 71275 CT ANGIOGRAPHY CHEST: CPT | Mod: 26,MC

## 2024-12-13 PROCEDURE — 71045 X-RAY EXAM CHEST 1 VIEW: CPT | Mod: 26

## 2024-12-13 PROCEDURE — 99223 1ST HOSP IP/OBS HIGH 75: CPT

## 2024-12-13 PROCEDURE — G0316 PROLONG INPT EVAL ADD15 M: CPT

## 2024-12-13 PROCEDURE — 99285 EMERGENCY DEPT VISIT HI MDM: CPT | Mod: GC

## 2024-12-13 PROCEDURE — 99497 ADVNCD CARE PLAN 30 MIN: CPT | Mod: 25

## 2024-12-13 RX ORDER — VANCOMYCIN HCL 900 MCG/MG
1000 POWDER (GRAM) MISCELLANEOUS ONCE
Refills: 0 | Status: COMPLETED | OUTPATIENT
Start: 2024-12-13 | End: 2024-12-13

## 2024-12-13 RX ORDER — METHYLPREDNISOLONE SOD SUCC 125 MG
125 VIAL (EA) INJECTION ONCE
Refills: 0 | Status: COMPLETED | OUTPATIENT
Start: 2024-12-13 | End: 2024-12-13

## 2024-12-13 RX ORDER — METOPROLOL TARTRATE 100 MG/1
0.5 TABLET, FILM COATED ORAL
Refills: 0 | DISCHARGE

## 2024-12-13 RX ORDER — AZITHROMYCIN 250 MG/1
500 TABLET, FILM COATED ORAL ONCE
Refills: 0 | Status: DISCONTINUED | OUTPATIENT
Start: 2024-12-13 | End: 2024-12-13

## 2024-12-13 RX ORDER — SILDENAFIL 20 MG/1
1 TABLET, FILM COATED ORAL
Refills: 0 | DISCHARGE

## 2024-12-13 RX ORDER — GABAPENTIN 300 MG/1
400 CAPSULE ORAL ONCE
Refills: 0 | Status: COMPLETED | OUTPATIENT
Start: 2024-12-13 | End: 2024-12-13

## 2024-12-13 RX ADMIN — Medication 125 MILLIGRAM(S): at 16:47

## 2024-12-13 RX ADMIN — Medication 250 MILLIGRAM(S): at 20:01

## 2024-12-13 RX ADMIN — GABAPENTIN 400 MILLIGRAM(S): 300 CAPSULE ORAL at 21:11

## 2024-12-13 NOTE — ED PROVIDER NOTE - ATTENDING CONTRIBUTION TO CARE
Pt was seen and evaluated by me. Pt is a 89 y/o female with PMHx of HTN, DM type 2, CAD, CHF on Lasix, PVD, and COPD on home O2 who presented to the ED for cough, increased SOB, and body aches X 2 days. Pt states over the past 2 days having non-productive cough with increased SOB, normally on 2L as needed of O2 now requiring 4L regularly. Pt also admits to body ached and lethargy. Pt denies any fever, chills, nausea, vomiting, chest pain, or abd pain.   VITALS: Vitals have been reviewed.  GEN APPEARANCE: Awake, lethargic appearing  HEAD: Atraumatic, normocephalic.   EYES: PERRL.   EARS: Gross hearing intact.   NOSE: No nasal discharge.   THROAT: MMM. Oral cavity and pharynx normal.   CV: RRR, S1S2, no c/r/m/g. No cyanosis or pallor.   LUNGS: Coarse breath sounds b/l  ABDOMEN: Soft, NTND. No guarding or rebound.   MSK/EXT: Spine appears normal, no spine point tenderness.  NEURO: Alert, follows commands. Speech normal. Sensation and motor normal x4 extremities.   SKIN: Normal color for race, warm, dry and intact. No evidence of rash.  89 y/o female with PMHx of HTN, DM type 2, CAD, CHF on Lasix, PVD, and COPD on home O2 who presented to the ED for cough, increased SOB, and body aches X 2 days.   Concern for PNA/URI/COPD Exacerbation  Will get labs, CT, and continue O2 support

## 2024-12-13 NOTE — H&P ADULT - PROBLEM SELECTOR PLAN 1
suspect secondary to multifocal pna (+/- UTI, diagnosed outpatient)  check urine studies, reordered  check BCx if febrile  lactate wnl  trend leukocytosis  sputum cultures if able to obtain  procalcitonin pending with AM labs   c/w levofloxacin given allergy profile meeting SIRS criteria w/WBC.12, HR>90  suspect secondary to multifocal pna (+/- UTI, diagnosed outpatient)  check urine studies, reordered  check BCx if febrile  lactate wnl  trend leukocytosis  sputum cultures if able to obtain  procalcitonin pending with AM labs   c/w levofloxacin given allergy profile  no IVF given dHF, lactate wnl, VSS, afebrile, furosemide held in lieu of IVF (monitor fluid status, will need to add back) meeting SIRS criteria w/WBC.12, HR>90  suspect secondary to possible multifocal pna (however similar imaging noted on prior CTs in Sept/Oct ?chronic findings) vs UTI, diagnosed outpatient  check urine studies, reordered  check BCx if febrile  lactate wnl  trend leukocytosis  sputum cultures if able to obtain  procalcitonin pending with AM labs   c/w levofloxacin given allergy profile  no IVF given dHF, lactate wnl, VSS, afebrile, furosemide held in lieu of IVF (monitor fluid status, will need to add back) meeting SIRS criteria w/WBC.12, HR>90  suspect secondary to possible multifocal pna (however similar imaging noted on prior CTs in Sept/Oct ?chronic findings) vs UTI, diagnosed outpatient  check urine studies, reordered  check BCx if febrile  lactate wnl  trend leukocytosis  sputum cultures if able to obtain  procalcitonin pending with AM labs   c/w levofloxacin given allergy profile  no IVF given dHF, lactate wnl, VSS, afebrile, furosemide held in lieu of IVF (monitor fluid status, will need to add back)  Per patient's PCP, whom I spoke with this AM UCx showed Citrobacter sensitive to fluoroquinolones

## 2024-12-13 NOTE — H&P ADULT - NSHPREVIEWOFSYSTEMS_GEN_ALL_CORE
REVIEW OF SYSTEMS:    CONSTITUTIONAL: No weakness, fevers or chills  EYES/ENT: No visual changes; No dysphagia; No sore throat; No rhinorrhea; No sinus pain/pressure  NECK: No pain or stiffness  RESPIRATORY: (+) cough, wheezing, hemoptysis; No shortness of breath  CARDIOVASCULAR: No chest pain or palpitations; No lower extremity edema  GASTROINTESTINAL: No abdominal or epigastric pain. No nausea, vomiting, or hematemesis; No diarrhea or constipation. No melena or hematochezia.  GENITOURINARY: No dysuria, frequency or hematuria  NEUROLOGICAL: No numbness, paresthesias, or weakness; No HA; No LH/dizziness  MSK: ambulates with  SKIN: No itching, burning, rashes, or lesions   All other review of systems is negative unless indicated above. REVIEW OF SYSTEMS:    CONSTITUTIONAL: No fevers or chills  EYES/ENT: (+) chronic poor vision (reportedly due to macular degeneration); (+)[place]; No dysphagia; No sore throat; No rhinorrhea; No sinus pain/pressure  NECK: No pain or stiffness  RESPIRATORY: (+) cough, No wheezing; No hemoptysis; (+) shortness of breath/JACKSON  CARDIOVASCULAR: No chest pain or palpitations; No lower extremity edema; No orthopnea  GASTROINTESTINAL: No abdominal or epigastric pain. No nausea, vomiting, or hematemesis; (+) diarrhea on Wednesday, resolved; No constipation. No melena or hematochezia.  GENITOURINARY: (+) recent dysuria followed by cloudy urine, (+) urinary frequency; No hematuria  NEUROLOGICAL: (+)neuropathy in feet; No weakness; No HA; No LH/dizziness  MSK: ambulates with walker at times; No falls  SKIN: No itching, burning, rashes, or lesions   All other review of systems is negative unless indicated above.

## 2024-12-13 NOTE — ED ADULT NURSE NOTE - NSFALLHARMRISKINTERV_ED_ALL_ED

## 2024-12-13 NOTE — H&P ADULT - ASSESSMENT
89 -year-old female with COPD on 2L home O2 PRN and QHS, CVA (remote, no deficits), CAD s/p stent ('15), IDDM2, PVD s/p fem-pop bypass ('15) w/occlusion ('22), HLD, HTN, anemia, recently diagnosed pHTN, presenting from with increasing cough occasionally productive of white sputum, found to have multifocal PNA

## 2024-12-13 NOTE — ED PROVIDER NOTE - CONSIDERATION OF ADMISSION OBSERVATION
Given concern for PNA/URI requiring increased O2 support, will require admission for further management Consideration of Admission/Observation

## 2024-12-13 NOTE — H&P ADULT - HISTORY OF PRESENT ILLNESS
89 -year-old female with COPD on 2L home O2 PRN and QHS, CVA (remote, no deficits), CAD s/p stent ('15), IDDM2, PVD s/p fem-pop bypass ('15) w/occlusion ('22), HLD, HTN, anemia, presenting from with increasing cough occasionally productive of white sputum.    Vascular bypass surgery    Reports having a PET scan and being diagnosed with presumed lung cancer, was due to undergo a VATS however was later deferred due to debility, may go for RT, yet to be determined.      clear sputum production   cough worse than baseline   89 -year-old female with COPD on 2L home O2 PRN and QHS, CVA (remote, no deficits), CAD s/p stent ('15), IDDM2, PVD s/p fem-pop bypass ('15) w/occlusion ('22), HLD, HTN, anemia, presenting from with increasing cough occasionally productive of white sputum.    Vascular bypass surgery    Reports having a PET scan and being diagnosed with presumed lung cancer, was due to undergo a VATS however was later deferred due to debility, may go for RT, yet to be determined.    clear sputum production   cough worse than baseline    Was recently started on abx for UTI, was started on an initial antibiotic and was called and changed to nitrofurantoin based on culture sensitivities.    In the ED VS:  98.5  82-99  102-118/51-57  18-26  93-95% on 4L NC, received methylprednisolone 125mg IV x1, gabapentin 400mg PO x1, levofloxacin 750mg IVPB x1 and vancomycin 1g IVPB x1,    89 -year-old female with COPD on 2L home O2 PRN and QHS, CVA (remote, no deficits), CAD s/p stent ('15), IDDM2, PVD s/p fem-pop bypass ('15) w/occlusion ('22), HLD, HTN, anemia, presenting from with increasing cough occasionally productive of white sputum. She denies any fevers or chills. She notes some increase in her chronic shortness of breath/JACKSON. Reports recent dysuria and cloudy urine, has recurrent UTIs, was started on abx for UTI by PCP and was called and changed to nitrofurantoin based on culture sensitivities per patient.    Reports having a PET scan and being diagnosed with presumed lung cancer, was due to undergo a L VATS/LLL wedge however was later deferred due to diagnosis of pHTN, due to see rad/onc for possible SBRT    In the ED VS:  98.5  82-99  102-118/51-57  18-26  93-95% on 4L NC, received methylprednisolone 125mg IV x1, gabapentin 400mg PO x1, levofloxacin 750mg IVPB x1 and vancomycin 1g IVPB x1,    89 -year-old female with COPD on 2L home O2 PRN and QHS, CVA (remote, no deficits), CAD s/p stent ('15), IDDM2, PVD s/p fem-pop bypass ('15) w/occlusion ('22), HLD, HTN, anemia, presenting from with increasing cough occasionally productive of white sputum. She denies any fevers or chills. She notes some increase in her chronic shortness of breath/JACKSON. Reports recent dysuria and cloudy urine, has recurrent UTIs, was started on cephalexin (12/06) for UTI by PCP and was called and changed to nitrofurantoin (12/11) based on culture sensitivities per patient.    Reports having a PET scan and being diagnosed with presumed lung cancer, was due to undergo a L VATS/LLL wedge however was later deferred due to diagnosis of pHTN, due to see rad/onc for possible SBRT    In the ED VS:  98.5  82-99  102-118/51-57  18-26  80%RA on triage improved to 93-95% on 4L NC, received methylprednisolone 125mg IV x1, gabapentin 400mg PO x1, levofloxacin 750mg IVPB x1 and vancomycin 1g IVPB x1,

## 2024-12-13 NOTE — H&P ADULT - PROBLEM SELECTOR PLAN 4
c/w home medication regimen given hyperglycemia and no recent hypoglycemic events, (just decreased by 20% per protocol as switching from degludec to glargine   FS QAC/HS w/ISS  check A1c monitor strict I/Os, daily weights  pro-BNP 1578 similar to prior value in Oct 2024  add back furosemide if BP stable

## 2024-12-13 NOTE — H&P ADULT - PROBLEM SELECTOR PLAN 12
HSQ for VTE ppx  DASH/CC diet with 1200cc fluid restriction  GOC - please readdress GOC with patient and daughter in AM, currently FULL CODE    #lesion on sacrum   -reportedly chronic, wound care consult for topical recs HSQ for VTE ppx  DASH/CC diet with 1200cc fluid restriction  GOC - please readdress GOC with patient and daughter in AM, currently FULL CODE    #lesion on sacrum   -reportedly chronic, wound care consult for topical recs    #initial trop 38, no chest pain, would send 2nd set with AM labs, (not sent for add on, placed STAT order to send with other AM labs), EKG non-ischemic

## 2024-12-13 NOTE — ED ADULT TRIAGE NOTE - CHIEF COMPLAINT QUOTE
C/O generalized bodyaches, weakness since yesterday. She is saturating 80% on room air with SOB. Placed on 4L O2 NC saturating 94%. On home oxygen PRN. Taking Sidenifil 20mg twice daily. History of DM, HTN, CAD, PVD, COPD, HLD.

## 2024-12-13 NOTE — ED PROVIDER NOTE - PHYSICAL EXAMINATION
CONSTITUTIONAL: awake; in no acute distress.   HEAD: Normocephalic; atraumatic.  EYES: no conjunctival injection. no scleral icterus  ENT: No nasal discharge; airway clear.  CARD: S1, S2 normal; no murmurs, gallops, or rubs. Regular rate and rhythm.   RESP: Mildly coarse to mid L air fields, no wheezes. Good air movement bilaterally. Desats to mid 80s on RA and to 70s during coughing fits. Speaking full sentences.  ABD: soft ntnd, no guarding, no distention, no rigidity.   EXT:  No cyanosis or edema.   NEURO: Alert, oriented, grossly unremarkable  PSYCH: Cooperative, appropriate.

## 2024-12-13 NOTE — PATIENT PROFILE ADULT - FALL HARM RISK - HARM RISK INTERVENTIONS
Assistance with ambulation/Assistance OOB with selected safe patient handling equipment/Communicate Risk of Fall with Harm to all staff/Discuss with provider need for PT consult/Monitor gait and stability/Reinforce activity limits and safety measures with patient and family/Review medications for side effects contributing to fall risk/Sit up slowly, dangle for a short time, stand at bedside before walking/Tailored Fall Risk Interventions/Toileting schedule using arm’s reach rule for commode and bathroom/Visual Cue: Yellow wristband and red socks/Bed in lowest position, wheels locked, appropriate side rails in place/Call bell, personal items and telephone in reach/Instruct patient to call for assistance before getting out of bed or chair/Non-slip footwear when patient is out of bed/[place] to call system/Physically safe environment - no spills, clutter or unnecessary equipment/Purposeful Proactive Rounding/Room/bathroom lighting operational, light cord in reach

## 2024-12-13 NOTE — H&P ADULT - PROBLEM SELECTOR PLAN 3
monitor strict I/Os, daily weights  pro-BNP 1578 similar to prior value in Oct 2024 on 2L O2 PRN and QHS per patient, in ED triage O2 80% on RA, started on 4L NC with improvement to 94%  given initial O2 was off O2, possibly at baseline  would wean to 2L as tolerated if able  suspect due to possible pna as above however, of note, imaging from prior 2 admissions with similar lower lobe findings on CT, possibly sepsis due to UTI with ?mild COPD exacerbation. No wheezing on exam however so holding off on further steroids for now, but low threshold to add back as needed on 2L O2 PRN and QHS per patient, in ED triage O2 80% on RA, started on 4L NC with improvement to 94%  given initial O2 was off O2, possibly at baseline  would wean to 2L as tolerated if able  suspect due to possible pna as above however, of note, imaging from prior 2 admissions with similar lower lobe findings on CT, possibly sepsis due to UTI with ?mild COPD exacerbation. No wheezing on exam however so holding off on further steroids for now, but low threshold to add back as needed    possible pulm fibrosis noted on review of prior pulm notes from Sept and noted on CXR findings in Oct  given COPD/pHTN/lung ca/possible pulm fibrosis would consult pulm (Dr. Pierre in AM) on 2L O2 PRN and QHS per patient, in ED triage O2 80% on RA, started on 4L NC with improvement to 94%  given initial O2 was off O2, possibly at baseline  would wean to 2L as tolerated if able  suspect due to possible pna as above however, of note, imaging from prior 2 admissions with similar lower lobe findings on CT, possibly sepsis due to UTI with ?mild COPD exacerbation. No wheezing on exam however so holding off on further steroids for now, but low threshold to add back as needed    possible pulm fibrosis noted on review of prior pulm notes from Sept and noted on CXR findings in Oct  given COPD/pHTN/lung ca/possible pulm fibrosis would consult pulm, attempted to reach Dr. Pierre x2 in AM, but went to , would reach out later this AM on 2L O2 PRN and QHS per patient, in ED triage O2 80% on RA, started on 4L NC with improvement to 94%  given initial O2 was off O2, possibly at baseline  would wean to 2L as tolerated if able  suspect due to possible pna as above however, of note, imaging from prior 2 admissions with similar lower lobe findings on CT, possibly sepsis due to UTI with ?mild COPD exacerbation. No wheezing on exam however so holding off on further steroids for now, but low threshold to add back as needed  CTPA negative for PE    possible pulm fibrosis noted on review of prior pulm notes from Sept and noted on CXR findings in Oct  given COPD/pHTN/lung ca/possible pulm fibrosis would consult pulm, attempted to reach Dr. Pierre x2 in AM, but went to , would reach out later this AM

## 2024-12-13 NOTE — H&P ADULT - TIME BILLING
Preparing to see the patient including review of tests and other providers' notes, confirming history with patient, performing medical examination and evaluation, counseling and educating the patient, ordering medications, tests and procedures, communicating with other health care professionals, documenting clinical information in the EMR, independently interpreting results and communicating results to the patient, care coordination Preparing to see the patient including review of tests and other providers' notes, confirming history with patient, performing medical examination and evaluation, counseling and educating the patient, ordering medications, tests and procedures, communicating with other health care professionals, documenting clinical information in the EMR, independently interpreting results and communicating results to the patient, care coordination.

## 2024-12-13 NOTE — ED PROVIDER NOTE - CLINICAL SUMMARY MEDICAL DECISION MAKING FREE TEXT BOX
Patient is a 89-year-old female past medical history of hypertension, diabetes, CAD, PVD status post stent, COPD on only as needed O2 at home, CHF on 40 Lasix daily, lung nodules concerning for malignancy presenting for cough and bodyaches. With initial vital signs significant for hypoxia on room air improved on nasal cannula, recurrent with coughing fits.  Additionally with tachycardia.  Presenting for coughing and bodyaches and generalized weakness, with only mild coarseness to the mid left field on lung exam, does not appear clinically volume overloaded.  Differential includes but not limited to pneumonia versus viral syndrome versus PE, lower suspicion for pulmonary edema secondary to heart failure given exam to evaluate labs, x-ray, CT, reassessment.  Likely to require admission for hypoxic respiratory failure.  EKG sinus tachycardia without any diagnostic ischemic changes.

## 2024-12-13 NOTE — H&P ADULT - PROBLEM SELECTOR PLAN 8
c/w ASA, statin holding furosemide and metoprolol given controlled BP and sepsis, would add back if BP remains stable -> furosemide added back in AM c/w sildenafil

## 2024-12-13 NOTE — H&P ADULT - NSHPPHYSICALEXAM_GEN_ALL_CORE
Vital Signs Last 24 Hrs  T(C): 36.7 (13 Dec 2024 21:23), Max: 36.9 (13 Dec 2024 13:06)  T(F): 98 (13 Dec 2024 21:23), Max: 98.5 (13 Dec 2024 19:34)  HR: 82 (13 Dec 2024 21:23) (82 - 99)  BP: 118/57 (13 Dec 2024 21:23) (102/51 - 118/57)  RR: 22 (13 Dec 2024 21:23) (18 - 26)  SpO2: 94% (13 Dec 2024 21:23) (93% - 95%)    Parameters below as of 13 Dec 2024 21:23  Patient On (Oxygen Delivery Method): nasal cannula  O2 Flow (L/min): 4 Vital Signs Last 24 Hrs  T(C): 36.7 (13 Dec 2024 21:23), Max: 36.9 (13 Dec 2024 13:06)  T(F): 98 (13 Dec 2024 21:23), Max: 98.5 (13 Dec 2024 19:34)  HR: 82 (13 Dec 2024 21:23) (82 - 99)  BP: 118/57 (13 Dec 2024 21:23) (102/51 - 118/57)  RR: 22 (13 Dec 2024 21:23) (18 - 26)  SpO2: 94% (13 Dec 2024 21:23) (93% - 95%)    Parameters below as of 13 Dec 2024 21:23  Patient On (Oxygen Delivery Method): nasal cannula  O2 Flow (L/min): 4    PHYSICAL EXAM:  GENERAL: NAD  HEAD:  Atraumatic, Normocephalic  EYES: conjunctiva and sclera clear  NECK: Supple, No JVD  CHEST/LUNG: course rales b/l bases; No wheezes; slightly increased work of breathing but speaking in full sentences, and appears comfortable  HEART: Regular rate and rhythm; No murmurs, rubs, or gallops, (+)S1, S2  ABDOMEN: Soft, Nontender, Nondistended; Normal Bowel sounds   EXTREMITIES:  2+ Peripheral Pulses, No clubbing, cyanosis, or edema  PSYCH: normal mood and affect, A&Ox3  NEUROLOGY: no focal neuro deficits  SKIN: sacral erythema with central scab, no drainage (chronic per pt)

## 2024-12-13 NOTE — H&P ADULT - NSHPLABSRESULTS_GEN_ALL_CORE
10.1   12.52 )-----------( 346      ( 13 Dec 2024 14:35 )             32.8     137  |  97[L]  |  29[H]  ----------------------------<  246[H]     12-13  4.4   |  25  |  0.90    Ca    9.5      13 Dec 2024 14:35    TPro  7.7  /  Alb  3.8  /  TBili  0.8  /  DBili  x   /  AST  16  /  ALT  10  /  AlkPhos  108  12-13    PT/INR: 12.8/1.10 (12-13-24 @ 14:35)  PTT: 32.3 (12-13-24 @ 14:35)    14:35 - VBG - pH: 7.39  | pCO2: 50    | pO2: 26    | Lactate: 1.8      hs Troponin, T - 38 ng/L (12-13-24 @ 14:35)    Pro-BNP: 1578 (12-13-24 @ 14:35)    < from: CT Angio Chest PE Protocol w/ IV Cont (12.13.24 @ 18:22) >  AIRWAYS/LUNGS/PLEURA: Patent central airways. Several subsegmental bronchi in the bilateral lower lobes appear to be occluded. Bilateral peripheral, basal predominant bronchiectasis, better appreciated on prior CT of the chest 10/16/2024. Bilateral reticular opacities are also seen again, increased since the prior exam. Since 10/16/2024, there is increased consolidation in the lower lobes and superimposed patchy groundglass opacities throughout the upper to mid lungs. There is also increased interlobular septal thickening. A left lower lobe nodule measures approximately 2.3 x 2.0 cm (301:46). Direct comparison is difficult due to extensive adjacent consolidation.  MEDIASTINUM AND SHANNON: Redemonstrated enlarged mediastinal and bilateral hilar lymph nodes. A reference right paratracheal lymph node measures 1.8 x 1.5 cm, previously 1.6 x 1.1 cm and 9/13/2024.  PULMONARY ANGIOGRAM: No pulmonary embolism to the level of the bilateral segmental branches. Decreased opacification and more distal branches in the lower lobes may be due to sluggish flow recommendations consolidation.  VESSELS: Aortic and coronary artery calcification.  HEART: The heart is enlarged. No pericardial effusion. Mitral annular calcification.  VISUALIZED UPPER ABDOMEN: Subcentimeter hypodense lesion in the posterior spleen, previously described as cyst.  CHEST WALL AND BONES: Degenerative changes. Unchanged hypodense right thyroid nodule.  IMPRESSION: No pulmonary embolism. New bilateral lower lobe patchy consolidation and peripheral opacities superimposed on chronic interstitial changes/fibrosis likely representing multifocal pneumonia. Suspect similar appearing left lower lobe mass allowing for the presence of new consolidation to previously seen FDG avid lesion. Recommend follow-up chest CT in one month after the appropriate clinical treatment to reevaluate.  < end of copied text > 10.1   12.52 )-----------( 346      ( 13 Dec 2024 14:35 )             32.8     137  |  97[L]  |  29[H]  ----------------------------<  246[H]     12-13  4.4   |  25  |  0.90    Ca    9.5      13 Dec 2024 14:35    TPro  7.7  /  Alb  3.8  /  TBili  0.8  /  DBili  x   /  AST  16  /  ALT  10  /  AlkPhos  108  12-13    PT/INR: 12.8/1.10 (12-13-24 @ 14:35)  PTT: 32.3 (12-13-24 @ 14:35)    14:35 - VBG - pH: 7.39  | pCO2: 50    | pO2: 26    | Lactate: 1.8      hs Troponin, T - 38 ng/L (12-13-24 @ 14:35)    Pro-BNP: 1578 (12-13-24 @ 14:35)    FluA/FluB/RSV/COVID PCR (12.13.24 @ 14:35)    SARS-CoV-2 Result: NotDetec    Influenza A Result: NotDete    Influenza B Result: NotDetec    Resp Syn Virus Result: NotDetec    < from: CT Angio Chest PE Protocol w/ IV Cont (12.13.24 @ 18:22) >  AIRWAYS/LUNGS/PLEURA: Patent central airways. Several subsegmental bronchi in the bilateral lower lobes appear to be occluded. Bilateral peripheral, basal predominant bronchiectasis, better appreciated on prior CT of the chest 10/16/2024. Bilateral reticular opacities are also seen again, increased since the prior exam. Since 10/16/2024, there is increased consolidation in the lower lobes and superimposed patchy groundglass opacities throughout the upper to mid lungs. There is also increased interlobular septal thickening. A left lower lobe nodule measures approximately 2.3 x 2.0 cm (301:46). Direct comparison is difficult due to extensive adjacent consolidation.  MEDIASTINUM AND SHANNON: Redemonstrated enlarged mediastinal and bilateral hilar lymph nodes. A reference right paratracheal lymph node measures 1.8 x 1.5 cm, previously 1.6 x 1.1 cm and 9/13/2024.  PULMONARY ANGIOGRAM: No pulmonary embolism to the level of the bilateral segmental branches. Decreased opacification and more distal branches in the lower lobes may be due to sluggish flow recommendations consolidation.  VESSELS: Aortic and coronary artery calcification.  HEART: The heart is enlarged. No pericardial effusion. Mitral annular calcification.  VISUALIZED UPPER ABDOMEN: Subcentimeter hypodense lesion in the posterior spleen, previously described as cyst.  CHEST WALL AND BONES: Degenerative changes. Unchanged hypodense right thyroid nodule.  IMPRESSION: No pulmonary embolism. New bilateral lower lobe patchy consolidation and peripheral opacities superimposed on chronic interstitial changes/fibrosis likely representing multifocal pneumonia. Suspect similar appearing left lower lobe mass allowing for the presence of new consolidation to previously seen FDG avid lesion. Recommend follow-up chest CT in one month after the appropriate clinical treatment to reevaluate.  < end of copied text >    EKG personally reviewed and interpreted - ST 103bpm, QTc 445ms    < from: TTE W or WO Ultrasound Enhancing Agent (10.07.24 @ 15:53) >  CONCLUSIONS:   1. Technically difficult image quality.   2. Left ventricular systolic function is normal with an ejection fraction of 60 % by Gomez's method of disks.   3. Normal left and right atrial size.   4. Normal right ventricular cavity size and probably normal right ventricular systolic function.   5. There is calcification of the mitral valve annulus.   6. Mild mitral valve stenosis.   7. Trileaflet aortic valve with reduced systolic excursion. There is calcification of the aortic valve leaflets. Moderate aortic stenosis.   8. The peak transaortic velocity is 1.95 m/s, peak transaortic gradient is 15.2 mmHg and mean transaortic gradient is 9.0 mmHg with an LVOT/aortic valve VTI ratio of 0.47. The effective orifice area is estimated at 1.47 cm² by the continuity equation and 1.38 cm² by 2D planimetry.   9. Mild to moderate tricuspid regurgitation.  10. No pericardial effusion seen.  < end of copied text >

## 2024-12-13 NOTE — ED PROVIDER NOTE - OBJECTIVE STATEMENT
Patient is a 89-year-old female past medical history of hypertension, diabetes, CAD, PVD status post stent, COPD on only as needed O2 at home, CHF on 40 Lasix daily, lung nodules concerning for malignancy presenting for cough and bodyaches.  Patient states she has had gradually worsening cough and bodyaches over the past 1 to 2 days, associated with generalized fatigue and low energy.  Cough is nonproductive, she has a cough at baseline but feels that she is increased frequency of cough.  No fevers.  No chest pain.  No abdominal pain, stool changes.  Says that urine became cloudy a few days ago, had urinalysis performed at PMD and was started on Macrobid.  No sick contacts or travel.

## 2024-12-13 NOTE — ED ADULT NURSE REASSESSMENT NOTE - NS ED NURSE REASSESS COMMENT FT1
20G IV placed in left AC. Labs drawn and sent. Suction set up and patient educated on suction of secretions. Pending lab results.
Pt received from dalia RN. Pt is aox4. skin warm, dry. resp easy at this time. pt on 4L NC - uses O2 at home prn (COPD, Lung CA). pt denies pain. pending imaging. receptive to plan of care. denies needs. safety maintained.
Received report from Day RN: Pt appears to be resting comfortably, NAD, respirations are even and unlabored. O2 pulse ox in progress. VS noted. Medicated as per orders. Report given to floor RN, pending on transport to floor. Safety precautions implemented as per protocol, awaiting further MD orders, plan of care ongoing.

## 2024-12-13 NOTE — ED ADULT TRIAGE NOTE - ADDITIONAL SAFETY/BANDS...
Janice Ho    Your CT results came back within normal limits. No hernia or other abnormality to explain your pain is seen. The cyst on the kidney is benign- no concern there. I'm going to have you see GI for further assessment.     Order: Adult Gastro Ref - Consult Only     [facility]  [address] [address]  Phone: [phone]      Please let us know if you have any questions.     Take care,    CLAUDIA Mccoy CNP
Additional Safety/Bands:

## 2024-12-13 NOTE — H&P ADULT - PROBLEM SELECTOR PLAN 6
c/w sildenafil was due to have L VATS/LLL resection however due to development of pHTN, surgery deferred. Had PET scan. Plan is now to f/u with rad/onc for possible SBRT no wheezing appreciated on exam  s/p IV steroids in ED, will fernandez further steroids for now however low threshold to add back as needed  ween back to home O2 as tolerated, on  at present due to ?multifocal PNA on CT  c/w ICS/LABA  albuterol PRN

## 2024-12-13 NOTE — H&P ADULT - PROBLEM SELECTOR PLAN 11
HSQ for VTE ppx  DASH/CC diet with 1200cc fluid restriction  GOC - please readdress GOC with patient and daughter in AM, currently FULL CODE    #lesion on sacrum   -reportedly chronic, wound care consult for topical recs chronic/stable renal function  renally dose meds, avoid nephrotoxins  trend Cr

## 2024-12-13 NOTE — H&P ADULT - NSICDXPASTMEDICALHX_GEN_ALL_CORE_FT
PAST MEDICAL HISTORY:  Anemia     CAD (coronary artery disease)     Chronic diastolic heart failure     CVA (cerebral vascular accident)     DM2 (diabetes mellitus, type 2)     H/O hearing loss     History of COPD     HLD (hyperlipidemia)     HTN (hypertension)     Pulmonary hypertension     PVD (peripheral vascular disease)     Solitary pulmonary nodule

## 2024-12-13 NOTE — H&P ADULT - PROBLEM SELECTOR PROBLEM 3
1900 - Verbal/Bedside shift change report given to TOM Nash (oncoming nurse) by Jeanne Cook RN (offgoing nurse). Report included the following information SBAR, Kardex, Intake/Output, MAR, Recent Results, Cardiac Rhythm normal sinus rhythm, and dual skin assessment at the bedside. 2000 - Assessment complete. Receiving oxygen via Ventilator. Oral care complete. Bilateral soft wrist restraints in place without injury. Vent/Bipap: mode: PCV 22/5/PC20/30%    Lines: ETT # 7.5/22 cm at teeth,. PIV: x1, L triple lumen CVC, R portacath, R quintonOGT 55 cm infusing, patent Lyons,. Dialysis: CRRT. Mode CVVHD Factor 100-150 4K bags    Infusions: Precedex 1mcg, TwoCal HN ., Goal 35 ml/hr Free H2O: 75 ml q 4 h,.    Skin/Wounds: R leg skin tear    0000 - Reassessment complete. VSS. Oral care complete. 0400 - Reassessment complete. VSS. Oral care complete. Labs sent. Bath and linen change complete. Applied new cream/lotion/powder. Tolerated well.    0700 - Verbal/Bedside shift change report given to TOM Hernandez/TOM Redd (oncoming nurse) by Jae Gomez (offgoing nurse). Report included the following information SBAR, Kardex, Intake/Output, MAR, Recent Results, and Cardiac Rhythm normal sinus rhythm. Chronic diastolic congestive heart failure Acute on chronic respiratory failure with hypoxia

## 2024-12-13 NOTE — H&P ADULT - PROBLEM SELECTOR PLAN 5
no wheezing appreciated on exam no wheezing appreciated on exam  s/p IV steroids in ED, will fernandez further steroids for now  ween back to home O2 as tolerated, on  at present c/w home medication regimen given hyperglycemia and no recent hypoglycemic events, (just decreased by 20% per protocol as switching from degludec to glargine   FS QAC/HS w/ISS  check A1c

## 2024-12-13 NOTE — ED ADULT NURSE NOTE - OBJECTIVE STATEMENT
Break RN: Pt received to room 23. Pt A&O x 3, ambulatory. Pt c/o generalized weakness, body aches, cough and SOB starting a few days ago. Pt endorses sleeping all day yesterday. Pt denies fevers, chills, headache, vision changes, dizziness, chest pain, SOB, numbness or tingling, abdominal pain, N&V, or urinary symptoms. PERRLA observed. No neuro deficits. Respirations even and unlabored on 4L NC. NSR on cardiac monitor. Abdomen soft, nontender, nondistended. +2 pulses in all extremities. Comfort measures provided. Safety maintained. Pending MD evaluation. Break RN: Pt received to room 23. Pt A&O x 3, ambulatory. Pt c/o generalized weakness, body aches, cough and SOB starting a few days ago. Pt endorses sleeping all day yesterday. Pt endorses Hx of DM2, HTN, CAD, PVD, COPD, HLD. Pt denies fevers, chills, headache, vision changes, dizziness, chest pain, SOB, numbness or tingling, abdominal pain, N&V, or urinary symptoms. PERRLA observed. No neuro deficits. Respirations even and unlabored on 4L NC. NSR on cardiac monitor. Abdomen soft, nontender, nondistended. +2 pulses in all extremities. Comfort measures provided. Safety maintained. Pending MD evaluation.

## 2024-12-13 NOTE — H&P ADULT - PROBLEM SELECTOR PROBLEM 5
COPD (chronic obstructive pulmonary disease) Type 2 diabetes mellitus, with long-term current use of insulin

## 2024-12-13 NOTE — H&P ADULT - CONVERSATION DETAILS
Discussed advanced care planning and reasoning for discussion with patient at bedside. She initially was leaning towards DNR and then decided she wanted to touch base with her daughter/HCP first, will remain FULL CODE for now.

## 2024-12-13 NOTE — H&P ADULT - PROBLEM SELECTOR PLAN 7
holding furosemide and metoprolol given controlled BP and sepsis, would add back if BP remains stable c/w sildenafil was due to have L VATS/LLL resection however due to development of pHTN, surgery deferred. Had PET scan. Plan is now to f/u with rad/onc for possible SBRT

## 2024-12-13 NOTE — H&P ADULT - PROBLEM SELECTOR PLAN 9
HSQ for VTE ppx  DASH/CC diet with 1200cc fluid restriction c/w ASA, statin holding furosemide and metoprolol given controlled BP and sepsis, would add back if BP remains stable -> furosemide added back in AM

## 2024-12-13 NOTE — ED PROVIDER NOTE - PR
INFECTIOUS DISEASE PROGRESS NOTE    ASSESSMENT:   1.  C. difficile colitis -   2.  Hep C cirrhosis - s/p paracentesis 3L off on  (negative for SBP); another 2L off   3.  Polysubstance abuse, last used IV heroin just prior to admission  4.  Leukocytosis - rising though no other signs/symptoms of infection    PLAN:   - Script provided for lower dose Vancomycin  mg q6hrs (End 20)  - Ok for discharge from an ID perspective  - Needs close outpatient follow up - patient moving to [place]      Labs reviewed. Discussed with primary attending.     Pat Rubalcava DO  [phone]    -------------------------------------------------------------------------------------------------------------------    SUBJECTIVE:  Reports feeling much better.   Diarrhea resolved.  No fever or chills.     OBJECTIVE:  Tmax Temp (24hrs), Av.5 °F (36.4 °C), Min:97.2 °F (36.2 °C), Max:97.7 °F (36.5 °C)     Last Vitals   Vitals:    20 0736   BP: 104/69   Pulse: (!) 107   Resp: 18   Temp: 97.2 °F (36.2 °C)       Gen: No distress, cachetic  HEENT: Anicteric  CV: Regular rate and rhythm normal S1 and S2  Pulm: Bilateral clear to auscultation, no increased history effort  Abd: Soft, less distended today, normal bowel sounds  Ext: No rash, no edema  Neuro: A+O x 4, no focal deficits  Skin: some ecchymosis, no rashes    ANTIMICROBIALS  Oral vancomycin    LAB:  Recent Labs     20  1155   WBC 11.0   HGB 13.3   HCT 40.1      .6*       Recent Labs   Lab 20  1155 20  0705 20  0714   SODIUM 139 138 141   POTASSIUM 3.9 4.2 4.5   CHLORIDE 102 102 106   CO2 37* 35* 34*   BUN 19 9 8   CREATININE 0.68 0.67 0.61*   GLUCOSE 90 87 87   CALCIUM 8.7 8.4 8.5       Microbiology Results  (Last 10 results in the past 7 days)    Specimen   Gram Smear   Culture Result   Status      20  1330  20  1330  20  1330  20  1330     ASCITES FLUID PERITONEUM NO POLYMORPHONUCLEAR CELLS SEEN NO  GROWTH <24 HRS. PENDING      NO EPITHELIAL CELLS SEEN          NO ORGANISMS SEEN                RADIOLOGY:  CT - IMPRESSION:  1.   Interval increase of extensive ascites, with other findings of 3rd spacing including moderate bilateral pleural effusions, diffuse anasarca, and mild periportal edema.  Additional small pericardial effusion and fluid in the anterior mediastinum.  2.   Wall thickening and hyperenhancement along multiple small bowel loops, with diffuse wall thickening of the colon.  This appearance may be secondary to decompression of the bowel, though an enterocolopathy (such as portal enteropathy/colopathy or   enterocolitis) is also considered.  3.   Moderate hiatal hernia, partially imaged.  4.   Mild left basilar groundglass opacities, which may represent aspiration or infection.    Note:  Assessment and Plan have been moved to the top of the screen for ease of the viewer such that the plan can be seen without scrolling to the bottom of the note.           166

## 2024-12-14 DIAGNOSIS — I50.32 CHRONIC DIASTOLIC (CONGESTIVE) HEART FAILURE: ICD-10-CM

## 2024-12-14 DIAGNOSIS — I73.9 PERIPHERAL VASCULAR DISEASE, UNSPECIFIED: ICD-10-CM

## 2024-12-14 DIAGNOSIS — J18.9 PNEUMONIA, UNSPECIFIED ORGANISM: ICD-10-CM

## 2024-12-14 DIAGNOSIS — C34.90 MALIGNANT NEOPLASM OF UNSPECIFIED PART OF UNSPECIFIED BRONCHUS OR LUNG: ICD-10-CM

## 2024-12-14 DIAGNOSIS — Z29.9 ENCOUNTER FOR PROPHYLACTIC MEASURES, UNSPECIFIED: ICD-10-CM

## 2024-12-14 DIAGNOSIS — E11.9 TYPE 2 DIABETES MELLITUS WITHOUT COMPLICATIONS: ICD-10-CM

## 2024-12-14 DIAGNOSIS — J44.9 CHRONIC OBSTRUCTIVE PULMONARY DISEASE, UNSPECIFIED: ICD-10-CM

## 2024-12-14 DIAGNOSIS — I10 ESSENTIAL (PRIMARY) HYPERTENSION: ICD-10-CM

## 2024-12-14 DIAGNOSIS — A41.9 SEPSIS, UNSPECIFIED ORGANISM: ICD-10-CM

## 2024-12-14 DIAGNOSIS — I27.20 PULMONARY HYPERTENSION, UNSPECIFIED: ICD-10-CM

## 2024-12-14 DIAGNOSIS — J96.21 ACUTE AND CHRONIC RESPIRATORY FAILURE WITH HYPOXIA: ICD-10-CM

## 2024-12-14 DIAGNOSIS — N18.2 CHRONIC KIDNEY DISEASE, STAGE 2 (MILD): ICD-10-CM

## 2024-12-14 LAB
A1C WITH ESTIMATED AVERAGE GLUCOSE RESULT: 8 % — HIGH (ref 4–5.6)
ADD ON TEST-SPECIMEN IN LAB: SIGNIFICANT CHANGE UP
APPEARANCE UR: CLEAR — SIGNIFICANT CHANGE UP
BILIRUB UR-MCNC: NEGATIVE — SIGNIFICANT CHANGE UP
COLOR SPEC: YELLOW — SIGNIFICANT CHANGE UP
DIFF PNL FLD: NEGATIVE — SIGNIFICANT CHANGE UP
ESTIMATED AVERAGE GLUCOSE: 183 — SIGNIFICANT CHANGE UP
GLUCOSE BLDC GLUCOMTR-MCNC: 297 MG/DL — HIGH (ref 70–99)
GLUCOSE BLDC GLUCOMTR-MCNC: 330 MG/DL — HIGH (ref 70–99)
GLUCOSE BLDC GLUCOMTR-MCNC: 353 MG/DL — HIGH (ref 70–99)
GLUCOSE BLDC GLUCOMTR-MCNC: 386 MG/DL — HIGH (ref 70–99)
GLUCOSE BLDC GLUCOMTR-MCNC: 425 MG/DL — HIGH (ref 70–99)
GLUCOSE UR QL: 500 MG/DL
KETONES UR-MCNC: 15 MG/DL
LEUKOCYTE ESTERASE UR-ACNC: NEGATIVE — SIGNIFICANT CHANGE UP
NITRITE UR-MCNC: NEGATIVE — SIGNIFICANT CHANGE UP
PH UR: 5.5 — SIGNIFICANT CHANGE UP (ref 5–8)
PROT UR-MCNC: 100 MG/DL
SP GR SPEC: 1.04 — HIGH (ref 1–1.03)
TROPONIN T, HIGH SENSITIVITY RESULT: 25 NG/L — SIGNIFICANT CHANGE UP
UROBILINOGEN FLD QL: 0.2 MG/DL — SIGNIFICANT CHANGE UP (ref 0.2–1)

## 2024-12-14 RX ORDER — 0.9 % SODIUM CHLORIDE 0.9 %
1000 INTRAVENOUS SOLUTION INTRAVENOUS
Refills: 0 | Status: DISCONTINUED | OUTPATIENT
Start: 2024-12-14 | End: 2024-12-18

## 2024-12-14 RX ORDER — FLUTICASONE PROPIONATE AND SALMETEROL XINAFOATE 45; 21 UG/1; UG/1
1 AEROSOL, METERED RESPIRATORY (INHALATION)
Refills: 0 | Status: DISCONTINUED | OUTPATIENT
Start: 2024-12-14 | End: 2024-12-18

## 2024-12-14 RX ORDER — CYANOCOBALAMIN/FOLIC AC/VIT B6 1-2.2-25MG
1 TABLET ORAL DAILY
Refills: 0 | Status: DISCONTINUED | OUTPATIENT
Start: 2024-12-14 | End: 2024-12-18

## 2024-12-14 RX ORDER — GABAPENTIN 300 MG/1
400 CAPSULE ORAL AT BEDTIME
Refills: 0 | Status: DISCONTINUED | OUTPATIENT
Start: 2024-12-14 | End: 2024-12-18

## 2024-12-14 RX ORDER — ALBUTEROL 90 MCG
2 AEROSOL (GRAM) INHALATION EVERY 6 HOURS
Refills: 0 | Status: DISCONTINUED | OUTPATIENT
Start: 2024-12-14 | End: 2024-12-18

## 2024-12-14 RX ORDER — CEFEPIME 2 G/1
2000 INJECTION, POWDER, FOR SOLUTION INTRAVENOUS EVERY 12 HOURS
Refills: 0 | Status: COMPLETED | OUTPATIENT
Start: 2024-12-14 | End: 2024-12-18

## 2024-12-14 RX ORDER — INSULIN GLARGINE 100 [IU]/ML
8 INJECTION, SOLUTION SUBCUTANEOUS ONCE
Refills: 0 | Status: COMPLETED | OUTPATIENT
Start: 2024-12-14 | End: 2024-12-14

## 2024-12-14 RX ORDER — FUROSEMIDE 40 MG/1
40 TABLET ORAL DAILY
Refills: 0 | Status: DISCONTINUED | OUTPATIENT
Start: 2024-12-14 | End: 2024-12-18

## 2024-12-14 RX ORDER — INSULIN GLARGINE 100 [IU]/ML
8 INJECTION, SOLUTION SUBCUTANEOUS AT BEDTIME
Refills: 0 | Status: DISCONTINUED | OUTPATIENT
Start: 2024-12-14 | End: 2024-12-15

## 2024-12-14 RX ORDER — CEFEPIME 2 G/1
2000 INJECTION, POWDER, FOR SOLUTION INTRAVENOUS ONCE
Refills: 0 | Status: COMPLETED | OUTPATIENT
Start: 2024-12-14 | End: 2024-12-14

## 2024-12-14 RX ORDER — ACETAMINOPHEN 500MG 500 MG/1
650 TABLET, COATED ORAL EVERY 6 HOURS
Refills: 0 | Status: DISCONTINUED | OUTPATIENT
Start: 2024-12-14 | End: 2024-12-18

## 2024-12-14 RX ORDER — SILDENAFIL 20 MG/1
20 TABLET, FILM COATED ORAL THREE TIMES A DAY
Refills: 0 | Status: DISCONTINUED | OUTPATIENT
Start: 2024-12-14 | End: 2024-12-18

## 2024-12-14 RX ORDER — PANTOPRAZOLE SODIUM 40 MG/1
40 TABLET, DELAYED RELEASE ORAL
Refills: 0 | Status: DISCONTINUED | OUTPATIENT
Start: 2024-12-14 | End: 2024-12-18

## 2024-12-14 RX ORDER — CEFEPIME 2 G/1
INJECTION, POWDER, FOR SOLUTION INTRAVENOUS
Refills: 0 | Status: COMPLETED | OUTPATIENT
Start: 2024-12-14 | End: 2024-12-18

## 2024-12-14 RX ORDER — HEPARIN SODIUM,PORCINE 1000/ML
5000 VIAL (ML) INJECTION EVERY 8 HOURS
Refills: 0 | Status: DISCONTINUED | OUTPATIENT
Start: 2024-12-14 | End: 2024-12-17

## 2024-12-14 RX ORDER — GLUCAGON INJECTION, SOLUTION 0.5 MG/.1ML
1 INJECTION, SOLUTION SUBCUTANEOUS ONCE
Refills: 0 | Status: DISCONTINUED | OUTPATIENT
Start: 2024-12-14 | End: 2024-12-18

## 2024-12-14 RX ADMIN — Medication 5: at 17:21

## 2024-12-14 RX ADMIN — Medication 1 TABLET(S): at 13:51

## 2024-12-14 RX ADMIN — PANTOPRAZOLE SODIUM 40 MILLIGRAM(S): 40 TABLET, DELAYED RELEASE ORAL at 06:18

## 2024-12-14 RX ADMIN — SILDENAFIL 20 MILLIGRAM(S): 20 TABLET, FILM COATED ORAL at 13:51

## 2024-12-14 RX ADMIN — FUROSEMIDE 40 MILLIGRAM(S): 40 TABLET ORAL at 13:51

## 2024-12-14 RX ADMIN — CEFEPIME 100 MILLIGRAM(S): 2 INJECTION, POWDER, FOR SOLUTION INTRAVENOUS at 17:22

## 2024-12-14 RX ADMIN — SILDENAFIL 20 MILLIGRAM(S): 20 TABLET, FILM COATED ORAL at 06:18

## 2024-12-14 RX ADMIN — Medication 5000 UNIT(S): at 21:35

## 2024-12-14 RX ADMIN — Medication 5000 UNIT(S): at 13:51

## 2024-12-14 RX ADMIN — Medication 3: at 11:42

## 2024-12-14 RX ADMIN — GABAPENTIN 400 MILLIGRAM(S): 300 CAPSULE ORAL at 21:33

## 2024-12-14 RX ADMIN — FLUTICASONE PROPIONATE AND SALMETEROL XINAFOATE 1 DOSE(S): 45; 21 AEROSOL, METERED RESPIRATORY (INHALATION) at 10:30

## 2024-12-14 RX ADMIN — FLUTICASONE PROPIONATE AND SALMETEROL XINAFOATE 1 DOSE(S): 45; 21 AEROSOL, METERED RESPIRATORY (INHALATION) at 21:34

## 2024-12-14 RX ADMIN — INSULIN GLARGINE 8 UNIT(S): 100 INJECTION, SOLUTION SUBCUTANEOUS at 21:31

## 2024-12-14 RX ADMIN — Medication 81 MILLIGRAM(S): at 13:49

## 2024-12-14 RX ADMIN — INSULIN GLARGINE 8 UNIT(S): 100 INJECTION, SOLUTION SUBCUTANEOUS at 01:47

## 2024-12-14 RX ADMIN — Medication 3 UNIT(S): at 17:22

## 2024-12-14 RX ADMIN — Medication 3 UNIT(S): at 11:43

## 2024-12-14 RX ADMIN — CEFEPIME 100 MILLIGRAM(S): 2 INJECTION, POWDER, FOR SOLUTION INTRAVENOUS at 11:47

## 2024-12-14 RX ADMIN — Medication 20 MILLIGRAM(S): at 21:34

## 2024-12-14 RX ADMIN — Medication 6: at 08:11

## 2024-12-14 RX ADMIN — Medication 2: at 21:32

## 2024-12-14 RX ADMIN — SILDENAFIL 20 MILLIGRAM(S): 20 TABLET, FILM COATED ORAL at 21:33

## 2024-12-14 NOTE — CONSULT NOTE ADULT - SUBJECTIVE AND OBJECTIVE BOX
[facility], Division of Infectious Diseases  KRISSY Luong S. Shah, YANIRA [facility]  [phone]    FEDERICO DOS SANTOS  89y, Female  [id number]    HPI--  HPI:  89 -year-old female with COPD on 2L home O2 PRN and QHS, CVA (remote, no deficits), CAD s/p stent ('15), IDDM2, PVD s/p fem-pop bypass ('15) w/occlusion ('22), HLD, HTN, anemia, presenting from with increasing cough occasionally productive of white sputum. She denies any fevers or chills. She notes some increase in her chronic shortness of breath/JACKSON. Reports recent dysuria and cloudy urine, has recurrent UTIs, was started on cephalexin (12/06) for UTI by PCP and was called and changed to nitrofurantoin (12/11) based on culture sensitivities per patient.    Reports having a PET scan and being diagnosed with presumed lung cancer, was due to undergo a L VATS/LLL wedge however was later deferred due to diagnosis of pHTN, due to see rad/onc for possible SBRT    In the ED VS:  98.5  82-99  102-118/51-57  18-26  80%RA on triage improved to 93-95% on 4L NC, received methylprednisolone 125mg IV x1, gabapentin 400mg PO x1, levofloxacin 750mg IVPB x1 and vancomycin 1g IVPB x1,    (13 Dec 2024 22:46)    Pt seen at bedside  Feel SOB even at rest    Active Medications--  acetaminophen     Tablet .. 650 milliGRAM(s) Oral every 6 hours PRN  albuterol    90 MICROgram(s) HFA Inhaler 2 Puff(s) Inhalation every 6 hours PRN  aspirin enteric coated 81 milliGRAM(s) Oral daily  atorvastatin 20 milliGRAM(s) Oral at bedtime  dextrose 5%. 1000 milliLiter(s) IV Continuous <Continuous>  dextrose 5%. 1000 milliLiter(s) IV Continuous <Continuous>  dextrose 50% Injectable 25 Gram(s) IV Push once  dextrose 50% Injectable 12.5 Gram(s) IV Push once  dextrose 50% Injectable 25 Gram(s) IV Push once  dextrose Oral Gel 15 Gram(s) Oral once PRN  fluticasone propionate/ salmeterol 250-50 MICROgram(s) Diskus 1 Dose(s) Inhalation two times a day  furosemide    Tablet 40 milliGRAM(s) Oral daily  gabapentin 400 milliGRAM(s) Oral at bedtime  glucagon  Injectable 1 milliGRAM(s) IntraMuscular once  heparin   Injectable 5000 Unit(s) SubCutaneous every 8 hours  insulin glargine Injectable (LANTUS) 8 Unit(s) SubCutaneous at bedtime  insulin lispro (ADMELOG) corrective regimen sliding scale   SubCutaneous three times a day before meals  insulin lispro (ADMELOG) corrective regimen sliding scale   SubCutaneous at bedtime  insulin lispro Injectable (ADMELOG) 3 Unit(s) SubCutaneous three times a day before meals  multivitamin 1 Tablet(s) Oral daily  pantoprazole    Tablet 40 milliGRAM(s) Oral before breakfast  sildenafil (REVATIO) 20 milliGRAM(s) Oral three times a day    Antimicrobials:     Immunologic:     ROS:  CONSTITUTIONAL: No fevers or chills. No weakness or headache. No weight changes.  EYES/ENT: No visual or hearing changes. No sore throat or throat pain .  NECK: No pain or stiffness  RESPIRATORY: No cough, wheezing, or hemoptysis. No shortness of breath  CARDIOVASCULAR: No chest pain or palpitations  GASTROINTESTINAL: No abdominal pain. No nausea or vomiting. No diarrhea or constipation.  GENITOURINARY: No dysuria, frequency or hematuria  NEUROLOGICAL: No numbness or weakness  SKIN: No itching or rashes  PSYCHIATRIC: Pleasant. Appropriate affect    Allergies: penicillin (Unknown)  Biaxin (Unknown)  macrolide antibiotics (Other)    PMH -- HTN (hypertension)    CVA (cerebral vascular accident)    HLD (hyperlipidemia)    DM2 (diabetes mellitus, type 2)    Anemia    PVD (peripheral vascular disease)    Solitary pulmonary nodule    Chronic diastolic heart failure    H/O hearing loss    History of COPD    CAD (coronary artery disease)    Pulmonary hypertension      PSH -- CAD (coronary artery disease)    S/P vascular bypass    Bilateral cataracts    H/O rotator cuff surgery      FH -- FH: heart disease (Child)      Social History --  EtOH: denies   Tobacco: denies   Drug Use: denies     Travel/Environmental/Occupational History:    Physical Exam--  Vital Signs Last 24 Hrs  T(F): 97.5 (13 Dec 2024 22:50), Max: 98.5 (13 Dec 2024 19:34)  HR: 74 (13 Dec 2024 22:50) (74 - 99)  BP: 120/54 (13 Dec 2024 22:50) (102/51 - 120/54)  RR: 18 (13 Dec 2024 22:50) (18 - 26)  SpO2: 96% (13 Dec 2024 22:50) (93% - 96%)  General: elderly W, mild resp distress  HEENT: NC/AT, EOMI,   Lungs: Clear bilaterally without rales, wheezing or rhonchi  Heart: Regular rate and rhythm. No murmur, rub or gallop.  Abdomen: Soft. Nondistended. Nontender.   Extremities: No cyanosis or clubbing. No edema.   Skin: Warm. Dry. Good turgor.     Laboratory & Imaging Data:  CBC:                       10.1   12.52 )-----------( 346      ( 13 Dec 2024 14:35 )             32.8     CMP: 12-13    137  |  97[L]  |  29[H]  ----------------------------<  246[H]  4.4   |  25  |  0.90    Ca    9.5      13 Dec 2024 14:35    TPro  7.7  /  Alb  3.8  /  TBili  0.8  /  DBili  x   /  AST  16  /  ALT  10  /  AlkPhos  108  12-13    LIVER FUNCTIONS - ( 13 Dec 2024 14:35 )  Alb: 3.8 g/dL / Pro: 7.7 g/dL / ALK PHOS: 108 U/L / ALT: 10 U/L / AST: 16 U/L / GGT: x           Urinalysis Basic - ( 13 Dec 2024 14:35 )    Color: x / Appearance: x / SG: x / pH: x  Gluc: 246 mg/dL / Ketone: x  / Bili: x / Urobili: x   Blood: x / Protein: x / Nitrite: x   Leuk Esterase: x / RBC: x / WBC x   Sq Epi: x / Non Sq Epi: x / Bacteria: x        Microbiology: reviewed        Radiology: reviewed    < from: CT Angio Chest PE Protocol w/ IV Cont (12.13.24 @ 18:22) >    ACC: [id number] EXAM:  CT ANGIO CHEST PULM ART WAWI   ORDERED BY: AMY HANCOCK     PROCEDURE DATE:  12/13/2024          INTERPRETATION:  CLINICAL INFORMATION: Shortness of breath, hypoxia,   evaluate for malignancy versus PE    COMPARISON: CTchest 9/13/2024, CT chest 10/16/2024, PET/CT 7/3/2024    CONTRAST/COMPLICATIONS:  IV Contrast: Omnipaque 350  39 cc administered   61 cc discarded  Oral Contrast: NONE  .    PROCEDURE:  CT Angiogram of the chest was obtained with intravenous contrast. Three   dimensional maximum intensity projection (MIP) images were generated.    FINDINGS:    AIRWAYS/LUNGS/PLEURA: Patent central airways. Several subsegmental   bronchi in the bilateral lower lobes appear to be occluded. Bilateral   peripheral, basal predominant bronchiectasis, better appreciated on prior   CT of the chest 10/16/2024. Bilateral reticular opacities are also seen   again, increased since the prior exam. Since 10/16/2024, there is   increased consolidation in the lower lobes and superimposed patchy   groundglass opacities throughout the upper to mid lungs. There is also   increased interlobular septal thickening. A left lower lobe nodule   measures approximately 2.3 x 2.0 cm (301:46). Direct comparison is   difficult due to extensive adjacent consolidation.  MEDIASTINUM AND SHANNON: Redemonstrated enlarged mediastinal and bilateral   hilar lymph nodes. A reference right paratracheal lymph node measures 1.8   x 1.5 cm, previously 1.6 x 1.1 cm and 9/13/2024.  PULMONARY ANGIOGRAM: No pulmonary embolism to the level of the bilateral   segmental branches. Decreased opacification and more distal branches in   the lower lobes may be due to sluggish flow recommendations consolidation.  VESSELS: Aortic and coronary artery calcification.  HEART: The heart is enlarged. No pericardial effusion. Mitral annular   calcification.  VISUALIZED UPPER ABDOMEN: Subcentimeter hypodense lesion in the posterior   spleen, previously described as cyst.  CHEST WALL AND BONES: Degenerative changes. Unchanged hypodense right   thyroid nodule.    IMPRESSION:  No pulmonary embolism.    New bilateral lower lobe patchy consolidation and peripheral opacities   superimposed on chronic interstitial changes/fibrosis likely representing   multifocal pneumonia.    Suspect similar appearing left lower lobe mass allowing for the presence   of new consolidation to previously seen FDG avid lesion. Recommend   follow-up chest CT in one month after the appropriate clinical treatment   to reevaluate.          --- End of Report ---          ABY HUGO MD; Resident Radiologist  This document has been electronically signed.  PIOTR GIMENEZ MD; Attending Radiologist  This document has been electronically signed. Dec 13 2024  7:39PM    < end of copied text >  < from: Xray Chest 1 View- PORTABLE-Urgent (Xray Chest 1 View- PORTABLE-Urgent .) (12.13.24 @ 17:18) >    ******PRELIMINARY REPORT******      ******PRELIMINARY REPORT******         ACC: [id number] EXAM:  XR CHEST PORTABLE URGENT 1V   ORDERED BY: AMY HANCOCK     PROCEDURE DATE:  12/13/2024    ******PRELIMINARY REPORT******      ******PRELIMINARY REPORT******           INTERPRETATION:  PRELIM  IMPRESSION:  Again seen bilateral lower lobe consolidations with reticulonodular   changes throughout the lungs.        ******PRELIMINARY REPORT******      ******PRELIMINARY REPORT******       ZULLY JHAVERI DO; Resident Radiologist  This document is a PRELIMINARY interpretation and is pending final   attending approval. Dec 13 2024  5:27PM    < end of copied text >

## 2024-12-14 NOTE — CONSULT NOTE ADULT - ASSESSMENT
89 -year-old female with COPD on 2L home O2 PRN and QHS, CVA (remote, no deficits), CAD s/p stent ('15), IDDM2, PVD s/p fem-pop bypass ('15) w/occlusion ('22), HLD, HTN, anemia, recently diagnosed pHTN, presenting from with increasing cough occasionally productive of white sputum, found to have multifocal PNA      Sepsis 2/2 Multifocal PNA vs. Acute Cystitis  Acute on chronic HRF  CT showing New bilateral lower lobe patchy consolidation and peripheral opacities superimposed on chronic interstitial changes/fibrosis likely representing multifocal pneumonia.    Abx Allergies  Pt has tolerated cephalosporins in the past    Recommendations:   Stop levofloxacin  Switch to cefepime  F/u UA  F/u pending cx  Send BCx x2 (ordered)  Send sputum cx if possible  Trend temps/WBC  Pulm f/u  Supportive care, O2 as needed  Additional care per primary team    Infectious Diseases will follow. Please call with any questions.  Tabby Duarte M.D.  [facility] [phone]  For after 5 P.M. and weekends, please call [phone]  Available on [employer] TEAMS   89 -year-old female with COPD on 2L home O2 PRN and QHS, CVA (remote, no deficits), CAD s/p stent ('15), IDDM2, PVD s/p fem-pop bypass ('15) w/occlusion ('22), HLD, HTN, anemia, recently diagnosed pHTN, presenting from with increasing cough occasionally productive of white sputum, found to have multifocal PNA      Sepsis 2/2 Multifocal PNA vs. Acute Cystitis  Acute on chronic HRF  CT showing New bilateral lower lobe patchy consolidation and peripheral opacities superimposed on chronic interstitial changes/fibrosis likely representing multifocal pneumonia.  UA negative    Abx Allergies  Pt has tolerated cephalosporins in the past    Recommendations:   Stop levofloxacin  Switch to cefepime  F/u pending cx  Send BCx x2 (ordered)  Send sputum cx if possible  Trend temps/WBC  Pulm f/u  Supportive care, O2 as needed  Additional care per primary team    Dr. Verduzco to resume care 12/16  Infectious Diseases will follow. Please call with any questions.  Tabby Duarte M.D.  [facility] Division of Infectious Diseases [phone]  For after 5 P.M. and weekends, please call [phone]  Available on [employer] TEAMS

## 2024-12-15 LAB
ADD ON TEST-SPECIMEN IN LAB: SIGNIFICANT CHANGE UP
ALBUMIN SERPL ELPH-MCNC: 3.4 G/DL — SIGNIFICANT CHANGE UP (ref 3.3–5)
ALP SERPL-CCNC: 90 U/L — SIGNIFICANT CHANGE UP (ref 40–120)
ALT FLD-CCNC: 7 U/L — SIGNIFICANT CHANGE UP (ref 4–33)
ANION GAP SERPL CALC-SCNC: 17 MMOL/L — HIGH (ref 7–14)
AST SERPL-CCNC: 10 U/L — SIGNIFICANT CHANGE UP (ref 4–32)
BASOPHILS # BLD AUTO: 0.03 K/UL — SIGNIFICANT CHANGE UP (ref 0–0.2)
BASOPHILS NFR BLD AUTO: 0.3 % — SIGNIFICANT CHANGE UP (ref 0–2)
BILIRUB SERPL-MCNC: 0.3 MG/DL — SIGNIFICANT CHANGE UP (ref 0.2–1.2)
BUN SERPL-MCNC: 40 MG/DL — HIGH (ref 7–23)
CALCIUM SERPL-MCNC: 9 MG/DL — SIGNIFICANT CHANGE UP (ref 8.4–10.5)
CHLORIDE SERPL-SCNC: 96 MMOL/L — LOW (ref 98–107)
CO2 SERPL-SCNC: 23 MMOL/L — SIGNIFICANT CHANGE UP (ref 22–31)
CREAT SERPL-MCNC: 1.06 MG/DL — SIGNIFICANT CHANGE UP (ref 0.5–1.3)
EGFR: 50 ML/MIN/1.73M2 — LOW
EOSINOPHIL # BLD AUTO: 0.11 K/UL — SIGNIFICANT CHANGE UP (ref 0–0.5)
EOSINOPHIL NFR BLD AUTO: 1.1 % — SIGNIFICANT CHANGE UP (ref 0–6)
GLUCOSE BLDC GLUCOMTR-MCNC: 173 MG/DL — HIGH (ref 70–99)
GLUCOSE BLDC GLUCOMTR-MCNC: 275 MG/DL — HIGH (ref 70–99)
GLUCOSE BLDC GLUCOMTR-MCNC: 307 MG/DL — HIGH (ref 70–99)
GLUCOSE BLDC GLUCOMTR-MCNC: 346 MG/DL — HIGH (ref 70–99)
GLUCOSE SERPL-MCNC: 231 MG/DL — HIGH (ref 70–99)
HCT VFR BLD CALC: 26.7 % — LOW (ref 34.5–45)
HGB BLD-MCNC: 8.6 G/DL — LOW (ref 11.5–15.5)
IANC: 8.15 K/UL — HIGH (ref 1.8–7.4)
IMM GRANULOCYTES NFR BLD AUTO: 0.4 % — SIGNIFICANT CHANGE UP (ref 0–0.9)
LYMPHOCYTES # BLD AUTO: 1.38 K/UL — SIGNIFICANT CHANGE UP (ref 1–3.3)
LYMPHOCYTES # BLD AUTO: 13.4 % — SIGNIFICANT CHANGE UP (ref 13–44)
MAGNESIUM SERPL-MCNC: 2 MG/DL — SIGNIFICANT CHANGE UP (ref 1.6–2.6)
MCHC RBC-ENTMCNC: 28.1 PG — SIGNIFICANT CHANGE UP (ref 27–34)
MCHC RBC-ENTMCNC: 32.2 G/DL — SIGNIFICANT CHANGE UP (ref 32–36)
MCV RBC AUTO: 87.3 FL — SIGNIFICANT CHANGE UP (ref 80–100)
MONOCYTES # BLD AUTO: 0.58 K/UL — SIGNIFICANT CHANGE UP (ref 0–0.9)
MONOCYTES NFR BLD AUTO: 5.6 % — SIGNIFICANT CHANGE UP (ref 2–14)
NEUTROPHILS # BLD AUTO: 8.15 K/UL — HIGH (ref 1.8–7.4)
NEUTROPHILS NFR BLD AUTO: 79.2 % — HIGH (ref 43–77)
NRBC # BLD: 0 /100 WBCS — SIGNIFICANT CHANGE UP (ref 0–0)
NRBC # FLD: 0 K/UL — SIGNIFICANT CHANGE UP (ref 0–0)
PHOSPHATE SERPL-MCNC: 3 MG/DL — SIGNIFICANT CHANGE UP (ref 2.5–4.5)
PLATELET # BLD AUTO: 352 K/UL — SIGNIFICANT CHANGE UP (ref 150–400)
POTASSIUM SERPL-MCNC: 3.9 MMOL/L — SIGNIFICANT CHANGE UP (ref 3.5–5.3)
POTASSIUM SERPL-SCNC: 3.9 MMOL/L — SIGNIFICANT CHANGE UP (ref 3.5–5.3)
PROCALCITONIN SERPL-MCNC: 0.77 NG/ML — HIGH (ref 0.02–0.1)
PROT SERPL-MCNC: 6.9 G/DL — SIGNIFICANT CHANGE UP (ref 6–8.3)
RBC # BLD: 3.06 M/UL — LOW (ref 3.8–5.2)
RBC # FLD: 15.4 % — HIGH (ref 10.3–14.5)
SODIUM SERPL-SCNC: 136 MMOL/L — SIGNIFICANT CHANGE UP (ref 135–145)
WBC # BLD: 10.29 K/UL — SIGNIFICANT CHANGE UP (ref 3.8–10.5)
WBC # FLD AUTO: 10.29 K/UL — SIGNIFICANT CHANGE UP (ref 3.8–10.5)

## 2024-12-15 RX ORDER — INSULIN GLARGINE 100 [IU]/ML
10 INJECTION, SOLUTION SUBCUTANEOUS AT BEDTIME
Refills: 0 | Status: DISCONTINUED | OUTPATIENT
Start: 2024-12-15 | End: 2024-12-16

## 2024-12-15 RX ADMIN — Medication 3 UNIT(S): at 08:10

## 2024-12-15 RX ADMIN — Medication 5000 UNIT(S): at 22:13

## 2024-12-15 RX ADMIN — Medication 4: at 11:51

## 2024-12-15 RX ADMIN — GABAPENTIN 400 MILLIGRAM(S): 300 CAPSULE ORAL at 22:12

## 2024-12-15 RX ADMIN — Medication 1 TABLET(S): at 11:52

## 2024-12-15 RX ADMIN — Medication 20 MILLIGRAM(S): at 22:12

## 2024-12-15 RX ADMIN — Medication 2: at 22:14

## 2024-12-15 RX ADMIN — Medication 5 UNIT(S): at 11:52

## 2024-12-15 RX ADMIN — PANTOPRAZOLE SODIUM 40 MILLIGRAM(S): 40 TABLET, DELAYED RELEASE ORAL at 06:10

## 2024-12-15 RX ADMIN — Medication 5 UNIT(S): at 17:31

## 2024-12-15 RX ADMIN — CEFEPIME 100 MILLIGRAM(S): 2 INJECTION, POWDER, FOR SOLUTION INTRAVENOUS at 06:05

## 2024-12-15 RX ADMIN — FLUTICASONE PROPIONATE AND SALMETEROL XINAFOATE 1 DOSE(S): 45; 21 AEROSOL, METERED RESPIRATORY (INHALATION) at 09:30

## 2024-12-15 RX ADMIN — CEFEPIME 100 MILLIGRAM(S): 2 INJECTION, POWDER, FOR SOLUTION INTRAVENOUS at 17:34

## 2024-12-15 RX ADMIN — Medication 81 MILLIGRAM(S): at 11:52

## 2024-12-15 RX ADMIN — Medication 3: at 08:09

## 2024-12-15 RX ADMIN — SILDENAFIL 20 MILLIGRAM(S): 20 TABLET, FILM COATED ORAL at 15:17

## 2024-12-15 RX ADMIN — Medication 5000 UNIT(S): at 14:53

## 2024-12-15 RX ADMIN — SILDENAFIL 20 MILLIGRAM(S): 20 TABLET, FILM COATED ORAL at 06:07

## 2024-12-15 RX ADMIN — SILDENAFIL 20 MILLIGRAM(S): 20 TABLET, FILM COATED ORAL at 22:12

## 2024-12-15 RX ADMIN — INSULIN GLARGINE 10 UNIT(S): 100 INJECTION, SOLUTION SUBCUTANEOUS at 22:13

## 2024-12-15 RX ADMIN — FUROSEMIDE 40 MILLIGRAM(S): 40 TABLET ORAL at 06:06

## 2024-12-15 RX ADMIN — Medication 1: at 17:31

## 2024-12-15 RX ADMIN — Medication 5000 UNIT(S): at 06:07

## 2024-12-15 RX ADMIN — FLUTICASONE PROPIONATE AND SALMETEROL XINAFOATE 1 DOSE(S): 45; 21 AEROSOL, METERED RESPIRATORY (INHALATION) at 22:11

## 2024-12-15 NOTE — PHYSICAL THERAPY INITIAL EVALUATION ADULT - GENERAL OBSERVATIONS, REHAB EVAL
Pt encountered in semi-supine position in NAD, all lines intact, a&ox4, SPO2 96%, +Nc at 4L, and RN Bonnie aware.

## 2024-12-15 NOTE — PHYSICAL THERAPY INITIAL EVALUATION ADULT - ADDITIONAL COMMENTS
Pt left semisupine in bed in NAD, all lines intact, call bell in reach, SPO2 100%, +NC at 4L, bed alarm on, and TAWANNA Nicole aware.

## 2024-12-15 NOTE — PHYSICAL THERAPY INITIAL EVALUATION ADULT - ACTIVE RANGE OF MOTION EXAMINATION, REHAB EVAL
azeb. upper extremity Active ROM was WNL (within normal limits)/bilateral lower extremity Active ROM was WNL (within normal limits)

## 2024-12-16 ENCOUNTER — TRANSCRIPTION ENCOUNTER (OUTPATIENT)
Age: 88
End: 2024-12-16

## 2024-12-16 LAB
ALBUMIN SERPL ELPH-MCNC: 3.3 G/DL — SIGNIFICANT CHANGE UP (ref 3.3–5)
ALP SERPL-CCNC: 84 U/L — SIGNIFICANT CHANGE UP (ref 40–120)
ALT FLD-CCNC: 10 U/L — SIGNIFICANT CHANGE UP (ref 4–33)
ANION GAP SERPL CALC-SCNC: 12 MMOL/L — SIGNIFICANT CHANGE UP (ref 7–14)
AST SERPL-CCNC: 15 U/L — SIGNIFICANT CHANGE UP (ref 4–32)
BASOPHILS # BLD AUTO: 0.04 K/UL — SIGNIFICANT CHANGE UP (ref 0–0.2)
BASOPHILS NFR BLD AUTO: 0.6 % — SIGNIFICANT CHANGE UP (ref 0–2)
BILIRUB SERPL-MCNC: 0.2 MG/DL — SIGNIFICANT CHANGE UP (ref 0.2–1.2)
BLD GP AB SCN SERPL QL: NEGATIVE — SIGNIFICANT CHANGE UP
BUN SERPL-MCNC: 35 MG/DL — HIGH (ref 7–23)
CALCIUM SERPL-MCNC: 9.1 MG/DL — SIGNIFICANT CHANGE UP (ref 8.4–10.5)
CHLORIDE SERPL-SCNC: 99 MMOL/L — SIGNIFICANT CHANGE UP (ref 98–107)
CO2 SERPL-SCNC: 25 MMOL/L — SIGNIFICANT CHANGE UP (ref 22–31)
CREAT SERPL-MCNC: 0.95 MG/DL — SIGNIFICANT CHANGE UP (ref 0.5–1.3)
EGFR: 57 ML/MIN/1.73M2 — LOW
EOSINOPHIL # BLD AUTO: 0.23 K/UL — SIGNIFICANT CHANGE UP (ref 0–0.5)
EOSINOPHIL NFR BLD AUTO: 3.3 % — SIGNIFICANT CHANGE UP (ref 0–6)
GAS PNL BLDV: SIGNIFICANT CHANGE UP
GLUCOSE BLDC GLUCOMTR-MCNC: 160 MG/DL — HIGH (ref 70–99)
GLUCOSE BLDC GLUCOMTR-MCNC: 188 MG/DL — HIGH (ref 70–99)
GLUCOSE BLDC GLUCOMTR-MCNC: 189 MG/DL — HIGH (ref 70–99)
GLUCOSE BLDC GLUCOMTR-MCNC: 245 MG/DL — HIGH (ref 70–99)
GLUCOSE BLDC GLUCOMTR-MCNC: 288 MG/DL — HIGH (ref 70–99)
GLUCOSE SERPL-MCNC: 213 MG/DL — HIGH (ref 70–99)
HCT VFR BLD CALC: 28.8 % — LOW (ref 34.5–45)
HGB BLD-MCNC: 9.2 G/DL — LOW (ref 11.5–15.5)
IANC: 4.65 K/UL — SIGNIFICANT CHANGE UP (ref 1.8–7.4)
IMM GRANULOCYTES NFR BLD AUTO: 0.4 % — SIGNIFICANT CHANGE UP (ref 0–0.9)
LYMPHOCYTES # BLD AUTO: 1.45 K/UL — SIGNIFICANT CHANGE UP (ref 1–3.3)
LYMPHOCYTES # BLD AUTO: 20.6 % — SIGNIFICANT CHANGE UP (ref 13–44)
MAGNESIUM SERPL-MCNC: 1.9 MG/DL — SIGNIFICANT CHANGE UP (ref 1.6–2.6)
MCHC RBC-ENTMCNC: 28 PG — SIGNIFICANT CHANGE UP (ref 27–34)
MCHC RBC-ENTMCNC: 31.9 G/DL — LOW (ref 32–36)
MCV RBC AUTO: 87.8 FL — SIGNIFICANT CHANGE UP (ref 80–100)
MONOCYTES # BLD AUTO: 0.65 K/UL — SIGNIFICANT CHANGE UP (ref 0–0.9)
MONOCYTES NFR BLD AUTO: 9.2 % — SIGNIFICANT CHANGE UP (ref 2–14)
NEUTROPHILS # BLD AUTO: 4.65 K/UL — SIGNIFICANT CHANGE UP (ref 1.8–7.4)
NEUTROPHILS NFR BLD AUTO: 65.9 % — SIGNIFICANT CHANGE UP (ref 43–77)
NRBC # BLD: 0 /100 WBCS — SIGNIFICANT CHANGE UP (ref 0–0)
NRBC # FLD: 0 K/UL — SIGNIFICANT CHANGE UP (ref 0–0)
PHOSPHATE SERPL-MCNC: 3.9 MG/DL — SIGNIFICANT CHANGE UP (ref 2.5–4.5)
PLATELET # BLD AUTO: 312 K/UL — SIGNIFICANT CHANGE UP (ref 150–400)
POTASSIUM SERPL-MCNC: 3.6 MMOL/L — SIGNIFICANT CHANGE UP (ref 3.5–5.3)
POTASSIUM SERPL-SCNC: 3.6 MMOL/L — SIGNIFICANT CHANGE UP (ref 3.5–5.3)
PROT SERPL-MCNC: 6.7 G/DL — SIGNIFICANT CHANGE UP (ref 6–8.3)
RBC # BLD: 3.28 M/UL — LOW (ref 3.8–5.2)
RBC # FLD: 15.4 % — HIGH (ref 10.3–14.5)
RH IG SCN BLD-IMP: POSITIVE — SIGNIFICANT CHANGE UP
SODIUM SERPL-SCNC: 136 MMOL/L — SIGNIFICANT CHANGE UP (ref 135–145)
WBC # BLD: 7.05 K/UL — SIGNIFICANT CHANGE UP (ref 3.8–10.5)
WBC # FLD AUTO: 7.05 K/UL — SIGNIFICANT CHANGE UP (ref 3.8–10.5)

## 2024-12-16 RX ORDER — INSULIN GLARGINE 100 [IU]/ML
12 INJECTION, SOLUTION SUBCUTANEOUS AT BEDTIME
Refills: 0 | Status: DISCONTINUED | OUTPATIENT
Start: 2024-12-16 | End: 2024-12-18

## 2024-12-16 RX ADMIN — FLUTICASONE PROPIONATE AND SALMETEROL XINAFOATE 1 DOSE(S): 45; 21 AEROSOL, METERED RESPIRATORY (INHALATION) at 21:36

## 2024-12-16 RX ADMIN — FUROSEMIDE 40 MILLIGRAM(S): 40 TABLET ORAL at 06:31

## 2024-12-16 RX ADMIN — Medication 3: at 07:52

## 2024-12-16 RX ADMIN — Medication 1: at 17:41

## 2024-12-16 RX ADMIN — PANTOPRAZOLE SODIUM 40 MILLIGRAM(S): 40 TABLET, DELAYED RELEASE ORAL at 06:29

## 2024-12-16 RX ADMIN — Medication 1 TABLET(S): at 11:25

## 2024-12-16 RX ADMIN — Medication 20 MILLIGRAM(S): at 21:37

## 2024-12-16 RX ADMIN — Medication 1: at 11:54

## 2024-12-16 RX ADMIN — CEFEPIME 100 MILLIGRAM(S): 2 INJECTION, POWDER, FOR SOLUTION INTRAVENOUS at 06:29

## 2024-12-16 RX ADMIN — Medication 81 MILLIGRAM(S): at 11:26

## 2024-12-16 RX ADMIN — CEFEPIME 100 MILLIGRAM(S): 2 INJECTION, POWDER, FOR SOLUTION INTRAVENOUS at 17:15

## 2024-12-16 RX ADMIN — Medication 5000 UNIT(S): at 14:00

## 2024-12-16 RX ADMIN — Medication 5000 UNIT(S): at 06:30

## 2024-12-16 RX ADMIN — GABAPENTIN 400 MILLIGRAM(S): 300 CAPSULE ORAL at 21:41

## 2024-12-16 RX ADMIN — INSULIN GLARGINE 12 UNIT(S): 100 INJECTION, SOLUTION SUBCUTANEOUS at 21:37

## 2024-12-16 RX ADMIN — SILDENAFIL 20 MILLIGRAM(S): 20 TABLET, FILM COATED ORAL at 21:36

## 2024-12-16 RX ADMIN — Medication 5000 UNIT(S): at 21:37

## 2024-12-16 RX ADMIN — SILDENAFIL 20 MILLIGRAM(S): 20 TABLET, FILM COATED ORAL at 06:31

## 2024-12-16 RX ADMIN — SILDENAFIL 20 MILLIGRAM(S): 20 TABLET, FILM COATED ORAL at 15:54

## 2024-12-16 RX ADMIN — Medication 5 UNIT(S): at 11:55

## 2024-12-16 RX ADMIN — Medication 5 UNIT(S): at 17:42

## 2024-12-16 RX ADMIN — Medication 5 UNIT(S): at 07:52

## 2024-12-16 NOTE — DISCHARGE NOTE PROVIDER - NSDCCPTREATMENT_GEN_ALL_CORE_FT
PRINCIPAL PROCEDURE  Procedure: CT angiogram chest w and wo contrast  Findings and Treatment: FINDINGS:  AIRWAYS/LUNGS/PLEURA: Patent central airways. Several subsegmental   bronchi in the bilateral lower lobes appear to be occluded. Bilateral   peripheral, basal predominant bronchiectasis, better appreciated on prior   CT of the chest 10/16/2024. Bilateral reticular opacities are also seen   again, increased since the prior exam. Since 10/16/2024, there is   increased consolidation in the lower lobes and superimposed patchy   groundglass opacities throughout the upper to mid lungs. There is also   increased interlobular septal thickening. A left lower lobe nodule   measures approximately 2.3 x 2.0 cm (301:46). Direct comparison is   difficult due to extensive adjacent consolidation.  MEDIASTINUM AND SHANNON: Redemonstrated enlarged mediastinal and bilateral   hilar lymph nodes. A reference right paratracheal lymph node measures 1.8   x 1.5 cm, previously 1.6 x 1.1 cm and 9/13/2024.  PULMONARY ANGIOGRAM: No pulmonary embolism to the level of the bilateral   segmental branches. Decreased opacification and more distal branches in   the lower lobes may be due to sluggish flow recommendations consolidation.  VESSELS: Aortic and coronary artery calcification.  HEART: The heart is enlarged. No pericardial effusion. Mitral annular   calcification.  VISUALIZED UPPER ABDOMEN: Subcentimeter hypodense lesion in the posterior   spleen, previously described as cyst.  CHEST WALL AND BONES: Degenerative changes. Unchanged hypodense right   thyroid nodule.  IMPRESSION:  No pulmonary embolism.  New bilateral lower lobe patchy consolidation and peripheral opacities   superimposed on chronic interstitial changes/fibrosis likely representing   multifocal pneumonia.  Suspect similar appearing left lower lobe mass allowing for the presence   of new consolidation to previously seen FDG avid lesion. Recommend   follow-up chest CT in one month after the appropriate clinical treatment   to reevaluate.      SECONDARY PROCEDURE  Procedure: Chest xray, PA & lateral  Findings and Treatment: FINDINGS:  The heart is similar in size.  Bilateral lower lobe consolidations with reticulonodular changes   throughout the lungs.  There is no pneumothorax  Unchanged bilateral moderate sized pleural effusions.  No acute osseous abnormalities.  IMPRESSION:  Bilateral lower lobe consolidations with reticulonodular changes   throughout the lungs, not significantly changed.

## 2024-12-16 NOTE — ADVANCED PRACTICE NURSE CONSULT - RECOMMEDATIONS
Topical recommendations:     Right buttock- Cleanse with skin cleanser, pat dry. Apply TRIAD paste twice a day     Continue low air loss bed therapy,  heel elevation while in bed, encourage to turn & reposition q2h with fluidized pillow, continue moisture management & single breathable pad, continue measures to decrease friction/shear.   Plan discussed with patient- educated on topical wound therapy to optimize wound healing. Questions answered.     Cathleen Stone, ACP provider, notified of findings and recs.   Please reconsult if any wound changes or if we can be of further assistance (ext [phone]).

## 2024-12-16 NOTE — ADVANCED PRACTICE NURSE CONSULT - REASON FOR CONSULT
Patient seen on skin care rounds after wound care referral received for assessment of skin impairment and recommendations of topical management. Patient H/O of COPD on 2L home O2 PRN and QHS, CVA (remote, no deficits), CAD s/p stent ('15), IDDM2, PVD s/p fem-pop bypass ('15) w/occlusion ('22), HLD, HTN, anemia, recently diagnosed pHTN, presenting from with increasing cough occasionally productive of white sputum, found to have multifocal PNA   Chart reviewed: WBC 7.05, H/H 9.2/28.8, platelets 312, Serum albumin 3.3, A1C 8.0, BMI 24.5,  Pb 17.     Patient interviewed stating she has had this scab on her right buttock for over a year, puts a moisturizer on it as when it gets dry and scabby, " it hurts worse". Spends an extended amount of time in the chair " in the afternoon" but does ambulate to the bathroom and uses a seat cushion while sitting.

## 2024-12-16 NOTE — DISCHARGE NOTE PROVIDER - NSDCFUADDAPPT_GEN_ALL_CORE_FT
Follow up with your primary care provider in 1-2 weeks of discharge.    Follow up with pulmonology within 1 week of discharge.    APPTS ARE READY TO BE MADE: [X] YES  1: PCP  2: pulm Follow up with your primary care provider within 1 week of discharge.    Follow up with pulmonology, Dr. Douglass, as scheduled on 12/19 at 6:45pm. Follow up with your primary care provider within 1 week of discharge.    Follow up with pulmonology, Dr. Douglass, as scheduled on 12/19 at 6:45pm.    Appointment was scheduled in [place] with Dr. Norris 12/19 2:45pm via Telehealth    Prior to outreaching the patient, it was visible that the patient has secured a follow up appointment which was not scheduled by our team with Dr. Douglass 12/19/2024 06:45 PM    Provided patient with provider referral information, however patient prefers to schedule the appointments on their own with PCP.

## 2024-12-16 NOTE — DISCHARGE NOTE PROVIDER - NSDCHHATTENDCERT_GEN_ALL_CORE
My signature below certifies that the above stated patient is homebound and upon completion of the Face-To-Face encounter, has the need for intermittent skilled nursing, physical therapy and/or speech or occupational therapy services in their home for their current diagnosis as outlined in their initial plan of care. These services will continue to be monitored by myself or another physician.
Detail Level: Zone

## 2024-12-16 NOTE — DISCHARGE NOTE PROVIDER - NSDCFUSCHEDAPPT_GEN_ALL_CORE_FT
Stephen Squires  [facility] Physician [facility]  RADMED [address]  Scheduled Appointment: 12/20/2024    Peter Garrison  [facility]  [address] [address]  Scheduled Appointment: 12/20/2024    Zelalem Lazar  [facility]  THORSURG [address]  Scheduled Appointment: 12/30/2024     Britt Norris  [facility] Physician Replaced by [facility]  PULMMED [address]  Scheduled Appointment: 12/19/2024    Stephen Squires  [facility] Physician Replaced by [facility]  RADMED [address]  Scheduled Appointment: 12/20/2024    Peter Garrison  [facility]  [address]  Scheduled Appointment: 12/20/2024    Zelalem Lazar  [facility]  [address]  Scheduled Appointment: 12/30/2024

## 2024-12-16 NOTE — DISCHARGE NOTE PROVIDER - CARE PROVIDER_API CALL
Raymundo Moreira   Internal Medicine  [address] [place] [id number]  Phone: [phone]  Fax: [phone]  Follow Up Time:    Raymundo Moreira  Internal Medicine  [address] [place] [id number]  Phone: [phone]  Fax: [phone]  Follow Up Time:     Nadine Douglass  Pulmonary Disease  [address] [place] [address], [address]  [place] [address]  Phone: [phone]  Fax: [phone]  Scheduled Appointment: 12/19/2024 06:45 PM

## 2024-12-16 NOTE — DISCHARGE NOTE PROVIDER - NSDCCPCAREPLAN_GEN_ALL_CORE_FT
PRINCIPAL DISCHARGE DIAGNOSIS  Diagnosis: PNA (pneumonia)  Assessment and Plan of Treatment: You came in with trouble breathing and were found to have an acute bacterial infection in your lung. This improved with IV antibiotics and you completed a 5d course of these antibiotics on 12/18. Please follow up with your primary care provider in 1-2 weeks following discharge from the hospital for continued monitoring and management.      SECONDARY DISCHARGE DIAGNOSES  Diagnosis: SOB (shortness of breath)  Assessment and Plan of Treatment: You were re-qualified for home oxygen     PRINCIPAL DISCHARGE DIAGNOSIS  Diagnosis: PNA (pneumonia)  Assessment and Plan of Treatment: You came in with trouble breathing and were found to have an acute bacterial infection in your lung. This improved with IV antibiotics and you completed a 5 day course of antibiotics on 12/18. Please follow up with your primary care provider within 1 week of discharge from the hospital for continued monitoring and management. Follow up with pulmonology within 1 week of discharge.      SECONDARY DISCHARGE DIAGNOSES  Diagnosis: SOB (shortness of breath)  Assessment and Plan of Treatment: You were re-qualified for home oxygen. Continue supplemental oxygen via nasal cannula as needed.     PRINCIPAL DISCHARGE DIAGNOSIS  Diagnosis: PNA (pneumonia)  Assessment and Plan of Treatment: You came in with trouble breathing and were found to have an acute bacterial infection in your lung. This improved with IV antibiotics and you completed a 5 day course of antibiotics on 12/18. Please follow up with your primary care provider within 1 week of discharge from the hospital for continued monitoring and management. Follow up with pulmonology within 1 week of discharge.      SECONDARY DISCHARGE DIAGNOSES  Diagnosis: Type 2 diabetes mellitus, with long-term current use of insulin  Assessment and Plan of Treatment: Your hemoglobin A1C is 8%. Target goal for hemoglobin A1C is <7%. Monitor blood glucose levels throughout the day, before meals and at bedtime. Record blood sugars and bring to outpatient provider appointments in order to be reviewed by your doctor for management modifications. If your sugars are more than 400 or less than 70 you should contact your primary care provider immediately. Monitor for signs/symptoms of low blood glucose, such as dizziness, altered mental status, or cool/clammy skin. In addition, monitor for signs/symptoms of high blood glucose, such as feeling hot, dry, fatigued, or with increased thirst/urination. Make regular podiatry appointments in order to have feet checked for wounds and uncontrolled toe nail growth to prevent infections. Make regular ophthalmology appointments to monitor your vision.   Continue Tresiba 10 units at bedtime only, hold metformin going forward.    Diagnosis: SOB (shortness of breath)  Assessment and Plan of Treatment: You were re-qualified for home oxygen. Continue supplemental oxygen via nasal cannula as needed.

## 2024-12-16 NOTE — DISCHARGE NOTE PROVIDER - PROVIDER TOKENS
PROVIDER:[TOKEN:[[id number]]] PROVIDER:[TOKEN:[[id number]]],PROVIDER:[TOKEN:[[id number]],SCHEDULEDAPPT:[12/19/2024],SCHEDULEDAPPTTIME:[06:45 PM]]

## 2024-12-16 NOTE — DISCHARGE NOTE PROVIDER - CARE PROVIDERS DIRECT ADDRESSES
,[email or web address]

## 2024-12-16 NOTE — DISCHARGE NOTE PROVIDER - HOSPITAL COURSE
89F PMH of COPD on 2L home O2 PRN and QHS, CVA (remote, no deficits), CAD s/p stent ('15), IDDM2, PVD s/p fem-pop bypass ('15) w/ occlusion ('22), HLD, HTN, anemia, recently diagnosed pHTN, presenting from with increasing cough occasionally productive of white sputum, found to have multifocal PNA. CTA chest w/o PE, w/ new b/l lower lobe patch consolidation / peripheral opacities (multifocal PNA), similar appearing LLL mass (COPD, presumed lung cancer). Flu/COVID/RSV, UA, BCx neg. UCx w/ Citrobacter sensitive to fluoroquinolones (from outpatient PCP). ID consulted- started on Cefepime. Completed 5d course on 12/18.     PT: home PT; Dispo: *STARS pt* token placed for PCP and Pulm appointments   Home O2 resubmitted ______    On_________, discussed with __________, patient is medically cleared and optimized for discharge today. All medications were reviewed with attending, and sent to mutually agreed upon pharmacy.  Reviewed discharge medications with patient; All new medications requiring new prescription sent to pharmacy of patients choice. Reviewed need for prescription for previous home medications and new prescriptions sent if requested. Patient in agreement and understands. 89F PMH of COPD on 2L home O2 PRN and QHS, CVA (remote, no deficits), CAD s/p stent ('15), IDDM2, PVD s/p fem-pop bypass ('15) w/ occlusion ('22), HLD, HTN, anemia, recently diagnosed pHTN, presenting from with increasing cough occasionally productive of white sputum, found to have multifocal PNA. CTA chest w/o PE, w/ new b/l lower lobe patch consolidation / peripheral opacities (multifocal PNA), similar appearing LLL mass (COPD, presumed lung cancer). Flu/COVID/RSV, UA, BCx neg. UCx w/ Citrobacter sensitive to fluoroquinolones (from outpatient PCP). ID consulted, s/p Cefepime x5 day course.     Case discussed with Dr. Gilbert on ___. Patient is medically stable and optimized for discharge as per attending. All medications were reviewed and prescriptions were sent to a mutually agreed upon pharmacy. Discharge plan reviewed with patient and/or family. 89F PMH of COPD on 2L home O2 PRN and QHS, CVA (remote, no deficits), CAD s/p stent ('15), IDDM2, PVD s/p fem-pop bypass ('15) w/ occlusion ('22), HLD, HTN, anemia, recently diagnosed pHTN, presenting from with increasing cough occasionally productive of white sputum, found to have multifocal PNA. CTA chest w/o PE, w/ new b/l lower lobe patch consolidation / peripheral opacities (multifocal PNA), similar appearing LLL mass (COPD, presumed lung cancer). Flu/COVID/RSV, UA, BCx neg. UCx w/ Citrobacter sensitive to fluoroquinolones (from outpatient PCP). ID consulted, s/p Cefepime x5 day course.     Case discussed with Dr. Gilbert on 12/18. Patient is medically stable and optimized for discharge home as per attending. All medications were reviewed and prescriptions were sent to a mutually agreed upon pharmacy. Discharge plan reviewed with patient and/or family.

## 2024-12-16 NOTE — ADVANCED PRACTICE NURSE CONSULT - ASSESSMENT
General: A&Ox4, turns independently, on supplemental oxygen via nasal cannula, incontinent of urine- external female urinary  in place, continent of stool. Skin warm, thin, fragile, dry with increased moisture in intertriginous folds, poor skin turgor, scattered areas of hyperpigmentation and hypopigmentation. Blanchable erythema on bilateral heels.     Right buttock with previous wound of unknown etiology- entire area measuring [id number] including area of blanching erythema. Scab noted to left side of wound. Over soft tissue, no tenderness upon palpation. No suspicion of infection, no induration. Goals of care: Moisturize scab, continue to offload.

## 2024-12-16 NOTE — DISCHARGE NOTE PROVIDER - NSDCMRMEDTOKEN_GEN_ALL_CORE_FT
Advair Diskus 250 mcg-50 mcg inhalation powder: 1 puff(s) inhaled 2 times a day  aspirin 81 mg oral delayed release tablet: 1 tab(s) orally once a day (at bedtime)  atorvastatin 20 mg oral tablet: 1 tab(s) orally once a day (at bedtime)  ferrous sulfate 325 mg (65 mg elemental iron) oral tablet: 1 tab(s) orally once a day  furosemide 20 mg oral tablet: 2 tab(s) orally once a day  gabapentin 400 mg oral capsule: 1 cap(s) orally once a day (at bedtime)  ipratropium 42 mcg/inh (0.06%) nasal spray: 2 spray(s) intranasally 2 times a day  MetFORMIN (Eqv-Fortamet) 500 mg oral tablet, extended release: 2 tab(s) orally once a day  Metoprolol Tartrate 25 mg oral tablet: 0.5 tab(s) orally 2 times a day takes 2 half tabs in morning and 1 half tab at night  Multiple Vitamins oral tablet: 1 tab(s) orally once a day  omeprazole 40 mg oral delayed release capsule: 1 cap(s) orally once a day  PreserVision AREDS 2 oral capsule: 1 cap(s) orally 2 times a day  sildenafil 20 mg oral tablet: 1 tab(s) orally 3 times a day  Tresiba FlexTouch 100 units/mL subcutaneous solution: 10 unit(s) subcutaneous once a day (at bedtime) Take tresiba 10 units subcutaneous at bedtime starting tonight at bedtime   Advair Diskus 250 mcg-50 mcg inhalation powder: 1 puff(s) inhaled 2 times a day  aspirin 81 mg oral delayed release tablet: 1 tab(s) orally once a day (at bedtime)  atorvastatin 20 mg oral tablet: 1 tab(s) orally once a day (at bedtime)  ferrous sulfate 325 mg (65 mg elemental iron) oral tablet: 1 tab(s) orally once a day  furosemide 20 mg oral tablet: 2 tab(s) orally once a day  gabapentin 400 mg oral capsule: 1 cap(s) orally once a day (at bedtime)  ipratropium 42 mcg/inh (0.06%) nasal spray: 2 spray(s) intranasally 2 times a day  Metoprolol Tartrate 25 mg oral tablet: 0.5 tab(s) orally 2 times a day takes 2 half tabs in morning and 1 half tab at night  Multiple Vitamins oral tablet: 1 tab(s) orally once a day  omeprazole 40 mg oral delayed release capsule: 1 cap(s) orally once a day  polyethylene glycol 3350 oral powder for reconstitution: 17 gram(s) orally 2 times a day  PreserVision AREDS 2 oral capsule: 1 cap(s) orally 2 times a day  senna leaf extract oral tablet: 2 tab(s) orally once a day (at bedtime)  sildenafil 20 mg oral tablet: 1 tab(s) orally 3 times a day  Tresiba FlexTouch 100 units/mL subcutaneous solution: 10 unit(s) subcutaneous once a day (at bedtime) Take tresiba 10 units subcutaneous at bedtime starting tonight at bedtime

## 2024-12-17 LAB
ANION GAP SERPL CALC-SCNC: 14 MMOL/L — SIGNIFICANT CHANGE UP (ref 7–14)
BASOPHILS # BLD AUTO: 0.06 K/UL — SIGNIFICANT CHANGE UP (ref 0–0.2)
BASOPHILS NFR BLD AUTO: 0.9 % — SIGNIFICANT CHANGE UP (ref 0–2)
BUN SERPL-MCNC: 28 MG/DL — HIGH (ref 7–23)
CALCIUM SERPL-MCNC: 9.2 MG/DL — SIGNIFICANT CHANGE UP (ref 8.4–10.5)
CHLORIDE SERPL-SCNC: 97 MMOL/L — LOW (ref 98–107)
CO2 SERPL-SCNC: 26 MMOL/L — SIGNIFICANT CHANGE UP (ref 22–31)
CREAT SERPL-MCNC: 0.95 MG/DL — SIGNIFICANT CHANGE UP (ref 0.5–1.3)
EGFR: 57 ML/MIN/1.73M2 — LOW
EOSINOPHIL # BLD AUTO: 0.32 K/UL — SIGNIFICANT CHANGE UP (ref 0–0.5)
EOSINOPHIL NFR BLD AUTO: 4.7 % — SIGNIFICANT CHANGE UP (ref 0–6)
GLUCOSE BLDC GLUCOMTR-MCNC: 173 MG/DL — HIGH (ref 70–99)
GLUCOSE BLDC GLUCOMTR-MCNC: 177 MG/DL — HIGH (ref 70–99)
GLUCOSE BLDC GLUCOMTR-MCNC: 205 MG/DL — HIGH (ref 70–99)
GLUCOSE BLDC GLUCOMTR-MCNC: 248 MG/DL — HIGH (ref 70–99)
GLUCOSE SERPL-MCNC: 180 MG/DL — HIGH (ref 70–99)
HCT VFR BLD CALC: 30.6 % — LOW (ref 34.5–45)
HGB BLD-MCNC: 9.8 G/DL — LOW (ref 11.5–15.5)
IANC: 4.24 K/UL — SIGNIFICANT CHANGE UP (ref 1.8–7.4)
IMM GRANULOCYTES NFR BLD AUTO: 1 % — HIGH (ref 0–0.9)
LYMPHOCYTES # BLD AUTO: 1.52 K/UL — SIGNIFICANT CHANGE UP (ref 1–3.3)
LYMPHOCYTES # BLD AUTO: 22.4 % — SIGNIFICANT CHANGE UP (ref 13–44)
MAGNESIUM SERPL-MCNC: 1.8 MG/DL — SIGNIFICANT CHANGE UP (ref 1.6–2.6)
MCHC RBC-ENTMCNC: 28 PG — SIGNIFICANT CHANGE UP (ref 27–34)
MCHC RBC-ENTMCNC: 32 G/DL — SIGNIFICANT CHANGE UP (ref 32–36)
MCV RBC AUTO: 87.4 FL — SIGNIFICANT CHANGE UP (ref 80–100)
MONOCYTES # BLD AUTO: 0.59 K/UL — SIGNIFICANT CHANGE UP (ref 0–0.9)
MONOCYTES NFR BLD AUTO: 8.7 % — SIGNIFICANT CHANGE UP (ref 2–14)
NEUTROPHILS # BLD AUTO: 4.24 K/UL — SIGNIFICANT CHANGE UP (ref 1.8–7.4)
NEUTROPHILS NFR BLD AUTO: 62.3 % — SIGNIFICANT CHANGE UP (ref 43–77)
NRBC # BLD: 0 /100 WBCS — SIGNIFICANT CHANGE UP (ref 0–0)
NRBC # FLD: 0 K/UL — SIGNIFICANT CHANGE UP (ref 0–0)
PHOSPHATE SERPL-MCNC: 3.2 MG/DL — SIGNIFICANT CHANGE UP (ref 2.5–4.5)
PLATELET # BLD AUTO: 323 K/UL — SIGNIFICANT CHANGE UP (ref 150–400)
POTASSIUM SERPL-MCNC: 4 MMOL/L — SIGNIFICANT CHANGE UP (ref 3.5–5.3)
POTASSIUM SERPL-SCNC: 4 MMOL/L — SIGNIFICANT CHANGE UP (ref 3.5–5.3)
RBC # BLD: 3.5 M/UL — LOW (ref 3.8–5.2)
RBC # FLD: 15.2 % — HIGH (ref 10.3–14.5)
SODIUM SERPL-SCNC: 137 MMOL/L — SIGNIFICANT CHANGE UP (ref 135–145)
WBC # BLD: 6.8 K/UL — SIGNIFICANT CHANGE UP (ref 3.8–10.5)
WBC # FLD AUTO: 6.8 K/UL — SIGNIFICANT CHANGE UP (ref 3.8–10.5)

## 2024-12-17 RX ORDER — HEPARIN SODIUM,PORCINE 1000/ML
5000 VIAL (ML) INJECTION EVERY 12 HOURS
Refills: 0 | Status: DISCONTINUED | OUTPATIENT
Start: 2024-12-17 | End: 2024-12-18

## 2024-12-17 RX ORDER — SENNOSIDES 8.6 MG
2 TABLET ORAL AT BEDTIME
Refills: 0 | Status: DISCONTINUED | OUTPATIENT
Start: 2024-12-17 | End: 2024-12-18

## 2024-12-17 RX ORDER — POLYETHYLENE GLYCOL 3350 17 G/17G
17 POWDER, FOR SOLUTION ORAL
Refills: 0 | Status: DISCONTINUED | OUTPATIENT
Start: 2024-12-17 | End: 2024-12-18

## 2024-12-17 RX ADMIN — Medication 5000 UNIT(S): at 18:26

## 2024-12-17 RX ADMIN — Medication 5 UNIT(S): at 08:00

## 2024-12-17 RX ADMIN — PANTOPRAZOLE SODIUM 40 MILLIGRAM(S): 40 TABLET, DELAYED RELEASE ORAL at 05:44

## 2024-12-17 RX ADMIN — Medication 1 TABLET(S): at 11:39

## 2024-12-17 RX ADMIN — Medication 2: at 08:00

## 2024-12-17 RX ADMIN — FUROSEMIDE 40 MILLIGRAM(S): 40 TABLET ORAL at 05:44

## 2024-12-17 RX ADMIN — INSULIN GLARGINE 12 UNIT(S): 100 INJECTION, SOLUTION SUBCUTANEOUS at 21:55

## 2024-12-17 RX ADMIN — Medication 2: at 17:27

## 2024-12-17 RX ADMIN — FLUTICASONE PROPIONATE AND SALMETEROL XINAFOATE 1 DOSE(S): 45; 21 AEROSOL, METERED RESPIRATORY (INHALATION) at 09:00

## 2024-12-17 RX ADMIN — SILDENAFIL 20 MILLIGRAM(S): 20 TABLET, FILM COATED ORAL at 05:44

## 2024-12-17 RX ADMIN — SILDENAFIL 20 MILLIGRAM(S): 20 TABLET, FILM COATED ORAL at 15:46

## 2024-12-17 RX ADMIN — CEFEPIME 100 MILLIGRAM(S): 2 INJECTION, POWDER, FOR SOLUTION INTRAVENOUS at 18:25

## 2024-12-17 RX ADMIN — CEFEPIME 100 MILLIGRAM(S): 2 INJECTION, POWDER, FOR SOLUTION INTRAVENOUS at 05:44

## 2024-12-17 RX ADMIN — Medication 81 MILLIGRAM(S): at 11:38

## 2024-12-17 RX ADMIN — SILDENAFIL 20 MILLIGRAM(S): 20 TABLET, FILM COATED ORAL at 21:55

## 2024-12-17 RX ADMIN — GABAPENTIN 400 MILLIGRAM(S): 300 CAPSULE ORAL at 21:54

## 2024-12-17 RX ADMIN — Medication 5 UNIT(S): at 12:16

## 2024-12-17 RX ADMIN — Medication 5000 UNIT(S): at 05:44

## 2024-12-17 RX ADMIN — Medication 20 MILLIGRAM(S): at 21:55

## 2024-12-17 RX ADMIN — FLUTICASONE PROPIONATE AND SALMETEROL XINAFOATE 1 DOSE(S): 45; 21 AEROSOL, METERED RESPIRATORY (INHALATION) at 21:56

## 2024-12-17 RX ADMIN — Medication 1: at 12:14

## 2024-12-17 RX ADMIN — POLYETHYLENE GLYCOL 3350 17 GRAM(S): 17 POWDER, FOR SOLUTION ORAL at 18:33

## 2024-12-17 RX ADMIN — Medication 5 UNIT(S): at 17:27

## 2024-12-17 NOTE — PROGRESS NOTE ADULT - PROBLEM SELECTOR PROBLEM 5
Type 2 diabetes mellitus, with long-term current use of insulin

## 2024-12-17 NOTE — PROGRESS NOTE ADULT - PROBLEM SELECTOR PLAN 4
monitor strict I/Os, daily weights  pro-BNP 1578 similar to prior value in Oct 2024  add back furosemide if BP stable
monitor strict I/Os, daily weights  pro-BNP 1578 similar to prior value in Oct 2024  BP stable. Added back furosemide
monitor strict I/Os, daily weights  pro-BNP 1578 similar to prior value in Oct 2024  add back furosemide if BP stable
monitor strict I/Os, daily weights  pro-BNP 1578 similar to prior value in Oct 2024  BP stable. Added back furosemide

## 2024-12-17 NOTE — PROVIDER CONTACT NOTE (OTHER) - ASSESSMENT
Patient was sitting up , getting ready to ambulate. Oxygen was removed at this time ,noted her O2 saturation dropped to 72% and , sob. She was placed back in bed  with Oxygen at 2L/NC, O2 saturation went up to 92-93% right away. HOB kept at 45 degrees. will monitor
VSS. No complaints presented.

## 2024-12-17 NOTE — PROGRESS NOTE ADULT - PROBLEM SELECTOR PLAN 1
meeting SIRS criteria w/WBC.12, HR>90  suspect secondary to possible multifocal pna (however similar imaging noted on prior CTs in Sept/Oct ?chronic findings) vs UTI, diagnosed outpatient  BCx if febrile  lactate wnl  trend leukocytosis  sputum cultures if able to obtain  procalcitonin mildly elevated  ID eval appreciated.  levofloxacin --> Cefepime IV  no IVF given dHF, lactate wnl, VSS, afebrile, furosemide held in lieu of IVF   stable so restarted Lasix qdaily   Per patient's PCP, UCx showed Citrobacter sensitive to fluoroquinolones
meeting SIRS criteria w/WBC.12, HR>90  suspect secondary to possible multifocal pna (however similar imaging noted on prior CTs in Sept/Oct ?chronic findings) vs UTI, diagnosed outpatient  BCx if febrile  lactate wnl  trend leukocytosis  sputum cultures if able to obtain  procalcitonin mildly elevated  ID eval appreciated.  levofloxacin --> Cefepime IV  no IVF given dHF, lactate wnl, VSS, afebrile, furosemide held in lieu of IVF (monitor fluid status, will need to add back)  Per patient's PCP, whom I spoke with this AM UCx showed Citrobacter sensitive to fluoroquinolones
meeting SIRS criteria w/WBC.12, HR>90  suspect secondary to possible multifocal pna (however similar imaging noted on prior CTs in Sept/Oct ?chronic findings) vs UTI, diagnosed outpatient  BCx if febrile  lactate wnl  trend leukocytosis  sputum cultures if able to obtain  procalcitonin mildly elevated  ID eval appreciated.  levofloxacin --> Cefepime IV, last day 12/18/24 to complete 5 days course.  no IVF given dHF, lactate wnl, VSS, afebrile, furosemide held in lieu of IVF   restarted Lasix qdaily   Per patient's PCP, UCx showed Citrobacter sensitive to fluoroquinolones
meeting SIRS criteria w/WBC.12, HR>90  suspect secondary to possible multifocal pna (however similar imaging noted on prior CTs in Sept/Oct ?chronic findings) vs UTI, diagnosed outpatient  check urine studies, reordered  check BCx if febrile  lactate wnl  trend leukocytosis  sputum cultures if able to obtain  procalcitonin pending  ID eval appreciated.  levofloxacin --> Cefepime IV  no IVF given dHF, lactate wnl, VSS, afebrile, furosemide held in lieu of IVF (monitor fluid status, will need to add back)  Per patient's PCP, whom I spoke with this AM UCx showed Citrobacter sensitive to fluoroquinolones

## 2024-12-17 NOTE — PROVIDER CONTACT NOTE (OTHER) - REASON
Fingerstick Blood Sugar 425
Patient was sitting up , getting ready to ambulate. Oxygen was removed at this time ,noted her O2 saturation dropped to 72% and , sob. She was placed back in bed  with Oxygen at 2L/NC, O2 saturation went up to 92-93% right away. HOB kept at 45 degrees. will monitor

## 2024-12-17 NOTE — PROGRESS NOTE ADULT - PROBLEM SELECTOR PLAN 2
plan as above  wean O2 as toleartes (baseline home 2L NC)  incentive spirometry  ID edith appreciated.
plan as above  O2 as needed  incentive spirometry  ID following.

## 2024-12-17 NOTE — PROGRESS NOTE ADULT - PROBLEM SELECTOR PLAN 12
HSQ for VTE ppx  DASH/CC diet with 1200cc fluid restriction  GOC - please readdress GOC with patient and daughter in AM, currently FULL CODE    #lesion on sacrum   -reportedly chronic, wound care consult for topical recs    #initial trop 38, no chest pain, would send 2nd set with AM labs, (not sent for add on, placed STAT order to send with other AM labs), EKG non-ischemic
HSQ for VTE ppx  DASH/CC diet with 1200cc fluid restriction  GOC - please readdress GOC with patient and daughter in AM, currently FULL CODE    #lesion on sacrum   -reportedly chronic, wound care consult for topical recs    #initial trop 38, no chest pain, would send 2nd set with AM labs, (not sent for add on, placed STAT order to send with other AM labs), EKG non-ischemic
HSQ for VTE ppx  DASH/CC diet with 1200cc fluid restriction  GOC - please readdress GOC with patient and daughter in AM, currently FULL CODE    #lesion on sacrum   -reportedly chronic, wound care consult for topical recs    #initial trop 38, no chest pain, 2nd set stable. , EKG non-ischemic
HSQ for VTE ppx  DASH/CC diet with 1200cc fluid restriction  GOC - please readdress GOC with patient and daughter in AM, currently FULL CODE    #lesion on sacrum   -reportedly chronic, wound care consult for topical recs    #initial trop 38, no chest pain, 2nd set stable. , EKG non-ischemic

## 2024-12-17 NOTE — PROGRESS NOTE ADULT - PROBLEM SELECTOR PLAN 7
was due to have L VATS/LLL resection however due to development of pHTN, surgery deferred. Had PET scan. Plan is now to f/u with rad/onc for possible SBRT  d/w Dr. Lazar (no plan for surgery) given comorbidities and frail state, pulm htn.

## 2024-12-17 NOTE — PROGRESS NOTE ADULT - PROBLEM SELECTOR PLAN 9
restarted furosemide and metoprolol
holding furosemide and metoprolol given controlled BP and sepsis, would add back if BP remains stable -> furosemide added back in AM
holding furosemide and metoprolol given controlled BP and sepsis, would add back if BP remains stable -> furosemide added back in AM
restarted furosemide and metoprolol

## 2024-12-17 NOTE — PROGRESS NOTE ADULT - PROBLEM SELECTOR PLAN 6
no wheezing appreciated on exam  s/p IV steroids in ED, will fernandez further steroids for now however low threshold to add back as needed  wean back to home O2 as tolerated, on  at present due to ?multifocal PNA on CT  c/w ICS/LABA  albuterol PRN
no wheezing appreciated on exam  s/p IV steroids in ED, will fernandez further steroids for now however low threshold to add back as needed  ween back to home O2 as tolerated, on  at present due to ?multifocal PNA on CT  c/w ICS/LABA  albuterol PRN
no wheezing appreciated on exam  s/p IV steroids in ED, will fernandez further steroids for now however low threshold to add back as needed  wean back to home O2 as tolerated, on  at present due to ?multifocal PNA on CT  c/w ICS/LABA  albuterol PRN
no wheezing appreciated on exam  s/p IV steroids in ED, will fernandez further steroids for now however low threshold to add back as needed  ween back to home O2 as tolerated, on  at present due to ?multifocal PNA on CT  c/w ICS/LABA  albuterol PRN

## 2024-12-17 NOTE — PROGRESS NOTE ADULT - PROBLEM SELECTOR PROBLEM 11
Stage 2 chronic kidney disease

## 2024-12-17 NOTE — PROGRESS NOTE ADULT - PROBLEM SELECTOR PLAN 11
chronic/stable renal function  renally dose meds, avoid nephrotoxins  trend Cr

## 2024-12-17 NOTE — PROGRESS NOTE ADULT - PROBLEM SELECTOR PLAN 3
on 2L O2 PRN and QHS per patient, in ED triage O2 80% on RA, started on 4L NC with improvement to 94%  given initial O2 was off O2, possibly at baseline  would wean to 2L as tolerated if able  suspect due to possible pna as above however, of note, imaging from prior 2 admissions with similar lower lobe findings on CT, possibly sepsis due to UTI with ?mild COPD exacerbation. No wheezing on exam however so holding off on further steroids for now, but low threshold to add back as needed  CTPA negative for PE    Possible pulm fibrosis noted on review of prior pulm notes from Sept and noted on CXR findings in Oct  given COPD/pHTN/lung ca/possible pulm fibrosis would consider pulm  Dr. Pierre ([place]ulm[place].
on 2L O2 PRN and QHS per patient, in ED triage O2 80% on RA, started on 4L NC with improvement to 94%  would wean to 2L as tolerated if able  suspect due to possible pna as above however, of note, imaging from prior 2 admissions with similar lower lobe findings on CT, possibly sepsis due to UTI with ?mild COPD exacerbation. No wheezing on exam however so holding off on further steroids for now, but low threshold to add back as needed  CTPA negative for PE    Possible pulm fibrosis noted on review of prior pulm notes from Sept and noted on CXR findings in Oct  given COPD/pHTN/lung ca/possible pulm fibrosis would consider pulm  Dr. Pierre (pulm) [place].
on 2L O2 PRN and QHS per patient, in ED triage O2 80% on RA, started on 4L NC with improvement to 94%  given initial O2 was off O2, possibly at baseline  would wean to 2L as tolerated if able  suspect due to possible pna as above however, of note, imaging from prior 2 admissions with similar lower lobe findings on CT, possibly sepsis due to UTI with ?mild COPD exacerbation. No wheezing on exam however so holding off on further steroids for now, but low threshold to add back as needed  CTPA negative for PE    possible pulm fibrosis noted on review of prior pulm notes from Sept and noted on CXR findings in Oct  given COPD/pHTN/lung ca/possible pulm fibrosis would consider pulm  Dr. Pierre (pulm) out of country.
on 2L O2 PRN and QHS per patient, in ED triage O2 80% on RA, started on 4L NC with improvement to 94%  given initial O2 was off O2, possibly at baseline  would wean to 2L as tolerated if able  suspect due to possible pna as above however, of note, imaging from prior 2 admissions with similar lower lobe findings on CT, possibly sepsis due to UTI with ?mild COPD exacerbation. No wheezing on exam however so holding off on further steroids for now, but low threshold to add back as needed  CTPA negative for PE    possible pulm fibrosis noted on review of prior pulm notes from Sept and noted on CXR findings in Oct  given COPD/pHTN/lung ca/possible pulm fibrosis would consider pulm  Dr. Pierre (pulm) [place] of [place].

## 2024-12-17 NOTE — PROGRESS NOTE ADULT - PROBLEM SELECTOR PLAN 5
c/w home medication regimen given hyperglycemia and no recent hypoglycemic events, (just decreased by 20% per protocol as switching from degludec to glargine   FS QAC/HS w/ISS  A1c 8%  sugars elevated due to IV steroids in ED.  increased lantus
c/w home medication regimen given hyperglycemia and no recent hypoglycemic events, (just decreased by 20% per protocol as switching from degludec to glargine   FS QAC/HS w/ISS  A1c 8%  sugars elevated due to IV steroids in ED.  increased lantus
c/w home medication regimen given hyperglycemia and no recent hypoglycemic events, (just decreased by 20% per protocol as switching from degludec to glargine   FS QAC/HS w/ISS  A1c 8%  sugars elevated due to IV steroids in ED.  will adjust insulin
c/w home medication regimen given hyperglycemia and no recent hypoglycemic events, (just decreased by 20% per protocol as switching from degludec to glargine   FS QAC/HS w/ISS  A1c 8%  sugars elevated due to IV steroids in ED.  will adjust insulin

## 2024-12-17 NOTE — PROGRESS NOTE ADULT - NSPROGADDITIONALINFOA_GEN_ALL_CORE
Pt of PCP Dr. Moreira at [facility].    - Dr. RACHELL Martinez ([facility])  - [phone]
Pt of PCP Dr. Moreira at [facility].    c/w IV abx last day 12/18/24.   wean O2 back to home 2L.    monitor for clinical improvement.   dc planning if O2 and HR stable.   Physical therapy. Out of bed to chair with assistance.     d/w the daughter Vanessa.    Toni Paul will be covering for the pt starting 12/18/24. He can be reached at  if needed.     - Dr. RACHELL Martinez (hospitals)  - [phone]
Pt of PCP Dr. Moreira at [facility].  c/w IV abx.  monitor for clinical improvement.     - Dr. LOVETT Htet ([facility])  - [phone]
Pt of PCP Dr. Moreira at [facility].  c/w IV abx.  wean O2 back to home 2L.   monitor for clinical improvement.   Physical therapy. Out of bed to chair with assistance.     - Dr. RACHELL Martinez ([facility])  - [phone]

## 2024-12-18 ENCOUNTER — TRANSCRIPTION ENCOUNTER (OUTPATIENT)
Age: 88
End: 2024-12-18

## 2024-12-18 VITALS
SYSTOLIC BLOOD PRESSURE: 107 MMHG | DIASTOLIC BLOOD PRESSURE: 58 MMHG | HEART RATE: 112 BPM | OXYGEN SATURATION: 99 % | TEMPERATURE: 97 F | RESPIRATION RATE: 17 BRPM

## 2024-12-18 PROBLEM — I27.20 PULMONARY HYPERTENSION, UNSPECIFIED: Chronic | Status: ACTIVE | Noted: 2024-12-13

## 2024-12-18 LAB
ANION GAP SERPL CALC-SCNC: 14 MMOL/L — SIGNIFICANT CHANGE UP (ref 7–14)
BASOPHILS # BLD AUTO: 0.08 K/UL — SIGNIFICANT CHANGE UP (ref 0–0.2)
BASOPHILS NFR BLD AUTO: 1.2 % — SIGNIFICANT CHANGE UP (ref 0–2)
BUN SERPL-MCNC: 31 MG/DL — HIGH (ref 7–23)
CALCIUM SERPL-MCNC: 9.5 MG/DL — SIGNIFICANT CHANGE UP (ref 8.4–10.5)
CHLORIDE SERPL-SCNC: 96 MMOL/L — LOW (ref 98–107)
CO2 SERPL-SCNC: 25 MMOL/L — SIGNIFICANT CHANGE UP (ref 22–31)
CREAT SERPL-MCNC: 1.06 MG/DL — SIGNIFICANT CHANGE UP (ref 0.5–1.3)
EGFR: 50 ML/MIN/1.73M2 — LOW
EOSINOPHIL # BLD AUTO: 0.35 K/UL — SIGNIFICANT CHANGE UP (ref 0–0.5)
EOSINOPHIL NFR BLD AUTO: 5 % — SIGNIFICANT CHANGE UP (ref 0–6)
GLUCOSE BLDC GLUCOMTR-MCNC: 184 MG/DL — HIGH (ref 70–99)
GLUCOSE BLDC GLUCOMTR-MCNC: 245 MG/DL — HIGH (ref 70–99)
GLUCOSE SERPL-MCNC: 195 MG/DL — HIGH (ref 70–99)
HCT VFR BLD CALC: 32 % — LOW (ref 34.5–45)
HGB BLD-MCNC: 10.2 G/DL — LOW (ref 11.5–15.5)
IANC: 4.14 K/UL — SIGNIFICANT CHANGE UP (ref 1.8–7.4)
IMM GRANULOCYTES NFR BLD AUTO: 1.9 % — HIGH (ref 0–0.9)
LYMPHOCYTES # BLD AUTO: 1.7 K/UL — SIGNIFICANT CHANGE UP (ref 1–3.3)
LYMPHOCYTES # BLD AUTO: 24.5 % — SIGNIFICANT CHANGE UP (ref 13–44)
MAGNESIUM SERPL-MCNC: 1.8 MG/DL — SIGNIFICANT CHANGE UP (ref 1.6–2.6)
MCHC RBC-ENTMCNC: 27.8 PG — SIGNIFICANT CHANGE UP (ref 27–34)
MCHC RBC-ENTMCNC: 31.9 G/DL — LOW (ref 32–36)
MCV RBC AUTO: 87.2 FL — SIGNIFICANT CHANGE UP (ref 80–100)
MONOCYTES # BLD AUTO: 0.54 K/UL — SIGNIFICANT CHANGE UP (ref 0–0.9)
MONOCYTES NFR BLD AUTO: 7.8 % — SIGNIFICANT CHANGE UP (ref 2–14)
NEUTROPHILS # BLD AUTO: 4.14 K/UL — SIGNIFICANT CHANGE UP (ref 1.8–7.4)
NEUTROPHILS NFR BLD AUTO: 59.6 % — SIGNIFICANT CHANGE UP (ref 43–77)
NRBC # BLD: 0 /100 WBCS — SIGNIFICANT CHANGE UP (ref 0–0)
NRBC # FLD: 0 K/UL — SIGNIFICANT CHANGE UP (ref 0–0)
PHOSPHATE SERPL-MCNC: 3.2 MG/DL — SIGNIFICANT CHANGE UP (ref 2.5–4.5)
PLATELET # BLD AUTO: 320 K/UL — SIGNIFICANT CHANGE UP (ref 150–400)
POTASSIUM SERPL-MCNC: 3.8 MMOL/L — SIGNIFICANT CHANGE UP (ref 3.5–5.3)
POTASSIUM SERPL-SCNC: 3.8 MMOL/L — SIGNIFICANT CHANGE UP (ref 3.5–5.3)
RBC # BLD: 3.67 M/UL — LOW (ref 3.8–5.2)
RBC # FLD: 14.9 % — HIGH (ref 10.3–14.5)
SODIUM SERPL-SCNC: 135 MMOL/L — SIGNIFICANT CHANGE UP (ref 135–145)
WBC # BLD: 6.94 K/UL — SIGNIFICANT CHANGE UP (ref 3.8–10.5)
WBC # FLD AUTO: 6.94 K/UL — SIGNIFICANT CHANGE UP (ref 3.8–10.5)

## 2024-12-18 RX ORDER — METOPROLOL TARTRATE 100 MG/1
12.5 TABLET, FILM COATED ORAL
Refills: 0 | Status: DISCONTINUED | OUTPATIENT
Start: 2024-12-18 | End: 2024-12-18

## 2024-12-18 RX ORDER — POLYETHYLENE GLYCOL 3350 17 G/17G
17 POWDER, FOR SOLUTION ORAL
Qty: 0 | Refills: 0 | DISCHARGE
Start: 2024-12-18

## 2024-12-18 RX ORDER — SENNOSIDES 8.6 MG
2 TABLET ORAL
Qty: 0 | Refills: 0 | DISCHARGE
Start: 2024-12-18

## 2024-12-18 RX ORDER — METOPROLOL TARTRATE 100 MG/1
25 TABLET, FILM COATED ORAL
Refills: 0 | Status: DISCONTINUED | OUTPATIENT
Start: 2024-12-18 | End: 2024-12-18

## 2024-12-18 RX ADMIN — Medication 5000 UNIT(S): at 05:48

## 2024-12-18 RX ADMIN — METOPROLOL TARTRATE 25 MILLIGRAM(S): 100 TABLET, FILM COATED ORAL at 11:11

## 2024-12-18 RX ADMIN — Medication 1: at 11:56

## 2024-12-18 RX ADMIN — FLUTICASONE PROPIONATE AND SALMETEROL XINAFOATE 1 DOSE(S): 45; 21 AEROSOL, METERED RESPIRATORY (INHALATION) at 11:11

## 2024-12-18 RX ADMIN — Medication 5 UNIT(S): at 07:48

## 2024-12-18 RX ADMIN — CEFEPIME 100 MILLIGRAM(S): 2 INJECTION, POWDER, FOR SOLUTION INTRAVENOUS at 05:47

## 2024-12-18 RX ADMIN — Medication 5 UNIT(S): at 11:56

## 2024-12-18 RX ADMIN — SILDENAFIL 20 MILLIGRAM(S): 20 TABLET, FILM COATED ORAL at 15:12

## 2024-12-18 RX ADMIN — FUROSEMIDE 40 MILLIGRAM(S): 40 TABLET ORAL at 05:47

## 2024-12-18 RX ADMIN — Medication 1 TABLET(S): at 11:11

## 2024-12-18 RX ADMIN — Medication 81 MILLIGRAM(S): at 11:11

## 2024-12-18 RX ADMIN — Medication 2: at 07:47

## 2024-12-18 RX ADMIN — PANTOPRAZOLE SODIUM 40 MILLIGRAM(S): 40 TABLET, DELAYED RELEASE ORAL at 06:35

## 2024-12-18 RX ADMIN — POLYETHYLENE GLYCOL 3350 17 GRAM(S): 17 POWDER, FOR SOLUTION ORAL at 05:47

## 2024-12-18 NOTE — PROGRESS NOTE ADULT - SUBJECTIVE AND OBJECTIVE BOX
[facility], Division of Infectious Diseases  KRISSY Luong Y. Patel, S. Shah, G. Dax  [phone]  ([phone] - weekdays after 5pm and weekends)    Name: FEDERICO DOS SANTOS  Age/Gender: 89y Female  MRN: [id number]    Interval History:  Notes reviewed.   No concerning overnight events.  Afebrile.     Allergies: penicillin (Unknown)  Biaxin (Unknown)  macrolide antibiotics (Other)      Objective:  Vitals:   T(F): 97.6 (12-17-24 @ 10:00), Max: 97.9 (12-16-24 @ 21:35)  HR: 107 (12-17-24 @ 10:00) (87 - 107)  BP: 99/58 (12-17-24 @ 10:00) (99/58 - 108/55)  RR: 18 (12-17-24 @ 10:00) (17 - 18)  SpO2: 98% (12-17-24 @ 10:00) (98% - 99%)  Physical Examination:  General: no acute distress  HEENT: anicteric, NC  Cardio: S1, S2, normal rate  Resp: decreased breath sounds  Abd: soft, NT, ND  Ext: no LE edema  Skin: warm, dry    Laboratory Studies:  CBC:                       9.8    6.80  )-----------( 323      ( 17 Dec 2024 04:50 )             30.6     WBC Trend:  6.80 12-17-24 @ 04:50  7.05 12-16-24 @ 04:52  10.29 12-15-24 @ 05:55  12.52 12-13-24 @ 14:35    CMP: 12-17    137  |  97[L]  |  28[H]  ----------------------------<  180[H]  4.0   |  26  |  0.95    Ca    9.2      17 Dec 2024 04:50  Phos  3.2     12-17  Mg     1.80     12-17    TPro  6.7  /  Alb  3.3  /  TBili  0.2  /  DBili  x   /  AST  15  /  ALT  10  /  AlkPhos  84  12-16      LIVER FUNCTIONS - ( 16 Dec 2024 04:52 )  Alb: 3.3 g/dL / Pro: 6.7 g/dL / ALK PHOS: 84 U/L / ALT: 10 U/L / AST: 15 U/L / GGT: x             Urinalysis Basic - ( 17 Dec 2024 04:50 )    Color: x / Appearance: x / SG: x / pH: x  Gluc: 180 mg/dL / Ketone: x  / Bili: x / Urobili: x   Blood: x / Protein: x / Nitrite: x   Leuk Esterase: x / RBC: x / WBC x   Sq Epi: x / Non Sq Epi: x / Bacteria: x      Microbiology: reviewed     Culture - Blood (collected 12-14-24 @ 11:30)  Source: .Blood BLOOD  Preliminary Report (12-16-24 @ 15:01):    No growth at 48 Hours    Culture - Blood (collected 12-14-24 @ 11:21)  Source: .Blood BLOOD  Preliminary Report (12-16-24 @ 15:01):    No growth at 48 Hours    Urinalysis with Rflx Culture (collected 12-14-24 @ 11:20)        Radiology: reviewed     Medications:  acetaminophen     Tablet .. 650 milliGRAM(s) Oral every 6 hours PRN  albuterol    90 MICROgram(s) HFA Inhaler 2 Puff(s) Inhalation every 6 hours PRN  aspirin enteric coated 81 milliGRAM(s) Oral daily  atorvastatin 20 milliGRAM(s) Oral at bedtime  cefepime   IVPB      cefepime   IVPB 2000 milliGRAM(s) IV Intermittent every 12 hours  dextrose 5%. 1000 milliLiter(s) IV Continuous <Continuous>  dextrose 5%. 1000 milliLiter(s) IV Continuous <Continuous>  dextrose 50% Injectable 25 Gram(s) IV Push once  dextrose 50% Injectable 12.5 Gram(s) IV Push once  dextrose 50% Injectable 25 Gram(s) IV Push once  dextrose Oral Gel 15 Gram(s) Oral once PRN  fluticasone propionate/ salmeterol 250-50 MICROgram(s) Diskus 1 Dose(s) Inhalation two times a day  furosemide    Tablet 40 milliGRAM(s) Oral daily  gabapentin 400 milliGRAM(s) Oral at bedtime  glucagon  Injectable 1 milliGRAM(s) IntraMuscular once  heparin   Injectable 5000 Unit(s) SubCutaneous every 8 hours  insulin glargine Injectable (LANTUS) 12 Unit(s) SubCutaneous at bedtime  insulin lispro (ADMELOG) corrective regimen sliding scale   SubCutaneous three times a day before meals  insulin lispro (ADMELOG) corrective regimen sliding scale   SubCutaneous at bedtime  insulin lispro Injectable (ADMELOG) 5 Unit(s) SubCutaneous three times a day before meals  multivitamin 1 Tablet(s) Oral daily  pantoprazole    Tablet 40 milliGRAM(s) Oral before breakfast  sildenafil (REVATIO) 20 milliGRAM(s) Oral three times a day    Antimicrobials:  cefepime   IVPB      cefepime   IVPB 2000 milliGRAM(s) IV Intermittent every 12 hours  
[facility], Division of Infectious Diseases  KRISSY Luong Y. Patel, S. Shah, G. Dax  [phone]  ([phone] - weekdays after 5pm and weekends)    Name: FEDERICO DOS SANTOS  Age/Gender: 89y Female  MRN: [id number]    Interval History:  Notes reviewed.   No concerning overnight events.  Afebrile.   reports some SOB today    Allergies: penicillin (Unknown)  Biaxin (Unknown)  macrolide antibiotics (Other)      Objective:  Vitals:   T(F): 97.8 (12-16-24 @ 10:00), Max: 98.2 (12-15-24 @ 16:55)  HR: 98 (12-16-24 @ 10:00) (64 - 100)  BP: 106/65 (12-16-24 @ 10:00) (106/65 - 123/53)  RR: 18 (12-16-24 @ 10:00) (18 - 18)  SpO2: 100% (12-16-24 @ 10:00) (99% - 100%)  Physical Examination:  General: no acute distress  HEENT: anicteric, NC  Cardio: S1, S2, normal rate  Resp: decreased breath sounds  Abd: soft, NT, ND  Ext: no LE edema  Skin: warm, dry    Laboratory Studies:  CBC:                       9.2    7.05  )-----------( 312      ( 16 Dec 2024 04:52 )             28.8     WBC Trend:  7.05 12-16-24 @ 04:52  10.29 12-15-24 @ 05:55  12.52 12-13-24 @ 14:35    CMP: 12-16    136  |  99  |  35[H]  ----------------------------<  213[H]  3.6   |  25  |  0.95    Ca    9.1      16 Dec 2024 04:52  Phos  3.9     12-16  Mg     1.90     12-16    TPro  6.7  /  Alb  3.3  /  TBili  0.2  /  DBili  x   /  AST  15  /  ALT  10  /  AlkPhos  84  12-16      LIVER FUNCTIONS - ( 16 Dec 2024 04:52 )  Alb: 3.3 g/dL / Pro: 6.7 g/dL / ALK PHOS: 84 U/L / ALT: 10 U/L / AST: 15 U/L / GGT: x             Urinalysis Basic - ( 16 Dec 2024 04:52 )    Color: x / Appearance: x / SG: x / pH: x  Gluc: 213 mg/dL / Ketone: x  / Bili: x / Urobili: x   Blood: x / Protein: x / Nitrite: x   Leuk Esterase: x / RBC: x / WBC x   Sq Epi: x / Non Sq Epi: x / Bacteria: x      Microbiology: reviewed     Culture - Blood (collected 12-14-24 @ 11:30)  Source: .Blood BLOOD  Preliminary Report (12-15-24 @ 15:01):    No growth at 24 hours    Culture - Blood (collected 12-14-24 @ 11:21)  Source: .Blood BLOOD  Preliminary Report (12-15-24 @ 15:01):    No growth at 24 hours    Urinalysis with Rflx Culture (collected 12-14-24 @ 11:20)        Radiology: reviewed     Medications:  acetaminophen     Tablet .. 650 milliGRAM(s) Oral every 6 hours PRN  albuterol    90 MICROgram(s) HFA Inhaler 2 Puff(s) Inhalation every 6 hours PRN  aspirin enteric coated 81 milliGRAM(s) Oral daily  atorvastatin 20 milliGRAM(s) Oral at bedtime  cefepime   IVPB      cefepime   IVPB 2000 milliGRAM(s) IV Intermittent every 12 hours  dextrose 5%. 1000 milliLiter(s) IV Continuous <Continuous>  dextrose 5%. 1000 milliLiter(s) IV Continuous <Continuous>  dextrose 50% Injectable 25 Gram(s) IV Push once  dextrose 50% Injectable 12.5 Gram(s) IV Push once  dextrose 50% Injectable 25 Gram(s) IV Push once  dextrose Oral Gel 15 Gram(s) Oral once PRN  fluticasone propionate/ salmeterol 250-50 MICROgram(s) Diskus 1 Dose(s) Inhalation two times a day  furosemide    Tablet 40 milliGRAM(s) Oral daily  gabapentin 400 milliGRAM(s) Oral at bedtime  glucagon  Injectable 1 milliGRAM(s) IntraMuscular once  heparin   Injectable 5000 Unit(s) SubCutaneous every 8 hours  insulin glargine Injectable (LANTUS) 12 Unit(s) SubCutaneous at bedtime  insulin lispro (ADMELOG) corrective regimen sliding scale   SubCutaneous three times a day before meals  insulin lispro (ADMELOG) corrective regimen sliding scale   SubCutaneous at bedtime  insulin lispro Injectable (ADMELOG) 5 Unit(s) SubCutaneous three times a day before meals  multivitamin 1 Tablet(s) Oral daily  pantoprazole    Tablet 40 milliGRAM(s) Oral before breakfast  sildenafil (REVATIO) 20 milliGRAM(s) Oral three times a day    Antimicrobials:  cefepime   IVPB      cefepime   IVPB 2000 milliGRAM(s) IV Intermittent every 12 hours  
[facility], Division of Infectious Diseases  KRISSY Luong Y. Patel, S. Shah, G. Dax  [phone]  ([phone] - weekdays after 5pm and weekends)    Name: FEDERICO DOS SANTOS  Age/Gender: 89y Female  MRN: [id number]    Interval History:  Notes reviewed.   No concerning overnight events.  Afebrile.   reports occasional SOB  she is eager to leave the hospital soon    Allergies: penicillin (Unknown)  Biaxin (Unknown)  macrolide antibiotics (Other)      Objective:  Vitals:   T(F): 98.7 (12-18-24 @ 05:45), Max: 98.7 (12-18-24 @ 05:45)  HR: 86 (12-18-24 @ 05:45) (86 - 123)  BP: 111/53 (12-18-24 @ 05:45) (102/53 - 122/68)  RR: 16 (12-18-24 @ 05:45) (16 - 20)  SpO2: 95% (12-18-24 @ 05:45) (92% - 100%)  Physical Examination:  General: no acute distress  HEENT: anicteric, NC  Cardio: S1, S2, normal rate  Resp: decreased breath sounds  Abd: soft, NT, ND  Ext: no LE edema  Skin: warm, dry    Laboratory Studies:  CBC:                       10.2   6.94  )-----------( 320      ( 18 Dec 2024 04:32 )             32.0     WBC Trend:  6.94 12-18-24 @ 04:32  6.80 12-17-24 @ 04:50  7.05 12-16-24 @ 04:52  10.29 12-15-24 @ 05:55  12.52 12-13-24 @ 14:35    CMP: 12-18    135  |  96[L]  |  31[H]  ----------------------------<  195[H]  3.8   |  25  |  1.06    Ca    9.5      18 Dec 2024 04:32  Phos  3.2     12-18  Mg     1.80     12-18            Urinalysis Basic - ( 18 Dec 2024 04:32 )    Color: x / Appearance: x / SG: x / pH: x  Gluc: 195 mg/dL / Ketone: x  / Bili: x / Urobili: x   Blood: x / Protein: x / Nitrite: x   Leuk Esterase: x / RBC: x / WBC x   Sq Epi: x / Non Sq Epi: x / Bacteria: x      Microbiology: reviewed     Culture - Blood (collected 12-14-24 @ 11:30)  Source: .Blood BLOOD  Preliminary Report (12-17-24 @ 15:01):    No growth at 72 Hours    Culture - Blood (collected 12-14-24 @ 11:21)  Source: .Blood BLOOD  Preliminary Report (12-17-24 @ 15:01):    No growth at 72 Hours    Urinalysis with Rflx Culture (collected 12-14-24 @ 11:20)        Radiology: reviewed     Medications:  acetaminophen     Tablet .. 650 milliGRAM(s) Oral every 6 hours PRN  albuterol    90 MICROgram(s) HFA Inhaler 2 Puff(s) Inhalation every 6 hours PRN  aspirin enteric coated 81 milliGRAM(s) Oral daily  atorvastatin 20 milliGRAM(s) Oral at bedtime  dextrose 5%. 1000 milliLiter(s) IV Continuous <Continuous>  dextrose 5%. 1000 milliLiter(s) IV Continuous <Continuous>  dextrose 50% Injectable 25 Gram(s) IV Push once  dextrose 50% Injectable 12.5 Gram(s) IV Push once  dextrose 50% Injectable 25 Gram(s) IV Push once  dextrose Oral Gel 15 Gram(s) Oral once PRN  fluticasone propionate/ salmeterol 250-50 MICROgram(s) Diskus 1 Dose(s) Inhalation two times a day  furosemide    Tablet 40 milliGRAM(s) Oral daily  gabapentin 400 milliGRAM(s) Oral at bedtime  glucagon  Injectable 1 milliGRAM(s) IntraMuscular once  heparin   Injectable 5000 Unit(s) SubCutaneous every 12 hours  insulin glargine Injectable (LANTUS) 12 Unit(s) SubCutaneous at bedtime  insulin lispro (ADMELOG) corrective regimen sliding scale   SubCutaneous three times a day before meals  insulin lispro (ADMELOG) corrective regimen sliding scale   SubCutaneous at bedtime  insulin lispro Injectable (ADMELOG) 5 Unit(s) SubCutaneous three times a day before meals  metoprolol tartrate 25 milliGRAM(s) Oral <User Schedule>  metoprolol tartrate 12.5 milliGRAM(s) Oral <User Schedule>  multivitamin 1 Tablet(s) Oral daily  pantoprazole    Tablet 40 milliGRAM(s) Oral before breakfast  polyethylene glycol 3350 17 Gram(s) Oral two times a day  senna 2 Tablet(s) Oral at bedtime  sildenafil (REVATIO) 20 milliGRAM(s) Oral three times a day    Antimicrobials:  
SUBJECTIVE/ OVERNIGHT EVENTS:  comfortable  4L --> 2L  walked with PT, mild tachy rate 115s.  no cp, no sob, no n/v/d. no abdominal pain.  no headache, no dizziness.   Last day of IV abx tomorrow.       --------------------------------------------------------------------------------------------  LABS:                        9.8    6.80  )-----------( 323      ( 17 Dec 2024 04:50 )             30.6     12-17    137  |  97[L]  |  28[H]  ----------------------------<  180[H]  4.0   |  26  |  0.95    Ca    9.2      17 Dec 2024 04:50  Phos  3.2     12-17  Mg     1.80     12-17    TPro  6.7  /  Alb  3.3  /  TBili  0.2  /  DBili  x   /  AST  15  /  ALT  10  /  AlkPhos  84  12-16      CAPILLARY BLOOD GLUCOSE      POCT Blood Glucose.: 248 mg/dL (17 Dec 2024 07:21)  POCT Blood Glucose.: 188 mg/dL (16 Dec 2024 21:26)  POCT Blood Glucose.: 160 mg/dL (16 Dec 2024 16:58)  POCT Blood Glucose.: 189 mg/dL (16 Dec 2024 11:25)        Urinalysis Basic - ( 17 Dec 2024 04:50 )    Color: x / Appearance: x / SG: x / pH: x  Gluc: 180 mg/dL / Ketone: x  / Bili: x / Urobili: x   Blood: x / Protein: x / Nitrite: x   Leuk Esterase: x / RBC: x / WBC x   Sq Epi: x / Non Sq Epi: x / Bacteria: x        RADIOLOGY & ADDITIONAL TESTS:    Imaging Personally Reviewed:  [x] YES  [ ] NO    Consultant(s) Notes Reviewed:  [x] YES  [ ] NO    MEDICATIONS  (STANDING):  aspirin enteric coated 81 milliGRAM(s) Oral daily  atorvastatin 20 milliGRAM(s) Oral at bedtime  cefepime   IVPB      cefepime   IVPB 2000 milliGRAM(s) IV Intermittent every 12 hours  dextrose 5%. 1000 milliLiter(s) (100 mL/Hr) IV Continuous <Continuous>  dextrose 5%. 1000 milliLiter(s) (50 mL/Hr) IV Continuous <Continuous>  dextrose 50% Injectable 25 Gram(s) IV Push once  dextrose 50% Injectable 12.5 Gram(s) IV Push once  dextrose 50% Injectable 25 Gram(s) IV Push once  fluticasone propionate/ salmeterol 250-50 MICROgram(s) Diskus 1 Dose(s) Inhalation two times a day  furosemide    Tablet 40 milliGRAM(s) Oral daily  gabapentin 400 milliGRAM(s) Oral at bedtime  glucagon  Injectable 1 milliGRAM(s) IntraMuscular once  heparin   Injectable 5000 Unit(s) SubCutaneous every 8 hours  insulin glargine Injectable (LANTUS) 12 Unit(s) SubCutaneous at bedtime  insulin lispro (ADMELOG) corrective regimen sliding scale   SubCutaneous three times a day before meals  insulin lispro (ADMELOG) corrective regimen sliding scale   SubCutaneous at bedtime  insulin lispro Injectable (ADMELOG) 5 Unit(s) SubCutaneous three times a day before meals  multivitamin 1 Tablet(s) Oral daily  pantoprazole    Tablet 40 milliGRAM(s) Oral before breakfast  sildenafil (REVATIO) 20 milliGRAM(s) Oral three times a day    MEDICATIONS  (PRN):  acetaminophen     Tablet .. 650 milliGRAM(s) Oral every 6 hours PRN Mild Pain (1 - 3), Moderate Pain (4 - 6)  albuterol    90 MICROgram(s) HFA Inhaler 2 Puff(s) Inhalation every 6 hours PRN Shortness of Breath and/or Wheezing  dextrose Oral Gel 15 Gram(s) Oral once PRN Blood Glucose LESS THAN 70 milliGRAM(s)/deciliter      Care Discussed with Consultants/Other Providers [x] YES  [ ] NO    Vital Signs Last 24 Hrs  T(C): 36.4 (17 Dec 2024 10:00), Max: 36.6 (16 Dec 2024 21:35)  T(F): 97.6 (17 Dec 2024 10:00), Max: 97.9 (16 Dec 2024 21:35)  HR: 107 (17 Dec 2024 10:00) (87 - 107)  BP: 99/58 (17 Dec 2024 10:00) (99/58 - 108/55)  BP(mean): --  RR: 18 (17 Dec 2024 10:00) (17 - 18)  SpO2: 98% (17 Dec 2024 10:00) (98% - 99%)    Parameters below as of 17 Dec 2024 05:30  Patient On (Oxygen Delivery Method): nasal cannula  O2 Flow (L/min): 4    I&O's Summary    16 Dec 2024 07:01  -  17 Dec 2024 07:00  --------------------------------------------------------  IN: 360 mL / OUT: 660 mL / NET: -300 mL      PHYSICAL EXAM:  GENERAL: NAD, thin-elderly, comfortable on NC  HEAD:  Atraumatic, Normocephalic  EYES: EOMI, PERRLA, conjunctiva and sclera clear  NECK: Supple, No JVD  CHEST/LUNG: mild decrease breath sounds bilaterally; No wheeze   HEART: Regular rate and rhythm; No murmurs, rubs, or gallops  ABDOMEN: Soft, Nontender, Nondistended; Bowel sounds present  Neuro: AAOx3, no focal weakness   EXTREMITIES:  2+ Peripheral Pulses, No clubbing, cyanosis, or edema  SKIN: No rashes or lesions   
SUBJECTIVE/ OVERNIGHT EVENTS:  breathing a little better though not back to baseline yet  on IV abx  no cp, no n/v/d. no abdominal pain.  no headache, no dizziness.       --------------------------------------------------------------------------------------------  LABS:                        8.6    10.29 )-----------( 352      ( 15 Dec 2024 05:55 )             26.7     12-15    136  |  96[L]  |  40[H]  ----------------------------<  231[H]  3.9   |  23  |  1.06    Ca    9.0      15 Dec 2024 05:55  Phos  3.0     12-15  Mg     2.00     12-15    TPro  6.9  /  Alb  3.4  /  TBili  0.3  /  DBili  x   /  AST  10  /  ALT  7   /  AlkPhos  90  12-15      CAPILLARY BLOOD GLUCOSE      POCT Blood Glucose.: 173 mg/dL (15 Dec 2024 16:47)  POCT Blood Glucose.: 346 mg/dL (15 Dec 2024 11:28)  POCT Blood Glucose.: 275 mg/dL (15 Dec 2024 07:40)  POCT Blood Glucose.: 330 mg/dL (14 Dec 2024 21:02)        Urinalysis Basic - ( 15 Dec 2024 05:55 )    Color: x / Appearance: x / SG: x / pH: x  Gluc: 231 mg/dL / Ketone: x  / Bili: x / Urobili: x   Blood: x / Protein: x / Nitrite: x   Leuk Esterase: x / RBC: x / WBC x   Sq Epi: x / Non Sq Epi: x / Bacteria: x        RADIOLOGY & ADDITIONAL TESTS:    Imaging Personally Reviewed:  [x] YES  [ ] NO    Consultant(s) Notes Reviewed:  [x] YES  [ ] NO    MEDICATIONS  (STANDING):  aspirin enteric coated 81 milliGRAM(s) Oral daily  atorvastatin 20 milliGRAM(s) Oral at bedtime  cefepime   IVPB      cefepime   IVPB 2000 milliGRAM(s) IV Intermittent every 12 hours  dextrose 5%. 1000 milliLiter(s) (100 mL/Hr) IV Continuous <Continuous>  dextrose 5%. 1000 milliLiter(s) (50 mL/Hr) IV Continuous <Continuous>  dextrose 50% Injectable 25 Gram(s) IV Push once  dextrose 50% Injectable 12.5 Gram(s) IV Push once  dextrose 50% Injectable 25 Gram(s) IV Push once  fluticasone propionate/ salmeterol 250-50 MICROgram(s) Diskus 1 Dose(s) Inhalation two times a day  furosemide    Tablet 40 milliGRAM(s) Oral daily  gabapentin 400 milliGRAM(s) Oral at bedtime  glucagon  Injectable 1 milliGRAM(s) IntraMuscular once  heparin   Injectable 5000 Unit(s) SubCutaneous every 8 hours  insulin glargine Injectable (LANTUS) 10 Unit(s) SubCutaneous at bedtime  insulin lispro (ADMELOG) corrective regimen sliding scale   SubCutaneous three times a day before meals  insulin lispro (ADMELOG) corrective regimen sliding scale   SubCutaneous at bedtime  insulin lispro Injectable (ADMELOG) 5 Unit(s) SubCutaneous three times a day before meals  multivitamin 1 Tablet(s) Oral daily  pantoprazole    Tablet 40 milliGRAM(s) Oral before breakfast  sildenafil (REVATIO) 20 milliGRAM(s) Oral three times a day    MEDICATIONS  (PRN):  acetaminophen     Tablet .. 650 milliGRAM(s) Oral every 6 hours PRN Mild Pain (1 - 3), Moderate Pain (4 - 6)  albuterol    90 MICROgram(s) HFA Inhaler 2 Puff(s) Inhalation every 6 hours PRN Shortness of Breath and/or Wheezing  dextrose Oral Gel 15 Gram(s) Oral once PRN Blood Glucose LESS THAN 70 milliGRAM(s)/deciliter      Care Discussed with Consultants/Other Providers [x] YES  [ ] NO    Vital Signs Last 24 Hrs  T(C): 36.6 (15 Dec 2024 15:15), Max: 36.6 (15 Dec 2024 02:00)  T(F): 97.8 (15 Dec 2024 15:15), Max: 97.9 (15 Dec 2024 02:00)  HR: 100 (15 Dec 2024 15:15) (85 - 100)  BP: 118/65 (15 Dec 2024 15:15) (110/59 - 126/85)  BP(mean): --  RR: 18 (15 Dec 2024 15:15) (18 - 18)  SpO2: 99% (15 Dec 2024 15:15) (95% - 99%)    Parameters below as of 15 Dec 2024 15:15  Patient On (Oxygen Delivery Method): nasal cannula  O2 Flow (L/min): 4    I&O's Summary    14 Dec 2024 07:01  -  15 Dec 2024 07:00  --------------------------------------------------------  IN: 290 mL / OUT: 1200 mL / NET: -910 mL    15 Dec 2024 07:01  -  15 Dec 2024 17:48  --------------------------------------------------------  IN: 240 mL / OUT: 300 mL / NET: -60 mL          PHYSICAL EXAM:  GENERAL: NAD, thin-elderly, comfortable on NC  HEAD:  Atraumatic, Normocephalic  EYES: EOMI, PERRLA, conjunctiva and sclera clear  NECK: Supple, No JVD  CHEST/LUNG: mild decrease breath sounds bilaterally; No wheeze   HEART: Regular rate and rhythm; No murmurs, rubs, or gallops  ABDOMEN: Soft, Nontender, Nondistended; Bowel sounds present  Neuro: AAOx3, no focal weakness   EXTREMITIES:  2+ Peripheral Pulses, No clubbing, cyanosis, or edema  SKIN: No rashes or lesions   
SUBJECTIVE/ OVERNIGHT EVENTS:  remain on 4L  breathing stable  on IV abx  still weak and fatigue  eating breakfast  states she doesn't want to go to rehab.   no cp, no sob, no n/v/d. no abdominal pain.  no headache, no dizziness.       --------------------------------------------------------------------------------------------  LABS:                        9.2    7.05  )-----------( 312      ( 16 Dec 2024 04:52 )             28.8     12-16    136  |  99  |  35[H]  ----------------------------<  213[H]  3.6   |  25  |  0.95    Ca    9.1      16 Dec 2024 04:52  Phos  3.9     12-16  Mg     1.90     12-16    TPro  6.7  /  Alb  3.3  /  TBili  0.2  /  DBili  x   /  AST  15  /  ALT  10  /  AlkPhos  84  12-16      CAPILLARY BLOOD GLUCOSE      POCT Blood Glucose.: 288 mg/dL (16 Dec 2024 07:12)  POCT Blood Glucose.: 245 mg/dL (16 Dec 2024 06:05)  POCT Blood Glucose.: 307 mg/dL (15 Dec 2024 21:31)  POCT Blood Glucose.: 173 mg/dL (15 Dec 2024 16:47)  POCT Blood Glucose.: 346 mg/dL (15 Dec 2024 11:28)        Urinalysis Basic - ( 16 Dec 2024 04:52 )    Color: x / Appearance: x / SG: x / pH: x  Gluc: 213 mg/dL / Ketone: x  / Bili: x / Urobili: x   Blood: x / Protein: x / Nitrite: x   Leuk Esterase: x / RBC: x / WBC x   Sq Epi: x / Non Sq Epi: x / Bacteria: x        RADIOLOGY & ADDITIONAL TESTS:    Imaging Personally Reviewed:  [x] YES  [ ] NO    Consultant(s) Notes Reviewed:  [x] YES  [ ] NO    MEDICATIONS  (STANDING):  aspirin enteric coated 81 milliGRAM(s) Oral daily  atorvastatin 20 milliGRAM(s) Oral at bedtime  cefepime   IVPB      cefepime   IVPB 2000 milliGRAM(s) IV Intermittent every 12 hours  dextrose 5%. 1000 milliLiter(s) (100 mL/Hr) IV Continuous <Continuous>  dextrose 5%. 1000 milliLiter(s) (50 mL/Hr) IV Continuous <Continuous>  dextrose 50% Injectable 25 Gram(s) IV Push once  dextrose 50% Injectable 12.5 Gram(s) IV Push once  dextrose 50% Injectable 25 Gram(s) IV Push once  fluticasone propionate/ salmeterol 250-50 MICROgram(s) Diskus 1 Dose(s) Inhalation two times a day  furosemide    Tablet 40 milliGRAM(s) Oral daily  gabapentin 400 milliGRAM(s) Oral at bedtime  glucagon  Injectable 1 milliGRAM(s) IntraMuscular once  heparin   Injectable 5000 Unit(s) SubCutaneous every 8 hours  insulin glargine Injectable (LANTUS) 12 Unit(s) SubCutaneous at bedtime  insulin lispro (ADMELOG) corrective regimen sliding scale   SubCutaneous three times a day before meals  insulin lispro (ADMELOG) corrective regimen sliding scale   SubCutaneous at bedtime  insulin lispro Injectable (ADMELOG) 5 Unit(s) SubCutaneous three times a day before meals  multivitamin 1 Tablet(s) Oral daily  pantoprazole    Tablet 40 milliGRAM(s) Oral before breakfast  sildenafil (REVATIO) 20 milliGRAM(s) Oral three times a day    MEDICATIONS  (PRN):  acetaminophen     Tablet .. 650 milliGRAM(s) Oral every 6 hours PRN Mild Pain (1 - 3), Moderate Pain (4 - 6)  albuterol    90 MICROgram(s) HFA Inhaler 2 Puff(s) Inhalation every 6 hours PRN Shortness of Breath and/or Wheezing  dextrose Oral Gel 15 Gram(s) Oral once PRN Blood Glucose LESS THAN 70 milliGRAM(s)/deciliter      Care Discussed with Consultants/Other Providers [x] YES  [ ] NO    Vital Signs Last 24 Hrs  T(C): 36.6 (16 Dec 2024 10:00), Max: 36.8 (15 Dec 2024 16:55)  T(F): 97.8 (16 Dec 2024 10:00), Max: 98.2 (15 Dec 2024 16:55)  HR: 98 (16 Dec 2024 10:00) (64 - 100)  BP: 106/65 (16 Dec 2024 10:00) (106/65 - 123/53)  BP(mean): --  RR: 18 (16 Dec 2024 10:00) (18 - 18)  SpO2: 100% (16 Dec 2024 10:00) (99% - 100%)    Parameters below as of 16 Dec 2024 06:40  Patient On (Oxygen Delivery Method): nasal cannula  O2 Flow (L/min): 4    I&O's Summary    15 Dec 2024 07:01  -  16 Dec 2024 07:00  --------------------------------------------------------  IN: 480 mL / OUT: 2650 mL / NET: -2170 mL        PHYSICAL EXAM:  GENERAL: NAD, thin-elderly, comfortable on NC  HEAD:  Atraumatic, Normocephalic  EYES: EOMI, PERRLA, conjunctiva and sclera clear  NECK: Supple, No JVD  CHEST/LUNG: mild decrease breath sounds bilaterally; No wheeze   HEART: Regular rate and rhythm; No murmurs, rubs, or gallops  ABDOMEN: Soft, Nontender, Nondistended; Bowel sounds present  Neuro: AAOx3, no focal weakness   EXTREMITIES:  2+ Peripheral Pulses, No clubbing, cyanosis, or edema  SKIN: No rashes or lesions   
SUBJECTIVE/ OVERNIGHT EVENTS:  thin frail elderly  comfortable on NC  occasional cough.  no cp, no sob, no n/v/d. no abdominal pain.  no headache, no dizziness.   appetite is poor      --------------------------------------------------------------------------------------------  LABS:                        10.1   12.52 )-----------( 346      ( 13 Dec 2024 14:35 )             32.8     12-13    137  |  97[L]  |  29[H]  ----------------------------<  246[H]  4.4   |  25  |  0.90    Ca    9.5      13 Dec 2024 14:35    TPro  7.7  /  Alb  3.8  /  TBili  0.8  /  DBili  x   /  AST  16  /  ALT  10  /  AlkPhos  108  12-13    PT/INR - ( 13 Dec 2024 14:35 )   PT: 12.8 sec;   INR: 1.10 ratio         PTT - ( 13 Dec 2024 14:35 )  PTT:32.3 sec  CAPILLARY BLOOD GLUCOSE      POCT Blood Glucose.: 297 mg/dL (14 Dec 2024 11:10)  POCT Blood Glucose.: 425 mg/dL (14 Dec 2024 07:30)  POCT Blood Glucose.: 386 mg/dL (14 Dec 2024 01:22)  POCT Blood Glucose.: 366 mg/dL (13 Dec 2024 23:18)  POCT Blood Glucose.: 281 mg/dL (13 Dec 2024 13:04)        Urinalysis Basic - ( 14 Dec 2024 11:20 )    Color: Yellow / Appearance: Clear / S.041 / pH: x  Gluc: x / Ketone: 15 mg/dL  / Bili: Negative / Urobili: 0.2 mg/dL   Blood: x / Protein: 100 mg/dL / Nitrite: Negative   Leuk Esterase: Negative / RBC: 1 /HPF / WBC 1 /HPF   Sq Epi: x / Non Sq Epi: 2 /HPF / Bacteria: Negative /HPF        RADIOLOGY & ADDITIONAL TESTS:    Imaging Personally Reviewed:  [x] YES  [ ] NO    Consultant(s) Notes Reviewed:  [x] YES  [ ] NO    MEDICATIONS  (STANDING):  aspirin enteric coated 81 milliGRAM(s) Oral daily  atorvastatin 20 milliGRAM(s) Oral at bedtime  cefepime   IVPB      cefepime   IVPB 2000 milliGRAM(s) IV Intermittent every 12 hours  dextrose 5%. 1000 milliLiter(s) (100 mL/Hr) IV Continuous <Continuous>  dextrose 5%. 1000 milliLiter(s) (50 mL/Hr) IV Continuous <Continuous>  dextrose 50% Injectable 25 Gram(s) IV Push once  dextrose 50% Injectable 12.5 Gram(s) IV Push once  dextrose 50% Injectable 25 Gram(s) IV Push once  fluticasone propionate/ salmeterol 250-50 MICROgram(s) Diskus 1 Dose(s) Inhalation two times a day  furosemide    Tablet 40 milliGRAM(s) Oral daily  gabapentin 400 milliGRAM(s) Oral at bedtime  glucagon  Injectable 1 milliGRAM(s) IntraMuscular once  heparin   Injectable 5000 Unit(s) SubCutaneous every 8 hours  insulin glargine Injectable (LANTUS) 8 Unit(s) SubCutaneous at bedtime  insulin lispro (ADMELOG) corrective regimen sliding scale   SubCutaneous three times a day before meals  insulin lispro (ADMELOG) corrective regimen sliding scale   SubCutaneous at bedtime  insulin lispro Injectable (ADMELOG) 3 Unit(s) SubCutaneous three times a day before meals  multivitamin 1 Tablet(s) Oral daily  pantoprazole    Tablet 40 milliGRAM(s) Oral before breakfast  sildenafil (REVATIO) 20 milliGRAM(s) Oral three times a day    MEDICATIONS  (PRN):  acetaminophen     Tablet .. 650 milliGRAM(s) Oral every 6 hours PRN Mild Pain (1 - 3), Moderate Pain (4 - 6)  albuterol    90 MICROgram(s) HFA Inhaler 2 Puff(s) Inhalation every 6 hours PRN Shortness of Breath and/or Wheezing  dextrose Oral Gel 15 Gram(s) Oral once PRN Blood Glucose LESS THAN 70 milliGRAM(s)/deciliter      Care Discussed with Consultants/Other Providers [x] YES  [ ] NO    Vital Signs Last 24 Hrs  T(C): 36.3 (14 Dec 2024 10:57), Max: 36.9 (13 Dec 2024 13:06)  T(F): 97.4 (14 Dec 2024 10:57), Max: 98.5 (13 Dec 2024 19:34)  HR: 84 (14 Dec 2024 10:57) (74 - 99)  BP: 111/49 (14 Dec 2024 10:57) (102/51 - 120/54)  BP(mean): --  RR: 18 (14 Dec 2024 10:57) (18 - 26)  SpO2: 99% (14 Dec 2024 10:57) (93% - 99%)    Parameters below as of 14 Dec 2024 10:57  Patient On (Oxygen Delivery Method): room air      I&O's Summary    13 Dec 2024 07:01  -  14 Dec 2024 07:00  --------------------------------------------------------  IN: 120 mL / OUT: 0 mL / NET: 120 mL      PHYSICAL EXAM:  GENERAL: NAD, thin-elderly, comfortable on NC  HEAD:  Atraumatic, Normocephalic  EYES: EOMI, PERRLA, conjunctiva and sclera clear  NECK: Supple, No JVD  CHEST/LUNG: mild decrease breath sounds bilaterally; No wheeze   HEART: Regular rate and rhythm; No murmurs, rubs, or gallops  ABDOMEN: Soft, Nontender, Nondistended; Bowel sounds present  Neuro: AAOx3, no focal weakness   EXTREMITIES:  2+ Peripheral Pulses, No clubbing, cyanosis, or edema  SKIN: No rashes or lesions

## 2024-12-18 NOTE — DISCHARGE NOTE NURSING/CASE MANAGEMENT/SOCIAL WORK - NSSCNAMETXT_GEN_ALL_CORE
[facility]  Case accepted soc 12/19/24 [facility]  Case accepted soc 12/19/24 Rn to call to schedule home visit

## 2024-12-18 NOTE — DISCHARGE NOTE NURSING/CASE MANAGEMENT/SOCIAL WORK - NSDCFUADDAPPT_GEN_ALL_CORE_FT
Follow up with your primary care provider within 1 week of discharge.    Follow up with pulmonology, Dr. Douglass, as scheduled on 12/19 at 6:45pm.

## 2024-12-18 NOTE — DISCHARGE NOTE NURSING/CASE MANAGEMENT/SOCIAL WORK - FINANCIAL ASSISTANCE
[facility] provides services at a reduced cost to those who are determined to be eligible through [facility]’s financial assistance program. Information regarding [facility]’s financial assistance program can be found by going to [email or web address] or by calling [phone].

## 2024-12-18 NOTE — DISCHARGE NOTE NURSING/CASE MANAGEMENT/SOCIAL WORK - NSDCDMETYPESERV_GEN_ALL_CORE_FT
Alll updated testing information sent to dme agency/ Pt already has POC and Home concentrator Alll updated testing information sent to dme agency/ Pt already has POC and Home concentrator/ Pt family to bring into hospital Portable 02 tank to bedside for a safe  d/c transport./ Primary Rn Patricio aware

## 2024-12-18 NOTE — PROGRESS NOTE ADULT - ASSESSMENT
89 -year-old female with COPD on 2L home O2 PRN and QHS, CVA (remote, no deficits), CAD s/p stent ('15), IDDM2, PVD s/p fem-pop bypass ('15) w/occlusion ('22), HLD, HTN, anemia, recently diagnosed pHTN, presenting from with increasing cough occasionally productive of white sputum, found to have multifocal PNA    Multifocal PNA, lung cancer  sepsis- resolved  Acute on chronic HRF- improved  CT showing New bilateral lower lobe patchy consolidation and peripheral opacities superimposed on chronic interstitial changes/fibrosis likely representing multifocal pneumonia.  blood cx- NGTD  UA negative    Abx Allergies  Pt has tolerated cephalosporins in the past    Recommendations:   c/w cefepime to complete 5 day course w/ last day today 12/18  rest of care per primary team    Aldo Verduzco M.D.  [facility], Division of Infectious Diseases  [phone]  After 5pm on weekdays and all day on weekends - please call [phone] 
89 -year-old female with COPD on 2L home O2 PRN and QHS, CVA (remote, no deficits), CAD s/p stent ('15), IDDM2, PVD s/p fem-pop bypass ('15) w/occlusion ('22), HLD, HTN, anemia, recently diagnosed pHTN, presenting from with increasing cough occasionally productive of white sputum, found to have multifocal PNA    Sepsis 2/2 Multifocal PNA vs. Acute Cystitis  Acute on chronic HRF  CT showing New bilateral lower lobe patchy consolidation and peripheral opacities superimposed on chronic interstitial changes/fibrosis likely representing multifocal pneumonia.  UA negative    Abx Allergies  Pt has tolerated cephalosporins in the past    Recommendations:   c/w cefepime  - plan for 5 day course of antibiotics  F/u blood cx- NGTD  Send sputum cx if feasible  Trend temps/WBC  Supportive care, wean O2 as tolerated    Aldo Verduzco M.D.  [facility], Division of Infectious Diseases  [phone]  After 5pm on weekdays and all day on weekends - please call [phone] 
89 -year-old female with COPD on 2L home O2 PRN and QHS, CVA (remote, no deficits), CAD s/p stent ('15), IDDM2, PVD s/p fem-pop bypass ('15) w/occlusion ('22), HLD, HTN, anemia, recently diagnosed pHTN, presenting from with increasing cough occasionally productive of white sputum, found to have multifocal PNA    Sepsis 2/2 Multifocal PNA vs. Acute Cystitis  Acute on chronic HRF  CT showing New bilateral lower lobe patchy consolidation and peripheral opacities superimposed on chronic interstitial changes/fibrosis likely representing multifocal pneumonia.  blood cx- NGTD    Abx Allergies  Pt has tolerated cephalosporins in the past    Recommendations:   c/w cefepime  - plan for 5 day course of antibiotics w/ last day tomorrow  Trend temps/WBC  Supportive care, wean O2 as tolerated    Aldo Verduzco M.D.  [facility], Division of Infectious Diseases  [phone]  After 5pm on weekdays and all day on weekends - please call [phone] 
89 -year-old female with COPD on 2L home O2 PRN and QHS, CVA (remote, no deficits), CAD s/p stent ('15), IDDM2, PVD s/p fem-pop bypass ('15) w/occlusion ('22), HLD, HTN, anemia, recently diagnosed pHTN, presenting from with increasing cough occasionally productive of white sputum, found to have multifocal PNA

## 2024-12-18 NOTE — CHART NOTE - NSCHARTNOTEFT_GEN_A_CORE
Ambulatory Pulse Ox trial     Diagnosis: COPD (ICD J44)  Patient oxygen saturation on 2L at rest is 93 %   Patient oxygen saturation on room air at rest is 72 %    Patient will require home oxygen (2L)    Mark Billy PA-C  Department of Medicine  Pager #[phone]
Pulmonology paged for new consult. Pending call back.
Provider called patient's pulmonologist, Dr. Douglass's office and scheduled a follow up appointment for 12/19 at 6:45pm. Provider notified patient of appointment date and time - patient in agreement with plan for follow up with pulmonologist tomorrow.    Per discussion with Dr. Gilbert, will discharge off of metformin as patient with decreased PO intake recently; will continue Tresiba only. Provider discussed with patient who endorsed understanding of plan.

## 2024-12-19 ENCOUNTER — APPOINTMENT (OUTPATIENT)
Dept: PULMONOLOGY | Facility: CLINIC | Age: 88
End: 2024-12-19

## 2024-12-20 ENCOUNTER — OUTPATIENT (OUTPATIENT)
Dept: OUTPATIENT SERVICES | Facility: HOSPITAL | Age: 88
LOS: 1 days | Discharge: HOME | End: 2024-12-20

## 2024-12-20 ENCOUNTER — APPOINTMENT (OUTPATIENT)
Dept: OTOLARYNGOLOGY | Facility: CLINIC | Age: 88
End: 2024-12-20
Payer: MEDICARE

## 2024-12-20 ENCOUNTER — APPOINTMENT (OUTPATIENT)
Dept: RADIATION ONCOLOGY | Facility: CLINIC | Age: 88
End: 2024-12-20

## 2024-12-20 VITALS
OXYGEN SATURATION: 91 % | HEART RATE: 93 BPM | SYSTOLIC BLOOD PRESSURE: 90 MMHG | HEIGHT: 59 IN | DIASTOLIC BLOOD PRESSURE: 51 MMHG | RESPIRATION RATE: 15 BRPM | TEMPERATURE: 96.98 F

## 2024-12-20 VITALS
WEIGHT: 112 LBS | HEIGHT: 59 IN | HEART RATE: 93 BPM | BODY MASS INDEX: 22.58 KG/M2 | DIASTOLIC BLOOD PRESSURE: 83 MMHG | SYSTOLIC BLOOD PRESSURE: 127 MMHG

## 2024-12-20 DIAGNOSIS — H90.3 SENSORINEURAL HEARING LOSS, BILATERAL: ICD-10-CM

## 2024-12-20 DIAGNOSIS — J31.0 CHRONIC RHINITIS: ICD-10-CM

## 2024-12-20 DIAGNOSIS — I25.10 ATHEROSCLEROTIC HEART DISEASE OF NATIVE CORONARY ARTERY WITHOUT ANGINA PECTORIS: Chronic | ICD-10-CM

## 2024-12-20 DIAGNOSIS — J34.89 NASAL CONGESTION: ICD-10-CM

## 2024-12-20 DIAGNOSIS — R91.1 SOLITARY PULMONARY NODULE: ICD-10-CM

## 2024-12-20 DIAGNOSIS — J34.2 DEVIATED NASAL SEPTUM: ICD-10-CM

## 2024-12-20 DIAGNOSIS — R42 DIZZINESS AND GIDDINESS: ICD-10-CM

## 2024-12-20 DIAGNOSIS — J98.8 OTHER SPECIFIED RESPIRATORY DISORDERS: ICD-10-CM

## 2024-12-20 DIAGNOSIS — Z95.828 PRESENCE OF OTHER VASCULAR IMPLANTS AND GRAFTS: Chronic | ICD-10-CM

## 2024-12-20 DIAGNOSIS — R09.81 NASAL CONGESTION: ICD-10-CM

## 2024-12-20 DIAGNOSIS — H61.23 IMPACTED CERUMEN, BILATERAL: ICD-10-CM

## 2024-12-20 PROCEDURE — 99203 OFFICE O/P NEW LOW 30 MIN: CPT | Mod: 25

## 2024-12-20 PROCEDURE — 99205 OFFICE O/P NEW HI 60 MIN: CPT | Mod: GC

## 2024-12-20 PROCEDURE — G0268 REMOVAL OF IMPACTED WAX MD: CPT

## 2024-12-20 RX ORDER — FLUTICASONE PROPIONATE AND SALMETEROL 50; 250 UG/1; UG/1
250-50 POWDER RESPIRATORY (INHALATION) TWICE DAILY
Refills: 0 | Status: ACTIVE | COMMUNITY
Start: 2024-12-20

## 2024-12-20 RX ORDER — ASPIRIN 81 MG/1
81 TABLET, DELAYED RELEASE ORAL DAILY
Refills: 0 | Status: ACTIVE | COMMUNITY
Start: 2024-12-20

## 2024-12-20 RX ORDER — OMEPRAZOLE 40 MG/1
40 CAPSULE, DELAYED RELEASE ORAL
Refills: 0 | Status: ACTIVE | COMMUNITY
Start: 2024-12-20

## 2024-12-20 RX ORDER — SILDENAFIL 20 MG/1
20 TABLET ORAL 3 TIMES DAILY
Refills: 0 | Status: ACTIVE | COMMUNITY
Start: 2024-12-20

## 2024-12-30 ENCOUNTER — NON-APPOINTMENT (OUTPATIENT)
Age: 88
End: 2024-12-30

## 2024-12-30 ENCOUNTER — APPOINTMENT (OUTPATIENT)
Dept: OTOLARYNGOLOGY | Facility: CLINIC | Age: 88
End: 2024-12-30
Payer: MEDICARE

## 2024-12-30 VITALS
WEIGHT: 112 LBS | SYSTOLIC BLOOD PRESSURE: 117 MMHG | BODY MASS INDEX: 22.58 KG/M2 | HEIGHT: 59 IN | DIASTOLIC BLOOD PRESSURE: 68 MMHG | HEART RATE: 86 BPM

## 2024-12-30 DIAGNOSIS — H61.22 IMPACTED CERUMEN, LEFT EAR: ICD-10-CM

## 2024-12-30 DIAGNOSIS — J31.0 CHRONIC RHINITIS: ICD-10-CM

## 2024-12-30 DIAGNOSIS — H90.3 SENSORINEURAL HEARING LOSS, BILATERAL: ICD-10-CM

## 2024-12-30 DIAGNOSIS — H61.23 IMPACTED CERUMEN, BILATERAL: ICD-10-CM

## 2024-12-30 DIAGNOSIS — J34.2 DEVIATED NASAL SEPTUM: ICD-10-CM

## 2024-12-30 PROCEDURE — 69210 REMOVE IMPACTED EAR WAX UNI: CPT | Mod: LT

## 2024-12-30 PROCEDURE — 99213 OFFICE O/P EST LOW 20 MIN: CPT | Mod: 25

## 2024-12-31 ENCOUNTER — APPOINTMENT (OUTPATIENT)
Dept: THORACIC SURGERY | Facility: CLINIC | Age: 88
End: 2024-12-31

## 2025-01-22 ENCOUNTER — OUTPATIENT (OUTPATIENT)
Dept: OUTPATIENT SERVICES | Facility: HOSPITAL | Age: 89
LOS: 1 days | End: 2025-01-22
Payer: MEDICARE

## 2025-01-22 ENCOUNTER — APPOINTMENT (OUTPATIENT)
Dept: CT IMAGING | Facility: CLINIC | Age: 89
End: 2025-01-22
Payer: MEDICARE

## 2025-01-22 DIAGNOSIS — I25.10 ATHEROSCLEROTIC HEART DISEASE OF NATIVE CORONARY ARTERY WITHOUT ANGINA PECTORIS: Chronic | ICD-10-CM

## 2025-01-22 DIAGNOSIS — R91.1 SOLITARY PULMONARY NODULE: ICD-10-CM

## 2025-01-22 DIAGNOSIS — Z95.828 PRESENCE OF OTHER VASCULAR IMPLANTS AND GRAFTS: Chronic | ICD-10-CM

## 2025-01-22 PROCEDURE — 71250 CT THORAX DX C-: CPT

## 2025-01-22 PROCEDURE — 71250 CT THORAX DX C-: CPT | Mod: 26

## 2025-01-30 ENCOUNTER — APPOINTMENT (OUTPATIENT)
Dept: RADIATION ONCOLOGY | Facility: CLINIC | Age: 89
End: 2025-01-30
Payer: MEDICARE

## 2025-01-30 VITALS
BODY MASS INDEX: 24.76 KG/M2 | HEIGHT: 59 IN | DIASTOLIC BLOOD PRESSURE: 58 MMHG | SYSTOLIC BLOOD PRESSURE: 103 MMHG | RESPIRATION RATE: 16 BRPM | OXYGEN SATURATION: 95 % | WEIGHT: 122.81 LBS | TEMPERATURE: 209.3 F | HEART RATE: 85 BPM

## 2025-01-30 DIAGNOSIS — R91.1 SOLITARY PULMONARY NODULE: ICD-10-CM

## 2025-01-30 PROCEDURE — 99214 OFFICE O/P EST MOD 30 MIN: CPT

## 2025-02-11 ENCOUNTER — OUTPATIENT (OUTPATIENT)
Dept: OUTPATIENT SERVICES | Facility: HOSPITAL | Age: 89
LOS: 1 days | Discharge: HOME | End: 2025-02-11
Payer: MEDICARE

## 2025-02-11 DIAGNOSIS — C34.32 MALIGNANT NEOPLASM OF LOWER LOBE, LEFT BRONCHUS OR LUNG: ICD-10-CM

## 2025-02-11 DIAGNOSIS — Z95.828 PRESENCE OF OTHER VASCULAR IMPLANTS AND GRAFTS: Chronic | ICD-10-CM

## 2025-02-11 DIAGNOSIS — I25.10 ATHEROSCLEROTIC HEART DISEASE OF NATIVE CORONARY ARTERY WITHOUT ANGINA PECTORIS: Chronic | ICD-10-CM

## 2025-02-14 PROCEDURE — 77263 THER RADIOLOGY TX PLNG CPLX: CPT

## 2025-02-14 PROCEDURE — 77334 RADIATION TREATMENT AID(S): CPT | Mod: 26

## 2025-02-18 ENCOUNTER — INPATIENT (INPATIENT)
Facility: HOSPITAL | Age: 89
LOS: 12 days | Discharge: NURSING, REHAB OR LONG-TERM CARE | End: 2025-03-03
Attending: HOSPITALIST | Admitting: HOSPITALIST
Payer: MEDICARE

## 2025-02-18 VITALS
RESPIRATION RATE: 20 BRPM | OXYGEN SATURATION: 88 % | SYSTOLIC BLOOD PRESSURE: 159 MMHG | DIASTOLIC BLOOD PRESSURE: 78 MMHG | HEART RATE: 111 BPM | WEIGHT: 139.99 LBS | TEMPERATURE: 98 F

## 2025-02-18 DIAGNOSIS — I25.10 ATHEROSCLEROTIC HEART DISEASE OF NATIVE CORONARY ARTERY WITHOUT ANGINA PECTORIS: Chronic | ICD-10-CM

## 2025-02-18 DIAGNOSIS — Z95.828 PRESENCE OF OTHER VASCULAR IMPLANTS AND GRAFTS: Chronic | ICD-10-CM

## 2025-02-18 LAB
ALBUMIN SERPL ELPH-MCNC: 3.9 G/DL — SIGNIFICANT CHANGE UP (ref 3.3–5)
ALP SERPL-CCNC: 111 U/L — SIGNIFICANT CHANGE UP (ref 40–120)
ALT FLD-CCNC: 8 U/L — SIGNIFICANT CHANGE UP (ref 4–33)
ANION GAP SERPL CALC-SCNC: 12 MMOL/L — SIGNIFICANT CHANGE UP (ref 7–14)
ANISOCYTOSIS BLD QL: SLIGHT — SIGNIFICANT CHANGE UP
APPEARANCE UR: ABNORMAL
AST SERPL-CCNC: 15 U/L — SIGNIFICANT CHANGE UP (ref 4–32)
B-OH-BUTYR SERPL-SCNC: 0.3 MMOL/L — SIGNIFICANT CHANGE UP (ref 0–0.4)
BACTERIA # UR AUTO: ABNORMAL /HPF
BASOPHILS # BLD AUTO: 0 K/UL — SIGNIFICANT CHANGE UP (ref 0–0.2)
BASOPHILS NFR BLD AUTO: 0 % — SIGNIFICANT CHANGE UP (ref 0–2)
BILIRUB SERPL-MCNC: 0.4 MG/DL — SIGNIFICANT CHANGE UP (ref 0.2–1.2)
BILIRUB UR-MCNC: NEGATIVE — SIGNIFICANT CHANGE UP
BLOOD GAS VENOUS COMPREHENSIVE RESULT: SIGNIFICANT CHANGE UP
BUN SERPL-MCNC: 28 MG/DL — HIGH (ref 7–23)
CALCIUM SERPL-MCNC: 9.5 MG/DL — SIGNIFICANT CHANGE UP (ref 8.4–10.5)
CAST: 2 /LPF — SIGNIFICANT CHANGE UP (ref 0–4)
CHLORIDE SERPL-SCNC: 91 MMOL/L — LOW (ref 98–107)
CO2 SERPL-SCNC: 27 MMOL/L — SIGNIFICANT CHANGE UP (ref 22–31)
COLOR SPEC: YELLOW — SIGNIFICANT CHANGE UP
CREAT SERPL-MCNC: 1.04 MG/DL — SIGNIFICANT CHANGE UP (ref 0.5–1.3)
DIFF PNL FLD: NEGATIVE — SIGNIFICANT CHANGE UP
EGFR: 50 ML/MIN/1.73M2 — LOW
EGFR: 50 ML/MIN/1.73M2 — LOW
EOSINOPHIL # BLD AUTO: 0 K/UL — SIGNIFICANT CHANGE UP (ref 0–0.5)
EOSINOPHIL NFR BLD AUTO: 0 % — SIGNIFICANT CHANGE UP (ref 0–6)
FLUAV AG NPH QL: SIGNIFICANT CHANGE UP
FLUBV AG NPH QL: SIGNIFICANT CHANGE UP
GLUCOSE SERPL-MCNC: 366 MG/DL — HIGH (ref 70–99)
GLUCOSE UR QL: 250 MG/DL
HCT VFR BLD CALC: 30.5 % — LOW (ref 34.5–45)
HGB BLD-MCNC: 10 G/DL — LOW (ref 11.5–15.5)
HYPOCHROMIA BLD QL: SLIGHT — SIGNIFICANT CHANGE UP
IANC: 11.57 K/UL — HIGH (ref 1.8–7.4)
KETONES UR-MCNC: NEGATIVE MG/DL — SIGNIFICANT CHANGE UP
LEUKOCYTE ESTERASE UR-ACNC: ABNORMAL
LYMPHOCYTES # BLD AUTO: 0.44 K/UL — LOW (ref 1–3.3)
LYMPHOCYTES # BLD AUTO: 3.5 % — LOW (ref 13–44)
MAGNESIUM SERPL-MCNC: 1.7 MG/DL — SIGNIFICANT CHANGE UP (ref 1.6–2.6)
MCHC RBC-ENTMCNC: 28.2 PG — SIGNIFICANT CHANGE UP (ref 27–34)
MCHC RBC-ENTMCNC: 32.8 G/DL — SIGNIFICANT CHANGE UP (ref 32–36)
MCV RBC AUTO: 86.2 FL — SIGNIFICANT CHANGE UP (ref 80–100)
MONOCYTES # BLD AUTO: 0.44 K/UL — SIGNIFICANT CHANGE UP (ref 0–0.9)
MONOCYTES NFR BLD AUTO: 3.5 % — SIGNIFICANT CHANGE UP (ref 2–14)
NEUTROPHILS # BLD AUTO: 11.45 K/UL — HIGH (ref 1.8–7.4)
NEUTROPHILS NFR BLD AUTO: 80.9 % — HIGH (ref 43–77)
NEUTS BAND # BLD: 9.5 % — HIGH (ref 0–6)
NEUTS BAND NFR BLD: 9.5 % — HIGH (ref 0–6)
NITRITE UR-MCNC: NEGATIVE — SIGNIFICANT CHANGE UP
NT-PROBNP SERPL-SCNC: 1406 PG/ML — HIGH
PH UR: 6 — SIGNIFICANT CHANGE UP (ref 5–8)
PLAT MORPH BLD: NORMAL — SIGNIFICANT CHANGE UP
PLATELET # BLD AUTO: 439 K/UL — HIGH (ref 150–400)
PLATELET COUNT - ESTIMATE: NORMAL — SIGNIFICANT CHANGE UP
POIKILOCYTOSIS BLD QL AUTO: SLIGHT — SIGNIFICANT CHANGE UP
POTASSIUM SERPL-MCNC: 4.3 MMOL/L — SIGNIFICANT CHANGE UP (ref 3.5–5.3)
POTASSIUM SERPL-SCNC: 4.3 MMOL/L — SIGNIFICANT CHANGE UP (ref 3.5–5.3)
PROT SERPL-MCNC: 7.4 G/DL — SIGNIFICANT CHANGE UP (ref 6–8.3)
PROT UR-MCNC: 30 MG/DL
RBC # BLD: 3.54 M/UL — LOW (ref 3.8–5.2)
RBC # FLD: 15.2 % — HIGH (ref 10.3–14.5)
RBC BLD AUTO: ABNORMAL
RBC CASTS # UR COMP ASSIST: 6 /HPF — HIGH (ref 0–4)
RSV RNA NPH QL NAA+NON-PROBE: SIGNIFICANT CHANGE UP
SARS-COV-2 RNA SPEC QL NAA+PROBE: SIGNIFICANT CHANGE UP
SODIUM SERPL-SCNC: 130 MMOL/L — LOW (ref 135–145)
SP GR SPEC: 1.01 — SIGNIFICANT CHANGE UP (ref 1–1.03)
SQUAMOUS # UR AUTO: 4 /HPF — SIGNIFICANT CHANGE UP (ref 0–5)
TROPONIN T, HIGH SENSITIVITY RESULT: 33 NG/L — SIGNIFICANT CHANGE UP
UROBILINOGEN FLD QL: 0.2 MG/DL — SIGNIFICANT CHANGE UP (ref 0.2–1)
VARIANT LYMPHS # BLD: 2.6 % — SIGNIFICANT CHANGE UP (ref 0–6)
VARIANT LYMPHS NFR BLD MANUAL: 2.6 % — SIGNIFICANT CHANGE UP (ref 0–6)
WBC # BLD: 12.67 K/UL — HIGH (ref 3.8–10.5)
WBC # FLD AUTO: 12.67 K/UL — HIGH (ref 3.8–10.5)
WBC UR QL: 25 /HPF — HIGH (ref 0–5)

## 2025-02-18 PROCEDURE — 71046 X-RAY EXAM CHEST 2 VIEWS: CPT | Mod: 26

## 2025-02-18 PROCEDURE — 99285 EMERGENCY DEPT VISIT HI MDM: CPT

## 2025-02-18 RX ORDER — MIDODRINE HYDROCHLORIDE 5 MG/1
10 TABLET ORAL THREE TIMES A DAY
Refills: 0 | Status: DISCONTINUED | OUTPATIENT
Start: 2025-02-18 | End: 2025-02-19

## 2025-02-18 RX ORDER — VANCOMYCIN HCL IN 5 % DEXTROSE 1.5G/250ML
1000 PLASTIC BAG, INJECTION (ML) INTRAVENOUS ONCE
Refills: 0 | Status: COMPLETED | OUTPATIENT
Start: 2025-02-18 | End: 2025-02-18

## 2025-02-18 RX ORDER — CEFEPIME 2 G/20ML
1000 INJECTION, POWDER, FOR SOLUTION INTRAVENOUS ONCE
Refills: 0 | Status: COMPLETED | OUTPATIENT
Start: 2025-02-18 | End: 2025-02-18

## 2025-02-18 RX ORDER — ACETAMINOPHEN 500 MG/5ML
1000 LIQUID (ML) ORAL ONCE
Refills: 0 | Status: COMPLETED | OUTPATIENT
Start: 2025-02-18 | End: 2025-02-18

## 2025-02-18 RX ADMIN — Medication 250 MILLIGRAM(S): at 23:10

## 2025-02-18 RX ADMIN — CEFEPIME 100 MILLIGRAM(S): 2 INJECTION, POWDER, FOR SOLUTION INTRAVENOUS at 22:07

## 2025-02-18 RX ADMIN — Medication 1000 MILLILITER(S): at 23:17

## 2025-02-18 RX ADMIN — Medication 400 MILLIGRAM(S): at 22:06

## 2025-02-18 NOTE — ED ADULT NURSE NOTE - OBJECTIVE STATEMENT
Pt received to rm 16 , awake and alert, A&OX4, ambulatory with cane at baseline. pt  brought in by family for suspected infection,  Family states this usually happens when she has UTI. Pt states she was tremulous earlier but currently is snot. pt denying any pain or complaints at this time. Pt states she feels fine. Pt on oxygen at home for comfort. Pt satting 100% on 2 L NC. Pt not noted to have any tremors at this time. Abdomen is soft, nontender, and nondistended. Pt in no acute distress. Pt speaking in full sentences, airway patent. Respirations even and unlabored. Denies CP, SOB, N/V, HA, dizziness, palpitations, blurry vision.   . Bed in lowest position, call bell within reach. Safety maintained. awaiting MD evaluation, plan of care ongoing.

## 2025-02-18 NOTE — ED PROVIDER NOTE - ATTENDING CONTRIBUTION TO CARE
89-year-old female with extensive past medical history coming in for dysuria, generalized weakness over past couple of days.  Also endorsing shortness of breath and chest discomfort ongoing over past few days.  Reports compliance with AC.  Decreased p.o. intake.    Patient appears chronically ill.  No respiratory distress.  Regular rate and rhythm.  Abdomen is soft and nontender.    Differential diagnosis include but not limited to electrolyte abnormalities, anemia, sepsis secondary to UTI, pneumonia.  Plan to admit the patient.

## 2025-02-18 NOTE — ED ADULT TRIAGE NOTE - CHIEF COMPLAINT QUOTE
brought in by family for suspected infection, pt very tremulus in triage, family states this usually happens when she has UTI, pt fingertips are ICE cold, cannot get accurate pulse ox reading at this time, pt denies any SOB or chest pain, h/o type 2 DM  brought in by family for suspected infection, pt very tremulus in triage, family states this usually happens when she has UTI, pt fingertips are ICE cold, cannot get accurate pulse ox reading at this time, pt denies any SOB or chest pain, h/o type 2 DM , placed on nasal cannula, per family pt was using O2 in the car

## 2025-02-18 NOTE — ED PROVIDER NOTE - PROGRESS NOTE DETAILS
caraballo: Called to bedside for hypotensive with maps less than 65, we will have to provide fluids we will start with small bolus of 500 cc and reassess caraballo: Patient noted to have a likely episode of rigors, that was caught on telemetry, and some leads appearing to be in V. tach versus torsades, other leads such as aVR indicates an underlying sinus beat notable.  Patient had a pulse, was responsive, this entire time does not endorse any chest pain, suspect likely just rigors.  Given magnesium level 1.7, we will replete prophylactically.  twelve-lead was obtained after the suspected rigors ended which just shows a sinus rhythm

## 2025-02-18 NOTE — ED PROVIDER NOTE - OBJECTIVE STATEMENT
89 -year-old female with COPD on 2L home O2 PRN and QHS, CVA (remote, no deficits), CAD s/p stent ('15), IDDM2, PVD s/p fem-pop bypass ('15) w/occlusion ('22), HLD, HTN, anemia, pulmonary hypertension, suspected lung malignancy about to start radiation therapy in 2 weeks, today for burning with urination since Friday.  Has been going on with every urinary movement, today she developed equal full body shaking, which she notes is usually what happens whenever she has a UTI and family member brought her in to be evaluated for this.  She notes that over the past couple months she has been feeling short of breath, has had a chronic cough at baseline with mucus, but no hemoptysis, she is unsure if the symptoms acutely got worse today or not.  She denies fever, chills, night sweats, PND, orthopnea, pedal edema, chest pains, abdominal pain, nausea, vomiting, diarrhea, constipation

## 2025-02-18 NOTE — ED PROCEDURE NOTE - PROCEDURE ADDITIONAL DETAILS
B-lines are dominant all fields.  No large pleural effusions are noted.   technically difficult study with limited cardiac windows    Emergency Department Focused Ultrasound performed at patient's bedside for educational purposes. The study will have a follow up study performed or was performed in the direct supervision of an ultrasound trained attending.

## 2025-02-18 NOTE — ED PROVIDER NOTE - CLINICAL SUMMARY MEDICAL DECISION MAKING FREE TEXT BOX
89 -year-old female with COPD on 2L home O2 PRN and QHS, CVA (remote, no deficits), CAD s/p stent ('15), IDDM2, PVD s/p fem-pop bypass ('15) w/occlusion ('22), HLD, HTN, anemia, pulmonary hypertension, suspected lung malignancy about to start radiation therapy in 2 weeks, today for burning with urination since Friday    Physical examination remarkable for inspiratory crackles upon auscultation, significant tenderness to palpation in the suprapubic area    differential diagnosis includes was not limited to urinary tract infection given that the patient notes that her symptoms are always occur with shaking whenever she has a UTI, burning with urination, sepsis given she is febrile to 102.5 here febrile and with increased respiratory rate, superimposed pneumonia, pneumonia, CHF of the inspiratory crackles, viral syndrome, dysrhythmia, electrolyte abnormality, point-of-care glucose was over 300 here will rule out DKA, dehydration with the dry mucous membranes, will rule out pulmonary embolism given the shortness of breath and malignancy history with the tachycardia    Plan includes labs and imaging for the above including antibiotics given empiric sepsis, will hold off on fluids due to inspiratory crackles and likely possible fluid overload, urinalysis, imaging for the above, symptomatic management as necessary and reassess

## 2025-02-18 NOTE — ED PROVIDER NOTE - PHYSICAL EXAMINATION
Zelalem Payne DO (PGY1)   Physical Exam:    Gen: NAD, AOx3  Head: NCAT  HEENT: EOMI, PEERLA, mildly dry mucous membranes  Lung: Inspiratory crackles noted upon auscultation,  CV: RRR, no murmurs, rubs or gallops  Abd: soft, ND, no guarding, no rigidity, no rebound tenderness, no CVA tenderness.  Significant suprapubic tenderness upon palpation  MSK: no visible deformities, ROM normal in UE/LE, no back pain  Neuro: No focal sensory or motor deficits. Sensation intact to light touch all extremities.  Skin: Warm, well perfused, no rash, no leg swelling  Psych: normal affect, calm

## 2025-02-18 NOTE — ED ADULT NURSE NOTE - CHIEF COMPLAINT QUOTE
brought in by family for suspected infection, pt very tremulus in triage, family states this usually happens when she has UTI, pt fingertips are ICE cold, cannot get accurate pulse ox reading at this time, pt denies any SOB or chest pain, h/o type 2 DM , placed on nasal cannula, per family pt was using O2 in the car

## 2025-02-18 NOTE — ED ADULT NURSE NOTE - NSFALLHARMRISKINTERV_ED_ALL_ED

## 2025-02-19 DIAGNOSIS — A41.9 SEPSIS, UNSPECIFIED ORGANISM: ICD-10-CM

## 2025-02-19 DIAGNOSIS — I50.9 HEART FAILURE, UNSPECIFIED: ICD-10-CM

## 2025-02-19 DIAGNOSIS — N39.0 URINARY TRACT INFECTION, SITE NOT SPECIFIED: ICD-10-CM

## 2025-02-19 DIAGNOSIS — I10 ESSENTIAL (PRIMARY) HYPERTENSION: ICD-10-CM

## 2025-02-19 DIAGNOSIS — J44.9 CHRONIC OBSTRUCTIVE PULMONARY DISEASE, UNSPECIFIED: ICD-10-CM

## 2025-02-19 DIAGNOSIS — D64.9 ANEMIA, UNSPECIFIED: ICD-10-CM

## 2025-02-19 DIAGNOSIS — E78.5 HYPERLIPIDEMIA, UNSPECIFIED: ICD-10-CM

## 2025-02-19 DIAGNOSIS — I27.20 PULMONARY HYPERTENSION, UNSPECIFIED: ICD-10-CM

## 2025-02-19 DIAGNOSIS — J18.9 PNEUMONIA, UNSPECIFIED ORGANISM: ICD-10-CM

## 2025-02-19 DIAGNOSIS — R63.8 OTHER SYMPTOMS AND SIGNS CONCERNING FOOD AND FLUID INTAKE: ICD-10-CM

## 2025-02-19 DIAGNOSIS — E11.9 TYPE 2 DIABETES MELLITUS WITHOUT COMPLICATIONS: ICD-10-CM

## 2025-02-19 DIAGNOSIS — R91.8 OTHER NONSPECIFIC ABNORMAL FINDING OF LUNG FIELD: ICD-10-CM

## 2025-02-19 LAB
A1C WITH ESTIMATED AVERAGE GLUCOSE RESULT: 9.7 % — HIGH (ref 4–5.6)
ADD ON TEST-SPECIMEN IN LAB: SIGNIFICANT CHANGE UP
ALBUMIN SERPL ELPH-MCNC: 3.5 G/DL — SIGNIFICANT CHANGE UP (ref 3.3–5)
ALP SERPL-CCNC: 86 U/L — SIGNIFICANT CHANGE UP (ref 40–120)
ALT FLD-CCNC: <5 U/L — SIGNIFICANT CHANGE UP (ref 4–33)
ANION GAP SERPL CALC-SCNC: 13 MMOL/L — SIGNIFICANT CHANGE UP (ref 7–14)
AST SERPL-CCNC: 7 U/L — SIGNIFICANT CHANGE UP (ref 4–32)
BASOPHILS # BLD AUTO: 0.04 K/UL — SIGNIFICANT CHANGE UP (ref 0–0.2)
BASOPHILS NFR BLD AUTO: 0.4 % — SIGNIFICANT CHANGE UP (ref 0–2)
BILIRUB SERPL-MCNC: 0.4 MG/DL — SIGNIFICANT CHANGE UP (ref 0.2–1.2)
BLD GP AB SCN SERPL QL: NEGATIVE — SIGNIFICANT CHANGE UP
BUN SERPL-MCNC: 24 MG/DL — HIGH (ref 7–23)
CALCIUM SERPL-MCNC: 8.1 MG/DL — LOW (ref 8.4–10.5)
CHLORIDE SERPL-SCNC: 95 MMOL/L — LOW (ref 98–107)
CK MB BLD-MCNC: 3.4 % — HIGH (ref 0–2.5)
CK MB CFR SERPL CALC: 1.1 NG/ML — SIGNIFICANT CHANGE UP
CK SERPL-CCNC: 32 U/L — SIGNIFICANT CHANGE UP (ref 25–170)
CO2 SERPL-SCNC: 25 MMOL/L — SIGNIFICANT CHANGE UP (ref 22–31)
CREAT SERPL-MCNC: 0.97 MG/DL — SIGNIFICANT CHANGE UP (ref 0.5–1.3)
EGFR: 55 ML/MIN/1.73M2 — LOW
EGFR: 55 ML/MIN/1.73M2 — LOW
EOSINOPHIL # BLD AUTO: 0.19 K/UL — SIGNIFICANT CHANGE UP (ref 0–0.5)
EOSINOPHIL NFR BLD AUTO: 1.7 % — SIGNIFICANT CHANGE UP (ref 0–6)
ESTIMATED AVERAGE GLUCOSE: 232 — SIGNIFICANT CHANGE UP
FOLATE SERPL-MCNC: >20 NG/ML — HIGH (ref 3.1–17.5)
GAS PNL BLDV: SIGNIFICANT CHANGE UP
GLUCOSE BLDC GLUCOMTR-MCNC: 262 MG/DL — HIGH (ref 70–99)
GLUCOSE BLDC GLUCOMTR-MCNC: 267 MG/DL — HIGH (ref 70–99)
GLUCOSE BLDC GLUCOMTR-MCNC: 272 MG/DL — HIGH (ref 70–99)
GLUCOSE BLDC GLUCOMTR-MCNC: 368 MG/DL — HIGH (ref 70–99)
GLUCOSE BLDC GLUCOMTR-MCNC: 97 MG/DL — SIGNIFICANT CHANGE UP (ref 70–99)
GLUCOSE SERPL-MCNC: 240 MG/DL — HIGH (ref 70–99)
HCT VFR BLD CALC: 27.3 % — LOW (ref 34.5–45)
HGB BLD-MCNC: 8.6 G/DL — LOW (ref 11.5–15.5)
IANC: 9.34 K/UL — HIGH (ref 1.8–7.4)
IMM GRANULOCYTES NFR BLD AUTO: 0.4 % — SIGNIFICANT CHANGE UP (ref 0–0.9)
LYMPHOCYTES # BLD AUTO: 0.71 K/UL — LOW (ref 1–3.3)
LYMPHOCYTES # BLD AUTO: 6.4 % — LOW (ref 13–44)
MAGNESIUM SERPL-MCNC: 3.1 MG/DL — HIGH (ref 1.6–2.6)
MCHC RBC-ENTMCNC: 27.7 PG — SIGNIFICANT CHANGE UP (ref 27–34)
MCHC RBC-ENTMCNC: 31.5 G/DL — LOW (ref 32–36)
MCV RBC AUTO: 88.1 FL — SIGNIFICANT CHANGE UP (ref 80–100)
MONOCYTES # BLD AUTO: 0.83 K/UL — SIGNIFICANT CHANGE UP (ref 0–0.9)
MONOCYTES NFR BLD AUTO: 7.4 % — SIGNIFICANT CHANGE UP (ref 2–14)
MRSA PCR RESULT.: SIGNIFICANT CHANGE UP
NEUTROPHILS # BLD AUTO: 9.34 K/UL — HIGH (ref 1.8–7.4)
NEUTROPHILS NFR BLD AUTO: 83.7 % — HIGH (ref 43–77)
NRBC # BLD AUTO: 0 K/UL — SIGNIFICANT CHANGE UP (ref 0–0)
NRBC # FLD: 0 K/UL — SIGNIFICANT CHANGE UP (ref 0–0)
NRBC BLD AUTO-RTO: 0 /100 WBCS — SIGNIFICANT CHANGE UP (ref 0–0)
PHOSPHATE SERPL-MCNC: 2.5 MG/DL — SIGNIFICANT CHANGE UP (ref 2.5–4.5)
PLATELET # BLD AUTO: 389 K/UL — SIGNIFICANT CHANGE UP (ref 150–400)
POTASSIUM SERPL-MCNC: 4 MMOL/L — SIGNIFICANT CHANGE UP (ref 3.5–5.3)
POTASSIUM SERPL-SCNC: 4 MMOL/L — SIGNIFICANT CHANGE UP (ref 3.5–5.3)
PROT SERPL-MCNC: 6.4 G/DL — SIGNIFICANT CHANGE UP (ref 6–8.3)
RBC # BLD: 3.1 M/UL — LOW (ref 3.8–5.2)
RBC # FLD: 15.9 % — HIGH (ref 10.3–14.5)
RH IG SCN BLD-IMP: POSITIVE — SIGNIFICANT CHANGE UP
S AUREUS DNA NOSE QL NAA+PROBE: SIGNIFICANT CHANGE UP
SODIUM SERPL-SCNC: 133 MMOL/L — LOW (ref 135–145)
TROPONIN T, HIGH SENSITIVITY RESULT: 32 NG/L — SIGNIFICANT CHANGE UP
TROPONIN T, HIGH SENSITIVITY RESULT: 36 NG/L — SIGNIFICANT CHANGE UP
TROPONIN T, HIGH SENSITIVITY RESULT: 48 NG/L — SIGNIFICANT CHANGE UP
TSH SERPL-MCNC: 1.95 UIU/ML — SIGNIFICANT CHANGE UP (ref 0.27–4.2)
VIT B12 SERPL-MCNC: >2000 PG/ML — HIGH (ref 200–900)
WBC # BLD: 11.15 K/UL — HIGH (ref 3.8–10.5)
WBC # FLD AUTO: 11.15 K/UL — HIGH (ref 3.8–10.5)

## 2025-02-19 PROCEDURE — 99233 SBSQ HOSP IP/OBS HIGH 50: CPT | Mod: GC

## 2025-02-19 PROCEDURE — 99222 1ST HOSP IP/OBS MODERATE 55: CPT

## 2025-02-19 PROCEDURE — 99223 1ST HOSP IP/OBS HIGH 75: CPT

## 2025-02-19 PROCEDURE — 71275 CT ANGIOGRAPHY CHEST: CPT | Mod: 26

## 2025-02-19 RX ORDER — DEXTROSE 50 % IN WATER 50 %
15 SYRINGE (ML) INTRAVENOUS ONCE
Refills: 0 | Status: DISCONTINUED | OUTPATIENT
Start: 2025-02-19 | End: 2025-03-03

## 2025-02-19 RX ORDER — SODIUM CHLORIDE 9 G/1000ML
1000 INJECTION, SOLUTION INTRAVENOUS
Refills: 0 | Status: DISCONTINUED | OUTPATIENT
Start: 2025-02-19 | End: 2025-03-03

## 2025-02-19 RX ORDER — MIDODRINE HYDROCHLORIDE 5 MG/1
5 TABLET ORAL EVERY 8 HOURS
Refills: 0 | Status: DISCONTINUED | OUTPATIENT
Start: 2025-02-19 | End: 2025-02-19

## 2025-02-19 RX ORDER — ACETAMINOPHEN 500 MG/5ML
650 LIQUID (ML) ORAL EVERY 6 HOURS
Refills: 0 | Status: DISCONTINUED | OUTPATIENT
Start: 2025-02-19 | End: 2025-03-03

## 2025-02-19 RX ORDER — GLUCAGON 3 MG/1
1 POWDER NASAL ONCE
Refills: 0 | Status: DISCONTINUED | OUTPATIENT
Start: 2025-02-19 | End: 2025-03-03

## 2025-02-19 RX ORDER — GABAPENTIN 400 MG/1
400 CAPSULE ORAL AT BEDTIME
Refills: 0 | Status: DISCONTINUED | OUTPATIENT
Start: 2025-02-19 | End: 2025-03-03

## 2025-02-19 RX ORDER — METHYLPREDNISOLONE ACETATE 80 MG/ML
20 INJECTION, SUSPENSION INTRA-ARTICULAR; INTRALESIONAL; INTRAMUSCULAR; SOFT TISSUE EVERY 8 HOURS
Refills: 0 | Status: DISCONTINUED | OUTPATIENT
Start: 2025-02-19 | End: 2025-02-19

## 2025-02-19 RX ORDER — MELATONIN 5 MG
3 TABLET ORAL AT BEDTIME
Refills: 0 | Status: DISCONTINUED | OUTPATIENT
Start: 2025-02-19 | End: 2025-03-03

## 2025-02-19 RX ORDER — ONDANSETRON HCL/PF 4 MG/2 ML
4 VIAL (ML) INJECTION EVERY 8 HOURS
Refills: 0 | Status: DISCONTINUED | OUTPATIENT
Start: 2025-02-19 | End: 2025-03-03

## 2025-02-19 RX ORDER — NOREPINEPHRINE BITARTRATE 8 MG
0.05 SOLUTION INTRAVENOUS
Qty: 8 | Refills: 0 | Status: DISCONTINUED | OUTPATIENT
Start: 2025-02-19 | End: 2025-02-19

## 2025-02-19 RX ORDER — METHYLPREDNISOLONE ACETATE 80 MG/ML
20 INJECTION, SUSPENSION INTRA-ARTICULAR; INTRALESIONAL; INTRAMUSCULAR; SOFT TISSUE EVERY 8 HOURS
Refills: 0 | Status: COMPLETED | OUTPATIENT
Start: 2025-02-19 | End: 2025-02-21

## 2025-02-19 RX ORDER — IPRATROPIUM BROMIDE AND ALBUTEROL SULFATE .5; 2.5 MG/3ML; MG/3ML
3 SOLUTION RESPIRATORY (INHALATION) EVERY 6 HOURS
Refills: 0 | Status: DISCONTINUED | OUTPATIENT
Start: 2025-02-19 | End: 2025-02-21

## 2025-02-19 RX ORDER — KETOROLAC TROMETHAMINE 30 MG/ML
15 INJECTION, SOLUTION INTRAMUSCULAR; INTRAVENOUS ONCE
Refills: 0 | Status: DISCONTINUED | OUTPATIENT
Start: 2025-02-19 | End: 2025-02-19

## 2025-02-19 RX ORDER — DEXTROSE 50 % IN WATER 50 %
25 SYRINGE (ML) INTRAVENOUS ONCE
Refills: 0 | Status: DISCONTINUED | OUTPATIENT
Start: 2025-02-19 | End: 2025-03-03

## 2025-02-19 RX ORDER — INSULIN LISPRO 100 U/ML
8 INJECTION, SOLUTION INTRAVENOUS; SUBCUTANEOUS
Refills: 0 | Status: DISCONTINUED | OUTPATIENT
Start: 2025-02-19 | End: 2025-02-21

## 2025-02-19 RX ORDER — MAGNESIUM SULFATE 500 MG/ML
2 SYRINGE (ML) INJECTION ONCE
Refills: 0 | Status: COMPLETED | OUTPATIENT
Start: 2025-02-19 | End: 2025-02-19

## 2025-02-19 RX ORDER — B1/B2/B3/B5/B6/B12/VIT C/FOLIC 500-0.5 MG
1 TABLET ORAL DAILY
Refills: 0 | Status: DISCONTINUED | OUTPATIENT
Start: 2025-02-19 | End: 2025-03-03

## 2025-02-19 RX ORDER — INSULIN LISPRO 100 U/ML
INJECTION, SOLUTION INTRAVENOUS; SUBCUTANEOUS
Refills: 0 | Status: DISCONTINUED | OUTPATIENT
Start: 2025-02-19 | End: 2025-02-20

## 2025-02-19 RX ORDER — GABAPENTIN 400 MG/1
200 CAPSULE ORAL ONCE
Refills: 0 | Status: COMPLETED | OUTPATIENT
Start: 2025-02-19 | End: 2025-02-19

## 2025-02-19 RX ORDER — ATORVASTATIN CALCIUM 80 MG/1
20 TABLET, FILM COATED ORAL AT BEDTIME
Refills: 0 | Status: DISCONTINUED | OUTPATIENT
Start: 2025-02-19 | End: 2025-03-03

## 2025-02-19 RX ORDER — SENNA 187 MG
2 TABLET ORAL AT BEDTIME
Refills: 0 | Status: DISCONTINUED | OUTPATIENT
Start: 2025-02-19 | End: 2025-02-28

## 2025-02-19 RX ORDER — DEXTROSE 50 % IN WATER 50 %
12.5 SYRINGE (ML) INTRAVENOUS ONCE
Refills: 0 | Status: DISCONTINUED | OUTPATIENT
Start: 2025-02-19 | End: 2025-03-03

## 2025-02-19 RX ORDER — INSULIN GLARGINE-YFGN 100 [IU]/ML
16 INJECTION, SOLUTION SUBCUTANEOUS AT BEDTIME
Refills: 0 | Status: DISCONTINUED | OUTPATIENT
Start: 2025-02-19 | End: 2025-02-21

## 2025-02-19 RX ORDER — ACETAMINOPHEN 500 MG/5ML
1000 LIQUID (ML) ORAL ONCE
Refills: 0 | Status: COMPLETED | OUTPATIENT
Start: 2025-02-19 | End: 2025-02-19

## 2025-02-19 RX ORDER — INSULIN LISPRO 100 U/ML
INJECTION, SOLUTION INTRAVENOUS; SUBCUTANEOUS AT BEDTIME
Refills: 0 | Status: DISCONTINUED | OUTPATIENT
Start: 2025-02-19 | End: 2025-02-20

## 2025-02-19 RX ORDER — CEFEPIME 2 G/20ML
2000 INJECTION, POWDER, FOR SOLUTION INTRAVENOUS
Refills: 0 | Status: DISCONTINUED | OUTPATIENT
Start: 2025-02-19 | End: 2025-02-21

## 2025-02-19 RX ORDER — SILDENAFIL 50 MG/1
20 TABLET, FILM COATED ORAL THREE TIMES A DAY
Refills: 0 | Status: DISCONTINUED | OUTPATIENT
Start: 2025-02-19 | End: 2025-03-03

## 2025-02-19 RX ORDER — ENOXAPARIN SODIUM 100 MG/ML
40 INJECTION SUBCUTANEOUS EVERY 24 HOURS
Refills: 0 | Status: DISCONTINUED | OUTPATIENT
Start: 2025-02-19 | End: 2025-03-03

## 2025-02-19 RX ORDER — ASPIRIN 325 MG
81 TABLET ORAL DAILY
Refills: 0 | Status: DISCONTINUED | OUTPATIENT
Start: 2025-02-19 | End: 2025-03-03

## 2025-02-19 RX ORDER — POLYETHYLENE GLYCOL 3350 17 G/17G
17 POWDER, FOR SOLUTION ORAL
Refills: 0 | Status: DISCONTINUED | OUTPATIENT
Start: 2025-02-19 | End: 2025-02-28

## 2025-02-19 RX ADMIN — METHYLPREDNISOLONE ACETATE 20 MILLIGRAM(S): 80 INJECTION, SUSPENSION INTRA-ARTICULAR; INTRALESIONAL; INTRAMUSCULAR; SOFT TISSUE at 23:17

## 2025-02-19 RX ADMIN — IPRATROPIUM BROMIDE AND ALBUTEROL SULFATE 3 MILLILITER(S): .5; 2.5 SOLUTION RESPIRATORY (INHALATION) at 10:32

## 2025-02-19 RX ADMIN — Medication 250 MILLILITER(S): at 04:33

## 2025-02-19 RX ADMIN — Medication 1 DOSE(S): at 22:59

## 2025-02-19 RX ADMIN — ATORVASTATIN CALCIUM 20 MILLIGRAM(S): 80 TABLET, FILM COATED ORAL at 22:25

## 2025-02-19 RX ADMIN — Medication 2 TABLET(S): at 22:25

## 2025-02-19 RX ADMIN — MIDODRINE HYDROCHLORIDE 10 MILLIGRAM(S): 5 TABLET ORAL at 00:00

## 2025-02-19 RX ADMIN — INSULIN LISPRO 6: 100 INJECTION, SOLUTION INTRAVENOUS; SUBCUTANEOUS at 22:26

## 2025-02-19 RX ADMIN — Medication 400 MILLIGRAM(S): at 05:47

## 2025-02-19 RX ADMIN — Medication 25 GRAM(S): at 06:28

## 2025-02-19 RX ADMIN — INSULIN LISPRO 6: 100 INJECTION, SOLUTION INTRAVENOUS; SUBCUTANEOUS at 12:22

## 2025-02-19 RX ADMIN — ENOXAPARIN SODIUM 40 MILLIGRAM(S): 100 INJECTION SUBCUTANEOUS at 20:04

## 2025-02-19 RX ADMIN — Medication 650 MILLIGRAM(S): at 10:32

## 2025-02-19 RX ADMIN — POLYETHYLENE GLYCOL 3350 17 GRAM(S): 17 POWDER, FOR SOLUTION ORAL at 17:52

## 2025-02-19 RX ADMIN — KETOROLAC TROMETHAMINE 15 MILLIGRAM(S): 30 INJECTION, SOLUTION INTRAMUSCULAR; INTRAVENOUS at 12:54

## 2025-02-19 RX ADMIN — GABAPENTIN 200 MILLIGRAM(S): 400 CAPSULE ORAL at 12:54

## 2025-02-19 RX ADMIN — GABAPENTIN 400 MILLIGRAM(S): 400 CAPSULE ORAL at 22:25

## 2025-02-19 RX ADMIN — METHYLPREDNISOLONE ACETATE 20 MILLIGRAM(S): 80 INJECTION, SUSPENSION INTRA-ARTICULAR; INTRALESIONAL; INTRAMUSCULAR; SOFT TISSUE at 15:09

## 2025-02-19 RX ADMIN — CEFEPIME 100 MILLIGRAM(S): 2 INJECTION, POWDER, FOR SOLUTION INTRAVENOUS at 10:33

## 2025-02-19 RX ADMIN — SILDENAFIL 20 MILLIGRAM(S): 50 TABLET, FILM COATED ORAL at 17:52

## 2025-02-19 RX ADMIN — IPRATROPIUM BROMIDE AND ALBUTEROL SULFATE 3 MILLILITER(S): .5; 2.5 SOLUTION RESPIRATORY (INHALATION) at 17:21

## 2025-02-19 RX ADMIN — INSULIN GLARGINE-YFGN 16 UNIT(S): 100 INJECTION, SOLUTION SUBCUTANEOUS at 23:02

## 2025-02-19 RX ADMIN — Medication 1 TABLET(S): at 12:22

## 2025-02-19 RX ADMIN — MIDODRINE HYDROCHLORIDE 5 MILLIGRAM(S): 5 TABLET ORAL at 10:32

## 2025-02-19 RX ADMIN — Medication 1000 MILLILITER(S): at 00:05

## 2025-02-19 RX ADMIN — Medication 81 MILLIGRAM(S): at 12:22

## 2025-02-19 RX ADMIN — IPRATROPIUM BROMIDE AND ALBUTEROL SULFATE 3 MILLILITER(S): .5; 2.5 SOLUTION RESPIRATORY (INHALATION) at 23:05

## 2025-02-19 NOTE — CONSULT NOTE ADULT - ASSESSMENT
89 y/o F PMhx CHF, anemia, pHTN, CAD, CVA, DMII, COPD (2L NC PRN, qhs), PVD, HTN, and ?lung CA (pending RT) who presented w/ dysuria x 5 days as well as SOB and rigors.    L lung cancer, PNA, UTI  sepsis- fever, leukocytosis  RVP negative  CTA chest- negative for PE but demonstrated increase in groundglass opacities and subpleural consolidations in bilateral lungs; Interlobular septal thickening in the upper lobes, which can be suggestive of interstitial edema 3.1 cm masslike consolidation in left lower lobe. Additional smaller nodules in bilateral lungs. Subpleural reticulations, Honeycombing, and peripheral cystic changes in bilateral lungs, suggestive of interstitial lung fibrosis.  UA positive  pt w/ dysuria, no flank tenderness    Recommendations  c/w cefepime 2g every 12 hours (renally adjusted)  hold off vancomycin for now  - if MRSA PCR positive restart vanc  f/u blood cultures  f/u urine cx  strep urine Ag ordered  legionella urine Ag ordered    Aldo Verduzco M.D.  [facility]  [phone]  After 5pm on weekdays and all day on weekends - please call [phone]  Available on [employer] teams    Thank you for consulting us and involving us in the management of this patients case. In addition to reviewing history, imaging, documents, labs, microbiology, took into account antibiotic stewardship, local antibiogram and infection control strategies and potential transmission issues.  89 y/o F PMhx CHF, anemia, pHTN, CAD, CVA, DMII, COPD (2L NC PRN, qhs), PVD, HTN, and ?lung CA (pending RT) who presented w/ dysuria x 5 days as well as SOB and rigors.    L lung cancer, PNA, UTI  sepsis- fever, leukocytosis  RVP negative  CTA chest- negative for PE but demonstrated increase in groundglass opacities and subpleural consolidations in bilateral lungs; Interlobular septal thickening in the upper lobes, which can be suggestive of interstitial edema 3.1 cm masslike consolidation in left lower lobe. Additional smaller nodules in bilateral lungs. Subpleural reticulations, Honeycombing, and peripheral cystic changes in bilateral lungs, suggestive of interstitial lung fibrosis.  UA positive  pt w/ dysuria, no flank tenderness    Recommendations  c/w cefepime 2g every 12 hours (renally adjusted)  hold off vancomycin for now  - if MRSA PCR positive restart vanc  f/u blood cultures  f/u urine cx  strep urine Ag ordered  legionella urine Ag ordered  TTE pending    Aldo Verduzco M.D.  [facility]  [phone]  After 5pm on weekdays and all day on weekends - please call [phone]  Available on [employer] teams    Thank you for consulting us and involving us in the management of this patients case. In addition to reviewing history, imaging, documents, labs, microbiology, took into account antibiotic stewardship, local antibiogram and infection control strategies and potential transmission issues.

## 2025-02-19 NOTE — H&P ADULT - NSHPLABSRESULTS_GEN_ALL_CORE
LABS:                        8.6    11.15 )-----------( 389      ( 19 Feb 2025 09:14 )             27.3     02-19    133[L]  |  95[L]  |  24[H]  ----------------------------<  240[H]  4.0   |  25  |  0.97    Ca    8.1[L]      19 Feb 2025 09:14  Phos  2.5     02-19  Mg     3.10     02-19    TPro  6.4  /  Alb  3.5  /  TBili  0.4  /  DBili  x   /  AST  7   /  ALT  <5  /  AlkPhos  86  02-19      Urinalysis Basic - ( 19 Feb 2025 09:14 )    Color: x / Appearance: x / SG: x / pH: x  Gluc: 240 mg/dL / Ketone: x  / Bili: x / Urobili: x   Blood: x / Protein: x / Nitrite: x   Leuk Esterase: x / RBC: x / WBC x   Sq Epi: x / Non Sq Epi: x / Bacteria: x      CAPILLARY BLOOD GLUCOSE      POCT Blood Glucose.: 262 mg/dL (19 Feb 2025 07:55)      RADIOLOGY & ADDITIONAL TESTS: Reviewed.

## 2025-02-19 NOTE — H&P ADULT - PROBLEM SELECTOR PLAN 12
- F: none  - E: replete K<4, Mg<2   - N: DASH/TLC  - D: lovenox 40mg q24h  - G: protonix 40mg daily    code:  dispo: pending medical optimization and PT eval - F: none  - E: replete K<4, Mg<2   - N: DASH/TLC  - D: lovenox 40mg q24h  - G: protonix 40mg daily    code: full  dispo: pending medical optimization and PT eval

## 2025-02-19 NOTE — CONSULT NOTE ADULT - TIME BILLING
Agree with above ACP note.  A/P    89 yo F with PMH CHF, valvular disease, anemia, pHTN, CAD, CVA (no deficits), T2D, [place], COPD (2L NC PRN, qhs), PVD, HTN, HLD, and ?lung CA (pending RT) p/w dysuria x 1wk along with tremors and SOB + pna    #PNA  -CTa w/op PE  -3.1 cm mass like consolidation in left lower lobe, Mediastinal and hilar lymphadenopathy which can be reactive or due to  metastasis  -f/u bx  -abx  -iv diuretics PRN    #hypotension   -micu eval noted  -hypotensive, responded to ivf, mido  -monitor BP  -f/u ECHO   -sepsis work up   -not a candidate for MICU per team   -cont mido as needed  -no ACS  -ivf with close monitoring for decomp HF, pulmonary edema  #CAD s/p PCI  -stable  -cont asa, statin, BB  #HFpEF  -check ECHO   -iv lasix for now given hypotension, sepsis picture, and likely intravascular hypovolemia   #Pulmonary HTN.   -home sildenafil 20mg TID as bp toplerates  -f/u echo   #Lung CA  -med/ pulm fu   -pending RT initiation as outpt   #UTI   -management per med /ID  dvt ppx

## 2025-02-19 NOTE — CONSULT NOTE ADULT - ATTENDING COMMENTS
89F history of COPD on 2 L NC, CAD, DM 2, pulmonary hypertension, lung CA presenting with chills and rigors at home.  States that she has similar feeling with prior urinary tract infections.  Was found to be hypotensive and hypoxic with UA positive for UTI and chest imaging concerning for pneumonia.  MICU consulted for septic shock secondary to above.    On exam, patient resting comfortably, normotensive with , MAP mid 70s, afebrile, no tachycardia, saturating 98% on 2 L NC.  Rales at the right lung base, left side with diminished sounds at bases, abdomen soft nontender, no peripheral edema    Labs notable for leukocytosis, elevated glucose and proBNP, UA suggestive of urinary tract infection, CT imaging showing no evidence of PE however development of multifocal large infiltrates and groundglass opacities.    89F with severe sepsis secondary to likely pneumonia versus UTI.  No evidence of encephalopathy at this time.  Despite severe sepsis patient is maintaining normal maps after 1 L crystalloid and midodrine.  Bedside echo shows compromise EF estimated 35 to 40% which does appear to be new from prior.  Despite his IVC is flat and fully collapsible, scattered B-lines diffusely.  Would continue recommend judicious IV fluids.  No indication for vasopressor initiation at this time.  Obtain official TTE.  Twelve-lead EKG.  Delta troponin negligible.  Continue broad-spectrum antibiotics, send and trend procalcitonin, urine antigens studies, MRSA PCR, sputum and panculture.  Rest of management per primary team, currently patient does not require MICU level of care.  Please call with questions.    KRISSY Morales MD  Critical Care Medicine

## 2025-02-19 NOTE — H&P ADULT - ASSESSMENT
Pt is a 89 yo F with PMH CHF, anemia, pHTN, CAD, CVA (no deficits), T2D, [place], COPD (2L NC PRN, qhs), PVD, HTN, HLD, and ?lung CA (pending RT) p/w dysuria x 1wk along with tremors and SOB. On arrival, meeting SIRS criteria with hypotension and hypoxia requiring 2L NC. EKG with sinus tachycardia, no ischemic changes. ER POCUS with diffuse B lines. Labs with leukocytosis, normocytic anemia, trop neg x2, BNP 1406, lactate neg, UA+ with UCx and BCx in lab, RVP neg, MRSA in lab. CTA chest neg PE but with multifocal PNA, pulmonary edema, and 2.8x2.7cm mass LLL interval inc compared to prior. MICU consulted for hypotension with c/f need for pressors, however, improved with IVF and midodrine; started on cefepime. ID consulted and PT consulted with recs pending. Pt is a 89 yo F with PMH CHF, anemia, pHTN, CAD, CVA (no deficits), T2D, [place], COPD (2L NC PRN, qhs), PVD, HTN, HLD, and ?lung CA (pending RT) p/w dysuria x 1wk along with tremors and SOB. On arrival, meeting SIRS criteria with hypotension and hypoxia requiring 2L NC. EKG with sinus tachycardia, no ischemic changes. ER POCUS with diffuse B lines. Labs with leukocytosis, normocytic anemia, trop neg x2, BNP 1406, lactate neg, UA+ with UCx and BCx in lab, RVP neg, MRSA in lab. CTA chest neg PE but with multifocal PNA, pulmonary edema, and 2.8x2.7cm mass LLL interval inc compared to prior. MICU consulted for hypotension with c/f need for pressors, however, improved with IVF and midodrine; started on cefepime. ID + pulm consulted and PT consulted with recs pending.

## 2025-02-19 NOTE — PROVIDER CONTACT NOTE (CHANGE IN STATUS NOTIFICATION) - RECOMMENDATIONS
called for help, ED MD team and float nurses called to bedside, pt placed on ZOLL, EKG obtained, rpt VS are stable.

## 2025-02-19 NOTE — H&P ADULT - PROBLEM SELECTOR PLAN 4
- known hx CHF   - 10/2024 TTE EF 60%, mild MV stenosis, mod AS, mild-mod TR  - POCUS in ER c/f reduced EF  - CXR with pulmonary vascular congestion, CTA chest with pulmonary edema   - f/u TTE  - home med: lasix 40mg daily, lopressor as below -- on hold 2/2 sepsis and hypotension  - cautious with fluids

## 2025-02-19 NOTE — PROVIDER CONTACT NOTE (CHANGE IN STATUS NOTIFICATION) - SITUATION
pt had episode of tremors and shortness of breath, and cardiac monitor resembling ventricular tachycardia

## 2025-02-19 NOTE — PROVIDER CONTACT NOTE (CHANGE IN STATUS NOTIFICATION) - ASSESSMENT
pt states she does not feel well, is hyperventilating and says that this has how she felt that prompted her ER visit

## 2025-02-19 NOTE — CONSULT NOTE ADULT - SUBJECTIVE AND OBJECTIVE BOX
CHIEF COMPLAINT:     HPI:  89F w/ COPD on 2L NC PRN, CVA w/o deficits, CAD s/p stent, T2DM, Pulmonary hypertension and recent dx of lung cancer planned for radiation therapy coming in with 6-day hx of dysuria, shaking and dyspnea. Patient states she has had UTIs in the past and this feels like a UTI. She has had burning with urinating, but denies any fevers. She also has a productive cough which she says has been chronic. At the ED, she had a T of 102F HR in 110s and BP down to 80s/40s MAP in 50s. She was given a dose of Midodrine 10mg x1, 1L fluid bolus and started on Cefepime and Vancomycin in the ED. Her BP improved to 110s/40s MAP 70s.   MICU was consulted for hypotension.    PAST MEDICAL & SURGICAL HISTORY:  HTN (hypertension)      CVA (cerebral vascular accident)      HLD (hyperlipidemia)      DM2 (diabetes mellitus, type 2)      Anemia      PVD (peripheral vascular disease)      Solitary pulmonary nodule      Chronic diastolic heart failure      H/O hearing loss      History of COPD      CAD (coronary artery disease)      Pulmonary hypertension      CAD (coronary artery disease)  cardiac stent      S/P vascular bypass  left leg      Bilateral cataracts      H/O rotator cuff surgery        FAMILY HISTORY:  FH: heart disease (Child)      Allergies    penicillin (Unknown)  macrolide antibiotics (Other)  Biaxin (Unknown)    Intolerances        HOME MEDICATIONS:    REVIEW OF SYSTEMS:  [x] All other systems negative  [ ] Unable to assess ROS because ________    OBJECTIVE:  ICU Vital Signs Last 24 Hrs  T(C): 36.8 (2025 01:19), Max: 39.2 (2025 20:48)  T(F): 98.2 (2025 01:19), Max: 102.5 (2025 20:48)  HR: 70 (2025 03:26) (69 - 113)  BP: 112/51 (2025 03:41) (81/41 - 159/78)  BP(mean): 69 (2025 03:41) (58 - 73)  ABP: --  ABP(mean): --  RR: 13 (2025 03:21) (13 - 20)  SpO2: 100% (2025 23:19) (88% - 100%)    O2 Parameters below as of 2025 03:21  Patient On (Oxygen Delivery Method): nasal cannula  O2 Flow (L/min): 2            CAPILLARY BLOOD GLUCOSE      POCT Blood Glucose.: 361 mg/dL (2025 17:00)      PHYSICAL EXAM:  General: Patient in NAD  HEENT: NCAT, EOMI, no oral lesions  CV: S1+S2, no m/r/g appreciated   Lungs: No respiratory distress, audible upper airway congestion when cough  Abd: Soft, nontender, no guarding, no rebound tenderness, + bowel sounds   : No suprapubic tenderness  Neuro: Alert and oriented to time, place and person. No focal deficits noted.   Ext: No cyanosis, no edema  Skin: No rash, no phlebitis    POCUS w/ diffuse B-lines, collapsed IVC, dilated RV and reduced LV function.       HOSPITAL MEDICATIONS:  MEDICATIONS  (STANDING):  sodium chloride 0.9% Bolus 250 milliLiter(s) IV Bolus once    MEDICATIONS  (PRN):      LABS:                        10.0   12.67 )-----------( 439      ( 2025 20:07 )             30.5     02-18    130[L]  |  91[L]  |  28[H]  ----------------------------<  366[H]  4.3   |  27  |  1.04    Ca    9.5      2025 20:07  Phos  2.3     02-19  Mg     1.70         TPro  7.4  /  Alb  3.9  /  TBili  0.4  /  DBili  x   /  AST  15  /  ALT  8   /  AlkPhos  111  -18      Urinalysis Basic - ( 2025 20:15 )    Color: Yellow / Appearance: Cloudy / S.015 / pH: x  Gluc: x / Ketone: Negative mg/dL  / Bili: Negative / Urobili: 0.2 mg/dL   Blood: x / Protein: 30 mg/dL / Nitrite: Negative   Leuk Esterase: Moderate / RBC: 6 /HPF / WBC 25 /HPF   Sq Epi: x / Non Sq Epi: 4 /HPF / Bacteria: Few /HPF        Venous Blood Gas:   @ 20:07  7.43/47/27/31/43.7  VBG Lactate: 1.8      MICROBIOLOGY:     RADIOLOGY:  [x] Reviewed and interpreted by me    EKG: Reviewed

## 2025-02-19 NOTE — H&P ADULT - PROBLEM SELECTOR PLAN 6
- CTA chest with interval increase in LLL mass from prior, 2.8x2.7cm  - pending RT initiation outpt? - CTA chest with interval increase in LLL mass from prior, 2.8x2.7cm  - pending RT initiation outpt  - outpt f/u

## 2025-02-19 NOTE — H&P ADULT - PROBLEM SELECTOR PLAN 8
- home meds: lasix 40mg daily, lopressor 25mg am/12.5mg pm  - hold home meds in setting of hypotension; resume as able

## 2025-02-19 NOTE — CHART NOTE - NSCHARTNOTEFT_GEN_A_CORE
ID consult called - Dr. Verduzco. Re-ordered Cefepime per MICU recs. Will adjust antibiotic regimen as needed.

## 2025-02-19 NOTE — CONSULT NOTE ADULT - SUBJECTIVE AND OBJECTIVE BOX
CARDIOLOGY CONSULT - Dr. Valenzuela         HPI:  Pt is a 89 yo F with PMH CHF, anemia, pHTN, CAD, CVA (no deficits), T2D, [place], COPD (2L NC PRN, qhs), PVD, HTN, HLD, and ?lung CA (pending RT) p/w dysuria x 1wk along with tremors and SOB. Says she had been doing better post-discharge from last hospitalization and was pending radiation initiation. Lives with daughter who recently had a URI but states kept her distance at home. At time of exam, complaining of abdominal pressure without SOB. Says that she always feels cold and that her feet have neuropathic pain from missing a dose of gabapentin. Otherwise denies complaints. Resumed her metformin in the last month with remaining meds unchanged.    On arrival, T 102.5, , /78 --- SBP 80s, RR 20 O2sat 88% RA -- 2L NC. EKG with sinus tachycardia, no ischemic changes. ER POCUS with diffuse B lines. Labs with leukocytosis, normocytic anemia, trop neg x2, BNP 1406, lactate neg, UA+ with UCx and BCx in lab, RVP neg, MRSA in lab. CTA chest neg PE but with multifocal PNA, pulmonary edema, and 2.8x2.7cm mass LLL interval inc compared to prior. MICU consulted for hypotension with c/f need for pressors, however, improved with IVF and midodrine. Pt given tylenol, cefepime, and vancomycin.\  pt is known from prior admissions   no cp on exam -  ECHO 10/7/24: nl LV sys fx EF 60% . Mild mitral valve stenosis. Mild to moderate tricuspid regurgitation. mod AS   ROS Otherwise negative           PAST MEDICAL & SURGICAL HISTORY:  HTN (hypertension)      CVA (cerebral vascular accident)      HLD (hyperlipidemia)      DM2 (diabetes mellitus, type 2)      Anemia      PVD (peripheral vascular disease)      Solitary pulmonary nodule      Chronic diastolic heart failure      H/O hearing loss      History of COPD      CAD (coronary artery disease)      Pulmonary hypertension      CAD (coronary artery disease)  cardiac stent      S/P vascular bypass  left leg      Bilateral cataracts      H/O rotator cuff surgery            PREVIOUS DIAGNOSTIC TESTING:    [ ] Echocardiogram:  < from: TTE W or WO Ultrasound Enhancing Agent (10.07.24 @ 15:53) >     CONCLUSIONS:      1. Technically difficult image quality.   2. Left ventricular systolic function is normal with an ejection fraction of 60 % by Gomez's method of disks.   3. Normal left and right atrial size.   4. Normal right ventricular cavity size and probably normal right ventricular systolic function.   5. There is calcification of the mitral valve annulus.   6. Mild mitral valve stenosis.   7. Trileaflet aortic valve with reduced systolic excursion. There is calcification of the aortic valve leaflets. Moderate aortic stenosis.   8. The peak transaortic velocity is 1.95 m/s, peak transaortic gradient is 15.2 mmHg and mean transaortic gradient is 9.0 mmHg with an LVOT/aortic valve VTI ratio of 0.47. The effective orifice area is estimated at 1.47 cm² by the continuity equation and 1.38 cm² by 2D planimetry.   9. Mild to moderate tricuspid regurgitation.  10. No pericardial effusion seen.    < end of copied text >    [ ]  Catheterization:  [ ] Stress Test:  	    MEDICATIONS:  Home Medications:  Advair Diskus 250 mcg-50 mcg inhalation powder: 1 puff(s) inhaled 2 times a day (13 Dec 2024 23:00)  aspirin 81 mg oral delayed release tablet: 1 tab(s) orally once a day (at bedtime) (13 Dec 2024 23:00)  atorvastatin 20 mg oral tablet: 1 tab(s) orally once a day (at bedtime) (13 Dec 2024 23:00)  furosemide 20 mg oral tablet: 2 tab(s) orally once a day (13 Dec 2024 22:55)  gabapentin 400 mg oral capsule: 1 cap(s) orally once a day (at bedtime) (13 Dec 2024 23:00)  ipratropium 42 mcg/inh (0.06%) nasal spray: 2 spray(s) intranasally 2 times a day (13 Dec 2024 23:00)  metFORMIN 1000 mg oral tablet: 1 tab(s) orally once a day (19 Feb 2025 12:37)  Metoprolol Tartrate 25 mg oral tablet: 0.5 tab(s) orally 2 times a day takes 2 half tabs in morning and 1 half tab at night (13 Dec 2024 22:59)  Multiple Vitamins oral tablet: 1 tab(s) orally once a day (13 Dec 2024 23:00)  omeprazole 40 mg oral delayed release capsule: 1 cap(s) orally once a day (13 Dec 2024 23:00)  polyethylene glycol 3350 oral powder for reconstitution: 17 gram(s) orally 2 times a day (18 Dec 2024 13:08)  PreserVision AREDS 2 oral capsule: 1 cap(s) orally 2 times a day (13 Dec 2024 23:00)  senna leaf extract oral tablet: 2 tab(s) orally once a day (at bedtime) (18 Dec 2024 13:08)  sildenafil 20 mg oral tablet: 1 tab(s) orally 3 times a day (13 Dec 2024 22:55)  Tresiba FlexTouch 100 units/mL subcutaneous solution: 10 unit(s) subcutaneous once a day (at bedtime) Take tresiba 10 units subcutaneous at bedtime starting tonight at bedtime (13 Dec 2024 23:00)      MEDICATIONS  (STANDING):  albuterol/ipratropium for Nebulization 3 milliLiter(s) Nebulizer every 6 hours  aspirin enteric coated 81 milliGRAM(s) Oral daily  atorvastatin 20 milliGRAM(s) Oral at bedtime  cefepime   IVPB 2000 milliGRAM(s) IV Intermittent <User Schedule>  dextrose 5%. 1000 milliLiter(s) (50 mL/Hr) IV Continuous <Continuous>  dextrose 5%. 1000 milliLiter(s) (100 mL/Hr) IV Continuous <Continuous>  dextrose 50% Injectable 25 Gram(s) IV Push once  dextrose 50% Injectable 12.5 Gram(s) IV Push once  dextrose 50% Injectable 25 Gram(s) IV Push once  enoxaparin Injectable 40 milliGRAM(s) SubCutaneous every 24 hours  fluticasone propionate/ salmeterol 250-50 MICROgram(s) Diskus 1 Dose(s) Inhalation two times a day  gabapentin 400 milliGRAM(s) Oral at bedtime  glucagon  Injectable 1 milliGRAM(s) IntraMuscular once  insulin lispro (ADMELOG) corrective regimen sliding scale   SubCutaneous three times a day before meals  insulin lispro (ADMELOG) corrective regimen sliding scale   SubCutaneous at bedtime  methylPREDNISolone sodium succinate Injectable 20 milliGRAM(s) IV Push every 8 hours  multivitamin 1 Tablet(s) Oral daily  pantoprazole    Tablet 40 milliGRAM(s) Oral before breakfast  polyethylene glycol 3350 17 Gram(s) Oral two times a day  senna 2 Tablet(s) Oral at bedtime  sildenafil (REVATIO) 20 milliGRAM(s) Oral three times a day      FAMILY HISTORY:  FH: heart disease (Child)        SOCIAL HISTORY:    [ x] Non-smoker  [ ] Smoker  [ ] Alcohol    Allergies    penicillin (Unknown)  macrolide antibiotics (Other)  Biaxin (Unknown)    Intolerances    	    REVIEW OF SYSTEMS:  CONSTITUTIONAL: No fever, weight loss, or fatigue  EYES: No eye pain, visual disturbances, or discharge  ENMT:  No difficulty hearing, tinnitus, vertigo; No sinus or throat pain  NECK: No pain or stiffness  RESPIRATORY: No cough, wheezing, chills or hemoptysis; + Shortness of Breath  CARDIOVASCULAR: No chest pain, palpitations, passing out, dizziness, or leg swelling  GASTROINTESTINAL: No abdominal or epigastric pain. No nausea, vomiting, or hematemesis; No diarrhea or constipation. No melena or hematochezia.  GENITOURINARY: No dysuria, frequency, hematuria, or incontinence  NEUROLOGICAL: No headaches, memory loss, loss of strength, numbness, or tremors  SKIN: No itching, burning, rashes, or lesions   	    [x ] All others negative	  [ ] Unable to obtain    PHYSICAL EXAM:  T(C): 36.4 (02-19-25 @ 08:40), Max: 39.2 (02-18-25 @ 20:48)  HR: 72 (02-19-25 @ 10:08) (68 - 113)  BP: 111/54 (02-19-25 @ 10:08) (81/41 - 159/78)  RR: 18 (02-19-25 @ 10:08) (13 - 20)  SpO2: 100% (02-19-25 @ 10:08) (88% - 100%)  Wt(kg): --  I&O's Summary      Appearance: Normal	  Psychiatry: A & O x 3, Mood & affect appropriate  HEENT:   Normal oral mucosa, PERRL, EOMI	  Lymphatic: No lymphadenopathy  Cardiovascular: Normal S1 S2,RRR, No JVD, No murmurs  Respiratory:  rhonchi  Gastrointestinal:  Soft, Non-tender, + BS	  Skin: No rashes, No ecchymoses, No cyanosis	  Neurologic: Non-focal  Extremities: Normal range of motion, No clubbing, cyanosis or edema  Vascular: Peripheral pulses palpable 2+ bilaterally    TELEMETRY: 	    ECG:  	nsr 78 pvc  RADIOLOGY:  OTHER: 	       from: CT Angio Chest PE Protocol w/ IV Cont (02.19.25 @ 01:11) >  FINDINGS:  Lines and tubes: None    Pulmonary arteries: There is a good bolus of contrast in the pulmonary   arterial system. There is opacification through the distal subsegmental   level. There is no respiratory motion artifact. The main pulmonary artery   is normal in size. The heart size is within normal limits. The right   heart is not enlarged.    Mediastinum: The thyroid and thoracic inlet are normal. There are   enlarged mediastinal lymph nodes measuring up to 1.2 cm in pericarinal   and 1.3 cm in subcarinal region. There is 1.5 x 1.7 cm right infrahilar   lymph node. No pericardial effusion is present. The esophagus is normal.    Lung: There is secretion in trachea, extending to left main stem   bronchus. On background of emphysema and peripheral cystic lucencies,   there is diffuse groundglass opacity in bilateral lungs. These area of   groundglass opacity are more consolidative in the history and the base   bilateral lungs. There is mild interlobular septal thickening in the   upper lobes. There is a 3.1 cm masslike consolidation in left lower lobe.   Smaller nodules are present (for instance 6 mm nodule in left upper lobe   on series 302, image 107).    Pleura: No effusions or pneumothorax.    Abdomen: Colonic diverticula are noted.The visualized superior abdomen is   normal.    Bone and soft tissue: The osseous structures and visualized soft tissues   demonstrate no acute abnormality.    IMPRESSION:  No pulmonary embolism.  Interval increase in groundglass opacities and subpleural consolidations   in bilateral lungs, likely infectious. Interlobularseptal thickening in   the upper lobes, which can be suggestive of interstitial edema  3.1 cm mass like consolidation in left lower lobe. Additional smaller   nodules in bilateral lungs.  Mediastinal and hilar lymphadenopathy which can be reactive or due to   metastasis.  Subpleural reticulations, [place], and peripheral cystic changes in   bilateral lungs, suggestive of interstitial lung fibrosis.        	  LABS:	 	    CARDIAC MARKERS:  Troponin T, High Sensitivity Result: 32 ng/L (02-19 @ 01:13)  Troponin T, High Sensitivity Result: 33 ng/L (02-18 @ 20:07)                                  8.6    11.15 )-----------( 389      ( 19 Feb 2025 09:14 )             27.3     02-19    133[L]  |  95[L]  |  24[H]  ----------------------------<  240[H]  4.0   |  25  |  0.97    Ca    8.1[L]      19 Feb 2025 09:14  Phos  2.5     02-19  Mg     3.10     02-19    TPro  6.4  /  Alb  3.5  /  TBili  0.4  /  DBili  x   /  AST  7   /  ALT  <5  /  AlkPhos  86  02-19      proBNP:   Lipid Profile:   HgA1c:   TSH: Thyroid Stimulating Hormone, Serum: 1.95 uIU/mL (02-19 @ 09:14)

## 2025-02-19 NOTE — CONSULT NOTE ADULT - ASSESSMENT
A/P: Pt is a 89 yo F with PMH CHF, anemia, pHTN, CAD, CVA (no deficits), T2D, [place], COPD (2L NC PRN, qhs), PVD, HTN, HLD, and ?lung CA (pending RT) p/w dysuria x 1wk along with tremors and SOB, found to be hyperglycemic and will be on steroids. Endocrinology was consulted for management of diabetes mellitus.    #Type 2 Diabetes Mellitus  - Ordered to start methylpred 20 mg q8h 2/19. No steroids received yet  - HbA1c 9.7%    ; home regimen: Tresiba 10 units at bedtime, metformin 500mg 2 tabs daily   - Recommend lantus 16 units daily- recommend dosing lantus now as pt is hyperglycemic prior to steroids. Then 2/20 dose can be at 6pm, pushing it closer to QHS dosing each day  - Recommend admelog 8 units TIDQAC  - Recommend moderate admelog correction scale TIDQAC and QHS  - Please check FSG before meals and QHS, or q6h while NPO  - RD consult  - hypoglycemia orderset prn  - Discharge planning:  depends on steroids- please inform endocrine of steroid plans  tresiba + metformin 1 g bid if egfr ok. Vs basal/bolus+metformin if going home on insulin.    #Hypertension  - BP goal <130/80  - Management as per primary team, not on meds now    #Hyperlipidemia  - ordered for atorvastatin 20 mg    primary team placed insulin orders     Angelica Wakefield MD  Attending Physician   Department of Endocrinology, Diabetes and Metabolism   [employer] Teams for 02-19-25 @ 13:48.    If before 9AM or after 5PM, or on weekends/holidays, please call the Endocrine answering service for assistance ([phone]).  For nonurgent matters, please email [email or web address] for assistance.     Please note that a different provider may be following this patient each day.

## 2025-02-19 NOTE — H&P ADULT - PROBLEM SELECTOR PLAN 11
- not in acute exacerbation; baseline O2 req 2L NC PRN/qhs  - currently at baseline O2 requirement  - c/w home advair  - amrita q6h x24h -- transition to PRN in AM

## 2025-02-19 NOTE — H&P ADULT - NSHPSOCIALHISTORY_GEN_ALL_CORE
denies toxic substances  ambulates with cane denies toxic substances  ambulates with cane  lives with daughter

## 2025-02-19 NOTE — CONSULT NOTE ADULT - SUBJECTIVE AND OBJECTIVE BOX
02-19-25 @ 12:05    Patient is a 89y old  Female who presents with a chief complaint of UTI, PNA (19 Feb 2025 10:03)      HPI:  Pt is a 89 yo F with PMH CHF, anemia, pHTN, CAD, CVA (no deficits), T2D, [place], COPD (2L NC PRN, qhs), PVD, HTN, HLD, and ?lung CA (pending RT) p/w dysuria x 1wk along with tremors and SOB.   On arrival, T 102.5, , /78 --- SBP 80s, RR 20 O2sat 88% RA -- 2L NC. EKG with sinus tachycardia, no ischemic changes. ER POCUS with diffuse B lines. Labs with leukocytosis, normocytic anemia, trop neg x2, BNP 1406, lactate neg, UA+ with UCx and BCx in lab, RVP neg, MRSA in lab. CTA chest neg PE but with multifocal PNA, pulmonary edema, and 2.8x2.7cm mass LLL interval inc compared to prior. MICU consulted for hypotension with c/f need for pressors, however, improved with IVF and midodrine. Pt given tylenol, cefepime, and vancomycin. ID consulted and PT consulted with recs pending. (19 Feb 2025 10:03)  she is apparently sob to me:  but she says her breathing is OK:  she  is on home oxygen :  recently diagnosed with lung cancer:   now pulm called for pneumonia      ?FOLLOWING PRESENT  [ x] Hx of PE/DVT, [y ] Hx COPD, [ x] Hx of Asthma, [ y] Hx of Hospitalization, [ ]x  Hx of BiPAP/CPAP use, [ ]x Hx of CARIN    Allergies    penicillin (Unknown)  macrolide antibiotics (Other)  Biaxin (Unknown)    Intolerances        PAST MEDICAL & SURGICAL HISTORY:  HTN (hypertension)      CVA (cerebral vascular accident)      HLD (hyperlipidemia)      DM2 (diabetes mellitus, type 2)      Anemia      PVD (peripheral vascular disease)      Solitary pulmonary nodule      Chronic diastolic heart failure      H/O hearing loss      History of COPD      CAD (coronary artery disease)      Pulmonary hypertension      CAD (coronary artery disease)  cardiac stent      S/P vascular bypass  left leg      Bilateral cataracts      H/O rotator cuff surgery          FAMILY HISTORY:  FH: heart disease (Child)        Social History: [former smoker   ] TOBACCO                  [x  ] ETOH                                 [x  ] IVDA/DRUGS    REVIEW OF SYSTEMS      General:	x    Skin/Breast:x  	  Ophthalmologic:x  	  ENMT:	x    Respiratory and Thorax:  s ob,  cough , hypoxia  fever   	  Cardiovascular:	x    Gastrointestinal:	x    Genitourinary:	x    Musculoskeletal:	x    Neurological:	x    Psychiatric:	x    Hematology/Lymphatics:	x    Endocrine:	x    Allergic/Immunologic:	x    MEDICATIONS  (STANDING):  albuterol/ipratropium for Nebulization 3 milliLiter(s) Nebulizer every 6 hours  aspirin enteric coated 81 milliGRAM(s) Oral daily  atorvastatin 20 milliGRAM(s) Oral at bedtime  cefepime   IVPB 2000 milliGRAM(s) IV Intermittent <User Schedule>  dextrose 5%. 1000 milliLiter(s) (50 mL/Hr) IV Continuous <Continuous>  dextrose 5%. 1000 milliLiter(s) (100 mL/Hr) IV Continuous <Continuous>  dextrose 50% Injectable 25 Gram(s) IV Push once  dextrose 50% Injectable 12.5 Gram(s) IV Push once  dextrose 50% Injectable 25 Gram(s) IV Push once  enoxaparin Injectable 40 milliGRAM(s) SubCutaneous every 24 hours  fluticasone propionate/ salmeterol 250-50 MICROgram(s) Diskus 1 Dose(s) Inhalation two times a day  gabapentin 400 milliGRAM(s) Oral at bedtime  glucagon  Injectable 1 milliGRAM(s) IntraMuscular once  insulin lispro (ADMELOG) corrective regimen sliding scale   SubCutaneous three times a day before meals  insulin lispro (ADMELOG) corrective regimen sliding scale   SubCutaneous at bedtime  midodrine 5 milliGRAM(s) Oral every 8 hours  multivitamin 1 Tablet(s) Oral daily  pantoprazole    Tablet 40 milliGRAM(s) Oral before breakfast  polyethylene glycol 3350 17 Gram(s) Oral two times a day  senna 2 Tablet(s) Oral at bedtime  sildenafil (REVATIO) 20 milliGRAM(s) Oral three times a day    MEDICATIONS  (PRN):  acetaminophen     Tablet .. 650 milliGRAM(s) Oral every 6 hours PRN Temp greater or equal to 38C (100.4F), Mild Pain (1 - 3)  dextrose Oral Gel 15 Gram(s) Oral once PRN Blood Glucose LESS THAN 70 milliGRAM(s)/deciliter  melatonin 3 milliGRAM(s) Oral at bedtime PRN Insomnia  ondansetron Injectable 4 milliGRAM(s) IV Push every 8 hours PRN Nausea and/or Vomiting       Vital Signs Last 24 Hrs  T(C): 36.4 (19 Feb 2025 08:40), Max: 39.2 (18 Feb 2025 20:48)  T(F): 97.6 (19 Feb 2025 08:40), Max: 102.5 (18 Feb 2025 20:48)  HR: 72 (19 Feb 2025 10:08) (68 - 113)  BP: 111/54 (19 Feb 2025 10:08) (81/41 - 159/78)  BP(mean): 73 (19 Feb 2025 05:50) (58 - 73)  RR: 18 (19 Feb 2025 10:08) (13 - 20)  SpO2: 100% (19 Feb 2025 10:08) (88% - 100%)    Parameters below as of 19 Feb 2025 10:08    O2 Flow (L/min): 2  Orthostatic VS          I&O's Summary      Physical Exam:   GENERAL: NAD, well-groomed, well-developed  HEENT: MARCIO/   Atraumatic, Normocephalic  ENMT: No tonsillar erythema, exudates, or enlargement; Moist mucous membranes, Good dentition, No lesions  NECK: Supple, No JVD, Normal thyroid  CHEST/LUNG: decreased BS:  coarse crackles +  CVS: Regular rate and rhythm; No murmurs, rubs, or gallops  GI: : Soft, Nontender, Nondistended; Bowel sounds present  NERVOUS SYSTEM:  Alert & Oriented X3  EXTREMITIES:  -edema  LYMPH: No lymphadenopathy noted  SKIN: No rashes or lesions  ENDOCRINOLOGY: No Thyromegaly  PSYCH: Appropriate    Labs:  5.6<55<4>>31<<7.375>>5.6<<3><<4><<5<<319>>, 6.0<47<4>>27<<7.435>>6.0<<3><<4><<5<<279>>SARS-CoV-2: NotDetec (18 Feb 2025 22:20)                              8.6    11.15 )-----------( 389      ( 19 Feb 2025 09:14 )             27.3                         10.0   12.67 )-----------( 439      ( 18 Feb 2025 20:07 )             30.5     02-19    133[L]  |  95[L]  |  24[H]  ----------------------------<  240[H]  4.0   |  25  |  0.97  02-18    130[L]  |  91[L]  |  28[H]  ----------------------------<  366[H]  4.3   |  27  |  1.04    Ca    8.1[L]      19 Feb 2025 09:14  Ca    9.5      18 Feb 2025 20:07  Phos  2.5     02-19  Phos  2.3     02-19  Mg     3.10     02-19  Mg     1.70     02-18    TPro  6.4  /  Alb  3.5  /  TBili  0.4  /  DBili  x   /  AST  7   /  ALT  <5  /  AlkPhos  86  02-19  TPro  7.4  /  Alb  3.9  /  TBili  0.4  /  DBili  x   /  AST  15  /  ALT  8   /  AlkPhos  111  02-18    CAPILLARY BLOOD GLUCOSE      POCT Blood Glucose.: 272 mg/dL (19 Feb 2025 11:44)  POCT Blood Glucose.: 262 mg/dL (19 Feb 2025 07:55)  POCT Blood Glucose.: 361 mg/dL (18 Feb 2025 17:00)    LIVER FUNCTIONS - ( 19 Feb 2025 09:14 )  Alb: 3.5 g/dL / Pro: 6.4 g/dL / ALK PHOS: 86 U/L / ALT: <5 U/L / AST: 7 U/L / GGT: x             Urinalysis Basic - ( 19 Feb 2025 09:14 )    Color: x / Appearance: x / SG: x / pH: x  Gluc: 240 mg/dL / Ketone: x  / Bili: x / Urobili: x   Blood: x / Protein: x / Nitrite: x   Leuk Esterase: x / RBC: x / WBC x   Sq Epi: x / Non Sq Epi: x / Bacteria: x      D DImer  Procalcitonin: 2.19 ng/mL (02-19 @ 01:13)      Studies  Chest X-RAY  CT SCAN Chest   CT Abdomen  Venous Dopplers: LE:   Others    rad< from: CT Angio Chest PE Protocol w/ IV Cont (02.19.25 @ 01:11) >  No pulmonary embolism.  Interval increase in groundglass opacities and subpleural consolidations   in bilateral lungs, likely infectious. Interlobularseptal thickening in   the upper lobes, which can be suggestive of interstitial edema  3.1 cm masslike consolidation in left lower lobe. Additional smaller   nodules in bilateral lungs.  Mediastinal and hilar lymphadenopathy which can be reactive or due to   metastasis.  Subpleural reticulations, Honeycombing, and peripheral cystic changes in   bilateral lungs, suggestive of interstitial lung fibrosis.    < end of copied text >        < from: TTE W or WO Ultrasound Enhancing Agent (10.07.24 @ 15:53) >      1. Technically difficult image quality.   2. Left ventricular systolic function is normal with an ejection fraction of 60 % by Gomez's method of disks.   3. Normal left and right atrial size.   4. Normal right ventricular cavity size and probably normal right ventricular systolic function.   5. There is calcification of the mitral valve annulus.   6. Mild mitral valve stenosis.   7. Trileaflet aortic valve with reduced systolic excursion. There is calcification of the aortic valve leaflets. Moderate aortic stenosis.   8. The peak transaortic velocity is 1.95 m/s, peak transaortic gradient is 15.2 mmHg and mean transaortic gradient is 9.0 mmHg with an LVOT/aortic valve VTI ratio of 0.47. The effective orifice area is estimated at 1.47 cm² by the continuity equation and 1.38 cm² by 2D planimetry.   9. Mild to moderate tricuspid regurgitation.  10. No pericardial effusion seen.    < end of copied text >

## 2025-02-19 NOTE — CONSULT NOTE ADULT - SUBJECTIVE AND OBJECTIVE BOX
[facility]  KRISSY Luong S. Shah, Y. Patel, G. Casimir  [phone]  ([phone] - weekdays after 5pm and weekends)    FEDERICO DOS SANTOS  89y, Female  [id number]    HPI--  HPI:  89 y/o F PMhx CHF, anemia, pHTN, CAD, CVA, DMII, COPD (2L NC PRN, qhs), PVD, HTN, and ?lung CA (pending RT) who presented w/ dysuria x 5 days as well as SOB and rigors. She reports dysuria started Saturday. States symptoms progressively worsened. She is unsure whether she has had frequency or urgency 2/2 UTI vs diuretics which she takes.   In ED patient febrile to 102.5 and found to be hypoxic on RA placed on NC. Labs significant for leukocytosis. CTA chest was negative for PE but demonstrated increase in groundglass opacities and subpleural consolidations in bilateral lungs; Interlobular septal thickening in the upper lobes, which can be suggestive of interstitial edema 3.1 cm masslike consolidation in left lower lobe. Additional smaller nodules in bilateral lungs. Subpleural reticulations, Honeycombing, and peripheral cystic changes in bilateral lungs, suggestive of interstitial lung fibrosis.  MICU was consulted for hypotension which improved s/p IVF and midodrine. She was started on cefepime, and given vancomycin 1g x 1    ROS: 14 point review of systems completed, pertinent positives and negatives as per HPI.    Allergies: penicillin (Unknown)  macrolide antibiotics (Other)  Biaxin (Unknown)    PMH -- HTN (hypertension)    CVA (cerebral vascular accident)    HLD (hyperlipidemia)    DM2 (diabetes mellitus, type 2)    Anemia    PVD (peripheral vascular disease)    Solitary pulmonary nodule    Chronic diastolic heart failure    H/O hearing loss    History of COPD    CAD (coronary artery disease)    Pulmonary hypertension      PSH -- CAD (coronary artery disease)    S/P vascular bypass    Bilateral cataracts    H/O rotator cuff surgery      FH -- FH: heart disease (Child)      Social History -- denies tobacco, alcohol or illicit drug use    Physical Exam--  Vital Signs Last 24 Hrs  T(F): 97.6 (19 Feb 2025 08:40), Max: 102.5 (18 Feb 2025 20:48)  HR: 72 (19 Feb 2025 10:08) (68 - 113)  BP: 111/54 (19 Feb 2025 10:08) (81/41 - 159/78)  RR: 18 (19 Feb 2025 10:08) (13 - 20)  SpO2: 100% (19 Feb 2025 10:08) (88% - 100%)  General: pale, no acute distress  HEENT: anicteric, NC  Lungs: decreased breath sounds   Heart: S1, S2, normal rate.   Abdomen: Soft. Nondistended. Nontender.   Neuro: no obvious focal deficits   Back: No costovertebral angle tenderness.  Extremities: No LE edema.   Skin: Warm. Dry. No rash.  Psychiatric: Appropriate affect and mood for situation.     Laboratory & Imaging Data--  CBC:                       8.6    11.15 )-----------( 389      ( 19 Feb 2025 09:14 )             27.3     WBC Count: 11.15 K/uL (02-19-25 @ 09:14)  WBC Count: 12.67 K/uL (02-18-25 @ 20:07)    CMP: 02-19    133[L]  |  95[L]  |  24[H]  ----------------------------<  240[H]  4.0   |  25  |  0.97    Ca    8.1[L]      19 Feb 2025 09:14  Phos  2.5     02-19  Mg     3.10     02-19    TPro  6.4  /  Alb  3.5  /  TBili  0.4  /  DBili  x   /  AST  7   /  ALT  <5  /  AlkPhos  86  02-19    LIVER FUNCTIONS - ( 19 Feb 2025 09:14 )  Alb: 3.5 g/dL / Pro: 6.4 g/dL / ALK PHOS: 86 U/L / ALT: <5 U/L / AST: 7 U/L / GGT: x           Urinalysis Basic - ( 19 Feb 2025 09:14 )    Color: x / Appearance: x / SG: x / pH: x  Gluc: 240 mg/dL / Ketone: x  / Bili: x / Urobili: x   Blood: x / Protein: x / Nitrite: x   Leuk Esterase: x / RBC: x / WBC x   Sq Epi: x / Non Sq Epi: x / Bacteria: x      Microbiology:       Radiology--  ***  Active Medications--  acetaminophen     Tablet .. 650 milliGRAM(s) Oral every 6 hours PRN  albuterol/ipratropium for Nebulization 3 milliLiter(s) Nebulizer every 6 hours  aspirin enteric coated 81 milliGRAM(s) Oral daily  atorvastatin 20 milliGRAM(s) Oral at bedtime  cefepime   IVPB 2000 milliGRAM(s) IV Intermittent <User Schedule>  dextrose 5%. 1000 milliLiter(s) IV Continuous <Continuous>  dextrose 5%. 1000 milliLiter(s) IV Continuous <Continuous>  dextrose 50% Injectable 25 Gram(s) IV Push once  dextrose 50% Injectable 12.5 Gram(s) IV Push once  dextrose 50% Injectable 25 Gram(s) IV Push once  dextrose Oral Gel 15 Gram(s) Oral once PRN  enoxaparin Injectable 40 milliGRAM(s) SubCutaneous every 24 hours  fluticasone propionate/ salmeterol 250-50 MICROgram(s) Diskus 1 Dose(s) Inhalation two times a day  gabapentin 400 milliGRAM(s) Oral at bedtime  gabapentin 200 milliGRAM(s) Oral once  glucagon  Injectable 1 milliGRAM(s) IntraMuscular once  insulin lispro (ADMELOG) corrective regimen sliding scale   SubCutaneous three times a day before meals  insulin lispro (ADMELOG) corrective regimen sliding scale   SubCutaneous at bedtime  ketorolac   Injectable 15 milliGRAM(s) IV Push once  melatonin 3 milliGRAM(s) Oral at bedtime PRN  methylPREDNISolone sodium succinate IVPB 20 milliGRAM(s) IV Intermittent every 8 hours  midodrine 5 milliGRAM(s) Oral every 8 hours  multivitamin 1 Tablet(s) Oral daily  ondansetron Injectable 4 milliGRAM(s) IV Push every 8 hours PRN  pantoprazole    Tablet 40 milliGRAM(s) Oral before breakfast  polyethylene glycol 3350 17 Gram(s) Oral two times a day  senna 2 Tablet(s) Oral at bedtime  sildenafil (REVATIO) 20 milliGRAM(s) Oral three times a day    Antimicrobials:   cefepime   IVPB 2000 milliGRAM(s) IV Intermittent <User Schedule>    Immunologic:

## 2025-02-19 NOTE — H&P ADULT - NSHPREVIEWOFSYSTEMS_GEN_ALL_CORE
CONSTITUTIONAL: +fevers/chills  EYES/ENT: No visual changes;  No vertigo or throat pain   NECK: No pain or stiffness  RESPIRATORY: No cough, wheezing, hemoptysis; No shortness of breath  CARDIOVASCULAR: No chest pain or palpitations  GASTROINTESTINAL: +abdominal/epigastric pain. No nausea, vomiting, or hematemesis; No diarrhea or constipation. No melena or hematochezia.  GENITOURINARY: No frequency or hematuria; +dysuria  NEUROLOGICAL: No numbness or weakness  SKIN: No itching, burning, rashes, or lesions   All other review of systems is negative unless indicated above.

## 2025-02-19 NOTE — CONSULT NOTE ADULT - SUBJECTIVE AND OBJECTIVE BOX
HPI:  Pt is a 89 yo F with PMH CHF, anemia, pHTN, CAD, CVA (no deficits), T2D, [place], COPD (2L NC PRN, qhs), PVD, HTN, HLD, and ?lung CA (pending RT) p/w dysuria x 1wk along with tremors and SOB. Says she had been doing better post-discharge from last hospitalization and was pending radiation initiation. Lives with daughter who recently had a URI but states kept her distance at home. At time of exam, complaining of abdominal pressure without SOB. Says that she always feels cold and that her feet have neuropathic pain from missing a dose of gabapentin. Otherwise denies complaints. Resumed her metformin in the last month with remaining meds unchanged.    On arrival, T 102.5, , /78 --- SBP 80s, RR 20 O2sat 88% RA -- 2L NC. EKG with sinus tachycardia, no ischemic changes. ER POCUS with diffuse B lines. Labs with leukocytosis, normocytic anemia, trop neg x2, BNP 1406, lactate neg, UA+ with UCx and BCx in lab, RVP neg, MRSA in lab. CTA chest neg PE but with multifocal PNA, pulmonary edema, and 2.8x2.7cm mass LLL interval inc compared to prior. MICU consulted for hypotension with c/f need for pressors, however, improved with IVF and midodrine. Pt given tylenol, cefepime, and vancomycin. ID consulted and PT consulted with recs pending. (19 Feb 2025 10:03)      Reason for consult: dm2/steroid hyperglycemia  HPI:  Pt is a 89 yo F with PMH CHF, anemia, pHTN, CAD, CVA (no deficits), T2D, [place], COPD (2L NC PRN, qhs), PVD, HTN, HLD, and ?lung CA (pending RT) p/w dysuria x 1wk along with tremors and SOB, found to be hyperglycemic and will be on steroids. Endocrinology was consulted for management of diabetes mellitus.    Patient was diagnosed with Dm2 many years ago.  Diabetes complications include: Reports CAD, CVA, Nephropathy   Administers her own insulin.  Reports family history of DM in son - has DM1.    Denies blurry vision, polyuria, polydipsia, and paresthesias.    Endocrinologist: none, follows with PCP      Outpatient regimen: Tresiba 10 units at bedtime, metformin 500mg 2 tabs daily       Last HbA1c: 9.7%      Ordered to start methylpred 20 mg q8h today. No steroids received yet      PAST MEDICAL & SURGICAL HISTORY:  HTN (hypertension)      CVA (cerebral vascular accident)      HLD (hyperlipidemia)      DM2 (diabetes mellitus, type 2)      Anemia      PVD (peripheral vascular disease)      Solitary pulmonary nodule      Chronic diastolic heart failure      H/O hearing loss      History of COPD      CAD (coronary artery disease)      Pulmonary hypertension      CAD (coronary artery disease)  cardiac stent      S/P vascular bypass  left leg      Bilateral cataracts      H/O rotator cuff surgery          FAMILY HISTORY:  FH: heart disease (Child)        Social History:  no tobacco, etoh or drug use    MEDICATIONS  (STANDING):  albuterol/ipratropium for Nebulization 3 milliLiter(s) Nebulizer every 6 hours  aspirin enteric coated 81 milliGRAM(s) Oral daily  atorvastatin 20 milliGRAM(s) Oral at bedtime  cefepime   IVPB 2000 milliGRAM(s) IV Intermittent <User Schedule>  dextrose 5%. 1000 milliLiter(s) (50 mL/Hr) IV Continuous <Continuous>  dextrose 5%. 1000 milliLiter(s) (100 mL/Hr) IV Continuous <Continuous>  dextrose 50% Injectable 25 Gram(s) IV Push once  dextrose 50% Injectable 12.5 Gram(s) IV Push once  dextrose 50% Injectable 25 Gram(s) IV Push once  enoxaparin Injectable 40 milliGRAM(s) SubCutaneous every 24 hours  fluticasone propionate/ salmeterol 250-50 MICROgram(s) Diskus 1 Dose(s) Inhalation two times a day  gabapentin 400 milliGRAM(s) Oral at bedtime  glucagon  Injectable 1 milliGRAM(s) IntraMuscular once  insulin lispro (ADMELOG) corrective regimen sliding scale   SubCutaneous three times a day before meals  insulin lispro (ADMELOG) corrective regimen sliding scale   SubCutaneous at bedtime  methylPREDNISolone sodium succinate Injectable 20 milliGRAM(s) IV Push every 8 hours  multivitamin 1 Tablet(s) Oral daily  pantoprazole    Tablet 40 milliGRAM(s) Oral before breakfast  polyethylene glycol 3350 17 Gram(s) Oral two times a day  senna 2 Tablet(s) Oral at bedtime  sildenafil (REVATIO) 20 milliGRAM(s) Oral three times a day    MEDICATIONS  (PRN):  acetaminophen     Tablet .. 650 milliGRAM(s) Oral every 6 hours PRN Temp greater or equal to 38C (100.4F), Mild Pain (1 - 3)  dextrose Oral Gel 15 Gram(s) Oral once PRN Blood Glucose LESS THAN 70 milliGRAM(s)/deciliter  melatonin 3 milliGRAM(s) Oral at bedtime PRN Insomnia  ondansetron Injectable 4 milliGRAM(s) IV Push every 8 hours PRN Nausea and/or Vomiting      Allergies    penicillin (Unknown)  macrolide antibiotics (Other)  Biaxin (Unknown)    Intolerances        Review of Systems:  Constitutional: No fever, good appetite/po intake  Eyes: No blurry vision, diplopia  Neuro: No tremors  HEENT: No pain  Cardiovascular: No chest pain, palpitations  Respiratory: No SOB, no cough  GI: No nausea, vomiting,   : No dysuria, hematuria  Skin: no rash  Psych: no depression  Endocrine: no polyuria, polydipsia  Hem/lymph: no swelling  Osteoporosis: no fractures    ALL OTHER SYSTEMS REVIEWED AND NEGATIVE    PHYSICAL EXAM:  VITALS: T(C): 36.3 (02-19-25 @ 13:10)  T(F): 97.4 (02-19-25 @ 13:10), Max: 102.5 (02-18-25 @ 20:48)  HR: 71 (02-19-25 @ 13:10) (68 - 113)  BP: 105/44 (02-19-25 @ 13:10) (81/41 - 159/78)  RR:  (13 - 20)  SpO2:  (88% - 100%)  Wt(kg): --  GENERAL: NAD, well-groomed, well-developed  EYES: No proptosis, extraocular movements intact,  no lid lag, anicteric  HEENT:  Atraumatic, Normocephalic, moist mucous membranes  THYROID: Normal size, no palpable nodules, no thyromegaly  RESPIRATORY: Clear to auscultation bilaterally; No rales, rhonchi, wheezing, or rubs  CARDIOVASCULAR: Regular rate and rhythm; No murmurs; no peripheral edema  GI: Soft, nontender, non distended, normal bowel sounds  SKIN: Dry, intact, No rashes or lesions  EXTREMITIES: No foot ulcers, distal pedal pulses intact bilaterally  NEURO: sensation intact, no tremors  PSYCH: reactive affect, euthymic mood  CUSHING'S SIGNS: no striae or visible bruising                              8.6    11.15 )-----------( 389      ( 19 Feb 2025 09:14 )             27.3       02-19    133[L]  |  95[L]  |  24[H]  ----------------------------<  240[H]  4.0   |  25  |  0.97    eGFR: 55[L]    Ca    8.1[L]      02-19  Mg     3.10     02-19  Phos  2.5     02-19    TPro  6.4  /  Alb  3.5  /  TBili  0.4  /  DBili  x   /  AST  7   /  ALT  <5  /  AlkPhos  86  02-19      Thyroid Function Tests:  02-19 @ 09:14 TSH 1.95 FreeT4 -- T3 -- Anti TPO -- Anti Thyroglobulin Ab -- TSI --          Radiology:       A/P: Pt is a 89 yo F with PMH CHF, anemia, pHTN, CAD, CVA (no deficits), T2D, [place], COPD (2L NC PRN, qhs), PVD, HTN, HLD, and ?lung CA (pending RT) p/w dysuria x 1wk along with tremors and SOB, found to be hyperglycemic and will be on steroids. Endocrinology was consulted for management of diabetes mellitus.    #Type 2 Diabetes Mellitus  - Ordered to start methylpred 20 mg q8h 2/19. No steroids received yet  - HbA1c 9.7%    ; home regimen: Tresiba 10 units at bedtime, metformin 500mg 2 tabs daily   - Recommend lantus 16 units qhs  - Recommend admelog 8 units TIDQAC  - Recommend moderate admelog correction scale TIDQAC and QHS  - Please check FSG before meals and QHS, or q6h while NPO  - RD consult  - hypoglycemia orderset prn  - Discharge planning:  depends on steroids- please inform endocrine of steroid plans    #Hypertension  - BP goal <130/80  - Management as per primary team, not on meds now    #Hyperlipidemia  - ordered for atorvastatin 20 mg    Angelica Wakefield MD  Attending Physician   Department of Endocrinology, Diabetes and Metabolism   [employer] Teams for 02-19-25 @ 13:48.    If before 9AM or after 5PM, or on weekends/holidays, please call the Endocrine answering service for assistance ([phone]).  For nonurgent matters, please email [email or web address] for assistance.     Please note that a different provider may be following this patient each day.          HPI:  Pt is a 89 yo F with PMH CHF, anemia, pHTN, CAD, CVA (no deficits), T2D, [place], COPD (2L NC PRN, qhs), PVD, HTN, HLD, and ?lung CA (pending RT) p/w dysuria x 1wk along with tremors and SOB. Says she had been doing better post-discharge from last hospitalization and was pending radiation initiation. Lives with daughter who recently had a URI but states kept her distance at home. At time of exam, complaining of abdominal pressure without SOB. Says that she always feels cold and that her feet have neuropathic pain from missing a dose of gabapentin. Otherwise denies complaints. Resumed her metformin in the last month with remaining meds unchanged.    On arrival, T 102.5, , /78 --- SBP 80s, RR 20 O2sat 88% RA -- 2L NC. EKG with sinus tachycardia, no ischemic changes. ER POCUS with diffuse B lines. Labs with leukocytosis, normocytic anemia, trop neg x2, BNP 1406, lactate neg, UA+ with UCx and BCx in lab, RVP neg, MRSA in lab. CTA chest neg PE but with multifocal PNA, pulmonary edema, and 2.8x2.7cm mass LLL interval inc compared to prior. MICU consulted for hypotension with c/f need for pressors, however, improved with IVF and midodrine. Pt given tylenol, cefepime, and vancomycin. ID consulted and PT consulted with recs pending. (19 Feb 2025 10:03)      Reason for consult: dm2/steroid hyperglycemia  HPI:  Pt is a 89 yo F with PMH CHF, anemia, pHTN, CAD, CVA (no deficits), T2D, [place], COPD (2L NC PRN, qhs), PVD, HTN, HLD, and ?lung CA (pending RT) p/w dysuria x 1wk along with tremors and SOB, found to be hyperglycemic and will be on steroids. Endocrinology was consulted for management of diabetes mellitus.    Patient was diagnosed with Dm2 many years ago.  Diabetes complications include: Reports CAD, CVA, Nephropathy   Administers her own insulin.  Reports family history of DM in son - has DM1.  Appetite is ok, she finds softer foods easier.  Reports sugars were uptrending to 138 in the last week which was high for her. She does not take steroids normally.    Denies blurry vision, polyuria, polydipsia, and paresthesias.    Endocrinologist: none, follows with PCP      Outpatient regimen: Tresiba 10 units at bedtime, metformin 1000 mg daily (reports tolerating higher dose previously)      Last HbA1c: 9.7%      Ordered to start methylpred 20 mg q8h today. No steroids received yet      PAST MEDICAL & SURGICAL HISTORY:  HTN (hypertension)      CVA (cerebral vascular accident)      HLD (hyperlipidemia)      DM2 (diabetes mellitus, type 2)      Anemia      PVD (peripheral vascular disease)      Solitary pulmonary nodule      Chronic diastolic heart failure      H/O hearing loss      History of COPD      CAD (coronary artery disease)      Pulmonary hypertension      CAD (coronary artery disease)  cardiac stent      S/P vascular bypass  left leg      Bilateral cataracts      H/O rotator cuff surgery          FAMILY HISTORY:  FH: heart disease (Child)        Social History:  no tobacco, etoh or drug use    MEDICATIONS  (STANDING):  albuterol/ipratropium for Nebulization 3 milliLiter(s) Nebulizer every 6 hours  aspirin enteric coated 81 milliGRAM(s) Oral daily  atorvastatin 20 milliGRAM(s) Oral at bedtime  cefepime   IVPB 2000 milliGRAM(s) IV Intermittent <User Schedule>  dextrose 5%. 1000 milliLiter(s) (50 mL/Hr) IV Continuous <Continuous>  dextrose 5%. 1000 milliLiter(s) (100 mL/Hr) IV Continuous <Continuous>  dextrose 50% Injectable 25 Gram(s) IV Push once  dextrose 50% Injectable 12.5 Gram(s) IV Push once  dextrose 50% Injectable 25 Gram(s) IV Push once  enoxaparin Injectable 40 milliGRAM(s) SubCutaneous every 24 hours  fluticasone propionate/ salmeterol 250-50 MICROgram(s) Diskus 1 Dose(s) Inhalation two times a day  gabapentin 400 milliGRAM(s) Oral at bedtime  glucagon  Injectable 1 milliGRAM(s) IntraMuscular once  insulin lispro (ADMELOG) corrective regimen sliding scale   SubCutaneous three times a day before meals  insulin lispro (ADMELOG) corrective regimen sliding scale   SubCutaneous at bedtime  methylPREDNISolone sodium succinate Injectable 20 milliGRAM(s) IV Push every 8 hours  multivitamin 1 Tablet(s) Oral daily  pantoprazole    Tablet 40 milliGRAM(s) Oral before breakfast  polyethylene glycol 3350 17 Gram(s) Oral two times a day  senna 2 Tablet(s) Oral at bedtime  sildenafil (REVATIO) 20 milliGRAM(s) Oral three times a day    MEDICATIONS  (PRN):  acetaminophen     Tablet .. 650 milliGRAM(s) Oral every 6 hours PRN Temp greater or equal to 38C (100.4F), Mild Pain (1 - 3)  dextrose Oral Gel 15 Gram(s) Oral once PRN Blood Glucose LESS THAN 70 milliGRAM(s)/deciliter  melatonin 3 milliGRAM(s) Oral at bedtime PRN Insomnia  ondansetron Injectable 4 milliGRAM(s) IV Push every 8 hours PRN Nausea and/or Vomiting      Allergies    penicillin (Unknown)  macrolide antibiotics (Other)  Biaxin (Unknown)    Intolerances        Review of Systems:  Constitutional: No fever, good appetite/po intake  Eyes: No blurry vision, diplopia  Neuro: No tremors  HEENT: No pain  Cardiovascular: No chest pain, palpitations  Respiratory: No SOB, no cough  GI: No nausea, vomiting,   : No dysuria, hematuria  Skin: no rash  Psych: no depression  Endocrine: no polyuria, polydipsia  Hem/lymph: no swelling  Osteoporosis: no fractures    ALL OTHER SYSTEMS REVIEWED AND NEGATIVE    PHYSICAL EXAM:  VITALS: T(C): 36.3 (02-19-25 @ 13:10)  T(F): 97.4 (02-19-25 @ 13:10), Max: 102.5 (02-18-25 @ 20:48)  HR: 71 (02-19-25 @ 13:10) (68 - 113)  BP: 105/44 (02-19-25 @ 13:10) (81/41 - 159/78)  RR:  (13 - 20)  SpO2:  (88% - 100%)  Wt(kg): --  GENERAL: NAD, well-groomed, well-developed  EYES: No proptosis, extraocular movements intact,  no lid lag, anicteric  HEENT:  Atraumatic, Normocephalic, moist mucous membranes  THYROID: Normal size, no thyromegaly  RESPIRATORY: breathing comfortably, no distress, no wheezes  CARDIOVASCULAR: Regular rate and rhythm; No murmurs; no peripheral edema  GI: Soft, nontender, non distended, normal bowel sounds  SKIN: Dry, intact, No rashes or lesions  NEURO: sensation intact, no tremors  PSYCH: reactive affect, euthymic mood  CUSHING'S SIGNS: no striae or visible bruising                              8.6    11.15 )-----------( 389      ( 19 Feb 2025 09:14 )             27.3       02-19    133[L]  |  95[L]  |  24[H]  ----------------------------<  240[H]  4.0   |  25  |  0.97    eGFR: 55[L]    Ca    8.1[L]      02-19  Mg     3.10     02-19  Phos  2.5     02-19    TPro  6.4  /  Alb  3.5  /  TBili  0.4  /  DBili  x   /  AST  7   /  ALT  <5  /  AlkPhos  86  02-19      Thyroid Function Tests:  02-19 @ 09:14 TSH 1.95 FreeT4 -- T3 -- Anti TPO -- Anti Thyroglobulin Ab -- TSI --          Radiology:

## 2025-02-19 NOTE — CONSULT NOTE ADULT - ASSESSMENT
Pt is a 89 yo F with PMH CHF, anemia, pHTN, CAD, CVA (no deficits), T2D, [place], COPD (2L NC PRN, qhs), PVD, HTN, HLD, and ?lung CA (pending RT) p/w dysuria x 1wk along with tremors and SOB.   On arrival, T 102.5, , /78 --- SBP 80s, RR 20 O2sat 88% RA -- 2L NC. EKG with sinus tachycardia, no ischemic changes. ER POCUS with diffuse B lines. Labs with leukocytosis, normocytic anemia, trop neg x2, BNP 1406, lactate neg, UA+ with UCx and BCx in lab, RVP neg, MRSA in lab. CTA chest neg PE but with multifocal PNA, pulmonary edema, and 2.8x2.7cm mass LLL interval inc compared to prior. MICU consulted for hypotension with c/f need for pressors, however, improved with IVF and midodrine. Pt given tylenol, cefepime, and vancomycin. ID consulted and PT consulted with recs pending. (19 Feb 2025 10:03)  she is apparently sob to me:  but she says her breathing is OK:  she  is on home oxygen :  recently diagnosed with lung cancer:   now pulm called for pneumonia        Sepsis/ Pneumonia/ COPD / lung cancer:   pneumonia: on antibiotics   CTA chest neg PE but with multifocal PNA and pulmonary edema   cont antibtiocs : seen by id   urine legionella/strep  lEFT LOWER OPACITY:  PER DAUGHTER :  SHE NEVER GOT ANY CHEMO :  SHE WAS SUPPOSED TO GET RADATION THERAPY,  BUT SHE ENDED UP HERE:     She has had multiplek admission in last few months and hence could not any surgery for the mass:  now it seems to have increased   she seems to be sob:  and has copd:  will qadd short course of steroids   her vbg is OK:  but she looks sob:  she may need bipap , if her rr worsoens    Acute UTI.   as above.    Acute on chronic heart failure.    - known hx CHF   - 10/2024 TTE EF 60%, mild MV stenosis, mod AS, mild-mod TR  - POCUS in ER c/f reduced EF  - CXR with pulmonary vascular congestion, CTA chest with pulmonary edema   -cont diuretics  defer to card s    Pulmonary mass.    CTA chest with interval increase in LLL mass from prior, 2.8x2.7cm  - pending RT initiation outpt?\  - as above:  has not getting any treatment since the diagnosis many months ago     Pulmonary HTN.    sildenafil 20mg TID.    HTN (hypertension).   blood pressure is soft:  currently off anti hypertensives:     Type 2 diabetes mellitus.   monitor and control:    gareth acp

## 2025-02-19 NOTE — H&P ADULT - PROBLEM SELECTOR PLAN 10
- check A1C  - home med: tresiba 10U daily  - mISS while inpt - A1C 9.7  - home med: tresiba 10U daily, metformin 1g daily  - endo consulted, f/u recs  - mISS while inpt

## 2025-02-19 NOTE — H&P ADULT - TIME BILLING
review of laboratory data, radiology results, consultants' recommendations, documentation in [place], discussion with patient/ACP and interdisciplinary staff (such as , social workers, etc). Interventions were performed as documented above.

## 2025-02-19 NOTE — CONSULT NOTE ADULT - ASSESSMENT
FEDERICO DOS SANTOS is a 89y F with COPD on 2L NC PRN, CVA w/o deficits, CAD s/p stent, T2DM, Pulmonary hypertension and recent dx of lung cancer planned for radiation therapy here for treatment of Sepsis iso uti vs. PNA.      MICU team consulted for hypotension.    #Sepsis   #Hypotension  #Pneumonia  #UTI(?)  - Meeting sepsis criteria w/ fever, tachycardia, leukocytosis  - hypotensive to 80s/40s, given 2x 500cc bolus and Midodrine 10mg w/ improvement in BP to MAP >70  - Mentating well at this time and not requiring more O2 than baseline home 2LNC  - Pocus w/ collapsed IVC suggestive of intravascular depletion, but w/ evidence of reduced RV and LV function   - UA not too suggestive of a UTI; prior cx in 2024 grew Citrobacter and faecium sensitive to cefepime and vancomycin respectively   - CT chest showing opacities concerning for underlying PNA       Recommendations:   - patient will need additional fluids. agree w/ gentle fluid resuscitation iso possible reduced cardiac function; consider another 250cc bolus of IVF   - Consider secretion mobilization w/ nebusal Q6 and airway clearance therapy  - agree w/ Cefepime and Vancomycin for empiric coverage of respiratory vs. Urinary source of infection   - f/u MRSA swab, BCx, UCx and Sputum Cx   - consider formal TTE to evaluate cardiac function    - The patient is not a candidate for MICU admission at this time. Please reconsult for any further concerns for hemodynamic compromise or need for MICU.    Recommendations are not finalized until attending attestation.       Jerry Mancia MD (Jason)   PGY-2 Internal Medicine

## 2025-02-19 NOTE — H&P ADULT - PROBLEM SELECTOR PLAN 5
- Hb 10 with MCV 86  - at baseline, per chart review  - no active s/s bleeding   - suspect component of chronic disease given c/f ?malignancy as below

## 2025-02-19 NOTE — CONSULT NOTE ADULT - ASSESSMENT
ECHO 10/7/24: nl LV sys fx EF 60% . Mild mitral valve stenosis. Mild to moderate tricuspid regurgitation. mod AS   a/p    89 yo F with PMH CHF, valvular disease, anemia, pHTN, CAD, CVA (no deficits), T2D, [place], COPD (2L NC PRN, qhs), PVD, HTN, HLD, and ?lung CA (pending RT) p/w dysuria x 1wk along with tremors and SOB + pna      #PNA  -Meeting sepsis criteria w/ fever, tachycardia, leukocytosis  -CTa chest with no PE, Interval increase in groundglass opacities and subpleural consolidations  in bilateral lungs, interlobularseptal thickening in  the upper lobes, which can be suggestive of interstitial edema; 3.1 cm mass like consolidation in left lower lobe, Mediastinal and hilar lymphadenopathy which can be reactive or due to  metastasis.  Subpleural reticulations, Honeycombing, and peripheral cystic changes in  bilateral lungs, suggestive of interstitial lung fibrosis.  -pending Bcx  -ID work up - on IV abx   -LASIX pRN for now     #hypotension   -micu eval noted- hypotensive to 80s/40s, given 2x 500cc bolus and Midodrine 10mg w/ improvement in BP to MAP >70  - Pocus w/ collapsed IVC suggestive of intravascular depletion, but w/ evidence of reduced RV and LV function   -check ECHO   -sepsis work up   -not a candidate for MICU per team   -consider adding mido to augment BP , would avoid aggressive IVF resuscitation given evidence of pulm edema on cta   -HS trop neg. no ischemic changes to ecg     #CAD s/p PCI  -stable  -cont asa, statin, BB    #HFpEF  -repeat ECHO as above  -lasix PRN for now given hypotension/sepsis     #Pulmonary HTN.   -c/w home sildenafil 20mg TID.  -fu echo     #Lung CA  -med/ pulm fu   -pending RT initiation as outpt     #UTI   -management per med /ID    dvt ppx

## 2025-02-19 NOTE — H&P ADULT - PROBLEM SELECTOR PLAN 1
- pt p/w dysuria x6d along with SOB and tremors  - on arrival, meeting SIRS criteria with likely pulmonary/ source  - EKG ST without ischemic changes  - POCUS in ER with diffuse B-lines and reduced EF  - hypotensive in ER requiring midodrine 10mg x1 and IVF -- 1.25L NS -- MICU consulted, no needs  - hx UTI with citrobacter and enterococcus   - lactate neg, RVP neg  - UA+ with UCx in lab  - f/u BCx  - f/u MRSA PCR  - f/u procal  - CTA chest neg PE but with multifocal PNA and pulmonary edema   - s/p cefepime and vancomycin in ER  - ID consulted, appreciate recs  - c/w cefepime for now  - f/u urine legionella/strep  - start midodrine 5mg TID and wean as able; hold for SBP>110 - pt p/w dysuria x6d along with SOB and tremors  - on arrival, meeting SIRS criteria with likely pulmonary/ source  - EKG ST without ischemic changes  - POCUS in ER with diffuse B-lines and reduced EF  - hypotensive in ER requiring midodrine 10mg x1 and IVF -- 1.25L NS -- MICU consulted, no needs  - hx UTI with citrobacter and enterococcus   - lactate neg, RVP neg  - UA+ with UCx in lab  - f/u BCx  - f/u MRSA PCR  - f/u procal  - CTA chest neg PE but with multifocal PNA and pulmonary edema   - s/p cefepime and vancomycin in ER  - ID consulted, appreciate recs  - c/w cefepime for now  - f/u urine legionella/strep  - BP improved, defer additional midodrine

## 2025-02-19 NOTE — H&P ADULT - NSHPPHYSICALEXAM_GEN_ALL_CORE
VITAL SIGNS:  T(C): 36.4 (02-19-25 @ 08:40), Max: 39.2 (02-18-25 @ 20:48)  T(F): 97.6 (02-19-25 @ 08:40), Max: 102.5 (02-18-25 @ 20:48)  HR: 72 (02-19-25 @ 10:08) (68 - 113)  BP: 111/54 (02-19-25 @ 10:08) (81/41 - 159/78)  BP(mean): 73 (02-19-25 @ 05:50) (58 - 73)  RR: 18 (02-19-25 @ 10:08) (13 - 20)  SpO2: 100% (02-19-25 @ 10:08) (88% - 100%)  Wt(kg): --    PHYSICAL EXAM:  Constitutional: resting in bed; appears uncomfortable; elderly F  Head: NC/AT  Eyes: PERRL, EOMI, anicteric sclera  ENT: no nasal discharge; MMM  Neck: supple; no JVD  Respiratory: good inspiratory effort, tachypneic without acc mm use, bibasilar crackles BL, on 2L NC  Cardiac: +S1/S2; RRR; no M/R/G  Gastrointestinal: soft, NT/ND; no rebound or guarding; +BSx4  Extremities: WWP, no clubbing or cyanosis; no peripheral edema  Musculoskeletal: NROM x4; no joint swelling, tenderness or erythema  Vascular: 2+ radial, DP/PT pulses B/L  Dermatologic: skin warm, dry and intact; no rashes, wounds, or scars  Neurologic: AAOx3; CNII-XII grossly intact; no focal deficits  Psychiatric: affect and characteristics of appearance, verbalizations, behaviors are appropriate

## 2025-02-19 NOTE — H&P ADULT - HISTORY OF PRESENT ILLNESS
Pt is a 89 yo F with PMH CHF, anemia, pHTN, CAD, CVA (no deficits), T2D, [place], COPD (2L NC PRN, qhs), PVD, HTN, HLD, and ?lung CA (pending RT) p/w dysuria x 1wk along with tremors and SOB.     On arrival, T 102.5, , /78 --- SBP 80s, RR 20 O2sat 88% RA -- 2L NC. EKG with sinus tachycardia, no ischemic changes. ER POCUS with diffuse B lines. Labs with leukocytosis, normocytic anemia, trop neg x2, BNP 1406, lactate neg, UA+ with UCx and BCx in lab, RVP neg, MRSA in lab. CTA chest neg PE but with multifocal PNA, pulmonary edema, and 2.8x2.7cm mass LLL interval inc compared to prior. MICU consulted for hypotension with c/f need for pressors, however, improved with IVF and midodrine. Pt given tylenol, cefepime, and vancomycin. ID consulted and PT consulted with recs pending. Pt is a 89 yo F with PMH CHF, anemia, pHTN, CAD, CVA (no deficits), T2D, [place], COPD (2L NC PRN, qhs), PVD, HTN, HLD, and ?lung CA (pending RT) p/w dysuria x 1wk along with tremors and SOB. Says she had been doing better post-discharge from last hospitalization and was pending radiation initiation. Lives with daughter who recently had a URI but states kept her distance at home. At time of exam, complaining of abdominal pressure without SOB. Says that she always feels cold and that her feet have neuropathic pain from missing a dose of gabapentin. Otherwise denies complaints. Resumed her metformin in the last month with remaining meds unchanged.    On arrival, T 102.5, , /78 --- SBP 80s, RR 20 O2sat 88% RA -- 2L NC. EKG with sinus tachycardia, no ischemic changes. ER POCUS with diffuse B lines. Labs with leukocytosis, normocytic anemia, trop neg x2, BNP 1406, lactate neg, UA+ with UCx and BCx in lab, RVP neg, MRSA in lab. CTA chest neg PE but with multifocal PNA, pulmonary edema, and 2.8x2.7cm mass LLL interval inc compared to prior. MICU consulted for hypotension with c/f need for pressors, however, improved with IVF and midodrine. Pt given tylenol, cefepime, and vancomycin. ID consulted and PT consulted with recs pending.

## 2025-02-20 LAB
-  AMPICILLIN: SIGNIFICANT CHANGE UP
-  CIPROFLOXACIN: SIGNIFICANT CHANGE UP
-  LEVOFLOXACIN: SIGNIFICANT CHANGE UP
-  NITROFURANTOIN: SIGNIFICANT CHANGE UP
-  TETRACYCLINE: SIGNIFICANT CHANGE UP
-  VANCOMYCIN: SIGNIFICANT CHANGE UP
ANION GAP SERPL CALC-SCNC: 14 MMOL/L — SIGNIFICANT CHANGE UP (ref 7–14)
BUN SERPL-MCNC: 28 MG/DL — HIGH (ref 7–23)
CALCIUM SERPL-MCNC: 9.4 MG/DL — SIGNIFICANT CHANGE UP (ref 8.4–10.5)
CHLORIDE SERPL-SCNC: 98 MMOL/L — SIGNIFICANT CHANGE UP (ref 98–107)
CO2 SERPL-SCNC: 24 MMOL/L — SIGNIFICANT CHANGE UP (ref 22–31)
CREAT SERPL-MCNC: 0.91 MG/DL — SIGNIFICANT CHANGE UP (ref 0.5–1.3)
CULTURE RESULTS: ABNORMAL
EGFR: 59 ML/MIN/1.73M2 — LOW
EGFR: 59 ML/MIN/1.73M2 — LOW
GLUCOSE BLDC GLUCOMTR-MCNC: 225 MG/DL — HIGH (ref 70–99)
GLUCOSE SERPL-MCNC: 192 MG/DL — HIGH (ref 70–99)
HCT VFR BLD CALC: 28.3 % — LOW (ref 34.5–45)
HGB BLD-MCNC: 9.1 G/DL — LOW (ref 11.5–15.5)
MAGNESIUM SERPL-MCNC: 2.6 MG/DL — SIGNIFICANT CHANGE UP (ref 1.6–2.6)
MCHC RBC-ENTMCNC: 28.2 PG — SIGNIFICANT CHANGE UP (ref 27–34)
MCHC RBC-ENTMCNC: 32.2 G/DL — SIGNIFICANT CHANGE UP (ref 32–36)
MCV RBC AUTO: 87.6 FL — SIGNIFICANT CHANGE UP (ref 80–100)
METHOD TYPE: SIGNIFICANT CHANGE UP
NRBC # BLD AUTO: 0 K/UL — SIGNIFICANT CHANGE UP (ref 0–0)
NRBC # FLD: 0 K/UL — SIGNIFICANT CHANGE UP (ref 0–0)
NRBC BLD AUTO-RTO: 0 /100 WBCS — SIGNIFICANT CHANGE UP (ref 0–0)
ORGANISM # SPEC MICROSCOPIC CNT: ABNORMAL
ORGANISM # SPEC MICROSCOPIC CNT: ABNORMAL
PHOSPHATE SERPL-MCNC: 3.4 MG/DL — SIGNIFICANT CHANGE UP (ref 2.5–4.5)
PLATELET # BLD AUTO: 416 K/UL — HIGH (ref 150–400)
POTASSIUM SERPL-MCNC: 4.3 MMOL/L — SIGNIFICANT CHANGE UP (ref 3.5–5.3)
POTASSIUM SERPL-SCNC: 4.3 MMOL/L — SIGNIFICANT CHANGE UP (ref 3.5–5.3)
RBC # BLD: 3.23 M/UL — LOW (ref 3.8–5.2)
RBC # FLD: 15.5 % — HIGH (ref 10.3–14.5)
SODIUM SERPL-SCNC: 136 MMOL/L — SIGNIFICANT CHANGE UP (ref 135–145)
SPECIMEN SOURCE: SIGNIFICANT CHANGE UP
WBC # BLD: 8.53 K/UL — SIGNIFICANT CHANGE UP (ref 3.8–10.5)
WBC # FLD AUTO: 8.53 K/UL — SIGNIFICANT CHANGE UP (ref 3.8–10.5)

## 2025-02-20 PROCEDURE — 71045 X-RAY EXAM CHEST 1 VIEW: CPT | Mod: 26

## 2025-02-20 PROCEDURE — 99223 1ST HOSP IP/OBS HIGH 75: CPT

## 2025-02-20 PROCEDURE — 99232 SBSQ HOSP IP/OBS MODERATE 35: CPT

## 2025-02-20 PROCEDURE — 99497 ADVNCD CARE PLAN 30 MIN: CPT | Mod: 25

## 2025-02-20 RX ORDER — FUROSEMIDE 10 MG/ML
20 INJECTION INTRAMUSCULAR; INTRAVENOUS DAILY
Refills: 0 | Status: DISCONTINUED | OUTPATIENT
Start: 2025-02-20 | End: 2025-02-22

## 2025-02-20 RX ORDER — INSULIN LISPRO 100 U/ML
INJECTION, SOLUTION INTRAVENOUS; SUBCUTANEOUS
Refills: 0 | Status: DISCONTINUED | OUTPATIENT
Start: 2025-02-20 | End: 2025-02-21

## 2025-02-20 RX ORDER — VANCOMYCIN HCL IN 5 % DEXTROSE 1.5G/250ML
750 PLASTIC BAG, INJECTION (ML) INTRAVENOUS EVERY 24 HOURS
Refills: 0 | Status: DISCONTINUED | OUTPATIENT
Start: 2025-02-20 | End: 2025-02-21

## 2025-02-20 RX ORDER — INSULIN LISPRO 100 U/ML
INJECTION, SOLUTION INTRAVENOUS; SUBCUTANEOUS AT BEDTIME
Refills: 0 | Status: DISCONTINUED | OUTPATIENT
Start: 2025-02-20 | End: 2025-02-21

## 2025-02-20 RX ORDER — NITROFURANTOIN MACROCRYSTAL 100 MG
100 CAPSULE ORAL EVERY 12 HOURS
Refills: 0 | Status: DISCONTINUED | OUTPATIENT
Start: 2025-02-20 | End: 2025-02-20

## 2025-02-20 RX ORDER — WHITE PETROLATUM 1 G/G
1 OINTMENT TOPICAL ONCE
Refills: 0 | Status: COMPLETED | OUTPATIENT
Start: 2025-02-20 | End: 2025-02-20

## 2025-02-20 RX ADMIN — INSULIN LISPRO 8 UNIT(S): 100 INJECTION, SOLUTION INTRAVENOUS; SUBCUTANEOUS at 09:22

## 2025-02-20 RX ADMIN — Medication 40 MILLIGRAM(S): at 06:11

## 2025-02-20 RX ADMIN — ENOXAPARIN SODIUM 40 MILLIGRAM(S): 100 INJECTION SUBCUTANEOUS at 21:30

## 2025-02-20 RX ADMIN — POLYETHYLENE GLYCOL 3350 17 GRAM(S): 17 POWDER, FOR SOLUTION ORAL at 06:11

## 2025-02-20 RX ADMIN — CEFEPIME 100 MILLIGRAM(S): 2 INJECTION, POWDER, FOR SOLUTION INTRAVENOUS at 09:26

## 2025-02-20 RX ADMIN — FUROSEMIDE 20 MILLIGRAM(S): 10 INJECTION INTRAMUSCULAR; INTRAVENOUS at 18:07

## 2025-02-20 RX ADMIN — ATORVASTATIN CALCIUM 20 MILLIGRAM(S): 80 TABLET, FILM COATED ORAL at 21:33

## 2025-02-20 RX ADMIN — METHYLPREDNISOLONE ACETATE 20 MILLIGRAM(S): 80 INJECTION, SUSPENSION INTRA-ARTICULAR; INTRALESIONAL; INTRAMUSCULAR; SOFT TISSUE at 21:31

## 2025-02-20 RX ADMIN — METHYLPREDNISOLONE ACETATE 20 MILLIGRAM(S): 80 INJECTION, SUSPENSION INTRA-ARTICULAR; INTRALESIONAL; INTRAMUSCULAR; SOFT TISSUE at 12:03

## 2025-02-20 RX ADMIN — Medication 1 DOSE(S): at 21:52

## 2025-02-20 RX ADMIN — INSULIN LISPRO 3: 100 INJECTION, SOLUTION INTRAVENOUS; SUBCUTANEOUS at 12:17

## 2025-02-20 RX ADMIN — Medication 1 TABLET(S): at 11:12

## 2025-02-20 RX ADMIN — SILDENAFIL 20 MILLIGRAM(S): 50 TABLET, FILM COATED ORAL at 12:03

## 2025-02-20 RX ADMIN — METHYLPREDNISOLONE ACETATE 20 MILLIGRAM(S): 80 INJECTION, SUSPENSION INTRA-ARTICULAR; INTRALESIONAL; INTRAMUSCULAR; SOFT TISSUE at 05:05

## 2025-02-20 RX ADMIN — GABAPENTIN 400 MILLIGRAM(S): 400 CAPSULE ORAL at 21:33

## 2025-02-20 RX ADMIN — INSULIN LISPRO 8 UNIT(S): 100 INJECTION, SOLUTION INTRAVENOUS; SUBCUTANEOUS at 18:03

## 2025-02-20 RX ADMIN — INSULIN GLARGINE-YFGN 16 UNIT(S): 100 INJECTION, SOLUTION SUBCUTANEOUS at 21:55

## 2025-02-20 RX ADMIN — Medication 1 DOSE(S): at 12:03

## 2025-02-20 RX ADMIN — INSULIN LISPRO 6: 100 INJECTION, SOLUTION INTRAVENOUS; SUBCUTANEOUS at 09:21

## 2025-02-20 RX ADMIN — CEFEPIME 100 MILLIGRAM(S): 2 INJECTION, POWDER, FOR SOLUTION INTRAVENOUS at 23:21

## 2025-02-20 RX ADMIN — INSULIN LISPRO 2: 100 INJECTION, SOLUTION INTRAVENOUS; SUBCUTANEOUS at 21:52

## 2025-02-20 RX ADMIN — CEFEPIME 100 MILLIGRAM(S): 2 INJECTION, POWDER, FOR SOLUTION INTRAVENOUS at 00:23

## 2025-02-20 RX ADMIN — IPRATROPIUM BROMIDE AND ALBUTEROL SULFATE 3 MILLILITER(S): .5; 2.5 SOLUTION RESPIRATORY (INHALATION) at 04:57

## 2025-02-20 RX ADMIN — INSULIN LISPRO 8 UNIT(S): 100 INJECTION, SOLUTION INTRAVENOUS; SUBCUTANEOUS at 12:18

## 2025-02-20 RX ADMIN — SILDENAFIL 20 MILLIGRAM(S): 50 TABLET, FILM COATED ORAL at 05:05

## 2025-02-20 RX ADMIN — Medication 2 TABLET(S): at 21:33

## 2025-02-20 RX ADMIN — IPRATROPIUM BROMIDE AND ALBUTEROL SULFATE 3 MILLILITER(S): .5; 2.5 SOLUTION RESPIRATORY (INHALATION) at 16:23

## 2025-02-20 RX ADMIN — SILDENAFIL 20 MILLIGRAM(S): 50 TABLET, FILM COATED ORAL at 21:32

## 2025-02-20 RX ADMIN — INSULIN LISPRO 4: 100 INJECTION, SOLUTION INTRAVENOUS; SUBCUTANEOUS at 18:03

## 2025-02-20 RX ADMIN — Medication 250 MILLIGRAM(S): at 12:05

## 2025-02-20 RX ADMIN — Medication 81 MILLIGRAM(S): at 21:31

## 2025-02-20 RX ADMIN — IPRATROPIUM BROMIDE AND ALBUTEROL SULFATE 3 MILLILITER(S): .5; 2.5 SOLUTION RESPIRATORY (INHALATION) at 21:05

## 2025-02-20 NOTE — CONSULT NOTE ADULT - PROBLEM SELECTOR RECOMMENDATION 2
Initially recommended for resection however multiple re-hospitalizations and now with secondary deconditioning  Pending RT, per pt had mapping done already and was supposed to start RT soon however ended up in the hospital instead  Goal: she would like to still pursue RT if possible however she is growing sceptical that it will happen at all  Also at terms that potentially she would have to shift goal to comfort care

## 2025-02-20 NOTE — PHYSICAL THERAPY INITIAL EVALUATION ADULT - GAIT DEVIATIONS NOTED, PT EVAL
multiple standing rest periods due to fatigue/decreased janet/decreased step length/decreased weight-shifting ability

## 2025-02-20 NOTE — PROGRESS NOTE ADULT - ASSESSMENT
ECHO 10/7/24: nl LV sys fx EF 60% . Mild mitral valve stenosis. Mild to moderate tricuspid regurgitation. mod AS   a/p    89 yo F with PMH CHF, valvular disease, anemia, pHTN, CAD, CVA (no deficits), T2D, [place], COPD (2L NC PRN, qhs), PVD, HTN, HLD, and ?lung CA (pending RT) p/w dysuria x 1wk along with tremors and SOB + pna      #PNA  -Meeting sepsis criteria w/ fever, tachycardia, leukocytosis  -CTa chest with no PE, Interval increase in groundglass opacities and subpleural consolidations  in bilateral lungs, interlobularseptal thickening in  the upper lobes, which can be suggestive of interstitial edema; 3.1 cm mass like consolidation in left lower lobe, Mediastinal and hilar lymphadenopathy which can be reactive or due to  metastasis.  Subpleural reticulations, Honeycombing, and peripheral cystic changes in  bilateral lungs, suggestive of interstitial lung fibrosis.  -pending Bcx  -ID work up - on IV abx   -bp improved- start lasix 20 mg IVP daily     #hypotension   -micu eval noted- hypotensive to 80s/40s, given 2x 500cc bolus and Midodrine 10mg w/ improvement in BP to MAP >70  - Pocus w/ collapsed IVC suggestive of intravascular depletion, but w/ evidence of reduced RV and LV function   -check ECHO   -sepsis work up   -not a candidate for MICU per team   -consider adding mido if needed  to augment BP , would avoid aggressive IVF resuscitation given evidence of pulm edema on cta   -HS trop neg. no ischemic changes to ecg     #CAD s/p PCI  -stable  -cont asa, statin, BB    #HFpEF  -repeat ECHO as above  -bp now stable-- add iv lasix as mentioned above     #Pulmonary HTN.   -c/w home sildenafil 20mg TID.  -fu echo     #Lung CA  -med/ pulm fu   -pending RT initiation as outpt     #UTI   -management per med /ID    dvt ppx

## 2025-02-20 NOTE — PROGRESS NOTE ADULT - SUBJECTIVE AND OBJECTIVE BOX
CARDIOLOGY FOLLOW UP - Dr. Valenzuela  DATE OF SERVICE: 2/20/25    CC no acute cv events       REVIEW OF SYSTEMS:  CONSTITUTIONAL: No fever, weight loss, or fatigue  RESPIRATORY: No cough, wheezing, chills or hemoptysis; No Shortness of Breath  CARDIOVASCULAR: No chest pain, palpitations, passing out, dizziness  GASTROINTESTINAL: No abdominal or epigastric pain. No nausea, vomiting, or hematemesis; No diarrhea or constipation. No melena or hematochezia.      PHYSICAL EXAM:  T(C): 36.4 (02-20-25 @ 12:00), Max: 36.6 (02-19-25 @ 15:37)  HR: 72 (02-20-25 @ 12:00) (70 - 85)  BP: 110/67 (02-20-25 @ 12:00) (96/35 - 139/86)  RR: 17 (02-20-25 @ 12:00) (14 - 19)  SpO2: 100% (02-20-25 @ 12:00) (95% - 100%)  Wt(kg): --  I&O's Summary    19 Feb 2025 07:01  -  20 Feb 2025 07:00  --------------------------------------------------------  IN: 250 mL / OUT: 350 mL / NET: -100 mL        Appearance: Normal	  Cardiovascular: Normal S1 S2,RR  Respiratory: diminsihed   Gastrointestinal:  Soft, Non-tender, + BS	  Extremities: Normal range of motion, No clubbing, cyanosis or edema      Home Medications:  Advair Diskus 250 mcg-50 mcg inhalation powder: 1 puff(s) inhaled 2 times a day (13 Dec 2024 23:00)  aspirin 81 mg oral delayed release tablet: 1 tab(s) orally once a day (at bedtime) (13 Dec 2024 23:00)  atorvastatin 20 mg oral tablet: 1 tab(s) orally once a day (at bedtime) (13 Dec 2024 23:00)  furosemide 20 mg oral tablet: 2 tab(s) orally once a day (13 Dec 2024 22:55)  gabapentin 400 mg oral capsule: 1 cap(s) orally once a day (at bedtime) (13 Dec 2024 23:00)  ipratropium 42 mcg/inh (0.06%) nasal spray: 2 spray(s) intranasally 2 times a day (13 Dec 2024 23:00)  metFORMIN 1000 mg oral tablet: 1 tab(s) orally once a day (19 Feb 2025 12:37)  Metoprolol Tartrate 25 mg oral tablet: 0.5 tab(s) orally 2 times a day takes 2 half tabs in morning and 1 half tab at night (13 Dec 2024 22:59)  Multiple Vitamins oral tablet: 1 tab(s) orally once a day (13 Dec 2024 23:00)  omeprazole 40 mg oral delayed release capsule: 1 cap(s) orally once a day (13 Dec 2024 23:00)  polyethylene glycol 3350 oral powder for reconstitution: 17 gram(s) orally 2 times a day (18 Dec 2024 13:08)  PreserVision AREDS 2 oral capsule: 1 cap(s) orally 2 times a day (13 Dec 2024 23:00)  senna leaf extract oral tablet: 2 tab(s) orally once a day (at bedtime) (18 Dec 2024 13:08)  sildenafil 20 mg oral tablet: 1 tab(s) orally 3 times a day (13 Dec 2024 22:55)  Tresiba FlexTouch 100 units/mL subcutaneous solution: 10 unit(s) subcutaneous once a day (at bedtime) Take tresiba 10 units subcutaneous at bedtime starting tonight at bedtime (13 Dec 2024 23:00)      MEDICATIONS  (STANDING):  albuterol/ipratropium for Nebulization 3 milliLiter(s) Nebulizer every 6 hours  aspirin enteric coated 81 milliGRAM(s) Oral daily  atorvastatin 20 milliGRAM(s) Oral at bedtime  cefepime   IVPB 2000 milliGRAM(s) IV Intermittent <User Schedule>  dextrose 5%. 1000 milliLiter(s) (50 mL/Hr) IV Continuous <Continuous>  dextrose 5%. 1000 milliLiter(s) (100 mL/Hr) IV Continuous <Continuous>  dextrose 50% Injectable 25 Gram(s) IV Push once  dextrose 50% Injectable 12.5 Gram(s) IV Push once  dextrose 50% Injectable 25 Gram(s) IV Push once  enoxaparin Injectable 40 milliGRAM(s) SubCutaneous every 24 hours  fluticasone propionate/ salmeterol 250-50 MICROgram(s) Diskus 1 Dose(s) Inhalation two times a day  gabapentin 400 milliGRAM(s) Oral at bedtime  glucagon  Injectable 1 milliGRAM(s) IntraMuscular once  insulin glargine Injectable (LANTUS) 16 Unit(s) SubCutaneous at bedtime  insulin lispro (ADMELOG) corrective regimen sliding scale   SubCutaneous three times a day before meals  insulin lispro (ADMELOG) corrective regimen sliding scale   SubCutaneous at bedtime  insulin lispro Injectable (ADMELOG) 8 Unit(s) SubCutaneous three times a day before meals  methylPREDNISolone sodium succinate Injectable 20 milliGRAM(s) IV Push every 8 hours  multivitamin 1 Tablet(s) Oral daily  pantoprazole    Tablet 40 milliGRAM(s) Oral before breakfast  polyethylene glycol 3350 17 Gram(s) Oral two times a day  senna 2 Tablet(s) Oral at bedtime  sildenafil (REVATIO) 20 milliGRAM(s) Oral three times a day  vancomycin  IVPB 750 milliGRAM(s) IV Intermittent every 24 hours      TELEMETRY: nsr 	    ECG:  	  RADIOLOGY:   DIAGNOSTIC TESTING:  [ ] Echocardiogram:  [ ]  Catheterization:  [ ] Stress Test:    OTHER: 	    LABS:	 	    CKMB Units: 1.1 ng/mL (02-19 @ 12:12)  Troponin T, High Sensitivity Result: 36 ng/L (02-19 @ 12:12)  Troponin T, High Sensitivity Result: 48 ng/L (02-19 @ 09:14)  Troponin T, High Sensitivity Result: 32 ng/L (02-19 @ 01:13)  Troponin T, High Sensitivity Result: 33 ng/L (02-18 @ 20:07)                          9.1    8.53  )-----------( 416      ( 20 Feb 2025 05:37 )             28.3     02-20    136  |  98  |  28[H]  ----------------------------<  192[H]  4.3   |  24  |  0.91    Ca    9.4      20 Feb 2025 05:37  Phos  3.4     02-20  Mg     2.60     02-20    TPro  6.4  /  Alb  3.5  /  TBili  0.4  /  DBili  x   /  AST  7   /  ALT  <5  /  AlkPhos  86  02-19

## 2025-02-20 NOTE — PROGRESS NOTE ADULT - ASSESSMENT
Pt is a 89 yo F with PMH CHF, anemia, pHTN, CAD, CVA (no deficits), T2D, [place], COPD (2L NC PRN, qhs), PVD, HTN, HLD, and ?lung CA (pending RT) p/w dysuria x 1wk along with tremors and SOB.   On arrival, T 102.5, , /78 --- SBP 80s, RR 20 O2sat 88% RA -- 2L NC. EKG with sinus tachycardia, no ischemic changes. ER POCUS with diffuse B lines. Labs with leukocytosis, normocytic anemia, trop neg x2, BNP 1406, lactate neg, UA+ with UCx and BCx in lab, RVP neg, MRSA in lab. CTA chest neg PE but with multifocal PNA, pulmonary edema, and 2.8x2.7cm mass LLL interval inc compared to prior. MICU consulted for hypotension with c/f need for pressors, however, improved with IVF and midodrine. Pt given tylenol, cefepime, and vancomycin. ID consulted and PT consulted with recs pending. (19 Feb 2025 10:03)  she is apparently sob to me:  but she says her breathing is OK:  she  is on home oxygen :  recently diagnosed with lung cancer:   now pulm called for pneumonia        Sepsis/ Pneumonia/ COPD / lung cancer:   pneumonia: on antibiotics   CTA chest neg PE but with multifocal PNA and pulmonary edema   cont antibtiocs : seen by id   urine legionella/strep  lEFT LOWER OPACITY:  PER DAUGHTER :  SHE NEVER GOT ANY CHEMO :  SHE WAS SUPPOSED TO GET RADATION THERAPY,  BUT SHE ENDED UP HERE:     She has had multiplek admission in last few months and hence could not any surgery for the mass:  now it seems to have increased   she seems to be sob:  and has copd:  will qadd short course of steroids   her vbg is OK:  but she looks sob:  she may need bipap , if her rr worsens    2/20: she looks better today  : on 3 L o f oxygen : she does use oxygen at home:  2 L of oxygen :  ct scan chest showed: No pulmonary embolism. Interval increase in groundglass opacities and subpleural consolidations in bilateral lungs, likely infectious. Interlobularseptal thickening in the upper lobes, which can be suggestive of interstitial edema 3.1 cm masslike consolidation in left lower lobe. Additional smaller nodules in bilateral lungs. Mediastinal and hilar lymphadenopathy which can be reactive or due to  metastasis. Subpleural reticulations, Honeycombing, and peripheral cystic changes in bilateral lungs, suggestive of interstitial lung fibrosis.    would cont antibiotics   ? hemoinc consult??     Acute UTI.   as above.    Acute on chronic heart failure.    - known hx CHF   - 10/2024 TTE EF 60%, mild MV stenosis, mod AS, mild-mod TR  - POCUS in ER c/f reduced EF  - CXR with pulmonary vascular congestion, CTA chest with pulmonary edema   -cont diuretics  defer to card s    2/20: cont current rx:    on 3 L of oxygen      Pulmonary mass.    CTA chest with interval increase in LLL mass from prior, 2.8x2.7cm  - pending RT initiation outpt?\  - as above:  has not getting any treatment since the diagnosis many months ago     Pulmonary HTN.    sildenafil 20mg TID.    HTN (hypertension).   blood pressure is soft:  currently off anti hypertensives:     Type 2 diabetes mellitus.   monitor and control:    gareth acp

## 2025-02-20 NOTE — CONSULT NOTE ADULT - CONSULT REQUESTED BY NAME
Dr kevin Gabriel
medicine
Dr. Rodriguez
Zelalem Payne
Dr. Gusman, [facility]
Primary Team

## 2025-02-20 NOTE — PROGRESS NOTE ADULT - ASSESSMENT
89 y/o F PMhx CHF, anemia, pHTN, CAD, CVA, DMII, COPD (2L NC PRN, qhs), PVD, HTN, and ?lung CA (pending RT) who presented w/ dysuria x 5 days as well as SOB and rigors.    L lung cancer, PNA, UTI  sepsis- fever, leukocytosis- resolved  RVP negative  CTA chest- negative for PE but demonstrated increase in groundglass opacities and subpleural consolidations in bilateral lungs; Interlobular septal thickening in the upper lobes, which can be suggestive of interstitial edema 3.1 cm masslike consolidation in left lower lobe. Additional smaller nodules in bilateral lungs. Subpleural reticulations, Honeycombing, and peripheral cystic changes in bilateral lungs, suggestive of interstitial lung fibrosis.  UA positive  pt w/ dysuria, no flank tenderness    Recommendations  c/w cefepime 2g every 12 hours (renally adjusted)  f/u blood cultures- NGTD  f/u urine cx E faecium sensitivities  - prior urine E faecium was vanc sensitive  start vanc 750mg every 24 hours for now  - trough prior to 3rd dose  TTE pending    Aldo Verduzco M.D.  [facility]  [phone]  After 5pm on weekdays and all day on weekends - please call [phone]  Available on [employer] teams    Thank you for consulting us and involving us in the management of this patients case. In addition to reviewing history, imaging, documents, labs, microbiology, took into account antibiotic stewardship, local antibiogram and infection control strategies and potential transmission issues.

## 2025-02-20 NOTE — PATIENT PROFILE ADULT - VISION (WITH CORRECTIVE LENSES IF THE PATIENT USUALLY WEARS THEM):
uses eyeglass/Partially impaired: cannot see medication labels or newsprint, but can see obstacles in path, and the surrounding layout; can count fingers at arm's length

## 2025-02-20 NOTE — PROGRESS NOTE ADULT - NSPROGADDITIONALINFOA_GEN_ALL_CORE
d/w ID and pulm.  d/w the daughter Vanessa.     - Dr. RACHELL Martinez ([facility])  - [phone] d/w ID and pulm.  d/w the daughter Vanessa STEELE. Pt lives with her. Uses walker to walk, though not always using.   Recently seen by Dr. Shaw Mitchell (rad onc) for measurements. Hoping that pt will be able to proceed with radiation treatment post discharge.    GOC discussed with pt and the daughter. Per the pt and daughter, if condition is irreversible then no CPR/ventilator. otherwise okay with CPR/ ventilator.   currently remain full code.   over all prognosis is poor given lung findings.   all questions answered.   palliative f/u appropriate for GOC and future symptom management.     - Dr. RACHELL Martinez ([facility])  - [phone]

## 2025-02-20 NOTE — PROGRESS NOTE ADULT - SUBJECTIVE AND OBJECTIVE BOX
Chief Complaint: Type 2 DM, steroid    History: tolerating po intake  received non- carb consistent tray but knew not to drink the juice  no hypoglycemia events or symptoms noted    MEDICATIONS  (STANDING):  albuterol/ipratropium for Nebulization 3 milliLiter(s) Nebulizer every 6 hours  aspirin enteric coated 81 milliGRAM(s) Oral daily  atorvastatin 20 milliGRAM(s) Oral at bedtime  cefepime   IVPB 2000 milliGRAM(s) IV Intermittent <User Schedule>  dextrose 5%. 1000 milliLiter(s) (50 mL/Hr) IV Continuous <Continuous>  dextrose 5%. 1000 milliLiter(s) (100 mL/Hr) IV Continuous <Continuous>  dextrose 50% Injectable 25 Gram(s) IV Push once  dextrose 50% Injectable 12.5 Gram(s) IV Push once  dextrose 50% Injectable 25 Gram(s) IV Push once  enoxaparin Injectable 40 milliGRAM(s) SubCutaneous every 24 hours  fluticasone propionate/ salmeterol 250-50 MICROgram(s) Diskus 1 Dose(s) Inhalation two times a day  gabapentin 400 milliGRAM(s) Oral at bedtime  glucagon  Injectable 1 milliGRAM(s) IntraMuscular once  insulin glargine Injectable (LANTUS) 16 Unit(s) SubCutaneous at bedtime  insulin lispro (ADMELOG) corrective regimen sliding scale   SubCutaneous three times a day before meals  insulin lispro (ADMELOG) corrective regimen sliding scale   SubCutaneous at bedtime  insulin lispro Injectable (ADMELOG) 8 Unit(s) SubCutaneous three times a day before meals  methylPREDNISolone sodium succinate Injectable 20 milliGRAM(s) IV Push every 8 hours  multivitamin 1 Tablet(s) Oral daily  pantoprazole    Tablet 40 milliGRAM(s) Oral before breakfast  polyethylene glycol 3350 17 Gram(s) Oral two times a day  senna 2 Tablet(s) Oral at bedtime  sildenafil (REVATIO) 20 milliGRAM(s) Oral three times a day  vancomycin  IVPB 750 milliGRAM(s) IV Intermittent every 24 hours    MEDICATIONS  (PRN):  acetaminophen     Tablet .. 650 milliGRAM(s) Oral every 6 hours PRN Temp greater or equal to 38C (100.4F), Mild Pain (1 - 3)  dextrose Oral Gel 15 Gram(s) Oral once PRN Blood Glucose LESS THAN 70 milliGRAM(s)/deciliter  melatonin 3 milliGRAM(s) Oral at bedtime PRN Insomnia  ondansetron Injectable 4 milliGRAM(s) IV Push every 8 hours PRN Nausea and/or Vomiting      Allergies    penicillin (Unknown)  macrolide antibiotics (Other)  Biaxin (Unknown)    Intolerances      Review of Systems:    ALL OTHER SYSTEMS REVIEWED AND NEGATIVE      PHYSICAL EXAM:  VITALS: T(C): 36.2 (02-20-25 @ 06:00)  T(F): 97.1 (02-20-25 @ 06:00), Max: 97.8 (02-19-25 @ 15:37)  HR: 85 (02-20-25 @ 06:00) (66 - 85)  BP: 117/64 (02-20-25 @ 06:00) (96/35 - 139/86)  RR:  (14 - 19)  SpO2:  (95% - 100%)  Wt(kg): --  GENERAL: NAD, well-groomed, well-developed  EYES: No proptosis, no lid lag, anicteric  HEENT:  Atraumatic, Normocephalic, moist mucous membranes  RESPIRATORY: nonlabored respirations  PSYCH: Alert and oriented x 3, normal affect, normal mood    CAPILLARY BLOOD GLUCOSE      POCT Blood Glucose.: 252 mg/dL (20 Feb 2025 08:56)  POCT Blood Glucose.: 264 mg/dL (20 Feb 2025 08:55)  POCT Blood Glucose.: 225 mg/dL (20 Feb 2025 05:09)  POCT Blood Glucose.: 368 mg/dL (19 Feb 2025 21:44)  POCT Blood Glucose.: 97 mg/dL (19 Feb 2025 16:09)  POCT Blood Glucose.: 267 mg/dL (19 Feb 2025 12:15)  POCT Blood Glucose.: 272 mg/dL (19 Feb 2025 11:44)      02-20    136  |  98  |  28[H]  ----------------------------<  192[H]  4.3   |  24  |  0.91    eGFR: 59[L]    Ca    9.4      02-20  Mg     2.60     02-20  Phos  3.4     02-20    TPro  6.4  /  Alb  3.5  /  TBili  0.4  /  DBili  x   /  AST  7   /  ALT  <5  /  AlkPhos  86  02-19      A1C with Estimated Average Glucose Result: 9.7 % (02-19-25 @ 09:14)  A1C with Estimated Average Glucose Result: 8.0 % (12-13-24 @ 14:35)  A1C with Estimated Average Glucose Result: 6.6 % (09-05-24 @ 13:42)  A1C with Estimated Average Glucose Result: 9.2 % (04-20-24 @ 08:50)      Thyroid Function Tests:  02-19 @ 09:14 TSH 1.95 FreeT4 -- T3 -- Anti TPO -- Anti Thyroglobulin Ab -- TSI --

## 2025-02-20 NOTE — PATIENT PROFILE ADULT - FALL HARM RISK - HARM RISK INTERVENTIONS
Assistance with ambulation/Assistance OOB with selected safe patient handling equipment/Communicate Risk of Fall with Harm to all staff/Reinforce activity limits and safety measures with patient and family/Review medications for side effects contributing to fall risk/Sit up slowly, dangle for a short time, stand at bedside before walking/Tailored Fall Risk Interventions/Toileting schedule using arm’s reach rule for commode and bathroom/Visual Cue: Yellow wristband and red socks/Bed in lowest position, wheels locked, appropriate side rails in place/Call bell, personal items and telephone in reach/Instruct patient to call for assistance before getting out of bed or chair/Non-slip footwear when patient is out of bed/[place] to call system/Physically safe environment - no spills, clutter or unnecessary equipment/Purposeful Proactive Rounding/Room/bathroom lighting operational, light cord in reach

## 2025-02-20 NOTE — PROGRESS NOTE ADULT - ASSESSMENT
Pt is a 89 yo F with PMH CHF, anemia, pHTN, CAD, CVA (no deficits), T2D, [place], COPD (2L NC PRN, qhs), PVD, HTN, HLD, and ?lung CA (pending RT) p/w dysuria x 1wk along with tremors and SOB. On arrival, meeting SIRS criteria with hypotension and hypoxia requiring 2L NC. EKG with sinus tachycardia, no ischemic changes. ER POCUS with diffuse B lines. Labs with leukocytosis, normocytic anemia, trop neg x2, BNP 1406, lactate neg, UA+ with UCx and BCx in lab, RVP neg, MRSA in lab. CTA chest neg PE but with multifocal PNA, pulmonary edema, and 2.8x2.7cm mass LLL interval inc compared to prior. MICU consulted for hypotension with c/f need for pressors, however, improved with IVF and midodrine; started on cefepime. ID + pulm consulted and PT consulted with recs pending.

## 2025-02-20 NOTE — CONSULT NOTE ADULT - SUBJECTIVE AND OBJECTIVE BOX
Date of [id number]    HPI:  Pt is a 89 yo F with PMH CHF, anemia, pHTN, CAD, CVA (no deficits), T2D, [place], COPD (2L NC PRN, qhs), PVD, HTN, HLD, and ?lung CA (pending RT) p/w dysuria x 1wk along with tremors and SOB. Says she had been doing better post-discharge from last hospitalization and was pending radiation initiation. Lives with daughter who recently had a URI but states kept her distance at home. At time of exam, complaining of abdominal pressure without SOB. Says that she always feels cold and that her feet have neuropathic pain from missing a dose of gabapentin. Otherwise denies complaints. Resumed her metformin in the last month with remaining meds unchanged.    On arrival, T 102.5, , /78 --- SBP 80s, RR 20 O2sat 88% RA -- 2L NC. EKG with sinus tachycardia, no ischemic changes. ER POCUS with diffuse B lines. Labs with leukocytosis, normocytic anemia, trop neg x2, BNP 1406, lactate neg, UA+ with UCx and BCx in lab, RVP neg, MRSA in lab. CTA chest neg PE but with multifocal PNA, pulmonary edema, and 2.8x2.7cm mass LLL interval inc compared to prior. MICU consulted for hypotension with c/f need for pressors, however, improved with IVF and midodrine. Pt given tylenol, cefepime, and vancomycin. ID consulted and PT consulted with recs pending. (19 Feb 2025 10:03)      PERTINENT PM/SXH:   HTN (hypertension)    CVA (cerebral vascular accident)    HLD (hyperlipidemia)    DM2 (diabetes mellitus, type 2)    Anemia    PVD (peripheral vascular disease)    Solitary pulmonary nodule    Chronic diastolic heart failure    H/O hearing loss    History of COPD    CAD (coronary artery disease)    Pulmonary hypertension      CAD (coronary artery disease)    S/P vascular bypass    Bilateral cataracts    H/O rotator cuff surgery    FAMILY HISTORY:  FH: heart disease (Child)    Family Hx substance abuse [ ]yes [ ]no    ITEMS NOT CHECKED ARE NOT PRESENT    SOCIAL HISTORY:   Significant other/partner[ ]   Children[ x]  Mandaeism/Spirituality:  Substance hx:  [ ]   Tobacco hx:  [ ]   Alcohol hx: [ ]   Home Opioid hx:  [ ] I-Stop Reference No: [id number]  Living Situation: [x ]Home  [ ]Long term care  [ ]Rehab [ ]Other  Lives at home with daughter, independent in all ADLs    ADVANCE DIRECTIVES:    DNR/MOLST  [ ]  Living Will  [ ]   DECISION MAKER(s):  [x ] Health Care Proxy(s)  [ ] Surrogate(s)  [ ] Guardian           Name(s): Phone Number(s): copy scanned in patient window  Vanessa Casey #[phone]    BASELINE (I)ADL(s) (prior to admission):  [place]: [x ]Total  [ ] Moderate [ ]Dependent    Allergies    penicillin (Unknown)  macrolide antibiotics (Other)  Biaxin (Unknown)    Intolerances    MEDICATIONS  (STANDING):  albuterol/ipratropium for Nebulization 3 milliLiter(s) Nebulizer every 6 hours  aspirin enteric coated 81 milliGRAM(s) Oral daily  atorvastatin 20 milliGRAM(s) Oral at bedtime  cefepime   IVPB 2000 milliGRAM(s) IV Intermittent <User Schedule>  dextrose 5%. 1000 milliLiter(s) (50 mL/Hr) IV Continuous <Continuous>  dextrose 5%. 1000 milliLiter(s) (100 mL/Hr) IV Continuous <Continuous>  dextrose 50% Injectable 25 Gram(s) IV Push once  dextrose 50% Injectable 12.5 Gram(s) IV Push once  dextrose 50% Injectable 25 Gram(s) IV Push once  enoxaparin Injectable 40 milliGRAM(s) SubCutaneous every 24 hours  fluticasone propionate/ salmeterol 250-50 MICROgram(s) Diskus 1 Dose(s) Inhalation two times a day  furosemide   Injectable 20 milliGRAM(s) IV Push daily  gabapentin 400 milliGRAM(s) Oral at bedtime  glucagon  Injectable 1 milliGRAM(s) IntraMuscular once  insulin glargine Injectable (LANTUS) 16 Unit(s) SubCutaneous at bedtime  insulin lispro (ADMELOG) corrective regimen sliding scale   SubCutaneous three times a day before meals  insulin lispro (ADMELOG) corrective regimen sliding scale   SubCutaneous at bedtime  insulin lispro Injectable (ADMELOG) 8 Unit(s) SubCutaneous three times a day before meals  levalbuterol Inhalation 1.25 milliGRAM(s) Inhalation every 6 hours  methylPREDNISolone sodium succinate Injectable 20 milliGRAM(s) IV Push every 8 hours  metoprolol tartrate 12.5 milliGRAM(s) Oral two times a day  multivitamin 1 Tablet(s) Oral daily  pantoprazole    Tablet 40 milliGRAM(s) Oral before breakfast  polyethylene glycol 3350 17 Gram(s) Oral two times a day  senna 2 Tablet(s) Oral at bedtime  sildenafil (REVATIO) 20 milliGRAM(s) Oral three times a day  vancomycin  IVPB 750 milliGRAM(s) IV Intermittent every 24 hours    MEDICATIONS  (PRN):  acetaminophen     Tablet .. 650 milliGRAM(s) Oral every 6 hours PRN Temp greater or equal to 38C (100.4F), Mild Pain (1 - 3)  dextrose Oral Gel 15 Gram(s) Oral once PRN Blood Glucose LESS THAN 70 milliGRAM(s)/deciliter  melatonin 3 milliGRAM(s) Oral at bedtime PRN Insomnia  ondansetron Injectable 4 milliGRAM(s) IV Push every 8 hours PRN Nausea and/or Vomiting    PRESENT SYMPTOMS: [ ]Unable to self-report  [ ] CPOT [ ] PAINADs [ ] RDOS  Source if other than patient:  [ ]Family   [ ]Team     Pain: [ ]yes [x ]no  QOL impact -   Location -                    Aggravating factors -  Quality -  Radiation -  Timing-  Severity (0-10 scale):  Minimal acceptable level/pain goal (0-10 scale):     CPOT:    https://www.sccm.org/getattachment/[email or web address]/Critical-Care-Pain-Observation-Tool-(CPOT)    PAIN AD Score:   http://geriatrictoolkit.[place].[facility]/cog/painad.pdf (press ctrl +  left click to view)    Dyspnea:                           [ ]Mild [ x]Moderate [ ]Severe    RDOS:  0 to 2  minimal or no respiratory distress   3  mild distress  4 to 6 moderate distress  >7 severe distress  https://[email or web address]/handouts/hen/Respiratory_Distress_Observation_Scale.pdf (Ctrl +  left click to view)     Anxiety:                             [ ]Mild [ ]Moderate [ ]Severe  Fatigue:                             [ ]Mild [x ]Moderate [ ]Severe  Nausea:                             [ ]Mild [ ]Moderate [ ]Severe  Loss of appetite:              [ ]Mild [ ]Moderate [ ]Severe  Constipation:                    [ ]Mild [ ]Moderate [ ]Severe    PCSSQ[Palliative Care Spiritual Screening Question]   Severity (0-10): deferred  Score of 4 or > indicate consideration of Chaplaincy referral.  Chaplaincy Referral: [ ] yes [ ] refused [ ] following [x ] Deferred     Caregiver [place]? : [ ] yes [ ] no [x ] Deferred [ ] Declined             Social work referral [ ] Patient & Family Centered Care Referral [ ]     Anticipatory Grief present?:  [ ] yes [ ] no  [ x] Deferred                  Social work referral [ ] Chaplaincy Referral[ ]    Other Symptoms:  [x ]All other review of systems negative     Palliative Performance Status Version 2: 40-50%    http://[facility].org/files/news/palliative_performance_scale_ppsv2.pdf    PHYSICAL EXAM:  Vital Signs Last 24 Hrs  T(C): 36.4 (21 Feb 2025 04:46), Max: 36.9 (20 Feb 2025 22:10)  T(F): 97.5 (21 Feb 2025 04:46), Max: 98.4 (20 Feb 2025 22:10)  HR: 83 (21 Feb 2025 09:17) (72 - 102)  BP: 128/60 (21 Feb 2025 04:46) (105/50 - 128/60)  BP(mean): --  RR: 18 (21 Feb 2025 04:46) (17 - 20)  SpO2: 92% (21 Feb 2025 09:17) (92% - 100%)    Parameters below as of 21 Feb 2025 04:46  Patient On (Oxygen Delivery Method): nasal cannula  O2 Flow (L/min): 2   I&O's Summary    20 Feb 2025 07:01  -  21 Feb 2025 07:00  --------------------------------------------------------  IN: 0 mL / OUT: 1500 mL / NET: -1500 mL    GENERAL: [ ]Cachexia    [x ]Alert  [x ]Oriented x 4  [ ]Lethargic  [ ]Unarousable  [x ]Verbal  [ ]Non-Verbal  Behavioral:   [ ] Anxiety  [ ] Delirium [ ] Agitation [ ] Other  HEENT:  [ ]Normal   [ ]Dry mouth   [ ]ET Tube/Trach  [ ]Oral lesions  PULMONARY:   [ ]Clear [ ]Tachypnea  [ ]Audible excessive secretions   [ ]Rhonchi        [ ]Right [ ]Left [ ]Bilateral  [ ]Crackles        [ ]Right [ ]Left [ ]Bilateral  [ ]Wheezing     [ ]Right [ ]Left [ ]Bilateral  [ ]Diminished breath sounds [ ]right [ ]left [ ]bilateral  CARDIOVASCULAR:    [ ]Regular [ ]Irregular [ ]Tachy  [ ]Mat [ ]Murmur [ ]Other  GASTROINTESTINAL:  [ ]Soft  [ ]Distended   [ ]+BS  [ ]Non tender [ ]Tender  [ ]Other [ ]PEG [ ]OGT/ NGT  Last BM:  GENITOURINARY:  [ ]Normal [ ] Incontinent   [ ]Oliguria/Anuria   [ ]Awan  MUSCULOSKELETAL:   [ ]Normal   [ ]Weakness  [ ]Bed/Wheelchair bound [ ]Edema  NEUROLOGIC:   [ ]No focal deficits  [ ]Cognitive impairment  [ ]Dysphagia [ ]Dysarthria [ ]Paresis [ ]Other   SKIN:   [ ]Normal  [ ]Rash  [ ]Other  [ ]Pressure ulcer(s)       Present on admission [ ]y [ ]n    CRITICAL CARE:  [ ] Shock Present  [ ]Septic [ ]Cardiogenic [ ]Neurologic [ ]Hypovolemic  [ ]  Vasopressors [ ]  Inotropes   [ ]Respiratory failure present [ ]Mechanical ventilation [ ]Non-invasive ventilatory support [ ]High flow    [ ]Acute  [ ]Chronic [ ]Hypoxic  [ ]Hypercarbic [ ]Other  [ ]Other organ failure     LABS:                        9.2    9.02  )-----------( 453      ( 21 Feb 2025 06:33 )             28.9   02-21    134[L]  |  98  |  30[H]  ----------------------------<  376[H]  4.6   |  24  |  0.99    Ca    9.4      21 Feb 2025 06:33  Phos  3.4     02-21  Mg     2.20     02-21        Urinalysis Basic - ( 21 Feb 2025 06:33 )    Color: x / Appearance: x / SG: x / pH: x  Gluc: 376 mg/dL / Ketone: x  / Bili: x / Urobili: x   Blood: x / Protein: x / Nitrite: x   Leuk Esterase: x / RBC: x / WBC x   Sq Epi: x / Non Sq Epi: x / Bacteria: x      RADIOLOGY & ADDITIONAL STUDIES:    PROTEIN CALORIE MALNUTRITION PRESENT: [ ]mild [ ]moderate [ ]severe [ ]underweight [ ]morbid obesity  https://www.andeal.org/vault/2440/web/files/ONC/Table_Clinical%20Characteristics%20to%20Document%20Malnutrition-White%20JV%20et%20al%202012.pdf    Height (cm): 149 (02-19-25 @ 10:08), 149.9 (12-13-24 @ 22:50), 149.9 (10-04-24 @ 17:03)  Weight (kg): 63.5 (02-18-25 @ 17:01), 55.1 (12-13-24 @ 22:50), 53.5 (10-04-24 @ 17:03)  BMI (kg/m2): 28.6 (02-19-25 @ 10:08), 28.3 (02-18-25 @ 17:01), 24.5 (12-13-24 @ 22:50)    [ ]PPSV2 < or = to 30% [ ]significant weight loss  [ ]poor nutritional intake  [ ]anasarca[ ]Artificial Nutrition      Other REFERRALS:  [ ]Hospice  [ ]Child Life  [ ]Social Work  [ ]Case management [ ]Holistic Therapy     Goals of Care Document: RACHELL Martinez (10-10-24 @ 12:07)  Goals of Care Conversation:   Participants:  · Participants  Patient    Advance Directives:  · Caregiver:  no    Conversation Discussion:  · Conversation Details  full code  will continue with further discussion    What Matters Most To Patient and Family:  · What matters most to patient and family  quality of life    Personal Advance Directives Treatment Guidelines:   Treatment Guidelines:  · Decision Maker  Patient    Location of Discussion:   Time Spent on Advance Care Planning:  Attending or JOB Only.     I personally spent 5 minutes on advance care planning services with the patient. This time is separate and distinct from any other care management services provided on this date.    Location of Discussion:  · Location of discussion  Face to face      Electronic Signatures:  Uzma Martinez)  (Signed 10-Oct-2024 15:07)  	Authored: Goals of Care Conversation, Personal Advance Directives Treatment Guidelines, Location of Discussion      Last Updated: 10-Oct-2024 15:07 by Uzma Martinez)     Date of [id number]    HPI:  Pt is a 89 yo F with PMH CHF, anemia, pHTN, CAD, CVA (no deficits), T2D, [place], COPD (2L [place] PRN, qhs), PVD, HTN, HLD, and ?lung CA (pending RT) p/w dysuria x 1wk along with tremors and SOB. Says she had been doing better post-discharge from last hospitalization and was pending radiation initiation. Lives with daughter who recently had a URI but states kept her distance at home. At time of exam, complaining of abdominal pressure without SOB. Says that she always feels cold and that her feet have neuropathic pain from missing a dose of gabapentin. Otherwise denies complaints. Resumed her metformin in the last month with remaining meds unchanged.    On arrival, T 102.5, , /78 --- SBP 80s, RR 20 O2sat 88% RA -- 2L NC. EKG with sinus tachycardia, no ischemic changes. ER POCUS with diffuse B lines. Labs with leukocytosis, normocytic anemia, trop neg x2, BNP 1406, lactate neg, UA+ with UCx and BCx in lab, RVP neg, MRSA in lab. CTA chest neg PE but with multifocal PNA, pulmonary edema, and 2.8x2.7cm mass LLL interval inc compared to prior. MICU consulted for hypotension with c/f need for pressors, however, improved with IVF and midodrine. Pt given tylenol, cefepime, and vancomycin. ID consulted and PT consulted with recs pending. (19 Feb 2025 10:03)    PERTINENT PM/SXH:   HTN (hypertension)    CVA (cerebral vascular accident)    HLD (hyperlipidemia)    DM2 (diabetes mellitus, type 2)    Anemia    PVD (peripheral vascular disease)    Solitary pulmonary nodule    Chronic diastolic heart failure    H/O hearing loss    History of COPD    CAD (coronary artery disease)    Pulmonary hypertension      CAD (coronary artery disease)    S/P vascular bypass    Bilateral cataracts    H/O rotator cuff surgery    FAMILY HISTORY:  FH: heart disease (Child)    Family Hx substance abuse [ ]yes [ ]no    ITEMS NOT CHECKED ARE NOT PRESENT    SOCIAL HISTORY:   Significant other/partner[ ]   Children[ x]  Caodaism/Spirituality:  Substance hx:  [ ]   Tobacco hx:  [ ]   Alcohol hx: [ ]   Home Opioid hx:  [ ] I-Stop Reference No: [id number]  Living Situation: [x ]Home  [ ]Long term care  [ ]Rehab [ ]Other  Lives at home with daughter, independent in all ADLs    ADVANCE DIRECTIVES:    DNR/MOLST  [ ]  Living Will  [ ]   DECISION MAKER(s):  [x ] Health Care Proxy(s)  [ ] Surrogate(s)  [ ] Guardian           Name(s): Phone Number(s): copy scanned in patient window  Vanessa Casey #[phone]    BASELINE (I)ADL(s) (prior to admission):  [place]: [x ]Total  [ ] Moderate [ ]Dependent    Allergies    penicillin (Unknown)  macrolide antibiotics (Other)  Biaxin (Unknown)    Intolerances    MEDICATIONS  (STANDING):  albuterol/ipratropium for Nebulization 3 milliLiter(s) Nebulizer every 6 hours  aspirin enteric coated 81 milliGRAM(s) Oral daily  atorvastatin 20 milliGRAM(s) Oral at bedtime  cefepime   IVPB 2000 milliGRAM(s) IV Intermittent <User Schedule>  dextrose 5%. 1000 milliLiter(s) (50 mL/Hr) IV Continuous <Continuous>  dextrose 5%. 1000 milliLiter(s) (100 mL/Hr) IV Continuous <Continuous>  dextrose 50% Injectable 25 Gram(s) IV Push once  dextrose 50% Injectable 12.5 Gram(s) IV Push once  dextrose 50% Injectable 25 Gram(s) IV Push once  enoxaparin Injectable 40 milliGRAM(s) SubCutaneous every 24 hours  fluticasone propionate/ salmeterol 250-50 MICROgram(s) Diskus 1 Dose(s) Inhalation two times a day  furosemide   Injectable 20 milliGRAM(s) IV Push daily  gabapentin 400 milliGRAM(s) Oral at bedtime  glucagon  Injectable 1 milliGRAM(s) IntraMuscular once  insulin glargine Injectable (LANTUS) 16 Unit(s) SubCutaneous at bedtime  insulin lispro (ADMELOG) corrective regimen sliding scale   SubCutaneous three times a day before meals  insulin lispro (ADMELOG) corrective regimen sliding scale   SubCutaneous at bedtime  insulin lispro Injectable (ADMELOG) 8 Unit(s) SubCutaneous three times a day before meals  levalbuterol Inhalation 1.25 milliGRAM(s) Inhalation every 6 hours  methylPREDNISolone sodium succinate Injectable 20 milliGRAM(s) IV Push every 8 hours  metoprolol tartrate 12.5 milliGRAM(s) Oral two times a day  multivitamin 1 Tablet(s) Oral daily  pantoprazole    Tablet 40 milliGRAM(s) Oral before breakfast  polyethylene glycol 3350 17 Gram(s) Oral two times a day  senna 2 Tablet(s) Oral at bedtime  sildenafil (REVATIO) 20 milliGRAM(s) Oral three times a day  vancomycin  IVPB 750 milliGRAM(s) IV Intermittent every 24 hours    MEDICATIONS  (PRN):  acetaminophen     Tablet .. 650 milliGRAM(s) Oral every 6 hours PRN Temp greater or equal to 38C (100.4F), Mild Pain (1 - 3)  dextrose Oral Gel 15 Gram(s) Oral once PRN Blood Glucose LESS THAN 70 milliGRAM(s)/deciliter  melatonin 3 milliGRAM(s) Oral at bedtime PRN Insomnia  ondansetron Injectable 4 milliGRAM(s) IV Push every 8 hours PRN Nausea and/or Vomiting    PRESENT SYMPTOMS: [ ]Unable to self-report  [ ] CPOT [ ] PAINADs [ ] RDOS  Source if other than patient:  [ ]Family   [ ]Team     Pain: [ ]yes [x ]no  QOL impact -   Location -                    Aggravating factors -  Quality -  Radiation -  Timing-  Severity (0-10 scale):  Minimal acceptable level/pain goal (0-10 scale):     CPOT:    https://www.sccm.org/getattachment/[email or web address]/Critical-Care-Pain-Observation-Tool-(CPOT)    PAIN AD Score:   http://geriatrictoolkit.[place].[facility]/cog/painad.pdf (press ctrl +  left click to view)    Dyspnea:                           [ ]Mild [ x]Moderate [ ]Severe    RDOS:  0 to 2  minimal or no respiratory distress   3  mild distress  4 to 6 moderate distress  >7 severe distress  https://[email or web address]/handouts/hen/Respiratory_Distress_Observation_Scale.pdf (Ctrl +  left click to view)     Anxiety:                             [ ]Mild [ ]Moderate [ ]Severe  Fatigue:                             [ ]Mild [x ]Moderate [ ]Severe  Nausea:                             [ ]Mild [ ]Moderate [ ]Severe  Loss of appetite:              [ ]Mild [ ]Moderate [ ]Severe  Constipation:                    [ ]Mild [ ]Moderate [ ]Severe    PCSSQ[Palliative Care Spiritual Screening Question]   Severity (0-10): deferred  Score of 4 or > indicate consideration of Chaplaincy referral.  Chaplaincy Referral: [ ] yes [ ] refused [ ] following [x ] Deferred     Caregiver [place]? : [ ] yes [ ] no [x ] Deferred [ ] Declined             Social work referral [ ] Patient & Family Centered Care Referral [ ]     Anticipatory Grief present?:  [ ] yes [ ] no  [ x] Deferred                  Social work referral [ ] Chaplaincy Referral[ ]    Other Symptoms:  [x ]All other review of systems negative     Palliative Performance Status Version 2: 40-50%    http://[email or web address].org/files/news/palliative_performance_scale_[email or web address].pdf    PHYSICAL EXAM:  Vital Signs Last 24 Hrs  T(C): 36.4 (21 Feb 2025 04:46), Max: 36.9 (20 Feb 2025 22:10)  T(F): 97.5 (21 Feb 2025 04:46), Max: 98.4 (20 Feb 2025 22:10)  HR: 83 (21 Feb 2025 09:17) (72 - 102)  BP: 128/60 (21 Feb 2025 04:46) (105/50 - 128/60)  BP(mean): --  RR: 18 (21 Feb 2025 04:46) (17 - 20)  SpO2: 92% (21 Feb 2025 09:17) (92% - 100%)    Parameters below as of 21 Feb 2025 04:46  Patient On (Oxygen Delivery Method): nasal cannula  O2 Flow (L/min): 2   I&O's Summary    20 Feb 2025 07:01  -  21 Feb 2025 07:00  --------------------------------------------------------  IN: 0 mL / OUT: 1500 mL / NET: -1500 mL    GENERAL: [ ]Cachexia    [x ]Alert  [x ]Oriented x 4  [ ]Lethargic  [ ]Unarousable  [x ]Verbal  [ ]Non-Verbal  Behavioral:   [ ] Anxiety  [ ] Delirium [ ] Agitation [ ] Other  HEENT:  [ ]Normal   [x ]Dry mouth   [ ]ET Tube/Trach  [ ]Oral lesions  PULMONARY:   [ ]Clear [ ]Tachypnea  [ ]Audible excessive secretions   [ x]Rhonchi        [ ]Right [ ]Left [ ]Bilateral  [ ]Crackles        [ ]Right [ ]Left [ ]Bilateral  [ ]Wheezing     [ ]Right [ ]Left [ ]Bilateral  [ ]Diminished breath sounds [ ]right [ ]left [ ]bilateral  CARDIOVASCULAR:    [x ]Regular [ ]Irregular [ ]Tachy  [ ]Mat [ ]Murmur [ ]Other  GASTROINTESTINAL:  [x ]Soft  [ ]Distended  x [ ]+BS  [x ]Non tender [ ]Tender  [ ]Other [ ]PEG [ ]OGT/ NGT  Last BM: none sincce admission  GENITOURINARY:  [ x]Normal [ ] Incontinent   [ ]Oliguria/Anuria   [ ]Awan  MUSCULOSKELETAL:   [ ]Normal   [ x]Weakness  [ ]Bed/Wheelchair bound [ ]Edema  NEUROLOGIC:   [x ]No focal deficits  [ ]Cognitive impairment  [ ]Dysphagia [ ]Dysarthria [ ]Paresis [ ]Other   SKIN:   [x ]Normal  [ ]Rash  [ ]Other  [ ]Pressure ulcer(s)       Present on admission [ ]y [ ]n    CRITICAL CARE:  [ ] Shock Present  [ ]Septic [ ]Cardiogenic [ ]Neurologic [ ]Hypovolemic  [ ]  Vasopressors [ ]  Inotropes   [ ]Respiratory failure present [ ]Mechanical ventilation [ ]Non-invasive ventilatory support [ ]High flow    [ ]Acute  [ ]Chronic [ ]Hypoxic  [ ]Hypercarbic [ ]Other  [ ]Other organ failure     LABS:                        9.2    9.02  )-----------( 453      ( 21 Feb 2025 06:33 )             28.9   02-21    134[L]  |  98  |  30[H]  ----------------------------<  376[H]  4.6   |  24  |  0.99    Ca    9.4      21 Feb 2025 06:33  Phos  3.4     02-21  Mg     2.20     02-21    Urinalysis Basic - ( 21 Feb 2025 06:33 )    Color: x / Appearance: x / SG: x / pH: x  Gluc: 376 mg/dL / Ketone: x  / Bili: x / Urobili: x   Blood: x / Protein: x / Nitrite: x   Leuk Esterase: x / RBC: x / WBC x   Sq Epi: x / Non Sq Epi: x / Bacteria: x    RADIOLOGY & ADDITIONAL STUDIES:  < from: CT Angio Chest PE Protocol w/ IV Cont (02.19.25 @ 01:11) >  IMPRESSION:  No pulmonary embolism.  Interval increase in groundglass opacities and subpleural consolidations   in bilateral lungs, likely infectious. Interlobularseptal thickening in   the upper lobes, which can be suggestive of interstitial edema  3.1 cm masslike consolidation in left lower lobe. Additional smaller   nodules in bilateral lungs.  Mediastinal and hilar lymphadenopathy which can be reactive or due to   metastasis.  Subpleural reticulations, [place], and peripheral cystic changes in   bilateral lungs, suggestive of interstitial lung fibrosis.    PROTEIN CALORIE MALNUTRITION PRESENT: [ ]mild [ ]moderate [ ]severe [ ]underweight [ ]morbid obesity  https://www.andeal.org/vault/2440/web/files/ONC/Table_Clinical%20Characteristics%20to%20Document%20Malnutrition-White%20JV%20et%20al%448877.pdf    Height (cm): 149 (02-19-25 @ 10:08), 149.9 (12-13-24 @ 22:50), 149.9 (10-04-24 @ 17:03)  Weight (kg): 63.5 (02-18-25 @ 17:01), 55.1 (12-13-24 @ 22:50), 53.5 (10-04-24 @ 17:03)  BMI (kg/m2): 28.6 (02-19-25 @ 10:08), 28.3 (02-18-25 @ 17:01), 24.5 (12-13-24 @ 22:50)    [ ]PPSV2 < or = to 30% [ ]significant weight loss  [ ]poor nutritional intake  [ ]anasarca[ ]Artificial Nutrition      Other REFERRALS:  [ ]Hospice  [ ]Child Life  [ ]Social Work  [ ]Case management [ ]Holistic Therapy

## 2025-02-20 NOTE — CONSULT NOTE ADULT - PROBLEM SELECTOR RECOMMENDATION 4
Pt is full code - expressed wish of allowing natural passing (DNR/DNI, however she would like to talk with her adult children before making any decisions.   Page for uncontrolled symptoms [id number]

## 2025-02-20 NOTE — CONSULT NOTE ADULT - ASSESSMENT
89 yo F with PMH CHF, anemia, pHTN, CAD, CVA (no deficits), T2D, [place], COPD (2L NC PRN, qhs), PVD, HTN, HLD, and ?lung CA (pending RT) p/w dysuria x 1wk along with tremors and SOB. Admitted for Sepsis 2/2 Multifocal PNA and acute on chronic heart failure. Palliative Care consulted for complex decision making in the setting of advanced illness.

## 2025-02-20 NOTE — PROGRESS NOTE ADULT - SUBJECTIVE AND OBJECTIVE BOX
[facility]  KRISSY Luong Y. Patel, S. Shah, G. Casimir  [phone]  ([phone] - weekdays after 5pm and weekends)    Name: FEDERICO DOS SANTOS  Age/Gender: 89y Female  MRN: [id number]    Interval History:  Notes reviewed.   No concerning overnight events.  Afebrile.   states breathing is better  dysuria is also better    Allergies: penicillin (Unknown)  macrolide antibiotics (Other)  Biaxin (Unknown)      Objective:  Vitals:   T(F): 97.5 (02-20-25 @ 12:00), Max: 97.8 (02-19-25 @ 15:37)  HR: 72 (02-20-25 @ 12:00) (70 - 85)  BP: 110/67 (02-20-25 @ 12:00) (96/35 - 121/61)  RR: 17 (02-20-25 @ 12:00) (14 - 19)  SpO2: 100% (02-20-25 @ 12:00) (95% - 100%)  Physical Examination:  General: no acute distress  HEENT: anicteric, NC  Cardio: S1, S2, normal rate, no murmur  Resp: decreased breath sounds lung bases  Abd: soft, NT, ND  Ext: no LE edema  Skin: warm, dry    Laboratory Studies:  CBC:                       9.1    8.53  )-----------( 416      ( 20 Feb 2025 05:37 )             28.3     WBC Trend:  8.53 02-20-25 @ 05:37  11.15 02-19-25 @ 09:14  12.67 02-18-25 @ 20:07    CMP: 02-20    136  |  98  |  28[H]  ----------------------------<  192[H]  4.3   |  24  |  0.91    Ca    9.4      20 Feb 2025 05:37  Phos  3.4     02-20  Mg     2.60     02-20    TPro  6.4  /  Alb  3.5  /  TBili  0.4  /  DBili  x   /  AST  7   /  ALT  <5  /  AlkPhos  86  02-19      LIVER FUNCTIONS - ( 19 Feb 2025 09:14 )  Alb: 3.5 g/dL / Pro: 6.4 g/dL / ALK PHOS: 86 U/L / ALT: <5 U/L / AST: 7 U/L / GGT: x             Urinalysis Basic - ( 20 Feb 2025 05:37 )    Color: x / Appearance: x / SG: x / pH: x  Gluc: 192 mg/dL / Ketone: x  / Bili: x / Urobili: x   Blood: x / Protein: x / Nitrite: x   Leuk Esterase: x / RBC: x / WBC x   Sq Epi: x / Non Sq Epi: x / Bacteria: x      Microbiology: reviewed     Culture - Blood (collected 02-18-25 @ 21:42)  Source: .Blood Blood-Peripheral  Preliminary Report (02-20-25 @ 01:02):    No growth at 24 hours    Culture - Urine (collected 02-18-25 @ 21:36)  Source: Clean Catch Clean Catch (Midstream)  Preliminary Report (02-19-25 @ 23:43):    50,000 - 99,000 CFU/mL Enterococcus faecium    <10,000 CFU/ml Normal Urogenital vonnie present    Culture - Blood (collected 02-18-25 @ 20:45)  Source: .Blood Blood-Peripheral  Preliminary Report (02-20-25 @ 01:02):    No growth at 24 hours        Radiology: reviewed     Medications:  acetaminophen     Tablet .. 650 milliGRAM(s) Oral every 6 hours PRN  albuterol/ipratropium for Nebulization 3 milliLiter(s) Nebulizer every 6 hours  aspirin enteric coated 81 milliGRAM(s) Oral daily  atorvastatin 20 milliGRAM(s) Oral at bedtime  cefepime   IVPB 2000 milliGRAM(s) IV Intermittent <User Schedule>  dextrose 5%. 1000 milliLiter(s) IV Continuous <Continuous>  dextrose 5%. 1000 milliLiter(s) IV Continuous <Continuous>  dextrose 50% Injectable 25 Gram(s) IV Push once  dextrose 50% Injectable 12.5 Gram(s) IV Push once  dextrose 50% Injectable 25 Gram(s) IV Push once  dextrose Oral Gel 15 Gram(s) Oral once PRN  enoxaparin Injectable 40 milliGRAM(s) SubCutaneous every 24 hours  fluticasone propionate/ salmeterol 250-50 MICROgram(s) Diskus 1 Dose(s) Inhalation two times a day  gabapentin 400 milliGRAM(s) Oral at bedtime  glucagon  Injectable 1 milliGRAM(s) IntraMuscular once  insulin glargine Injectable (LANTUS) 16 Unit(s) SubCutaneous at bedtime  insulin lispro (ADMELOG) corrective regimen sliding scale   SubCutaneous three times a day before meals  insulin lispro (ADMELOG) corrective regimen sliding scale   SubCutaneous at bedtime  insulin lispro Injectable (ADMELOG) 8 Unit(s) SubCutaneous three times a day before meals  melatonin 3 milliGRAM(s) Oral at bedtime PRN  methylPREDNISolone sodium succinate Injectable 20 milliGRAM(s) IV Push every 8 hours  multivitamin 1 Tablet(s) Oral daily  ondansetron Injectable 4 milliGRAM(s) IV Push every 8 hours PRN  pantoprazole    Tablet 40 milliGRAM(s) Oral before breakfast  polyethylene glycol 3350 17 Gram(s) Oral two times a day  senna 2 Tablet(s) Oral at bedtime  sildenafil (REVATIO) 20 milliGRAM(s) Oral three times a day  vancomycin  IVPB 750 milliGRAM(s) IV Intermittent every 24 hours    Antimicrobials:  cefepime   IVPB 2000 milliGRAM(s) IV Intermittent <User Schedule>  vancomycin  IVPB 750 milliGRAM(s) IV Intermittent every 24 hours

## 2025-02-20 NOTE — PHYSICAL THERAPY INITIAL EVALUATION ADULT - ADDITIONAL COMMENTS
Pt lives in a private home on first floor, 2 steps to enter, has home 02 at 2l/min via NC. Pt lives in a private home on first floor, 2 steps to enter, has home 02 at 2l/min via NC. Pt owns a cane and rolling walker.

## 2025-02-20 NOTE — CONSULT NOTE ADULT - PROBLEM SELECTOR RECOMMENDATION 3
PPSV 40-50%  Pt ambulatory and independent on most ADLs prior to hospitalization, however given multiple re-hospitalizations her functional status has declined  High risk for further decline given comorbidities  Attempted PT in the past without much benefit

## 2025-02-20 NOTE — PROGRESS NOTE ADULT - SUBJECTIVE AND OBJECTIVE BOX
Date of Service: 02-20-25 @ 11:41    Patient is a 89y old  Female who presents with a chief complaint of UTI, PNA (20 Feb 2025 11:20)      Any change in ROS: she looks abd feel better today   ; on 3 L of oxygen     MEDICATIONS  (STANDING):  albuterol/ipratropium for Nebulization 3 milliLiter(s) Nebulizer every 6 hours  aspirin enteric coated 81 milliGRAM(s) Oral daily  atorvastatin 20 milliGRAM(s) Oral at bedtime  cefepime   IVPB 2000 milliGRAM(s) IV Intermittent <User Schedule>  dextrose 5%. 1000 milliLiter(s) (50 mL/Hr) IV Continuous <Continuous>  dextrose 5%. 1000 milliLiter(s) (100 mL/Hr) IV Continuous <Continuous>  dextrose 50% Injectable 25 Gram(s) IV Push once  dextrose 50% Injectable 12.5 Gram(s) IV Push once  dextrose 50% Injectable 25 Gram(s) IV Push once  enoxaparin Injectable 40 milliGRAM(s) SubCutaneous every 24 hours  fluticasone propionate/ salmeterol 250-50 MICROgram(s) Diskus 1 Dose(s) Inhalation two times a day  gabapentin 400 milliGRAM(s) Oral at bedtime  glucagon  Injectable 1 milliGRAM(s) IntraMuscular once  insulin glargine Injectable (LANTUS) 16 Unit(s) SubCutaneous at bedtime  insulin lispro (ADMELOG) corrective regimen sliding scale   SubCutaneous at bedtime  insulin lispro (ADMELOG) corrective regimen sliding scale   SubCutaneous three times a day before meals  insulin lispro Injectable (ADMELOG) 8 Unit(s) SubCutaneous three times a day before meals  methylPREDNISolone sodium succinate Injectable 20 milliGRAM(s) IV Push every 8 hours  multivitamin 1 Tablet(s) Oral daily  pantoprazole    Tablet 40 milliGRAM(s) Oral before breakfast  polyethylene glycol 3350 17 Gram(s) Oral two times a day  senna 2 Tablet(s) Oral at bedtime  sildenafil (REVATIO) 20 milliGRAM(s) Oral three times a day  vancomycin  IVPB 750 milliGRAM(s) IV Intermittent every 24 hours    MEDICATIONS  (PRN):  acetaminophen     Tablet .. 650 milliGRAM(s) Oral every 6 hours PRN Temp greater or equal to 38C (100.4F), Mild Pain (1 - 3)  dextrose Oral Gel 15 Gram(s) Oral once PRN Blood Glucose LESS THAN 70 milliGRAM(s)/deciliter  melatonin 3 milliGRAM(s) Oral at bedtime PRN Insomnia  ondansetron Injectable 4 milliGRAM(s) IV Push every 8 hours PRN Nausea and/or Vomiting    Vital Signs Last 24 Hrs  T(C): 36.2 (20 Feb 2025 06:00), Max: 36.6 (19 Feb 2025 15:37)  T(F): 97.1 (20 Feb 2025 06:00), Max: 97.8 (19 Feb 2025 15:37)  HR: 85 (20 Feb 2025 06:00) (66 - 85)  BP: 117/64 (20 Feb 2025 06:00) (96/35 - 139/86)  BP(mean): 61 (20 Feb 2025 04:46) (54 - 103)  RR: 14 (20 Feb 2025 06:00) (14 - 19)  SpO2: 98% (20 Feb 2025 06:00) (95% - 100%)    Parameters below as of 20 Feb 2025 06:00  Patient On (Oxygen Delivery Method): nasal cannula  O2 Flow (L/min): 2      I&O's Summary    19 Feb 2025 07:01  -  20 Feb 2025 07:00  --------------------------------------------------------  IN: 250 mL / OUT: 350 mL / NET: -100 mL          Physical Exam:   GENERAL: NAD, well-groomed, well-developed  HEENT: MARCIO/   Atraumatic, Normocephalic  ENMT: No tonsillar erythema, exudates, or enlargement; Moist mucous membranes, Good dentition, No lesions  NECK: Supple, No JVD, Normal thyroid  CHEST/LUNG: mild coarse rhonchi +  CVS: Regular rate and rhythm; No murmurs, rubs, or gallops  GI: : Soft, Nontender, Nondistended; Bowel sounds present  NERVOUS SYSTEM:  Alert & Oriented X3,  EXTREMITIES:  - edema  LYMPH: No lymphadenopathy noted  SKIN: No rashes or lesions  ENDOCRINOLOGY: No Thyromegaly  PSYCH: Appropriate    Labs:  32, 31  CARDIAC MARKERS ( 19 Feb 2025 12:12 )  x     / x     / x     / x     / 1.1 ng/mL                            9.1    8.53  )-----------( 416      ( 20 Feb 2025 05:37 )             28.3                         8.6    11.15 )-----------( 389      ( 19 Feb 2025 09:14 )             27.3                         10.0   12.67 )-----------( 439      ( 18 Feb 2025 20:07 )             30.5     02-20    136  |  98  |  28[H]  ----------------------------<  192[H]  4.3   |  24  |  0.91  02-19    133[L]  |  95[L]  |  24[H]  ----------------------------<  240[H]  4.0   |  25  |  0.97  02-18    130[L]  |  91[L]  |  28[H]  ----------------------------<  366[H]  4.3   |  27  |  1.04    Ca    9.4      20 Feb 2025 05:37  Ca    8.1[L]      19 Feb 2025 09:14  Ca    9.5      18 Feb 2025 20:07  Phos  3.4     02-20  Phos  2.5     02-19  Phos  2.3     02-19  Mg     2.60     02-20  Mg     3.10     02-19  Mg     1.70     02-18    TPro  6.4  /  Alb  3.5  /  TBili  0.4  /  DBili  x   /  AST  7   /  ALT  <5  /  AlkPhos  86  02-19  TPro  7.4  /  Alb  3.9  /  TBili  0.4  /  DBili  x   /  AST  15  /  ALT  8   /  AlkPhos  111  02-18    CAPILLARY BLOOD GLUCOSE      POCT Blood Glucose.: 252 mg/dL (20 Feb 2025 08:56)  POCT Blood Glucose.: 264 mg/dL (20 Feb 2025 08:55)  POCT Blood Glucose.: 225 mg/dL (20 Feb 2025 05:09)  POCT Blood Glucose.: 368 mg/dL (19 Feb 2025 21:44)  POCT Blood Glucose.: 97 mg/dL (19 Feb 2025 16:09)  POCT Blood Glucose.: 267 mg/dL (19 Feb 2025 12:15)  POCT Blood Glucose.: 272 mg/dL (19 Feb 2025 11:44)      LIVER FUNCTIONS - ( 19 Feb 2025 09:14 )  Alb: 3.5 g/dL / Pro: 6.4 g/dL / ALK PHOS: 86 U/L / ALT: <5 U/L / AST: 7 U/L / GGT: x             Urinalysis Basic - ( 20 Feb 2025 05:37 )    Color: x / Appearance: x / SG: x / pH: x  Gluc: 192 mg/dL / Ketone: x  / Bili: x / Urobili: x   Blood: x / Protein: x / Nitrite: x   Leuk Esterase: x / RBC: x / WBC x   Sq Epi: x / Non Sq Epi: x / Bacteria: x      Procalcitonin: 2.19 ng/mL (02-19 @ 01:13)        RECENT CULTURES:  02-18 @ 21:42 .Blood Blood-Peripheral                No growth at 24 hours    02-18 @ 21:36 Clean Catch Clean Catch (Midstream)     rad< from: CT Angio Chest PE Protocol w/ IV Cont (02.19.25 @ 01:11) >  No pulmonary embolism.  Interval increase in groundglass opacities and subpleural consolidations   in bilateral lungs, likely infectious. Interlobularseptal thickening in   the upper lobes, which can be suggestive of interstitial edema  3.1 cm masslike consolidation in left lower lobe. Additional smaller   nodules in bilateral lungs.  Mediastinal and hilar lymphadenopathy which can be reactive or due to   metastasis.  Subpleural reticulations, Honeycombing, and peripheral cystic changes in   bilateral lungs, suggestive of interstitial lung fibrosis.    < end of copied text >             50,000 - 99,000 CFU/mL Enterococcus faecium  <10,000 CFU/ml Normal Urogenital vonnie present    02-18 @ 20:45 .Blood Blood-Peripheral                No growth at 24 hours          RESPIRATORY CULTURES:          Studies  Chest X-RAY  CT SCAN Chest   Venous Dopplers: LE:   CT Abdomen  Others

## 2025-02-20 NOTE — CONSULT NOTE ADULT - CONSULT REASON
SOB
Hypotension
Complex decision making in the setting of advanced illness
DM2
UTI, PNA
SOB cards eval

## 2025-02-20 NOTE — ED ADULT NURSE REASSESSMENT NOTE - NS ED NURSE REASSESS COMMENT FT1
PT is resting in stretcher, easily arousable to verbal stimuli. no apparent distress noted at this time. pt medicated per MD order.  hand off will be given to primary nurse when return to area.
Pt awake and alert, A&OX4, resp even and unlabored. Pt given meds per MD orders, pt TBA. Pt in NAD. Denies CP, SOB, HA, dizziness, palpitations, blurry vision. Resting comfortably. Safety being maintained, plan of care ongoing.
Pt blood pressure 81/41, MD aware 500cc NS bolus infusing now. Pt is asymptomatic vat this time. Pt awake and alert, A&OX4, resp even and unlabored. Pt in NAD. Denies CP, SOB, HA, dizziness, palpitations, blurry vision. Resting comfortably. Plan of care onoging.
Pt in NAD, a&ox3, attached to cardiac monitor, NSR, denies any present pain, breathing even and unlabored, pt attached to cardiac zoll for precaution, vs taken and documented, ivl clear and patent, safety maintained with bed in lowest position, side rails up x2, will continue to monitor.
Pt placed cleaned and repositioned. Pt placed on prima fit. Blanchable redness noted to sacrum. Pt awake and alert, A&OX4, resp even and unlabored. Pt in NAD, pt NSR on CM. Pt satting 98% on 3 L NC. Denies CP, SOB, HA, dizziness, palpitations, blurry vision. Resting comfortably. Safety being maintained, plan of care ongoing.
Pt still hypotensive with a MAP in the 50s, MD Chavez made aware, meds given per orders. Pt is still asymptomatic, NSR on CM.
maribel RN called to bedside for initiation of norepinephrine gtt. MAP borderline, pt repositioned with improvement. pt offers no complaints, appears comfortable, respirations even and non labored. holding levo at this time. comfort and safety measures provided.
report received from TAWANNA Thao. pt remains at baseline mental status A&oX3, RR even unlabored completing full sentences. pt resting in stretcher comfortably at this time, no new complaints offered. rpt vs per flowsheet. pt remains on 2LNC per pt baseline, tolerating well. pt remains on zoll as per provider, NSR on monitor and Zoll noted. stretcher lowest position siderails up safety measures in place. pending bed placement at this time
pt A&Ox4, turned to obtain rectal temp 102.5. blanchable redness noted to sacrum. pt wearing underwear with liner. states she is incontinent of urine. pt cleaned and repositioned in bed. pt wearing glasses, bilateral hearing aides and 4lpm O2 (baseline). comfort and safety measures provided.
Patient received from night shift RN, A&OX4, respirations even and unlabored, normal sinus rhythm on the monitor. Patient denies fevers, chest pain, headache, dizziness, blurry vision, abdominal pain, n/v/d, and urinary symptoms. Patient endorses sob, chills, and mild neck pain. Care plan continued, comfort measures provided, safety maintained.

## 2025-02-20 NOTE — PROGRESS NOTE ADULT - ASSESSMENT
A/P: Pt is a 89 yo F with PMH CHF, anemia, pHTN, CAD, CVA (no deficits), T2D, [place], COPD (2L NC PRN, qhs), PVD, HTN, HLD, and ?lung CA (pending RT) p/w dysuria x 1wk along with tremors and SOB, found to be hyperglycemic and will be on steroids. Endocrinology was consulted for management of diabetes mellitus.    #Type 2 Diabetes Mellitus  - Receiving methylpred 20 mg IV q8h (scheduled to continue until 2/21)  - HbA1c 9.7%; home regimen: Tresiba 10 units at bedtime (Tresiba no longer covered working on switching to a new basal prior to admission), metformin 500mg (?) 2 tabs daily   - Continue with Lantus 16 units qhs  - Continue with Admelog 8 units TIDQAC - will see response to this new dose starting at lunch today (received first dose with breakfast today)  - On moderate admelog correction scale TIDQAC and  moderate QHS scale. Will reduce to low scale premeal and low bedtime scale.  - Please check FSG before meals and QHS, or q6h while NPO  - RD consult  - hypoglycemia orderset prn  -Please change to consistent carb diet    - Discharge planning:  depends on steroids- please inform endocrine of steroid plans  Tresiba no longer covered, to find an alternative basal insulin that is covered.  Clarify dose of metformin tabs at home  Also given patient had a son with type 1 DM will send c-peptide (with serum glucose), FELISHA Ab, IA-2 Ab, and Zinc transporter Ab with Am labs to rule out JILLIAN.  DC plan basal/metformin or basal/bolus TBD  Patient attributes high A1c to several hospital admissions in past few months.  Patient uses glucometer and has previously declined CGM (will reconsider).  Patient wishes to follow up with pcp and declines initiating care with an endocrinologist.    #Hypertension  - BP goal <130/80  - Management as per primary team, not on meds now    #Hyperlipidemia  - ordered for atorvastatin 20 mg      Shi Mello MD  Division of Endocrinology  Pager: [phone]    If after 6PM or before 9AM, or on weekends/holidays, please call endocrine answering service for assistance ([phone]).  For nonurgent matters email [email or web address] for assistance.

## 2025-02-20 NOTE — PHYSICAL THERAPY INITIAL EVALUATION ADULT - PRECAUTIONS/LIMITATIONS, REHAB EVAL
aspiration precautions/cardiac precautions/fall precautions/oxygen therapy device and L/min 2L/min via NC/aspiration precautions/cardiac precautions/fall precautions/oxygen therapy device and L/min

## 2025-02-20 NOTE — CONSULT NOTE ADULT - CONSULT REQUESTED DATE/TIME
19-Feb-2025 13:48
19-Feb-2025 04:04
19-Feb-2025 12:22
19-Feb-2025 13:06
20-Feb-2025 10:40
19-Feb-2025 12:05

## 2025-02-20 NOTE — PROGRESS NOTE ADULT - SUBJECTIVE AND OBJECTIVE BOX
SUBJECTIVE/ OVERNIGHT EVENTS:  breathing better today  comfortable on NC.  no cp, no sob, no n/v/d. no abdominal pain.  no headache, no dizziness.   no dysuria, no urinary urgency/frequency. no bowel/bladder incontinence.     --------------------------------------------------------------------------------------------  LABS:                        9.1    8.53  )-----------( 416      ( 20 Feb 2025 05:37 )             28.3     02-20    136  |  98  |  28[H]  ----------------------------<  192[H]  4.3   |  24  |  0.91    Ca    9.4      20 Feb 2025 05:37  Phos  3.4     02-20  Mg     2.60     02-20    TPro  6.4  /  Alb  3.5  /  TBili  0.4  /  DBili  x   /  AST  7   /  ALT  <5  /  AlkPhos  86  02-19      CAPILLARY BLOOD GLUCOSE      POCT Blood Glucose.: 267 mg/dL (20 Feb 2025 12:07)  POCT Blood Glucose.: 252 mg/dL (20 Feb 2025 08:56)  POCT Blood Glucose.: 264 mg/dL (20 Feb 2025 08:55)  POCT Blood Glucose.: 225 mg/dL (20 Feb 2025 05:09)  POCT Blood Glucose.: 368 mg/dL (19 Feb 2025 21:44)  POCT Blood Glucose.: 97 mg/dL (19 Feb 2025 16:09)    CARDIAC MARKERS ( 19 Feb 2025 12:12 )  x     / x     / x     / x     / 1.1 ng/mL      Urinalysis Basic - ( 20 Feb 2025 05:37 )    Color: x / Appearance: x / SG: x / pH: x  Gluc: 192 mg/dL / Ketone: x  / Bili: x / Urobili: x   Blood: x / Protein: x / Nitrite: x   Leuk Esterase: x / RBC: x / WBC x   Sq Epi: x / Non Sq Epi: x / Bacteria: x        RADIOLOGY & ADDITIONAL TESTS:    Imaging Personally Reviewed:  [x] YES  [ ] NO    Consultant(s) Notes Reviewed:  [x] YES  [ ] NO    MEDICATIONS  (STANDING):  albuterol/ipratropium for Nebulization 3 milliLiter(s) Nebulizer every 6 hours  aspirin enteric coated 81 milliGRAM(s) Oral daily  atorvastatin 20 milliGRAM(s) Oral at bedtime  cefepime   IVPB 2000 milliGRAM(s) IV Intermittent <User Schedule>  dextrose 5%. 1000 milliLiter(s) (50 mL/Hr) IV Continuous <Continuous>  dextrose 5%. 1000 milliLiter(s) (100 mL/Hr) IV Continuous <Continuous>  dextrose 50% Injectable 25 Gram(s) IV Push once  dextrose 50% Injectable 12.5 Gram(s) IV Push once  dextrose 50% Injectable 25 Gram(s) IV Push once  enoxaparin Injectable 40 milliGRAM(s) SubCutaneous every 24 hours  fluticasone propionate/ salmeterol 250-50 MICROgram(s) Diskus 1 Dose(s) Inhalation two times a day  gabapentin 400 milliGRAM(s) Oral at bedtime  glucagon  Injectable 1 milliGRAM(s) IntraMuscular once  insulin glargine Injectable (LANTUS) 16 Unit(s) SubCutaneous at bedtime  insulin lispro (ADMELOG) corrective regimen sliding scale   SubCutaneous three times a day before meals  insulin lispro (ADMELOG) corrective regimen sliding scale   SubCutaneous at bedtime  insulin lispro Injectable (ADMELOG) 8 Unit(s) SubCutaneous three times a day before meals  methylPREDNISolone sodium succinate Injectable 20 milliGRAM(s) IV Push every 8 hours  multivitamin 1 Tablet(s) Oral daily  pantoprazole    Tablet 40 milliGRAM(s) Oral before breakfast  polyethylene glycol 3350 17 Gram(s) Oral two times a day  senna 2 Tablet(s) Oral at bedtime  sildenafil (REVATIO) 20 milliGRAM(s) Oral three times a day  vancomycin  IVPB 750 milliGRAM(s) IV Intermittent every 24 hours    MEDICATIONS  (PRN):  acetaminophen     Tablet .. 650 milliGRAM(s) Oral every 6 hours PRN Temp greater or equal to 38C (100.4F), Mild Pain (1 - 3)  dextrose Oral Gel 15 Gram(s) Oral once PRN Blood Glucose LESS THAN 70 milliGRAM(s)/deciliter  melatonin 3 milliGRAM(s) Oral at bedtime PRN Insomnia  ondansetron Injectable 4 milliGRAM(s) IV Push every 8 hours PRN Nausea and/or Vomiting      Care Discussed with Consultants/Other Providers [x] YES  [ ] NO    Vital Signs Last 24 Hrs  T(C): 36.4 (20 Feb 2025 12:00), Max: 36.6 (19 Feb 2025 15:37)  T(F): 97.5 (20 Feb 2025 12:00), Max: 97.8 (19 Feb 2025 15:37)  HR: 72 (20 Feb 2025 12:00) (70 - 85)  BP: 110/67 (20 Feb 2025 12:00) (96/35 - 121/61)  BP(mean): 61 (20 Feb 2025 04:46) (54 - 61)  RR: 17 (20 Feb 2025 12:00) (14 - 19)  SpO2: 100% (20 Feb 2025 12:00) (95% - 100%)    Parameters below as of 20 Feb 2025 06:00  Patient On (Oxygen Delivery Method): nasal cannula  O2 Flow (L/min): 2    I&O's Summary    19 Feb 2025 07:01  -  20 Feb 2025 07:00  --------------------------------------------------------  IN: 250 mL / OUT: 350 mL / NET: -100 mL    20 Feb 2025 07:01  -  20 Feb 2025 14:23  --------------------------------------------------------  IN: 0 mL / OUT: 400 mL / NET: -400 mL      PHYSICAL EXAM:  GENERAL: NAD, thin-elderly, comfortable on NC  HEAD:  Atraumatic, Normocephalic  EYES: EOMI, PERRLA, conjunctiva and sclera clear  NECK: Supple, No JVD  CHEST/LUNG: mild decrease breath sounds bilaterally; No wheeze   HEART: Regular rate and rhythm; No murmurs, rubs, or gallops  ABDOMEN: Soft, Nontender, Nondistended; Bowel sounds present  Neuro: AAOx3, no focal weakness   EXTREMITIES:  2+ Peripheral Pulses, No clubbing, cyanosis, or edema  SKIN: No rashes or lesions

## 2025-02-20 NOTE — PROVIDER CONTACT NOTE (OTHER) - ACTION/TREATMENT ORDERED:
patient o2 increased to 4l NC, provider ordered rectal temp to be taken, no fever found, provider aware of patient symptoms and vitals. provider will come to assess patient. provider ordered rectal temp to be taken, no fever found, provider aware of patient symptoms and vitals. provider will come to assess patient.

## 2025-02-20 NOTE — CONSULT NOTE ADULT - CONVERSATION DETAILS
Met with pt Ladonna at bedside, the visit and discussion of voluntary nature.  The time was spent assessing pts understanding about her current illness and eliciting her goals of care.   Patient is aware of her diagnosis of lung cancer and about her treatment options. She shared that initially they were going to opt for surgical resection for her  lung cancer, however she got more debilitated after hospital admission and the decision was to schedule RT and no further surgical interventions.   She indicates that she has had to postpone tx on a couple of occasions given her recurrent hospitalizations. She shared that her thought is that she will likely not receive further tx and focus on her comfort.   She shared that her daughter Vanessa is her HCP (copy found in patient portal), although all her children are supportive.  Addressed advanced directives in the event of cardiac arrest or respiratory failure. Options discussed in the setting of advanced illness. Including option for allowing a natural passing (DNR/DNI) and adding medications for comfort if any symptoms might arise. She would not want to be intubated/MV or resuscitated, she would want to allow for a natural death. Asked about completing MOLST form, however she would like to have time to discuss with her adult children as well.   Questions answered. Support provided.

## 2025-02-20 NOTE — CONSULT NOTE ADULT - PROBLEM SELECTOR PROBLEM 4
Patient states she is having left heel pain. Rating pain up to 5-6/ 10 when she steps on it.  Asking how long to expect this pain and what nursing recommends she do about it. Please call mobile [phone] to discuss.   DOS 1-20-17 Left repair of Achilles tendon  Next appointment: 4-18-17  
Talked with patient.  She is having more pain since she has been out of the boot.  She also has been doing a lot of activity.  Recommended she cut back on her activity as she is probably doing too much.  She can also put the boot back on for part of the day.  Can try a gel heel cup to pad the heel.  She was very appreciative of return call and advice.  
Palliative care encounter

## 2025-02-20 NOTE — PHYSICAL THERAPY INITIAL EVALUATION ADULT - GENERAL OBSERVATIONS, REHAB EVAL
Pt found sitting in chair; 98% 02sat at 3L/min via NC; spoke with Heide Stuart, who advised patient may participate. Pt agreeable to PT evaluation. Pt found sitting in chair; + telemetry monitor; 98% 02sat at 2L/min via NC; spoke with TAWANNA Stuart, who advised patient may participate. Pt agreeable to PT evaluation.

## 2025-02-20 NOTE — PHYSICAL THERAPY INITIAL EVALUATION ADULT - PERTINENT HX OF CURRENT PROBLEM, REHAB EVAL
As per chart, patient is a 89 year old female with PMH CHF, anemia, pHTN, CAD, CVA (no deficits), T2D, [place], COPD (2L NC PRN, qhs), PVD, HTN, HLD, and ?lung CA (pending RT) presenting with dysuria x 1wk along with tremors and SOB. Says she had been doing better post-discharge from last hospitalization and was pending radiation initiation. CTA chest neg PE but with multifocal PNA, pulmonary edema, and 2.8x2.7cm mass left lower lobe, interval increase compared to prior.

## 2025-02-20 NOTE — CONSULT NOTE ADULT - PROBLEM SELECTOR RECOMMENDATION 9
On nasal canula  Pt indicates +worsening dyspnea with exertion  Indicates that dyspnea has been limiting her functional status  Addressed advanced directives, she shared that she would not want to be intubated or have MV however would like to discuss with adult children before completing MOLST form

## 2025-02-20 NOTE — PHYSICAL THERAPY INITIAL EVALUATION ADULT - NSPTDISCHREC_GEN_A_CORE
Anticipate Home with home PT services to improve functional mobility and strength, to optimize safety within the home environment, and to allow pt to return to prior level of function.

## 2025-02-21 DIAGNOSIS — R53.1 WEAKNESS: ICD-10-CM

## 2025-02-21 DIAGNOSIS — J96.01 ACUTE RESPIRATORY FAILURE WITH HYPOXIA: ICD-10-CM

## 2025-02-21 DIAGNOSIS — Z51.5 ENCOUNTER FOR PALLIATIVE CARE: ICD-10-CM

## 2025-02-21 DIAGNOSIS — C34.90 MALIGNANT NEOPLASM OF UNSPECIFIED PART OF UNSPECIFIED BRONCHUS OR LUNG: ICD-10-CM

## 2025-02-21 LAB
ANION GAP SERPL CALC-SCNC: 12 MMOL/L — SIGNIFICANT CHANGE UP (ref 7–14)
BASOPHILS # BLD AUTO: 0.01 K/UL — SIGNIFICANT CHANGE UP (ref 0–0.2)
BASOPHILS NFR BLD AUTO: 0.1 % — SIGNIFICANT CHANGE UP (ref 0–2)
BLOOD GAS VENOUS COMPREHENSIVE RESULT: SIGNIFICANT CHANGE UP
BUN SERPL-MCNC: 30 MG/DL — HIGH (ref 7–23)
C PEPTIDE SERPL-MCNC: 3.7 NG/ML — SIGNIFICANT CHANGE UP (ref 1.1–4.4)
CALCIUM SERPL-MCNC: 9.4 MG/DL — SIGNIFICANT CHANGE UP (ref 8.4–10.5)
CHLORIDE SERPL-SCNC: 98 MMOL/L — SIGNIFICANT CHANGE UP (ref 98–107)
CK MB BLD-MCNC: 6.7 % — HIGH (ref 0–2.5)
CK MB CFR SERPL CALC: 1.6 NG/ML — SIGNIFICANT CHANGE UP
CK SERPL-CCNC: 24 U/L — LOW (ref 25–170)
CO2 SERPL-SCNC: 24 MMOL/L — SIGNIFICANT CHANGE UP (ref 22–31)
CREAT SERPL-MCNC: 0.99 MG/DL — SIGNIFICANT CHANGE UP (ref 0.5–1.3)
EGFR: 54 ML/MIN/1.73M2 — LOW
EGFR: 54 ML/MIN/1.73M2 — LOW
EOSINOPHIL # BLD AUTO: 0 K/UL — SIGNIFICANT CHANGE UP (ref 0–0.5)
EOSINOPHIL NFR BLD AUTO: 0 % — SIGNIFICANT CHANGE UP (ref 0–6)
GLUCOSE SERPL-MCNC: 376 MG/DL — HIGH (ref 70–99)
HCT VFR BLD CALC: 28.9 % — LOW (ref 34.5–45)
HGB BLD-MCNC: 9.2 G/DL — LOW (ref 11.5–15.5)
IANC: 7.91 K/UL — HIGH (ref 1.8–7.4)
IMM GRANULOCYTES NFR BLD AUTO: 0.4 % — SIGNIFICANT CHANGE UP (ref 0–0.9)
LACTATE SERPL-SCNC: 3.4 MMOL/L — HIGH (ref 0.5–2)
LYMPHOCYTES # BLD AUTO: 0.58 K/UL — LOW (ref 1–3.3)
LYMPHOCYTES # BLD AUTO: 6.4 % — LOW (ref 13–44)
MAGNESIUM SERPL-MCNC: 2.2 MG/DL — SIGNIFICANT CHANGE UP (ref 1.6–2.6)
MCHC RBC-ENTMCNC: 27.9 PG — SIGNIFICANT CHANGE UP (ref 27–34)
MCHC RBC-ENTMCNC: 31.8 G/DL — LOW (ref 32–36)
MCV RBC AUTO: 87.6 FL — SIGNIFICANT CHANGE UP (ref 80–100)
MONOCYTES # BLD AUTO: 0.48 K/UL — SIGNIFICANT CHANGE UP (ref 0–0.9)
MONOCYTES NFR BLD AUTO: 5.3 % — SIGNIFICANT CHANGE UP (ref 2–14)
NEUTROPHILS # BLD AUTO: 7.91 K/UL — HIGH (ref 1.8–7.4)
NEUTROPHILS NFR BLD AUTO: 87.8 % — HIGH (ref 43–77)
NRBC # BLD AUTO: 0 K/UL — SIGNIFICANT CHANGE UP (ref 0–0)
NRBC # FLD: 0 K/UL — SIGNIFICANT CHANGE UP (ref 0–0)
NRBC BLD AUTO-RTO: 0 /100 WBCS — SIGNIFICANT CHANGE UP (ref 0–0)
PHOSPHATE SERPL-MCNC: 3.4 MG/DL — SIGNIFICANT CHANGE UP (ref 2.5–4.5)
PLATELET # BLD AUTO: 453 K/UL — HIGH (ref 150–400)
POTASSIUM SERPL-MCNC: 4.6 MMOL/L — SIGNIFICANT CHANGE UP (ref 3.5–5.3)
POTASSIUM SERPL-SCNC: 4.6 MMOL/L — SIGNIFICANT CHANGE UP (ref 3.5–5.3)
RBC # BLD: 3.3 M/UL — LOW (ref 3.8–5.2)
RBC # FLD: 15.6 % — HIGH (ref 10.3–14.5)
SODIUM SERPL-SCNC: 134 MMOL/L — LOW (ref 135–145)
TROPONIN T, HIGH SENSITIVITY RESULT: 30 NG/L — SIGNIFICANT CHANGE UP
WBC # BLD: 9.02 K/UL — SIGNIFICANT CHANGE UP (ref 3.8–10.5)
WBC # FLD AUTO: 9.02 K/UL — SIGNIFICANT CHANGE UP (ref 3.8–10.5)

## 2025-02-21 PROCEDURE — 71045 X-RAY EXAM CHEST 1 VIEW: CPT | Mod: 26

## 2025-02-21 PROCEDURE — 99232 SBSQ HOSP IP/OBS MODERATE 35: CPT

## 2025-02-21 RX ORDER — INSULIN LISPRO 100 U/ML
11 INJECTION, SOLUTION INTRAVENOUS; SUBCUTANEOUS
Refills: 0 | Status: DISCONTINUED | OUTPATIENT
Start: 2025-02-21 | End: 2025-02-22

## 2025-02-21 RX ORDER — METHYLPREDNISOLONE ACETATE 80 MG/ML
20 INJECTION, SUSPENSION INTRA-ARTICULAR; INTRALESIONAL; INTRAMUSCULAR; SOFT TISSUE EVERY 8 HOURS
Refills: 0 | Status: DISCONTINUED | OUTPATIENT
Start: 2025-02-21 | End: 2025-02-24

## 2025-02-21 RX ORDER — INSULIN GLARGINE-YFGN 100 [IU]/ML
22 INJECTION, SOLUTION SUBCUTANEOUS AT BEDTIME
Refills: 0 | Status: DISCONTINUED | OUTPATIENT
Start: 2025-02-21 | End: 2025-02-22

## 2025-02-21 RX ORDER — CEFEPIME 2 G/20ML
2000 INJECTION, POWDER, FOR SOLUTION INTRAVENOUS
Refills: 0 | Status: DISCONTINUED | OUTPATIENT
Start: 2025-02-21 | End: 2025-02-24

## 2025-02-21 RX ORDER — INSULIN LISPRO 100 U/ML
INJECTION, SOLUTION INTRAVENOUS; SUBCUTANEOUS AT BEDTIME
Refills: 0 | Status: DISCONTINUED | OUTPATIENT
Start: 2025-02-21 | End: 2025-02-26

## 2025-02-21 RX ORDER — NITROFURANTOIN MACROCRYSTAL 100 MG
100 CAPSULE ORAL
Refills: 0 | Status: COMPLETED | OUTPATIENT
Start: 2025-02-22 | End: 2025-02-25

## 2025-02-21 RX ORDER — INSULIN LISPRO 100 U/ML
INJECTION, SOLUTION INTRAVENOUS; SUBCUTANEOUS
Refills: 0 | Status: DISCONTINUED | OUTPATIENT
Start: 2025-02-21 | End: 2025-02-26

## 2025-02-21 RX ORDER — METOPROLOL SUCCINATE 50 MG/1
12.5 TABLET, EXTENDED RELEASE ORAL
Refills: 0 | Status: DISCONTINUED | OUTPATIENT
Start: 2025-02-21 | End: 2025-02-28

## 2025-02-21 RX ORDER — LEVALBUTEROL HYDROCHLORIDE 1.25 MG/3ML
1.25 SOLUTION RESPIRATORY (INHALATION) EVERY 6 HOURS
Refills: 0 | Status: DISCONTINUED | OUTPATIENT
Start: 2025-02-21 | End: 2025-03-03

## 2025-02-21 RX ADMIN — METOPROLOL SUCCINATE 12.5 MILLIGRAM(S): 50 TABLET, EXTENDED RELEASE ORAL at 17:52

## 2025-02-21 RX ADMIN — INSULIN LISPRO 12: 100 INJECTION, SOLUTION INTRAVENOUS; SUBCUTANEOUS at 13:06

## 2025-02-21 RX ADMIN — CEFEPIME 100 MILLIGRAM(S): 2 INJECTION, POWDER, FOR SOLUTION INTRAVENOUS at 09:06

## 2025-02-21 RX ADMIN — Medication 250 MILLIGRAM(S): at 09:38

## 2025-02-21 RX ADMIN — Medication 1 DOSE(S): at 21:36

## 2025-02-21 RX ADMIN — FUROSEMIDE 20 MILLIGRAM(S): 10 INJECTION INTRAMUSCULAR; INTRAVENOUS at 05:09

## 2025-02-21 RX ADMIN — METHYLPREDNISOLONE ACETATE 20 MILLIGRAM(S): 80 INJECTION, SUSPENSION INTRA-ARTICULAR; INTRALESIONAL; INTRAMUSCULAR; SOFT TISSUE at 21:35

## 2025-02-21 RX ADMIN — ENOXAPARIN SODIUM 40 MILLIGRAM(S): 100 INJECTION SUBCUTANEOUS at 21:37

## 2025-02-21 RX ADMIN — GABAPENTIN 400 MILLIGRAM(S): 400 CAPSULE ORAL at 21:36

## 2025-02-21 RX ADMIN — Medication 3 MILLIGRAM(S): at 21:36

## 2025-02-21 RX ADMIN — Medication 2 TABLET(S): at 21:36

## 2025-02-21 RX ADMIN — SILDENAFIL 20 MILLIGRAM(S): 50 TABLET, FILM COATED ORAL at 21:37

## 2025-02-21 RX ADMIN — METHYLPREDNISOLONE ACETATE 20 MILLIGRAM(S): 80 INJECTION, SUSPENSION INTRA-ARTICULAR; INTRALESIONAL; INTRAMUSCULAR; SOFT TISSUE at 13:08

## 2025-02-21 RX ADMIN — Medication 81 MILLIGRAM(S): at 09:05

## 2025-02-21 RX ADMIN — SILDENAFIL 20 MILLIGRAM(S): 50 TABLET, FILM COATED ORAL at 13:10

## 2025-02-21 RX ADMIN — LEVALBUTEROL HYDROCHLORIDE 1.25 MILLIGRAM(S): 1.25 SOLUTION RESPIRATORY (INHALATION) at 14:21

## 2025-02-21 RX ADMIN — INSULIN LISPRO 11 UNIT(S): 100 INJECTION, SOLUTION INTRAVENOUS; SUBCUTANEOUS at 18:31

## 2025-02-21 RX ADMIN — Medication 1 PUFF(S): at 22:36

## 2025-02-21 RX ADMIN — METHYLPREDNISOLONE ACETATE 20 MILLIGRAM(S): 80 INJECTION, SUSPENSION INTRA-ARTICULAR; INTRALESIONAL; INTRAMUSCULAR; SOFT TISSUE at 05:09

## 2025-02-21 RX ADMIN — INSULIN GLARGINE-YFGN 22 UNIT(S): 100 INJECTION, SOLUTION SUBCUTANEOUS at 21:36

## 2025-02-21 RX ADMIN — POLYETHYLENE GLYCOL 3350 17 GRAM(S): 17 POWDER, FOR SOLUTION ORAL at 05:09

## 2025-02-21 RX ADMIN — ATORVASTATIN CALCIUM 20 MILLIGRAM(S): 80 TABLET, FILM COATED ORAL at 21:36

## 2025-02-21 RX ADMIN — IPRATROPIUM BROMIDE AND ALBUTEROL SULFATE 3 MILLILITER(S): .5; 2.5 SOLUTION RESPIRATORY (INHALATION) at 09:17

## 2025-02-21 RX ADMIN — Medication 1 TABLET(S): at 09:05

## 2025-02-21 RX ADMIN — CEFEPIME 100 MILLIGRAM(S): 2 INJECTION, POWDER, FOR SOLUTION INTRAVENOUS at 21:37

## 2025-02-21 RX ADMIN — Medication 40 MILLIGRAM(S): at 05:09

## 2025-02-21 RX ADMIN — LEVALBUTEROL HYDROCHLORIDE 1.25 MILLIGRAM(S): 1.25 SOLUTION RESPIRATORY (INHALATION) at 21:19

## 2025-02-21 RX ADMIN — SILDENAFIL 20 MILLIGRAM(S): 50 TABLET, FILM COATED ORAL at 05:09

## 2025-02-21 RX ADMIN — INSULIN LISPRO 8 UNIT(S): 100 INJECTION, SOLUTION INTRAVENOUS; SUBCUTANEOUS at 09:19

## 2025-02-21 RX ADMIN — INSULIN LISPRO 6: 100 INJECTION, SOLUTION INTRAVENOUS; SUBCUTANEOUS at 09:20

## 2025-02-21 RX ADMIN — METOPROLOL SUCCINATE 12.5 MILLIGRAM(S): 50 TABLET, EXTENDED RELEASE ORAL at 11:12

## 2025-02-21 RX ADMIN — INSULIN LISPRO 11 UNIT(S): 100 INJECTION, SOLUTION INTRAVENOUS; SUBCUTANEOUS at 13:07

## 2025-02-21 RX ADMIN — Medication 1 DOSE(S): at 09:04

## 2025-02-21 RX ADMIN — INSULIN LISPRO 4: 100 INJECTION, SOLUTION INTRAVENOUS; SUBCUTANEOUS at 18:31

## 2025-02-21 NOTE — PROGRESS NOTE ADULT - ASSESSMENT
A/P: Pt is a 89 yo F with PMH CHF, anemia, pHTN, CAD, CVA (no deficits), T2D, [place], COPD (2L NC PRN, qhs), PVD, HTN, HLD, and ?lung CA (pending RT) p/w dysuria x 1wk along with tremors and SOB, found to be hyperglycemic and will be on steroids. Endocrinology was consulted for management of diabetes mellitus.    #Type 2 Diabetes Mellitus  - Receiving methylpred 20 mg IV q8h (scheduled to continue until 2/21)  - HbA1c 9.7%; home regimen: Tresiba 10 units at bedtime (Tresiba no longer covered working on switching to a new basal prior to admission), metformin 500mg (?) 2 tabs daily   Hyperglycemia to 400s  solumedrol 20mg IV q8h now ongoing due to respiratory worsening.  - Increase Lantus to 22 units qhs  - Increase Admelog to 11 units TIDQAC hold if NPO  - Resume moderate admelog correction scale TIDQAC and  moderate QHS scale  - Please check FSG before meals and QHS, or q6h while NPO  - RD consult  - hypoglycemia orderset prn  -consistent carb diet    - Discharge planning:  depends on steroids- please inform endocrine of steroid plans  Tresiba no longer covered, to find an alternative basal insulin that is covered.  Clarify dose of metformin tabs at home  Also given patient had a son with type 1 DM sent c-peptide (with serum glucose), FELISHA Ab, IA-2 Ab, and Zinc transporter Ab with Am labs to rule out JILLIAN, now testing in lab.  DC plan basal/metformin or basal/bolus TBD  Patient attributes high A1c to several hospital admissions in past few months.  Patient uses glucometer and has previously declined CGM (will reconsider).  Patient wishes to follow up with pcp and declines initiating care with an endocrinologist.    #Hypertension  - BP goal <130/80  - Management as per primary team, not on meds now    #Hyperlipidemia  - ordered for atorvastatin 20 mg    Discussed with primary team    Shi Mello MD  Division of Endocrinology  Pager: [phone]    If after 6PM or before 9AM, or on weekends/holidays, please call endocrine answering service for assistance ([phone]).  For nonurgent matters email [email or web address] for assistance.   A/P: Pt is a 89 yo F with PMH CHF, anemia, pHTN, CAD, CVA (no deficits), T2D, [place], COPD (2L NC PRN, qhs), PVD, HTN, HLD, and ?lung CA (pending RT) p/w dysuria x 1wk along with tremors and SOB, found to be hyperglycemic and will be on steroids. Endocrinology was consulted for management of diabetes mellitus.    #Type 2 Diabetes Mellitus  - Receiving methylpred 20 mg IV q8h (scheduled to continue until 2/21)  - HbA1c 9.7%; home regimen: Tresiba 10 units at bedtime (Tresiba no longer covered working on switching to a new basal prior to admission), metformin 500mg (?) 2 tabs daily   Hyperglycemia to 400s  bicarb and ph normal not concerning for DKA  solumedrol 20mg IV q8h now ongoing due to respiratory worsening.  - Increase Lantus to 22 units qhs  - Increase Admelog to 11 units TIDQAC hold if NPO  - Resume moderate admelog correction scale TIDQAC and  moderate QHS scale  - Please check FSG before meals and QHS, or q6h while NPO  - RD consult  - hypoglycemia orderset prn  -consistent carb diet  -change antibiotics to non dextrose fluid    - Discharge planning:  depends on steroids- please inform endocrine of steroid plans  Tresiba no longer covered, to find an alternative basal insulin that is covered.  Clarify dose of metformin tabs at home  Also given patient had a son with type 1 DM sent c-peptide (with serum glucose), FELISHA Ab, IA-2 Ab, and Zinc transporter Ab with Am labs to rule out JILLIAN, now testing in lab.  DC plan basal/metformin or basal/bolus TBD  Patient attributes high A1c to several hospital admissions in past few months.  Patient uses glucometer and has previously declined CGM (will reconsider).  Patient wishes to follow up with pcp and declines initiating care with an endocrinologist.    #Hypertension  - BP goal <130/80  - Management as per primary team, not on meds now    #Hyperlipidemia  - ordered for atorvastatin 20 mg    Discussed with primary team    Shi Mello MD  Division of Endocrinology  Pager: [phone]    If after 6PM or before 9AM, or on weekends/holidays, please call endocrine answering service for assistance ([phone]).  For nonurgent matters email [email or web address] for assistance.

## 2025-02-21 NOTE — CHART NOTE - NSCHARTNOTEFT_GEN_A_CORE
Late Entry    Notified by RN patient with desaturation to SpO2 mid-80s on 6LNC and tachycardia. Patient assessed at bedside with complaint of chest pain and SOB.  Denies lightheadedness, dizziness, abdominal pain, nausea, vomiting, numbness or tingling.     VITALS  T(C): 36.3 (02-21-25 @ 17:45), Max: 36.9 (02-20-25 @ 22:10)  HR: 94 (02-21-25 @ 17:45) (83 - 136)  BP: 128/60 (02-21-25 @ 17:45) (105/50 - 151/69)  RR: 19 (02-21-25 @ 17:45) (17 - 22)  SpO2: 95% (02-21-25 @ 17:45) (80% - 99%)    EXAM:   CONSTITUTIONAL: Well groomed, mild respiratory distress   EYES: PERRLA and symmetric, EOMI    RESP: no use of accessory muscles; tachypneic, +mild wheeze and rhonchi   CV: +S1 S2, tachycardic regular rhythm   GI: Soft, NT, ND, no rebound, no guarding +BS x4    NEURO: no slurred speech or facial droop, following commands   PSYCH: Appropriate insight/judgment, recent/remote memory intact    LABS:                       9.2    9.02  )-----------( 453      ( 21 Feb 2025 06:33 )             28.9     02-21    134[L]  |  98  |  30[H]  ----------------------------<  376[H]  4.6   |  24  |  0.99    Ca    9.4      21 Feb 2025 06:33  Phos  3.4     02-21  Mg     2.20     02-21    CARDIAC MARKERS ( 21 Feb 2025 10:43 )  30 ng/L / x     / x     / x     / x     / 1.6 ng/mL  CARDIAC MARKERS ( 19 Feb 2025 12:12 )  36 ng/L / x     / x     / x     / x     / 1.1 ng/mL  CARDIAC MARKERS ( 19 Feb 2025 09:14 )  48 ng/L / x     / x     / x     / x     / x      CARDIAC MARKERS ( 19 Feb 2025 01:13 )  32 ng/L / x     / x     / x     / x     / x      CARDIAC MARKERS ( 18 Feb 2025 20:07 )  33 ng/L / x     / x     / x     / x     / x        10:48 - VBG - pH: 7.40  | pCO2: 37    | pO2: 81    | Lactate: 3.6      CAPILLARY BLOOD GLUCOSE  POCT Blood Glucose.: 212 mg/dL (21 Feb 2025 18:14)  POCT Blood Glucose.: 342 mg/dL (21 Feb 2025 15:19)  POCT Blood Glucose.: 538 mg/dL (21 Feb 2025 12:11)  POCT Blood Glucose.: 585 mg/dL (21 Feb 2025 10:52)  POCT Blood Glucose.: 571 mg/dL (21 Feb 2025 10:49)  POCT Blood Glucose.: 427 mg/dL (21 Feb 2025 09:03)  POCT Blood Glucose.: 330 mg/dL (20 Feb 2025 21:45    12 Lead EKG: Sinus Tachycardia to 119 with PVCs, no TWI, ST depressions or elevations.     Chest x-ray 2/20/2025 at 11:06 PM:  Surgical clips in the right axilla.  The heart is normal in size.  Diffuse bilateral patchy and hazy opacities, unchanged.  No pneumothorax.  Trace bilateral pleural effusions and associated atelectasis.  No acute bony abnormality.    Chest x-ray 2/21/2025 at 11:28 AM:  Progression of right base atelectasis or infiltrate. The left lung is   clearer.    IMPRESSION:  Changing infiltrates in latest image.      Patient intermittently placed on NRB and subsequently started on HFNC 40/40, will wean as tolerated. Duoneb switched to xopenex and ipratropium. Will continue IV solumedrol 20mg q8hr.  Resumed home lopressor at decreased dose 12.5mg BID. VBG w/o hypercapnia however revealed elevated lactate, will continue to trend.  Given elevated blood glucose will change antibiotic solutions from dextrose to NS.  Case discussed with endocrine- unlikely DKA at this time, insulin requirements increased and sliding scale changed from mild to moderate.  Patient later reassessed with improvement of respiratory status. Late Entry    Notified by RN patient with desaturation to SpO2 mid-80s on 6LNC and tachycardia. Patient assessed at bedside with complaint of chest pain and SOB.  Denies lightheadedness, dizziness, abdominal pain, nausea, vomiting, numbness or tingling.     VITALS  T(C): 36.3 (02-21-25 @ 17:45), Max: 36.9 (02-20-25 @ 22:10)  HR: 94 (02-21-25 @ 17:45) (83 - 136)  BP: 128/60 (02-21-25 @ 17:45) (105/50 - 151/69)  RR: 19 (02-21-25 @ 17:45) (17 - 22)  SpO2: 95% (02-21-25 @ 17:45) (80% - 99%)    EXAM:   CONSTITUTIONAL: Well groomed, mild respiratory distress   EYES: PERRLA and symmetric, EOMI    RESP: no use of accessory muscles; tachypneic, +mild wheeze and rhonchi   CV: +S1 S2, tachycardic regular rhythm   GI: Soft, NT, ND, no rebound, no guarding +BS x4    NEURO: no slurred speech or facial droop, following commands   PSYCH: Appropriate insight/judgment, recent/remote memory intact    LABS:                       9.2    9.02  )-----------( 453      ( 21 Feb 2025 06:33 )             28.9     02-21    134[L]  |  98  |  30[H]  ----------------------------<  376[H]  4.6   |  24  |  0.99    Ca    9.4      21 Feb 2025 06:33  Phos  3.4     02-21  Mg     2.20     02-21    CARDIAC MARKERS ( 21 Feb 2025 10:43 )  30 ng/L / x     / x     / x     / x     / 1.6 ng/mL  CARDIAC MARKERS ( 19 Feb 2025 12:12 )  36 ng/L / x     / x     / x     / x     / 1.1 ng/mL  CARDIAC MARKERS ( 19 Feb 2025 09:14 )  48 ng/L / x     / x     / x     / x     / x      CARDIAC MARKERS ( 19 Feb 2025 01:13 )  32 ng/L / x     / x     / x     / x     / x      CARDIAC MARKERS ( 18 Feb 2025 20:07 )  33 ng/L / x     / x     / x     / x     / x        10:48 - VBG - pH: 7.40  | pCO2: 37    | pO2: 81    | Lactate: 3.6      CAPILLARY BLOOD GLUCOSE  POCT Blood Glucose.: 212 mg/dL (21 Feb 2025 18:14)  POCT Blood Glucose.: 342 mg/dL (21 Feb 2025 15:19)  POCT Blood Glucose.: 538 mg/dL (21 Feb 2025 12:11)  POCT Blood Glucose.: 585 mg/dL (21 Feb 2025 10:52)  POCT Blood Glucose.: 571 mg/dL (21 Feb 2025 10:49)  POCT Blood Glucose.: 427 mg/dL (21 Feb 2025 09:03)  POCT Blood Glucose.: 330 mg/dL (20 Feb 2025 21:45    12 Lead EKG: Sinus Tachycardia to 119 with PVCs, no TWI, ST depressions or elevations.     Chest x-ray 2/20/2025 at 11:06 PM:  Surgical clips in the right axilla.  The heart is normal in size.  Diffuse bilateral patchy and hazy opacities, unchanged.  No pneumothorax.  Trace bilateral pleural effusions and associated atelectasis.  No acute bony abnormality.    Chest x-ray 2/21/2025 at 11:28 AM:  Progression of right base atelectasis or infiltrate. The left lung is   clearer.    IMPRESSION:  Changing infiltrates in latest image.      Patient intermittently placed on NRB and subsequently started on HFNC 40/40, will wean as tolerated. Duoneb switched to xopenex and ipratropium. Will continue IV solumedrol 20mg q8hr.  Resumed home lopressor at decreased dose 12.5mg BID. VBG w/o hypercapnia however revealed elevated lactate, will continue to trend.  Given elevated blood glucose will change antibiotic solutions from dextrose to NS.  Case discussed with endocrine- unlikely DKA at this time, insulin requirements increased and sliding scale changed from mild to moderate.  Patient later reassessed with improvement of respiratory status. Case discussed with pulmonology and medicine attending. Late Entry    Notified by RN patient with desaturation to SpO2 mid-80s on 6LNC and tachycardia. Patient assessed at bedside with complaint of chest pain and SOB.  Denies lightheadedness, dizziness, abdominal pain, nausea, vomiting, numbness or tingling. Of note patient had CTA chest 2/19/25 neg for PE.     VITALS  T(C): 36.3 (02-21-25 @ 17:45), Max: 36.9 (02-20-25 @ 22:10)  HR: 94 (02-21-25 @ 17:45) (83 - 136)  BP: 128/60 (02-21-25 @ 17:45) (105/50 - 151/69)  RR: 19 (02-21-25 @ 17:45) (17 - 22)  SpO2: 95% (02-21-25 @ 17:45) (80% - 99%)    EXAM:   CONSTITUTIONAL: Well groomed, mild respiratory distress   EYES: PERRLA and symmetric, EOMI    RESP: no use of accessory muscles; tachypneic, +mild wheeze and rhonchi   CV: +S1 S2, tachycardic regular rhythm   GI: Soft, NT, ND, no rebound, no guarding +BS x4    NEURO: no slurred speech or facial droop, following commands   PSYCH: Appropriate insight/judgment, recent/remote memory intact  EXTREMITIES: B/L Calf pain L>R     LABS:                       9.2    9.02  )-----------( 453      ( 21 Feb 2025 06:33 )             28.9     02-21    134[L]  |  98  |  30[H]  ----------------------------<  376[H]  4.6   |  24  |  0.99    Ca    9.4      21 Feb 2025 06:33  Phos  3.4     02-21  Mg     2.20     02-21    CARDIAC MARKERS ( 21 Feb 2025 10:43 )  30 ng/L / x     / x     / x     / x     / 1.6 ng/mL  CARDIAC MARKERS ( 19 Feb 2025 12:12 )  36 ng/L / x     / x     / x     / x     / 1.1 ng/mL  CARDIAC MARKERS ( 19 Feb 2025 09:14 )  48 ng/L / x     / x     / x     / x     / x      CARDIAC MARKERS ( 19 Feb 2025 01:13 )  32 ng/L / x     / x     / x     / x     / x      CARDIAC MARKERS ( 18 Feb 2025 20:07 )  33 ng/L / x     / x     / x     / x     / x        10:48 - VBG - pH: 7.40  | pCO2: 37    | pO2: 81    | Lactate: 3.6      CAPILLARY BLOOD GLUCOSE  POCT Blood Glucose.: 212 mg/dL (21 Feb 2025 18:14)  POCT Blood Glucose.: 342 mg/dL (21 Feb 2025 15:19)  POCT Blood Glucose.: 538 mg/dL (21 Feb 2025 12:11)  POCT Blood Glucose.: 585 mg/dL (21 Feb 2025 10:52)  POCT Blood Glucose.: 571 mg/dL (21 Feb 2025 10:49)  POCT Blood Glucose.: 427 mg/dL (21 Feb 2025 09:03)  POCT Blood Glucose.: 330 mg/dL (20 Feb 2025 21:45    12 Lead EKG: Sinus Tachycardia to 119 with PVCs, no TWI, ST depressions or elevations.     Chest x-ray 2/20/2025 at 11:06 PM:  Surgical clips in the right axilla.  The heart is normal in size.  Diffuse bilateral patchy and hazy opacities, unchanged.  No pneumothorax.  Trace bilateral pleural effusions and associated atelectasis.  No acute bony abnormality.    Chest x-ray 2/21/2025 at 11:28 AM:  Progression of right base atelectasis or infiltrate. The left lung is   clearer.    IMPRESSION:  Changing infiltrates in latest image.      Patient intermittently placed on NRB and subsequently started on HFNC 40/40, will wean as tolerated. Duoneb switched to xopenex and ipratropium. Will continue IV solumedrol 20mg q8hr.  Resumed home lopressor at decreased dose 12.5mg BID. VBG w/o hypercapnia however revealed elevated lactate, will continue to trend. Will order B/L LE duplex given B/L calf pain L>R.  Given elevated blood glucose will change antibiotic solutions from dextrose to NS.  Case discussed with endocrine- unlikely DKA at this time, insulin requirements increased and sliding scale changed from mild to moderate.  Patient later reassessed with improvement of respiratory status. Case discussed with pulmonology and medicine attending. Late Entry    Notified by RN patient with desaturation to SpO2 low-mid-80s on 6LNC and tachycardia. Patient assessed at bedside with complaint of chest pain and SOB.  Denies lightheadedness, dizziness, abdominal pain, nausea, vomiting, numbness or tingling. Of note patient had CTA chest 2/19/25 neg for PE.     VITALS  T(C): 36.3 (02-21-25 @ 17:45), Max: 36.9 (02-20-25 @ 22:10)  HR: 94 (02-21-25 @ 17:45) (83 - 136)  BP: 128/60 (02-21-25 @ 17:45) (105/50 - 151/69)  RR: 19 (02-21-25 @ 17:45) (17 - 22)  SpO2: 95% (02-21-25 @ 17:45) (80% - 99%)    EXAM:   CONSTITUTIONAL: Well groomed, mild respiratory distress   EYES: PERRLA and symmetric, EOMI    RESP: no use of accessory muscles; tachypneic, +mild wheeze and rhonchi   CV: +S1 S2, tachycardic regular rhythm   GI: Soft, NT, ND, no rebound, no guarding +BS x4    NEURO: no slurred speech or facial droop, following commands   PSYCH: Appropriate insight/judgment, recent/remote memory intact  EXTREMITIES: B/L Calf pain L>R     LABS:                       9.2    9.02  )-----------( 453      ( 21 Feb 2025 06:33 )             28.9     02-21    134[L]  |  98  |  30[H]  ----------------------------<  376[H]  4.6   |  24  |  0.99    Ca    9.4      21 Feb 2025 06:33  Phos  3.4     02-21  Mg     2.20     02-21    CARDIAC MARKERS ( 21 Feb 2025 10:43 )  30 ng/L / x     / x     / x     / x     / 1.6 ng/mL  CARDIAC MARKERS ( 19 Feb 2025 12:12 )  36 ng/L / x     / x     / x     / x     / 1.1 ng/mL  CARDIAC MARKERS ( 19 Feb 2025 09:14 )  48 ng/L / x     / x     / x     / x     / x      CARDIAC MARKERS ( 19 Feb 2025 01:13 )  32 ng/L / x     / x     / x     / x     / x      CARDIAC MARKERS ( 18 Feb 2025 20:07 )  33 ng/L / x     / x     / x     / x     / x        10:48 - VBG - pH: 7.40  | pCO2: 37    | pO2: 81    | Lactate: 3.6      CAPILLARY BLOOD GLUCOSE  POCT Blood Glucose.: 212 mg/dL (21 Feb 2025 18:14)  POCT Blood Glucose.: 342 mg/dL (21 Feb 2025 15:19)  POCT Blood Glucose.: 538 mg/dL (21 Feb 2025 12:11)  POCT Blood Glucose.: 585 mg/dL (21 Feb 2025 10:52)  POCT Blood Glucose.: 571 mg/dL (21 Feb 2025 10:49)  POCT Blood Glucose.: 427 mg/dL (21 Feb 2025 09:03)  POCT Blood Glucose.: 330 mg/dL (20 Feb 2025 21:45    12 Lead EKG: Sinus Tachycardia to 119 with PVCs, no TWI, ST depressions or elevations.     Chest x-ray 2/20/2025 at 11:06 PM:  Surgical clips in the right axilla.  The heart is normal in size.  Diffuse bilateral patchy and hazy opacities, unchanged.  No pneumothorax.  Trace bilateral pleural effusions and associated atelectasis.  No acute bony abnormality.    Chest x-ray 2/21/2025 at 11:28 AM:  Progression of right base atelectasis or infiltrate. The left lung is   clearer.    IMPRESSION:  Changing infiltrates in latest image.      Patient intermittently placed on NRB and subsequently started on HFNC 40/40, will wean as tolerated. Duoneb switched to xopenex and ipratropium. Will continue IV solumedrol 20mg q8hr.  Resumed home lopressor at decreased dose 12.5mg BID. VBG w/o hypercapnia however revealed elevated lactate, will continue to trend. Will order B/L LE duplex given B/L calf pain L>R.  Given elevated blood glucose will change antibiotic solutions from dextrose to NS.  Case discussed with endocrine- unlikely DKA at this time, insulin requirements increased and sliding scale changed from mild to moderate.  Patient later reassessed with improvement of respiratory status. Case discussed with pulmonology and medicine attending.

## 2025-02-21 NOTE — PROGRESS NOTE ADULT - NSPROGADDITIONALINFOA_GEN_ALL_CORE
d/w ID and pulm.  d/w the daughter Vanessa STEELE. Pt lives with her. Uses walker to walk, though not always using.   Recently seen by Dr. Shaw Mitchell (rad onc) for measurements. Hoping that pt will be able to proceed with radiation treatment post discharge.    GOC discussed with pt and the daughter. Per the pt and daughter, if condition is irreversible then no CPR/ventilator. otherwise okay with CPR/ ventilator.   currently remain full code.   overall prognosis is poor given lung findings of fibrosis.   all questions answered.   palliative f/u appropriate for GOC and future symptom management.     Resume metoprolol for a.fib.  BP stable, resume Lasix.     - Dr. LOVETT Htet ([facility])  - [phone]

## 2025-02-21 NOTE — PROGRESS NOTE ADULT - ASSESSMENT
89 y/o F PMhx CHF, anemia, pHTN, CAD, CVA, DMII, COPD (2L NC PRN, qhs), PVD, HTN, and ?lung CA (pending RT) who presented w/ dysuria x 5 days as well as SOB and rigors.    L lung cancer, PNA  sepsis- fever, leukocytosis- resolved  RVP negative  CTA chest- negative for PE but demonstrated increase in groundglass opacities and subpleural consolidations in bilateral lungs; Interlobular septal thickening in the upper lobes, which can be suggestive of interstitial edema 3.1 cm masslike consolidation in left lower lobe. Additional smaller nodules in bilateral lungs. Subpleural reticulations, Honeycombing, and peripheral cystic changes in bilateral lungs, suggestive of interstitial lung fibrosis.    UTI  pt w/ dysuria, no flank tenderness, UA positive  urine cx E faecium, sensitivities reviewed  blood cultures- NGTD    Recommendations  c/w cefepime 2g every 12 hours (renally adjusted)  - complete 5 day course w/ last day 2/23 AM  s/p vanc 2/20 & 2/21  switch vanc to macrobid x 3 more days for E faecium UTI  TTE pending    Dr. Cody Smith will be covering 2/22 & 2/23. I will resume care 2/24.   Aldo Verduzco M.D.  [facility]  [phone]  After 5pm on weekdays and all day on weekends - please call [phone]  Available on [employer] teams    Thank you for consulting us and involving us in the management of this patients case. In addition to reviewing history, imaging, documents, labs, microbiology, took into account antibiotic stewardship, local antibiogram and infection control strategies and potential transmission issues.

## 2025-02-21 NOTE — PROGRESS NOTE ADULT - SUBJECTIVE AND OBJECTIVE BOX
CARDIOLOGY FOLLOW UP NOTE - DR. ERNANDEZ    Patient Name: FEDERICO DOS SANTOS    Date of Service: 02-21-25 @ 16:10    Patient seen and examined  c/o dyspnea      Subjective:    cv: denies chest pain, dyspnea, palpitations, dizziness  pulmonary: denies cough  GI: denies abdominal pain, nausea, vomiting  vascular/legs: no edema   skin: no rash  ROS: otherwise negative   overnight events:      PHYSICAL EXAM:  T(C): 36.4 (02-21-25 @ 10:22), Max: 36.9 (02-20-25 @ 22:10)  HR: 99 (02-21-25 @ 15:51) (80 - 136)  BP: 151/69 (02-21-25 @ 10:22) (105/50 - 151/69)  RR: 20 (02-21-25 @ 15:51) (17 - 22)  SpO2: 92% (02-21-25 @ 15:51) (80% - 99%)  Wt(kg): --  I&O's Summary    20 Feb 2025 07:01  -  21 Feb 2025 07:00  --------------------------------------------------------  IN: 0 mL / OUT: 1500 mL / NET: -1500 mL    21 Feb 2025 07:01  -  21 Feb 2025 16:10  --------------------------------------------------------  IN: 0 mL / OUT: 750 mL / NET: -750 mL      Daily     Daily     Appearance: Normal	  Cardiovascular: Normal S1 S2,RRR, No JVD, No murmurs  Respiratory: Lungs clear to auscultation	  Gastrointestinal:  Soft, Non-tender, + BS	  Extremities: Normal range of motion, No clubbing, cyanosis or edema      Home Medications:  Advair Diskus 250 mcg-50 mcg inhalation powder: 1 puff(s) inhaled 2 times a day (13 Dec 2024 23:00)  aspirin 81 mg oral delayed release tablet: 1 tab(s) orally once a day (at bedtime) (13 Dec 2024 23:00)  atorvastatin 20 mg oral tablet: 1 tab(s) orally once a day (at bedtime) (13 Dec 2024 23:00)  furosemide 20 mg oral tablet: 2 tab(s) orally once a day (13 Dec 2024 22:55)  gabapentin 400 mg oral capsule: 1 cap(s) orally once a day (at bedtime) (13 Dec 2024 23:00)  ipratropium 42 mcg/inh (0.06%) nasal spray: 2 spray(s) intranasally 2 times a day (13 Dec 2024 23:00)  metFORMIN 1000 mg oral tablet: 1 tab(s) orally once a day (19 Feb 2025 12:37)  Metoprolol Tartrate 25 mg oral tablet: 0.5 tab(s) orally 2 times a day takes 2 half tabs in morning and 1 half tab at night (13 Dec 2024 22:59)  Multiple Vitamins oral tablet: 1 tab(s) orally once a day (13 Dec 2024 23:00)  omeprazole 40 mg oral delayed release capsule: 1 cap(s) orally once a day (13 Dec 2024 23:00)  polyethylene glycol 3350 oral powder for reconstitution: 17 gram(s) orally 2 times a day (18 Dec 2024 13:08)  PreserVision AREDS 2 oral capsule: 1 cap(s) orally 2 times a day (13 Dec 2024 23:00)  senna leaf extract oral tablet: 2 tab(s) orally once a day (at bedtime) (18 Dec 2024 13:08)  sildenafil 20 mg oral tablet: 1 tab(s) orally 3 times a day (13 Dec 2024 22:55)  Tresiba FlexTouch 100 units/mL subcutaneous solution: 10 unit(s) subcutaneous once a day (at bedtime) Take tresiba 10 units subcutaneous at bedtime starting tonight at bedtime (13 Dec 2024 23:00)      MEDICATIONS  (STANDING):  aspirin enteric coated 81 milliGRAM(s) Oral daily  atorvastatin 20 milliGRAM(s) Oral at bedtime  cefepime   IVPB 2000 milliGRAM(s) IV Intermittent <User Schedule>  dextrose 5%. 1000 milliLiter(s) (50 mL/Hr) IV Continuous <Continuous>  dextrose 5%. 1000 milliLiter(s) (100 mL/Hr) IV Continuous <Continuous>  dextrose 50% Injectable 25 Gram(s) IV Push once  dextrose 50% Injectable 12.5 Gram(s) IV Push once  dextrose 50% Injectable 25 Gram(s) IV Push once  enoxaparin Injectable 40 milliGRAM(s) SubCutaneous every 24 hours  fluticasone propionate/ salmeterol 250-50 MICROgram(s) Diskus 1 Dose(s) Inhalation two times a day  furosemide   Injectable 20 milliGRAM(s) IV Push daily  gabapentin 400 milliGRAM(s) Oral at bedtime  glucagon  Injectable 1 milliGRAM(s) IntraMuscular once  insulin glargine Injectable (LANTUS) 22 Unit(s) SubCutaneous at bedtime  insulin lispro (ADMELOG) corrective regimen sliding scale   SubCutaneous three times a day before meals  insulin lispro (ADMELOG) corrective regimen sliding scale   SubCutaneous at bedtime  insulin lispro Injectable (ADMELOG) 11 Unit(s) SubCutaneous three times a day before meals  ipratropium 17 MICROgram(s) HFA Inhaler 1 Puff(s) Inhalation every 6 hours  levalbuterol Inhalation 1.25 milliGRAM(s) Inhalation every 6 hours  methylPREDNISolone sodium succinate Injectable 20 milliGRAM(s) IV Push every 8 hours  metoprolol tartrate 12.5 milliGRAM(s) Oral two times a day  multivitamin 1 Tablet(s) Oral daily  pantoprazole    Tablet 40 milliGRAM(s) Oral before breakfast  polyethylene glycol 3350 17 Gram(s) Oral two times a day  senna 2 Tablet(s) Oral at bedtime  sildenafil (REVATIO) 20 milliGRAM(s) Oral three times a day      TELEMETRY: 	    ECG:  	  RADIOLOGY:   DIAGNOSTIC TESTING:  [ ] Echocardiogram:  [ ] Catheterization:  [ ] Stress Test:    OTHER: 	    LABS:	 	    CARDIAC MARKERS:        Troponin T, High Sensitivity Result: 30 ng/L (02-21 @ 10:43)  Troponin T, High Sensitivity Result: 36 ng/L (02-19 @ 12:12)  Troponin T, High Sensitivity Result: 48 ng/L (02-19 @ 09:14)  Troponin T, High Sensitivity Result: 32 ng/L (02-19 @ 01:13)  Troponin T, High Sensitivity Result: 33 ng/L (02-18 @ 20:07)                                9.2    9.02  )-----------( 453      ( 21 Feb 2025 06:33 )             28.9     02-21    134[L]  |  98  |  30[H]  ----------------------------<  376[H]  4.6   |  24  |  0.99    Ca    9.4      21 Feb 2025 06:33  Phos  3.4     02-21  Mg     2.20     02-21      proBNP:     Lipid Profile:   HgA1c:     Creatinine: 0.99 mg/dL (02-21-25 @ 06:33)  Creatinine: 0.91 mg/dL (02-20-25 @ 05:37)  Creatinine: 0.97 mg/dL (02-19-25 @ 09:14)  Creatinine: 1.04 mg/dL (02-18-25 @ 20:07)

## 2025-02-21 NOTE — GOALS OF CARE CONVERSATION - ADVANCED CARE PLANNING - CONVERSATION DETAILS
Met with patient at bedside the visit and discussion of voluntary nature.  Follow up conversation regarding advanced directives. Discussed options contained in MOLST form.  Patient shared and confirmed her wishes of DNR/DNI however does not want to complete MOLST form without her daughter, left MOLST form at bedside

## 2025-02-21 NOTE — PROGRESS NOTE ADULT - ASSESSMENT
Pt is a 89 yo F with PMH CHF, anemia, pHTN, CAD, CVA (no deficits), T2D, [place], COPD (2L NC PRN, qhs), PVD, HTN, HLD, and ?lung CA (pending RT) p/w dysuria x 1wk along with tremors and SOB.   On arrival, T 102.5, , /78 --- SBP 80s, RR 20 O2sat 88% RA -- 2L NC. EKG with sinus tachycardia, no ischemic changes. ER POCUS with diffuse B lines. Labs with leukocytosis, normocytic anemia, trop neg x2, BNP 1406, lactate neg, UA+ with UCx and BCx in lab, RVP neg, MRSA in lab. CTA chest neg PE but with multifocal PNA, pulmonary edema, and 2.8x2.7cm mass LLL interval inc compared to prior. MICU consulted for hypotension with c/f need for pressors, however, improved with IVF and midodrine. Pt given tylenol, cefepime, and vancomycin. ID consulted and PT consulted with recs pending. (19 Feb 2025 10:03)  she is apparently sob to me:  but she says her breathing is OK:  she  is on home oxygen :  recently diagnosed with lung cancer:   now pulm called for pneumonia        Sepsis/ Pneumonia/ COPD / lung cancer:   pneumonia: on antibiotics   CTA chest neg PE but with multifocal PNA and pulmonary edema   cont antibtiocs : seen by id   urine legionella/strep  lEFT LOWER OPACITY:  PER DAUGHTER :  SHE NEVER GOT ANY CHEMO :  SHE WAS SUPPOSED TO GET RADIATION THERAPY,  BUT SHE ENDED UP HERE:     She has had multiplek admission in last few months and hence could not any surgery for the mass:  now it seems to have increased   she seems to be sob:  and has copd:  will add short course of steroids   her vbg is OK:  but she looks sob:  she may need bipap , if her rr worsens    2/20: she looks better today  : on 3 L o f oxygen : she does use oxygen at home:  2 L of oxygen :  ct scan chest showed: No pulmonary embolism. Interval increase in groundglass opacities and subpleural consolidations in bilateral lungs, likely infectious. Interlobularseptal thickening in the upper lobes, which can be suggestive of interstitial edema 3.1 cm masslike consolidation in left lower lobe. Additional smaller nodules in bilateral lungs. Mediastinal and hilar lymphadenopathy which can be reactive or due to  metastasis. Subpleural reticulations, Honeycombing, and peripheral cystic changes in bilateral lungs, suggestive of interstitial lung fibrosis.    2/21: spoke to daughter again : she had mapping ct prior to coming to hospital  : she was supposed to be getting radiation therapy;   no chemo ;   cont iv steroids;   she had no pe  on initial admission she had no pe:  \  she has significant emphysema:    VBG is good     would cont antibiotics   ? hemoinc consult??     2/21: she was seen by palliative care;     Acute UTI.   as above.    Acute on chronic heart failure.    - known hx CHF   - 10/2024 TTE EF 60%, mild MV stenosis, mod AS, mild-mod TR  - POCUS in ER c/f reduced EF  - CXR with pulmonary vascular congestion, CTA chest with pulmonary edema   -cont diuretics  defer to card s    2/20: cont current rx:    on 3 L of oxygen    2/21: today hero2 requirement went up : do chest xray and stat high flow:  cont BD and steroids     Pulmonary mass.    CTA chest with interval increase in LLL mass from prior, 2.8x2.7cm  - pending RT initiation outpt?\  - as above:  has not getting any treatment since the diagnosis many months ago     Pulmonary HTN.    sildenafil 20mg TID.    HTN (hypertension).   blood pressure is soft:  currently off anti hypertensives:     Type 2 diabetes mellitus.   monitor and control:    dw acp and daughter

## 2025-02-21 NOTE — PROGRESS NOTE ADULT - SUBJECTIVE AND OBJECTIVE BOX
Date of Service: 02-21-25 @ 11:04    Patient is a 89y old  Female who presents with a chief complaint of UTI, PNA (21 Feb 2025 10:53)      Any change in ROS: seems OK:  seems a little down today  ; remains on 6 L of oxygen     MEDICATIONS  (STANDING):  albuterol/ipratropium for Nebulization 3 milliLiter(s) Nebulizer every 6 hours  aspirin enteric coated 81 milliGRAM(s) Oral daily  atorvastatin 20 milliGRAM(s) Oral at bedtime  cefepime   IVPB 2000 milliGRAM(s) IV Intermittent <User Schedule>  dextrose 5%. 1000 milliLiter(s) (50 mL/Hr) IV Continuous <Continuous>  dextrose 5%. 1000 milliLiter(s) (100 mL/Hr) IV Continuous <Continuous>  dextrose 50% Injectable 25 Gram(s) IV Push once  dextrose 50% Injectable 12.5 Gram(s) IV Push once  dextrose 50% Injectable 25 Gram(s) IV Push once  enoxaparin Injectable 40 milliGRAM(s) SubCutaneous every 24 hours  fluticasone propionate/ salmeterol 250-50 MICROgram(s) Diskus 1 Dose(s) Inhalation two times a day  furosemide   Injectable 20 milliGRAM(s) IV Push daily  gabapentin 400 milliGRAM(s) Oral at bedtime  glucagon  Injectable 1 milliGRAM(s) IntraMuscular once  insulin glargine Injectable (LANTUS) 22 Unit(s) SubCutaneous at bedtime  insulin lispro (ADMELOG) corrective regimen sliding scale   SubCutaneous three times a day before meals  insulin lispro (ADMELOG) corrective regimen sliding scale   SubCutaneous at bedtime  insulin lispro Injectable (ADMELOG) 11 Unit(s) SubCutaneous three times a day before meals  levalbuterol Inhalation 1.25 milliGRAM(s) Inhalation every 6 hours  methylPREDNISolone sodium succinate Injectable 20 milliGRAM(s) IV Push every 8 hours  metoprolol tartrate 12.5 milliGRAM(s) Oral two times a day  multivitamin 1 Tablet(s) Oral daily  pantoprazole    Tablet 40 milliGRAM(s) Oral before breakfast  polyethylene glycol 3350 17 Gram(s) Oral two times a day  senna 2 Tablet(s) Oral at bedtime  sildenafil (REVATIO) 20 milliGRAM(s) Oral three times a day  vancomycin  IVPB 750 milliGRAM(s) IV Intermittent every 24 hours    MEDICATIONS  (PRN):  acetaminophen     Tablet .. 650 milliGRAM(s) Oral every 6 hours PRN Temp greater or equal to 38C (100.4F), Mild Pain (1 - 3)  dextrose Oral Gel 15 Gram(s) Oral once PRN Blood Glucose LESS THAN 70 milliGRAM(s)/deciliter  melatonin 3 milliGRAM(s) Oral at bedtime PRN Insomnia  ondansetron Injectable 4 milliGRAM(s) IV Push every 8 hours PRN Nausea and/or Vomiting    Vital Signs Last 24 Hrs  T(C): 36.4 (21 Feb 2025 04:46), Max: 36.9 (20 Feb 2025 22:10)  T(F): 97.5 (21 Feb 2025 04:46), Max: 98.4 (20 Feb 2025 22:10)  HR: 83 (21 Feb 2025 09:17) (72 - 102)  BP: 128/60 (21 Feb 2025 04:46) (105/50 - 128/60)  BP(mean): --  RR: 18 (21 Feb 2025 04:46) (17 - 20)  SpO2: 92% (21 Feb 2025 09:17) (92% - 100%)    Parameters below as of 21 Feb 2025 04:46  Patient On (Oxygen Delivery Method): nasal cannula  O2 Flow (L/min): 2      I&O's Summary    20 Feb 2025 07:01  -  21 Feb 2025 07:00  --------------------------------------------------------  IN: 0 mL / OUT: 1500 mL / NET: -1500 mL          Physical Exam:   GENERAL: NAD, well-groomed, well-developed  HEENT: MARCIO/   Atraumatic, Normocephalic  ENMT: No tonsillar erythema, exudates, or enlargement; Moist mucous membranes, Good dentition, No lesions  NECK: Supple, No JVD, Normal thyroid  CHEST/LUNG: mild coarse rhonchi   CVS: Regular rate and rhythm; No murmurs, rubs, or gallops  GI: : Soft, Nontender, Nondistended; Bowel sounds present  NERVOUS SYSTEM:  Alert & Oriented X3  EXTREMITIES: - edema  LYMPH: No lymphadenopathy noted  SKIN: No rashes or lesions  ENDOCRINOLOGY: No Thyromegaly  PSYCH: Appropriate    Labs:  23, 32, 31  CARDIAC MARKERS ( 19 Feb 2025 12:12 )  x     / x     / x     / x     / 1.1 ng/mL                            9.2    9.02  )-----------( 453      ( 21 Feb 2025 06:33 )             28.9                         9.1    8.53  )-----------( 416      ( 20 Feb 2025 05:37 )             28.3                         8.6    11.15 )-----------( 389      ( 19 Feb 2025 09:14 )             27.3                         10.0   12.67 )-----------( 439      ( 18 Feb 2025 20:07 )             30.5     02-21    134[L]  |  98  |  30[H]  ----------------------------<  376[H]  4.6   |  24  |  0.99  02-20    136  |  98  |  28[H]  ----------------------------<  192[H]  4.3   |  24  |  0.91  02-19    133[L]  |  95[L]  |  24[H]  ----------------------------<  240[H]  4.0   |  25  |  0.97  02-18    130[L]  |  91[L]  |  28[H]  ----------------------------<  366[H]  4.3   |  27  |  1.04    Ca    9.4      21 Feb 2025 06:33  Ca    9.4      20 Feb 2025 05:37  Phos  3.4     02-21  Phos  3.4     02-20  Mg     2.20     02-21  Mg     2.60     02-20    TPro  6.4  /  Alb  3.5  /  TBili  0.4  /  DBili  x   /  AST  7   /  ALT  <5  /  AlkPhos  86  02-19  TPro  7.4  /  Alb  3.9  /  TBili  0.4  /  DBili  x   /  AST  15  /  ALT  8   /  AlkPhos  111  02-18    CAPILLARY BLOOD GLUCOSE      POCT Blood Glucose.: 585 mg/dL (21 Feb 2025 10:52)  POCT Blood Glucose.: 571 mg/dL (21 Feb 2025 10:49)  POCT Blood Glucose.: 427 mg/dL (21 Feb 2025 09:03)  POCT Blood Glucose.: 330 mg/dL (20 Feb 2025 21:45)  POCT Blood Glucose.: 324 mg/dL (20 Feb 2025 17:51)  POCT Blood Glucose.: 267 mg/dL (20 Feb 2025 12:07)          Urinalysis Basic - ( 21 Feb 2025 06:33 )    Color: x / Appearance: x / SG: x / pH: x  Gluc: 376 mg/dL / Ketone: x  / Bili: x / Urobili: x   Blood: x / Protein: x / Nitrite: x   Leuk Esterase: x / RBC: x / WBC x   Sq Epi: x / Non Sq Epi: x / Bacteria: x      Procalcitonin: 2.19 ng/mL (02-19 @ 01:13)        RECENT CULTURES:  02-18 @ 21:42 .Blood Blood-Peripheral     rad< from: CT Angio Chest PE Protocol w/ IV Cont (02.19.25 @ 01:11) >  o pulmonary embolism.  Interval increase in groundglass opacities and subpleural consolidations   in bilateral lungs, likely infectious. Interlobularseptal thickening in   the upper lobes, which can be suggestive of interstitial edema  3.1 cm masslike consolidation in left lower lobe. Additional smaller   nodules in bilateral lungs.  Mediastinal and hilar lymphadenopathy which can be reactive or due to   metastasis.  Subpleural reticulations, Honeycombing, and peripheral cystic changes in   bilateral lungs, suggestive of interstitial lung fibrosis.    < end of copied text >             No growth at 48 Hours    02-18 @ 21:36 Clean Catch Clean Catch (Midstream)   [id number]      Enterococcus faecium  Enterococcus faecium     50,000 - 99,000 CFU/mL Enterococcus faecium  <10,000 CFU/ml Normal Urogenital vonnie present    02-18 @ 20:45 .Blood Blood-Peripheral                No growth at 48 Hours          RESPIRATORY CULTURES:          Studies  Chest X-RAY  CT SCAN Chest   Venous Dopplers: LE:   CT Abdomen  Others

## 2025-02-21 NOTE — PROGRESS NOTE ADULT - SUBJECTIVE AND OBJECTIVE BOX
Chief Complaint: DM2    History: s.p desat and chest tightness today placed on nonrebreather  hyperglycemia this AM to 400s  steroid was to finish but resumed for respiratory worsening    MEDICATIONS  (STANDING):  albuterol/ipratropium for Nebulization 3 milliLiter(s) Nebulizer every 6 hours  aspirin enteric coated 81 milliGRAM(s) Oral daily  atorvastatin 20 milliGRAM(s) Oral at bedtime  cefepime   IVPB 2000 milliGRAM(s) IV Intermittent <User Schedule>  dextrose 5%. 1000 milliLiter(s) (50 mL/Hr) IV Continuous <Continuous>  dextrose 5%. 1000 milliLiter(s) (100 mL/Hr) IV Continuous <Continuous>  dextrose 50% Injectable 25 Gram(s) IV Push once  dextrose 50% Injectable 12.5 Gram(s) IV Push once  dextrose 50% Injectable 25 Gram(s) IV Push once  enoxaparin Injectable 40 milliGRAM(s) SubCutaneous every 24 hours  fluticasone propionate/ salmeterol 250-50 MICROgram(s) Diskus 1 Dose(s) Inhalation two times a day  furosemide   Injectable 20 milliGRAM(s) IV Push daily  gabapentin 400 milliGRAM(s) Oral at bedtime  glucagon  Injectable 1 milliGRAM(s) IntraMuscular once  insulin glargine Injectable (LANTUS) 16 Unit(s) SubCutaneous at bedtime  insulin lispro (ADMELOG) corrective regimen sliding scale   SubCutaneous three times a day before meals  insulin lispro (ADMELOG) corrective regimen sliding scale   SubCutaneous at bedtime  insulin lispro Injectable (ADMELOG) 8 Unit(s) SubCutaneous three times a day before meals  levalbuterol Inhalation 1.25 milliGRAM(s) Inhalation every 6 hours  methylPREDNISolone sodium succinate Injectable 20 milliGRAM(s) IV Push every 8 hours  metoprolol tartrate 12.5 milliGRAM(s) Oral two times a day  multivitamin 1 Tablet(s) Oral daily  pantoprazole    Tablet 40 milliGRAM(s) Oral before breakfast  polyethylene glycol 3350 17 Gram(s) Oral two times a day  senna 2 Tablet(s) Oral at bedtime  sildenafil (REVATIO) 20 milliGRAM(s) Oral three times a day  vancomycin  IVPB 750 milliGRAM(s) IV Intermittent every 24 hours    MEDICATIONS  (PRN):  acetaminophen     Tablet .. 650 milliGRAM(s) Oral every 6 hours PRN Temp greater or equal to 38C (100.4F), Mild Pain (1 - 3)  dextrose Oral Gel 15 Gram(s) Oral once PRN Blood Glucose LESS THAN 70 milliGRAM(s)/deciliter  melatonin 3 milliGRAM(s) Oral at bedtime PRN Insomnia  ondansetron Injectable 4 milliGRAM(s) IV Push every 8 hours PRN Nausea and/or Vomiting      Allergies    penicillin (Unknown)  macrolide antibiotics (Other)  Biaxin (Unknown)    Intolerances      Review of Systems:    ALL OTHER SYSTEMS REVIEWED AND NEGATIVE        PHYSICAL EXAM:  VITALS: T(C): 36.4 (02-21-25 @ 04:46)  T(F): 97.5 (02-21-25 @ 04:46), Max: 98.4 (02-20-25 @ 22:10)  HR: 83 (02-21-25 @ 09:17) (72 - 102)  BP: 128/60 (02-21-25 @ 04:46) (105/50 - 128/60)  RR:  (17 - 20)  SpO2:  (92% - 100%)  Wt(kg): --  GENERAL: NAD, well-groomed, well-developed  EYES: No proptosis, no lid lag, anicteric  HEENT:  Atraumatic, Normocephalic, moist mucous membranes  RESPIRATORY: mildly labored, nonrebreather mask in place  PSYCH: Alert and oriented x 3, normal affect, normal mood    CAPILLARY BLOOD GLUCOSE      POCT Blood Glucose.: 427 mg/dL (21 Feb 2025 09:03)  POCT Blood Glucose.: 330 mg/dL (20 Feb 2025 21:45)  POCT Blood Glucose.: 324 mg/dL (20 Feb 2025 17:51)  POCT Blood Glucose.: 267 mg/dL (20 Feb 2025 12:07)      02-21    134[L]  |  98  |  30[H]  ----------------------------<  376[H]  4.6   |  24  |  0.99    eGFR: 54[L]    Ca    9.4      02-21  Mg     2.20     02-21  Phos  3.4     02-21    TPro  6.4  /  Alb  3.5  /  TBili  0.4  /  DBili  x   /  AST  7   /  ALT  <5  /  AlkPhos  86  02-19      A1C with Estimated Average Glucose Result: 9.7 % (02-19-25 @ 09:14)  A1C with Estimated Average Glucose Result: 8.0 % (12-13-24 @ 14:35)  A1C with Estimated Average Glucose Result: 6.6 % (09-05-24 @ 13:42)  A1C with Estimated Average Glucose Result: 9.2 % (04-20-24 @ 08:50)      Thyroid Function Tests:  02-19 @ 09:14 TSH 1.95 FreeT4 -- T3 -- Anti TPO -- Anti Thyroglobulin Ab -- TSI --

## 2025-02-21 NOTE — PROVIDER CONTACT NOTE (OTHER) - ACTION/TREATMENT ORDERED:
ACP notified. ACP called to bedside. Pt placed on nonrebreather, pt now sating 100% on nonrebreather. Metoprolol given. VBG and labs ordered.

## 2025-02-21 NOTE — PROGRESS NOTE ADULT - PROBLEM SELECTOR PLAN 4
- known hx CHF   - 10/2024 TTE EF 60%, mild MV stenosis, mod AS, mild-mod TR  - POCUS in ER c/f reduced EF  - CXR with pulmonary vascular congestion, CTA chest with pulmonary edema   - f/u TTE  - home med: lasix 40mg daily, lopressor as below -- on hold 2/2 sepsis and hypotension  - BP improves. restart Lopressor given tachycardia.   - cautious with fluids, resume lasix.

## 2025-02-21 NOTE — PROGRESS NOTE ADULT - SUBJECTIVE AND OBJECTIVE BOX
[facility]  KRISSY Luong Y. Patel, S. Shah, G. Casimir  [phone]  ([phone] - weekdays after 5pm and weekends)    Name: FEDERICO DOS SANTOS  Age/Gender: 89y Female  MRN: [id number]    Interval History:  Notes reviewed.   No concerning overnight events.  Afebrile.   reported SOB and abd discomfort this AM  seen by palliative care yesterday    Allergies: penicillin (Unknown)  macrolide antibiotics (Other)  Biaxin (Unknown)      Objective:  Vitals:   T(F): 97.5 (02-21-25 @ 10:22), Max: 98.4 (02-20-25 @ 22:10)  HR: 136 (02-21-25 @ 10:22) (80 - 136)  BP: 151/69 (02-21-25 @ 10:22) (105/50 - 151/69)  RR: 22 (02-21-25 @ 11:13) (17 - 22)  SpO2: 96% (02-21-25 @ 11:13) (80% - 99%)  Physical Examination:  General: no acute distress  HEENT: anicteric, NC  Cardio: S1, S2, normal rate  Resp: decreased breath sounds lung bases  Abd: soft, NT, ND  Ext: no LE edema  Skin: warm, dry    Laboratory Studies:  CBC:                       9.2    9.02  )-----------( 453      ( 21 Feb 2025 06:33 )             28.9     WBC Trend:  9.02 02-21-25 @ 06:33  8.53 02-20-25 @ 05:37  11.15 02-19-25 @ 09:14  12.67 02-18-25 @ 20:07    CMP: 02-21    134[L]  |  98  |  30[H]  ----------------------------<  376[H]  4.6   |  24  |  0.99    Ca    9.4      21 Feb 2025 06:33  Phos  3.4     02-21  Mg     2.20     02-21            Urinalysis Basic - ( 21 Feb 2025 06:33 )    Color: x / Appearance: x / SG: x / pH: x  Gluc: 376 mg/dL / Ketone: x  / Bili: x / Urobili: x   Blood: x / Protein: x / Nitrite: x   Leuk Esterase: x / RBC: x / WBC x   Sq Epi: x / Non Sq Epi: x / Bacteria: x      Microbiology: reviewed     Culture - Blood (collected 02-18-25 @ 21:42)  Source: .Blood Blood-Peripheral  Preliminary Report (02-21-25 @ 01:02):    No growth at 48 Hours    Culture - Urine (collected 02-18-25 @ 21:36)  Source: Clean Catch Clean Catch (Midstream)  Final Report (02-20-25 @ 16:54):    50,000 - 99,000 CFU/mL Enterococcus faecium    <10,000 CFU/ml Normal Urogenital vonnie present  Organism: Enterococcus faecium (02-20-25 @ 16:54)  Organism: Enterococcus faecium (02-20-25 @ 16:54)      Method Type: KHADIJAH      -  Ampicillin: S <=2 Predicts results to ampicillin/sulbactam, amoxacillin-clavulanate and  piperacillin-tazobactam.      -  Ciprofloxacin: I 2      -  Levofloxacin: I 4      -  Nitrofurantoin: S <=32 Should not be used to treat pyelonephritis.      -  Tetracycline: S <=4      -  Vancomycin: S 0.5    Culture - Blood (collected 02-18-25 @ 20:45)  Source: .Blood Blood-Peripheral  Preliminary Report (02-21-25 @ 01:02):    No growth at 48 Hours        Radiology: reviewed     Medications:  acetaminophen     Tablet .. 650 milliGRAM(s) Oral every 6 hours PRN  albuterol/ipratropium for Nebulization 3 milliLiter(s) Nebulizer every 6 hours  aspirin enteric coated 81 milliGRAM(s) Oral daily  atorvastatin 20 milliGRAM(s) Oral at bedtime  cefepime   IVPB 2000 milliGRAM(s) IV Intermittent <User Schedule>  dextrose 5%. 1000 milliLiter(s) IV Continuous <Continuous>  dextrose 5%. 1000 milliLiter(s) IV Continuous <Continuous>  dextrose 50% Injectable 25 Gram(s) IV Push once  dextrose 50% Injectable 12.5 Gram(s) IV Push once  dextrose 50% Injectable 25 Gram(s) IV Push once  dextrose Oral Gel 15 Gram(s) Oral once PRN  enoxaparin Injectable 40 milliGRAM(s) SubCutaneous every 24 hours  fluticasone propionate/ salmeterol 250-50 MICROgram(s) Diskus 1 Dose(s) Inhalation two times a day  furosemide   Injectable 20 milliGRAM(s) IV Push daily  gabapentin 400 milliGRAM(s) Oral at bedtime  glucagon  Injectable 1 milliGRAM(s) IntraMuscular once  insulin glargine Injectable (LANTUS) 22 Unit(s) SubCutaneous at bedtime  insulin lispro (ADMELOG) corrective regimen sliding scale   SubCutaneous three times a day before meals  insulin lispro (ADMELOG) corrective regimen sliding scale   SubCutaneous at bedtime  insulin lispro Injectable (ADMELOG) 11 Unit(s) SubCutaneous three times a day before meals  levalbuterol Inhalation 1.25 milliGRAM(s) Inhalation every 6 hours  melatonin 3 milliGRAM(s) Oral at bedtime PRN  methylPREDNISolone sodium succinate Injectable 20 milliGRAM(s) IV Push every 8 hours  metoprolol tartrate 12.5 milliGRAM(s) Oral two times a day  multivitamin 1 Tablet(s) Oral daily  ondansetron Injectable 4 milliGRAM(s) IV Push every 8 hours PRN  pantoprazole    Tablet 40 milliGRAM(s) Oral before breakfast  polyethylene glycol 3350 17 Gram(s) Oral two times a day  senna 2 Tablet(s) Oral at bedtime  sildenafil (REVATIO) 20 milliGRAM(s) Oral three times a day  vancomycin  IVPB 750 milliGRAM(s) IV Intermittent every 24 hours    Antimicrobials:  cefepime   IVPB 2000 milliGRAM(s) IV Intermittent <User Schedule>  vancomycin  IVPB 750 milliGRAM(s) IV Intermittent every 24 hours

## 2025-02-21 NOTE — PROGRESS NOTE ADULT - ASSESSMENT
Pt is a 89 yo F with PMH CHF, anemia, pHTN, CAD, CVA (no deficits), T2D, [place], COPD (2L NC PRN, qhs), PVD, HTN, HLD, and ?lung CA (pending RT) p/w dysuria x 1wk along with tremors and SOB. On arrival, meeting SIRS criteria with hypotension and hypoxia requiring 2L NC. EKG with sinus tachycardia, no ischemic changes. ER POCUS with diffuse B lines. Labs with leukocytosis, normocytic anemia, trop neg x2, BNP 1406, lactate neg, UA+ with UCx and BCx in lab, RVP neg, MRSA in lab. CTA chest neg PE but with multifocal PNA, pulmonary edema, and 2.8x2.7cm mass LLL interval inc compared to prior. MICU consulted for hypotension with c/f need for pressors, however, improved with IVF and midodrine; started on cefepime. ID + pulm consulted and PT consulted with recs pending.

## 2025-02-21 NOTE — PROGRESS NOTE ADULT - SUBJECTIVE AND OBJECTIVE BOX
SUBJECTIVE/ OVERNIGHT EVENTS:  O2 status is labile, now on hi-flow  remain awake alert  no cp, no sob, no n/v/d. no abdominal pain.  no headache, no dizziness.       --------------------------------------------------------------------------------------------  LABS:                        9.2    9.02  )-----------( 453      ( 21 Feb 2025 06:33 )             28.9     02-21    134[L]  |  98  |  30[H]  ----------------------------<  376[H]  4.6   |  24  |  0.99    Ca    9.4      21 Feb 2025 06:33  Phos  3.4     02-21  Mg     2.20     02-21        CAPILLARY BLOOD GLUCOSE      POCT Blood Glucose.: 342 mg/dL (21 Feb 2025 15:19)  POCT Blood Glucose.: 538 mg/dL (21 Feb 2025 12:11)  POCT Blood Glucose.: 585 mg/dL (21 Feb 2025 10:52)  POCT Blood Glucose.: 571 mg/dL (21 Feb 2025 10:49)  POCT Blood Glucose.: 427 mg/dL (21 Feb 2025 09:03)  POCT Blood Glucose.: 330 mg/dL (20 Feb 2025 21:45)  POCT Blood Glucose.: 324 mg/dL (20 Feb 2025 17:51)    CARDIAC MARKERS ( 21 Feb 2025 10:43 )  x     / x     / x     / x     / 1.6 ng/mL      Urinalysis Basic - ( 21 Feb 2025 06:33 )    Color: x / Appearance: x / SG: x / pH: x  Gluc: 376 mg/dL / Ketone: x  / Bili: x / Urobili: x   Blood: x / Protein: x / Nitrite: x   Leuk Esterase: x / RBC: x / WBC x   Sq Epi: x / Non Sq Epi: x / Bacteria: x        RADIOLOGY & ADDITIONAL TESTS:    Imaging Personally Reviewed:  [x] YES  [ ] NO    Consultant(s) Notes Reviewed:  [x] YES  [ ] NO    MEDICATIONS  (STANDING):  aspirin enteric coated 81 milliGRAM(s) Oral daily  atorvastatin 20 milliGRAM(s) Oral at bedtime  cefepime   IVPB 2000 milliGRAM(s) IV Intermittent <User Schedule>  dextrose 5%. 1000 milliLiter(s) (50 mL/Hr) IV Continuous <Continuous>  dextrose 5%. 1000 milliLiter(s) (100 mL/Hr) IV Continuous <Continuous>  dextrose 50% Injectable 25 Gram(s) IV Push once  dextrose 50% Injectable 12.5 Gram(s) IV Push once  dextrose 50% Injectable 25 Gram(s) IV Push once  enoxaparin Injectable 40 milliGRAM(s) SubCutaneous every 24 hours  fluticasone propionate/ salmeterol 250-50 MICROgram(s) Diskus 1 Dose(s) Inhalation two times a day  furosemide   Injectable 20 milliGRAM(s) IV Push daily  gabapentin 400 milliGRAM(s) Oral at bedtime  glucagon  Injectable 1 milliGRAM(s) IntraMuscular once  insulin glargine Injectable (LANTUS) 22 Unit(s) SubCutaneous at bedtime  insulin lispro (ADMELOG) corrective regimen sliding scale   SubCutaneous three times a day before meals  insulin lispro (ADMELOG) corrective regimen sliding scale   SubCutaneous at bedtime  insulin lispro Injectable (ADMELOG) 11 Unit(s) SubCutaneous three times a day before meals  ipratropium 17 MICROgram(s) HFA Inhaler 1 Puff(s) Inhalation every 6 hours  levalbuterol Inhalation 1.25 milliGRAM(s) Inhalation every 6 hours  methylPREDNISolone sodium succinate Injectable 20 milliGRAM(s) IV Push every 8 hours  metoprolol tartrate 12.5 milliGRAM(s) Oral two times a day  multivitamin 1 Tablet(s) Oral daily  pantoprazole    Tablet 40 milliGRAM(s) Oral before breakfast  polyethylene glycol 3350 17 Gram(s) Oral two times a day  senna 2 Tablet(s) Oral at bedtime  sildenafil (REVATIO) 20 milliGRAM(s) Oral three times a day    MEDICATIONS  (PRN):  acetaminophen     Tablet .. 650 milliGRAM(s) Oral every 6 hours PRN Temp greater or equal to 38C (100.4F), Mild Pain (1 - 3)  dextrose Oral Gel 15 Gram(s) Oral once PRN Blood Glucose LESS THAN 70 milliGRAM(s)/deciliter  melatonin 3 milliGRAM(s) Oral at bedtime PRN Insomnia  ondansetron Injectable 4 milliGRAM(s) IV Push every 8 hours PRN Nausea and/or Vomiting      Care Discussed with Consultants/Other Providers [x] YES  [ ] NO    Vital Signs Last 24 Hrs  T(C): 36.4 (21 Feb 2025 10:22), Max: 36.9 (20 Feb 2025 22:10)  T(F): 97.5 (21 Feb 2025 10:22), Max: 98.4 (20 Feb 2025 22:10)  HR: 99 (21 Feb 2025 15:51) (80 - 136)  BP: 151/69 (21 Feb 2025 10:22) (105/50 - 151/69)  BP(mean): --  RR: 20 (21 Feb 2025 15:51) (17 - 22)  SpO2: 92% (21 Feb 2025 15:51) (80% - 99%)    Parameters below as of 21 Feb 2025 15:51  Patient On (Oxygen Delivery Method): nasal cannula, high flow  O2 Flow (L/min): 40  O2 Concentration (%): 40  I&O's Summary    20 Feb 2025 07:01  -  21 Feb 2025 07:00  --------------------------------------------------------  IN: 0 mL / OUT: 1500 mL / NET: -1500 mL    21 Feb 2025 07:01  -  21 Feb 2025 16:50  --------------------------------------------------------  IN: 0 mL / OUT: 750 mL / NET: -750 mL      PHYSICAL EXAM:  GENERAL: NAD, thin-elderly, comfortable now on hi-flow  HEAD:  Atraumatic, Normocephalic  EYES: EOMI, PERRLA, conjunctiva and sclera clear  NECK: Supple, No JVD  CHEST/LUNG: mild decrease breath sounds bilaterally; No wheeze   HEART: Regular rate and rhythm; No murmurs, rubs, or gallops  ABDOMEN: Soft, Nontender, Nondistended; Bowel sounds present  Neuro: AAOx3, no focal weakness   EXTREMITIES:  2+ Peripheral Pulses, No clubbing, cyanosis, or edema  SKIN: No rashes or lesions

## 2025-02-21 NOTE — PROGRESS NOTE ADULT - ASSESSMENT
ECHO 10/7/24: nl LV sys fx EF 60% . Mild mitral valve stenosis. Mild to moderate tricuspid regurgitation. mod AS   a/p    89 yo F with PMH CHF, valvular disease, anemia, pHTN, CAD, CVA (no deficits), T2D, [place], COPD (2L NC PRN, qhs), PVD, HTN, HLD, and ?lung CA (pending RT) p/w dysuria x 1wk along with tremors and SOB + pna    #PNA  -Meeting sepsis criteria w/ fever, tachycardia, leukocytosis  -CTa chest with no PE, Interval increase in groundglass opacities and subpleural consolidations  in bilateral lungs, interlobularseptal thickening in  the upper lobes, which can be suggestive of interstitial edema; 3.1 cm mass like consolidation in left lower lobe, Mediastinal and hilar lymphadenopathy which can be reactive or due to  metastasis.  Subpleural reticulations, Honeycombing, and peripheral cystic changes in  bilateral lungs, suggestive of interstitial lung fibrosis.  -abx per id/med  -cont iv lasix     #hypotension   -resolved  -micu eval noted- hypotensive to 80s/40s, given 2x 500cc bolus and Midodrine 10mg w/ improvement in BP to MAP >70  -Pocus w/ collapsed IVC suggestive of intravascular depletion, but w/ evidence of reduced RV and LV function   -f/u ECHO   -sepsis work up   -not a candidate for MICU per team   -HS trop neg. no ischemic changes to ecg     #CAD s/p PCI  -stable  -cont asa, statin, BB    #HFpEF  -repeat ECHO as above  -iv lasix     #Pulmonary HTN.   -c/w home sildenafil 20mg TID.  -fu echo     #Lung CA  -med/ pulm fu   -pending RT initiation as outpt     #UTI   -management per med /ID    dvt ppx      35 minutes spent on total encounter; more than 50% of the visit was spent counseling and/or coordinating care by the attending physician.

## 2025-02-22 ENCOUNTER — RESULT REVIEW (OUTPATIENT)
Age: 89
End: 2025-02-22

## 2025-02-22 LAB
ANION GAP SERPL CALC-SCNC: 17 MMOL/L — HIGH (ref 7–14)
BUN SERPL-MCNC: 31 MG/DL — HIGH (ref 7–23)
CALCIUM SERPL-MCNC: 9.3 MG/DL — SIGNIFICANT CHANGE UP (ref 8.4–10.5)
CHLORIDE SERPL-SCNC: 97 MMOL/L — LOW (ref 98–107)
CO2 SERPL-SCNC: 22 MMOL/L — SIGNIFICANT CHANGE UP (ref 22–31)
CREAT SERPL-MCNC: 0.93 MG/DL — SIGNIFICANT CHANGE UP (ref 0.5–1.3)
EGFR: 58 ML/MIN/1.73M2 — LOW
EGFR: 58 ML/MIN/1.73M2 — LOW
GLUCOSE SERPL-MCNC: 250 MG/DL — HIGH (ref 70–99)
HCT VFR BLD CALC: 29.1 % — LOW (ref 34.5–45)
HGB BLD-MCNC: 9 G/DL — LOW (ref 11.5–15.5)
LACTATE SERPL-SCNC: 4.3 MMOL/L — CRITICAL HIGH (ref 0.5–2)
MAGNESIUM SERPL-MCNC: 1.8 MG/DL — SIGNIFICANT CHANGE UP (ref 1.6–2.6)
MCHC RBC-ENTMCNC: 27.3 PG — SIGNIFICANT CHANGE UP (ref 27–34)
MCHC RBC-ENTMCNC: 30.9 G/DL — LOW (ref 32–36)
MCV RBC AUTO: 88.2 FL — SIGNIFICANT CHANGE UP (ref 80–100)
NRBC # BLD AUTO: 0 K/UL — SIGNIFICANT CHANGE UP (ref 0–0)
NRBC # FLD: 0 K/UL — SIGNIFICANT CHANGE UP (ref 0–0)
NRBC BLD AUTO-RTO: 0 /100 WBCS — SIGNIFICANT CHANGE UP (ref 0–0)
PHOSPHATE SERPL-MCNC: 2 MG/DL — LOW (ref 2.5–4.5)
PLATELET # BLD AUTO: 448 K/UL — HIGH (ref 150–400)
POTASSIUM SERPL-MCNC: 4.3 MMOL/L — SIGNIFICANT CHANGE UP (ref 3.5–5.3)
POTASSIUM SERPL-SCNC: 4.3 MMOL/L — SIGNIFICANT CHANGE UP (ref 3.5–5.3)
RBC # BLD: 3.3 M/UL — LOW (ref 3.8–5.2)
RBC # FLD: 15.6 % — HIGH (ref 10.3–14.5)
SODIUM SERPL-SCNC: 136 MMOL/L — SIGNIFICANT CHANGE UP (ref 135–145)
WBC # BLD: 10.32 K/UL — SIGNIFICANT CHANGE UP (ref 3.8–10.5)
WBC # FLD AUTO: 10.32 K/UL — SIGNIFICANT CHANGE UP (ref 3.8–10.5)

## 2025-02-22 PROCEDURE — 93970 EXTREMITY STUDY: CPT | Mod: 26

## 2025-02-22 PROCEDURE — 99233 SBSQ HOSP IP/OBS HIGH 50: CPT

## 2025-02-22 PROCEDURE — 93306 TTE W/DOPPLER COMPLETE: CPT | Mod: 26

## 2025-02-22 RX ORDER — SOD PHOS DI, MONO/K PHOS MONO 250 MG
1 TABLET ORAL ONCE
Refills: 0 | Status: COMPLETED | OUTPATIENT
Start: 2025-02-22 | End: 2025-02-22

## 2025-02-22 RX ORDER — INSULIN GLARGINE-YFGN 100 [IU]/ML
30 INJECTION, SOLUTION SUBCUTANEOUS AT BEDTIME
Refills: 0 | Status: DISCONTINUED | OUTPATIENT
Start: 2025-02-22 | End: 2025-02-23

## 2025-02-22 RX ORDER — FUROSEMIDE 10 MG/ML
40 INJECTION INTRAMUSCULAR; INTRAVENOUS DAILY
Refills: 0 | Status: DISCONTINUED | OUTPATIENT
Start: 2025-02-23 | End: 2025-02-25

## 2025-02-22 RX ORDER — FUROSEMIDE 10 MG/ML
20 INJECTION INTRAMUSCULAR; INTRAVENOUS ONCE
Refills: 0 | Status: COMPLETED | OUTPATIENT
Start: 2025-02-22 | End: 2025-02-22

## 2025-02-22 RX ORDER — INSULIN LISPRO 100 U/ML
16 INJECTION, SOLUTION INTRAVENOUS; SUBCUTANEOUS
Refills: 0 | Status: DISCONTINUED | OUTPATIENT
Start: 2025-02-22 | End: 2025-02-23

## 2025-02-22 RX ADMIN — INSULIN LISPRO 16 UNIT(S): 100 INJECTION, SOLUTION INTRAVENOUS; SUBCUTANEOUS at 18:41

## 2025-02-22 RX ADMIN — ENOXAPARIN SODIUM 40 MILLIGRAM(S): 100 INJECTION SUBCUTANEOUS at 21:39

## 2025-02-22 RX ADMIN — GABAPENTIN 400 MILLIGRAM(S): 400 CAPSULE ORAL at 21:39

## 2025-02-22 RX ADMIN — LEVALBUTEROL HYDROCHLORIDE 1.25 MILLIGRAM(S): 1.25 SOLUTION RESPIRATORY (INHALATION) at 21:01

## 2025-02-22 RX ADMIN — INSULIN LISPRO 11 UNIT(S): 100 INJECTION, SOLUTION INTRAVENOUS; SUBCUTANEOUS at 10:35

## 2025-02-22 RX ADMIN — POLYETHYLENE GLYCOL 3350 17 GRAM(S): 17 POWDER, FOR SOLUTION ORAL at 05:39

## 2025-02-22 RX ADMIN — SILDENAFIL 20 MILLIGRAM(S): 50 TABLET, FILM COATED ORAL at 13:16

## 2025-02-22 RX ADMIN — Medication 1 TABLET(S): at 10:32

## 2025-02-22 RX ADMIN — Medication 1 DOSE(S): at 21:43

## 2025-02-22 RX ADMIN — INSULIN LISPRO 11 UNIT(S): 100 INJECTION, SOLUTION INTRAVENOUS; SUBCUTANEOUS at 13:13

## 2025-02-22 RX ADMIN — METOPROLOL SUCCINATE 12.5 MILLIGRAM(S): 50 TABLET, EXTENDED RELEASE ORAL at 16:58

## 2025-02-22 RX ADMIN — INSULIN LISPRO 4: 100 INJECTION, SOLUTION INTRAVENOUS; SUBCUTANEOUS at 18:40

## 2025-02-22 RX ADMIN — Medication 81 MILLIGRAM(S): at 10:31

## 2025-02-22 RX ADMIN — METHYLPREDNISOLONE ACETATE 20 MILLIGRAM(S): 80 INJECTION, SUSPENSION INTRA-ARTICULAR; INTRALESIONAL; INTRAMUSCULAR; SOFT TISSUE at 05:38

## 2025-02-22 RX ADMIN — METHYLPREDNISOLONE ACETATE 20 MILLIGRAM(S): 80 INJECTION, SUSPENSION INTRA-ARTICULAR; INTRALESIONAL; INTRAMUSCULAR; SOFT TISSUE at 13:17

## 2025-02-22 RX ADMIN — FUROSEMIDE 20 MILLIGRAM(S): 10 INJECTION INTRAMUSCULAR; INTRAVENOUS at 05:39

## 2025-02-22 RX ADMIN — LEVALBUTEROL HYDROCHLORIDE 1.25 MILLIGRAM(S): 1.25 SOLUTION RESPIRATORY (INHALATION) at 14:27

## 2025-02-22 RX ADMIN — LEVALBUTEROL HYDROCHLORIDE 1.25 MILLIGRAM(S): 1.25 SOLUTION RESPIRATORY (INHALATION) at 03:49

## 2025-02-22 RX ADMIN — Medication 1 PUFF(S): at 10:30

## 2025-02-22 RX ADMIN — ATORVASTATIN CALCIUM 20 MILLIGRAM(S): 80 TABLET, FILM COATED ORAL at 21:41

## 2025-02-22 RX ADMIN — Medication 100 MILLIGRAM(S): at 16:58

## 2025-02-22 RX ADMIN — METHYLPREDNISOLONE ACETATE 20 MILLIGRAM(S): 80 INJECTION, SUSPENSION INTRA-ARTICULAR; INTRALESIONAL; INTRAMUSCULAR; SOFT TISSUE at 21:40

## 2025-02-22 RX ADMIN — INSULIN LISPRO 10: 100 INJECTION, SOLUTION INTRAVENOUS; SUBCUTANEOUS at 10:35

## 2025-02-22 RX ADMIN — FUROSEMIDE 20 MILLIGRAM(S): 10 INJECTION INTRAMUSCULAR; INTRAVENOUS at 13:20

## 2025-02-22 RX ADMIN — Medication 2 TABLET(S): at 21:48

## 2025-02-22 RX ADMIN — Medication 1 PUFF(S): at 21:43

## 2025-02-22 RX ADMIN — CEFEPIME 100 MILLIGRAM(S): 2 INJECTION, POWDER, FOR SOLUTION INTRAVENOUS at 10:32

## 2025-02-22 RX ADMIN — Medication 40 MILLIGRAM(S): at 05:39

## 2025-02-22 RX ADMIN — Medication 1 PUFF(S): at 16:59

## 2025-02-22 RX ADMIN — INSULIN GLARGINE-YFGN 30 UNIT(S): 100 INJECTION, SOLUTION SUBCUTANEOUS at 21:56

## 2025-02-22 RX ADMIN — SILDENAFIL 20 MILLIGRAM(S): 50 TABLET, FILM COATED ORAL at 05:39

## 2025-02-22 RX ADMIN — METOPROLOL SUCCINATE 12.5 MILLIGRAM(S): 50 TABLET, EXTENDED RELEASE ORAL at 05:39

## 2025-02-22 RX ADMIN — Medication 3 MILLIGRAM(S): at 21:41

## 2025-02-22 RX ADMIN — SILDENAFIL 20 MILLIGRAM(S): 50 TABLET, FILM COATED ORAL at 21:41

## 2025-02-22 RX ADMIN — Medication 1 DOSE(S): at 10:29

## 2025-02-22 RX ADMIN — Medication 1 PACKET(S): at 16:57

## 2025-02-22 RX ADMIN — CEFEPIME 100 MILLIGRAM(S): 2 INJECTION, POWDER, FOR SOLUTION INTRAVENOUS at 21:48

## 2025-02-22 RX ADMIN — INSULIN LISPRO 12: 100 INJECTION, SOLUTION INTRAVENOUS; SUBCUTANEOUS at 13:14

## 2025-02-22 NOTE — PROGRESS NOTE ADULT - SUBJECTIVE AND OBJECTIVE BOX
SUBJECTIVE/ OVERNIGHT EVENTS:  hi-flow wean to NC  going for TTE  no cp, no sob, no n/v/d. no abdominal pain.  no headache, no dizziness.     --------------------------------------------------------------------------------------------  LABS:                        9.2    9.02  )-----------( 453      ( 21 Feb 2025 06:33 )             28.9     02-21    134[L]  |  98  |  30[H]  ----------------------------<  376[H]  4.6   |  24  |  0.99    Ca    9.4      21 Feb 2025 06:33  Phos  3.4     02-21  Mg     2.20     02-21        CAPILLARY BLOOD GLUCOSE      POCT Blood Glucose.: 369 mg/dL (22 Feb 2025 08:49)  POCT Blood Glucose.: 209 mg/dL (21 Feb 2025 21:26)  POCT Blood Glucose.: 212 mg/dL (21 Feb 2025 18:14)  POCT Blood Glucose.: 342 mg/dL (21 Feb 2025 15:19)  POCT Blood Glucose.: 538 mg/dL (21 Feb 2025 12:11)  POCT Blood Glucose.: 585 mg/dL (21 Feb 2025 10:52)  POCT Blood Glucose.: 571 mg/dL (21 Feb 2025 10:49)    CARDIAC MARKERS ( 21 Feb 2025 10:43 )  x     / x     / x     / x     / 1.6 ng/mL      Urinalysis Basic - ( 21 Feb 2025 06:33 )    Color: x / Appearance: x / SG: x / pH: x  Gluc: 376 mg/dL / Ketone: x  / Bili: x / Urobili: x   Blood: x / Protein: x / Nitrite: x   Leuk Esterase: x / RBC: x / WBC x   Sq Epi: x / Non Sq Epi: x / Bacteria: x        RADIOLOGY & ADDITIONAL TESTS:    Imaging Personally Reviewed:  [x] YES  [ ] NO    Consultant(s) Notes Reviewed:  [x] YES  [ ] NO    MEDICATIONS  (STANDING):  aspirin enteric coated 81 milliGRAM(s) Oral daily  atorvastatin 20 milliGRAM(s) Oral at bedtime  cefepime   IVPB 2000 milliGRAM(s) IV Intermittent <User Schedule>  dextrose 5%. 1000 milliLiter(s) (50 mL/Hr) IV Continuous <Continuous>  dextrose 5%. 1000 milliLiter(s) (100 mL/Hr) IV Continuous <Continuous>  dextrose 50% Injectable 25 Gram(s) IV Push once  dextrose 50% Injectable 12.5 Gram(s) IV Push once  dextrose 50% Injectable 25 Gram(s) IV Push once  enoxaparin Injectable 40 milliGRAM(s) SubCutaneous every 24 hours  fluticasone propionate/ salmeterol 250-50 MICROgram(s) Diskus 1 Dose(s) Inhalation two times a day  furosemide   Injectable 20 milliGRAM(s) IV Push daily  gabapentin 400 milliGRAM(s) Oral at bedtime  glucagon  Injectable 1 milliGRAM(s) IntraMuscular once  insulin glargine Injectable (LANTUS) 22 Unit(s) SubCutaneous at bedtime  insulin lispro (ADMELOG) corrective regimen sliding scale   SubCutaneous three times a day before meals  insulin lispro (ADMELOG) corrective regimen sliding scale   SubCutaneous at bedtime  insulin lispro Injectable (ADMELOG) 11 Unit(s) SubCutaneous three times a day before meals  ipratropium 17 MICROgram(s) HFA Inhaler 1 Puff(s) Inhalation every 6 hours  levalbuterol Inhalation 1.25 milliGRAM(s) Inhalation every 6 hours  methylPREDNISolone sodium succinate Injectable 20 milliGRAM(s) IV Push every 8 hours  metoprolol tartrate 12.5 milliGRAM(s) Oral two times a day  multivitamin 1 Tablet(s) Oral daily  nitrofurantoin monohydrate/macrocrystals (MACROBID) 100 milliGRAM(s) Oral two times a day  pantoprazole    Tablet 40 milliGRAM(s) Oral before breakfast  polyethylene glycol 3350 17 Gram(s) Oral two times a day  senna 2 Tablet(s) Oral at bedtime  sildenafil (REVATIO) 20 milliGRAM(s) Oral three times a day    MEDICATIONS  (PRN):  acetaminophen     Tablet .. 650 milliGRAM(s) Oral every 6 hours PRN Temp greater or equal to 38C (100.4F), Mild Pain (1 - 3)  dextrose Oral Gel 15 Gram(s) Oral once PRN Blood Glucose LESS THAN 70 milliGRAM(s)/deciliter  melatonin 3 milliGRAM(s) Oral at bedtime PRN Insomnia  ondansetron Injectable 4 milliGRAM(s) IV Push every 8 hours PRN Nausea and/or Vomiting      Care Discussed with Consultants/Other Providers [x] YES  [ ] NO    Vital Signs Last 24 Hrs  T(C): 36.4 (22 Feb 2025 05:38), Max: 36.5 (21 Feb 2025 20:40)  T(F): 97.6 (22 Feb 2025 05:38), Max: 97.7 (21 Feb 2025 20:40)  HR: 79 (22 Feb 2025 05:38) (79 - 136)  BP: 129/53 (22 Feb 2025 05:38) (126/55 - 151/69)  BP(mean): --  RR: 19 (22 Feb 2025 05:38) (19 - 22)  SpO2: 100% (22 Feb 2025 05:38) (80% - 100%)    Parameters below as of 22 Feb 2025 05:38  Patient On (Oxygen Delivery Method): nasal cannula, high flow      I&O's Summary    21 Feb 2025 07:01  -  22 Feb 2025 07:00  --------------------------------------------------------  IN: 0 mL / OUT: 750 mL / NET: -750 mL      PHYSICAL EXAM:  GENERAL: NAD, thin-elderly, comfortable now on hi-flow --> 6L  HEAD:  Atraumatic, Normocephalic  EYES: EOMI, PERRLA, conjunctiva and sclera clear  NECK: Supple, No JVD  CHEST/LUNG: mild decrease breath sounds bilaterally; No wheeze   HEART: Regular rate and rhythm; No murmurs, rubs, or gallops  ABDOMEN: Soft, Nontender, Nondistended; Bowel sounds present  Neuro: AAOx3, no focal weakness   EXTREMITIES:  2+ Peripheral Pulses, No clubbing, cyanosis, or edema  SKIN: No rashes or lesions

## 2025-02-22 NOTE — PROVIDER CONTACT NOTE (CRITICAL VALUE NOTIFICATION) - BACKGROUND
Pt admitted for sepsis

## 2025-02-22 NOTE — PROGRESS NOTE ADULT - ASSESSMENT
Pt is a 89 yo F with PMH CHF, anemia, pHTN, CAD, CVA (no deficits), T2D, [place], COPD (2L NC PRN, qhs), PVD, HTN, HLD, and ?lung CA (pending RT) p/w dysuria x 1wk along with tremors and SOB. On arrival, meeting SIRS criteria with hypotension and hypoxia requiring 2L NC. EKG with sinus tachycardia, no ischemic changes. ER POCUS with diffuse B lines. Labs with leukocytosis, normocytic anemia, trop neg x2, BNP 1406, lactate neg, UA+ with UCx and BCx in lab, RVP neg, MRSA in lab. CTA chest neg PE but with multifocal PNA, pulmonary edema, and 2.8x2.7cm mass LLL interval inc compared to prior. MICU consulted for hypotension with c/f need for pressors, however, improved with IVF and midodrine; started on cefepime. ID + pulm consulted and PT consulted with recs pending.

## 2025-02-22 NOTE — PROGRESS NOTE ADULT - NSPROGADDITIONALINFOA_GEN_ALL_CORE
d/w the daughter Vanessa HCP. Pt lives with her. Uses walker to walk, though not always using.   Recently seen by Dr. Shaw Mitchell (rad onc) for measurements. Hoping that pt will be able to proceed with radiation treatment post discharge.    GOC discussed with pt and the daughter. Per the pt and daughter, if condition is irreversible then no CPR/ventilator. otherwise okay with CPR/ ventilator.   currently remain full code. overall prognosis is poor given lung findings of fibrosis.   all questions answered.   palliative f/u appropriate for GOC and future symptom management.     Resume metoprolol for a.fib.  BP stable, resume Lasix.   hi-flow wean to NC  TTE today.  out of bed to chair if tolerates.     d/w QUAN Segovia.     - Dr. RACHELL Martinez ([facility])  - [phone]

## 2025-02-22 NOTE — PROGRESS NOTE ADULT - SUBJECTIVE AND OBJECTIVE BOX
Date of Service: 02-22-25 @ 11:15    Patient is a 89y old  Female who presents with a chief complaint of UTI, PNA (22 Feb 2025 10:00)      Any change in ROS: seems OK:  getting echo :  has night time cough :   on 2 L o f oxygen :  not in any resp distress     MEDICATIONS  (STANDING):  aspirin enteric coated 81 milliGRAM(s) Oral daily  atorvastatin 20 milliGRAM(s) Oral at bedtime  cefepime   IVPB 2000 milliGRAM(s) IV Intermittent <User Schedule>  dextrose 5%. 1000 milliLiter(s) (50 mL/Hr) IV Continuous <Continuous>  dextrose 5%. 1000 milliLiter(s) (100 mL/Hr) IV Continuous <Continuous>  dextrose 50% Injectable 25 Gram(s) IV Push once  dextrose 50% Injectable 12.5 Gram(s) IV Push once  dextrose 50% Injectable 25 Gram(s) IV Push once  enoxaparin Injectable 40 milliGRAM(s) SubCutaneous every 24 hours  fluticasone propionate/ salmeterol 250-50 MICROgram(s) Diskus 1 Dose(s) Inhalation two times a day  furosemide   Injectable 20 milliGRAM(s) IV Push daily  gabapentin 400 milliGRAM(s) Oral at bedtime  glucagon  Injectable 1 milliGRAM(s) IntraMuscular once  insulin glargine Injectable (LANTUS) 22 Unit(s) SubCutaneous at bedtime  insulin lispro (ADMELOG) corrective regimen sliding scale   SubCutaneous three times a day before meals  insulin lispro (ADMELOG) corrective regimen sliding scale   SubCutaneous at bedtime  insulin lispro Injectable (ADMELOG) 11 Unit(s) SubCutaneous three times a day before meals  ipratropium 17 MICROgram(s) HFA Inhaler 1 Puff(s) Inhalation every 6 hours  levalbuterol Inhalation 1.25 milliGRAM(s) Inhalation every 6 hours  methylPREDNISolone sodium succinate Injectable 20 milliGRAM(s) IV Push every 8 hours  metoprolol tartrate 12.5 milliGRAM(s) Oral two times a day  multivitamin 1 Tablet(s) Oral daily  nitrofurantoin monohydrate/macrocrystals (MACROBID) 100 milliGRAM(s) Oral two times a day  pantoprazole    Tablet 40 milliGRAM(s) Oral before breakfast  polyethylene glycol 3350 17 Gram(s) Oral two times a day  senna 2 Tablet(s) Oral at bedtime  sildenafil (REVATIO) 20 milliGRAM(s) Oral three times a day    MEDICATIONS  (PRN):  acetaminophen     Tablet .. 650 milliGRAM(s) Oral every 6 hours PRN Temp greater or equal to 38C (100.4F), Mild Pain (1 - 3)  dextrose Oral Gel 15 Gram(s) Oral once PRN Blood Glucose LESS THAN 70 milliGRAM(s)/deciliter  melatonin 3 milliGRAM(s) Oral at bedtime PRN Insomnia  ondansetron Injectable 4 milliGRAM(s) IV Push every 8 hours PRN Nausea and/or Vomiting    Vital Signs Last 24 Hrs  T(C): 36.4 (22 Feb 2025 05:38), Max: 36.5 (21 Feb 2025 20:40)  T(F): 97.6 (22 Feb 2025 05:38), Max: 97.7 (21 Feb 2025 20:40)  HR: 79 (22 Feb 2025 05:38) (79 - 99)  BP: 129/53 (22 Feb 2025 05:38) (126/55 - 129/53)  BP(mean): --  RR: 19 (22 Feb 2025 05:38) (19 - 20)  SpO2: 100% (22 Feb 2025 05:38) (92% - 100%)    Parameters below as of 22 Feb 2025 05:38  Patient On (Oxygen Delivery Method): nasal cannula, high flow        I&O's Summary    21 Feb 2025 07:01  -  22 Feb 2025 07:00  --------------------------------------------------------  IN: 0 mL / OUT: 750 mL / NET: -750 mL    22 Feb 2025 07:01  -  22 Feb 2025 11:15  --------------------------------------------------------  IN: 0 mL / OUT: 1400 mL / NET: -1400 mL          Physical Exam:   GENERAL: NAD, well-groomed, well-developed  HEENT: MARCIO/   Atraumatic, Normocephalic  ENMT: No tonsillar erythema, exudates, or enlargement; Moist mucous membranes, Good dentition, No lesions  NECK: Supple, No JVD, Normal thyroid  CHEST/LUNG: scattered crackles  no wheezing  CVS: Regular rate and rhythm; No murmurs, rubs, or gallops  GI: : Soft, Nontender, Nondistended; Bowel sounds present  NERVOUS SYSTEM:  Alert & Oriented X3  EXTREMITIES:  - edema  LYMPH: No lymphadenopathy noted  SKIN: No rashes or lesions  ENDOCRINOLOGY: No Thyromegaly  PSYCH: Appropriate    Labs:  23, 32, 31  CARDIAC MARKERS ( 21 Feb 2025 10:43 )  x     / x     / x     / x     / 1.6 ng/mL                            9.2    9.02  )-----------( 453      ( 21 Feb 2025 06:33 )             28.9                         9.1    8.53  )-----------( 416      ( 20 Feb 2025 05:37 )             28.3                         8.6    11.15 )-----------( 389      ( 19 Feb 2025 09:14 )             27.3                         10.0   12.67 )-----------( 439      ( 18 Feb 2025 20:07 )             30.5     02-21    134[L]  |  98  |  30[H]  ----------------------------<  376[H]  4.6   |  24  |  0.99  02-20    136  |  98  |  28[H]  ----------------------------<  192[H]  4.3   |  24  |  0.91  02-19    133[L]  |  95[L]  |  24[H]  ----------------------------<  240[H]  4.0   |  25  |  0.97  02-18    130[L]  |  91[L]  |  28[H]  ----------------------------<  366[H]  4.3   |  27  |  1.04    Ca    9.4      21 Feb 2025 06:33  Phos  3.4     02-21  Mg     2.20     02-21    TPro  6.4  /  Alb  3.5  /  TBili  0.4  /  DBili  x   /  AST  7   /  ALT  <5  /  AlkPhos  86  02-19  TPro  7.4  /  Alb  3.9  /  TBili  0.4  /  DBili  x   /  AST  15  /  ALT  8   /  AlkPhos  111  02-18    CAPILLARY BLOOD GLUCOSE      POCT Blood Glucose.: 397 mg/dL (22 Feb 2025 10:29)  POCT Blood Glucose.: 369 mg/dL (22 Feb 2025 08:49)  POCT Blood Glucose.: 209 mg/dL (21 Feb 2025 21:26)  POCT Blood Glucose.: 212 mg/dL (21 Feb 2025 18:14)  POCT Blood Glucose.: 342 mg/dL (21 Feb 2025 15:19)  POCT Blood Glucose.: 538 mg/dL (21 Feb 2025 12:11)          Urinalysis Basic - ( 21 Feb 2025 06:33 )    Color: x / Appearance: x / SG: x / pH: x  Gluc: 376 mg/dL / Ketone: x  / Bili: x / Urobili: x   Blood: x / Protein: x / Nitrite: x   Leuk Esterase: x / RBC: x / WBC x   Sq Epi: x / Non Sq Epi: x / Bacteria: x      Procalcitonin: 2.19 ng/mL (02-19 @ 01:13)  Lactate, Blood: 3.4 mmol/L (02-21 @ 14:45)        RECENT CULTURES:  02-18 @ 21:42 .Blood Blood-Peripheral         rad< from: Xray Chest 1 View- PORTABLE-Urgent (Xray Chest 1 View- PORTABLE-Urgent .) (02.21.25 @ 11:48) >    TECHNIQUE: Single frontal, portable view of the chest was obtained.    COMPARISON: Chest x-ray 2/18/2025.    FINDINGS:    Chest x-ray 2/20/2025 at 11:06 PM:  Surgical clips in the right axilla.  The heart is normal in size.  Diffuse bilateral patchy and hazy opacities, unchanged.  No pneumothorax.  Trace bilateral pleural effusions and associated atelectasis.  No acute bony abnormality.    Chest x-ray 2/21/2025 at 11:28 AM:  Progression of right base atelectasis or infiltrate. The left lung is   clearer.    IMPRESSION:  Changing infiltrates in latest image.    --- End of Report ---          DEANNA HERNANDEZ MD; Resident Radiologist  This document has been electronically signed.  LJ IRVIN MD; Attending Interventional Radiologist  This document has been electronically signed. Feb 21 2025  3:04PM    < end of copied text >         No growth at 72 Hours    02-18 @ 21:36 Clean Catch Clean Catch (Midstream)   KHADIJAH      Enterococcus faecium  Enterococcus faecium     50,000 - 99,000 CFU/mL Enterococcus faecium  <10,000 CFU/ml Normal Urogenital vonnie present    02-18 @ 20:45 .Blood Blood-Peripheral                No growth at 72 Hours          RESPIRATORY CULTURES:          Studies  Chest X-RAY  CT SCAN Chest   Venous Dopplers: LE:   CT Abdomen  Others

## 2025-02-22 NOTE — PROGRESS NOTE ADULT - SUBJECTIVE AND OBJECTIVE BOX
Chief Complaint: DM2    History: patient denies complaints, breathing stable on nasal cannula  tolerating po intake  no hypoglycemia    MEDICATIONS  (STANDING):  aspirin enteric coated 81 milliGRAM(s) Oral daily  atorvastatin 20 milliGRAM(s) Oral at bedtime  cefepime   IVPB 2000 milliGRAM(s) IV Intermittent <User Schedule>  dextrose 5%. 1000 milliLiter(s) (50 mL/Hr) IV Continuous <Continuous>  dextrose 5%. 1000 milliLiter(s) (100 mL/Hr) IV Continuous <Continuous>  dextrose 50% Injectable 25 Gram(s) IV Push once  dextrose 50% Injectable 12.5 Gram(s) IV Push once  dextrose 50% Injectable 25 Gram(s) IV Push once  enoxaparin Injectable 40 milliGRAM(s) SubCutaneous every 24 hours  fluticasone propionate/ salmeterol 250-50 MICROgram(s) Diskus 1 Dose(s) Inhalation two times a day  gabapentin 400 milliGRAM(s) Oral at bedtime  glucagon  Injectable 1 milliGRAM(s) IntraMuscular once  insulin glargine Injectable (LANTUS) 22 Unit(s) SubCutaneous at bedtime  insulin lispro (ADMELOG) corrective regimen sliding scale   SubCutaneous three times a day before meals  insulin lispro (ADMELOG) corrective regimen sliding scale   SubCutaneous at bedtime  insulin lispro Injectable (ADMELOG) 11 Unit(s) SubCutaneous three times a day before meals  ipratropium 17 MICROgram(s) HFA Inhaler 1 Puff(s) Inhalation every 6 hours  levalbuterol Inhalation 1.25 milliGRAM(s) Inhalation every 6 hours  methylPREDNISolone sodium succinate Injectable 20 milliGRAM(s) IV Push every 8 hours  metoprolol tartrate 12.5 milliGRAM(s) Oral two times a day  multivitamin 1 Tablet(s) Oral daily  nitrofurantoin monohydrate/macrocrystals (MACROBID) 100 milliGRAM(s) Oral two times a day  pantoprazole    Tablet 40 milliGRAM(s) Oral before breakfast  polyethylene glycol 3350 17 Gram(s) Oral two times a day  senna 2 Tablet(s) Oral at bedtime  sildenafil (REVATIO) 20 milliGRAM(s) Oral three times a day    MEDICATIONS  (PRN):  acetaminophen     Tablet .. 650 milliGRAM(s) Oral every 6 hours PRN Temp greater or equal to 38C (100.4F), Mild Pain (1 - 3)  dextrose Oral Gel 15 Gram(s) Oral once PRN Blood Glucose LESS THAN 70 milliGRAM(s)/deciliter  melatonin 3 milliGRAM(s) Oral at bedtime PRN Insomnia  ondansetron Injectable 4 milliGRAM(s) IV Push every 8 hours PRN Nausea and/or Vomiting      Allergies    penicillin (Unknown)  macrolide antibiotics (Other)  Biaxin (Unknown)    Intolerances      Review of Systems:    ALL OTHER SYSTEMS REVIEWED AND NEGATIVE      PHYSICAL EXAM:  VITALS: T(C): 36.3 (02-22-25 @ 12:05)  T(F): 97.4 (02-22-25 @ 12:05), Max: 97.7 (02-21-25 @ 20:40)  HR: 86 (02-22-25 @ 12:05) (79 - 99)  BP: 133/56 (02-22-25 @ 12:05) (126/55 - 133/56)  RR:  (18 - 20)  SpO2:  (92% - 100%)  Wt(kg): --  GENERAL: NAD, well-groomed, well-developed  EYES: No proptosis, no lid lag, anicteric  HEENT:  Atraumatic, Normocephalic, moist mucous membranes  RESPIRATORY: nonlabored respirations, nasal cannula in place  PSYCH: Alert and oriented x 3, normal affect, normal mood    CAPILLARY BLOOD GLUCOSE      POCT Blood Glucose.: 423 mg/dL (22 Feb 2025 12:16)  POCT Blood Glucose.: 397 mg/dL (22 Feb 2025 10:29)  POCT Blood Glucose.: 369 mg/dL (22 Feb 2025 08:49)  POCT Blood Glucose.: 209 mg/dL (21 Feb 2025 21:26)  POCT Blood Glucose.: 212 mg/dL (21 Feb 2025 18:14)  POCT Blood Glucose.: 342 mg/dL (21 Feb 2025 15:19)      02-21    134[L]  |  98  |  30[H]  ----------------------------<  376[H]  4.6   |  24  |  0.99    eGFR: 54[L]    Ca    9.4      02-21  Mg     2.20     02-21  Phos  3.4     02-21        A1C with Estimated Average Glucose Result: 9.7 % (02-19-25 @ 09:14)  A1C with Estimated Average Glucose Result: 8.0 % (12-13-24 @ 14:35)  A1C with Estimated Average Glucose Result: 6.6 % (09-05-24 @ 13:42)  A1C with Estimated Average Glucose Result: 9.2 % (04-20-24 @ 08:50)      Thyroid Function Tests:  02-19 @ 09:14 TSH 1.95 FreeT4 -- T3 -- Anti TPO -- Anti Thyroglobulin Ab -- TSI --

## 2025-02-22 NOTE — PROGRESS NOTE ADULT - ASSESSMENT
Pt is a 89 yo F with PMH CHF, anemia, pHTN, CAD, CVA (no deficits), T2D, [place], COPD (2L NC PRN, qhs), PVD, HTN, HLD, and ?lung CA (pending RT) p/w dysuria x 1wk along with tremors and SOB.   On arrival, T 102.5, , /78 --- SBP 80s, RR 20 O2sat 88% RA -- 2L NC. EKG with sinus tachycardia, no ischemic changes. ER POCUS with diffuse B lines. Labs with leukocytosis, normocytic anemia, trop neg x2, BNP 1406, lactate neg, UA+ with UCx and BCx in lab, RVP neg, MRSA in lab. CTA chest neg PE but with multifocal PNA, pulmonary edema, and 2.8x2.7cm mass LLL interval inc compared to prior. MICU consulted for hypotension with c/f need for pressors, however, improved with IVF and midodrine. Pt given tylenol, cefepime, and vancomycin. ID consulted and PT consulted with recs pending. (19 Feb 2025 10:03)  she is apparently sob to me:  but she says her breathing is OK:  she  is on home oxygen :  recently diagnosed with lung cancer:   now pulm called for pneumonia        Sepsis/ Pneumonia/ COPD / lung cancer:   pneumonia: on antibiotics   CTA chest neg PE but with multifocal PNA and pulmonary edema   cont antibtiocs : seen by id   urine legionella/strep  lEFT LOWER OPACITY:  PER DAUGHTER :  SHE NEVER GOT ANY CHEMO :  SHE WAS SUPPOSED TO GET RADIATION THERAPY,  BUT SHE ENDED UP HERE:     She has had multiplek admission in last few months and hence could not any surgery for the mass:  now it seems to have increased   she seems to be sob:  and has copd:  will add short course of steroids   her vbg is OK:  but she looks sob:  she may need bipap , if her rr worsens    2/20: she looks better today  : on 3 L o f oxygen : she does use oxygen at home:  2 L of oxygen :  ct scan chest showed: No pulmonary embolism. Interval increase in groundglass opacities and subpleural consolidations in bilateral lungs, likely infectious. Interlobularseptal thickening in the upper lobes, which can be suggestive of interstitial edema 3.1 cm masslike consolidation in left lower lobe. Additional smaller nodules in bilateral lungs. Mediastinal and hilar lymphadenopathy which can be reactive or due to  metastasis. Subpleural reticulations, Honeycombing, and peripheral cystic changes in bilateral lungs, suggestive of interstitial lung fibrosis.    2/21: spoke to daughter again : she had mapping ct prior to coming to hospital  : she was supposed to be getting radiation therapy;   no chemo ;   cont iv steroids;   she had no pe  on initial admission she had no pe:  \  she has significant emphysema:    VBG is good     would cont antibiotics   ? hemoinc consult??     2/21: she was seen by palliative care;   2/22:  seems pretty good:  has cough + especially in night time:   add anti tussives:  hycodan at night time  \  on cefepime"     Acute UTI.   as above.    Acute on chronic heart failure.    - known hx CHF   - 10/2024 TTE EF 60%, mild MV stenosis, mod AS, mild-mod TR  - POCUS in ER c/f reduced EF  - CXR with pulmonary vascular congestion, CTA chest with pulmonary edema   -cont diuretics  defer to card s    2/20: cont current rx:    on 3 L of oxygen    2/21: today hero2 requirement went up : do chest xray and stat high flow:  cont BD and steroids   2/22: on 40% high flow;  but I think can be changed to nasal cannula:  await echo results     Pulmonary mass.    CTA chest with interval increase in LLL mass from prior, 2.8x2.7cm  - pending RT initiation outpt?\  - as above:  has not getting any treatment since the diagnosis many months ago   2/22: defer to  onc:   ct scan looks not that good   would struggles to increase lasix     Pulmonary HTN.    sildenafil 20mg TID.    HTN (hypertension).   blood pressure is soft:  currently off anti hypertensives:     2/22:"; blood pressyre is controlled     Type 2 diabetes mellitus.   monitor and control:    dw acp and daughter

## 2025-02-22 NOTE — PROGRESS NOTE ADULT - SUBJECTIVE AND OBJECTIVE BOX
CARDIOLOGY FOLLOW UP - Dr. Valenzuela  Date of Service: 02-22-25 @ 08:48    CC: no events    Review of Systems:  Constitutional: No fever, weight loss, or fatigue  Respiratory: No cough, wheezing, or hemoptysis, no shortness of breath  Cardiovascular: No chest pain, palpitations, passing out, dizziness, or leg swelling  Gastrointestinal: No abd or epigastric pain. No nausea, vomiting, or hematemesis; no diarrhea or consiptaiton, no melena or hematochezia  Vascular: No edema     TELEMETRY:    PHYSICAL EXAM:  T(C): 36.4 (02-22-25 @ 05:38), Max: 36.5 (02-21-25 @ 20:40)  HR: 79 (02-22-25 @ 05:38) (79 - 136)  BP: 129/53 (02-22-25 @ 05:38) (126/55 - 151/69)  RR: 19 (02-22-25 @ 05:38) (19 - 22)  SpO2: 100% (02-22-25 @ 05:38) (80% - 100%)  Wt(kg): --  I&O's Summary    21 Feb 2025 07:01  -  22 Feb 2025 07:00  --------------------------------------------------------  IN: 0 mL / OUT: 750 mL / NET: -750 mL        Appearance: Normal	  Cardiovascular: Normal S1 S2,RRR, No JVD, No murmurs  Respiratory: Lungs clear to auscultation	  Gastrointestinal:  Soft, Non-tender, + BS	  Extremities: Normal range of motion, No clubbing, cyanosis or edema  Vascular: Peripheral pulses palpable 2+ bilaterally       Home Medications:  Advair Diskus 250 mcg-50 mcg inhalation powder: 1 puff(s) inhaled 2 times a day (13 Dec 2024 23:00)  aspirin 81 mg oral delayed release tablet: 1 tab(s) orally once a day (at bedtime) (13 Dec 2024 23:00)  atorvastatin 20 mg oral tablet: 1 tab(s) orally once a day (at bedtime) (13 Dec 2024 23:00)  furosemide 20 mg oral tablet: 2 tab(s) orally once a day (13 Dec 2024 22:55)  gabapentin 400 mg oral capsule: 1 cap(s) orally once a day (at bedtime) (13 Dec 2024 23:00)  ipratropium 42 mcg/inh (0.06%) nasal spray: 2 spray(s) intranasally 2 times a day (13 Dec 2024 23:00)  metFORMIN 1000 mg oral tablet: 1 tab(s) orally once a day (19 Feb 2025 12:37)  Metoprolol Tartrate 25 mg oral tablet: 0.5 tab(s) orally 2 times a day takes 2 half tabs in morning and 1 half tab at night (13 Dec 2024 22:59)  Multiple Vitamins oral tablet: 1 tab(s) orally once a day (13 Dec 2024 23:00)  omeprazole 40 mg oral delayed release capsule: 1 cap(s) orally once a day (13 Dec 2024 23:00)  polyethylene glycol 3350 oral powder for reconstitution: 17 gram(s) orally 2 times a day (18 Dec 2024 13:08)  PreserVision AREDS 2 oral capsule: 1 cap(s) orally 2 times a day (13 Dec 2024 23:00)  senna leaf extract oral tablet: 2 tab(s) orally once a day (at bedtime) (18 Dec 2024 13:08)  sildenafil 20 mg oral tablet: 1 tab(s) orally 3 times a day (13 Dec 2024 22:55)  Tresiba FlexTouch 100 units/mL subcutaneous solution: 10 unit(s) subcutaneous once a day (at bedtime) Take tresiba 10 units subcutaneous at bedtime starting tonight at bedtime (13 Dec 2024 23:00)        MEDICATIONS  (STANDING):  aspirin enteric coated 81 milliGRAM(s) Oral daily  atorvastatin 20 milliGRAM(s) Oral at bedtime  cefepime   IVPB 2000 milliGRAM(s) IV Intermittent <User Schedule>  dextrose 5%. 1000 milliLiter(s) (50 mL/Hr) IV Continuous <Continuous>  dextrose 5%. 1000 milliLiter(s) (100 mL/Hr) IV Continuous <Continuous>  dextrose 50% Injectable 25 Gram(s) IV Push once  dextrose 50% Injectable 12.5 Gram(s) IV Push once  dextrose 50% Injectable 25 Gram(s) IV Push once  enoxaparin Injectable 40 milliGRAM(s) SubCutaneous every 24 hours  fluticasone propionate/ salmeterol 250-50 MICROgram(s) Diskus 1 Dose(s) Inhalation two times a day  furosemide   Injectable 20 milliGRAM(s) IV Push daily  gabapentin 400 milliGRAM(s) Oral at bedtime  glucagon  Injectable 1 milliGRAM(s) IntraMuscular once  insulin glargine Injectable (LANTUS) 22 Unit(s) SubCutaneous at bedtime  insulin lispro (ADMELOG) corrective regimen sliding scale   SubCutaneous three times a day before meals  insulin lispro (ADMELOG) corrective regimen sliding scale   SubCutaneous at bedtime  insulin lispro Injectable (ADMELOG) 11 Unit(s) SubCutaneous three times a day before meals  ipratropium 17 MICROgram(s) HFA Inhaler 1 Puff(s) Inhalation every 6 hours  levalbuterol Inhalation 1.25 milliGRAM(s) Inhalation every 6 hours  methylPREDNISolone sodium succinate Injectable 20 milliGRAM(s) IV Push every 8 hours  metoprolol tartrate 12.5 milliGRAM(s) Oral two times a day  multivitamin 1 Tablet(s) Oral daily  nitrofurantoin monohydrate/macrocrystals (MACROBID) 100 milliGRAM(s) Oral two times a day  pantoprazole    Tablet 40 milliGRAM(s) Oral before breakfast  polyethylene glycol 3350 17 Gram(s) Oral two times a day  senna 2 Tablet(s) Oral at bedtime  sildenafil (REVATIO) 20 milliGRAM(s) Oral three times a day        EKG:  RADIOLOGY:  DIAGNOSTIC TESTING:  [ ] Echocardiogram:  [ ] Catherterization:  [ ] Stress Test:  OTHER:     LABS:	 	                          9.2    9.02  )-----------( 453      ( 21 Feb 2025 06:33 )             28.9     02-21    134[L]  |  98  |  30[H]  ----------------------------<  376[H]  4.6   |  24  |  0.99    Ca    9.4      21 Feb 2025 06:33  Phos  3.4     02-21  Mg     2.20     02-21            CARDIAC MARKERS:

## 2025-02-22 NOTE — PROGRESS NOTE ADULT - ASSESSMENT
A/P: Pt is a 89 yo F with PMH CHF, anemia, pHTN, CAD, CVA (no deficits), T2D, [place], COPD (2L NC PRN, qhs), PVD, HTN, HLD, and ?lung CA (pending RT) p/w dysuria x 1wk along with tremors and SOB, found to be hyperglycemic and will be on steroids. Endocrinology was consulted for management of diabetes mellitus.    #Type 2 Diabetes Mellitus  - Receiving methylpred 20 mg IV q8h (scheduled to continue until 2/21)  - HbA1c 9.7%; home regimen: Tresiba 10 units at bedtime (Tresiba no longer covered working on switching to a new basal prior to admission), metformin 500mg (?) 2 tabs daily   Hyperglycemia to 400s  bicarb and ph normal not concerning for DKA  solumedrol 20mg IV q8h is continued. Please notify endocrine if any change to steroid plan.  - Increase Lantus to 30 units qhs  - Increase Admelog to 16 units TIDQAC hold if NPO  - continue moderate admelog correction scale TIDQAC and  moderate QHS scale  - Please check FSG before meals and QHS, or q6h while NPO  - RD consult  - hypoglycemia orderset prn  -consistent carb diet  -changed antibiotics to non dextrose fluid    - Discharge planning:  depends on steroids- please inform endocrine of steroid plans  Tresiba no longer covered, to find an alternative basal insulin that is covered.  Clarify dose of metformin tabs at home  Also given patient had a son with type 1 DM sent c-peptide (with serum glucose), FELISHA Ab, IA-2 Ab, and Zinc transporter Ab with Am labs to rule out JILLIAN, now testing in lab. C-peptide 3.7 (glu 376) consistent with type 2 DM. Can follow up antibodies for completeness.  DC plan basal/metformin or basal/bolus TBD  Patient attributes high A1c to several hospital admissions in past few months.  Patient uses glucometer and has previously declined CGM (will reconsider).  Patient wishes to follow up with pcp and declines initiating care with an endocrinologist.    #Hypertension  - BP goal <130/80  - Management as per primary team, not on meds now    #Hyperlipidemia  - ordered for atorvastatin 20 mg    Endocrine team consulted for uncontrolled diabetes. Patient is high risk with high level decision making due to uncontrolled diabetes which places patient at high risk for cardiovascular and cerebrovascular events. Patient with lability of glucose requiring close monitoring and insulin adjustments.  Discussed with primary team    Shi Mello MD  Division of Endocrinology  Pager: [phone]    If after 6PM or before 9AM, or on weekends/holidays, please call endocrine answering service for assistance ([phone]).  For nonurgent matters email [email or web address] for assistance.

## 2025-02-23 LAB
ANION GAP SERPL CALC-SCNC: 13 MMOL/L — SIGNIFICANT CHANGE UP (ref 7–14)
BASOPHILS # BLD AUTO: 0.02 K/UL — SIGNIFICANT CHANGE UP (ref 0–0.2)
BASOPHILS NFR BLD AUTO: 0.2 % — SIGNIFICANT CHANGE UP (ref 0–2)
BLOOD GAS VENOUS COMPREHENSIVE RESULT: SIGNIFICANT CHANGE UP
BUN SERPL-MCNC: 30 MG/DL — HIGH (ref 7–23)
CALCIUM SERPL-MCNC: 9.2 MG/DL — SIGNIFICANT CHANGE UP (ref 8.4–10.5)
CHLORIDE SERPL-SCNC: 97 MMOL/L — LOW (ref 98–107)
CO2 SERPL-SCNC: 25 MMOL/L — SIGNIFICANT CHANGE UP (ref 22–31)
CREAT SERPL-MCNC: 0.88 MG/DL — SIGNIFICANT CHANGE UP (ref 0.5–1.3)
EGFR: 62 ML/MIN/1.73M2 — SIGNIFICANT CHANGE UP
EGFR: 62 ML/MIN/1.73M2 — SIGNIFICANT CHANGE UP
EOSINOPHIL # BLD AUTO: 0.04 K/UL — SIGNIFICANT CHANGE UP (ref 0–0.5)
EOSINOPHIL NFR BLD AUTO: 0.4 % — SIGNIFICANT CHANGE UP (ref 0–6)
GLUCOSE SERPL-MCNC: 269 MG/DL — HIGH (ref 70–99)
HCT VFR BLD CALC: 29.2 % — LOW (ref 34.5–45)
HGB BLD-MCNC: 9.2 G/DL — LOW (ref 11.5–15.5)
IANC: 7.99 K/UL — HIGH (ref 1.8–7.4)
IMM GRANULOCYTES NFR BLD AUTO: 1.3 % — HIGH (ref 0–0.9)
LYMPHOCYTES # BLD AUTO: 0.73 K/UL — LOW (ref 1–3.3)
LYMPHOCYTES # BLD AUTO: 7.7 % — LOW (ref 13–44)
MAGNESIUM SERPL-MCNC: 1.8 MG/DL — SIGNIFICANT CHANGE UP (ref 1.6–2.6)
MCHC RBC-ENTMCNC: 27.6 PG — SIGNIFICANT CHANGE UP (ref 27–34)
MCHC RBC-ENTMCNC: 31.5 G/DL — LOW (ref 32–36)
MCV RBC AUTO: 87.7 FL — SIGNIFICANT CHANGE UP (ref 80–100)
MONOCYTES # BLD AUTO: 0.64 K/UL — SIGNIFICANT CHANGE UP (ref 0–0.9)
MONOCYTES NFR BLD AUTO: 6.7 % — SIGNIFICANT CHANGE UP (ref 2–14)
NEUTROPHILS # BLD AUTO: 7.99 K/UL — HIGH (ref 1.8–7.4)
NEUTROPHILS NFR BLD AUTO: 83.7 % — HIGH (ref 43–77)
NRBC # BLD AUTO: 0 K/UL — SIGNIFICANT CHANGE UP (ref 0–0)
NRBC # FLD: 0 K/UL — SIGNIFICANT CHANGE UP (ref 0–0)
NRBC BLD AUTO-RTO: 0 /100 WBCS — SIGNIFICANT CHANGE UP (ref 0–0)
PHOSPHATE SERPL-MCNC: 3.6 MG/DL — SIGNIFICANT CHANGE UP (ref 2.5–4.5)
PLATELET # BLD AUTO: 401 K/UL — HIGH (ref 150–400)
POTASSIUM SERPL-MCNC: 4.4 MMOL/L — SIGNIFICANT CHANGE UP (ref 3.5–5.3)
POTASSIUM SERPL-SCNC: 4.4 MMOL/L — SIGNIFICANT CHANGE UP (ref 3.5–5.3)
RBC # BLD: 3.33 M/UL — LOW (ref 3.8–5.2)
RBC # FLD: 15.3 % — HIGH (ref 10.3–14.5)
SODIUM SERPL-SCNC: 135 MMOL/L — SIGNIFICANT CHANGE UP (ref 135–145)
WBC # BLD: 9.54 K/UL — SIGNIFICANT CHANGE UP (ref 3.8–10.5)
WBC # FLD AUTO: 9.54 K/UL — SIGNIFICANT CHANGE UP (ref 3.8–10.5)

## 2025-02-23 PROCEDURE — 99232 SBSQ HOSP IP/OBS MODERATE 35: CPT

## 2025-02-23 RX ORDER — INSULIN LISPRO 100 U/ML
20 INJECTION, SOLUTION INTRAVENOUS; SUBCUTANEOUS
Refills: 0 | Status: DISCONTINUED | OUTPATIENT
Start: 2025-02-23 | End: 2025-02-24

## 2025-02-23 RX ORDER — INSULIN GLARGINE-YFGN 100 [IU]/ML
40 INJECTION, SOLUTION SUBCUTANEOUS AT BEDTIME
Refills: 0 | Status: DISCONTINUED | OUTPATIENT
Start: 2025-02-23 | End: 2025-02-26

## 2025-02-23 RX ADMIN — GABAPENTIN 400 MILLIGRAM(S): 400 CAPSULE ORAL at 21:52

## 2025-02-23 RX ADMIN — METHYLPREDNISOLONE ACETATE 20 MILLIGRAM(S): 80 INJECTION, SUSPENSION INTRA-ARTICULAR; INTRALESIONAL; INTRAMUSCULAR; SOFT TISSUE at 13:18

## 2025-02-23 RX ADMIN — METOPROLOL SUCCINATE 12.5 MILLIGRAM(S): 50 TABLET, EXTENDED RELEASE ORAL at 05:45

## 2025-02-23 RX ADMIN — METOPROLOL SUCCINATE 12.5 MILLIGRAM(S): 50 TABLET, EXTENDED RELEASE ORAL at 18:14

## 2025-02-23 RX ADMIN — POLYETHYLENE GLYCOL 3350 17 GRAM(S): 17 POWDER, FOR SOLUTION ORAL at 05:43

## 2025-02-23 RX ADMIN — INSULIN LISPRO 16 UNIT(S): 100 INJECTION, SOLUTION INTRAVENOUS; SUBCUTANEOUS at 13:15

## 2025-02-23 RX ADMIN — FUROSEMIDE 40 MILLIGRAM(S): 10 INJECTION INTRAMUSCULAR; INTRAVENOUS at 05:44

## 2025-02-23 RX ADMIN — Medication 1 TABLET(S): at 09:27

## 2025-02-23 RX ADMIN — Medication 100 MILLIGRAM(S): at 18:15

## 2025-02-23 RX ADMIN — LEVALBUTEROL HYDROCHLORIDE 1.25 MILLIGRAM(S): 1.25 SOLUTION RESPIRATORY (INHALATION) at 21:06

## 2025-02-23 RX ADMIN — SILDENAFIL 20 MILLIGRAM(S): 50 TABLET, FILM COATED ORAL at 21:54

## 2025-02-23 RX ADMIN — LEVALBUTEROL HYDROCHLORIDE 1.25 MILLIGRAM(S): 1.25 SOLUTION RESPIRATORY (INHALATION) at 08:44

## 2025-02-23 RX ADMIN — Medication 1 DOSE(S): at 09:25

## 2025-02-23 RX ADMIN — Medication 1 DOSE(S): at 21:53

## 2025-02-23 RX ADMIN — Medication 100 MILLIGRAM(S): at 05:44

## 2025-02-23 RX ADMIN — Medication 40 MILLIGRAM(S): at 05:46

## 2025-02-23 RX ADMIN — Medication 1 PUFF(S): at 21:53

## 2025-02-23 RX ADMIN — CEFEPIME 100 MILLIGRAM(S): 2 INJECTION, POWDER, FOR SOLUTION INTRAVENOUS at 21:52

## 2025-02-23 RX ADMIN — Medication 81 MILLIGRAM(S): at 09:27

## 2025-02-23 RX ADMIN — Medication 3 MILLIGRAM(S): at 21:55

## 2025-02-23 RX ADMIN — ENOXAPARIN SODIUM 40 MILLIGRAM(S): 100 INJECTION SUBCUTANEOUS at 21:54

## 2025-02-23 RX ADMIN — SILDENAFIL 20 MILLIGRAM(S): 50 TABLET, FILM COATED ORAL at 13:16

## 2025-02-23 RX ADMIN — LEVALBUTEROL HYDROCHLORIDE 1.25 MILLIGRAM(S): 1.25 SOLUTION RESPIRATORY (INHALATION) at 15:06

## 2025-02-23 RX ADMIN — LEVALBUTEROL HYDROCHLORIDE 1.25 MILLIGRAM(S): 1.25 SOLUTION RESPIRATORY (INHALATION) at 04:14

## 2025-02-23 RX ADMIN — INSULIN GLARGINE-YFGN 40 UNIT(S): 100 INJECTION, SOLUTION SUBCUTANEOUS at 21:51

## 2025-02-23 RX ADMIN — INSULIN LISPRO 8: 100 INJECTION, SOLUTION INTRAVENOUS; SUBCUTANEOUS at 09:25

## 2025-02-23 RX ADMIN — INSULIN LISPRO 16 UNIT(S): 100 INJECTION, SOLUTION INTRAVENOUS; SUBCUTANEOUS at 09:26

## 2025-02-23 RX ADMIN — INSULIN LISPRO 20 UNIT(S): 100 INJECTION, SOLUTION INTRAVENOUS; SUBCUTANEOUS at 18:13

## 2025-02-23 RX ADMIN — Medication 1 PUFF(S): at 18:16

## 2025-02-23 RX ADMIN — INSULIN LISPRO 10: 100 INJECTION, SOLUTION INTRAVENOUS; SUBCUTANEOUS at 13:15

## 2025-02-23 RX ADMIN — ATORVASTATIN CALCIUM 20 MILLIGRAM(S): 80 TABLET, FILM COATED ORAL at 21:54

## 2025-02-23 RX ADMIN — METHYLPREDNISOLONE ACETATE 20 MILLIGRAM(S): 80 INJECTION, SUSPENSION INTRA-ARTICULAR; INTRALESIONAL; INTRAMUSCULAR; SOFT TISSUE at 05:43

## 2025-02-23 RX ADMIN — CEFEPIME 100 MILLIGRAM(S): 2 INJECTION, POWDER, FOR SOLUTION INTRAVENOUS at 09:30

## 2025-02-23 RX ADMIN — Medication 1 PUFF(S): at 09:25

## 2025-02-23 RX ADMIN — SILDENAFIL 20 MILLIGRAM(S): 50 TABLET, FILM COATED ORAL at 05:45

## 2025-02-23 RX ADMIN — METHYLPREDNISOLONE ACETATE 20 MILLIGRAM(S): 80 INJECTION, SUSPENSION INTRA-ARTICULAR; INTRALESIONAL; INTRAMUSCULAR; SOFT TISSUE at 21:54

## 2025-02-23 RX ADMIN — Medication 2 TABLET(S): at 21:54

## 2025-02-23 RX ADMIN — Medication 1 PUFF(S): at 05:43

## 2025-02-23 NOTE — PROGRESS NOTE ADULT - ASSESSMENT
A/P: Pt is a 89 yo F with PMH CHF, anemia, pHTN, CAD, CVA (no deficits), T2D, [place], COPD (2L NC PRN, qhs), PVD, HTN, HLD, and ?lung CA (pending RT) p/w dysuria x 1wk along with tremors and SOB, found to be hyperglycemic and will be on steroids. Endocrinology was consulted for management of diabetes mellitus.    #Type 2 Diabetes Mellitus  - Receiving methylpred 20 mg IV q8h - 2/21- ?- solumedrol 20mg IV q8h is continued.  -  Please notify endocrine if any change to steroid plan.  - HbA1c 9.7%; home regimen: Tresiba 10 units at bedtime (Tresiba no longer covered working on switching to a new basal prior to admission), metformin 500mg (?) 2 tabs daily       INPATIENT PLAN:  - Inpatient BG goal 100-180mg/dl: AM, bedtime and prandial FS above goal.  - Increase Lantus to 40 units qhs  - Increase Admelog to 20 units TIDQAC hold if NPO  - continue moderate admelog correction scale TIDQAC and  moderate QHS scale  - Please check FSG before meals and QHS, or q6h while NPO  - RD consult  - hypoglycemia orderset prn  -consistent carb diet  -changed antibiotics to non dextrose fluid    DISCHARGE PLAN:  - depends on steroids- please inform endocrine of steroid plans---> basal/metformin or basal/bolus TBD  - Tresiba no longer covered, to find an alternative basal insulin that is covered.  - Clarify dose of metformin tabs at home  - Also given patient had a son with type 1 DM sent c-peptide (with serum glucose), FELISHA Ab, IA-2 Ab, and Zinc transporter Ab with Am labs to rule out JILLIAN, now testing in lab. C-peptide 3.7 (glu 376) consistent with type 2 DM. Can follow up antibodies for completeness.  - Patient attributes high A1c to several hospital admissions in past few months.  - Patient uses glucometer and has previously declined CGM (will reconsider).  - Patient wishes to follow up with pcp and declines initiating care with an endocrinologist.    #Hypertension  - BP goal <130/80  - Management as per primary team, not on meds now    #Hyperlipidemia  - ordered for atorvastatin 20 mg    VERONA TuckerCNP-BC  Nurse Practitioner  Division of Endocrinology  Contact on TEAMS    If out of hospital/unavailable when paged, please note: patient will be cared for by another provider on the endocrine service.  For urgent concerns: call the endocrine answering service for assistance to reach covering provider ([phone]). For non-urgent matters: please email [email or web address] for assistance.

## 2025-02-23 NOTE — PROGRESS NOTE ADULT - SUBJECTIVE AND OBJECTIVE BOX
Chief Complaint: T2DM c/b steroid induced hyperglycemia    Interval Events: Pt seen and examined at bedside earlier today. Pt tolerating carb consistent diet with no nausea, no vomiting.    MEDICATIONS  (STANDING):  aspirin enteric coated 81 milliGRAM(s) Oral daily  atorvastatin 20 milliGRAM(s) Oral at bedtime  cefepime   IVPB 2000 milliGRAM(s) IV Intermittent <User Schedule>  dextrose 5%. 1000 milliLiter(s) (50 mL/Hr) IV Continuous <Continuous>  dextrose 5%. 1000 milliLiter(s) (100 mL/Hr) IV Continuous <Continuous>  dextrose 50% Injectable 25 Gram(s) IV Push once  dextrose 50% Injectable 12.5 Gram(s) IV Push once  dextrose 50% Injectable 25 Gram(s) IV Push once  enoxaparin Injectable 40 milliGRAM(s) SubCutaneous every 24 hours  fluticasone propionate/ salmeterol 250-50 MICROgram(s) Diskus 1 Dose(s) Inhalation two times a day  furosemide   Injectable 40 milliGRAM(s) IV Push daily  gabapentin 400 milliGRAM(s) Oral at bedtime  glucagon  Injectable 1 milliGRAM(s) IntraMuscular once  insulin glargine Injectable (LANTUS) 40 Unit(s) SubCutaneous at bedtime  insulin lispro (ADMELOG) corrective regimen sliding scale   SubCutaneous three times a day before meals  insulin lispro (ADMELOG) corrective regimen sliding scale   SubCutaneous at bedtime  insulin lispro Injectable (ADMELOG) 20 Unit(s) SubCutaneous three times a day before meals  ipratropium 17 MICROgram(s) HFA Inhaler 1 Puff(s) Inhalation every 6 hours  levalbuterol Inhalation 1.25 milliGRAM(s) Inhalation every 6 hours  methylPREDNISolone sodium succinate Injectable 20 milliGRAM(s) IV Push every 8 hours  metoprolol tartrate 12.5 milliGRAM(s) Oral two times a day  multivitamin 1 Tablet(s) Oral daily  nitrofurantoin monohydrate/macrocrystals (MACROBID) 100 milliGRAM(s) Oral two times a day  pantoprazole    Tablet 40 milliGRAM(s) Oral before breakfast  polyethylene glycol 3350 17 Gram(s) Oral two times a day  senna 2 Tablet(s) Oral at bedtime  sildenafil (REVATIO) 20 milliGRAM(s) Oral three times a day    MEDICATIONS  (PRN):  acetaminophen     Tablet .. 650 milliGRAM(s) Oral every 6 hours PRN Temp greater or equal to 38C (100.4F), Mild Pain (1 - 3)  dextrose Oral Gel 15 Gram(s) Oral once PRN Blood Glucose LESS THAN 70 milliGRAM(s)/deciliter  melatonin 3 milliGRAM(s) Oral at bedtime PRN Insomnia  ondansetron Injectable 4 milliGRAM(s) IV Push every 8 hours PRN Nausea and/or Vomiting      Allergies    penicillin (Unknown)  macrolide antibiotics (Other)  Biaxin (Unknown)    Intolerances      Review of Systems:  Eyes: No blurry vision  Cardiovascular: No chest pain, palpitations  Respiratory: No SOB, no cough  GI: No nausea, vomiting, abdominal pain  : No dysuria    ALL OTHER SYSTEMS REVIEWED AND NEGATIVE    VITALS: T(C): 36.5 (02-23-25 @ 12:00)  T(F): 97.7 (02-23-25 @ 12:00), Max: 98.2 (02-22-25 @ 16:50)  HR: 87 (02-23-25 @ 12:00) (72 - 87)  BP: 128/88 (02-23-25 @ 12:00) (105/88 - 128/88)  RR:  (16 - 18)  SpO2:  (96% - 100%)  Wt(kg): --      Physical Exam:   GENERAL: NAD, well-developed  EYES: No proptosis  HEENT:  Atraumatic, Normocephalic  RESPIRATORY: non labored breathing   GI: Non distended  PSYCH: Alert, normal affect, normal mood    CAPILLARY BLOOD GLUCOSE    POCT Blood Glucose.: 359 mg/dL (23 Feb 2025 12:16)  POCT Blood Glucose.: 319 mg/dL (23 Feb 2025 08:45)  POCT Blood Glucose.: 142 mg/dL (22 Feb 2025 21:37)  POCT Blood Glucose.: 208 mg/dL (22 Feb 2025 17:43)      02-23    135  |  97[L]  |  30[H]  ----------------------------<  269[H]  4.4   |  25  |  0.88    eGFR: 62    Ca    9.2      02-23  Mg     1.80     02-23  Phos  3.6     02-23        A1C with Estimated Average Glucose Result: 9.7 % (02-19-25 @ 09:14)  A1C with Estimated Average Glucose Result: 8.0 % (12-13-24 @ 14:35)  A1C with Estimated Average Glucose Result: 6.6 % (09-05-24 @ 13:42)  A1C with Estimated Average Glucose Result: 9.2 % (04-20-24 @ 08:50)      Thyroid Function Tests:  02-19 @ 09:14 TSH 1.95 FreeT4 -- T3 -- Anti TPO -- Anti Thyroglobulin Ab -- TSI --

## 2025-02-23 NOTE — PROGRESS NOTE ADULT - SUBJECTIVE AND OBJECTIVE BOX
CARDIOLOGY FOLLOW UP - Dr. Valenzuela  Date of Service: 02-23-25 @ 12:03    CC: no events    Review of Systems:  Constitutional: No fever, weight loss, or fatigue  Respiratory: No cough, wheezing, or hemoptysis, no shortness of breath  Cardiovascular: No chest pain, palpitations, passing out, dizziness, or leg swelling  Gastrointestinal: No abd or epigastric pain. No nausea, vomiting, or hematemesis; no diarrhea or consiptaiton, no melena or hematochezia  Vascular: No edema     TELEMETRY:    PHYSICAL EXAM:  T(C): 36.3 (02-23-25 @ 04:56), Max: 36.8 (02-22-25 @ 16:50)  HR: 80 (02-23-25 @ 09:46) (72 - 86)  BP: 105/88 (02-23-25 @ 04:56) (105/88 - 133/56)  RR: 17 (02-23-25 @ 04:56) (16 - 18)  SpO2: 99% (02-23-25 @ 09:46) (96% - 100%)  Wt(kg): --  I&O's Summary    22 Feb 2025 07:01  -  23 Feb 2025 07:00  --------------------------------------------------------  IN: 200 mL / OUT: 2800 mL / NET: -2600 mL    23 Feb 2025 07:01  -  23 Feb 2025 12:03  --------------------------------------------------------  IN: 0 mL / OUT: 820 mL / NET: -820 mL        Appearance: Normal	  Cardiovascular: Normal S1 S2,RRR, No JVD, No murmurs  Respiratory: Lungs clear to auscultation	  Gastrointestinal:  Soft, Non-tender, + BS	  Extremities: Normal range of motion, No clubbing, cyanosis or edema  Vascular: Peripheral pulses palpable 2+ bilaterally       Home Medications:  Advair Diskus 250 mcg-50 mcg inhalation powder: 1 puff(s) inhaled 2 times a day (13 Dec 2024 23:00)  aspirin 81 mg oral delayed release tablet: 1 tab(s) orally once a day (at bedtime) (13 Dec 2024 23:00)  atorvastatin 20 mg oral tablet: 1 tab(s) orally once a day (at bedtime) (13 Dec 2024 23:00)  furosemide 20 mg oral tablet: 2 tab(s) orally once a day (13 Dec 2024 22:55)  gabapentin 400 mg oral capsule: 1 cap(s) orally once a day (at bedtime) (13 Dec 2024 23:00)  ipratropium 42 mcg/inh (0.06%) nasal spray: 2 spray(s) intranasally 2 times a day (13 Dec 2024 23:00)  metFORMIN 1000 mg oral tablet: 1 tab(s) orally once a day (19 Feb 2025 12:37)  Metoprolol Tartrate 25 mg oral tablet: 0.5 tab(s) orally 2 times a day takes 2 half tabs in morning and 1 half tab at night (13 Dec 2024 22:59)  Multiple Vitamins oral tablet: 1 tab(s) orally once a day (13 Dec 2024 23:00)  omeprazole 40 mg oral delayed release capsule: 1 cap(s) orally once a day (13 Dec 2024 23:00)  polyethylene glycol 3350 oral powder for reconstitution: 17 gram(s) orally 2 times a day (18 Dec 2024 13:08)  PreserVision AREDS 2 oral capsule: 1 cap(s) orally 2 times a day (13 Dec 2024 23:00)  senna leaf extract oral tablet: 2 tab(s) orally once a day (at bedtime) (18 Dec 2024 13:08)  sildenafil 20 mg oral tablet: 1 tab(s) orally 3 times a day (13 Dec 2024 22:55)  Tresiba FlexTouch 100 units/mL subcutaneous solution: 10 unit(s) subcutaneous once a day (at bedtime) Take tresiba 10 units subcutaneous at bedtime starting tonight at bedtime (13 Dec 2024 23:00)        MEDICATIONS  (STANDING):  aspirin enteric coated 81 milliGRAM(s) Oral daily  atorvastatin 20 milliGRAM(s) Oral at bedtime  cefepime   IVPB 2000 milliGRAM(s) IV Intermittent <User Schedule>  dextrose 5%. 1000 milliLiter(s) (50 mL/Hr) IV Continuous <Continuous>  dextrose 5%. 1000 milliLiter(s) (100 mL/Hr) IV Continuous <Continuous>  dextrose 50% Injectable 25 Gram(s) IV Push once  dextrose 50% Injectable 12.5 Gram(s) IV Push once  dextrose 50% Injectable 25 Gram(s) IV Push once  enoxaparin Injectable 40 milliGRAM(s) SubCutaneous every 24 hours  fluticasone propionate/ salmeterol 250-50 MICROgram(s) Diskus 1 Dose(s) Inhalation two times a day  furosemide   Injectable 40 milliGRAM(s) IV Push daily  gabapentin 400 milliGRAM(s) Oral at bedtime  glucagon  Injectable 1 milliGRAM(s) IntraMuscular once  insulin glargine Injectable (LANTUS) 30 Unit(s) SubCutaneous at bedtime  insulin lispro (ADMELOG) corrective regimen sliding scale   SubCutaneous three times a day before meals  insulin lispro (ADMELOG) corrective regimen sliding scale   SubCutaneous at bedtime  insulin lispro Injectable (ADMELOG) 16 Unit(s) SubCutaneous three times a day before meals  ipratropium 17 MICROgram(s) HFA Inhaler 1 Puff(s) Inhalation every 6 hours  levalbuterol Inhalation 1.25 milliGRAM(s) Inhalation every 6 hours  methylPREDNISolone sodium succinate Injectable 20 milliGRAM(s) IV Push every 8 hours  metoprolol tartrate 12.5 milliGRAM(s) Oral two times a day  multivitamin 1 Tablet(s) Oral daily  nitrofurantoin monohydrate/macrocrystals (MACROBID) 100 milliGRAM(s) Oral two times a day  pantoprazole    Tablet 40 milliGRAM(s) Oral before breakfast  polyethylene glycol 3350 17 Gram(s) Oral two times a day  senna 2 Tablet(s) Oral at bedtime  sildenafil (REVATIO) 20 milliGRAM(s) Oral three times a day        EKG:  RADIOLOGY:  DIAGNOSTIC TESTING:  [ ] Echocardiogram:  [ ] Catherterization:  [ ] Stress Test:  OTHER:     LABS:	 	                          9.2    9.54  )-----------( 401      ( 23 Feb 2025 07:21 )             29.2     02-23    135  |  97[L]  |  30[H]  ----------------------------<  269[H]  4.4   |  25  |  0.88    Ca    9.2      23 Feb 2025 07:21  Phos  3.6     02-23  Mg     1.80     02-23            CARDIAC MARKERS:

## 2025-02-23 NOTE — PROGRESS NOTE ADULT - NSPROGADDITIONALINFOA_GEN_ALL_CORE
d/w the daughter Vanessa HCP. Pt lives with her. Uses walker to walk, though not always using.   Recently seen by Dr. Shaw Mitchell (rad onc) for measurements. Hoping that pt will be able to proceed with radiation treatment post discharge.    GOC discussed with pt and the daughter. Per the pt and daughter, if condition is irreversible then no CPR/ventilator. otherwise okay with CPR/ ventilator.   currently remain full code. overall prognosis is poor given lung findings of fibrosis.   all questions answered.   palliative f/u appropriate for GOC and future symptom management.     Resume metoprolol for a.fib.  BP stable, resume Lasix --> IV started, oxygen improving.   hi-flow wean to NC  TTE reviewed. stage 2 diastolic dysfunction.   out of bed to chair if tolerates.     d/w QUAN Segovia.     - Dr. RACHELL Martinez ([facility])  - [phone]

## 2025-02-23 NOTE — PROGRESS NOTE ADULT - ASSESSMENT
ECHO 10/7/24: nl LV sys fx EF 60% . Mild mitral valve stenosis. Mild to moderate tricuspid regurgitation. mod AS   a/p    89 yo F with PMH CHF, valvular disease, anemia, pHTN, CAD, CVA (no deficits), T2D, [place], COPD (2L NC PRN, qhs), PVD, HTN, HLD, and ?lung CA (pending RT) p/w dysuria x 1wk along with tremors and SOB + pna    #PNA  -Meeting sepsis criteria w/ fever, tachycardia, leukocytosis  -CTa chest with no PE, Interval increase in groundglass opacities and subpleural consolidations  in bilateral lungs, interlobularseptal thickening in  the upper lobes, which can be suggestive of interstitial edema; 3.1 cm mass like consolidation in left lower lobe, Mediastinal and hilar lymphadenopathy which can be reactive or due to  metastasis.  Subpleural reticulations, Honeycombing, and peripheral cystic changes in  bilateral lungs, suggestive of interstitial lung fibrosis.  -abx per id/med  -cont iv lasix     #hypotension   -resolved  -micu eval noted- hypotensive to 80s/40s, given 2x 500cc bolus and Midodrine 10mg w/ improvement in BP to MAP >70  -Pocus w/ collapsed IVC suggestive of intravascular depletion, but w/ evidence of reduced RV and LV function   -f/u ECHO   -sepsis work up   -not a candidate for MICU per team   -HS trop neg. no ischemic changes to ecg     #CAD s/p PCI  -stable  -cont asa, statin, BB    #HFpEF  -repeat ECHO as above  -iv lasix     #Pulmonary HTN.   -c/w home sildenafil 20mg TID.  -fu echo     #Lung CA  -med/ pulm fu   -pending RT initiation as outpt     #UTI   -management per med /ID    dvt ppx      35 minutes spent on total encounter; more than 50% of the visit was spent counseling and/or coordinating care by the attending physician.

## 2025-02-23 NOTE — PROGRESS NOTE ADULT - PROBLEM SELECTOR PLAN 4
- known hx CHF   - 10/2024 TTE EF 60%, mild MV stenosis, mod AS, mild-mod TR  - POCUS in ER c/f reduced EF  - CXR with pulmonary vascular congestion, CTA chest with pulmonary edema   - f/u TTE  - home med: lasix 40mg daily, lopressor as below -- on hold 2/2 sepsis and hypotension  - BP improves. restart Lopressor given tachycardia.   - cautious with fluids, resume lasix --> transition to IV

## 2025-02-23 NOTE — PROGRESS NOTE ADULT - SUBJECTIVE AND OBJECTIVE BOX
SUBJECTIVE/ OVERNIGHT EVENTS:  oxygen improving on IV Lasix  no cp, no sob, no n/v/d. no abdominal pain.  no headache, no dizziness.   mild hand tremors intermittent per pt  current stable.       --------------------------------------------------------------------------------------------  LABS:                        9.2    9.54  )-----------( 401      ( 23 Feb 2025 07:21 )             29.2     02-23    135  |  97[L]  |  30[H]  ----------------------------<  269[H]  4.4   |  25  |  0.88    Ca    9.2      23 Feb 2025 07:21  Phos  3.6     02-23  Mg     1.80     02-23        CAPILLARY BLOOD GLUCOSE      POCT Blood Glucose.: 359 mg/dL (23 Feb 2025 12:16)  POCT Blood Glucose.: 319 mg/dL (23 Feb 2025 08:45)  POCT Blood Glucose.: 142 mg/dL (22 Feb 2025 21:37)  POCT Blood Glucose.: 208 mg/dL (22 Feb 2025 17:43)        Urinalysis Basic - ( 23 Feb 2025 07:21 )    Color: x / Appearance: x / SG: x / pH: x  Gluc: 269 mg/dL / Ketone: x  / Bili: x / Urobili: x   Blood: x / Protein: x / Nitrite: x   Leuk Esterase: x / RBC: x / WBC x   Sq Epi: x / Non Sq Epi: x / Bacteria: x        RADIOLOGY & ADDITIONAL TESTS:    Imaging Personally Reviewed:  [x] YES  [ ] NO    Consultant(s) Notes Reviewed:  [x] YES  [ ] NO    MEDICATIONS  (STANDING):  aspirin enteric coated 81 milliGRAM(s) Oral daily  atorvastatin 20 milliGRAM(s) Oral at bedtime  cefepime   IVPB 2000 milliGRAM(s) IV Intermittent <User Schedule>  dextrose 5%. 1000 milliLiter(s) (50 mL/Hr) IV Continuous <Continuous>  dextrose 5%. 1000 milliLiter(s) (100 mL/Hr) IV Continuous <Continuous>  dextrose 50% Injectable 25 Gram(s) IV Push once  dextrose 50% Injectable 12.5 Gram(s) IV Push once  dextrose 50% Injectable 25 Gram(s) IV Push once  enoxaparin Injectable 40 milliGRAM(s) SubCutaneous every 24 hours  fluticasone propionate/ salmeterol 250-50 MICROgram(s) Diskus 1 Dose(s) Inhalation two times a day  furosemide   Injectable 40 milliGRAM(s) IV Push daily  gabapentin 400 milliGRAM(s) Oral at bedtime  glucagon  Injectable 1 milliGRAM(s) IntraMuscular once  insulin glargine Injectable (LANTUS) 30 Unit(s) SubCutaneous at bedtime  insulin lispro (ADMELOG) corrective regimen sliding scale   SubCutaneous three times a day before meals  insulin lispro (ADMELOG) corrective regimen sliding scale   SubCutaneous at bedtime  insulin lispro Injectable (ADMELOG) 16 Unit(s) SubCutaneous three times a day before meals  ipratropium 17 MICROgram(s) HFA Inhaler 1 Puff(s) Inhalation every 6 hours  levalbuterol Inhalation 1.25 milliGRAM(s) Inhalation every 6 hours  methylPREDNISolone sodium succinate Injectable 20 milliGRAM(s) IV Push every 8 hours  metoprolol tartrate 12.5 milliGRAM(s) Oral two times a day  multivitamin 1 Tablet(s) Oral daily  nitrofurantoin monohydrate/macrocrystals (MACROBID) 100 milliGRAM(s) Oral two times a day  pantoprazole    Tablet 40 milliGRAM(s) Oral before breakfast  polyethylene glycol 3350 17 Gram(s) Oral two times a day  senna 2 Tablet(s) Oral at bedtime  sildenafil (REVATIO) 20 milliGRAM(s) Oral three times a day    MEDICATIONS  (PRN):  acetaminophen     Tablet .. 650 milliGRAM(s) Oral every 6 hours PRN Temp greater or equal to 38C (100.4F), Mild Pain (1 - 3)  dextrose Oral Gel 15 Gram(s) Oral once PRN Blood Glucose LESS THAN 70 milliGRAM(s)/deciliter  melatonin 3 milliGRAM(s) Oral at bedtime PRN Insomnia  ondansetron Injectable 4 milliGRAM(s) IV Push every 8 hours PRN Nausea and/or Vomiting      Care Discussed with Consultants/Other Providers [x] YES  [ ] NO    Vital Signs Last 24 Hrs  T(C): 36.5 (23 Feb 2025 12:00), Max: 36.8 (22 Feb 2025 16:50)  T(F): 97.7 (23 Feb 2025 12:00), Max: 98.2 (22 Feb 2025 16:50)  HR: 87 (23 Feb 2025 12:00) (72 - 87)  BP: 128/88 (23 Feb 2025 12:00) (105/88 - 128/88)  BP(mean): --  RR: 18 (23 Feb 2025 12:00) (16 - 18)  SpO2: 100% (23 Feb 2025 12:00) (96% - 100%)    Parameters below as of 23 Feb 2025 09:46  Patient On (Oxygen Delivery Method): nasal cannula      I&O's Summary    22 Feb 2025 07:01  -  23 Feb 2025 07:00  --------------------------------------------------------  IN: 200 mL / OUT: 2800 mL / NET: -2600 mL    23 Feb 2025 07:01  -  23 Feb 2025 12:40  --------------------------------------------------------  IN: 0 mL / OUT: 820 mL / NET: -820 mL        PHYSICAL EXAM:  GENERAL: NAD, thin-elderly, comfortable now on hi-flow --> 6L --> 4L  HEAD:  Atraumatic, Normocephalic  EYES: EOMI, PERRLA, conjunctiva and sclera clear  NECK: Supple, No JVD  CHEST/LUNG: mild decrease breath sounds bilaterally; No wheeze   HEART: Regular rate and rhythm; No murmurs, rubs, or gallops  ABDOMEN: Soft, Nontender, Nondistended; Bowel sounds present  Neuro: AAOx3, no focal weakness   EXTREMITIES:  2+ Peripheral Pulses, No clubbing, cyanosis, or edema  SKIN: No rashes or lesions

## 2025-02-23 NOTE — PROGRESS NOTE ADULT - ASSESSMENT
Pt is a 89 yo F with PMH CHF, anemia, pHTN, CAD, CVA (no deficits), T2D, [place], COPD (2L NC PRN, qhs), PVD, HTN, HLD, and ?lung CA (pending RT) p/w dysuria x 1wk along with tremors and SOB. On arrival, meeting SIRS criteria with hypotension and hypoxia requiring 2L NC. EKG with sinus tachycardia, no ischemic changes. ER POCUS with diffuse B lines. Labs with leukocytosis, normocytic anemia, trop neg x2, BNP 1406, lactate neg, UA+ with UCx and BCx in lab, RVP neg, MRSA in lab. CTA chest neg PE but with multifocal PNA, pulmonary edema, and 2.8x2.7cm mass LLL interval inc compared to prior. MICU consulted for hypotension with c/f need for pressors, however, improved with IVF and midodrine; started on cefepime. ID + pulm consulted and PT consulted with recs pending.

## 2025-02-24 LAB
ANION GAP SERPL CALC-SCNC: 15 MMOL/L — HIGH (ref 7–14)
BASOPHILS # BLD AUTO: 0.06 K/UL — SIGNIFICANT CHANGE UP (ref 0–0.2)
BASOPHILS NFR BLD AUTO: 0.5 % — SIGNIFICANT CHANGE UP (ref 0–2)
BLOOD GAS VENOUS COMPREHENSIVE RESULT: SIGNIFICANT CHANGE UP
BUN SERPL-MCNC: 36 MG/DL — HIGH (ref 7–23)
CALCIUM SERPL-MCNC: 9.8 MG/DL — SIGNIFICANT CHANGE UP (ref 8.4–10.5)
CHLORIDE SERPL-SCNC: 98 MMOL/L — SIGNIFICANT CHANGE UP (ref 98–107)
CO2 SERPL-SCNC: 25 MMOL/L — SIGNIFICANT CHANGE UP (ref 22–31)
CREAT SERPL-MCNC: 0.88 MG/DL — SIGNIFICANT CHANGE UP (ref 0.5–1.3)
CULTURE RESULTS: SIGNIFICANT CHANGE UP
CULTURE RESULTS: SIGNIFICANT CHANGE UP
EGFR: 62 ML/MIN/1.73M2 — SIGNIFICANT CHANGE UP
EGFR: 62 ML/MIN/1.73M2 — SIGNIFICANT CHANGE UP
EOSINOPHIL # BLD AUTO: 0.19 K/UL — SIGNIFICANT CHANGE UP (ref 0–0.5)
EOSINOPHIL NFR BLD AUTO: 1.6 % — SIGNIFICANT CHANGE UP (ref 0–6)
GLUCOSE SERPL-MCNC: 190 MG/DL — HIGH (ref 70–99)
HCT VFR BLD CALC: 32.2 % — LOW (ref 34.5–45)
HGB BLD-MCNC: 9.9 G/DL — LOW (ref 11.5–15.5)
IANC: 9.43 K/UL — HIGH (ref 1.8–7.4)
IMM GRANULOCYTES NFR BLD AUTO: 2 % — HIGH (ref 0–0.9)
LYMPHOCYTES # BLD AUTO: 1.23 K/UL — SIGNIFICANT CHANGE UP (ref 1–3.3)
LYMPHOCYTES # BLD AUTO: 10.3 % — LOW (ref 13–44)
MAGNESIUM SERPL-MCNC: 2 MG/DL — SIGNIFICANT CHANGE UP (ref 1.6–2.6)
MCHC RBC-ENTMCNC: 27.6 PG — SIGNIFICANT CHANGE UP (ref 27–34)
MCHC RBC-ENTMCNC: 30.7 G/DL — LOW (ref 32–36)
MCV RBC AUTO: 89.7 FL — SIGNIFICANT CHANGE UP (ref 80–100)
MONOCYTES # BLD AUTO: 0.76 K/UL — SIGNIFICANT CHANGE UP (ref 0–0.9)
MONOCYTES NFR BLD AUTO: 6.4 % — SIGNIFICANT CHANGE UP (ref 2–14)
NEUTROPHILS # BLD AUTO: 9.43 K/UL — HIGH (ref 1.8–7.4)
NEUTROPHILS NFR BLD AUTO: 79.2 % — HIGH (ref 43–77)
NRBC # BLD AUTO: 0 K/UL — SIGNIFICANT CHANGE UP (ref 0–0)
NRBC # FLD: 0 K/UL — SIGNIFICANT CHANGE UP (ref 0–0)
NRBC BLD AUTO-RTO: 0 /100 WBCS — SIGNIFICANT CHANGE UP (ref 0–0)
PHOSPHATE SERPL-MCNC: 4.1 MG/DL — SIGNIFICANT CHANGE UP (ref 2.5–4.5)
PLATELET # BLD AUTO: 454 K/UL — HIGH (ref 150–400)
POTASSIUM SERPL-MCNC: 4.7 MMOL/L — SIGNIFICANT CHANGE UP (ref 3.5–5.3)
POTASSIUM SERPL-SCNC: 4.7 MMOL/L — SIGNIFICANT CHANGE UP (ref 3.5–5.3)
RBC # BLD: 3.59 M/UL — LOW (ref 3.8–5.2)
RBC # FLD: 15.4 % — HIGH (ref 10.3–14.5)
SODIUM SERPL-SCNC: 138 MMOL/L — SIGNIFICANT CHANGE UP (ref 135–145)
SPECIMEN SOURCE: SIGNIFICANT CHANGE UP
SPECIMEN SOURCE: SIGNIFICANT CHANGE UP
WBC # BLD: 11.91 K/UL — HIGH (ref 3.8–10.5)
WBC # FLD AUTO: 11.91 K/UL — HIGH (ref 3.8–10.5)

## 2025-02-24 PROCEDURE — 99232 SBSQ HOSP IP/OBS MODERATE 35: CPT

## 2025-02-24 RX ORDER — CEFEPIME 2 G/20ML
2000 INJECTION, POWDER, FOR SOLUTION INTRAVENOUS
Refills: 0 | Status: DISCONTINUED | OUTPATIENT
Start: 2025-02-24 | End: 2025-02-25

## 2025-02-24 RX ORDER — INSULIN LISPRO 100 U/ML
16 INJECTION, SOLUTION INTRAVENOUS; SUBCUTANEOUS
Refills: 0 | Status: DISCONTINUED | OUTPATIENT
Start: 2025-02-24 | End: 2025-02-24

## 2025-02-24 RX ORDER — DEXTROSE 50 % IN WATER 50 %
12.5 SYRINGE (ML) INTRAVENOUS ONCE
Refills: 0 | Status: COMPLETED | OUTPATIENT
Start: 2025-02-24 | End: 2025-02-24

## 2025-02-24 RX ORDER — INSULIN LISPRO 100 U/ML
20 INJECTION, SOLUTION INTRAVENOUS; SUBCUTANEOUS
Refills: 0 | Status: DISCONTINUED | OUTPATIENT
Start: 2025-02-24 | End: 2025-02-25

## 2025-02-24 RX ORDER — PREDNISONE 20 MG/1
20 TABLET ORAL EVERY 12 HOURS
Refills: 0 | Status: COMPLETED | OUTPATIENT
Start: 2025-02-24 | End: 2025-02-27

## 2025-02-24 RX ADMIN — INSULIN LISPRO 6: 100 INJECTION, SOLUTION INTRAVENOUS; SUBCUTANEOUS at 12:53

## 2025-02-24 RX ADMIN — Medication 1 TABLET(S): at 09:25

## 2025-02-24 RX ADMIN — Medication 81 MILLIGRAM(S): at 09:24

## 2025-02-24 RX ADMIN — POLYETHYLENE GLYCOL 3350 17 GRAM(S): 17 POWDER, FOR SOLUTION ORAL at 06:16

## 2025-02-24 RX ADMIN — PREDNISONE 20 MILLIGRAM(S): 20 TABLET ORAL at 18:24

## 2025-02-24 RX ADMIN — Medication 100 MILLIGRAM(S): at 06:16

## 2025-02-24 RX ADMIN — SILDENAFIL 20 MILLIGRAM(S): 50 TABLET, FILM COATED ORAL at 06:17

## 2025-02-24 RX ADMIN — Medication 12.5 GRAM(S): at 17:30

## 2025-02-24 RX ADMIN — Medication 1 PUFF(S): at 12:54

## 2025-02-24 RX ADMIN — Medication 1 DOSE(S): at 21:22

## 2025-02-24 RX ADMIN — Medication 1 PUFF(S): at 06:14

## 2025-02-24 RX ADMIN — METHYLPREDNISOLONE ACETATE 20 MILLIGRAM(S): 80 INJECTION, SUSPENSION INTRA-ARTICULAR; INTRALESIONAL; INTRAMUSCULAR; SOFT TISSUE at 06:16

## 2025-02-24 RX ADMIN — ENOXAPARIN SODIUM 40 MILLIGRAM(S): 100 INJECTION SUBCUTANEOUS at 21:21

## 2025-02-24 RX ADMIN — FUROSEMIDE 40 MILLIGRAM(S): 10 INJECTION INTRAMUSCULAR; INTRAVENOUS at 06:16

## 2025-02-24 RX ADMIN — INSULIN LISPRO 20 UNIT(S): 100 INJECTION, SOLUTION INTRAVENOUS; SUBCUTANEOUS at 09:23

## 2025-02-24 RX ADMIN — INSULIN LISPRO 16 UNIT(S): 100 INJECTION, SOLUTION INTRAVENOUS; SUBCUTANEOUS at 12:53

## 2025-02-24 RX ADMIN — METOPROLOL SUCCINATE 12.5 MILLIGRAM(S): 50 TABLET, EXTENDED RELEASE ORAL at 06:17

## 2025-02-24 RX ADMIN — Medication 1 PUFF(S): at 18:24

## 2025-02-24 RX ADMIN — CEFEPIME 100 MILLIGRAM(S): 2 INJECTION, POWDER, FOR SOLUTION INTRAVENOUS at 21:29

## 2025-02-24 RX ADMIN — ATORVASTATIN CALCIUM 20 MILLIGRAM(S): 80 TABLET, FILM COATED ORAL at 21:21

## 2025-02-24 RX ADMIN — SILDENAFIL 20 MILLIGRAM(S): 50 TABLET, FILM COATED ORAL at 14:25

## 2025-02-24 RX ADMIN — CEFEPIME 100 MILLIGRAM(S): 2 INJECTION, POWDER, FOR SOLUTION INTRAVENOUS at 09:22

## 2025-02-24 RX ADMIN — GABAPENTIN 400 MILLIGRAM(S): 400 CAPSULE ORAL at 21:21

## 2025-02-24 RX ADMIN — SILDENAFIL 20 MILLIGRAM(S): 50 TABLET, FILM COATED ORAL at 21:23

## 2025-02-24 RX ADMIN — INSULIN GLARGINE-YFGN 40 UNIT(S): 100 INJECTION, SOLUTION SUBCUTANEOUS at 21:29

## 2025-02-24 RX ADMIN — LEVALBUTEROL HYDROCHLORIDE 1.25 MILLIGRAM(S): 1.25 SOLUTION RESPIRATORY (INHALATION) at 08:21

## 2025-02-24 RX ADMIN — METOPROLOL SUCCINATE 12.5 MILLIGRAM(S): 50 TABLET, EXTENDED RELEASE ORAL at 21:29

## 2025-02-24 RX ADMIN — LEVALBUTEROL HYDROCHLORIDE 1.25 MILLIGRAM(S): 1.25 SOLUTION RESPIRATORY (INHALATION) at 15:19

## 2025-02-24 RX ADMIN — Medication 1 DOSE(S): at 09:24

## 2025-02-24 RX ADMIN — Medication 40 MILLIGRAM(S): at 06:18

## 2025-02-24 RX ADMIN — INSULIN LISPRO 4: 100 INJECTION, SOLUTION INTRAVENOUS; SUBCUTANEOUS at 09:23

## 2025-02-24 RX ADMIN — Medication 3 MILLIGRAM(S): at 22:27

## 2025-02-24 RX ADMIN — Medication 1 PUFF(S): at 21:22

## 2025-02-24 RX ADMIN — Medication 100 MILLIGRAM(S): at 18:24

## 2025-02-24 NOTE — PROVIDER CONTACT NOTE (HYPOGLYCEMIA EVENT) - NS PROVIDER CONTACT BACKGROUND-HYPO
Age: 89y    Gender: Female    POCT Blood Glucose:  194 mg/dL (02-24-25 @ 18:01)  210 mg/dL (02-24-25 @ 17:46)  58 mg/dL (02-24-25 @ 17:26)  268 mg/dL (02-24-25 @ 12:10)  244 mg/dL (02-24-25 @ 08:30)  135 mg/dL (02-23-25 @ 21:35)      eMAR:atorvastatin   20 milliGRAM(s) Oral (02-23-25 @ 21:54)    dextrose 50% Injectable   12.5 Gram(s) IV Push (02-24-25 @ 17:30)    insulin glargine Injectable (LANTUS)   40 Unit(s) SubCutaneous (02-23-25 @ 21:51)    insulin lispro (ADMELOG) corrective regimen sliding scale   6 Unit(s) SubCutaneous (02-24-25 @ 12:53)   4 Unit(s) SubCutaneous (02-24-25 @ 09:23)    insulin lispro Injectable (ADMELOG)   20 Unit(s) SubCutaneous (02-24-25 @ 09:23)    insulin lispro Injectable (ADMELOG)   16 Unit(s) SubCutaneous (02-24-25 @ 12:53)    methylPREDNISolone sodium succinate Injectable   20 milliGRAM(s) IV Push (02-24-25 @ 06:16)   20 milliGRAM(s) IV Push (02-23-25 @ 21:54)    predniSONE   Tablet   20 milliGRAM(s) Oral (02-24-25 @ 18:24)

## 2025-02-24 NOTE — PROGRESS NOTE ADULT - ASSESSMENT
89 y/o F PMhx CHF, anemia, pHTN, CAD, CVA, DMII, COPD (2L NC PRN, qhs), PVD, HTN, and ?lung CA (pending RT) who presented w/ dysuria x 5 days as well as SOB and rigors.    L lung cancer, PNA  sepsis- fever, leukocytosis- resolved  RVP negative  CTA chest- negative for PE but demonstrated increase in groundglass opacities and subpleural consolidations in bilateral lungs; Interlobular septal thickening in the upper lobes, which can be suggestive of interstitial edema 3.1 cm masslike consolidation in left lower lobe. Additional smaller nodules in bilateral lungs. Subpleural reticulations, Honeycombing, and peripheral cystic changes in bilateral lungs, suggestive of interstitial lung fibrosis.    UTI  pt w/ dysuria, no flank tenderness, UA positive  urine cx E faecium, sensitivities reviewed  blood cultures- NGTD    Recommendations  c/w cefepime 2g every 12 hours (renally adjusted)  - will extent antibiotic course to 7 days w/ last dose 2/25 AM  c/w macrobid w/ last dose 2/25 AM    Aldo Verduzco M.D.  [facility]  [phone]  After 5pm on weekdays and all day on weekends - please call [phone]  Available on [employer] teams    Thank you for consulting us and involving us in the management of this patients case. In addition to reviewing history, imaging, documents, labs, microbiology, took into account antibiotic stewardship, local antibiogram and infection control strategies and potential transmission issues.

## 2025-02-24 NOTE — PROGRESS NOTE ADULT - ASSESSMENT
ECHO 10/7/24: nl LV sys fx EF 60% . Mild mitral valve stenosis. Mild to moderate tricuspid regurgitation. mod AS   ECHO  2/22/25 nl LV sys fx  ef 68%, moderate (grade 2) left ventricular diastolic dysfunction,mild pulmonary hypertension. The aortic valve appears trileaflet with reduced systolic excursion. There is calcification of the aortic valve leaflets. The aortic valve acceleration time is 90 msec. There is no evidence of aortic regurgitation.    a/p    89 yo F with PMH CHF, valvular disease, anemia, pHTN, CAD, CVA (no deficits), T2D, [place], COPD (2L NC PRN, qhs), PVD, HTN, HLD, and ?lung CA (pending RT) p/w dysuria x 1wk along with tremors and SOB + pna    #PNA  -Meeting sepsis criteria w/ fever, tachycardia, leukocytosis  -CTa chest with no PE, Interval increase in groundglass opacities and subpleural consolidations  in bilateral lungs, interlobularseptal thickening in  the upper lobes, which can be suggestive of interstitial edema; 3.1 cm mass like consolidation in left lower lobe, Mediastinal and hilar lymphadenopathy which can be reactive or due to  metastasis.  Subpleural reticulations, [place], and peripheral cystic changes in  bilateral lungs, suggestive of interstitial lung fibrosis.  -abx per id/med  -cont iv lasix     #hypotension   -resolved  -micu eval noted- hypotensive to 80s/40s, given 2x 500cc bolus and Midodrine 10mg w/ improvement in BP to MAP >70  -Pocus w/ collapsed IVC suggestive of intravascular depletion, but w/ evidence of reduced RV and LV function   -bcx ngtd  -not a candidate for MICU per team   -HS trop neg. no ischemic changes to ecg   -ECHO  2/22/25 nl LV sys fx  ef 68%, moderate (grade 2) left ventricular diastolic dysfunction,mild pulmonary hypertension.    #CAD s/p PCI  -stable  -cont asa, statin, BB    #HFpEF  -ECHO  2/22/25 nl LV sys fx  ef 68%, moderate (grade 2) left ventricular diastolic dysfunction,mild pulmonary hypertension.  -c/w iv lasix     #Pulmonary HTN.   -c/w home sildenafil 20mg TID.  -echo with mild pulm htn     #Lung CA  -med/ pulm fu   -pending RT initiation as outpt     #UTI   -management per med /ID    dvt ppx

## 2025-02-24 NOTE — PROGRESS NOTE ADULT - ASSESSMENT
A/P: Pt is a 89 yo F with PMH CHF, anemia, pHTN, CAD, CVA (no deficits), T2D, [place], COPD (2L NC PRN, qhs), PVD, HTN, HLD, and ?lung CA (pending RT) p/w dysuria x 1wk along with tremors and SOB, found to be hyperglycemic and will be on steroids. Endocrinology was consulted for management of diabetes mellitus.    #Type 2 Diabetes Mellitus  - Receiving methylpred 20 mg IV q8h - 2/21- ?- solumedrol 20mg IV q8h is continued.  -  Please notify endocrine if any change to steroid plan.  - HbA1c 9.7%; home regimen: Tresiba 10 units at bedtime (Tresiba no longer covered working on switching to a new basal prior to admission), metformin 500mg (?) 2 tabs daily       INPATIENT PLAN:  - Inpatient BG goal 100-180mg/dl: AM, bedtime and prandial FS remain above goal. This am changed Admelog back to 16 units for lunch dose in potential preparation for lower FS. However lunch FS remains well above goal (268). Admelog changed back to 20 units.   - Continue Lantus 40 units qhs  - Continue Admelog 20 units TIDQAC hold if NPO  - continue moderate admelog correction scale TIDQAC and  moderate QHS scale  - Please check FSG before meals and QHS, or q6h while NPO  - RD consult  - hypoglycemia orderset prn  -consistent carb diet  -changed antibiotics to non dextrose fluid    DISCHARGE PLAN:  - depends on steroids- please inform endocrine of steroid plans---> basal/metformin or basal/bolus TBD  - Tresiba no longer covered, to find an alternative basal insulin that is covered.  - Clarify dose of metformin tabs at home  - Also given patient had a son with type 1 DM sent c-peptide (with serum glucose), FELISHA Ab, IA-2 Ab, and Zinc transporter Ab with Am labs to rule out JILLIAN, now testing in lab. C-peptide 3.7 (glu 376) consistent with type 2 DM. Can follow up antibodies for completeness.  - Patient attributes high A1c to several hospital admissions in past few months.  - Patient uses glucometer and has previously declined CGM (will reconsider).  - Patient wishes to follow up with pcp and declines initiating care with an endocrinologist.    #Hypertension  - BP goal <130/80  - Management as per primary team, not on meds now    #Hyperlipidemia  - ordered for atorvastatin 20 mg    ARIAN Tucker-BC  Nurse Practitioner  Division of Endocrinology  Contact on TEAMS    If out of hospital/unavailable when paged, please note: patient will be cared for by another provider on the endocrine service.  For urgent concerns: call the endocrine answering service for assistance to reach covering provider ([phone]). For non-urgent matters: please email [email or web address] for assistance.

## 2025-02-24 NOTE — PROGRESS NOTE ADULT - SUBJECTIVE AND OBJECTIVE BOX
Chief Complaint: T2DM c/b steroid induced hyperglycemia     Interval Events: Pt seen and examined at bedside earlier today. Pt tolerating carb consistent diet.     MEDICATIONS  (STANDING):  aspirin enteric coated 81 milliGRAM(s) Oral daily  atorvastatin 20 milliGRAM(s) Oral at bedtime  cefepime   IVPB 2000 milliGRAM(s) IV Intermittent <User Schedule>  dextrose 5%. 1000 milliLiter(s) (50 mL/Hr) IV Continuous <Continuous>  dextrose 5%. 1000 milliLiter(s) (100 mL/Hr) IV Continuous <Continuous>  dextrose 50% Injectable 25 Gram(s) IV Push once  dextrose 50% Injectable 12.5 Gram(s) IV Push once  dextrose 50% Injectable 25 Gram(s) IV Push once  enoxaparin Injectable 40 milliGRAM(s) SubCutaneous every 24 hours  fluticasone propionate/ salmeterol 250-50 MICROgram(s) Diskus 1 Dose(s) Inhalation two times a day  furosemide   Injectable 40 milliGRAM(s) IV Push daily  gabapentin 400 milliGRAM(s) Oral at bedtime  glucagon  Injectable 1 milliGRAM(s) IntraMuscular once  insulin glargine Injectable (LANTUS) 40 Unit(s) SubCutaneous at bedtime  insulin lispro (ADMELOG) corrective regimen sliding scale   SubCutaneous three times a day before meals  insulin lispro (ADMELOG) corrective regimen sliding scale   SubCutaneous at bedtime  insulin lispro Injectable (ADMELOG) 20 Unit(s) SubCutaneous three times a day before meals  ipratropium 17 MICROgram(s) HFA Inhaler 1 Puff(s) Inhalation every 6 hours  levalbuterol Inhalation 1.25 milliGRAM(s) Inhalation every 6 hours  metoprolol tartrate 12.5 milliGRAM(s) Oral two times a day  multivitamin 1 Tablet(s) Oral daily  nitrofurantoin monohydrate/macrocrystals (MACROBID) 100 milliGRAM(s) Oral two times a day  pantoprazole    Tablet 40 milliGRAM(s) Oral before breakfast  polyethylene glycol 3350 17 Gram(s) Oral two times a day  predniSONE   Tablet 20 milliGRAM(s) Oral every 12 hours  senna 2 Tablet(s) Oral at bedtime  sildenafil (REVATIO) 20 milliGRAM(s) Oral three times a day    MEDICATIONS  (PRN):  acetaminophen     Tablet .. 650 milliGRAM(s) Oral every 6 hours PRN Temp greater or equal to 38C (100.4F), Mild Pain (1 - 3)  dextrose Oral Gel 15 Gram(s) Oral once PRN Blood Glucose LESS THAN 70 milliGRAM(s)/deciliter  melatonin 3 milliGRAM(s) Oral at bedtime PRN Insomnia  ondansetron Injectable 4 milliGRAM(s) IV Push every 8 hours PRN Nausea and/or Vomiting      Allergies    penicillin (Unknown)  macrolide antibiotics (Other)  Biaxin (Unknown)    Intolerances      Review of Systems:  Eyes: No blurry vision  Cardiovascular: No chest pain, palpitations  Respiratory: No SOB, no cough  GI: No nausea, vomiting, abdominal pain  : No dysuria    ALL OTHER SYSTEMS REVIEWED AND NEGATIVE    VITALS: T(C): 36.7 (02-24-25 @ 14:00)  T(F): 98 (02-24-25 @ 14:00), Max: 98.7 (02-23-25 @ 20:39)  HR: 119 (02-24-25 @ 14:15) (77 - 126)  BP: 119/52 (02-24-25 @ 14:15) (104/55 - 135/68)  RR:  (15 - 24)  SpO2:  (96% - 100%)  Wt(kg): --      Physical Exam:   GENERAL: NAD, well-developed  EYES: No proptosis  HEENT:  Atraumatic, Normocephalic  RESPIRATORY: non labored breathing   GI: Non distended  PSYCH: Alert, normal affect, normal mood      CAPILLARY BLOOD GLUCOSE    POCT Blood Glucose.: 268 mg/dL (24 Feb 2025 12:10)  POCT Blood Glucose.: 244 mg/dL (24 Feb 2025 08:30)  POCT Blood Glucose.: 135 mg/dL (23 Feb 2025 21:35)  POCT Blood Glucose.: 118 mg/dL (23 Feb 2025 17:32)      02-24    138  |  98  |  36[H]  ----------------------------<  190[H]  4.7   |  25  |  0.88    eGFR: 62    Ca    9.8      02-24  Mg     2.00     02-24  Phos  4.1     02-24        A1C with Estimated Average Glucose Result: 9.7 % (02-19-25 @ 09:14)  A1C with Estimated Average Glucose Result: 8.0 % (12-13-24 @ 14:35)  A1C with Estimated Average Glucose Result: 6.6 % (09-05-24 @ 13:42)  A1C with Estimated Average Glucose Result: 9.2 % (04-20-24 @ 08:50)      Thyroid Function Tests:  02-19 @ 09:14 TSH 1.95 FreeT4 -- T3 -- Anti TPO -- Anti Thyroglobulin Ab -- TSI --

## 2025-02-24 NOTE — PROGRESS NOTE ADULT - SUBJECTIVE AND OBJECTIVE BOX
[facility]  KRISSY Luong Y. Patel, S. Shah, G. Casimir  [phone]  ([phone] - weekdays after 5pm and weekends)    Name: FEDERICO DOS SANTOS  Age/Gender: 89y Female  MRN: [id number]    Interval History:  Notes reviewed.   No concerning overnight events.  Afebrile.   reports dysuria resolved    Allergies: penicillin (Unknown)  macrolide antibiotics (Other)  Biaxin (Unknown)      Objective:  Vitals:   T(F): 97.4 (02-24-25 @ 10:03), Max: 98.7 (02-23-25 @ 20:39)  HR: 100 (02-24-25 @ 10:03) (77 - 100)  BP: 135/68 (02-24-25 @ 10:03) (116/50 - 135/68)  RR: 19 (02-24-25 @ 10:03) (15 - 19)  SpO2: 100% (02-24-25 @ 10:03) (97% - 100%)  Physical Examination:  General: no acute distress  HEENT: anicteric, NC  Cardio: S1, S2, normal rate  Resp: decreased breath sounds  Abd: soft, NT, ND  Ext: no LE edema  Skin: warm, dry    Laboratory Studies:  CBC:                       9.9    11.91 )-----------( 454      ( 24 Feb 2025 07:40 )             32.2     WBC Trend:  11.91 02-24-25 @ 07:40  9.54 02-23-25 @ 07:21  10.32 02-22-25 @ 15:59  9.02 02-21-25 @ 06:33  8.53 02-20-25 @ 05:37  11.15 02-19-25 @ 09:14  12.67 02-18-25 @ 20:07    CMP: 02-24    138  |  98  |  36[H]  ----------------------------<  190[H]  4.7   |  25  |  0.88    Ca    9.8      24 Feb 2025 07:40  Phos  4.1     02-24  Mg     2.00     02-24            Urinalysis Basic - ( 24 Feb 2025 07:40 )    Color: x / Appearance: x / SG: x / pH: x  Gluc: 190 mg/dL / Ketone: x  / Bili: x / Urobili: x   Blood: x / Protein: x / Nitrite: x   Leuk Esterase: x / RBC: x / WBC x   Sq Epi: x / Non Sq Epi: x / Bacteria: x      Microbiology: reviewed     Culture - Blood (collected 02-18-25 @ 21:42)  Source: .Blood Blood-Peripheral  Final Report (02-24-25 @ 01:01):    No growth at 5 days    Culture - Urine (collected 02-18-25 @ 21:36)  Source: Clean Catch Clean Catch (Midstream)  Final Report (02-20-25 @ 16:54):    50,000 - 99,000 CFU/mL Enterococcus faecium    <10,000 CFU/ml Normal Urogenital vonnie present  Organism: Enterococcus faecium (02-20-25 @ 16:54)  Organism: Enterococcus faecium (02-20-25 @ 16:54)      Method Type: KHADIJAH      -  Ampicillin: S <=2 Predicts results to ampicillin/sulbactam, amoxacillin-clavulanate and  piperacillin-tazobactam.      -  Ciprofloxacin: I 2      -  Levofloxacin: I 4      -  Nitrofurantoin: S <=32 Should not be used to treat pyelonephritis.      -  Tetracycline: S <=4      -  Vancomycin: S 0.5    Culture - Blood (collected 02-18-25 @ 20:45)  Source: .Blood Blood-Peripheral  Final Report (02-24-25 @ 01:01):    No growth at 5 days        Radiology: reviewed     Medications:  acetaminophen     Tablet .. 650 milliGRAM(s) Oral every 6 hours PRN  aspirin enteric coated 81 milliGRAM(s) Oral daily  atorvastatin 20 milliGRAM(s) Oral at bedtime  cefepime   IVPB 2000 milliGRAM(s) IV Intermittent <User Schedule>  dextrose 5%. 1000 milliLiter(s) IV Continuous <Continuous>  dextrose 5%. 1000 milliLiter(s) IV Continuous <Continuous>  dextrose 50% Injectable 25 Gram(s) IV Push once  dextrose 50% Injectable 12.5 Gram(s) IV Push once  dextrose 50% Injectable 25 Gram(s) IV Push once  dextrose Oral Gel 15 Gram(s) Oral once PRN  enoxaparin Injectable 40 milliGRAM(s) SubCutaneous every 24 hours  fluticasone propionate/ salmeterol 250-50 MICROgram(s) Diskus 1 Dose(s) Inhalation two times a day  furosemide   Injectable 40 milliGRAM(s) IV Push daily  gabapentin 400 milliGRAM(s) Oral at bedtime  glucagon  Injectable 1 milliGRAM(s) IntraMuscular once  insulin glargine Injectable (LANTUS) 40 Unit(s) SubCutaneous at bedtime  insulin lispro (ADMELOG) corrective regimen sliding scale   SubCutaneous three times a day before meals  insulin lispro (ADMELOG) corrective regimen sliding scale   SubCutaneous at bedtime  insulin lispro Injectable (ADMELOG) 16 Unit(s) SubCutaneous three times a day before meals  ipratropium 17 MICROgram(s) HFA Inhaler 1 Puff(s) Inhalation every 6 hours  levalbuterol Inhalation 1.25 milliGRAM(s) Inhalation every 6 hours  melatonin 3 milliGRAM(s) Oral at bedtime PRN  methylPREDNISolone sodium succinate Injectable 20 milliGRAM(s) IV Push every 8 hours  metoprolol tartrate 12.5 milliGRAM(s) Oral two times a day  multivitamin 1 Tablet(s) Oral daily  nitrofurantoin monohydrate/macrocrystals (MACROBID) 100 milliGRAM(s) Oral two times a day  ondansetron Injectable 4 milliGRAM(s) IV Push every 8 hours PRN  pantoprazole    Tablet 40 milliGRAM(s) Oral before breakfast  polyethylene glycol 3350 17 Gram(s) Oral two times a day  senna 2 Tablet(s) Oral at bedtime  sildenafil (REVATIO) 20 milliGRAM(s) Oral three times a day    Antimicrobials:  cefepime   IVPB 2000 milliGRAM(s) IV Intermittent <User Schedule>  nitrofurantoin monohydrate/macrocrystals (MACROBID) 100 milliGRAM(s) Oral two times a day

## 2025-02-24 NOTE — PROGRESS NOTE ADULT - SUBJECTIVE AND OBJECTIVE BOX
Date of Service: 02-24-25 @ 12:34    Patient is a 89y old  Female who presents with a chief complaint of UTI, PNA (24 Feb 2025 11:51)      Any change in ROS: seems same to me:  not in any resp distress;  on 3 L of oxygen     MEDICATIONS  (STANDING):  aspirin enteric coated 81 milliGRAM(s) Oral daily  atorvastatin 20 milliGRAM(s) Oral at bedtime  cefepime   IVPB 2000 milliGRAM(s) IV Intermittent <User Schedule>  dextrose 5%. 1000 milliLiter(s) (50 mL/Hr) IV Continuous <Continuous>  dextrose 5%. 1000 milliLiter(s) (100 mL/Hr) IV Continuous <Continuous>  dextrose 50% Injectable 25 Gram(s) IV Push once  dextrose 50% Injectable 12.5 Gram(s) IV Push once  dextrose 50% Injectable 25 Gram(s) IV Push once  enoxaparin Injectable 40 milliGRAM(s) SubCutaneous every 24 hours  fluticasone propionate/ salmeterol 250-50 MICROgram(s) Diskus 1 Dose(s) Inhalation two times a day  furosemide   Injectable 40 milliGRAM(s) IV Push daily  gabapentin 400 milliGRAM(s) Oral at bedtime  glucagon  Injectable 1 milliGRAM(s) IntraMuscular once  insulin glargine Injectable (LANTUS) 40 Unit(s) SubCutaneous at bedtime  insulin lispro (ADMELOG) corrective regimen sliding scale   SubCutaneous three times a day before meals  insulin lispro (ADMELOG) corrective regimen sliding scale   SubCutaneous at bedtime  insulin lispro Injectable (ADMELOG) 16 Unit(s) SubCutaneous three times a day before meals  ipratropium 17 MICROgram(s) HFA Inhaler 1 Puff(s) Inhalation every 6 hours  levalbuterol Inhalation 1.25 milliGRAM(s) Inhalation every 6 hours  methylPREDNISolone sodium succinate Injectable 20 milliGRAM(s) IV Push every 8 hours  metoprolol tartrate 12.5 milliGRAM(s) Oral two times a day  multivitamin 1 Tablet(s) Oral daily  nitrofurantoin monohydrate/macrocrystals (MACROBID) 100 milliGRAM(s) Oral two times a day  pantoprazole    Tablet 40 milliGRAM(s) Oral before breakfast  polyethylene glycol 3350 17 Gram(s) Oral two times a day  senna 2 Tablet(s) Oral at bedtime  sildenafil (REVATIO) 20 milliGRAM(s) Oral three times a day    MEDICATIONS  (PRN):  acetaminophen     Tablet .. 650 milliGRAM(s) Oral every 6 hours PRN Temp greater or equal to 38C (100.4F), Mild Pain (1 - 3)  dextrose Oral Gel 15 Gram(s) Oral once PRN Blood Glucose LESS THAN 70 milliGRAM(s)/deciliter  melatonin 3 milliGRAM(s) Oral at bedtime PRN Insomnia  ondansetron Injectable 4 milliGRAM(s) IV Push every 8 hours PRN Nausea and/or Vomiting    Vital Signs Last 24 Hrs  T(C): 36.3 (24 Feb 2025 10:03), Max: 37.1 (23 Feb 2025 20:39)  T(F): 97.4 (24 Feb 2025 10:03), Max: 98.7 (23 Feb 2025 20:39)  HR: 100 (24 Feb 2025 10:03) (77 - 100)  BP: 135/68 (24 Feb 2025 10:03) (116/50 - 135/68)  BP(mean): --  RR: 19 (24 Feb 2025 10:03) (15 - 19)  SpO2: 100% (24 Feb 2025 10:03) (97% - 100%)    Parameters below as of 24 Feb 2025 10:03  Patient On (Oxygen Delivery Method): nasal cannula  O2 Flow (L/min): 3      I&O's Summary    23 Feb 2025 07:01  -  24 Feb 2025 07:00  --------------------------------------------------------  IN: 0 mL / OUT: 1420 mL / NET: -1420 mL          Physical Exam:   GENERAL: NAD, well-groomed, well-developed  HEENT: MARCIO/   Atraumatic, Normocephalic  ENMT: No tonsillar erythema, exudates, or enlargement; Moist mucous membranes, Good dentition, No lesions  NECK: Supple, No JVD, Normal thyroid  CHEST/LUNG: poor air enery bilaterally;   CVS: Regular rate and rhythm; No murmurs, rubs, or gallops  GI: : Soft, Nontender, Nondistended; Bowel sounds present  NERVOUS SYSTEM:  Alert & Oriented X3  EXTREMITIES:- edema  LYMPH: No lymphadenopathy noted  SKIN: No rashes or lesions  ENDOCRINOLOGY: No Thyromegaly  PSYCH: Appropriate    Labs:  32, 29, 23, 32, 31                            9.9    11.91 )-----------( 454      ( 24 Feb 2025 07:40 )             32.2                         9.2    9.54  )-----------( 401      ( 23 Feb 2025 07:21 )             29.2                         9.0    10.32 )-----------( 448      ( 22 Feb 2025 15:59 )             29.1                         9.2    9.02  )-----------( 453      ( 21 Feb 2025 06:33 )             28.9     02-24    138  |  98  |  36[H]  ----------------------------<  190[H]  4.7   |  25  |  0.88  02-23    135  |  97[L]  |  30[H]  ----------------------------<  269[H]  4.4   |  25  |  0.88  02-22    136  |  97[L]  |  31[H]  ----------------------------<  250[H]  4.3   |  22  |  0.93  02-21    134[L]  |  98  |  30[H]  ----------------------------<  376[H]  4.6   |  24  |  0.99    Ca    9.8      24 Feb 2025 07:40  Ca    9.2      23 Feb 2025 07:21  Ca    9.3      22 Feb 2025 15:59  Phos  4.1     02-24  Phos  3.6     02-23  Phos  2.0     02-22  Mg     2.00     02-24  Mg     1.80     02-23  Mg     1.80     02-22      CAPILLARY BLOOD GLUCOSE      POCT Blood Glucose.: 268 mg/dL (24 Feb 2025 12:10)  POCT Blood Glucose.: 244 mg/dL (24 Feb 2025 08:30)  POCT Blood Glucose.: 135 mg/dL (23 Feb 2025 21:35)  POCT Blood Glucose.: 118 mg/dL (23 Feb 2025 17:32)          Urinalysis Basic - ( 24 Feb 2025 07:40 )    Color: x / Appearance: x / SG: x / pH: x  Gluc: 190 mg/dL / Ketone: x  / Bili: x / Urobili: x   Blood: x / Protein: x / Nitrite: x   Leuk Esterase: x / RBC: x / WBC x   Sq Epi: x / Non Sq Epi: x / Bacteria: x      Lactate, Blood: 4.3 mmol/L (02-22 @ 15:59)  Lactate, Blood: 3.4 mmol/L (02-21 @ 14:45)        RECENT CULTURES:  02-18 @ 21:42 .Blood Blood-Peripheral                No growth at 5 days    02-18 @ 21:36 Clean Catch Clean Catch (Midstream)   KHADIJAH      Enterococcus faecium  Enterococcus faecium     50,000 - 99,000 CFU/mL Enterococcus faecium  <10,000 CFU/ml Normal Urogenital vonnie present    02-18 @ 20:45 .Blood Blood-Peripheral                No growth at 5 days          RESPIRATORY CULTURES:  rad< from: Xray Chest 1 View- PORTABLE-Urgent (Xray Chest 1 View- PORTABLE-Urgent .) (02.21.25 @ 11:48) >  FINDINGS:    Chest x-ray 2/20/2025 at 11:06 PM:  Surgical clips in the right axilla.  The heart is normal in size.  Diffuse bilateral patchy and hazy opacities, unchanged.  No pneumothorax.  Trace bilateral pleural effusions and associated atelectasis.  No acute bony abnormality.    Chest x-ray 2/21/2025 at 11:28 AM:  Progression of right base atelectasis or infiltrate. The left lung is   clearer.    IMPRESSION:  Changing infiltrates in latest image.    --- End of Report ---          DEANNA HERNANDEZ MD; Resident Radiologist  This document has been electronically signed.  LJ IRVIN MD; Attending Interventional Radiologist  This document has been electronically signed. Feb 21 2025  3:04PM    < end of copied text >      echo?ech< from: TTE W or WO Ultrasound Enhancing Agent (02.22.25 @ 09:47) >      1. Left ventricular cavity is normal in size. Left ventricular wall thickness is normal. Left ventricular systolic function is normal with an ejection fraction of 68 % by Gomez's method of disks. There are no regional wall motion abnormalities seen.   2. There is moderate (grade 2) left ventricular diastolic dysfunction, with elevated left ventricular filling pressure.   3. Normal right ventricular cavity size, with normal wall thickness, and normal right ventricular systolic function. Tricuspid annular plane systolic excursion (TAPSE) is 2.0 cm (normal >=1.7 cm).   4. Normal left and right atrial size.   5. No pericardial effusion seen.   6. Estimated pulmonary artery systolicpressure is 44 mmHg, consistent with mild pulmonary hypertension.   7. Technically difficult image quality.    < end of copied text >      Studies  Chest X-RAY  CT SCAN Chest   Venous Dopplers: LE:   CT Abdomen  Others

## 2025-02-24 NOTE — PROGRESS NOTE ADULT - ASSESSMENT
Pt is a 89 yo F with PMH CHF, anemia, pHTN, CAD, CVA (no deficits), T2D, [place], COPD (2L NC PRN, qhs), PVD, HTN, HLD, and ?lung CA (pending RT) p/w dysuria x 1wk along with tremors and SOB. On arrival, meeting SIRS criteria with hypotension and hypoxia requiring 2L NC. EKG with sinus tachycardia, no ischemic changes. ER POCUS with diffuse B lines. Labs with leukocytosis, normocytic anemia, trop neg x2, BNP 1406, lactate neg, UA+ with UCx and BCx in lab, RVP neg, MRSA in lab. CTA chest neg PE but with multifocal PNA, pulmonary edema, and 2.8x2.7cm mass LLL interval inc compared to prior. MICU consulted for hypotension with c/f need for pressors, however, improved with IVF and midodrine; started on cefepime. ID + pulm consulted and PT consulted with recs pending.

## 2025-02-24 NOTE — CHART NOTE - NSCHARTNOTEFT_GEN_A_CORE
sinus tachycardia 120-130 bp 104/55 temp 98 sat 100% on 3L   worked with PT prior and was placed in chair -> feeling sob in chair, was first time out of bed to chair.  placed back into bed and vitals to be checked in 1/2 hour.  repeat vitals 127/44 hr 98    addendum to above 2/24/25     notified patient had a hypoglycemic event 58, 56 1 amp d50 given repeat 210.  premeal dinner admelog to be Held.  iv steroids changed to po today.   emailed endocrine to make aware sinus tachycardia 120-130 bp 104/55 temp 98 sat 100% on 3L   worked with PT prior and was placed in chair -> feeling sob in chair, was first time out of bed to chair.  placed back into bed and vitals to be checked in 1/2 hour.  repeat vitals 127/44 hr 98    addendum to above 2/24/25     notified patient had a hypoglycemic event 58, 56 + mentation. 1 amp d50 given repeat 210.  premeal dinner admelog to be Held.  iv steroids changed to po today.   emailed endocrine to make aware

## 2025-02-24 NOTE — PROGRESS NOTE ADULT - NSPROGADDITIONALINFOA_GEN_ALL_CORE
d/w the daughter Vanessa STEELE. Pt lives with her. Uses walker to walk, though not always using.   Recently seen by Dr. Shaw Mitchell (rad onc) for measurements. Hoping that pt will be able to proceed with radiation treatment post discharge.    GOC discussed with pt and the daughter. Per the pt and daughter, if condition is irreversible then no CPR/ventilator. otherwise okay with CPR/ ventilator.   currently remain full code. overall prognosis is poor given lung findings of fibrosis.   all questions answered.   palliative f/u appropriate for GOC and future symptom management.     Resume metoprolol for a.fib.  BP stable, resume Lasix --> IV started, oxygen improving.   hi-flow wean to NC  TTE reviewed. stage 2 diastolic dysfunction.   out of bed to chair if tolerates.   physical therapy today  last day of abx 2/25/25 per ID.     - Dr. RACHELL Martinez ([facility])  - [phone]

## 2025-02-24 NOTE — PROGRESS NOTE ADULT - SUBJECTIVE AND OBJECTIVE BOX
CARDIOLOGY FOLLOW UP - Dr. Valenzuela  DATE OF SERVICE: 2/24/25    CC +JACKSON  no cp      REVIEW OF SYSTEMS:  CONSTITUTIONAL: No fever, weight loss, or fatigue  RESPIRATORY: No cough, wheezing, chills or hemoptysis; No Shortness of Breath  CARDIOVASCULAR: No chest pain, palpitations, passing out, dizziness  GASTROINTESTINAL: No abdominal or epigastric pain. No nausea, vomiting, or hematemesis; No diarrhea or constipation. No melena or hematochezia.      PHYSICAL EXAM:  T(C): 36.3 (02-24-25 @ 10:03), Max: 37.1 (02-23-25 @ 20:39)  HR: 100 (02-24-25 @ 10:03) (77 - 100)  BP: 135/68 (02-24-25 @ 10:03) (116/50 - 135/68)  RR: 19 (02-24-25 @ 10:03) (15 - 19)  SpO2: 100% (02-24-25 @ 10:03) (97% - 100%)  Wt(kg): --  I&O's Summary    23 Feb 2025 07:01  -  24 Feb 2025 07:00  --------------------------------------------------------  IN: 0 mL / OUT: 1420 mL / NET: -1420 mL        Appearance: Normal	  Cardiovascular: Normal S1 S2,RRR, No JVD, No murmurs  Respiratory: diminished 	  Gastrointestinal:  Soft, Non-tender, + BS	  Extremities: Normal range of motion, No clubbing, cyanosis or edema      Home Medications:  Advair Diskus 250 mcg-50 mcg inhalation powder: 1 puff(s) inhaled 2 times a day (13 Dec 2024 23:00)  aspirin 81 mg oral delayed release tablet: 1 tab(s) orally once a day (at bedtime) (13 Dec 2024 23:00)  atorvastatin 20 mg oral tablet: 1 tab(s) orally once a day (at bedtime) (13 Dec 2024 23:00)  furosemide 20 mg oral tablet: 2 tab(s) orally once a day (13 Dec 2024 22:55)  gabapentin 400 mg oral capsule: 1 cap(s) orally once a day (at bedtime) (13 Dec 2024 23:00)  ipratropium 42 mcg/inh (0.06%) nasal spray: 2 spray(s) intranasally 2 times a day (13 Dec 2024 23:00)  metFORMIN 1000 mg oral tablet: 1 tab(s) orally once a day (19 Feb 2025 12:37)  Metoprolol Tartrate 25 mg oral tablet: 0.5 tab(s) orally 2 times a day takes 2 half tabs in morning and 1 half tab at night (13 Dec 2024 22:59)  Multiple Vitamins oral tablet: 1 tab(s) orally once a day (13 Dec 2024 23:00)  omeprazole 40 mg oral delayed release capsule: 1 cap(s) orally once a day (13 Dec 2024 23:00)  polyethylene glycol 3350 oral powder for reconstitution: 17 gram(s) orally 2 times a day (18 Dec 2024 13:08)  PreserVision AREDS 2 oral capsule: 1 cap(s) orally 2 times a day (13 Dec 2024 23:00)  senna leaf extract oral tablet: 2 tab(s) orally once a day (at bedtime) (18 Dec 2024 13:08)  sildenafil 20 mg oral tablet: 1 tab(s) orally 3 times a day (13 Dec 2024 22:55)  Tresiba FlexTouch 100 units/mL subcutaneous solution: 10 unit(s) subcutaneous once a day (at bedtime) Take tresiba 10 units subcutaneous at bedtime starting tonight at bedtime (13 Dec 2024 23:00)      MEDICATIONS  (STANDING):  aspirin enteric coated 81 milliGRAM(s) Oral daily  atorvastatin 20 milliGRAM(s) Oral at bedtime  cefepime   IVPB 2000 milliGRAM(s) IV Intermittent <User Schedule>  dextrose 5%. 1000 milliLiter(s) (50 mL/Hr) IV Continuous <Continuous>  dextrose 5%. 1000 milliLiter(s) (100 mL/Hr) IV Continuous <Continuous>  dextrose 50% Injectable 25 Gram(s) IV Push once  dextrose 50% Injectable 12.5 Gram(s) IV Push once  dextrose 50% Injectable 25 Gram(s) IV Push once  enoxaparin Injectable 40 milliGRAM(s) SubCutaneous every 24 hours  fluticasone propionate/ salmeterol 250-50 MICROgram(s) Diskus 1 Dose(s) Inhalation two times a day  furosemide   Injectable 40 milliGRAM(s) IV Push daily  gabapentin 400 milliGRAM(s) Oral at bedtime  glucagon  Injectable 1 milliGRAM(s) IntraMuscular once  insulin glargine Injectable (LANTUS) 40 Unit(s) SubCutaneous at bedtime  insulin lispro (ADMELOG) corrective regimen sliding scale   SubCutaneous three times a day before meals  insulin lispro (ADMELOG) corrective regimen sliding scale   SubCutaneous at bedtime  insulin lispro Injectable (ADMELOG) 16 Unit(s) SubCutaneous three times a day before meals  ipratropium 17 MICROgram(s) HFA Inhaler 1 Puff(s) Inhalation every 6 hours  levalbuterol Inhalation 1.25 milliGRAM(s) Inhalation every 6 hours  methylPREDNISolone sodium succinate Injectable 20 milliGRAM(s) IV Push every 8 hours  metoprolol tartrate 12.5 milliGRAM(s) Oral two times a day  multivitamin 1 Tablet(s) Oral daily  nitrofurantoin monohydrate/macrocrystals (MACROBID) 100 milliGRAM(s) Oral two times a day  pantoprazole    Tablet 40 milliGRAM(s) Oral before breakfast  polyethylene glycol 3350 17 Gram(s) Oral two times a day  senna 2 Tablet(s) Oral at bedtime  sildenafil (REVATIO) 20 milliGRAM(s) Oral three times a day      TELEMETRY: 	    ECG:  	  RADIOLOGY:   DIAGNOSTIC TESTING:  [ ] Echocardiogram: < from: TTE W or WO Ultrasound Enhancing Agent (02.22.25 @ 09:47) >  CONCLUSIONS:      1. Left ventricular cavity is normal in size. Left ventricular wall thickness is normal. Left ventricular systolic function is normal with an ejection fraction of 68 % by Gomez's method of disks. There are no regional wall motion abnormalities seen.   2. There is moderate (grade 2) left ventricular diastolic dysfunction, with elevated left ventricular filling pressure.   3. Normal right ventricular cavity size, with normal wall thickness, and normal right ventricular systolic function. Tricuspid annular plane systolic excursion (TAPSE) is 2.0 cm (normal >=1.7 cm).   4. Normal left and right atrial size.   5. No pericardial effusion seen.   6. Estimated pulmonary artery systolicpressure is 44 mmHg, consistent with mild pulmonary hypertension.   7. Technically difficult image quality.      < end of copied text >    [ ]  Catheterization:  [ ] Stress Test:    OTHER: 	    LABS:	 	    CKMB Units: 1.6 ng/mL (02-21 @ 10:43)  Troponin T, High Sensitivity Result: 30 ng/L (02-21 @ 10:43)  CKMB Units: 1.1 ng/mL (02-19 @ 12:12)  Troponin T, High Sensitivity Result: 36 ng/L (02-19 @ 12:12)  Troponin T, High Sensitivity Result: 48 ng/L (02-19 @ 09:14)  Troponin T, High Sensitivity Result: 32 ng/L (02-19 @ 01:13)  Troponin T, High Sensitivity Result: 33 ng/L (02-18 @ 20:07)                          9.9    11.91 )-----------( 454      ( 24 Feb 2025 07:40 )             32.2     02-24    138  |  98  |  36[H]  ----------------------------<  190[H]  4.7   |  25  |  0.88    Ca    9.8      24 Feb 2025 07:40  Phos  4.1     02-24  Mg     2.00     02-24

## 2025-02-24 NOTE — PROGRESS NOTE ADULT - PROBLEM SELECTOR PLAN 4
- known hx CHF   - 10/2024 TTE EF 60%, mild MV stenosis, mod AS, mild-mod TR  - POCUS in ER c/f reduced EF  - CXR with pulmonary vascular congestion, CTA chest with pulmonary edema   - TTE reviewed.   - home med: lasix 40mg daily, lopressor as below -- on hold 2/2 sepsis and hypotension  - BP improves. restart Lopressor given tachycardia.   - cautious with fluids, resume lasix --> transition to IV

## 2025-02-24 NOTE — PROGRESS NOTE ADULT - SUBJECTIVE AND OBJECTIVE BOX
SUBJECTIVE/ OVERNIGHT EVENTS:  feels well  comfortable  feel weak but overall breathing improving   PT at bedside    --------------------------------------------------------------------------------------------  LABS:                        9.9    11.91 )-----------( 454      ( 24 Feb 2025 07:40 )             32.2     02-24    138  |  98  |  36[H]  ----------------------------<  190[H]  4.7   |  25  |  0.88    Ca    9.8      24 Feb 2025 07:40  Phos  4.1     02-24  Mg     2.00     02-24        CAPILLARY BLOOD GLUCOSE      POCT Blood Glucose.: 268 mg/dL (24 Feb 2025 12:10)  POCT Blood Glucose.: 244 mg/dL (24 Feb 2025 08:30)  POCT Blood Glucose.: 135 mg/dL (23 Feb 2025 21:35)  POCT Blood Glucose.: 118 mg/dL (23 Feb 2025 17:32)        Urinalysis Basic - ( 24 Feb 2025 07:40 )    Color: x / Appearance: x / SG: x / pH: x  Gluc: 190 mg/dL / Ketone: x  / Bili: x / Urobili: x   Blood: x / Protein: x / Nitrite: x   Leuk Esterase: x / RBC: x / WBC x   Sq Epi: x / Non Sq Epi: x / Bacteria: x        RADIOLOGY & ADDITIONAL TESTS:    Imaging Personally Reviewed:  [x] YES  [ ] NO    Consultant(s) Notes Reviewed:  [x] YES  [ ] NO    MEDICATIONS  (STANDING):  aspirin enteric coated 81 milliGRAM(s) Oral daily  atorvastatin 20 milliGRAM(s) Oral at bedtime  cefepime   IVPB 2000 milliGRAM(s) IV Intermittent <User Schedule>  dextrose 5%. 1000 milliLiter(s) (50 mL/Hr) IV Continuous <Continuous>  dextrose 5%. 1000 milliLiter(s) (100 mL/Hr) IV Continuous <Continuous>  dextrose 50% Injectable 25 Gram(s) IV Push once  dextrose 50% Injectable 12.5 Gram(s) IV Push once  dextrose 50% Injectable 25 Gram(s) IV Push once  enoxaparin Injectable 40 milliGRAM(s) SubCutaneous every 24 hours  fluticasone propionate/ salmeterol 250-50 MICROgram(s) Diskus 1 Dose(s) Inhalation two times a day  furosemide   Injectable 40 milliGRAM(s) IV Push daily  gabapentin 400 milliGRAM(s) Oral at bedtime  glucagon  Injectable 1 milliGRAM(s) IntraMuscular once  insulin glargine Injectable (LANTUS) 40 Unit(s) SubCutaneous at bedtime  insulin lispro (ADMELOG) corrective regimen sliding scale   SubCutaneous three times a day before meals  insulin lispro (ADMELOG) corrective regimen sliding scale   SubCutaneous at bedtime  insulin lispro Injectable (ADMELOG) 16 Unit(s) SubCutaneous three times a day before meals  ipratropium 17 MICROgram(s) HFA Inhaler 1 Puff(s) Inhalation every 6 hours  levalbuterol Inhalation 1.25 milliGRAM(s) Inhalation every 6 hours  metoprolol tartrate 12.5 milliGRAM(s) Oral two times a day  multivitamin 1 Tablet(s) Oral daily  nitrofurantoin monohydrate/macrocrystals (MACROBID) 100 milliGRAM(s) Oral two times a day  pantoprazole    Tablet 40 milliGRAM(s) Oral before breakfast  polyethylene glycol 3350 17 Gram(s) Oral two times a day  predniSONE   Tablet 20 milliGRAM(s) Oral every 12 hours  senna 2 Tablet(s) Oral at bedtime  sildenafil (REVATIO) 20 milliGRAM(s) Oral three times a day    MEDICATIONS  (PRN):  acetaminophen     Tablet .. 650 milliGRAM(s) Oral every 6 hours PRN Temp greater or equal to 38C (100.4F), Mild Pain (1 - 3)  dextrose Oral Gel 15 Gram(s) Oral once PRN Blood Glucose LESS THAN 70 milliGRAM(s)/deciliter  melatonin 3 milliGRAM(s) Oral at bedtime PRN Insomnia  ondansetron Injectable 4 milliGRAM(s) IV Push every 8 hours PRN Nausea and/or Vomiting      Care Discussed with Consultants/Other Providers [x] YES  [ ] NO    Vital Signs Last 24 Hrs  T(C): 36.3 (24 Feb 2025 10:03), Max: 37.1 (23 Feb 2025 20:39)  T(F): 97.4 (24 Feb 2025 10:03), Max: 98.7 (23 Feb 2025 20:39)  HR: 100 (24 Feb 2025 10:03) (77 - 100)  BP: 135/68 (24 Feb 2025 10:03) (116/50 - 135/68)  BP(mean): --  RR: 19 (24 Feb 2025 10:03) (15 - 19)  SpO2: 100% (24 Feb 2025 10:03) (97% - 100%)    Parameters below as of 24 Feb 2025 10:03  Patient On (Oxygen Delivery Method): nasal cannula  O2 Flow (L/min): 3    I&O's Summary    23 Feb 2025 07:01  -  24 Feb 2025 07:00  --------------------------------------------------------  IN: 0 mL / OUT: 1420 mL / NET: -1420 mL        PHYSICAL EXAM:  GENERAL: NAD, thin-elderly, comfortable now on hi-flow --> 6L --> 4L --> 3L  HEAD:  Atraumatic, Normocephalic  EYES: EOMI, PERRLA, conjunctiva and sclera clear  NECK: Supple, No JVD  CHEST/LUNG: mild decrease breath sounds bilaterally; No wheeze   HEART: Regular rate and rhythm; No murmurs, rubs, or gallops  ABDOMEN: Soft, Nontender, Nondistended; Bowel sounds present  Neuro: AAOx3, no focal weakness   EXTREMITIES:  2+ Peripheral Pulses, No clubbing, cyanosis, or edema  SKIN: No rashes or lesions

## 2025-02-24 NOTE — PROGRESS NOTE ADULT - ASSESSMENT
Pt is a 89 yo F with PMH CHF, anemia, pHTN, CAD, CVA (no deficits), T2D, [place], COPD (2L NC PRN, qhs), PVD, HTN, HLD, and ?lung CA (pending RT) p/w dysuria x 1wk along with tremors and SOB.   On arrival, T 102.5, , /78 --- SBP 80s, RR 20 O2sat 88% RA -- 2L NC. EKG with sinus tachycardia, no ischemic changes. ER POCUS with diffuse B lines. Labs with leukocytosis, normocytic anemia, trop neg x2, BNP 1406, lactate neg, UA+ with UCx and BCx in lab, RVP neg, MRSA in lab. CTA chest neg PE but with multifocal PNA, pulmonary edema, and 2.8x2.7cm mass LLL interval inc compared to prior. MICU consulted for hypotension with c/f need for pressors, however, improved with IVF and midodrine. Pt given tylenol, cefepime, and vancomycin. ID consulted and PT consulted with recs pending. (19 Feb 2025 10:03)  she is apparently sob to me:  but she says her breathing is OK:  she  is on home oxygen :  recently diagnosed with lung cancer:   now pulm called for pneumonia        Sepsis/ Pneumonia/ COPD / lung cancer:   pneumonia: on antibiotics   CTA chest neg PE but with multifocal PNA and pulmonary edema   cont antibtiocs : seen by id   urine legionella/strep  lEFT LOWER OPACITY:  PER DAUGHTER :  SHE NEVER GOT ANY CHEMO :  SHE WAS SUPPOSED TO GET RADIATION THERAPY,  BUT SHE ENDED UP HERE:     She has had multiplek admission in last few months and hence could not any surgery for the mass:  now it seems to have increased   she seems to be sob:  and has copd:  will add short course of steroids   her vbg is OK:  but she looks sob:  she may need bipap , if her rr worsens    2/20: she looks better today  : on 3 L o f oxygen : she does use oxygen at home:  2 L of oxygen :  ct scan chest showed: No pulmonary embolism. Interval increase in groundglass opacities and subpleural consolidations in bilateral lungs, likely infectious. Interlobularseptal thickening in the upper lobes, which can be suggestive of interstitial edema 3.1 cm masslike consolidation in left lower lobe. Additional smaller nodules in bilateral lungs. Mediastinal and hilar lymphadenopathy which can be reactive or due to  metastasis. Subpleural reticulations, Honeycombing, and peripheral cystic changes in bilateral lungs, suggestive of interstitial lung fibrosis.    2/21: spoke to daughter again : she had mapping ct prior to coming to hospital  : she was supposed to be getting radiation therapy;   no chemo ;   cont iv steroids;   she had no pe  on initial admission she had no pe:  \  she has significant emphysema:    VBG is good     would cont antibiotics   ? hemoinc consult??     2/21: she was seen by palliative care;   2/22:  seems pretty good:  has cough + especially in night time:   add anti tussives:  hycodan at night time  \  on cefepime"   2/24:  she looks pretty good:  antibiotics till tomorrow:   on 3 L of ox gen  satting at 100%  VBG today is reasonable!  change to po steroids     Acute UTI.   as above.    Acute on chronic heart failure.    - known hx CHF   - 10/2024 TTE EF 60%, mild MV stenosis, mod AS, mild-mod TR  - POCUS in ER c/f reduced EF  - CXR with pulmonary vascular congestion, CTA chest with pulmonary edema   -cont diuretics  defer to card s    2/20: cont current rx:    on 3 L of oxygen    2/21: today hero2 requirement went up : do chest xray and stat high flow:  cont BD and steroids   2/22: on 40% high flow;  but I think can be changed to nasal cannula:  await echo results     Pulmonary mass.    CTA chest with interval increase in LLL mass from prior, 2.8x2.7cm  - pending RT initiation outpt?\  - as above:  has not getting any treatment since the diagnosis many months ago   2/22: defer to  onc:   ct scan looks not that good   would struggles to increase lasix   2/24: on iv Lasix     Pulmonary HTN.    sildenafil 20mg TID.  2/24: new echo ; mild pulm htn     HTN (hypertension).   blood pressure is soft:  currently off anti hypertensives:     2/22:"; blood pressure is controlled   2/24: controlled     Type 2 diabetes mellitus.   monitor and control:    gareth acp

## 2025-02-25 LAB
ALBUMIN SERPL ELPH-MCNC: 3.6 G/DL — SIGNIFICANT CHANGE UP (ref 3.3–5)
ALP SERPL-CCNC: 80 U/L — SIGNIFICANT CHANGE UP (ref 40–120)
ALT FLD-CCNC: 8 U/L — SIGNIFICANT CHANGE UP (ref 4–33)
ANION GAP SERPL CALC-SCNC: 13 MMOL/L — SIGNIFICANT CHANGE UP (ref 7–14)
AST SERPL-CCNC: 14 U/L — SIGNIFICANT CHANGE UP (ref 4–32)
BASOPHILS # BLD AUTO: 0.03 K/UL — SIGNIFICANT CHANGE UP (ref 0–0.2)
BASOPHILS NFR BLD AUTO: 0.3 % — SIGNIFICANT CHANGE UP (ref 0–2)
BILIRUB SERPL-MCNC: 0.2 MG/DL — SIGNIFICANT CHANGE UP (ref 0.2–1.2)
BUN SERPL-MCNC: 42 MG/DL — HIGH (ref 7–23)
CALCIUM SERPL-MCNC: 9.1 MG/DL — SIGNIFICANT CHANGE UP (ref 8.4–10.5)
CHLORIDE SERPL-SCNC: 95 MMOL/L — LOW (ref 98–107)
CO2 SERPL-SCNC: 26 MMOL/L — SIGNIFICANT CHANGE UP (ref 22–31)
CREAT SERPL-MCNC: 1.04 MG/DL — SIGNIFICANT CHANGE UP (ref 0.5–1.3)
EGFR: 50 ML/MIN/1.73M2 — LOW
EGFR: 50 ML/MIN/1.73M2 — LOW
EOSINOPHIL # BLD AUTO: 0.02 K/UL — SIGNIFICANT CHANGE UP (ref 0–0.5)
EOSINOPHIL NFR BLD AUTO: 0.2 % — SIGNIFICANT CHANGE UP (ref 0–6)
GAS PNL BLDV: SIGNIFICANT CHANGE UP
GLUCOSE SERPL-MCNC: 174 MG/DL — HIGH (ref 70–99)
HCT VFR BLD CALC: 29.6 % — LOW (ref 34.5–45)
HGB BLD-MCNC: 9.5 G/DL — LOW (ref 11.5–15.5)
IANC: 9.34 K/UL — HIGH (ref 1.8–7.4)
IMM GRANULOCYTES NFR BLD AUTO: 2 % — HIGH (ref 0–0.9)
LEGIONELLA AG UR QL: NEGATIVE — SIGNIFICANT CHANGE UP
LYMPHOCYTES # BLD AUTO: 1.24 K/UL — SIGNIFICANT CHANGE UP (ref 1–3.3)
LYMPHOCYTES # BLD AUTO: 10.6 % — LOW (ref 13–44)
MAGNESIUM SERPL-MCNC: 1.9 MG/DL — SIGNIFICANT CHANGE UP (ref 1.6–2.6)
MCHC RBC-ENTMCNC: 27.9 PG — SIGNIFICANT CHANGE UP (ref 27–34)
MCHC RBC-ENTMCNC: 32.1 G/DL — SIGNIFICANT CHANGE UP (ref 32–36)
MCV RBC AUTO: 87.1 FL — SIGNIFICANT CHANGE UP (ref 80–100)
MONOCYTES # BLD AUTO: 0.79 K/UL — SIGNIFICANT CHANGE UP (ref 0–0.9)
MONOCYTES NFR BLD AUTO: 6.8 % — SIGNIFICANT CHANGE UP (ref 2–14)
NEUTROPHILS # BLD AUTO: 9.34 K/UL — HIGH (ref 1.8–7.4)
NEUTROPHILS NFR BLD AUTO: 80.1 % — HIGH (ref 43–77)
NRBC # BLD AUTO: 0 K/UL — SIGNIFICANT CHANGE UP (ref 0–0)
NRBC # FLD: 0 K/UL — SIGNIFICANT CHANGE UP (ref 0–0)
NRBC BLD AUTO-RTO: 0 /100 WBCS — SIGNIFICANT CHANGE UP (ref 0–0)
PHOSPHATE SERPL-MCNC: 4.2 MG/DL — SIGNIFICANT CHANGE UP (ref 2.5–4.5)
PLATELET # BLD AUTO: 459 K/UL — HIGH (ref 150–400)
POTASSIUM SERPL-MCNC: 4.8 MMOL/L — SIGNIFICANT CHANGE UP (ref 3.5–5.3)
POTASSIUM SERPL-SCNC: 4.8 MMOL/L — SIGNIFICANT CHANGE UP (ref 3.5–5.3)
PROT SERPL-MCNC: 6.6 G/DL — SIGNIFICANT CHANGE UP (ref 6–8.3)
RBC # BLD: 3.4 M/UL — LOW (ref 3.8–5.2)
RBC # FLD: 15.6 % — HIGH (ref 10.3–14.5)
SODIUM SERPL-SCNC: 134 MMOL/L — LOW (ref 135–145)
WBC # BLD: 11.65 K/UL — HIGH (ref 3.8–10.5)
WBC # FLD AUTO: 11.65 K/UL — HIGH (ref 3.8–10.5)

## 2025-02-25 PROCEDURE — 99232 SBSQ HOSP IP/OBS MODERATE 35: CPT

## 2025-02-25 RX ORDER — DEXTROMETHORPHAN HBR, GUAIFENESIN 200 MG/10ML
100 LIQUID ORAL EVERY 6 HOURS
Refills: 0 | Status: DISCONTINUED | OUTPATIENT
Start: 2025-02-25 | End: 2025-03-03

## 2025-02-25 RX ORDER — MAGNESIUM SULFATE 500 MG/ML
1 SYRINGE (ML) INJECTION ONCE
Refills: 0 | Status: COMPLETED | OUTPATIENT
Start: 2025-02-25 | End: 2025-02-25

## 2025-02-25 RX ORDER — INSULIN LISPRO 100 U/ML
13 INJECTION, SOLUTION INTRAVENOUS; SUBCUTANEOUS
Refills: 0 | Status: DISCONTINUED | OUTPATIENT
Start: 2025-02-25 | End: 2025-02-27

## 2025-02-25 RX ORDER — FUROSEMIDE 10 MG/ML
40 INJECTION INTRAMUSCULAR; INTRAVENOUS DAILY
Refills: 0 | Status: DISCONTINUED | OUTPATIENT
Start: 2025-02-26 | End: 2025-02-28

## 2025-02-25 RX ADMIN — LEVALBUTEROL HYDROCHLORIDE 1.25 MILLIGRAM(S): 1.25 SOLUTION RESPIRATORY (INHALATION) at 20:29

## 2025-02-25 RX ADMIN — SILDENAFIL 20 MILLIGRAM(S): 50 TABLET, FILM COATED ORAL at 18:23

## 2025-02-25 RX ADMIN — LEVALBUTEROL HYDROCHLORIDE 1.25 MILLIGRAM(S): 1.25 SOLUTION RESPIRATORY (INHALATION) at 08:26

## 2025-02-25 RX ADMIN — INSULIN GLARGINE-YFGN 40 UNIT(S): 100 INJECTION, SOLUTION SUBCUTANEOUS at 22:18

## 2025-02-25 RX ADMIN — Medication 2 TABLET(S): at 21:30

## 2025-02-25 RX ADMIN — DEXTROMETHORPHAN HBR, GUAIFENESIN 100 MILLIGRAM(S): 200 LIQUID ORAL at 13:11

## 2025-02-25 RX ADMIN — METOPROLOL SUCCINATE 12.5 MILLIGRAM(S): 50 TABLET, EXTENDED RELEASE ORAL at 21:30

## 2025-02-25 RX ADMIN — INSULIN LISPRO 13 UNIT(S): 100 INJECTION, SOLUTION INTRAVENOUS; SUBCUTANEOUS at 13:10

## 2025-02-25 RX ADMIN — Medication 1 PUFF(S): at 05:31

## 2025-02-25 RX ADMIN — PREDNISONE 20 MILLIGRAM(S): 20 TABLET ORAL at 05:30

## 2025-02-25 RX ADMIN — CEFEPIME 100 MILLIGRAM(S): 2 INJECTION, POWDER, FOR SOLUTION INTRAVENOUS at 10:08

## 2025-02-25 RX ADMIN — SILDENAFIL 20 MILLIGRAM(S): 50 TABLET, FILM COATED ORAL at 05:31

## 2025-02-25 RX ADMIN — Medication 100 MILLIGRAM(S): at 05:31

## 2025-02-25 RX ADMIN — INSULIN LISPRO 2: 100 INJECTION, SOLUTION INTRAVENOUS; SUBCUTANEOUS at 22:18

## 2025-02-25 RX ADMIN — ENOXAPARIN SODIUM 40 MILLIGRAM(S): 100 INJECTION SUBCUTANEOUS at 21:30

## 2025-02-25 RX ADMIN — Medication 1 DOSE(S): at 10:12

## 2025-02-25 RX ADMIN — Medication 1 PUFF(S): at 17:07

## 2025-02-25 RX ADMIN — Medication 1 DOSE(S): at 21:30

## 2025-02-25 RX ADMIN — Medication 81 MILLIGRAM(S): at 10:08

## 2025-02-25 RX ADMIN — Medication 3 MILLIGRAM(S): at 21:30

## 2025-02-25 RX ADMIN — Medication 40 MILLIGRAM(S): at 05:30

## 2025-02-25 RX ADMIN — GABAPENTIN 400 MILLIGRAM(S): 400 CAPSULE ORAL at 21:35

## 2025-02-25 RX ADMIN — INSULIN LISPRO 20 UNIT(S): 100 INJECTION, SOLUTION INTRAVENOUS; SUBCUTANEOUS at 09:40

## 2025-02-25 RX ADMIN — LEVALBUTEROL HYDROCHLORIDE 1.25 MILLIGRAM(S): 1.25 SOLUTION RESPIRATORY (INHALATION) at 15:43

## 2025-02-25 RX ADMIN — SILDENAFIL 20 MILLIGRAM(S): 50 TABLET, FILM COATED ORAL at 21:30

## 2025-02-25 RX ADMIN — PREDNISONE 20 MILLIGRAM(S): 20 TABLET ORAL at 17:07

## 2025-02-25 RX ADMIN — POLYETHYLENE GLYCOL 3350 17 GRAM(S): 17 POWDER, FOR SOLUTION ORAL at 05:31

## 2025-02-25 RX ADMIN — Medication 1 PUFF(S): at 21:31

## 2025-02-25 RX ADMIN — FUROSEMIDE 40 MILLIGRAM(S): 10 INJECTION INTRAMUSCULAR; INTRAVENOUS at 10:18

## 2025-02-25 RX ADMIN — METOPROLOL SUCCINATE 12.5 MILLIGRAM(S): 50 TABLET, EXTENDED RELEASE ORAL at 10:06

## 2025-02-25 RX ADMIN — Medication 100 GRAM(S): at 10:05

## 2025-02-25 RX ADMIN — Medication 1 TABLET(S): at 10:06

## 2025-02-25 RX ADMIN — INSULIN LISPRO 6: 100 INJECTION, SOLUTION INTRAVENOUS; SUBCUTANEOUS at 13:09

## 2025-02-25 RX ADMIN — ATORVASTATIN CALCIUM 20 MILLIGRAM(S): 80 TABLET, FILM COATED ORAL at 21:30

## 2025-02-25 RX ADMIN — Medication 1 PUFF(S): at 10:09

## 2025-02-25 RX ADMIN — INSULIN LISPRO 2: 100 INJECTION, SOLUTION INTRAVENOUS; SUBCUTANEOUS at 09:40

## 2025-02-25 NOTE — PROGRESS NOTE ADULT - ASSESSMENT
ECHO 10/7/24: nl LV sys fx EF 60% . Mild mitral valve stenosis. Mild to moderate tricuspid regurgitation. mod AS   ECHO  2/22/25 nl LV sys fx  ef 68%, moderate (grade 2) left ventricular diastolic dysfunction,mild pulmonary hypertension. The aortic valve appears trileaflet with reduced systolic excursion. There is calcification of the aortic valve leaflets. The aortic valve acceleration time is 90 msec. There is no evidence of aortic regurgitation.    a/p    89 yo F with PMH CHF, valvular disease, anemia, pHTN, CAD, CVA (no deficits), T2D, [place], COPD (2L NC PRN, qhs), PVD, HTN, HLD, and ?lung CA (pending RT) p/w dysuria x 1wk along with tremors and SOB + pna    #PNA  -Meeting sepsis criteria w/ fever, tachycardia, leukocytosis  -CTa chest with no PE, Interval increase in groundglass opacities and subpleural consolidations  in bilateral lungs, interlobularseptal thickening in  the upper lobes, which can be suggestive of interstitial edema; 3.1 cm mass like consolidation in left lower lobe, Mediastinal and hilar lymphadenopathy which can be reactive or due to  metastasis.  Subpleural reticulations, Honeycombing, and peripheral cystic changes in  bilateral lungs, suggestive of interstitial lung fibrosis.  -abx per id/med  -on diuretics      #hypotension   -resolved  -micu eval noted- hypotensive to 80s/40s, given 2x 500cc bolus and Midodrine 10mg w/ improvement in BP to MAP >70  -Pocus w/ collapsed IVC suggestive of intravascular depletion, but w/ evidence of reduced RV and LV function   -bcx ngtd  -not a candidate for MICU per team   -HS trop neg. no ischemic changes to ecg   -ECHO  2/22/25 nl LV sys fx  ef 68%, moderate (grade 2) left ventricular diastolic dysfunction,mild pulmonary hypertension.    #CAD s/p PCI  -stable  -cont asa, statin, BB    #HFpEF  -ECHO  2/22/25 nl LV sys fx  ef 68%, moderate (grade 2) left ventricular diastolic dysfunction,mild pulmonary hypertension.  -bun riding/ NA noted-- change lasix to PO 40 mg daily     #Pulmonary HTN.   -c/w home sildenafil 20mg TID.  -echo with mild pulm htn   -diuretics     #Lung CA  -med/ pulm fu   -pending RT initiation as outpt     #UTI   -management per med /ID    dvt ppx

## 2025-02-25 NOTE — PROGRESS NOTE ADULT - ASSESSMENT
89 y/o F PMhx CHF, anemia, pHTN, CAD, CVA, DMII, COPD (2L NC PRN, qhs), PVD, HTN, and ?lung CA (pending RT) who presented w/ dysuria x 5 days as well as SOB and rigors.    L lung cancer, PNA  sepsis- fever, leukocytosis- resolved  RVP negative  CTA chest- negative for PE but demonstrated increase in groundglass opacities and subpleural consolidations in bilateral lungs; Interlobular septal thickening in the upper lobes, which can be suggestive of interstitial edema 3.1 cm masslike consolidation in left lower lobe. Additional smaller nodules in bilateral lungs. Subpleural reticulations, Honeycombing, and peripheral cystic changes in bilateral lungs, suggestive of interstitial lung fibrosis.    UTI  dysuria- resolved  no flank tenderness, UA positive  urine cx E faecium, sensitivities reviewed  blood cultures- NGTD    Recommendations  c/w cefepime w/ last dose this AM  s/p macrobid w/ last dose this AM  wean O2 as tolerated    Aldo Verduzco M.D.  [facility]  [phone]  After 5pm on weekdays and all day on weekends - please call [phone]  Available on [employer] teams    Thank you for consulting us and involving us in the management of this patients case. In addition to reviewing history, imaging, documents, labs, microbiology, took into account antibiotic stewardship, local antibiogram and infection control strategies and potential transmission issues.

## 2025-02-25 NOTE — PROGRESS NOTE ADULT - ASSESSMENT
A/P: Pt is a 89 yo F with PMH CHF, anemia, pHTN, CAD, CVA (no deficits), T2D, [place], COPD (2L NC PRN, qhs), PVD, HTN, HLD, and ?lung CA (pending RT) p/w dysuria x 1wk along with tremors and SOB, found to be hyperglycemic and will be on steroids. Endocrinology was consulted for management of diabetes mellitus.    #Type 2 Diabetes Mellitus  - Receiving methylpred 20 mg IV q8h - 2/21- ?- solumedrol 20mg IV q8h is continued.  -  Please notify endocrine if any change to steroid plan.  - HbA1c 9.7%; home regimen: Tresiba 10 units at bedtime (Tresiba no longer covered working on switching to a new basal prior to admission), metformin 500mg (?) 2 tabs daily       INPATIENT PLAN:  - Inpatient BG goal 100-180mg/dl: Steroids changed to Prednisone 20mg PO BID x 3 days. First dose 2/24@ 7pm. Last dose likely 2/27 @ 6am. Hypoglycemic last night at dinner while waiting for 1st dose of Prednisone.   - Continue Lantus 40 units qhs  - Lower Admelog to 13 units TIDQAC hold if NPO  - continue moderate admelog correction scale TIDQAC and  moderate QHS scale- lower when off steroids.   - Please check FSG before meals and QHS, or q6h while NPO  - RD consult  - hypoglycemia orderset prn  -consistent carb diet  -changed antibiotics to non dextrose fluid    DISCHARGE PLAN:  - depends on steroids- please inform endocrine of steroid plans---> basal/metformin or basal/bolus TBD  - Tresiba no longer covered, to find an alternative basal insulin that is covered.  - Clarify dose of metformin tabs at home  - Also given patient had a son with type 1 DM sent c-peptide (with serum glucose), FELISHA Ab, IA-2 Ab, and Zinc transporter Ab with Am labs to rule out JILLIAN, now testing in lab. C-peptide 3.7 (glu 376) consistent with type 2 DM. Can follow up antibodies for completeness.  - Patient attributes high A1c to several hospital admissions in past few months.  - Patient uses glucometer and has previously declined CGM (will reconsider).  - Patient wishes to follow up with pcp and declines initiating care with an endocrinologist.    #Hypertension  - BP goal <130/80  - Management as per primary team, not on meds now    #Hyperlipidemia  - ordered for atorvastatin 20 mg    ARIAN Tucker-BC  Nurse Practitioner  Division of Endocrinology  Contact on TEAMS    If out of hospital/unavailable when paged, please note: patient will be cared for by another provider on the endocrine service.  For urgent concerns: call the endocrine answering service for assistance to reach covering provider ([phone]). For non-urgent matters: please email [email or web address] for assistance.

## 2025-02-25 NOTE — PROGRESS NOTE ADULT - ASSESSMENT
Pt is a 89 yo F with PMH CHF, anemia, pHTN, CAD, CVA (no deficits), T2D, [place], COPD (2L NC PRN, qhs), PVD, HTN, HLD, and ?lung CA (pending RT) p/w dysuria x 1wk along with tremors and SOB. On arrival, meeting SIRS criteria with hypotension and hypoxia requiring 2L NC. EKG with sinus tachycardia, no ischemic changes. ER POCUS with diffuse B lines. Labs with leukocytosis, normocytic anemia, trop neg x2, BNP 1406, lactate neg, UA+ with UCx and BCx in lab, RVP neg, MRSA in lab. CTA chest neg PE but with multifocal PNA, pulmonary edema, and 2.8x2.7cm mass LLL interval inc compared to prior. MICU consulted for hypotension with c/f need for pressors, however, improved with IVF and midodrine; started on cefepime. ID + pulm consulted and PT consulted with recs pending.

## 2025-02-25 NOTE — PROGRESS NOTE ADULT - SUBJECTIVE AND OBJECTIVE BOX
Chief Complaint: T2DM c/b steroid induced hyperglycemia    Interval Events: Pt seen and examined at bedside earlier today. Pt aware of the hypoglycemic event last night before dinner. She said she was perspiring after walking with PT and then they checked her BS and it was low.     MEDICATIONS  (STANDING):  aspirin enteric coated 81 milliGRAM(s) Oral daily  atorvastatin 20 milliGRAM(s) Oral at bedtime  dextrose 5%. 1000 milliLiter(s) (50 mL/Hr) IV Continuous <Continuous>  dextrose 5%. 1000 milliLiter(s) (100 mL/Hr) IV Continuous <Continuous>  dextrose 50% Injectable 25 Gram(s) IV Push once  dextrose 50% Injectable 12.5 Gram(s) IV Push once  dextrose 50% Injectable 25 Gram(s) IV Push once  enoxaparin Injectable 40 milliGRAM(s) SubCutaneous every 24 hours  fluticasone propionate/ salmeterol 250-50 MICROgram(s) Diskus 1 Dose(s) Inhalation two times a day  gabapentin 400 milliGRAM(s) Oral at bedtime  glucagon  Injectable 1 milliGRAM(s) IntraMuscular once  insulin glargine Injectable (LANTUS) 40 Unit(s) SubCutaneous at bedtime  insulin lispro (ADMELOG) corrective regimen sliding scale   SubCutaneous three times a day before meals  insulin lispro (ADMELOG) corrective regimen sliding scale   SubCutaneous at bedtime  insulin lispro Injectable (ADMELOG) 13 Unit(s) SubCutaneous three times a day before meals  ipratropium 17 MICROgram(s) HFA Inhaler 1 Puff(s) Inhalation every 6 hours  levalbuterol Inhalation 1.25 milliGRAM(s) Inhalation every 6 hours  metoprolol tartrate 12.5 milliGRAM(s) Oral two times a day  multivitamin 1 Tablet(s) Oral daily  pantoprazole    Tablet 40 milliGRAM(s) Oral before breakfast  polyethylene glycol 3350 17 Gram(s) Oral two times a day  predniSONE   Tablet 20 milliGRAM(s) Oral every 12 hours  senna 2 Tablet(s) Oral at bedtime  sildenafil (REVATIO) 20 milliGRAM(s) Oral three times a day    MEDICATIONS  (PRN):  acetaminophen     Tablet .. 650 milliGRAM(s) Oral every 6 hours PRN Temp greater or equal to 38C (100.4F), Mild Pain (1 - 3)  dextrose Oral Gel 15 Gram(s) Oral once PRN Blood Glucose LESS THAN 70 milliGRAM(s)/deciliter  guaiFENesin Oral Liquid (Sugar-Free) 100 milliGRAM(s) Oral every 6 hours PRN Cough  melatonin 3 milliGRAM(s) Oral at bedtime PRN Insomnia  ondansetron Injectable 4 milliGRAM(s) IV Push every 8 hours PRN Nausea and/or Vomiting      Allergies    penicillin (Unknown)  macrolide antibiotics (Other)  Biaxin (Unknown)    Intolerances      Review of Systems:  Eyes: No blurry vision  Cardiovascular: No chest pain, palpitations  Respiratory: No SOB, no cough  GI: No nausea, vomiting, abdominal pain  : No dysuria  Psych: no depression  Endocrine: no polyuria, polydipsia    ALL OTHER SYSTEMS REVIEWED AND NEGATIVE    VITALS: T(C): 36.6 (02-25-25 @ 12:04)  T(F): 97.9 (02-25-25 @ 12:04), Max: 98.1 (02-24-25 @ 18:07)  HR: 101 (02-25-25 @ 12:04) (76 - 101)  BP: 113/46 (02-25-25 @ 12:04) (104/43 - 142/61)  RR:  (17 - 22)  SpO2:  (97% - 99%)  Wt(kg): --      Physical Exam:   GENERAL: NAD, well-developed  EYES: No proptosis  HEENT:  Atraumatic, Normocephalic  RESPIRATORY: non labored breathing   GI: Non distended  PSYCH: Alert, normal affect, normal mood    CAPILLARY BLOOD GLUCOSE    POCT Blood Glucose.: 294 mg/dL (25 Feb 2025 12:29)  POCT Blood Glucose.: 181 mg/dL (25 Feb 2025 08:42)  POCT Blood Glucose.: 226 mg/dL (24 Feb 2025 21:19)  POCT Blood Glucose.: 194 mg/dL (24 Feb 2025 18:01)  POCT Blood Glucose.: 210 mg/dL (24 Feb 2025 17:46)  POCT Blood Glucose.: 226 mg/dL (24 Feb 2025 17:42)  POCT Blood Glucose.: 56 mg/dL (24 Feb 2025 17:30)  POCT Blood Glucose.: 58 mg/dL (24 Feb 2025 17:26)      02-25    134[L]  |  95[L]  |  42[H]  ----------------------------<  174[H]  4.8   |  26  |  1.04    eGFR: 50[L]    Ca    9.1      02-25  Mg     1.90     02-25  Phos  4.2     02-25    TPro  6.6  /  Alb  3.6  /  TBili  0.2  /  DBili  x   /  AST  14  /  ALT  8   /  AlkPhos  80  02-25      A1C with Estimated Average Glucose Result: 9.7 % (02-19-25 @ 09:14)  A1C with Estimated Average Glucose Result: 8.0 % (12-13-24 @ 14:35)  A1C with Estimated Average Glucose Result: 6.6 % (09-05-24 @ 13:42)  A1C with Estimated Average Glucose Result: 9.2 % (04-20-24 @ 08:50)      Thyroid Function Tests:  02-19 @ 09:14 TSH 1.95 FreeT4 -- T3 -- Anti TPO -- Anti Thyroglobulin Ab -- TSI --

## 2025-02-25 NOTE — PROGRESS NOTE ADULT - SUBJECTIVE AND OBJECTIVE BOX
CARDIOLOGY FOLLOW UP - Dr. Valenzuela  DATE OF SERVICE: 2/25/25    CC no cp  unchanged JACKSON      REVIEW OF SYSTEMS:  CONSTITUTIONAL: No fever, weight loss, or fatigue  RESPIRATORY: No cough, wheezing, chills or hemoptysis; No Shortness of Breath  CARDIOVASCULAR: No chest pain, palpitations, passing out, dizziness  GASTROINTESTINAL: No abdominal or epigastric pain. No nausea, vomiting, or hematemesis; No diarrhea or constipation. No melena or hematochezia.      PHYSICAL EXAM:  T(C): 36.6 (02-25-25 @ 12:04), Max: 36.7 (02-24-25 @ 14:00)  HR: 101 (02-25-25 @ 12:04) (76 - 126)  BP: 113/46 (02-25-25 @ 12:04) (104/43 - 142/61)  RR: 18 (02-25-25 @ 12:04) (17 - 24)  SpO2: 98% (02-25-25 @ 12:04) (96% - 99%)  Wt(kg): --  I&O's Summary    24 Feb 2025 07:01  -  25 Feb 2025 07:00  --------------------------------------------------------  IN: 400 mL / OUT: 1450 mL / NET: -1050 mL        Appearance: Normal	  Cardiovascular: Normal S1 S2,RRR, No JVD, No murmurs  Respiratory: diminished   Gastrointestinal:  Soft, Non-tender, + BS	  Extremities: Normal range of motion, No clubbing, cyanosis or edema      Home Medications:  Advair Diskus 250 mcg-50 mcg inhalation powder: 1 puff(s) inhaled 2 times a day (13 Dec 2024 23:00)  aspirin 81 mg oral delayed release tablet: 1 tab(s) orally once a day (at bedtime) (13 Dec 2024 23:00)  atorvastatin 20 mg oral tablet: 1 tab(s) orally once a day (at bedtime) (13 Dec 2024 23:00)  furosemide 20 mg oral tablet: 2 tab(s) orally once a day (13 Dec 2024 22:55)  gabapentin 400 mg oral capsule: 1 cap(s) orally once a day (at bedtime) (13 Dec 2024 23:00)  ipratropium 42 mcg/inh (0.06%) nasal spray: 2 spray(s) intranasally 2 times a day (13 Dec 2024 23:00)  metFORMIN 1000 mg oral tablet: 1 tab(s) orally once a day (19 Feb 2025 12:37)  Metoprolol Tartrate 25 mg oral tablet: 0.5 tab(s) orally 2 times a day takes 2 half tabs in morning and 1 half tab at night (13 Dec 2024 22:59)  Multiple Vitamins oral tablet: 1 tab(s) orally once a day (13 Dec 2024 23:00)  omeprazole 40 mg oral delayed release capsule: 1 cap(s) orally once a day (13 Dec 2024 23:00)  polyethylene glycol 3350 oral powder for reconstitution: 17 gram(s) orally 2 times a day (18 Dec 2024 13:08)  PreserVision AREDS 2 oral capsule: 1 cap(s) orally 2 times a day (13 Dec 2024 23:00)  senna leaf extract oral tablet: 2 tab(s) orally once a day (at bedtime) (18 Dec 2024 13:08)  sildenafil 20 mg oral tablet: 1 tab(s) orally 3 times a day (13 Dec 2024 22:55)  Tresiba FlexTouch 100 units/mL subcutaneous solution: 10 unit(s) subcutaneous once a day (at bedtime) Take tresiba 10 units subcutaneous at bedtime starting tonight at bedtime (13 Dec 2024 23:00)      MEDICATIONS  (STANDING):  aspirin enteric coated 81 milliGRAM(s) Oral daily  atorvastatin 20 milliGRAM(s) Oral at bedtime  dextrose 5%. 1000 milliLiter(s) (50 mL/Hr) IV Continuous <Continuous>  dextrose 5%. 1000 milliLiter(s) (100 mL/Hr) IV Continuous <Continuous>  dextrose 50% Injectable 25 Gram(s) IV Push once  dextrose 50% Injectable 12.5 Gram(s) IV Push once  dextrose 50% Injectable 25 Gram(s) IV Push once  enoxaparin Injectable 40 milliGRAM(s) SubCutaneous every 24 hours  fluticasone propionate/ salmeterol 250-50 MICROgram(s) Diskus 1 Dose(s) Inhalation two times a day  furosemide   Injectable 40 milliGRAM(s) IV Push daily  gabapentin 400 milliGRAM(s) Oral at bedtime  glucagon  Injectable 1 milliGRAM(s) IntraMuscular once  insulin glargine Injectable (LANTUS) 40 Unit(s) SubCutaneous at bedtime  insulin lispro (ADMELOG) corrective regimen sliding scale   SubCutaneous three times a day before meals  insulin lispro (ADMELOG) corrective regimen sliding scale   SubCutaneous at bedtime  insulin lispro Injectable (ADMELOG) 13 Unit(s) SubCutaneous three times a day before meals  ipratropium 17 MICROgram(s) HFA Inhaler 1 Puff(s) Inhalation every 6 hours  levalbuterol Inhalation 1.25 milliGRAM(s) Inhalation every 6 hours  metoprolol tartrate 12.5 milliGRAM(s) Oral two times a day  multivitamin 1 Tablet(s) Oral daily  pantoprazole    Tablet 40 milliGRAM(s) Oral before breakfast  polyethylene glycol 3350 17 Gram(s) Oral two times a day  predniSONE   Tablet 20 milliGRAM(s) Oral every 12 hours  senna 2 Tablet(s) Oral at bedtime  sildenafil (REVATIO) 20 milliGRAM(s) Oral three times a day      TELEMETRY: 	    ECG:  	  RADIOLOGY:   DIAGNOSTIC TESTING:  [ ] Echocardiogram:  [ ]  Catheterization:  [ ] Stress Test:    OTHER: 	    LABS:	 	    CKMB Units: 1.6 ng/mL (02-21 @ 10:43)  Troponin T, High Sensitivity Result: 30 ng/L (02-21 @ 10:43)  CKMB Units: 1.1 ng/mL (02-19 @ 12:12)  Troponin T, High Sensitivity Result: 36 ng/L (02-19 @ 12:12)  Troponin T, High Sensitivity Result: 48 ng/L (02-19 @ 09:14)  Troponin T, High Sensitivity Result: 32 ng/L (02-19 @ 01:13)  Troponin T, High Sensitivity Result: 33 ng/L (02-18 @ 20:07)                          9.5    11.65 )-----------( 459      ( 25 Feb 2025 07:05 )             29.6     02-25    134[L]  |  95[L]  |  42[H]  ----------------------------<  174[H]  4.8   |  26  |  1.04    Ca    9.1      25 Feb 2025 07:05  Phos  4.2     02-25  Mg     1.90     02-25    TPro  6.6  /  Alb  3.6  /  TBili  0.2  /  DBili  x   /  AST  14  /  ALT  8   /  AlkPhos  80  02-25

## 2025-02-25 NOTE — PROGRESS NOTE ADULT - SUBJECTIVE AND OBJECTIVE BOX
Date of Service: 02-25-25 @ 12:03    Patient is a 89y old  Female who presents with a chief complaint of UTI, PNA (25 Feb 2025 11:43)      Any change in ROS: seems to be doing better:  no sob ; no cough ;  no phlegm  ; on 2 L of oxygen      MEDICATIONS  (STANDING):  aspirin enteric coated 81 milliGRAM(s) Oral daily  atorvastatin 20 milliGRAM(s) Oral at bedtime  dextrose 5%. 1000 milliLiter(s) (50 mL/Hr) IV Continuous <Continuous>  dextrose 5%. 1000 milliLiter(s) (100 mL/Hr) IV Continuous <Continuous>  dextrose 50% Injectable 25 Gram(s) IV Push once  dextrose 50% Injectable 12.5 Gram(s) IV Push once  dextrose 50% Injectable 25 Gram(s) IV Push once  enoxaparin Injectable 40 milliGRAM(s) SubCutaneous every 24 hours  fluticasone propionate/ salmeterol 250-50 MICROgram(s) Diskus 1 Dose(s) Inhalation two times a day  furosemide   Injectable 40 milliGRAM(s) IV Push daily  gabapentin 400 milliGRAM(s) Oral at bedtime  glucagon  Injectable 1 milliGRAM(s) IntraMuscular once  insulin glargine Injectable (LANTUS) 40 Unit(s) SubCutaneous at bedtime  insulin lispro (ADMELOG) corrective regimen sliding scale   SubCutaneous three times a day before meals  insulin lispro (ADMELOG) corrective regimen sliding scale   SubCutaneous at bedtime  insulin lispro Injectable (ADMELOG) 13 Unit(s) SubCutaneous three times a day before meals  ipratropium 17 MICROgram(s) HFA Inhaler 1 Puff(s) Inhalation every 6 hours  levalbuterol Inhalation 1.25 milliGRAM(s) Inhalation every 6 hours  metoprolol tartrate 12.5 milliGRAM(s) Oral two times a day  multivitamin 1 Tablet(s) Oral daily  pantoprazole    Tablet 40 milliGRAM(s) Oral before breakfast  polyethylene glycol 3350 17 Gram(s) Oral two times a day  predniSONE   Tablet 20 milliGRAM(s) Oral every 12 hours  senna 2 Tablet(s) Oral at bedtime  sildenafil (REVATIO) 20 milliGRAM(s) Oral three times a day    MEDICATIONS  (PRN):  acetaminophen     Tablet .. 650 milliGRAM(s) Oral every 6 hours PRN Temp greater or equal to 38C (100.4F), Mild Pain (1 - 3)  dextrose Oral Gel 15 Gram(s) Oral once PRN Blood Glucose LESS THAN 70 milliGRAM(s)/deciliter  guaiFENesin Oral Liquid (Sugar-Free) 100 milliGRAM(s) Oral every 6 hours PRN Cough  melatonin 3 milliGRAM(s) Oral at bedtime PRN Insomnia  ondansetron Injectable 4 milliGRAM(s) IV Push every 8 hours PRN Nausea and/or Vomiting    Vital Signs Last 24 Hrs  T(C): 36.3 (25 Feb 2025 08:51), Max: 36.7 (24 Feb 2025 14:00)  T(F): 97.4 (25 Feb 2025 08:51), Max: 98.1 (24 Feb 2025 18:07)  HR: 76 (25 Feb 2025 09:08) (76 - 126)  BP: 142/61 (25 Feb 2025 08:51) (104/43 - 142/61)  BP(mean): --  RR: 17 (25 Feb 2025 08:51) (17 - 24)  SpO2: 98% (25 Feb 2025 09:08) (96% - 99%)    Parameters below as of 25 Feb 2025 10:00  Patient On (Oxygen Delivery Method): nasal cannula        I&O's Summary    24 Feb 2025 07:01  -  25 Feb 2025 07:00  --------------------------------------------------------  IN: 400 mL / OUT: 1450 mL / NET: -1050 mL          Physical Exam:   GENERAL: NAD, well-groomed, well-developed  HEENT: MARCIO/   Atraumatic, Normocephalic  ENMT: No tonsillar erythema, exudates, or enlargement; Moist mucous membranes, Good dentition, No lesions  NECK: Supple, No JVD, Normal thyroid  CHEST/LUNG: Clear to auscultaion  CVS: Regular rate and rhythm; No murmurs, rubs, or gallops  GI: : Soft, Nontender, Nondistended; Bowel sounds present  NERVOUS SYSTEM:  Alert & Oriented X3  EXTREMITIES:  - edema  LYMPH: No lymphadenopathy noted  SKIN: No rashes or lesions  ENDOCRINOLOGY: No Thyromegaly  PSYCH: Appropriate    Labs:  29, 32, 29, 23, 32, 31                            9.5    11.65 )-----------( 459      ( 25 Feb 2025 07:05 )             29.6                         9.9    11.91 )-----------( 454      ( 24 Feb 2025 07:40 )             32.2                         9.2    9.54  )-----------( 401      ( 23 Feb 2025 07:21 )             29.2                         9.0    10.32 )-----------( 448      ( 22 Feb 2025 15:59 )             29.1     02-25    134[L]  |  95[L]  |  42[H]  ----------------------------<  174[H]  4.8   |  26  |  1.04  02-24    138  |  98  |  36[H]  ----------------------------<  190[H]  4.7   |  25  |  0.88  02-23    135  |  97[L]  |  30[H]  ----------------------------<  269[H]  4.4   |  25  |  0.88  02-22    136  |  97[L]  |  31[H]  ----------------------------<  250[H]  4.3   |  22  |  0.93    Ca    9.1      25 Feb 2025 07:05  Ca    9.8      24 Feb 2025 07:40  Phos  4.2     02-25  Phos  4.1     02-24  Mg     1.90     02-25  Mg     2.00     02-24    TPro  6.6  /  Alb  3.6  /  TBili  0.2  /  DBili  x   /  AST  14  /  ALT  8   /  AlkPhos  80  02-25    CAPILLARY BLOOD GLUCOSE      POCT Blood Glucose.: 181 mg/dL (25 Feb 2025 08:42)  POCT Blood Glucose.: 226 mg/dL (24 Feb 2025 21:19)  POCT Blood Glucose.: 194 mg/dL (24 Feb 2025 18:01)  POCT Blood Glucose.: 210 mg/dL (24 Feb 2025 17:46)  POCT Blood Glucose.: 226 mg/dL (24 Feb 2025 17:42)  POCT Blood Glucose.: 56 mg/dL (24 Feb 2025 17:30)  POCT Blood Glucose.: 58 mg/dL (24 Feb 2025 17:26)  POCT Blood Glucose.: 268 mg/dL (24 Feb 2025 12:10)      LIVER FUNCTIONS - ( 25 Feb 2025 07:05 )  Alb: 3.6 g/dL / Pro: 6.6 g/dL / ALK PHOS: 80 U/L / ALT: 8 U/L / AST: 14 U/L / GGT: x             Urinalysis Basic - ( 25 Feb 2025 07:05 )    Color: x / Appearance: x / SG: x / pH: x  Gluc: 174 mg/dL / Ketone: x  / Bili: x / Urobili: x   Blood: x / Protein: x / Nitrite: x   Leuk Esterase: x / RBC: x / WBC x   Sq Epi: x / Non Sq Epi: x / Bacteria: x      Lactate, Blood: 4.3 mmol/L (02-22 @ 15:59)  Lactate, Blood: 3.4 mmol/L (02-21 @ 14:45)        RECENT CULTURES:  02-18 @ 21:42 .Blood Blood-Peripheral       < from: Xray Chest 1 View- PORTABLE-Urgent (Xray Chest 1 View- PORTABLE-Urgent .) (02.21.25 @ 11:48) >  INTERPRETATION:  EXAMINATION: XR CHEST URGENT, XRCHEST URGENT    CLINICAL INDICATION: shortness of breath    TECHNIQUE: Single frontal, portable view of the chest was obtained.    COMPARISON: Chest x-ray 2/18/2025.    FINDINGS:    Chest x-ray 2/20/2025 at 11:06 PM:  Surgical clips in the right axilla.  The heart is normal in size.  Diffuse bilateral patchy and hazy opacities, unchanged.  No pneumothorax.  Trace bilateral pleural effusions and associated atelectasis.  No acute bony abnormality.    Chest x-ray 2/21/2025 at 11:28 AM:  Progression of right base atelectasis or infiltrate. The left lung is   clearer.    IMPRESSION:  Changing infiltrates in latest image.    --- End of Report ---          DEANNA HERNANDEZ MD; Resident Radiologist  This document has been electronically signed.  LJ IRVIN MD; Attending Interventional Radiologist  This document has been electronically signed. Feb 21 2025  3:04PM    < end of copied text >           No growth at 5 days    02-18 @ 21:36 Clean Catch Clean Catch (Midstream)   KHADIJAH      Enterococcus faecium  Enterococcus faecium     50,000 - 99,000 CFU/mL Enterococcus faecium  <10,000 CFU/ml Normal Urogenital vonnie present    02-18 @ 20:45 .Blood Blood-Peripheral                No growth at 5 days          RESPIRATORY CULTURES:          Studies  Chest X-RAY  CT SCAN Chest   Venous Dopplers: LE:   CT Abdomen  Others

## 2025-02-25 NOTE — PROGRESS NOTE ADULT - NSPROGADDITIONALINFOA_GEN_ALL_CORE
d/w the daughter Vanessa STEELE. Pt lives with her. Uses walker to walk, though not always using.   Recently seen by Dr. Shaw Mitchell (rad onc) for measurements. Hoping that pt will be able to proceed with radiation treatment post discharge (although concern for radiation induced pneumonitis)     GOC discussed with pt and the daughter. Per the pt and daughter, if condition is irreversible then no CPR/ventilator. otherwise okay with CPR/ ventilator.   overall prognosis is poor given lung findings of fibrosis.  Palliative team eval appreciated. pt signed DNR/DNI.     Resume metoprolol for a.fib.  BP stable, resume Lasix --> IV started, oxygen improving. transition to PO.   hi-flow wean to NC, TTE reviewed. stage 2 diastolic dysfunction.   out of bed to chair if tolerates.   physical therapy.  last day of abx 2/25/25 per ID.     DC Planning.     - Dr. RACHELL Martinez ([facility])  - [phone]

## 2025-02-25 NOTE — PROGRESS NOTE ADULT - SUBJECTIVE AND OBJECTIVE BOX
SUBJECTIVE/ OVERNIGHT EVENTS:  comfortable  feeling overall better  last day of abx  steroids tapered  FS better  events reviewed.       --------------------------------------------------------------------------------------------  LABS:                        9.5    11.65 )-----------( 459      ( 25 Feb 2025 07:05 )             29.6     02-25    134[L]  |  95[L]  |  42[H]  ----------------------------<  174[H]  4.8   |  26  |  1.04    Ca    9.1      25 Feb 2025 07:05  Phos  4.2     02-25  Mg     1.90     02-25    TPro  6.6  /  Alb  3.6  /  TBili  0.2  /  DBili  x   /  AST  14  /  ALT  8   /  AlkPhos  80  02-25      CAPILLARY BLOOD GLUCOSE      POCT Blood Glucose.: 294 mg/dL (25 Feb 2025 12:29)  POCT Blood Glucose.: 181 mg/dL (25 Feb 2025 08:42)  POCT Blood Glucose.: 226 mg/dL (24 Feb 2025 21:19)  POCT Blood Glucose.: 194 mg/dL (24 Feb 2025 18:01)  POCT Blood Glucose.: 210 mg/dL (24 Feb 2025 17:46)  POCT Blood Glucose.: 226 mg/dL (24 Feb 2025 17:42)  POCT Blood Glucose.: 56 mg/dL (24 Feb 2025 17:30)  POCT Blood Glucose.: 58 mg/dL (24 Feb 2025 17:26)        Urinalysis Basic - ( 25 Feb 2025 07:05 )    Color: x / Appearance: x / SG: x / pH: x  Gluc: 174 mg/dL / Ketone: x  / Bili: x / Urobili: x   Blood: x / Protein: x / Nitrite: x   Leuk Esterase: x / RBC: x / WBC x   Sq Epi: x / Non Sq Epi: x / Bacteria: x        RADIOLOGY & ADDITIONAL TESTS:    Imaging Personally Reviewed:  [x] YES  [ ] NO    Consultant(s) Notes Reviewed:  [x] YES  [ ] NO    MEDICATIONS  (STANDING):  aspirin enteric coated 81 milliGRAM(s) Oral daily  atorvastatin 20 milliGRAM(s) Oral at bedtime  dextrose 5%. 1000 milliLiter(s) (50 mL/Hr) IV Continuous <Continuous>  dextrose 5%. 1000 milliLiter(s) (100 mL/Hr) IV Continuous <Continuous>  dextrose 50% Injectable 25 Gram(s) IV Push once  dextrose 50% Injectable 12.5 Gram(s) IV Push once  dextrose 50% Injectable 25 Gram(s) IV Push once  enoxaparin Injectable 40 milliGRAM(s) SubCutaneous every 24 hours  fluticasone propionate/ salmeterol 250-50 MICROgram(s) Diskus 1 Dose(s) Inhalation two times a day  gabapentin 400 milliGRAM(s) Oral at bedtime  glucagon  Injectable 1 milliGRAM(s) IntraMuscular once  insulin glargine Injectable (LANTUS) 40 Unit(s) SubCutaneous at bedtime  insulin lispro (ADMELOG) corrective regimen sliding scale   SubCutaneous three times a day before meals  insulin lispro (ADMELOG) corrective regimen sliding scale   SubCutaneous at bedtime  insulin lispro Injectable (ADMELOG) 13 Unit(s) SubCutaneous three times a day before meals  ipratropium 17 MICROgram(s) HFA Inhaler 1 Puff(s) Inhalation every 6 hours  levalbuterol Inhalation 1.25 milliGRAM(s) Inhalation every 6 hours  metoprolol tartrate 12.5 milliGRAM(s) Oral two times a day  multivitamin 1 Tablet(s) Oral daily  pantoprazole    Tablet 40 milliGRAM(s) Oral before breakfast  polyethylene glycol 3350 17 Gram(s) Oral two times a day  predniSONE   Tablet 20 milliGRAM(s) Oral every 12 hours  senna 2 Tablet(s) Oral at bedtime  sildenafil (REVATIO) 20 milliGRAM(s) Oral three times a day    MEDICATIONS  (PRN):  acetaminophen     Tablet .. 650 milliGRAM(s) Oral every 6 hours PRN Temp greater or equal to 38C (100.4F), Mild Pain (1 - 3)  dextrose Oral Gel 15 Gram(s) Oral once PRN Blood Glucose LESS THAN 70 milliGRAM(s)/deciliter  guaiFENesin Oral Liquid (Sugar-Free) 100 milliGRAM(s) Oral every 6 hours PRN Cough  melatonin 3 milliGRAM(s) Oral at bedtime PRN Insomnia  ondansetron Injectable 4 milliGRAM(s) IV Push every 8 hours PRN Nausea and/or Vomiting      Care Discussed with Consultants/Other Providers [x] YES  [ ] NO    Vital Signs Last 24 Hrs  T(C): 36.6 (25 Feb 2025 12:04), Max: 36.7 (24 Feb 2025 14:00)  T(F): 97.9 (25 Feb 2025 12:04), Max: 98.1 (24 Feb 2025 18:07)  HR: 101 (25 Feb 2025 12:04) (76 - 126)  BP: 113/46 (25 Feb 2025 12:04) (104/43 - 142/61)  BP(mean): --  RR: 18 (25 Feb 2025 12:04) (17 - 24)  SpO2: 98% (25 Feb 2025 12:04) (96% - 99%)    Parameters below as of 25 Feb 2025 12:04  Patient On (Oxygen Delivery Method): nasal cannula  O2 Flow (L/min): 2    I&O's Summary    24 Feb 2025 07:01  -  25 Feb 2025 07:00  --------------------------------------------------------  IN: 400 mL / OUT: 1450 mL / NET: -1050 mL        PHYSICAL EXAM:  GENERAL: NAD, thin-elderly, comfortable now on hi-flow --> 6L --> 4L --> 3L  HEAD:  Atraumatic, Normocephalic  EYES: EOMI, PERRLA, conjunctiva and sclera clear  NECK: Supple, No JVD  CHEST/LUNG: mild decrease breath sounds bilaterally; No wheeze   HEART: Regular rate and rhythm; No murmurs, rubs, or gallops  ABDOMEN: Soft, Nontender, Nondistended; Bowel sounds present  Neuro: AAOx3, no focal weakness   EXTREMITIES:  2+ Peripheral Pulses, No clubbing, cyanosis, or edema  SKIN: No rashes or lesions

## 2025-02-25 NOTE — CHART NOTE - NSCHARTNOTEFT_GEN_A_CORE
Informed by RN that patient wanted to discuss GOC with team and would like to be DNR/DNI. Patient met with palliative few days ago and was given MOLST. Patient discussed GOC with her daughter.    Goals of Care conversation done with patient. Discussed FULL CODE VS DNR/DNI. CPR and intubation discussed with patient including what CPR/intubation is and risks/benefits. Pt wishes to be DNR/DNI after full discussion. Expresses her daughter also has same wishes. Pt is A&OX4. Patient states she would also not like to have dialysis done but would like all other interventions such as NIV, lab work, IV abx, IV fluids and medications.     All questions answered. MOLST signed and placed in chart. GOC conversation witness by colleague Elijah DOYLE (NP).

## 2025-02-25 NOTE — PROGRESS NOTE ADULT - ASSESSMENT
Pt is a 89 yo F with PMH CHF, anemia, pHTN, CAD, CVA (no deficits), T2D, [place], COPD (2L NC PRN, qhs), PVD, HTN, HLD, and ?lung CA (pending RT) p/w dysuria x 1wk along with tremors and SOB.   On arrival, T 102.5, , /78 --- SBP 80s, RR 20 O2sat 88% RA -- 2L NC. EKG with sinus tachycardia, no ischemic changes. ER POCUS with diffuse B lines. Labs with leukocytosis, normocytic anemia, trop neg x2, BNP 1406, lactate neg, UA+ with UCx and BCx in lab, RVP neg, MRSA in lab. CTA chest neg PE but with multifocal PNA, pulmonary edema, and 2.8x2.7cm mass LLL interval inc compared to prior. MICU consulted for hypotension with c/f need for pressors, however, improved with IVF and midodrine. Pt given tylenol, cefepime, and vancomycin. ID consulted and PT consulted with recs pending. (19 Feb 2025 10:03)  she is apparently sob to me:  but she says her breathing is OK:  she  is on home oxygen :  recently diagnosed with lung cancer:   now pulm called for pneumonia        Sepsis/ Pneumonia/ COPD / lung cancer:   pneumonia: on antibiotics   CTA chest neg PE but with multifocal PNA and pulmonary edema   cont antibtiocs : seen by id   urine legionella/strep  lEFT LOWER OPACITY:  PER DAUGHTER :  SHE NEVER GOT ANY CHEMO :  SHE WAS SUPPOSED TO GET RADIATION THERAPY,  BUT SHE ENDED UP HERE:     She has had multiplek admission in last few months and hence could not any surgery for the mass:  now it seems to have increased   she seems to be sob:  and has copd:  will add short course of steroids   her vbg is OK:  but she looks sob:  she may need bipap , if her rr worsens    2/20: she looks better today  : on 3 L o f oxygen : she does use oxygen at home:  2 L of oxygen :  ct scan chest showed: No pulmonary embolism. Interval increase in groundglass opacities and subpleural consolidations in bilateral lungs, likely infectious. Interlobularseptal thickening in the upper lobes, which can be suggestive of interstitial edema 3.1 cm masslike consolidation in left lower lobe. Additional smaller nodules in bilateral lungs. Mediastinal and hilar lymphadenopathy which can be reactive or due to  metastasis. Subpleural reticulations, Honeycombing, and peripheral cystic changes in bilateral lungs, suggestive of interstitial lung fibrosis.    2/21: spoke to daughter again : she had mapping ct prior to coming to hospital  : she was supposed to be getting radiation therapy;   no chemo ;   cont iv steroids;   she had no pe  on initial admission she had no pe:  \  she has significant emphysema:    VBG is good     would cont antibiotics   ? hemonc consult??     2/21: she was seen by palliative care;   2/22:  seems pretty good:  has cough + especially in night time:   add anti tussives:  hycodan at night time  \  on cefepime"   2/24:  she looks pretty good:  antibiotics till tomorrow:   on 3 L of ox gen  satting at 100%  VBG today is reasonable!  change to po steroids     2/25: she looks and feels better;   denies Sob to me at this time:   no wheezing:  has poor air entry  ;   cont current x:     Acute UTI.   as above.    Acute on chronic heart failure.    - known hx CHF   - 10/2024 TTE EF 60%, mild MV stenosis, mod AS, mild-mod TR  - POCUS in ER c/f reduced EF  - CXR with pulmonary vascular congestion, CTA chest with pulmonary edema   -cont diuretics  defer to card s    2/20: cont current rx:    on 3 L of oxygen    2/21: today hero2 requirement went up : do chest xray and stat high flow:  cont BD and steroids   2/22: on 40% high flow;  but I think can be changed to nasal cannula:  await echo results   2/25: on iv lasix    Pulmonary mass.    CTA chest with interval increase in LLL mass from prior, 2.8x2.7cm  - pending RT initiation outpt?\  - as above:  has not getting any treatment since the diagnosis many months ago   2/22: defer to  onc:   ct scan looks not that good   would struggles to increase lasix   2/24: on iv Lasix   2/25: iv lasix      Pulmonary HTN.    sildenafil 20mg TID.  2/24: new echo ; mild pulm htn     HTN (hypertension).   blood pressure is soft:  currently off anti hypertensives:   controlled    Type 2 diabetes mellitus.   monitor and control:    dw acp

## 2025-02-25 NOTE — PROGRESS NOTE ADULT - SUBJECTIVE AND OBJECTIVE BOX
[facility]  KRISSY Luong Y. Patel, S. Shah, G. Casimir  [phone]  ([phone] - weekdays after 5pm and weekends)    Name: FEDERICO DOS SANTOS  Age/Gender: 89y Female  MRN: [id number]    Interval History:  Notes reviewed.   No concerning overnight events.  Afebrile.     Allergies: penicillin (Unknown)  macrolide antibiotics (Other)  Biaxin (Unknown)      Objective:  Vitals:   T(F): 97.4 (02-25-25 @ 08:51), Max: 98.1 (02-24-25 @ 18:07)  HR: 76 (02-25-25 @ 09:08) (76 - 126)  BP: 142/61 (02-25-25 @ 08:51) (104/43 - 142/61)  RR: 17 (02-25-25 @ 08:51) (17 - 24)  SpO2: 98% (02-25-25 @ 09:08) (96% - 99%)  Physical Examination:  General: no acute distress  HEENT: anicteric, NC  Cardio: S1, S2, normal rate  Resp: decreased breath sounds  Abd: soft, NT, ND  Ext: no LE edema  Skin: warm, dry    Laboratory Studies:  CBC:                       9.5    11.65 )-----------( 459      ( 25 Feb 2025 07:05 )             29.6     WBC Trend:  11.65 02-25-25 @ 07:05  11.91 02-24-25 @ 07:40  9.54 02-23-25 @ 07:21  10.32 02-22-25 @ 15:59  9.02 02-21-25 @ 06:33  8.53 02-20-25 @ 05:37  11.15 02-19-25 @ 09:14  12.67 02-18-25 @ 20:07    CMP: 02-25    134[L]  |  95[L]  |  42[H]  ----------------------------<  174[H]  4.8   |  26  |  1.04    Ca    9.1      25 Feb 2025 07:05  Phos  4.2     02-25  Mg     1.90     02-25    TPro  6.6  /  Alb  3.6  /  TBili  0.2  /  DBili  x   /  AST  14  /  ALT  8   /  AlkPhos  80  02-25      LIVER FUNCTIONS - ( 25 Feb 2025 07:05 )  Alb: 3.6 g/dL / Pro: 6.6 g/dL / ALK PHOS: 80 U/L / ALT: 8 U/L / AST: 14 U/L / GGT: x             Urinalysis Basic - ( 25 Feb 2025 07:05 )    Color: x / Appearance: x / SG: x / pH: x  Gluc: 174 mg/dL / Ketone: x  / Bili: x / Urobili: x   Blood: x / Protein: x / Nitrite: x   Leuk Esterase: x / RBC: x / WBC x   Sq Epi: x / Non Sq Epi: x / Bacteria: x      Microbiology: reviewed     Culture - Blood (collected 02-18-25 @ 21:42)  Source: .Blood Blood-Peripheral  Final Report (02-24-25 @ 01:01):    No growth at 5 days    Culture - Urine (collected 02-18-25 @ 21:36)  Source: Clean Catch Clean Catch (Midstream)  Final Report (02-20-25 @ 16:54):    50,000 - 99,000 CFU/mL Enterococcus faecium    <10,000 CFU/ml Normal Urogenital vonnie present  Organism: Enterococcus faecium (02-20-25 @ 16:54)  Organism: Enterococcus faecium (02-20-25 @ 16:54)      Method Type: KHADIJAH      -  Ampicillin: S <=2 Predicts results to ampicillin/sulbactam, amoxacillin-clavulanate and  piperacillin-tazobactam.      -  Ciprofloxacin: I 2      -  Levofloxacin: I 4      -  Nitrofurantoin: S <=32 Should not be used to treat pyelonephritis.      -  Tetracycline: S <=4      -  Vancomycin: S 0.5    Culture - Blood (collected 02-18-25 @ 20:45)  Source: .Blood Blood-Peripheral  Final Report (02-24-25 @ 01:01):    No growth at 5 days        Radiology: reviewed     Medications:  acetaminophen     Tablet .. 650 milliGRAM(s) Oral every 6 hours PRN  aspirin enteric coated 81 milliGRAM(s) Oral daily  atorvastatin 20 milliGRAM(s) Oral at bedtime  cefepime   IVPB 2000 milliGRAM(s) IV Intermittent <User Schedule>  dextrose 5%. 1000 milliLiter(s) IV Continuous <Continuous>  dextrose 5%. 1000 milliLiter(s) IV Continuous <Continuous>  dextrose 50% Injectable 25 Gram(s) IV Push once  dextrose 50% Injectable 12.5 Gram(s) IV Push once  dextrose 50% Injectable 25 Gram(s) IV Push once  dextrose Oral Gel 15 Gram(s) Oral once PRN  enoxaparin Injectable 40 milliGRAM(s) SubCutaneous every 24 hours  fluticasone propionate/ salmeterol 250-50 MICROgram(s) Diskus 1 Dose(s) Inhalation two times a day  furosemide   Injectable 40 milliGRAM(s) IV Push daily  gabapentin 400 milliGRAM(s) Oral at bedtime  glucagon  Injectable 1 milliGRAM(s) IntraMuscular once  guaiFENesin Oral Liquid (Sugar-Free) 100 milliGRAM(s) Oral every 6 hours PRN  insulin glargine Injectable (LANTUS) 40 Unit(s) SubCutaneous at bedtime  insulin lispro (ADMELOG) corrective regimen sliding scale   SubCutaneous three times a day before meals  insulin lispro (ADMELOG) corrective regimen sliding scale   SubCutaneous at bedtime  insulin lispro Injectable (ADMELOG) 13 Unit(s) SubCutaneous three times a day before meals  ipratropium 17 MICROgram(s) HFA Inhaler 1 Puff(s) Inhalation every 6 hours  levalbuterol Inhalation 1.25 milliGRAM(s) Inhalation every 6 hours  melatonin 3 milliGRAM(s) Oral at bedtime PRN  metoprolol tartrate 12.5 milliGRAM(s) Oral two times a day  multivitamin 1 Tablet(s) Oral daily  ondansetron Injectable 4 milliGRAM(s) IV Push every 8 hours PRN  pantoprazole    Tablet 40 milliGRAM(s) Oral before breakfast  polyethylene glycol 3350 17 Gram(s) Oral two times a day  predniSONE   Tablet 20 milliGRAM(s) Oral every 12 hours  senna 2 Tablet(s) Oral at bedtime  sildenafil (REVATIO) 20 milliGRAM(s) Oral three times a day    Antimicrobials:  cefepime   IVPB 2000 milliGRAM(s) IV Intermittent <User Schedule>

## 2025-02-25 NOTE — CHART NOTE - NSCHARTNOTEFT_GEN_A_CORE
d/w the daughter Vanessa. Code status reviewed.  explains pt's wish of DNR/DNI and she agreed.  Understands that pt is very fragile given comorbidities.   States she already did forms with palliative.   the daughter hoping that she will be able to come home soon, then radiation treatment as outpt.  All questions answered.     - Dr. RACHELL Martinez ([facility])  - [phone]

## 2025-02-26 LAB
ALBUMIN SERPL ELPH-MCNC: 3.6 G/DL — SIGNIFICANT CHANGE UP (ref 3.3–5)
ALP SERPL-CCNC: 79 U/L — SIGNIFICANT CHANGE UP (ref 40–120)
ALT FLD-CCNC: 9 U/L — SIGNIFICANT CHANGE UP (ref 4–33)
ANION GAP SERPL CALC-SCNC: 13 MMOL/L — SIGNIFICANT CHANGE UP (ref 7–14)
AST SERPL-CCNC: 14 U/L — SIGNIFICANT CHANGE UP (ref 4–32)
BASOPHILS # BLD AUTO: 0.01 K/UL — SIGNIFICANT CHANGE UP (ref 0–0.2)
BASOPHILS NFR BLD AUTO: 0.1 % — SIGNIFICANT CHANGE UP (ref 0–2)
BILIRUB SERPL-MCNC: 0.2 MG/DL — SIGNIFICANT CHANGE UP (ref 0.2–1.2)
BLOOD GAS VENOUS COMPREHENSIVE RESULT: SIGNIFICANT CHANGE UP
BUN SERPL-MCNC: 45 MG/DL — HIGH (ref 7–23)
CALCIUM SERPL-MCNC: 9.5 MG/DL — SIGNIFICANT CHANGE UP (ref 8.4–10.5)
CHLORIDE SERPL-SCNC: 95 MMOL/L — LOW (ref 98–107)
CO2 SERPL-SCNC: 27 MMOL/L — SIGNIFICANT CHANGE UP (ref 22–31)
CREAT SERPL-MCNC: 1.12 MG/DL — SIGNIFICANT CHANGE UP (ref 0.5–1.3)
EGFR: 46 ML/MIN/1.73M2 — LOW
EGFR: 46 ML/MIN/1.73M2 — LOW
EOSINOPHIL # BLD AUTO: 0.06 K/UL — SIGNIFICANT CHANGE UP (ref 0–0.5)
EOSINOPHIL NFR BLD AUTO: 0.5 % — SIGNIFICANT CHANGE UP (ref 0–6)
GLUCOSE SERPL-MCNC: 123 MG/DL — HIGH (ref 70–99)
HCT VFR BLD CALC: 29.3 % — LOW (ref 34.5–45)
HGB BLD-MCNC: 9.7 G/DL — LOW (ref 11.5–15.5)
IANC: 8.12 K/UL — HIGH (ref 1.8–7.4)
IMM GRANULOCYTES NFR BLD AUTO: 2.2 % — HIGH (ref 0–0.9)
LYMPHOCYTES # BLD AUTO: 1.82 K/UL — SIGNIFICANT CHANGE UP (ref 1–3.3)
LYMPHOCYTES # BLD AUTO: 16.4 % — SIGNIFICANT CHANGE UP (ref 13–44)
MAGNESIUM SERPL-MCNC: 2.3 MG/DL — SIGNIFICANT CHANGE UP (ref 1.6–2.6)
MCHC RBC-ENTMCNC: 28.6 PG — SIGNIFICANT CHANGE UP (ref 27–34)
MCHC RBC-ENTMCNC: 33.1 G/DL — SIGNIFICANT CHANGE UP (ref 32–36)
MCV RBC AUTO: 86.4 FL — SIGNIFICANT CHANGE UP (ref 80–100)
MONOCYTES # BLD AUTO: 0.83 K/UL — SIGNIFICANT CHANGE UP (ref 0–0.9)
MONOCYTES NFR BLD AUTO: 7.5 % — SIGNIFICANT CHANGE UP (ref 2–14)
NEUTROPHILS # BLD AUTO: 8.12 K/UL — HIGH (ref 1.8–7.4)
NEUTROPHILS NFR BLD AUTO: 73.3 % — SIGNIFICANT CHANGE UP (ref 43–77)
NRBC # BLD AUTO: 0 K/UL — SIGNIFICANT CHANGE UP (ref 0–0)
NRBC # FLD: 0 K/UL — SIGNIFICANT CHANGE UP (ref 0–0)
NRBC BLD AUTO-RTO: 0 /100 WBCS — SIGNIFICANT CHANGE UP (ref 0–0)
PHOSPHATE SERPL-MCNC: 4.3 MG/DL — SIGNIFICANT CHANGE UP (ref 2.5–4.5)
PLATELET # BLD AUTO: 462 K/UL — HIGH (ref 150–400)
POTASSIUM SERPL-MCNC: 4.3 MMOL/L — SIGNIFICANT CHANGE UP (ref 3.5–5.3)
POTASSIUM SERPL-SCNC: 4.3 MMOL/L — SIGNIFICANT CHANGE UP (ref 3.5–5.3)
PROT SERPL-MCNC: 6.5 G/DL — SIGNIFICANT CHANGE UP (ref 6–8.3)
RBC # BLD: 3.39 M/UL — LOW (ref 3.8–5.2)
RBC # FLD: 15.6 % — HIGH (ref 10.3–14.5)
SODIUM SERPL-SCNC: 135 MMOL/L — SIGNIFICANT CHANGE UP (ref 135–145)
WBC # BLD: 11.08 K/UL — HIGH (ref 3.8–10.5)
WBC # FLD AUTO: 11.08 K/UL — HIGH (ref 3.8–10.5)

## 2025-02-26 PROCEDURE — 99232 SBSQ HOSP IP/OBS MODERATE 35: CPT

## 2025-02-26 RX ORDER — INSULIN LISPRO 100 U/ML
INJECTION, SOLUTION INTRAVENOUS; SUBCUTANEOUS AT BEDTIME
Refills: 0 | Status: DISCONTINUED | OUTPATIENT
Start: 2025-02-26 | End: 2025-03-03

## 2025-02-26 RX ORDER — INSULIN LISPRO 100 U/ML
INJECTION, SOLUTION INTRAVENOUS; SUBCUTANEOUS
Refills: 0 | Status: DISCONTINUED | OUTPATIENT
Start: 2025-02-26 | End: 2025-03-03

## 2025-02-26 RX ORDER — INSULIN GLARGINE-YFGN 100 [IU]/ML
36 INJECTION, SOLUTION SUBCUTANEOUS AT BEDTIME
Refills: 0 | Status: DISCONTINUED | OUTPATIENT
Start: 2025-02-26 | End: 2025-02-27

## 2025-02-26 RX ADMIN — SILDENAFIL 20 MILLIGRAM(S): 50 TABLET, FILM COATED ORAL at 21:11

## 2025-02-26 RX ADMIN — LEVALBUTEROL HYDROCHLORIDE 1.25 MILLIGRAM(S): 1.25 SOLUTION RESPIRATORY (INHALATION) at 03:18

## 2025-02-26 RX ADMIN — PREDNISONE 20 MILLIGRAM(S): 20 TABLET ORAL at 17:18

## 2025-02-26 RX ADMIN — METOPROLOL SUCCINATE 12.5 MILLIGRAM(S): 50 TABLET, EXTENDED RELEASE ORAL at 05:14

## 2025-02-26 RX ADMIN — INSULIN LISPRO 13 UNIT(S): 100 INJECTION, SOLUTION INTRAVENOUS; SUBCUTANEOUS at 17:49

## 2025-02-26 RX ADMIN — ENOXAPARIN SODIUM 40 MILLIGRAM(S): 100 INJECTION SUBCUTANEOUS at 21:11

## 2025-02-26 RX ADMIN — METOPROLOL SUCCINATE 12.5 MILLIGRAM(S): 50 TABLET, EXTENDED RELEASE ORAL at 17:21

## 2025-02-26 RX ADMIN — Medication 1 PUFF(S): at 09:09

## 2025-02-26 RX ADMIN — INSULIN LISPRO 2: 100 INJECTION, SOLUTION INTRAVENOUS; SUBCUTANEOUS at 09:09

## 2025-02-26 RX ADMIN — INSULIN LISPRO 13 UNIT(S): 100 INJECTION, SOLUTION INTRAVENOUS; SUBCUTANEOUS at 12:30

## 2025-02-26 RX ADMIN — INSULIN LISPRO 13 UNIT(S): 100 INJECTION, SOLUTION INTRAVENOUS; SUBCUTANEOUS at 09:12

## 2025-02-26 RX ADMIN — POLYETHYLENE GLYCOL 3350 17 GRAM(S): 17 POWDER, FOR SOLUTION ORAL at 17:18

## 2025-02-26 RX ADMIN — FUROSEMIDE 40 MILLIGRAM(S): 10 INJECTION INTRAMUSCULAR; INTRAVENOUS at 05:13

## 2025-02-26 RX ADMIN — Medication 40 MILLIGRAM(S): at 05:14

## 2025-02-26 RX ADMIN — PREDNISONE 20 MILLIGRAM(S): 20 TABLET ORAL at 05:14

## 2025-02-26 RX ADMIN — Medication 1 DOSE(S): at 21:10

## 2025-02-26 RX ADMIN — LEVALBUTEROL HYDROCHLORIDE 1.25 MILLIGRAM(S): 1.25 SOLUTION RESPIRATORY (INHALATION) at 15:22

## 2025-02-26 RX ADMIN — LEVALBUTEROL HYDROCHLORIDE 1.25 MILLIGRAM(S): 1.25 SOLUTION RESPIRATORY (INHALATION) at 21:30

## 2025-02-26 RX ADMIN — Medication 1 PUFF(S): at 21:10

## 2025-02-26 RX ADMIN — Medication 1 DOSE(S): at 09:09

## 2025-02-26 RX ADMIN — GABAPENTIN 400 MILLIGRAM(S): 400 CAPSULE ORAL at 21:09

## 2025-02-26 RX ADMIN — INSULIN LISPRO 1: 100 INJECTION, SOLUTION INTRAVENOUS; SUBCUTANEOUS at 17:49

## 2025-02-26 RX ADMIN — INSULIN LISPRO 1: 100 INJECTION, SOLUTION INTRAVENOUS; SUBCUTANEOUS at 12:31

## 2025-02-26 RX ADMIN — SILDENAFIL 20 MILLIGRAM(S): 50 TABLET, FILM COATED ORAL at 05:14

## 2025-02-26 RX ADMIN — Medication 1 PUFF(S): at 17:16

## 2025-02-26 RX ADMIN — LEVALBUTEROL HYDROCHLORIDE 1.25 MILLIGRAM(S): 1.25 SOLUTION RESPIRATORY (INHALATION) at 08:31

## 2025-02-26 RX ADMIN — SILDENAFIL 20 MILLIGRAM(S): 50 TABLET, FILM COATED ORAL at 11:44

## 2025-02-26 RX ADMIN — Medication 81 MILLIGRAM(S): at 11:44

## 2025-02-26 RX ADMIN — Medication 3 MILLIGRAM(S): at 21:18

## 2025-02-26 RX ADMIN — ATORVASTATIN CALCIUM 20 MILLIGRAM(S): 80 TABLET, FILM COATED ORAL at 21:14

## 2025-02-26 RX ADMIN — Medication 1 PUFF(S): at 05:14

## 2025-02-26 RX ADMIN — Medication 1 TABLET(S): at 09:09

## 2025-02-26 RX ADMIN — INSULIN GLARGINE-YFGN 36 UNIT(S): 100 INJECTION, SOLUTION SUBCUTANEOUS at 22:57

## 2025-02-26 RX ADMIN — Medication 2 TABLET(S): at 21:11

## 2025-02-26 NOTE — PROVIDER CONTACT NOTE (MEDICATION) - ASSESSMENT
pt resting in bed, asymptomatic, pt resting in bed, asymptomatic, no complaints of pain or discomfort.

## 2025-02-26 NOTE — DIETITIAN INITIAL EVALUATION ADULT - ETIOLOGY
in setting of CHF and DM related to endocrine dysfunction in setting of steroid induced hyperglycemia

## 2025-02-26 NOTE — DIETITIAN INITIAL EVALUATION ADULT - PROBLEM SELECTOR PLAN 6
- CTA chest with interval increase in LLL mass from prior, 2.8x2.7cm  - pending RT initiation outpt  - outpt f/u

## 2025-02-26 NOTE — PROGRESS NOTE ADULT - ASSESSMENT
89 y/o F PMhx CHF, anemia, pHTN, CAD, CVA, DMII, COPD (2L NC PRN, qhs), PVD, HTN, and ?lung CA (pending RT) who presented w/ dysuria x 5 days as well as SOB and rigors.    L lung cancer, PNA  sepsis- fever, leukocytosis- resolved  RVP negative  CTA chest- negative for PE but demonstrated increase in groundglass opacities and subpleural consolidations in bilateral lungs; Interlobular septal thickening in the upper lobes, which can be suggestive of interstitial edema 3.1 cm masslike consolidation in left lower lobe. Additional smaller nodules in bilateral lungs. Subpleural reticulations, Honeycombing, and peripheral cystic changes in bilateral lungs, suggestive of interstitial lung fibrosis.  s/p cefepime x 7 days, completed 2/25 AM    UTI  dysuria- resolved  no flank tenderness, UA positive  urine cx E faecium, sensitivities reviewed  blood cultures- NGTD  s/p vanc/ macrobid course, completed 2/25    Recommendations  continue off antibiotics  wean O2 as tolerated  stable from ID perspective    Aldo Verduzco M.D.  [facility]  [phone]  After 5pm on weekdays and all day on weekends - please call [phone]  Available on [employer] teams    Thank you for consulting us and involving us in the management of this patients case. In addition to reviewing history, imaging, documents, labs, microbiology, took into account antibiotic stewardship, local antibiogram and infection control strategies and potential transmission issues.

## 2025-02-26 NOTE — PROGRESS NOTE ADULT - ASSESSMENT
Pt is a 89 yo F with PMH CHF, anemia, pHTN, CAD, CVA (no deficits), T2D, [place], COPD (2L NC PRN, qhs), PVD, HTN, HLD, and ?lung CA (pending RT) p/w dysuria x 1wk along with tremors and SOB. On arrival, meeting SIRS criteria with hypotension and hypoxia requiring 2L NC. EKG with sinus tachycardia, no ischemic changes. ER POCUS with diffuse B lines. Labs with leukocytosis, normocytic anemia, trop neg x2, BNP 1406, lactate neg, UA+ with UCx and BCx in lab, RVP neg, MRSA in lab. CTA chest neg PE but with multifocal PNA, pulmonary edema, and 2.8x2.7cm mass LLL interval inc compared to prior. MICU consulted for hypotension with c/f need for pressors, however, improved with IVF and midodrine; started on cefepime. ID + pulm consulted and PT consulted with recs pending.

## 2025-02-26 NOTE — PROGRESS NOTE ADULT - ASSESSMENT
ECHO 10/7/24: nl LV sys fx EF 60% . Mild mitral valve stenosis. Mild to moderate tricuspid regurgitation. mod AS   ECHO  2/22/25 nl LV sys fx  ef 68%, moderate (grade 2) left ventricular diastolic dysfunction,mild pulmonary hypertension. The aortic valve appears trileaflet with reduced systolic excursion. There is calcification of the aortic valve leaflets. The aortic valve acceleration time is 90 msec. There is no evidence of aortic regurgitation.    a/p    89 yo F with PMH CHF, valvular disease, anemia, pHTN, CAD, CVA (no deficits), T2D, [place], COPD (2L NC PRN, qhs), PVD, HTN, HLD, and ?lung CA (pending RT) p/w dysuria x 1wk along with tremors and SOB + pna    #PNA  -Meeting sepsis criteria w/ fever, tachycardia, leukocytosis  -CTa chest with no PE, Interval increase in groundglass opacities and subpleural consolidations  in bilateral lungs, interlobularseptal thickening in  the upper lobes, which can be suggestive of interstitial edema; 3.1 cm mass like consolidation in left lower lobe, Mediastinal and hilar lymphadenopathy which can be reactive or due to  metastasis.  Subpleural reticulations, Honeycombing, and peripheral cystic changes in  bilateral lungs, suggestive of interstitial lung fibrosis.  -abx per id/med  -on diuretics      #hypotension   -resolved  -micu eval noted- hypotensive to 80s/40s, given 2x 500cc bolus and Midodrine 10mg w/ improvement in BP to MAP >70  -Pocus w/ collapsed IVC suggestive of intravascular depletion, but w/ evidence of reduced RV and LV function   -bcx ngtd  -not a candidate for MICU per team   -HS trop neg. no ischemic changes to ecg   -ECHO  2/22/25 nl LV sys fx  ef 68%, moderate (grade 2) left ventricular diastolic dysfunction,mild pulmonary hypertension.    #CAD s/p PCI  -stable  -cont asa, statin, BB    #HFpEF  -ECHO  2/22/25 nl LV sys fx  ef 68%, moderate (grade 2) left ventricular diastolic dysfunction,mild pulmonary hypertension.  -bun riding/ NA noted-- change lasix to PO 40 mg daily     #Pulmonary HTN.   -c/w home sildenafil 20mg TID.  -echo with mild pulm htn   -diuretics     #Lung CA  -med/ pulm fu   -pending RT initiation as outpt     #UTI   -management per med /ID    dvt ppx    35 minutes spent on total encounter; more than 50% of the visit was spent counseling and/or coordinating care by the attending physician.

## 2025-02-26 NOTE — DIETITIAN INITIAL EVALUATION ADULT - OTHER CALCULATIONS
IBW 98 lbs +/-10%. [facility] HIE record: 55.7kg (1/30/25), 54.4kg (11/14/24), 54kg (8/21/24)

## 2025-02-26 NOTE — PROGRESS NOTE ADULT - ASSESSMENT
A/P: Pt is a 89 yo F with PMH CHF, anemia, pHTN, CAD, CVA (no deficits), T2D, [place], COPD (2L NC PRN, qhs), PVD, HTN, HLD, and ?lung CA (pending RT) p/w dysuria x 1wk along with tremors and SOB, found to be hyperglycemic and will be on steroids. Endocrinology was consulted for management of diabetes mellitus.    #Type 2 Diabetes Mellitus  - Receiving methylpred 20 mg IV q8h - 2/21- ?- solumedrol 20mg IV q8h is continued.  -  Please notify endocrine if any change to steroid plan.  - HbA1c 9.7%; home regimen: Tresiba 10 units at bedtime (Tresiba no longer covered working on switching to a new basal prior to admission), metformin 500mg (?) 2 tabs daily       INPATIENT PLAN:  - Inpatient BG goal 100-180mg/dl: below goal at bedtime. Prednisone 20mg PO BID x 3 days (last dose on 2/27 at 6am)  - Decrease Lantus to 36 units qhs  - Continue Admelog 13 units TID AC hold if NPO  - Change Admelog from moderate to low correction scale TIDQAC and change from moderate to low correction scale QHS  - Please check FSG before meals and QHS, or q6h while NPO  - RD consult  - hypoglycemia orderset prn  - consistent carb diet  - RD consult  - Hypoglycemia Protocol   - changed antibiotics to non dextrose fluid  - Provider to Rn for insulin pen review     DISCHARGE PLAN:  - depends on steroids- please inform endocrine of steroid plans---> basal/metformin or basal/bolus TBD  - Tresiba no longer covered, to find an alternative basal insulin that is covered.  Please send Lantus solostar pen as test script to check insurance coverage.  Ok to send with current doses and update prior to d/c    If Lantus not covered- can try alternating with one of following   tresiba/basaglar/toujeo/Levemir  - Clarify dose of metformin tabs at home---> defer to primary team   - Also given patient had a son with type 1 DM sent c-peptide (with serum glucose), FELISHA Ab, IA-2 Ab, and Zinc transporter Ab with Am labs to rule out JILLIAN, now testing in lab. C-peptide 3.7 (glu 376) consistent with type 2 DM. Can follow up antibodies for completeness.  - Ensure patient has working glucometer, test strips, lancets, alcohol pads, and BD fransico pen needles  - Patient attributes high A1c to several hospital admissions in past few months.  - Patient uses glucometer and has previously declined CGM (will reconsider).  - Patient wishes to follow up with pcp and declines initiating care with an endocrinologist.    #Hypertension  - BP goal <130/80  - Please obtain urine micro albumin cr ratio as an outpt   - Management as per primary team, not on meds now    #Hyperlipidemia  - ordered for atorvastatin 20 mg continue if no contraindications   - Please obtain lipid profile if not done recently   - Defer to Primary Team     D/w Bianka Palacios  Nurse Practitioner  Division of Endocrinology & Diabetes  In house pager #[phone]    If before 9AM or after 6PM, or on weekends/holidays, please call endocrine answering service for assistance ([phone]).For nonurgent matters email [email or web address] for assistance.

## 2025-02-26 NOTE — DIETITIAN INITIAL EVALUATION ADULT - PERTINENT LABORATORY DATA
02-26    135  |  95[L]  |  45[H]  ----------------------------<  123[H]  4.3   |  27  |  1.12    Ca    9.5      26 Feb 2025 05:48  Phos  4.3     02-26  Mg     2.30     02-26    TPro  6.5  /  Alb  3.6  /  TBili  0.2  /  DBili  x   /  AST  14  /  ALT  9   /  AlkPhos  79  02-26  POCT Blood Glucose.: 171 mg/dL (02-26-25 @ 12:14)  A1C with Estimated Average Glucose Result: 9.7 % (02-19-25 @ 09:14)  A1C with Estimated Average Glucose Result: 8.0 % (12-13-24 @ 14:35)  A1C with Estimated Average Glucose Result: 6.6 % (09-05-24 @ 13:42)

## 2025-02-26 NOTE — DIETITIAN INITIAL EVALUATION ADULT - ORAL INTAKE PTA/DIET HISTORY
Patient is A&Ox3-4 at baseline. Reports no food allergy or intolerance. Endorses small appetite for a long time. Patient has been trying weighed herself at home, noted weight ranging 112-117 lbs due to fluid shift from water. Last weight 117 lbs a few days ago PTA. Believes weight loss vs gain.

## 2025-02-26 NOTE — PROGRESS NOTE ADULT - SUBJECTIVE AND OBJECTIVE BOX
Date of Service: 02-26-25 @ 11:42    Patient is a 89y old  Female who presents with a chief complaint of UTI, PNA (26 Feb 2025 10:37)      Any change in ROS: she feels tremors in the hands and feels sob off and on especially when she is eating     MEDICATIONS  (STANDING):  aspirin enteric coated 81 milliGRAM(s) Oral daily  atorvastatin 20 milliGRAM(s) Oral at bedtime  dextrose 5%. 1000 milliLiter(s) (50 mL/Hr) IV Continuous <Continuous>  dextrose 5%. 1000 milliLiter(s) (100 mL/Hr) IV Continuous <Continuous>  dextrose 50% Injectable 25 Gram(s) IV Push once  dextrose 50% Injectable 12.5 Gram(s) IV Push once  dextrose 50% Injectable 25 Gram(s) IV Push once  enoxaparin Injectable 40 milliGRAM(s) SubCutaneous every 24 hours  fluticasone propionate/ salmeterol 250-50 MICROgram(s) Diskus 1 Dose(s) Inhalation two times a day  furosemide    Tablet 40 milliGRAM(s) Oral daily  gabapentin 400 milliGRAM(s) Oral at bedtime  glucagon  Injectable 1 milliGRAM(s) IntraMuscular once  insulin glargine Injectable (LANTUS) 40 Unit(s) SubCutaneous at bedtime  insulin lispro (ADMELOG) corrective regimen sliding scale   SubCutaneous three times a day before meals  insulin lispro (ADMELOG) corrective regimen sliding scale   SubCutaneous at bedtime  insulin lispro Injectable (ADMELOG) 13 Unit(s) SubCutaneous three times a day before meals  ipratropium 17 MICROgram(s) HFA Inhaler 1 Puff(s) Inhalation every 6 hours  levalbuterol Inhalation 1.25 milliGRAM(s) Inhalation every 6 hours  metoprolol tartrate 12.5 milliGRAM(s) Oral two times a day  multivitamin 1 Tablet(s) Oral daily  pantoprazole    Tablet 40 milliGRAM(s) Oral before breakfast  polyethylene glycol 3350 17 Gram(s) Oral two times a day  predniSONE   Tablet 20 milliGRAM(s) Oral every 12 hours  senna 2 Tablet(s) Oral at bedtime  sildenafil (REVATIO) 20 milliGRAM(s) Oral three times a day    MEDICATIONS  (PRN):  acetaminophen     Tablet .. 650 milliGRAM(s) Oral every 6 hours PRN Temp greater or equal to 38C (100.4F), Mild Pain (1 - 3)  dextrose Oral Gel 15 Gram(s) Oral once PRN Blood Glucose LESS THAN 70 milliGRAM(s)/deciliter  guaiFENesin Oral Liquid (Sugar-Free) 100 milliGRAM(s) Oral every 6 hours PRN Cough  melatonin 3 milliGRAM(s) Oral at bedtime PRN Insomnia  ondansetron Injectable 4 milliGRAM(s) IV Push every 8 hours PRN Nausea and/or Vomiting    Vital Signs Last 24 Hrs  T(C): 36.5 (26 Feb 2025 05:00), Max: 36.6 (25 Feb 2025 12:04)  T(F): 97.7 (26 Feb 2025 05:00), Max: 97.9 (25 Feb 2025 12:04)  HR: 88 (26 Feb 2025 09:04) (79 - 101)  BP: 116/47 (26 Feb 2025 05:00) (104/48 - 116/47)  BP(mean): --  RR: 18 (26 Feb 2025 05:00) (18 - 18)  SpO2: 99% (26 Feb 2025 09:04) (97% - 100%)    Parameters below as of 26 Feb 2025 09:04  Patient On (Oxygen Delivery Method): nasal cannula        I&O's Summary    25 Feb 2025 07:01  -  26 Feb 2025 07:00  --------------------------------------------------------  IN: 350 mL / OUT: 425 mL / NET: -75 mL          Physical Exam:   GENERAL: NAD, well-groomed, well-developed  HEENT: MARCIO/   Atraumatic, Normocephalic  ENMT: No tonsillar erythema, exudates, or enlargement; Moist mucous membranes, Good dentition, No lesions  NECK: Supple, No JVD, Normal thyroid  CHEST/LUNG: Clear to auscultaion  CVS: Regular rate and rhythm; No murmurs, rubs, or gallops  GI: : Soft, Nontender, Nondistended; Bowel sounds present  NERVOUS SYSTEM:  Alert & Oriented X3  EXTREMITIES: tremors ++  LYMPH: No lymphadenopathy noted  SKIN: No rashes or lesions  ENDOCRINOLOGY: No Thyromegaly  PSYCH: calm     Labs:  31, 29, 32, 29, 23                            9.7    11.08 )-----------( 462      ( 26 Feb 2025 05:48 )             29.3                         9.5    11.65 )-----------( 459      ( 25 Feb 2025 07:05 )             29.6                         9.9    11.91 )-----------( 454      ( 24 Feb 2025 07:40 )             32.2                         9.2    9.54  )-----------( 401      ( 23 Feb 2025 07:21 )             29.2                         9.0    10.32 )-----------( 448      ( 22 Feb 2025 15:59 )             29.1     02-26    135  |  95[L]  |  45[H]  ----------------------------<  123[H]  4.3   |  27  |  1.12  02-25    134[L]  |  95[L]  |  42[H]  ----------------------------<  174[H]  4.8   |  26  |  1.04  02-24    138  |  98  |  36[H]  ----------------------------<  190[H]  4.7   |  25  |  0.88  02-23    135  |  97[L]  |  30[H]  ----------------------------<  269[H]  4.4   |  25  |  0.88  02-22    136  |  97[L]  |  31[H]  ----------------------------<  250[H]  4.3   |  22  |  0.93    Ca    9.5      26 Feb 2025 05:48  Ca    9.1      25 Feb 2025 07:05  Phos  4.3     02-26  Phos  4.2     02-25  Mg     2.30     02-26  Mg     1.90     02-25    TPro  6.5  /  Alb  3.6  /  TBili  0.2  /  DBili  x   /  AST  14  /  ALT  9   /  AlkPhos  79  02-26  TPro  6.6  /  Alb  3.6  /  TBili  0.2  /  DBili  x   /  AST  14  /  ALT  8   /  AlkPhos  80  02-25    CAPILLARY BLOOD GLUCOSE      POCT Blood Glucose.: 156 mg/dL (26 Feb 2025 08:44)  POCT Blood Glucose.: 292 mg/dL (25 Feb 2025 21:49)  POCT Blood Glucose.: 107 mg/dL (25 Feb 2025 17:56)  POCT Blood Glucose.: 71 mg/dL (25 Feb 2025 17:30)  POCT Blood Glucose.: 294 mg/dL (25 Feb 2025 12:29)      LIVER FUNCTIONS - ( 26 Feb 2025 05:48 )  Alb: 3.6 g/dL / Pro: 6.5 g/dL / ALK PHOS: 79 U/L / ALT: 9 U/L / AST: 14 U/L / GGT: x             Urinalysis Basic - ( 26 Feb 2025 05:48 )    Color: x / Appearance: x / SG: x / pH: x  Gluc: 123 mg/dL / Ketone: x  / Bili: x / Urobili: x   Blood: x / Protein: x / Nitrite: x   Leuk Esterase: x / RBC: x / WBC x   Sq Epi: x / Non Sq Epi: x / Bacteria: x      Lactate, Blood: 4.3 mmol/L (02-22 @ 15:59)        RECENT CULTURES:        RESPIRATORY CULTURES:    rad< from: Xray Chest 1 View- PORTABLE-Urgent (Xray Chest 1 View- PORTABLE-Urgent .) (02.21.25 @ 11:48) >  INTERPRETATION:  EXAMINATION: XR CHEST URGENT, XRCHEST URGENT    CLINICAL INDICATION: shortness of breath    TECHNIQUE: Single frontal, portable view of the chest was obtained.    COMPARISON: Chest x-ray 2/18/2025.    FINDINGS:    Chest x-ray 2/20/2025 at 11:06 PM:  Surgical clips in the right axilla.  The heart is normal in size.  Diffuse bilateral patchy and hazy opacities, unchanged.  No pneumothorax.  Trace bilateral pleural effusions and associated atelectasis.  No acute bony abnormality.    Chest x-ray 2/21/2025 at 11:28 AM:  Progression of right base atelectasis or infiltrate. The left lung is   clearer.    IMPRESSION:  Changing infiltrates in latest image.    --- End of Report ---          DEANNA HERNANDEZ MD; Resident Radiologist  This document has been electronically signed.  LJ IRVIN MD; Attending Interventional Radiologist  This document has been electronically signed. Feb 21 2025  3:04PM    < end of copied text >        Studies  Chest X-RAY  CT SCAN Chest   Venous Dopplers: LE:   CT Abdomen  Others

## 2025-02-26 NOTE — PROGRESS NOTE ADULT - SUBJECTIVE AND OBJECTIVE BOX
[facility]  KRISSY Luong Y. Patel, S. Shah, G. Casimir  [phone]  ([phone] - weekdays after 5pm and weekends)    Name: FEDERICO DOS SANTOS  Age/Gender: 89y Female  MRN: [id number]    Interval History:  Notes reviewed.   No concerning overnight events.  Afebrile.   states no SOB this morning  reports feels like SOB usually gets worse at the day progresses    Allergies: penicillin (Unknown)  macrolide antibiotics (Other)  Biaxin (Unknown)      Objective:  Vitals:   T(F): 97.7 (02-26-25 @ 05:00), Max: 97.9 (02-25-25 @ 12:04)  HR: 88 (02-26-25 @ 09:04) (79 - 101)  BP: 116/47 (02-26-25 @ 05:00) (104/48 - 116/47)  RR: 18 (02-26-25 @ 05:00) (18 - 18)  SpO2: 99% (02-26-25 @ 09:04) (97% - 100%)  Physical Examination:  General: no acute distress  HEENT: anicteric, NC  Cardio: S1, S2, normal rate  Resp: clear to auscultation anteriorly   Abd: soft, NT, ND  Ext: no LE edema  Skin: warm, dry    Laboratory Studies:  CBC:                       9.7    11.08 )-----------( 462      ( 26 Feb 2025 05:48 )             29.3     WBC Trend:  11.08 02-26-25 @ 05:48  11.65 02-25-25 @ 07:05  11.91 02-24-25 @ 07:40  9.54 02-23-25 @ 07:21  10.32 02-22-25 @ 15:59  9.02 02-21-25 @ 06:33  8.53 02-20-25 @ 05:37    CMP: 02-26    135  |  95[L]  |  45[H]  ----------------------------<  123[H]  4.3   |  27  |  1.12    Ca    9.5      26 Feb 2025 05:48  Phos  4.3     02-26  Mg     2.30     02-26    TPro  6.5  /  Alb  3.6  /  TBili  0.2  /  DBili  x   /  AST  14  /  ALT  9   /  AlkPhos  79  02-26      LIVER FUNCTIONS - ( 26 Feb 2025 05:48 )  Alb: 3.6 g/dL / Pro: 6.5 g/dL / ALK PHOS: 79 U/L / ALT: 9 U/L / AST: 14 U/L / GGT: x             Urinalysis Basic - ( 26 Feb 2025 05:48 )    Color: x / Appearance: x / SG: x / pH: x  Gluc: 123 mg/dL / Ketone: x  / Bili: x / Urobili: x   Blood: x / Protein: x / Nitrite: x   Leuk Esterase: x / RBC: x / WBC x   Sq Epi: x / Non Sq Epi: x / Bacteria: x      Microbiology: reviewed     Culture - Blood (collected 02-18-25 @ 21:42)  Source: .Blood Blood-Peripheral  Final Report (02-24-25 @ 01:01):    No growth at 5 days    Culture - Urine (collected 02-18-25 @ 21:36)  Source: Clean Catch Clean Catch (Midstream)  Final Report (02-20-25 @ 16:54):    50,000 - 99,000 CFU/mL Enterococcus faecium    <10,000 CFU/ml Normal Urogenital vonnie present  Organism: Enterococcus faecium (02-20-25 @ 16:54)  Organism: Enterococcus faecium (02-20-25 @ 16:54)      Method Type: KHADIJAH      -  Ampicillin: S <=2 Predicts results to ampicillin/sulbactam, amoxacillin-clavulanate and  piperacillin-tazobactam.      -  Ciprofloxacin: I 2      -  Levofloxacin: I 4      -  Nitrofurantoin: S <=32 Should not be used to treat pyelonephritis.      -  Tetracycline: S <=4      -  Vancomycin: S 0.5    Culture - Blood (collected 02-18-25 @ 20:45)  Source: .Blood Blood-Peripheral  Final Report (02-24-25 @ 01:01):    No growth at 5 days        Radiology: reviewed     Medications:  acetaminophen     Tablet .. 650 milliGRAM(s) Oral every 6 hours PRN  aspirin enteric coated 81 milliGRAM(s) Oral daily  atorvastatin 20 milliGRAM(s) Oral at bedtime  dextrose 5%. 1000 milliLiter(s) IV Continuous <Continuous>  dextrose 5%. 1000 milliLiter(s) IV Continuous <Continuous>  dextrose 50% Injectable 25 Gram(s) IV Push once  dextrose 50% Injectable 12.5 Gram(s) IV Push once  dextrose 50% Injectable 25 Gram(s) IV Push once  dextrose Oral Gel 15 Gram(s) Oral once PRN  enoxaparin Injectable 40 milliGRAM(s) SubCutaneous every 24 hours  fluticasone propionate/ salmeterol 250-50 MICROgram(s) Diskus 1 Dose(s) Inhalation two times a day  furosemide    Tablet 40 milliGRAM(s) Oral daily  gabapentin 400 milliGRAM(s) Oral at bedtime  glucagon  Injectable 1 milliGRAM(s) IntraMuscular once  guaiFENesin Oral Liquid (Sugar-Free) 100 milliGRAM(s) Oral every 6 hours PRN  insulin glargine Injectable (LANTUS) 40 Unit(s) SubCutaneous at bedtime  insulin lispro (ADMELOG) corrective regimen sliding scale   SubCutaneous three times a day before meals  insulin lispro (ADMELOG) corrective regimen sliding scale   SubCutaneous at bedtime  insulin lispro Injectable (ADMELOG) 13 Unit(s) SubCutaneous three times a day before meals  ipratropium 17 MICROgram(s) HFA Inhaler 1 Puff(s) Inhalation every 6 hours  levalbuterol Inhalation 1.25 milliGRAM(s) Inhalation every 6 hours  melatonin 3 milliGRAM(s) Oral at bedtime PRN  metoprolol tartrate 12.5 milliGRAM(s) Oral two times a day  multivitamin 1 Tablet(s) Oral daily  ondansetron Injectable 4 milliGRAM(s) IV Push every 8 hours PRN  pantoprazole    Tablet 40 milliGRAM(s) Oral before breakfast  polyethylene glycol 3350 17 Gram(s) Oral two times a day  predniSONE   Tablet 20 milliGRAM(s) Oral every 12 hours  senna 2 Tablet(s) Oral at bedtime  sildenafil (REVATIO) 20 milliGRAM(s) Oral three times a day    Antimicrobials:

## 2025-02-26 NOTE — DIETITIAN INITIAL EVALUATION ADULT - PROBLEM SELECTOR PLAN 10
- A1C 9.7  - home med: tresiba 10U daily, metformin 1g daily  - endo consulted, f/u recs  - mISS while inpt

## 2025-02-26 NOTE — PROGRESS NOTE ADULT - SUBJECTIVE AND OBJECTIVE BOX
SUBJECTIVE/ OVERNIGHT EVENTS:  feels comfortable  however very fragile   feels weak  on nasal canula  denied pain    --------------------------------------------------------------------------------------------  LABS:                        9.7    11.08 )-----------( 462      ( 26 Feb 2025 05:48 )             29.3     02-26    135  |  95[L]  |  45[H]  ----------------------------<  123[H]  4.3   |  27  |  1.12    Ca    9.5      26 Feb 2025 05:48  Phos  4.3     02-26  Mg     2.30     02-26    TPro  6.5  /  Alb  3.6  /  TBili  0.2  /  DBili  x   /  AST  14  /  ALT  9   /  AlkPhos  79  02-26      CAPILLARY BLOOD GLUCOSE      POCT Blood Glucose.: 171 mg/dL (26 Feb 2025 12:14)  POCT Blood Glucose.: 156 mg/dL (26 Feb 2025 08:44)  POCT Blood Glucose.: 292 mg/dL (25 Feb 2025 21:49)  POCT Blood Glucose.: 107 mg/dL (25 Feb 2025 17:56)  POCT Blood Glucose.: 71 mg/dL (25 Feb 2025 17:30)        Urinalysis Basic - ( 26 Feb 2025 05:48 )    Color: x / Appearance: x / SG: x / pH: x  Gluc: 123 mg/dL / Ketone: x  / Bili: x / Urobili: x   Blood: x / Protein: x / Nitrite: x   Leuk Esterase: x / RBC: x / WBC x   Sq Epi: x / Non Sq Epi: x / Bacteria: x        RADIOLOGY & ADDITIONAL TESTS:    Imaging Personally Reviewed:  [x] YES  [ ] NO    Consultant(s) Notes Reviewed:  [x] YES  [ ] NO    MEDICATIONS  (STANDING):  aspirin enteric coated 81 milliGRAM(s) Oral daily  atorvastatin 20 milliGRAM(s) Oral at bedtime  dextrose 5%. 1000 milliLiter(s) (50 mL/Hr) IV Continuous <Continuous>  dextrose 5%. 1000 milliLiter(s) (100 mL/Hr) IV Continuous <Continuous>  dextrose 50% Injectable 25 Gram(s) IV Push once  dextrose 50% Injectable 12.5 Gram(s) IV Push once  dextrose 50% Injectable 25 Gram(s) IV Push once  enoxaparin Injectable 40 milliGRAM(s) SubCutaneous every 24 hours  fluticasone propionate/ salmeterol 250-50 MICROgram(s) Diskus 1 Dose(s) Inhalation two times a day  furosemide    Tablet 40 milliGRAM(s) Oral daily  gabapentin 400 milliGRAM(s) Oral at bedtime  glucagon  Injectable 1 milliGRAM(s) IntraMuscular once  insulin glargine Injectable (LANTUS) 40 Unit(s) SubCutaneous at bedtime  insulin lispro (ADMELOG) corrective regimen sliding scale   SubCutaneous three times a day before meals  insulin lispro (ADMELOG) corrective regimen sliding scale   SubCutaneous at bedtime  insulin lispro Injectable (ADMELOG) 13 Unit(s) SubCutaneous three times a day before meals  ipratropium 17 MICROgram(s) HFA Inhaler 1 Puff(s) Inhalation every 6 hours  levalbuterol Inhalation 1.25 milliGRAM(s) Inhalation every 6 hours  metoprolol tartrate 12.5 milliGRAM(s) Oral two times a day  multivitamin 1 Tablet(s) Oral daily  pantoprazole    Tablet 40 milliGRAM(s) Oral before breakfast  polyethylene glycol 3350 17 Gram(s) Oral two times a day  predniSONE   Tablet 20 milliGRAM(s) Oral every 12 hours  senna 2 Tablet(s) Oral at bedtime  sildenafil (REVATIO) 20 milliGRAM(s) Oral three times a day    MEDICATIONS  (PRN):  acetaminophen     Tablet .. 650 milliGRAM(s) Oral every 6 hours PRN Temp greater or equal to 38C (100.4F), Mild Pain (1 - 3)  dextrose Oral Gel 15 Gram(s) Oral once PRN Blood Glucose LESS THAN 70 milliGRAM(s)/deciliter  guaiFENesin Oral Liquid (Sugar-Free) 100 milliGRAM(s) Oral every 6 hours PRN Cough  melatonin 3 milliGRAM(s) Oral at bedtime PRN Insomnia  ondansetron Injectable 4 milliGRAM(s) IV Push every 8 hours PRN Nausea and/or Vomiting      Care Discussed with Consultants/Other Providers [x] YES  [ ] NO    Vital Signs Last 24 Hrs  T(C): 36.8 (26 Feb 2025 12:03), Max: 36.8 (26 Feb 2025 12:03)  T(F): 98.2 (26 Feb 2025 12:03), Max: 98.2 (26 Feb 2025 12:03)  HR: 99 (26 Feb 2025 12:03) (79 - 99)  BP: 100/61 (26 Feb 2025 12:03) (100/61 - 116/47)  BP(mean): --  RR: 18 (26 Feb 2025 12:03) (18 - 18)  SpO2: 100% (26 Feb 2025 12:03) (97% - 100%)    Parameters below as of 26 Feb 2025 12:03  Patient On (Oxygen Delivery Method): nasal cannula  O2 Flow (L/min): 2    I&O's Summary    25 Feb 2025 07:01  -  26 Feb 2025 07:00  --------------------------------------------------------  IN: 350 mL / OUT: 425 mL / NET: -75 mL        PHYSICAL EXAM:  GENERAL: NAD, thin-elderly, comfortable now on hi-flow --> 6L --> 4L --> 3L  HEAD:  Atraumatic, Normocephalic  EYES: EOMI, PERRLA, conjunctiva and sclera clear  NECK: Supple, No JVD  CHEST/LUNG: mild decrease breath sounds bilaterally; No wheeze   HEART: Regular rate and rhythm; No murmurs, rubs, or gallops  ABDOMEN: Soft, Nontender, Nondistended; Bowel sounds present  Neuro: AAOx3, no focal weakness   EXTREMITIES:  2+ Peripheral Pulses, No clubbing, cyanosis, or edema  SKIN: No rashes or lesions

## 2025-02-26 NOTE — PROGRESS NOTE ADULT - PROBLEM SELECTOR PLAN 4
- known hx CHF   - 10/2024 TTE EF 60%, mild MV stenosis, mod AS, mild-mod TR  - POCUS in ER c/f reduced EF  - CXR with pulmonary vascular congestion, CTA chest with pulmonary edema   - TTE reviewed.   - home med: Lasix 40mg daily, lopressor as below -- initially on hold 2/2 sepsis and hypotension  - BP improves. restart Lopressor given tachycardia.   - cautious with fluids, resume lasix --> transition to IV --> PO

## 2025-02-26 NOTE — DIETITIAN INITIAL EVALUATION ADULT - NS FNS DIET ORDER
Diet, DASH/TLC:   Sodium & Cholesterol Restricted  Consistent Carbohydrate {Evening Snack} (CSTCHOSN) (02-20-25 @ 10:22) [Active]

## 2025-02-26 NOTE — DIETITIAN INITIAL EVALUATION ADULT - PROBLEM SELECTOR PLAN 1
- pt p/w dysuria x6d along with SOB and tremors  - on arrival, meeting SIRS criteria with likely pulmonary/ source  - EKG ST without ischemic changes  - POCUS in ER with diffuse B-lines and reduced EF  - hypotensive in ER requiring midodrine 10mg x1 and IVF -- 1.25L NS -- MICU consulted, no needs  - hx UTI with citrobacter and enterococcus   - lactate neg, RVP neg  - UA+ with UCx in lab  - f/u BCx  - f/u MRSA PCR  - f/u procal  - CTA chest neg PE but with multifocal PNA and pulmonary edema   - s/p cefepime and vancomycin in ER  - ID consulted, appreciate recs  - c/w cefepime for now  - f/u urine legionella/strep  - BP improved, defer additional midodrine

## 2025-02-26 NOTE — PROGRESS NOTE ADULT - SUBJECTIVE AND OBJECTIVE BOX
CARDIOLOGY FOLLOW UP - Dr. Valenzuela  Date of Service: 02-26-25 @ 13:00    CC: no events    Review of Systems:  Constitutional: No fever, weight loss, or fatigue  Respiratory: No cough, wheezing, or hemoptysis, no shortness of breath  Cardiovascular: No chest pain, palpitations, passing out, dizziness, or leg swelling  Gastrointestinal: No abd or epigastric pain. No nausea, vomiting, or hematemesis; no diarrhea or consiptaiton, no melena or hematochezia  Vascular: No edema     TELEMETRY:    PHYSICAL EXAM:  T(C): 36.8 (02-26-25 @ 12:03), Max: 36.8 (02-26-25 @ 12:03)  HR: 99 (02-26-25 @ 12:03) (79 - 99)  BP: 100/61 (02-26-25 @ 12:03) (100/61 - 116/47)  RR: 18 (02-26-25 @ 12:03) (18 - 18)  SpO2: 100% (02-26-25 @ 12:03) (97% - 100%)  Wt(kg): --  I&O's Summary    25 Feb 2025 07:01  -  26 Feb 2025 07:00  --------------------------------------------------------  IN: 350 mL / OUT: 425 mL / NET: -75 mL        Appearance: Normal	  Cardiovascular: Normal S1 S2,RRR, No JVD, No murmurs  Respiratory: Lungs clear to auscultation	  Gastrointestinal:  Soft, Non-tender, + BS	  Extremities: Normal range of motion, No clubbing, cyanosis or edema  Vascular: Peripheral pulses palpable 2+ bilaterally       Home Medications:  Advair Diskus 250 mcg-50 mcg inhalation powder: 1 puff(s) inhaled 2 times a day (13 Dec 2024 23:00)  aspirin 81 mg oral delayed release tablet: 1 tab(s) orally once a day (at bedtime) (13 Dec 2024 23:00)  atorvastatin 20 mg oral tablet: 1 tab(s) orally once a day (at bedtime) (13 Dec 2024 23:00)  furosemide 20 mg oral tablet: 2 tab(s) orally once a day (13 Dec 2024 22:55)  gabapentin 400 mg oral capsule: 1 cap(s) orally once a day (at bedtime) (13 Dec 2024 23:00)  ipratropium 42 mcg/inh (0.06%) nasal spray: 2 spray(s) intranasally 2 times a day (13 Dec 2024 23:00)  metFORMIN 1000 mg oral tablet: 1 tab(s) orally once a day (19 Feb 2025 12:37)  Metoprolol Tartrate 25 mg oral tablet: 0.5 tab(s) orally 2 times a day takes 2 half tabs in morning and 1 half tab at night (13 Dec 2024 22:59)  Multiple Vitamins oral tablet: 1 tab(s) orally once a day (13 Dec 2024 23:00)  omeprazole 40 mg oral delayed release capsule: 1 cap(s) orally once a day (13 Dec 2024 23:00)  polyethylene glycol 3350 oral powder for reconstitution: 17 gram(s) orally 2 times a day (18 Dec 2024 13:08)  PreserVision AREDS 2 oral capsule: 1 cap(s) orally 2 times a day (13 Dec 2024 23:00)  senna leaf extract oral tablet: 2 tab(s) orally once a day (at bedtime) (18 Dec 2024 13:08)  sildenafil 20 mg oral tablet: 1 tab(s) orally 3 times a day (13 Dec 2024 22:55)  Tresiba FlexTouch 100 units/mL subcutaneous solution: 10 unit(s) subcutaneous once a day (at bedtime) Take tresiba 10 units subcutaneous at bedtime starting tonight at bedtime (13 Dec 2024 23:00)        MEDICATIONS  (STANDING):  aspirin enteric coated 81 milliGRAM(s) Oral daily  atorvastatin 20 milliGRAM(s) Oral at bedtime  dextrose 5%. 1000 milliLiter(s) (50 mL/Hr) IV Continuous <Continuous>  dextrose 5%. 1000 milliLiter(s) (100 mL/Hr) IV Continuous <Continuous>  dextrose 50% Injectable 25 Gram(s) IV Push once  dextrose 50% Injectable 12.5 Gram(s) IV Push once  dextrose 50% Injectable 25 Gram(s) IV Push once  enoxaparin Injectable 40 milliGRAM(s) SubCutaneous every 24 hours  fluticasone propionate/ salmeterol 250-50 MICROgram(s) Diskus 1 Dose(s) Inhalation two times a day  furosemide    Tablet 40 milliGRAM(s) Oral daily  gabapentin 400 milliGRAM(s) Oral at bedtime  glucagon  Injectable 1 milliGRAM(s) IntraMuscular once  insulin glargine Injectable (LANTUS) 40 Unit(s) SubCutaneous at bedtime  insulin lispro (ADMELOG) corrective regimen sliding scale   SubCutaneous three times a day before meals  insulin lispro (ADMELOG) corrective regimen sliding scale   SubCutaneous at bedtime  insulin lispro Injectable (ADMELOG) 13 Unit(s) SubCutaneous three times a day before meals  ipratropium 17 MICROgram(s) HFA Inhaler 1 Puff(s) Inhalation every 6 hours  levalbuterol Inhalation 1.25 milliGRAM(s) Inhalation every 6 hours  metoprolol tartrate 12.5 milliGRAM(s) Oral two times a day  multivitamin 1 Tablet(s) Oral daily  pantoprazole    Tablet 40 milliGRAM(s) Oral before breakfast  polyethylene glycol 3350 17 Gram(s) Oral two times a day  predniSONE   Tablet 20 milliGRAM(s) Oral every 12 hours  senna 2 Tablet(s) Oral at bedtime  sildenafil (REVATIO) 20 milliGRAM(s) Oral three times a day        EKG:  RADIOLOGY:  DIAGNOSTIC TESTING:  [ ] Echocardiogram:  [ ] Catherterization:  [ ] Stress Test:  OTHER:     LABS:	 	                          9.7    11.08 )-----------( 462      ( 26 Feb 2025 05:48 )             29.3     02-26    135  |  95[L]  |  45[H]  ----------------------------<  123[H]  4.3   |  27  |  1.12    Ca    9.5      26 Feb 2025 05:48  Phos  4.3     02-26  Mg     2.30     02-26    TPro  6.5  /  Alb  3.6  /  TBili  0.2  /  DBili  x   /  AST  14  /  ALT  9   /  AlkPhos  79  02-26          CARDIAC MARKERS:

## 2025-02-26 NOTE — DIETITIAN INITIAL EVALUATION ADULT - ADD RECOMMEND
1. Continue current PO diet order as prescribed.   2. Encourage PO intake and honor food preferences as able.   3. Food and Nutrition Service department to honor food and fluid preferences as able within therapeutic diet. Magic Cup Reduced Sugar 4 oz. BID.  4. Nursing please consistently document % PO intake, weekly weights, bowel movement, and GI intolerance in RN flow sheets; RD to follow per protocol.

## 2025-02-26 NOTE — PROGRESS NOTE ADULT - SUBJECTIVE AND OBJECTIVE BOX
Chief Complaint: Type 2 DM; Steroid induced hyperglycemia    History: Pt seen at bedside. Pt tolerating oral diet. Pt denies nausea and vomiting/any signs of hypoglycemia. Pt reports an adequate appetite. Pt denies eating in between meals.     MEDICATIONS  (STANDING):  aspirin enteric coated 81 milliGRAM(s) Oral daily  atorvastatin 20 milliGRAM(s) Oral at bedtime  dextrose 5%. 1000 milliLiter(s) (50 mL/Hr) IV Continuous <Continuous>  dextrose 5%. 1000 milliLiter(s) (100 mL/Hr) IV Continuous <Continuous>  dextrose 50% Injectable 25 Gram(s) IV Push once  dextrose 50% Injectable 12.5 Gram(s) IV Push once  dextrose 50% Injectable 25 Gram(s) IV Push once  enoxaparin Injectable 40 milliGRAM(s) SubCutaneous every 24 hours  fluticasone propionate/ salmeterol 250-50 MICROgram(s) Diskus 1 Dose(s) Inhalation two times a day  furosemide    Tablet 40 milliGRAM(s) Oral daily  gabapentin 400 milliGRAM(s) Oral at bedtime  glucagon  Injectable 1 milliGRAM(s) IntraMuscular once  insulin glargine Injectable (LANTUS) 40 Unit(s) SubCutaneous at bedtime  insulin lispro (ADMELOG) corrective regimen sliding scale   SubCutaneous three times a day before meals  insulin lispro (ADMELOG) corrective regimen sliding scale   SubCutaneous at bedtime  insulin lispro Injectable (ADMELOG) 13 Unit(s) SubCutaneous three times a day before meals  ipratropium 17 MICROgram(s) HFA Inhaler 1 Puff(s) Inhalation every 6 hours  levalbuterol Inhalation 1.25 milliGRAM(s) Inhalation every 6 hours  metoprolol tartrate 12.5 milliGRAM(s) Oral two times a day  multivitamin 1 Tablet(s) Oral daily  pantoprazole    Tablet 40 milliGRAM(s) Oral before breakfast  polyethylene glycol 3350 17 Gram(s) Oral two times a day  predniSONE   Tablet 20 milliGRAM(s) Oral every 12 hours  senna 2 Tablet(s) Oral at bedtime  sildenafil (REVATIO) 20 milliGRAM(s) Oral three times a day    MEDICATIONS  (PRN):  acetaminophen     Tablet .. 650 milliGRAM(s) Oral every 6 hours PRN Temp greater or equal to 38C (100.4F), Mild Pain (1 - 3)  dextrose Oral Gel 15 Gram(s) Oral once PRN Blood Glucose LESS THAN 70 milliGRAM(s)/deciliter  guaiFENesin Oral Liquid (Sugar-Free) 100 milliGRAM(s) Oral every 6 hours PRN Cough  melatonin 3 milliGRAM(s) Oral at bedtime PRN Insomnia  ondansetron Injectable 4 milliGRAM(s) IV Push every 8 hours PRN Nausea and/or Vomiting      Allergies: penicillin (Unknown)  macrolide antibiotics (Other)  Biaxin (Unknown)    Review of Systems:  Respiratory: No SOB, no cough  GI: No nausea, vomiting, abdominal pain  Endocrine: no polyuria, polydipsia    PHYSICAL EXAM:  VITALS: T(C): 36.8 (02-26-25 @ 12:03)  T(F): 98.2 (02-26-25 @ 12:03), Max: 98.2 (02-26-25 @ 12:03)  HR: 99 (02-26-25 @ 12:03) (79 - 99)  BP: 100/61 (02-26-25 @ 12:03) (100/61 - 116/47)  RR:  (18 - 18)  SpO2:  (97% - 100%)  Wt(kg): --  GENERAL: NAD, well-groomed, well-developed  RESPIRATORY: No labored breathing   GI: Soft, nontender, non distended  PSYCH: Alert and oriented x 3, normal affect, normal mood      CAPILLARY BLOOD GLUCOSE  POCT Blood Glucose.: 171 mg/dL (26 Feb 2025 12:14)  POCT Blood Glucose.: 156 mg/dL (26 Feb 2025 08:44)  POCT Blood Glucose.: 292 mg/dL (25 Feb 2025 21:49)  POCT Blood Glucose.: 107 mg/dL (25 Feb 2025 17:56)  POCT Blood Glucose.: 71 mg/dL (25 Feb 2025 17:30)    A1C with Estimated Average Glucose (02.19.25 @ 09:14)    A1C with Estimated Average Glucose Result: 9.7   Estimated Average Glucose: 232      02-26    135  |  95[L]  |  45[H]  ----------------------------<  123[H]  4.3   |  27  |  1.12    eGFR: 46[L]    Ca    9.5      02-26  Mg     2.30     02-26  Phos  4.3     02-26    TPro  6.5  /  Alb  3.6  /  TBili  0.2  /  DBili  x   /  AST  14  /  ALT  9   /  AlkPhos  79  02-26    Thyroid Function Tests:  02-19 @ 09:14 TSH 1.95 FreeT4 -- T3 -- Anti TPO -- Anti Thyroglobulin Ab -- TSI --    Diet, DASH/TLC:   Sodium & Cholesterol Restricted  Consistent Carbohydrate Evening Snack (CSTCHOSN) (02-20-25 @ 10:22) [Active]

## 2025-02-26 NOTE — DIETITIAN INITIAL EVALUATION ADULT - OTHER INFO
Patient met at bedside for length of stay. Noted diet order: DASH/TLC. No difficulty chewing or swallowing. Reports poor PO intake with poor appetite, trying to complete ~50% of meals served; estimated PO intake </=50% EER at times. No report of nausea, vomit, diarrhea, constipation. Patient ordered for bowel regimen (senna, polyethylene glycol). Noted fecal incontinence; no last bowel movement noted per RN flow sheets. Labs notable for altered renal indices on furosemide 40mg QD. Dosing weight @ 63.5kg (2/18) is likely inaccurate, RD pressed bed scale @ 52.8kg/ 116.2 lbs which appeared to be at baseline and consistent with physical findings. Nutrition education provided verbally due to patient reports impaired vision. High calories high protein nutrition therapy handout left at bedside for daughter to review. Patient noted steroid induced hyperglycemia Hgb A1c 9.7% (2/19), endocrinology team is following on lantus 40u qhs, admelog 13u AC TID and CSTCHOSN. Patient is amenable for ice cream vs ONS, recommend Magic Cup Reduced Sugar 4 oz. (290 kcal, 9 gms protein) at lunch and dinner.

## 2025-02-26 NOTE — PROGRESS NOTE ADULT - NSPROGADDITIONALINFOA_GEN_ALL_CORE
d/w the daughter Vanessa STEELE. Pt lives with her. Uses walker to walk, though not always using.   Recently seen by Dr. Shaw Mitchell (rad onc) for measurements. Hoping that pt will be able to proceed with radiation treatment post discharge (although concern for radiation induced pneumonitis)     GOC discussed with pt and the daughter. Per the pt and daughter, if condition is irreversible then no CPR/ventilator. otherwise okay with CPR/ ventilator.   overall prognosis is poor given lung findings of fibrosis.  Palliative team eval appreciated. pt signed DNR/DNI.     hi-flow wean to NC, TTE reviewed. stage 2 diastolic dysfunction.  now back on home PO Lasix.   out of bed to chair if tolerates.   physical therapy.  last day of abx 2/25/25 per ID.   DC Planning. PT re-eval pending.   overall prognosis is poor given extensive lung finding of ILD/ lung Ca, etc    - Dr. RACHELL Reveleset ([facility])  - [phone]

## 2025-02-26 NOTE — DIETITIAN INITIAL EVALUATION ADULT - PERTINENT MEDS FT
MEDICATIONS  (STANDING):  aspirin enteric coated 81 milliGRAM(s) Oral daily  atorvastatin 20 milliGRAM(s) Oral at bedtime  dextrose 5%. 1000 milliLiter(s) (50 mL/Hr) IV Continuous <Continuous>  dextrose 5%. 1000 milliLiter(s) (100 mL/Hr) IV Continuous <Continuous>  dextrose 50% Injectable 25 Gram(s) IV Push once  dextrose 50% Injectable 12.5 Gram(s) IV Push once  dextrose 50% Injectable 25 Gram(s) IV Push once  enoxaparin Injectable 40 milliGRAM(s) SubCutaneous every 24 hours  fluticasone propionate/ salmeterol 250-50 MICROgram(s) Diskus 1 Dose(s) Inhalation two times a day  furosemide    Tablet 40 milliGRAM(s) Oral daily  gabapentin 400 milliGRAM(s) Oral at bedtime  glucagon  Injectable 1 milliGRAM(s) IntraMuscular once  insulin glargine Injectable (LANTUS) 40 Unit(s) SubCutaneous at bedtime  insulin lispro (ADMELOG) corrective regimen sliding scale   SubCutaneous three times a day before meals  insulin lispro (ADMELOG) corrective regimen sliding scale   SubCutaneous at bedtime  insulin lispro Injectable (ADMELOG) 13 Unit(s) SubCutaneous three times a day before meals  ipratropium 17 MICROgram(s) HFA Inhaler 1 Puff(s) Inhalation every 6 hours  levalbuterol Inhalation 1.25 milliGRAM(s) Inhalation every 6 hours  metoprolol tartrate 12.5 milliGRAM(s) Oral two times a day  multivitamin 1 Tablet(s) Oral daily  pantoprazole    Tablet 40 milliGRAM(s) Oral before breakfast  polyethylene glycol 3350 17 Gram(s) Oral two times a day  predniSONE   Tablet 20 milliGRAM(s) Oral every 12 hours  senna 2 Tablet(s) Oral at bedtime  sildenafil (REVATIO) 20 milliGRAM(s) Oral three times a day    MEDICATIONS  (PRN):  acetaminophen     Tablet .. 650 milliGRAM(s) Oral every 6 hours PRN Temp greater or equal to 38C (100.4F), Mild Pain (1 - 3)  dextrose Oral Gel 15 Gram(s) Oral once PRN Blood Glucose LESS THAN 70 milliGRAM(s)/deciliter  guaiFENesin Oral Liquid (Sugar-Free) 100 milliGRAM(s) Oral every 6 hours PRN Cough  melatonin 3 milliGRAM(s) Oral at bedtime PRN Insomnia  ondansetron Injectable 4 milliGRAM(s) IV Push every 8 hours PRN Nausea and/or Vomiting

## 2025-02-26 NOTE — PROGRESS NOTE ADULT - ASSESSMENT
Pt is a 89 yo F with PMH CHF, anemia, pHTN, CAD, CVA (no deficits), T2D, [place], COPD (2L NC PRN, qhs), PVD, HTN, HLD, and ?lung CA (pending RT) p/w dysuria x 1wk along with tremors and SOB.   On arrival, T 102.5, , /78 --- SBP 80s, RR 20 O2sat 88% RA -- 2L NC. EKG with sinus tachycardia, no ischemic changes. ER POCUS with diffuse B lines. Labs with leukocytosis, normocytic anemia, trop neg x2, BNP 1406, lactate neg, UA+ with UCx and BCx in lab, RVP neg, MRSA in lab. CTA chest neg PE but with multifocal PNA, pulmonary edema, and 2.8x2.7cm mass LLL interval inc compared to prior. MICU consulted for hypotension with c/f need for pressors, however, improved with IVF and midodrine. Pt given tylenol, cefepime, and vancomycin. ID consulted and PT consulted with recs pending. (19 Feb 2025 10:03)  she is apparently sob to me:  but she says her breathing is OK:  she  is on home oxygen :  recently diagnosed with lung cancer:   now pulm called for pneumonia        Sepsis/ Pneumonia/ COPD / lung cancer:   pneumonia: on antibiotics   CTA chest neg PE but with multifocal PNA and pulmonary edema   cont antibtiocs : seen by id   urine legionella/strep  lEFT LOWER OPACITY:  PER DAUGHTER :  SHE NEVER GOT ANY CHEMO :  SHE WAS SUPPOSED TO GET RADIATION THERAPY,  BUT SHE ENDED UP HERE:     She has had multiplek admission in last few months and hence could not any surgery for the mass:  now it seems to have increased   she seems to be sob:  and has copd:  will add short course of steroids   her vbg is OK:  but she looks sob:  she may need bipap , if her rr worsens    2/20: she looks better today  : on 3 L o f oxygen : she does use oxygen at home:  2 L of oxygen :  ct scan chest showed: No pulmonary embolism. Interval increase in groundglass opacities and subpleural consolidations in bilateral lungs, likely infectious. Interlobularseptal thickening in the upper lobes, which can be suggestive of interstitial edema 3.1 cm masslike consolidation in left lower lobe. Additional smaller nodules in bilateral lungs. Mediastinal and hilar lymphadenopathy which can be reactive or due to  metastasis. Subpleural reticulations, Honeycombing, and peripheral cystic changes in bilateral lungs, suggestive of interstitial lung fibrosis.    2/21: spoke to daughter again : she had mapping ct prior to coming to hospital  : she was supposed to be getting radiation therapy;   no chemo ;   cont iv steroids;   she had no pe  on initial admission she had no pe:  \  she has significant emphysema:    VBG is good     would cont antibiotics   ? hemonc consult??     2/21: she was seen by palliative care;   2/22:  seems pretty good:  has cough + especially in night time:   add anti tussives:  hycodan at night time  \  on cefepime"   2/24:  she looks pretty good:  antibiotics till tomorrow:   on 3 L of ox gen  satting at 100%  VBG today is reasonable!  change to po steroids     2/25: she looks and feels better;   denies Sob to me at this time:   no wheezing:  has poor air entry  ;   cont current x:     2/26: seems to be doing  ok : still has tremors  in hands: ? neuro consult:   still feels SOB off and on   no wheezing ;    Acute UTI.   as above.    Acute on chronic heart failure.    - known hx CHF   - 10/2024 TTE EF 60%, mild MV stenosis, mod AS, mild-mod TR  - POCUS in ER c/f reduced EF  - CXR with pulmonary vascular congestion, CTA chest with pulmonary edema   -cont diuretics  defer to card s    2/20: cont current rx:    on 3 L of oxygen    2/21: today hero2 requirement went up : do chest xray and stat high flow:  cont BD and steroids   2/22: on 40% high flow;  but I think can be changed to nasal cannula:  await echo results   2/25: on iv lasix  2/26: on oral  lasix      Pulmonary mass.    CTA chest with interval increase in LLL mass from prior, 2.8x2.7cm  - pending RT initiation outpt?\  - as above:  has not getting any treatment since the diagnosis many months ago   2/22: defer to  onc:   ct scan looks not that good   would struggles to increase lasix   2/24: on iv Lasix   2/25: iv lasix    2/26: changed to po lasix     Pulmonary HTN.    sildenafil 20mg TID.  2/24: new echo ; mild pulm htn     HTN (hypertension).   blood pressure is soft:  currently off anti hypertensives:   controlled    Type 2 diabetes mellitus.   monitor and control:    gareth peace

## 2025-02-26 NOTE — DIETITIAN INITIAL EVALUATION ADULT - REASON FOR ADMISSION
Sepsis  Per chart review, patient is a 89 yo F with PMH CHF, anemia, pHTN, CAD, CVA (no deficits), T2D, [place], COPD (2L NC PRN, qhs), PVD, HTN, HLD, and ?lung CA (pending RT) p/w dysuria x 1wk along with tremors and SOB. On arrival, meeting SIRS criteria with hypotension and hypoxia requiring 2L NC. EKG with sinus tachycardia, no ischemic changes. ER POCUS with diffuse B lines. Labs with leukocytosis, normocytic anemia, trop neg x2, BNP 1406, lactate neg, UA+ with UCx and BCx in lab, RVP neg, MRSA in lab. CTA chest neg PE but with multifocal PNA, pulmonary edema, and 2.8x2.7cm mass LLL interval inc compared to prior. MICU consulted for hypotension with c/f need for pressors, however, improved with IVF and midodrine; started on cefepime. ID + pulm consulted and PT consulted with recs pending.

## 2025-02-26 NOTE — DIETITIAN INITIAL EVALUATION ADULT - FACTORS AFF FOOD INTAKE
Physical Therapy Discharge    Visit Type: Discharge Summary  Visit: 11  Referring Provider: Myra Mendoza MD  Medical Diagnosis (from order): I63.81 - Acute lacunar stroke  (CMD)     SUBJECTIVE                                                                                                               Patient reports improved confidence and balance since last visit  Patient reports HEP compliance    Pain / Symptoms  - Patient denies pain / symptoms.     OBJECTIVE                                                                                                                           Standing Balance  (CINDY = base of support)  Functional Gait Assessment (FGA):   Score: 15  See flowsheet for complete test.       Balance Tests   5 Times Sit to Stand (seconds) 15.4    Norms: 70-79 year old - 4.5 -15.5 sec    Interpretation:        > 12 seconds indicates need for further assessment for fall risk for community dwelling elderly      > 16 seconds indicative of falls risk for Parkinson's disease      Ambulation / Gait  - Assistive device: none  - Distance (feet unless otherwise indicated): 400  - Assist Level: modified independent  - Surface: even  - Description: decreased derrick/pace        Outcome/Assessments  Outcome Measures:   Neuro QOL - Lower Extremity Function (Mobility) - Short Form  Lower Extremity Raw Score: 33, Lower Extremity T Score: 41.7  (Scored 8-40, lower score indicates increased disability) see flowsheet for additional documentation       Treatment     Therapeutic Exercise  Standing with UE support:  Heel raises x 20  Hip flexion x 20 each LE  Mini squats x 10  Knee flexion x 10 each LE  Minimal verbal cuing for technique and upright posture    Reviewed HEP and discharge instructions    Neuromuscular Re-Education  Five times sit to stand  Functional Gait Assessment  Neuro QoL for LE short form    Ambulated 400 feet no device, modified independent    Negotiated 12-6 inch stairs with bilateral handrails,  step to pattern, modified independent    Ambulating forward 20 feet while performing single lateral taps to cones on ground,   Stand by assist for safety due to impaired balance during single leg stance          Skilled input: verbal instruction/cues and as detailed above    Writer verbally educated and received verbal consent for hand placement, positioning of patient, and techniques to be performed today from patient for therapist position for techniques as described above and how they are pertinent to the patient's plan of care.  Home Exercise Program  Access Code: [email or web address]  URL: [email or web address]  Date: 01/15/2025  Prepared by: EMY RODGERS    Exercises  - Hooklying Clamshell with Resistance  - 1 x daily - 7 x weekly - 2 sets - 10 reps  - Supine March with Resistance Band  - 1 x daily - 7 x weekly - 2 sets - 10 reps  - Supine Bridge with Resistance Band  - 1 x daily - 7 x weekly - 2 sets - 10 reps  - Standing March with Counter Support  - 1 x daily - 7 x weekly - 3 sets - 10 reps  - Standing Hip Abduction with Counter Support  - 1 x daily - 7 x weekly - 3 sets - 10 reps  - Heel Raises with Counter Support  - 1 x daily - 7 x weekly - 3 sets - 10 reps  - Forward Lunge with Back Leg Straight and Counter Support  - 1 x daily - 7 x weekly - 3 sets - 10 reps  - Standing Hip Extension with Counter Support  - 1 x daily - 7 x weekly - 3 sets - 10 reps  - Mini Squat with Counter Support  - 1 x daily - 7 x weekly - 3 sets - 10 reps      ASSESSMENT                                                                                                            Gains in skilled therapy to date as expected in LE strength and balance which allowed patient to progress to an independent level for all mobility. Patient met all LTGs.  Patient attends therapy as recommended.  Patient reports performing HEP as prescribed.  Progress toward discharge/long term goals (see below for specific status updates): good  progress  Pain/symptoms after session (out of 10): 0  Education:   - Results of above outlined education: Demonstrates understanding and Verbalizes understanding    PLAN                                                                                                                           Discharge from skilled therapy with instructions/recommendations to: continue home exercise program, follow up with referring provider    Suggestions for next session as indicated: Patient discharged from OPPT    Goals  Long Term Goals: to be met by end of plan of care  1. Patient will be independent and compliant with home exercise program to demonstrate improved self management of condition. Status: met   2. Patient will achieve > or equal to 9/30 on the Functional Gait Assessment to demonstrate improved ability to ambulate with head turns in environments such as parking lots or grocery stores Status: met Patient achieved 15/30 on 1/15/2025  3. Patient will ambulate 150 feet modified independent with least restrictive assistive device to demonstrate improved functional mobility. Status: met  Patient ambulated 400 feet without assistive device at modified independent level    Patient progress, plan of care and goals reviewed between providing therapist and assistant.    Patient progress, plan of care and goals discussed face to face between providing physical therapist and physical therapy assistant.  Plan to discharge from outpatient PT at this time.  Wendy Palomino, PT     Therapy procedure time and total treatment time can be found documented on the Time Entry flowsheet     persistent lack of appetite/Voodoo/ethnic/cultural/personal food preferences

## 2025-02-26 NOTE — PROVIDER CONTACT NOTE (MEDICATION) - BACKGROUND
PMH) Pulmonary hypertension  (PMH) CAD (coronary artery disease)  (PMH) History of COPD
(PMH) Pulmonary hypertension  (PMH) CAD (coronary artery disease)  (PMH) History of COPD

## 2025-02-26 NOTE — DIETITIAN INITIAL EVALUATION ADULT - NSFNSPHYEXAMSKINFT_GEN_A_CORE
No pressure injury per RN flow sheets, Moisture Associated Dermatitis + Incontinence Associated Dermatitis

## 2025-02-27 ENCOUNTER — TRANSCRIPTION ENCOUNTER (OUTPATIENT)
Age: 89
End: 2025-02-27

## 2025-02-27 LAB
ANION GAP SERPL CALC-SCNC: 15 MMOL/L — HIGH (ref 7–14)
ANION GAP SERPL CALC-SCNC: 17 MMOL/L — HIGH (ref 7–14)
BUN SERPL-MCNC: 55 MG/DL — HIGH (ref 7–23)
BUN SERPL-MCNC: 56 MG/DL — HIGH (ref 7–23)
CALCIUM SERPL-MCNC: 9 MG/DL — SIGNIFICANT CHANGE UP (ref 8.4–10.5)
CALCIUM SERPL-MCNC: 9.7 MG/DL — SIGNIFICANT CHANGE UP (ref 8.4–10.5)
CHLORIDE SERPL-SCNC: 94 MMOL/L — LOW (ref 98–107)
CHLORIDE SERPL-SCNC: 94 MMOL/L — LOW (ref 98–107)
CO2 SERPL-SCNC: 23 MMOL/L — SIGNIFICANT CHANGE UP (ref 22–31)
CO2 SERPL-SCNC: 28 MMOL/L — SIGNIFICANT CHANGE UP (ref 22–31)
CREAT SERPL-MCNC: 1.13 MG/DL — SIGNIFICANT CHANGE UP (ref 0.5–1.3)
CREAT SERPL-MCNC: 1.15 MG/DL — SIGNIFICANT CHANGE UP (ref 0.5–1.3)
EGFR: 45 ML/MIN/1.73M2 — LOW
EGFR: 45 ML/MIN/1.73M2 — LOW
EGFR: 46 ML/MIN/1.73M2 — LOW
EGFR: 46 ML/MIN/1.73M2 — LOW
GLUCOSE SERPL-MCNC: 149 MG/DL — HIGH (ref 70–99)
GLUCOSE SERPL-MCNC: 171 MG/DL — HIGH (ref 70–99)
HCT VFR BLD CALC: 35.4 % — SIGNIFICANT CHANGE UP (ref 34.5–45)
HGB BLD-MCNC: 11.2 G/DL — LOW (ref 11.5–15.5)
MAGNESIUM SERPL-MCNC: 2.3 MG/DL — SIGNIFICANT CHANGE UP (ref 1.6–2.6)
MAGNESIUM SERPL-MCNC: 2.4 MG/DL — SIGNIFICANT CHANGE UP (ref 1.6–2.6)
MCHC RBC-ENTMCNC: 27.7 PG — SIGNIFICANT CHANGE UP (ref 27–34)
MCHC RBC-ENTMCNC: 31.6 G/DL — LOW (ref 32–36)
MCV RBC AUTO: 87.4 FL — SIGNIFICANT CHANGE UP (ref 80–100)
NRBC # BLD AUTO: 0 K/UL — SIGNIFICANT CHANGE UP (ref 0–0)
NRBC # FLD: 0 K/UL — SIGNIFICANT CHANGE UP (ref 0–0)
NRBC BLD AUTO-RTO: 0 /100 WBCS — SIGNIFICANT CHANGE UP (ref 0–0)
PHOSPHATE SERPL-MCNC: 4.6 MG/DL — HIGH (ref 2.5–4.5)
PHOSPHATE SERPL-MCNC: 5.4 MG/DL — HIGH (ref 2.5–4.5)
PLATELET # BLD AUTO: 431 K/UL — HIGH (ref 150–400)
POTASSIUM SERPL-MCNC: 4.5 MMOL/L — SIGNIFICANT CHANGE UP (ref 3.5–5.3)
POTASSIUM SERPL-MCNC: 6.4 MMOL/L — CRITICAL HIGH (ref 3.5–5.3)
POTASSIUM SERPL-SCNC: 4.5 MMOL/L — SIGNIFICANT CHANGE UP (ref 3.5–5.3)
POTASSIUM SERPL-SCNC: 6.4 MMOL/L — CRITICAL HIGH (ref 3.5–5.3)
RBC # BLD: 4.05 M/UL — SIGNIFICANT CHANGE UP (ref 3.8–5.2)
RBC # FLD: 15.9 % — HIGH (ref 10.3–14.5)
S PNEUM AG UR QL: NEGATIVE — SIGNIFICANT CHANGE UP
SODIUM SERPL-SCNC: 134 MMOL/L — LOW (ref 135–145)
SODIUM SERPL-SCNC: 137 MMOL/L — SIGNIFICANT CHANGE UP (ref 135–145)
WBC # BLD: 9.73 K/UL — SIGNIFICANT CHANGE UP (ref 3.8–10.5)
WBC # FLD AUTO: 9.73 K/UL — SIGNIFICANT CHANGE UP (ref 3.8–10.5)

## 2025-02-27 PROCEDURE — 99232 SBSQ HOSP IP/OBS MODERATE 35: CPT

## 2025-02-27 RX ORDER — LEVALBUTEROL HYDROCHLORIDE 1.25 MG/3ML
2 SOLUTION RESPIRATORY (INHALATION)
Qty: 1 | Refills: 0
Start: 2025-02-27 | End: 2025-03-28

## 2025-02-27 RX ORDER — INSULIN LISPRO 100 U/ML
6 INJECTION, SOLUTION INTRAVENOUS; SUBCUTANEOUS
Refills: 0 | Status: DISCONTINUED | OUTPATIENT
Start: 2025-02-27 | End: 2025-02-27

## 2025-02-27 RX ORDER — INSULIN GLARGINE-YFGN 100 [IU]/ML
10 INJECTION, SOLUTION SUBCUTANEOUS AT BEDTIME
Refills: 0 | Status: DISCONTINUED | OUTPATIENT
Start: 2025-02-27 | End: 2025-02-27

## 2025-02-27 RX ORDER — INSULIN LISPRO 100 U/ML
4 INJECTION, SOLUTION INTRAVENOUS; SUBCUTANEOUS
Refills: 0 | Status: DISCONTINUED | OUTPATIENT
Start: 2025-02-28 | End: 2025-02-28

## 2025-02-27 RX ORDER — ISOPROPYL ALCOHOL, BENZOCAINE .7; .06 ML/ML; ML/ML
0 SWAB TOPICAL
Qty: 100 | Refills: 1
Start: 2025-02-27

## 2025-02-27 RX ORDER — INSULIN GLARGINE-YFGN 100 [IU]/ML
10 INJECTION, SOLUTION SUBCUTANEOUS
Qty: 5 | Refills: 0
Start: 2025-02-27 | End: 2025-03-28

## 2025-02-27 RX ORDER — REPAGLINIDE 1 MG/1
1 TABLET ORAL
Qty: 90 | Refills: 0
Start: 2025-02-27 | End: 2025-03-28

## 2025-02-27 RX ORDER — INSULIN LISPRO 100 U/ML
4 INJECTION, SOLUTION INTRAVENOUS; SUBCUTANEOUS
Refills: 0 | Status: DISCONTINUED | OUTPATIENT
Start: 2025-02-27 | End: 2025-02-27

## 2025-02-27 RX ORDER — INSULIN GLARGINE-YFGN 100 [IU]/ML
20 INJECTION, SOLUTION SUBCUTANEOUS
Qty: 5 | Refills: 0
Start: 2025-02-27 | End: 2025-03-28

## 2025-02-27 RX ORDER — METFORMIN HYDROCHLORIDE 850 MG/1
1 TABLET ORAL
Refills: 0 | DISCHARGE

## 2025-02-27 RX ORDER — INSULIN GLARGINE-YFGN 100 [IU]/ML
15 INJECTION, SOLUTION SUBCUTANEOUS AT BEDTIME
Refills: 0 | Status: DISCONTINUED | OUTPATIENT
Start: 2025-02-27 | End: 2025-02-28

## 2025-02-27 RX ORDER — INSULIN GLARGINE-YFGN 100 [IU]/ML
25 INJECTION, SOLUTION SUBCUTANEOUS AT BEDTIME
Refills: 0 | Status: DISCONTINUED | OUTPATIENT
Start: 2025-02-27 | End: 2025-02-27

## 2025-02-27 RX ORDER — INSULIN GLARGINE-YFGN 100 [IU]/ML
20 INJECTION, SOLUTION SUBCUTANEOUS AT BEDTIME
Refills: 0 | Status: DISCONTINUED | OUTPATIENT
Start: 2025-02-27 | End: 2025-02-27

## 2025-02-27 RX ADMIN — SILDENAFIL 20 MILLIGRAM(S): 50 TABLET, FILM COATED ORAL at 12:24

## 2025-02-27 RX ADMIN — INSULIN LISPRO 13 UNIT(S): 100 INJECTION, SOLUTION INTRAVENOUS; SUBCUTANEOUS at 09:08

## 2025-02-27 RX ADMIN — POLYETHYLENE GLYCOL 3350 17 GRAM(S): 17 POWDER, FOR SOLUTION ORAL at 05:29

## 2025-02-27 RX ADMIN — FUROSEMIDE 40 MILLIGRAM(S): 10 INJECTION INTRAMUSCULAR; INTRAVENOUS at 05:29

## 2025-02-27 RX ADMIN — Medication 1 PUFF(S): at 09:09

## 2025-02-27 RX ADMIN — Medication 81 MILLIGRAM(S): at 09:10

## 2025-02-27 RX ADMIN — Medication 1 PUFF(S): at 22:11

## 2025-02-27 RX ADMIN — Medication 1 PUFF(S): at 05:30

## 2025-02-27 RX ADMIN — SILDENAFIL 20 MILLIGRAM(S): 50 TABLET, FILM COATED ORAL at 05:29

## 2025-02-27 RX ADMIN — INSULIN GLARGINE-YFGN 15 UNIT(S): 100 INJECTION, SOLUTION SUBCUTANEOUS at 23:26

## 2025-02-27 RX ADMIN — INSULIN LISPRO 6 UNIT(S): 100 INJECTION, SOLUTION INTRAVENOUS; SUBCUTANEOUS at 17:49

## 2025-02-27 RX ADMIN — Medication 3 MILLIGRAM(S): at 22:15

## 2025-02-27 RX ADMIN — ATORVASTATIN CALCIUM 20 MILLIGRAM(S): 80 TABLET, FILM COATED ORAL at 22:10

## 2025-02-27 RX ADMIN — LEVALBUTEROL HYDROCHLORIDE 1.25 MILLIGRAM(S): 1.25 SOLUTION RESPIRATORY (INHALATION) at 11:25

## 2025-02-27 RX ADMIN — LEVALBUTEROL HYDROCHLORIDE 1.25 MILLIGRAM(S): 1.25 SOLUTION RESPIRATORY (INHALATION) at 21:31

## 2025-02-27 RX ADMIN — LEVALBUTEROL HYDROCHLORIDE 1.25 MILLIGRAM(S): 1.25 SOLUTION RESPIRATORY (INHALATION) at 03:55

## 2025-02-27 RX ADMIN — INSULIN LISPRO 13 UNIT(S): 100 INJECTION, SOLUTION INTRAVENOUS; SUBCUTANEOUS at 12:24

## 2025-02-27 RX ADMIN — GABAPENTIN 400 MILLIGRAM(S): 400 CAPSULE ORAL at 22:10

## 2025-02-27 RX ADMIN — Medication 1 DOSE(S): at 22:11

## 2025-02-27 RX ADMIN — Medication 1 TABLET(S): at 09:11

## 2025-02-27 RX ADMIN — Medication 40 MILLIGRAM(S): at 05:29

## 2025-02-27 RX ADMIN — Medication 1 PUFF(S): at 17:23

## 2025-02-27 RX ADMIN — LEVALBUTEROL HYDROCHLORIDE 1.25 MILLIGRAM(S): 1.25 SOLUTION RESPIRATORY (INHALATION) at 16:34

## 2025-02-27 RX ADMIN — METOPROLOL SUCCINATE 12.5 MILLIGRAM(S): 50 TABLET, EXTENDED RELEASE ORAL at 05:29

## 2025-02-27 RX ADMIN — INSULIN LISPRO 1: 100 INJECTION, SOLUTION INTRAVENOUS; SUBCUTANEOUS at 12:25

## 2025-02-27 RX ADMIN — SILDENAFIL 20 MILLIGRAM(S): 50 TABLET, FILM COATED ORAL at 22:10

## 2025-02-27 RX ADMIN — ENOXAPARIN SODIUM 40 MILLIGRAM(S): 100 INJECTION SUBCUTANEOUS at 22:09

## 2025-02-27 RX ADMIN — PREDNISONE 20 MILLIGRAM(S): 20 TABLET ORAL at 05:29

## 2025-02-27 RX ADMIN — Medication 1 DOSE(S): at 09:10

## 2025-02-27 NOTE — PROVIDER CONTACT NOTE (CRITICAL VALUE NOTIFICATION) - SITUATION
Lactate 4.3
POCT 538 upon RN check
Blood Gas Lactate 3.6, glucose 619
POCT 427, 571, and 585 upon RN check
POCT 423
6.4 Potassium hemolyzed

## 2025-02-27 NOTE — DISCHARGE NOTE PROVIDER - NSDCFUADDAPPT_GEN_ALL_CORE_FT
APPTS ARE READY TO BE MADE: [X] YES    Best Family or Patient Contact (if needed):    Additional Information about above appointments (if needed):    1: HOME  2:   3:     Other comments or requests:    APPTS ARE READY TO BE MADE: [X] YES    Best Family or Patient Contact (if needed):    Additional Information about above appointments (if needed):    1: HOME  2:   3:     Other comments or requests:       Provided the patient with provider referral information, however patient prefers to call us once they are discharged.  APPTS ARE READY TO BE MADE: [X] YES    Best Family or Patient Contact (if needed):    Additional Information about above appointments (if needed):    1: HOME  2:   3:     Other comments or requests:   Appointment was scheduled in [facility] on 3/3 at 10:30am    Provided the patient with provider referral information, however patient prefers to call us once they are discharged.  1. follow up outpatient for antibodies result with endocrinology,  [phone] 1. follow up outpatient for antibodies result with endocrinology,  [phone]       Patient is being discharged to rehab Caregiver will arrange follow up.

## 2025-02-27 NOTE — PROGRESS NOTE ADULT - ASSESSMENT
Pt is a 89 yo F with PMH CHF, anemia, pHTN, CAD, CVA (no deficits), T2D, [place], COPD (2L NC PRN, qhs), PVD, HTN, HLD, and ?lung CA (pending RT) p/w dysuria x 1wk along with tremors and SOB.   On arrival, T 102.5, , /78 --- SBP 80s, RR 20 O2sat 88% RA -- 2L NC. EKG with sinus tachycardia, no ischemic changes. ER POCUS with diffuse B lines. Labs with leukocytosis, normocytic anemia, trop neg x2, BNP 1406, lactate neg, UA+ with UCx and BCx in lab, RVP neg, MRSA in lab. CTA chest neg PE but with multifocal PNA, pulmonary edema, and 2.8x2.7cm mass LLL interval inc compared to prior. MICU consulted for hypotension with c/f need for pressors, however, improved with IVF and midodrine. Pt given tylenol, cefepime, and vancomycin. ID consulted and PT consulted with recs pending. (19 Feb 2025 10:03)  she is apparently sob to me:  but she says her breathing is OK:  she  is on home oxygen :  recently diagnosed with lung cancer:   now pulm called for pneumonia        Sepsis/ Pneumonia/ COPD / lung cancer:   pneumonia: on antibiotics   CTA chest neg PE but with multifocal PNA and pulmonary edema   cont antibtiocs : seen by id   urine legionella/strep  lEFT LOWER OPACITY:  PER DAUGHTER :  SHE NEVER GOT ANY CHEMO :  SHE WAS SUPPOSED TO GET RADIATION THERAPY,  BUT SHE ENDED UP HERE:     She has had multiplek admission in last few months and hence could not any surgery for the mass:  now it seems to have increased   she seems to be sob:  and has copd:  will add short course of steroids   her vbg is OK:  but she looks sob:  she may need bipap , if her rr worsens    2/20: she looks better today  : on 3 L o f oxygen : she does use oxygen at home:  2 L of oxygen :  ct scan chest showed: No pulmonary embolism. Interval increase in groundglass opacities and subpleural consolidations in bilateral lungs, likely infectious. Interlobularseptal thickening in the upper lobes, which can be suggestive of interstitial edema 3.1 cm masslike consolidation in left lower lobe. Additional smaller nodules in bilateral lungs. Mediastinal and hilar lymphadenopathy which can be reactive or due to  metastasis. Subpleural reticulations, [place], and peripheral cystic changes in bilateral lungs, suggestive of interstitial lung fibrosis.    2/21: spoke to daughter again : she had mapping ct prior to coming to hospital  : she was supposed to be getting radiation therapy;   no chemo ;   cont iv steroids;   she had no pe  on initial admission she had no pe:  \  she has significant emphysema:    VBG is good     would cont antibiotics   ? hemonc consult??     2/21: she was seen by palliative care;   2/22:  seems pretty good:  has cough + especially in night time:   add anti tussives:  hycodan at night time  \  on cefepime"   2/24:  she looks pretty good:  antibiotics till tomorrow:   on 3 L of ox gen  satting at 100%  VBG today is reasonable!  change to po steroids     2/25: she looks and feels better;   denies Sob to me at this time:   no wheezing:  has poor air entry  ;   cont current x:     2/26: seems to be doing  ok : still has tremors  in hands: ? neuro consult:   still feels SOB off and on   no wheezing ;    2/27:   seems to be doing  ok :no sob:  tremors; +  no cough ; no phlegm      Acute UTI.   as above.    Acute on chronic heart failure.    - known hx CHF   - 10/2024 TTE EF 60%, mild MV stenosis, mod AS, mild-mod TR  - POCUS in ER c/f reduced EF  - CXR with pulmonary vascular congestion, CTA chest with pulmonary edema   -cont diuretics  defer to card s    2/20: cont current rx:    on 3 L of oxygen    2/21: today hero2 requirement went up : do chest xray and stat high flow:  cont BD and steroids   2/22: on 40% high flow;  but I think can be changed to nasal cannula:  await echo results   2/25: on iv lasix  2/26: on oral  lasix    2/27: control lasix     Pulmonary mass.    CTA chest with interval increase in LLL mass from prior, 2.8x2.7cm  - pending RT initiation outpt?\  - as above:  has not getting any treatment since the diagnosis many months ago   2/22: defer to  onc:   ct scan looks not that good   would struggles to increase lasix   2/24: on iv Lasix   2/25: iv lasix    2/26: changed to po lasix   2/27: cont current therapy:      Pulmonary HTN.    sildenafil 20mg TID.  2/24: new echo ; mild pulm htn     HTN (hypertension).   blood pressure is soft:  currently off anti hypertensives:   controlled    Type 2 diabetes mellitus.   monitor and control:    gareth acp

## 2025-02-27 NOTE — DISCHARGE NOTE PROVIDER - NSDCMRMEDTOKEN_GEN_ALL_CORE_FT
Advair Diskus 250 mcg-50 mcg inhalation powder: 1 puff(s) inhaled 2 times a day  aspirin 81 mg oral delayed release tablet: 1 tab(s) orally once a day (at bedtime)  atorvastatin 20 mg oral tablet: 1 tab(s) orally once a day (at bedtime)  ferrous sulfate 325 mg (65 mg elemental iron) oral tablet: 1 tab(s) orally once a day  furosemide 20 mg oral tablet: 2 tab(s) orally once a day  gabapentin 400 mg oral capsule: 1 cap(s) orally once a day (at bedtime)  ipratropium 42 mcg/inh (0.06%) nasal spray: 2 spray(s) intranasally 2 times a day  metFORMIN 1000 mg oral tablet: 1 tab(s) orally once a day  Metoprolol Tartrate 25 mg oral tablet: 0.5 tab(s) orally 2 times a day takes 2 half tabs in morning and 1 half tab at night  Multiple Vitamins oral tablet: 1 tab(s) orally once a day  omeprazole 40 mg oral delayed release capsule: 1 cap(s) orally once a day  polyethylene glycol 3350 oral powder for reconstitution: 17 gram(s) orally 2 times a day  PreserVision AREDS 2 oral capsule: 1 cap(s) orally 2 times a day  senna leaf extract oral tablet: 2 tab(s) orally once a day (at bedtime)  sildenafil 20 mg oral tablet: 1 tab(s) orally 3 times a day  Tresiba FlexTouch 100 units/mL subcutaneous solution: 10 unit(s) subcutaneous once a day (at bedtime) Take tresiba 10 units subcutaneous at bedtime starting tonight at bedtime   Advair Diskus 250 mcg-50 mcg inhalation powder: 1 puff(s) inhaled 2 times a day  aspirin 81 mg oral delayed release tablet: 1 tab(s) orally once a day (at bedtime)  atorvastatin 20 mg oral tablet: 1 tab(s) orally once a day (at bedtime)  ferrous sulfate 325 mg (65 mg elemental iron) oral tablet: 1 tab(s) orally once a day  furosemide 20 mg oral tablet: 2 tab(s) orally once a day  gabapentin 400 mg oral capsule: 1 cap(s) orally once a day (at bedtime)  ipratropium 42 mcg/inh (0.06%) nasal spray: 2 spray(s) intranasally 2 times a day  metFORMIN 1000 mg oral tablet: 1 tab(s) orally once a day  Metoprolol Tartrate 25 mg oral tablet: 0.5 tab(s) orally 2 times a day  Multiple Vitamins oral tablet: 1 tab(s) orally once a day  omeprazole 40 mg oral delayed release capsule: 1 cap(s) orally once a day  polyethylene glycol 3350 oral powder for reconstitution: 17 gram(s) orally 2 times a day  PreserVision AREDS 2 oral capsule: 1 cap(s) orally 2 times a day  senna leaf extract oral tablet: 2 tab(s) orally once a day (at bedtime)  sildenafil 20 mg oral tablet: 1 tab(s) orally 3 times a day  Tresiba FlexTouch 100 units/mL subcutaneous solution: 10 unit(s) subcutaneous once a day (at bedtime) Take tresiba 10 units subcutaneous at bedtime starting tonight at bedtime   Advair Diskus 250 mcg-50 mcg inhalation powder: 1 puff(s) inhaled 2 times a day  aspirin 81 mg oral delayed release tablet: 1 tab(s) orally once a day (at bedtime)  atorvastatin 20 mg oral tablet: 1 tab(s) orally once a day (at bedtime)  Atrovent HFA 17 mcg/inh inhalation aerosol: 1 puff(s) inhaled every 6 hours  ferrous sulfate 325 mg (65 mg elemental iron) oral tablet: 1 tab(s) orally once a day  furosemide 20 mg oral tablet: 2 tab(s) orally once a day  gabapentin 400 mg oral capsule: 1 cap(s) orally once a day (at bedtime)  ipratropium 42 mcg/inh (0.06%) nasal spray: 2 spray(s) intranasally 2 times a day  Lantus Solostar Pen 100 units/mL subcutaneous solution: 10 unit(s) subcutaneous once a day (at bedtime)  metFORMIN 1000 mg oral tablet: 1 tab(s) orally once a day  Metoprolol Tartrate 25 mg oral tablet: 0.5 tab(s) orally 2 times a day  Multiple Vitamins oral tablet: 1 tab(s) orally once a day  omeprazole 40 mg oral delayed release capsule: 1 cap(s) orally once a day  polyethylene glycol 3350 oral powder for reconstitution: 17 gram(s) orally 2 times a day  PreserVision AREDS 2 oral capsule: 1 cap(s) orally 2 times a day  repaglinide 0.5 mg oral tablet: 1 tab(s) orally 3 times a day (with meals)  senna leaf extract oral tablet: 2 tab(s) orally once a day (at bedtime)  sildenafil 20 mg oral tablet: 1 tab(s) orally 3 times a day  Xopenex HFA 45 mcg/inh inhalation aerosol: 2 puff(s) inhaled every 6 hours as needed for  shortness of breath and/or wheezing   Advair Diskus 250 mcg-50 mcg inhalation powder: 1 puff(s) inhaled 2 times a day  alcohol swabs: Apply topically to affected area 4 times a day  aspirin 81 mg oral delayed release tablet: 1 tab(s) orally once a day (at bedtime)  atorvastatin 20 mg oral tablet: 1 tab(s) orally once a day (at bedtime)  Atrovent HFA 17 mcg/inh inhalation aerosol: 1 puff(s) inhaled every 6 hours  Basaglar KwikPen 100 units/mL subcutaneous solution: 10 unit(s) subcutaneous once a day (at bedtime)  ferrous sulfate 325 mg (65 mg elemental iron) oral tablet: 1 tab(s) orally once a day  furosemide 20 mg oral tablet: 2 tab(s) orally once a day  gabapentin 400 mg oral capsule: 1 cap(s) orally once a day (at bedtime)  Insulin Pen Needles, 4mm: 1 application subcutaneously 4 times a day. ** Use with insulin pen **  ipratropium 42 mcg/inh (0.06%) nasal spray: 2 spray(s) intranasally 2 times a day  Metoprolol Tartrate 25 mg oral tablet: 0.5 tab(s) orally 2 times a day  Multiple Vitamins oral tablet: 1 tab(s) orally once a day  omeprazole 40 mg oral delayed release capsule: 1 cap(s) orally once a day  polyethylene glycol 3350 oral powder for reconstitution: 17 gram(s) orally 2 times a day  PreserVision AREDS 2 oral capsule: 1 cap(s) orally 2 times a day  repaglinide 0.5 mg oral tablet: 1 tab(s) orally 3 times a day (with meals)  senna leaf extract oral tablet: 2 tab(s) orally once a day (at bedtime)  sildenafil 20 mg oral tablet: 1 tab(s) orally 3 times a day  Xopenex HFA 45 mcg/inh inhalation aerosol: 2 puff(s) inhaled every 6 hours as needed for  shortness of breath and/or wheezing   Advair Diskus 250 mcg-50 mcg inhalation powder: 1 puff(s) inhaled 2 times a day  aspirin 81 mg oral delayed release tablet: 1 tab(s) orally once a day (at bedtime)  atorvastatin 20 mg oral tablet: 1 tab(s) orally once a day (at bedtime)  Atrovent HFA 17 mcg/inh inhalation aerosol: 1 puff(s) inhaled every 6 hours  Basaglar KwikPen 100 units/mL subcutaneous solution: 10 unit(s) subcutaneous once a day (at bedtime)  ferrous sulfate 325 mg (65 mg elemental iron) oral tablet: 1 tab(s) orally once a day  furosemide 20 mg oral tablet: 2 tab(s) orally once a day  gabapentin 400 mg oral capsule: 1 cap(s) orally once a day (at bedtime)  insulin lispro 100 units/mL injectable solution: 4 unit(s) injectable 3 times a day (before meals)  insulin lispro 100 units/mL injectable solution: 1 unit(s) injectable once a day (at bedtime) 0 Unit(s) if Glucose 61 - 250  1 Unit(s) if Glucose 251 - 300  2 Unit(s) if Glucose 301 - 350  3 Unit(s) if Glucose 351 - 400  4 Unit(s) if Glucose Greater Than 400  insulin lispro 100 units/mL injectable solution: 1 unit(s) injectable 3 times a day (before meals) 1 Unit(s) if Glucose 151 - 200  2 Unit(s) if Glucose 201 - 250  3 Unit(s) if Glucose 251 - 300  4 Unit(s) if Glucose 301 - 350  5 Unit(s) if Glucose 351 - 400  6 Unit(s) if Glucose Greater Than 400  ipratropium 42 mcg/inh (0.06%) nasal spray: 2 spray(s) intranasally 2 times a day  Metoprolol Tartrate 25 mg oral tablet: 0.5 tab(s) orally 2 times a day  Multiple Vitamins oral tablet: 1 tab(s) orally once a day  omeprazole 40 mg oral delayed release capsule: 1 cap(s) orally once a day  polyethylene glycol 3350 oral powder for reconstitution: 17 gram(s) orally 2 times a day  PreserVision AREDS 2 oral capsule: 1 cap(s) orally 2 times a day  senna leaf extract oral tablet: 2 tab(s) orally once a day (at bedtime)  sildenafil 20 mg oral tablet: 1 tab(s) orally 3 times a day  Xopenex HFA 45 mcg/inh inhalation aerosol: 2 puff(s) inhaled every 6 hours as needed for  shortness of breath and/or wheezing   Advair Diskus 250 mcg-50 mcg inhalation powder: 1 puff(s) inhaled 2 times a day  aspirin 81 mg oral delayed release tablet: 1 tab(s) orally once a day (at bedtime)  atorvastatin 20 mg oral tablet: 1 tab(s) orally once a day (at bedtime)  Atrovent HFA 17 mcg/inh inhalation aerosol: 1 puff(s) inhaled every 6 hours  Basaglar KwikPen 100 units/mL subcutaneous solution: 10 unit(s) subcutaneous once a day (at bedtime)  ferrous sulfate 325 mg (65 mg elemental iron) oral tablet: 1 tab(s) orally once a day  furosemide 20 mg oral tablet: 2 tab(s) orally once a day  gabapentin 400 mg oral capsule: 1 cap(s) orally once a day (at bedtime)  insulin lispro 100 units/mL injectable solution: 4 unit(s) injectable 3 times a day (before meals)  insulin lispro 100 units/mL injectable solution: 1 unit(s) injectable once a day (at bedtime) 0 Unit(s) if Glucose 61 - 250  1 Unit(s) if Glucose 251 - 300  2 Unit(s) if Glucose 301 - 350  3 Unit(s) if Glucose 351 - 400  4 Unit(s) if Glucose Greater Than 400  insulin lispro 100 units/mL injectable solution: 1 unit(s) injectable 3 times a day (before meals) 1 Unit(s) if Glucose 151 - 200  2 Unit(s) if Glucose 201 - 250  3 Unit(s) if Glucose 251 - 300  4 Unit(s) if Glucose 301 - 350  5 Unit(s) if Glucose 351 - 400  6 Unit(s) if Glucose Greater Than 400  ipratropium 42 mcg/inh (0.06%) nasal spray: 2 spray(s) intranasally 2 times a day  metoprolol tartrate 25 mg oral tablet: 1 tab(s) orally 2 times a day  Multiple Vitamins oral tablet: 1 tab(s) orally once a day  omeprazole 40 mg oral delayed release capsule: 1 cap(s) orally once a day  oxyCODONE 5 mg oral tablet: 0.5 tab(s) orally every 6 hours as needed for  severe pain MDD: 4 tablets  polyethylene glycol 3350 oral powder for reconstitution: 17 gram(s) orally 2 times a day  PreserVision AREDS 2 oral capsule: 1 cap(s) orally 2 times a day  senna leaf extract oral tablet: 2 tab(s) orally once a day (at bedtime)  sildenafil 20 mg oral tablet: 1 tab(s) orally 3 times a day  Xopenex HFA 45 mcg/inh inhalation aerosol: 2 puff(s) inhaled every 6 hours as needed for  shortness of breath and/or wheezing

## 2025-02-27 NOTE — DISCHARGE NOTE PROVIDER - DETAILS OF MALNUTRITION DIAGNOSIS/DIAGNOSES
This patient has been assessed with a concern for Malnutrition and was treated during this hospitalization for the following Nutrition diagnosis/diagnoses:     -  02/26/2025: Severe protein-calorie malnutrition

## 2025-02-27 NOTE — PROGRESS NOTE ADULT - SUBJECTIVE AND OBJECTIVE BOX
Date of Service: 02-27-25 @ 10:42    Patient is a 89y old  Female who presents with a chief complaint of UTI, PNA (27 Feb 2025 06:49)      Any change in ROS: seems to be doing  ok ;  no sob:  no cough :  no phlegm     MEDICATIONS  (STANDING):  aspirin enteric coated 81 milliGRAM(s) Oral daily  atorvastatin 20 milliGRAM(s) Oral at bedtime  dextrose 5%. 1000 milliLiter(s) (100 mL/Hr) IV Continuous <Continuous>  dextrose 5%. 1000 milliLiter(s) (50 mL/Hr) IV Continuous <Continuous>  dextrose 50% Injectable 25 Gram(s) IV Push once  dextrose 50% Injectable 12.5 Gram(s) IV Push once  dextrose 50% Injectable 25 Gram(s) IV Push once  enoxaparin Injectable 40 milliGRAM(s) SubCutaneous every 24 hours  fluticasone propionate/ salmeterol 250-50 MICROgram(s) Diskus 1 Dose(s) Inhalation two times a day  furosemide    Tablet 40 milliGRAM(s) Oral daily  gabapentin 400 milliGRAM(s) Oral at bedtime  glucagon  Injectable 1 milliGRAM(s) IntraMuscular once  insulin glargine Injectable (LANTUS) 25 Unit(s) SubCutaneous at bedtime  insulin lispro (ADMELOG) corrective regimen sliding scale   SubCutaneous three times a day before meals  insulin lispro (ADMELOG) corrective regimen sliding scale   SubCutaneous at bedtime  insulin lispro Injectable (ADMELOG) 13 Unit(s) SubCutaneous three times a day before meals  ipratropium 17 MICROgram(s) HFA Inhaler 1 Puff(s) Inhalation every 6 hours  levalbuterol Inhalation 1.25 milliGRAM(s) Inhalation every 6 hours  metoprolol tartrate 12.5 milliGRAM(s) Oral two times a day  multivitamin 1 Tablet(s) Oral daily  pantoprazole    Tablet 40 milliGRAM(s) Oral before breakfast  polyethylene glycol 3350 17 Gram(s) Oral two times a day  senna 2 Tablet(s) Oral at bedtime  sildenafil (REVATIO) 20 milliGRAM(s) Oral three times a day    MEDICATIONS  (PRN):  acetaminophen     Tablet .. 650 milliGRAM(s) Oral every 6 hours PRN Temp greater or equal to 38C (100.4F), Mild Pain (1 - 3)  dextrose Oral Gel 15 Gram(s) Oral once PRN Blood Glucose LESS THAN 70 milliGRAM(s)/deciliter  guaiFENesin Oral Liquid (Sugar-Free) 100 milliGRAM(s) Oral every 6 hours PRN Cough  melatonin 3 milliGRAM(s) Oral at bedtime PRN Insomnia  ondansetron Injectable 4 milliGRAM(s) IV Push every 8 hours PRN Nausea and/or Vomiting    Vital Signs Last 24 Hrs  T(C): 36.3 (27 Feb 2025 05:15), Max: 36.8 (26 Feb 2025 12:03)  T(F): 97.3 (27 Feb 2025 05:15), Max: 98.2 (26 Feb 2025 12:03)  HR: 86 (27 Feb 2025 05:15) (86 - 111)  BP: 118/56 (27 Feb 2025 05:15) (98/51 - 118/56)  BP(mean): --  RR: 18 (27 Feb 2025 05:15) (17 - 18)  SpO2: 99% (27 Feb 2025 05:15) (99% - 100%)    Parameters below as of 27 Feb 2025 05:15  Patient On (Oxygen Delivery Method): nasal cannula  O2 Flow (L/min): 2      I&O's Summary    26 Feb 2025 07:01  -  27 Feb 2025 07:00  --------------------------------------------------------  IN: 200 mL / OUT: 1540 mL / NET: -1340 mL          Physical Exam:   GENERAL: NAD, well-groomed, well-developed  HEENT: MARCIO/   Atraumatic, Normocephalic  ENMT: No tonsillar erythema, exudates, or enlargement; Moist mucous membranes, Good dentition, No lesions  NECK: Supple, No JVD, Normal thyroid  CHEST/LUNG: Clear to auscultaion  CVS: Regular rate and rhythm; No murmurs, rubs, or gallops  GI: : Soft, Nontender, Nondistended; Bowel sounds present  NERVOUS SYSTEM:  Alert & Oriented X3  EXTREMITIES: tremors+  LYMPH: No lymphadenopathy noted  SKIN: No rashes or lesions  ENDOCRINOLOGY: No Thyromegaly  PSYCH: Appropriate    Labs:  31, 29, 32, 29, 23                            11.2   9.73  )-----------( 431      ( 27 Feb 2025 07:12 )             35.4                         9.7    11.08 )-----------( 462      ( 26 Feb 2025 05:48 )             29.3                         9.5    11.65 )-----------( 459      ( 25 Feb 2025 07:05 )             29.6                         9.9    11.91 )-----------( 454      ( 24 Feb 2025 07:40 )             32.2     02-27    137  |  94[L]  |  56[H]  ----------------------------<  149[H]  4.5   |  28  |  1.15  02-27    134[L]  |  94[L]  |  55[H]  ----------------------------<  171[H]  6.4[HH]   |  23  |  1.13  02-26    135  |  95[L]  |  45[H]  ----------------------------<  123[H]  4.3   |  27  |  1.12  02-25    134[L]  |  95[L]  |  42[H]  ----------------------------<  174[H]  4.8   |  26  |  1.04  02-24    138  |  98  |  36[H]  ----------------------------<  190[H]  4.7   |  25  |  0.88    Ca    9.0      27 Feb 2025 09:25  Ca    9.7      27 Feb 2025 07:12  Ca    9.5      26 Feb 2025 05:48  Phos  4.6     02-27  Phos  5.4     02-27  Phos  4.3     02-26  Mg     2.30     02-27  Mg     2.40     02-27  Mg     2.30     02-26    TPro  6.5  /  Alb  3.6  /  TBili  0.2  /  DBili  x   /  AST  14  /  ALT  9   /  AlkPhos  79  02-26  TPro  6.6  /  Alb  3.6  /  TBili  0.2  /  DBili  x   /  AST  14  /  ALT  8   /  AlkPhos  80  02-25    CAPILLARY BLOOD GLUCOSE      POCT Blood Glucose.: 141 mg/dL (27 Feb 2025 08:28)  POCT Blood Glucose.: 168 mg/dL (26 Feb 2025 22:18)  POCT Blood Glucose.: 200 mg/dL (26 Feb 2025 16:59)  POCT Blood Glucose.: 180 mg/dL (26 Feb 2025 14:26)  POCT Blood Glucose.: 171 mg/dL (26 Feb 2025 12:14)      LIVER FUNCTIONS - ( 26 Feb 2025 05:48 )  Alb: 3.6 g/dL / Pro: 6.5 g/dL / ALK PHOS: 79 U/L / ALT: 9 U/L / AST: 14 U/L / GGT: x             Urinalysis Basic - ( 27 Feb 2025 09:25 )    Color: x / Appearance: x / SG: x / pH: x  Gluc: 149 mg/dL / Ketone: x  / Bili: x / Urobili: x   Blood: x / Protein: x / Nitrite: x   Leuk Esterase: x / RBC: x / WBC x   Sq Epi: x / Non Sq Epi: x / Bacteria: x    rad< from: Xray Chest 1 View- PORTABLE-Urgent (Xray Chest 1 View- PORTABLE-Urgent .) (02.21.25 @ 11:48) >  hest x-ray 2/20/2025 at 11:06 PM:  Surgical clips in the right axilla.  The heart is normal in size.  Diffuse bilateral patchy and hazy opacities, unchanged.  No pneumothorax.  Trace bilateral pleural effusions and associated atelectasis.  No acute bony abnormality.    Chest x-ray 2/21/2025 at 11:28 AM:  Progression of right base atelectasis or infiltrate. The left lung is   clearer.    IMPRESSION:  Changing infiltrates in latest image.    --- End of Report ---          DEANNA HERNANDEZ MD; Resident Radiologist  This document has been electronically signed.  LJ IRVIN MD; Attending Interventional Radiologist  This document has been electronically signed. Feb 21 2025  3:04PM    < end of copied text >          RECENT CULTURES:        RESPIRATORY CULTURES:          Studies  Chest X-RAY  CT SCAN Chest   Venous Dopplers: LE:   CT Abdomen  Others

## 2025-02-27 NOTE — DISCHARGE NOTE PROVIDER - CARE PROVIDER_API CALL
Isma Squires  Phone: (   )    -  Fax: (   )    -  Follow Up Time: 2 weeks   Isma Squires  Phone: (   )    -  Fax: (   )    -  Follow Up Time: 2 weeks    [facility]  [address] [place] [id number]  Phone: [phone]  Fax: [phone]  Scheduled Appointment: 03/03/2025 10:30 AM

## 2025-02-27 NOTE — PROGRESS NOTE ADULT - NSPROGADDITIONALINFOA_GEN_ALL_CORE
d/w the daughter Vanessa HCP. Pt lives with her. Uses walker to walk, though not always using.   Recently seen by Dr. Shaw Mitchell (rad onc) for measurements. Hoping that pt will be able to proceed with radiation treatment post discharge (although concern for radiation induced pneumonitis)     GOC discussed with pt and the daughter. Per the pt and daughter, if condition is irreversible then no CPR/ventilator. otherwise okay with CPR/ ventilator.   overall prognosis is poor given lung findings of fibrosis.  Palliative team eval appreciated. pt signed DNR/DNI.     hi-flow wean to NC, TTE reviewed. stage 2 diastolic dysfunction.  now back on home PO Lasix.   out of bed to chair if tolerates. physical therapy: Home PT.   completed abx 2/25/25 per ID.   DC Planning. PT re-eval: Home PT.    overall prognosis is poor given extensive lung finding of ILD/ lung Ca, etc  recurrent hospital admissions are inevitable. pt and the daughter not ready for hospice yet.   d/w pulm, ID.    d/w the daughter Vanessa.     - Dr. RACHELL Martinez ([facility])  - [phone]

## 2025-02-27 NOTE — PROGRESS NOTE ADULT - ASSESSMENT
ECHO 10/7/24: nl LV sys fx EF 60% . Mild mitral valve stenosis. Mild to moderate tricuspid regurgitation. mod AS   ECHO  2/22/25 nl LV sys fx  ef 68%, moderate (grade 2) left ventricular diastolic dysfunction,mild pulmonary hypertension. The aortic valve appears trileaflet with reduced systolic excursion. There is calcification of the aortic valve leaflets. The aortic valve acceleration time is 90 msec. There is no evidence of aortic regurgitation.    a/p    89 yo F with PMH CHF, valvular disease, anemia, pHTN, CAD, CVA (no deficits), T2D, [place], COPD (2L NC PRN, qhs), PVD, HTN, HLD, and ?lung CA (pending RT) p/w dysuria x 1wk along with tremors and SOB + pna    #PNA  -Meeting sepsis criteria w/ fever, tachycardia, leukocytosis  -CTa chest with no PE, Interval increase in groundglass opacities and subpleural consolidations  in bilateral lungs, interlobularseptal thickening in  the upper lobes, which can be suggestive of interstitial edema; 3.1 cm mass like consolidation in left lower lobe, Mediastinal and hilar lymphadenopathy which can be reactive or due to  metastasis.  Subpleural reticulations, Honeycombing, and peripheral cystic changes in  bilateral lungs, suggestive of interstitial lung fibrosis.  -sp abx per id/med  -on diuretics      #hypotension   -resolved  -micu eval noted- hypotensive to 80s/40s, given 2x 500cc bolus and Midodrine 10mg w/ improvement in BP to MAP >70  -Pocus w/ collapsed IVC suggestive of intravascular depletion, but w/ evidence of reduced RV and LV function   -bcx ngtd  -not a candidate for MICU per team   -HS trop neg. no ischemic changes to ecg   -ECHO  2/22/25 nl LV sys fx  ef 68%, moderate (grade 2) left ventricular diastolic dysfunction,mild pulmonary hypertension.    #CAD s/p PCI  -stable  -cont asa, statin, BB    #HFpEF  -ECHO  2/22/25 nl LV sys fx  ef 68%, moderate (grade 2) left ventricular diastolic dysfunction,mild pulmonary hypertension.  -c/w lasix po     #Pulmonary HTN.   -c/w home sildenafil 20mg TID.  -echo with mild pulm htn   -diuretics     #Lung CA  -med/ pulm fu   -pending RT initiation as outpt     #UTI   -management per med /ID    dvt ppx

## 2025-02-27 NOTE — DISCHARGE NOTE PROVIDER - PROVIDER TOKENS
FREE:[LAST:[Squires],FIRST:[Isma],PHONE:[(   )    -],FAX:[(   )    -],FOLLOWUP:[2 weeks]] FREE:[LAST:[Shaw],FIRST:[Isma],PHONE:[(   )    -],FAX:[(   )    -],FOLLOWUP:[2 weeks]],PROVIDER:[TOKEN:[[id number]],SCHEDULEDAPPT:[03/03/2025],SCHEDULEDAPPTTIME:[10:30 AM]]

## 2025-02-27 NOTE — DISCHARGE NOTE PROVIDER - NSDCFUSCHEDAPPT_GEN_ALL_CORE_FT
Peter Garrison  [facility]  [address]  Scheduled Appointment: 04/21/2025     Olga Ayoub  [facility]  [address]  Scheduled Appointment: 03/03/2025    Peter Garrison  [facility]  [address]  Scheduled Appointment: 04/21/2025     Mary Villalta  [facility]  [address]  Scheduled Appointment: 03/03/2025    Peter Garrison  [facility]  [address]  Scheduled Appointment: 04/21/2025     Peter Garrison  [facility]  [address]  Scheduled Appointment: 04/21/2025

## 2025-02-27 NOTE — PROGRESS NOTE ADULT - SUBJECTIVE AND OBJECTIVE BOX
CARDIOLOGY FOLLOW UP - Dr. Valenzuela  DATE OF SERVICE: 2/27/25    CC no acute cv evnets   k this am hemolyze repeat stable     REVIEW OF SYSTEMS:  CONSTITUTIONAL: No fever, weight loss, or fatigue  RESPIRATORY: No cough, wheezing, chills or hemoptysis; No Shortness of Breath  CARDIOVASCULAR: No chest pain, palpitations, passing out, dizziness  GASTROINTESTINAL: No abdominal or epigastric pain. No nausea, vomiting, or hematemesis; No diarrhea or constipation. No melena or hematochezia.      PHYSICAL EXAM:  T(C): 36.3 (02-27-25 @ 05:15), Max: 36.8 (02-26-25 @ 12:03)  HR: 86 (02-27-25 @ 05:15) (86 - 111)  BP: 118/56 (02-27-25 @ 05:15) (98/51 - 118/56)  RR: 18 (02-27-25 @ 05:15) (17 - 18)  SpO2: 99% (02-27-25 @ 05:15) (99% - 100%)  Wt(kg): --  I&O's Summary    26 Feb 2025 07:01  -  27 Feb 2025 07:00  --------------------------------------------------------  IN: 200 mL / OUT: 1540 mL / NET: -1340 mL        Appearance: Normal	  Cardiovascular: Normal S1 S2,RRR, No JVD, No murmurs  Respiratory:  diminsihed   Gastrointestinal:  Soft, Non-tender, + BS	  Extremities: Normal range of motion, No clubbing, cyanosis or edema      Home Medications:  Advair Diskus 250 mcg-50 mcg inhalation powder: 1 puff(s) inhaled 2 times a day (13 Dec 2024 23:00)  aspirin 81 mg oral delayed release tablet: 1 tab(s) orally once a day (at bedtime) (13 Dec 2024 23:00)  atorvastatin 20 mg oral tablet: 1 tab(s) orally once a day (at bedtime) (13 Dec 2024 23:00)  furosemide 20 mg oral tablet: 2 tab(s) orally once a day (13 Dec 2024 22:55)  gabapentin 400 mg oral capsule: 1 cap(s) orally once a day (at bedtime) (13 Dec 2024 23:00)  ipratropium 42 mcg/inh (0.06%) nasal spray: 2 spray(s) intranasally 2 times a day (13 Dec 2024 23:00)  metFORMIN 1000 mg oral tablet: 1 tab(s) orally once a day (19 Feb 2025 12:37)  Metoprolol Tartrate 25 mg oral tablet: 0.5 tab(s) orally 2 times a day takes 2 half tabs in morning and 1 half tab at night (13 Dec 2024 22:59)  Multiple Vitamins oral tablet: 1 tab(s) orally once a day (13 Dec 2024 23:00)  omeprazole 40 mg oral delayed release capsule: 1 cap(s) orally once a day (13 Dec 2024 23:00)  polyethylene glycol 3350 oral powder for reconstitution: 17 gram(s) orally 2 times a day (18 Dec 2024 13:08)  PreserVision AREDS 2 oral capsule: 1 cap(s) orally 2 times a day (13 Dec 2024 23:00)  senna leaf extract oral tablet: 2 tab(s) orally once a day (at bedtime) (18 Dec 2024 13:08)  sildenafil 20 mg oral tablet: 1 tab(s) orally 3 times a day (13 Dec 2024 22:55)  Tresiba FlexTouch 100 units/mL subcutaneous solution: 10 unit(s) subcutaneous once a day (at bedtime) Take tresiba 10 units subcutaneous at bedtime starting tonight at bedtime (13 Dec 2024 23:00)      MEDICATIONS  (STANDING):  aspirin enteric coated 81 milliGRAM(s) Oral daily  atorvastatin 20 milliGRAM(s) Oral at bedtime  dextrose 5%. 1000 milliLiter(s) (100 mL/Hr) IV Continuous <Continuous>  dextrose 5%. 1000 milliLiter(s) (50 mL/Hr) IV Continuous <Continuous>  dextrose 50% Injectable 25 Gram(s) IV Push once  dextrose 50% Injectable 12.5 Gram(s) IV Push once  dextrose 50% Injectable 25 Gram(s) IV Push once  enoxaparin Injectable 40 milliGRAM(s) SubCutaneous every 24 hours  fluticasone propionate/ salmeterol 250-50 MICROgram(s) Diskus 1 Dose(s) Inhalation two times a day  furosemide    Tablet 40 milliGRAM(s) Oral daily  gabapentin 400 milliGRAM(s) Oral at bedtime  glucagon  Injectable 1 milliGRAM(s) IntraMuscular once  insulin glargine Injectable (LANTUS) 25 Unit(s) SubCutaneous at bedtime  insulin lispro (ADMELOG) corrective regimen sliding scale   SubCutaneous three times a day before meals  insulin lispro (ADMELOG) corrective regimen sliding scale   SubCutaneous at bedtime  insulin lispro Injectable (ADMELOG) 13 Unit(s) SubCutaneous three times a day before meals  ipratropium 17 MICROgram(s) HFA Inhaler 1 Puff(s) Inhalation every 6 hours  levalbuterol Inhalation 1.25 milliGRAM(s) Inhalation every 6 hours  metoprolol tartrate 12.5 milliGRAM(s) Oral two times a day  multivitamin 1 Tablet(s) Oral daily  pantoprazole    Tablet 40 milliGRAM(s) Oral before breakfast  polyethylene glycol 3350 17 Gram(s) Oral two times a day  senna 2 Tablet(s) Oral at bedtime  sildenafil (REVATIO) 20 milliGRAM(s) Oral three times a day      TELEMETRY: sinus tach	    ECG:  	  RADIOLOGY:   DIAGNOSTIC TESTING:  [ ] Echocardiogram:  [ ]  Catheterization:  [ ] Stress Test:    OTHER: 	    LABS:	 	    CKMB Units: 1.6 ng/mL (02-21 @ 10:43)  Troponin T, High Sensitivity Result: 30 ng/L (02-21 @ 10:43)                          11.2   9.73  )-----------( 431      ( 27 Feb 2025 07:12 )             35.4     02-27    137  |  94[L]  |  56[H]  ----------------------------<  149[H]  4.5   |  28  |  1.15    Ca    9.0      27 Feb 2025 09:25  Phos  4.6     02-27  Mg     2.30     02-27    TPro  6.5  /  Alb  3.6  /  TBili  0.2  /  DBili  x   /  AST  14  /  ALT  9   /  AlkPhos  79  02-26

## 2025-02-27 NOTE — PROGRESS NOTE ADULT - SUBJECTIVE AND OBJECTIVE BOX
Chief Complaint: Type 2 Dm; Steroid induced Hyperglycemia    History: Pt seen at bedside. Pt tolerating oral diet. Pt denies nausea and vomiting/any signs of hypoglycemia. Pt reports an adequate appetite.     MEDICATIONS  (STANDING):  aspirin enteric coated 81 milliGRAM(s) Oral daily  atorvastatin 20 milliGRAM(s) Oral at bedtime  dextrose 5%. 1000 milliLiter(s) (100 mL/Hr) IV Continuous <Continuous>  dextrose 5%. 1000 milliLiter(s) (50 mL/Hr) IV Continuous <Continuous>  dextrose 50% Injectable 25 Gram(s) IV Push once  dextrose 50% Injectable 12.5 Gram(s) IV Push once  dextrose 50% Injectable 25 Gram(s) IV Push once  enoxaparin Injectable 40 milliGRAM(s) SubCutaneous every 24 hours  fluticasone propionate/ salmeterol 250-50 MICROgram(s) Diskus 1 Dose(s) Inhalation two times a day  furosemide    Tablet 40 milliGRAM(s) Oral daily  gabapentin 400 milliGRAM(s) Oral at bedtime  glucagon  Injectable 1 milliGRAM(s) IntraMuscular once  insulin glargine Injectable (LANTUS) 25 Unit(s) SubCutaneous at bedtime  insulin lispro (ADMELOG) corrective regimen sliding scale   SubCutaneous three times a day before meals  insulin lispro (ADMELOG) corrective regimen sliding scale   SubCutaneous at bedtime  insulin lispro Injectable (ADMELOG) 13 Unit(s) SubCutaneous three times a day before meals  ipratropium 17 MICROgram(s) HFA Inhaler 1 Puff(s) Inhalation every 6 hours  levalbuterol Inhalation 1.25 milliGRAM(s) Inhalation every 6 hours  metoprolol tartrate 12.5 milliGRAM(s) Oral two times a day  multivitamin 1 Tablet(s) Oral daily  pantoprazole    Tablet 40 milliGRAM(s) Oral before breakfast  polyethylene glycol 3350 17 Gram(s) Oral two times a day  senna 2 Tablet(s) Oral at bedtime  sildenafil (REVATIO) 20 milliGRAM(s) Oral three times a day    MEDICATIONS  (PRN):  acetaminophen     Tablet .. 650 milliGRAM(s) Oral every 6 hours PRN Temp greater or equal to 38C (100.4F), Mild Pain (1 - 3)  dextrose Oral Gel 15 Gram(s) Oral once PRN Blood Glucose LESS THAN 70 milliGRAM(s)/deciliter  guaiFENesin Oral Liquid (Sugar-Free) 100 milliGRAM(s) Oral every 6 hours PRN Cough  melatonin 3 milliGRAM(s) Oral at bedtime PRN Insomnia  ondansetron Injectable 4 milliGRAM(s) IV Push every 8 hours PRN Nausea and/or Vomiting      Allergies: penicillin (Unknown)  macrolide antibiotics (Other)  Biaxin (Unknown)        Review of Systems:  Respiratory: No SOB, no cough  GI: No nausea, vomiting, abdominal pain  Endocrine: no polyuria, polydipsia      PHYSICAL EXAM:  VITALS: T(C): 36.2 (02-27-25 @ 11:25)  T(F): 97.2 (02-27-25 @ 11:25), Max: 97.9 (02-26-25 @ 22:30)  HR: 101 (02-27-25 @ 11:25) (86 - 111)  BP: 105/35 (02-27-25 @ 11:25) (98/51 - 118/56)  RR:  (17 - 18)  SpO2:  (99% - 100%)  Wt(kg): --  GENERAL: NAD, well-groomed, well-developed  RESPIRATORY: No labored breathing   GI: Soft, nontender, non distended  PSYCH: Alert and oriented x 3, normal affect, normal mood      CAPILLARY BLOOD GLUCOSE  POCT Blood Glucose.: 189 mg/dL (27 Feb 2025 12:17)  POCT Blood Glucose.: 141 mg/dL (27 Feb 2025 08:28)  POCT Blood Glucose.: 168 mg/dL (26 Feb 2025 22:18)  POCT Blood Glucose.: 200 mg/dL (26 Feb 2025 16:59)  POCT Blood Glucose.: 180 mg/dL (26 Feb 2025 14:26)    A1C with Estimated Average Glucose (02.19.25 @ 09:14)    A1C with Estimated Average Glucose Result: 9.7   Estimated Average Glucose: 232      02-27    137  |  94[L]  |  56[H]  ----------------------------<  149[H]  4.5   |  28  |  1.15    eGFR: 45[L]    Ca    9.0      02-27  Mg     2.30     02-27  Phos  4.6     02-27    TPro  6.5  /  Alb  3.6  /  TBili  0.2  /  DBili  x   /  AST  14  /  ALT  9   /  AlkPhos  79  02-26    Thyroid Function Tests:  02-19 @ 09:14 TSH 1.95 FreeT4 -- T3 -- Anti TPO -- Anti Thyroglobulin Ab -- TSI --    Diet, DASH/TLC:   Sodium & Cholesterol Restricted  Consistent Carbohydrate Evening Snack (CSTCHOSN) (02-20-25 @ 10:22) [Active]

## 2025-02-27 NOTE — DISCHARGE NOTE PROVIDER - NSDCFUADDINST_GEN_ALL_CORE_FT
Tentative dc recs from rehab to home is Basaglar Kwik pen sq qhs (basaglar is covered by pts insurance as per primary team) dose TBD + Repaglinide 0.5mg p.o. once a day with largest meal (please hold if skips meals); final dm recommendations from rehab to home TBD by medical team at rehab based on pts glucose trend

## 2025-02-27 NOTE — PROGRESS NOTE ADULT - ASSESSMENT
89 y/o F PMhx CHF, anemia, pHTN, CAD, CVA, DMII, COPD (2L NC PRN, qhs), PVD, HTN, and ?lung CA (pending RT) who presented w/ dysuria x 5 days as well as SOB and rigors.    L lung cancer, PNA  sepsis- fever, leukocytosis- resolved  RVP negative  CTA chest- negative for PE but demonstrated increase in groundglass opacities and subpleural consolidations in bilateral lungs; Interlobular septal thickening in the upper lobes, which can be suggestive of interstitial edema 3.1 cm masslike consolidation in left lower lobe. Additional smaller nodules in bilateral lungs. Subpleural reticulations, Honeycombing, and peripheral cystic changes in bilateral lungs, suggestive of interstitial lung fibrosis.  s/p cefepime x 7 days, completed 2/25 AM    UTI  dysuria- resolved  no flank tenderness, UA positive  urine cx E faecium, sensitivities reviewed  blood cultures- NGTD  s/p vanc/ macrobid course, completed 2/25    Recommendations  continue off antibiotics  wean O2 as tolerated  discharge planning    We will sign off. Thank you for allowing us to participate in the care of Ms. Casey. Please feel free to call with any questions or concerns.     Aldo Verduzco M.D.  [facility]  [phone]  After 5pm on weekdays and all day on weekends - please call [phone]  Available on [employer] teams    Thank you for consulting us and involving us in the management of this patients case. In addition to reviewing history, imaging, documents, labs, microbiology, took into account antibiotic stewardship, local antibiogram and infection control strategies and potential transmission issues.  89 y/o F PMhx CHF, anemia, pHTN, CAD, CVA, DMII, COPD (2L NC PRN, qhs), PVD, HTN, and ?lung CA (pending RT) who presented w/ dysuria x 5 days as well as SOB and rigors.    L lung cancer, PNA  sepsis- fever, leukocytosis- resolved  RVP negative  CTA chest- negative for PE but demonstrated increase in groundglass opacities and subpleural consolidations in bilateral lungs; Interlobular septal thickening in the upper lobes, which can be suggestive of interstitial edema 3.1 cm masslike consolidation in left lower lobe. Additional smaller nodules in bilateral lungs. Subpleural reticulations, Honeycombing, and peripheral cystic changes in bilateral lungs, suggestive of interstitial lung fibrosis.  s/p cefepime x 7 days, completed 2/25 AM    UTI  dysuria- resolved  no flank tenderness, UA positive  urine cx E faecium, sensitivities reviewed  blood cultures- NGTD  s/p vanc/ macrobid course, completed 2/25    Recommendations  continue off antibiotics  wean O2 as tolerated  discharge planning    Aldo Verduzco M.D.  [facility]  [phone]  After 5pm on weekdays and all day on weekends - please call [phone]  Available on [employer] teams    Thank you for consulting us and involving us in the management of this patients case. In addition to reviewing history, imaging, documents, labs, microbiology, took into account antibiotic stewardship, local antibiogram and infection control strategies and potential transmission issues.

## 2025-02-27 NOTE — PROVIDER CONTACT NOTE (CRITICAL VALUE NOTIFICATION) - TEST AND RESULT REPORTED:
POCT 423
6.4 Potassium hemolyzed
Blood Gas Lactate 3.6, glucose 619
POCT 535
POCT 427, 571, and 585
Blood Gas Lactate 3.6, glucose 619

## 2025-02-27 NOTE — PROVIDER CONTACT NOTE (CRITICAL VALUE NOTIFICATION) - ACTION/TREATMENT ORDERED:
ACP aware.
ACP aware.
ACP aware. no need to make pt NPO. Safety maintained while continuing to monitor.
ACP aware. Contacted endocrine; no need to make pt NPO. Sliding scale adjusted. Will recheck prior to lunch. Safety maintained while continuing to monitor.
ACP notified
ACP aware. Contacted endocrine; no need to make pt NPO. Sliding scale adjusted. Will recheck prior to dinner. Safety maintained while continuing to monitor.

## 2025-02-27 NOTE — DISCHARGE NOTE NURSING/CASE MANAGEMENT/SOCIAL WORK - FINANCIAL ASSISTANCE
[facility] provides services at a reduced cost to those who are determined to be eligible through [facility]’s financial assistance program. Information regarding [facility]’s financial assistance program can be found by going to [email or web address] or by calling [phone].

## 2025-02-27 NOTE — PROGRESS NOTE ADULT - ASSESSMENT
A/P: Pt is a 89 yo F with PMH CHF, anemia, pHTN, CAD, CVA (no deficits), T2D, [place], COPD (2L NC PRN, qhs), PVD, HTN, HLD, and ?lung CA (pending RT) p/w dysuria x 1wk along with tremors and SOB, found to be hyperglycemic and will be on steroids. Endocrinology was consulted for management of diabetes mellitus.    #Type 2 Diabetes Mellitus  - Receiving methylpred 20 mg IV q8h - 2/21- ?- solumedrol 20mg IV q8h is continued.  -  Please notify endocrine if any change to steroid plan.  - HbA1c 9.7%; home regimen: Tresiba 10 units at bedtime (Tresiba no longer covered working on switching to a new basal prior to admission), metformin 500mg (?) 2 tabs daily     INPATIENT PLAN:  - Inpatient BG goal 100-200mg/dl: stable.   - Prednisone 20mg PO BID x 3 days (last dose 2/27 today)  - Decrease Lantus to 20 units qhs  - Decrease Admelog 6 units TID AC hold if NPO  - Continue Admelog low correction scale TIDQAC and continue low correction scale QHS  - Please check FSG before meals and QHS, or q6h while NPO  - consistent carb diet  - RD consult  - Hypoglycemia Protocol   - changed antibiotics to non dextrose fluid  - Provider to Rn for insulin pen review   - If stay overnight please check lactate level --->    DISCHARGE PLAN:  - As per primary team no further steroids upon discharge   - Lactate is elevated will stop Metformin for now--> repeat lactate defer to primary team   - Please discharge pt on Lantus 10 units sq qhs + Repaglinide 0.5mg p.o. TID AC (please hold if skips meals)   - Pt is now off steroid will keep basal insulin dose at home same dose as she states glucose in am at times is low 100s  - Advised pt if glucose 250 and above and or below 100 to please call her doctor as insulin doses may need ot be adjusted   - Would like to avoid hypoglycemia in this elderly pt  - Reviewed with daughter since pt received prednisone 20mg this am glucose may run slightly higher than usual at home tonight and in am.     - Tresiba no longer covered, to find an alternative basal insulin that is covered.  Please send Lantus solostar pen as test script to check insurance coverage.  Ok to send with current doses and update prior to d/c    If Lantus not covered- can try alternating with one of following   tresiba/basaglar/toujeo/Levemir    - Also given patient had a son with type 1 DM sent c-peptide (with serum glucose), FELISHA Ab, IA-2 Ab, and Zinc transporter Ab with Am labs to rule out JILLIAN, now testing in lab. C-peptide 3.7 (glu 376) consistent with type 2 DM. Can follow up antibodies for completeness; advised pt to follow up these results as an outpt     - Ensure patient has working glucometer, test strips, lancets, alcohol pads, and BD fransico pen needles    - Patient attributes high A1c to several hospital admissions in past few months.  - Patient uses glucometer and has previously declined CGM  - Patient wishes to follow up with pcp and declines initiating care with an endocrinologist.  - Provided Endocrine office information to pt so she can call for antibody results; [phone]  - D/w Daughter Vanessa [phone]; daughter states she knows how ot administer insulin vian insulin pen incase her mother needs assistance     #Hypertension  - BP goal <130/80  - Please obtain urine micro albumin cr ratio as an outpt   - Management as per primary team, not on meds now    #Hyperlipidemia  - ordered for atorvastatin 20 mg continue if no contraindications   - Please obtain lipid profile if not done recently   - Defer to Primary Team     D/w Bianka Palacios  Nurse Practitioner  Division of Endocrinology & Diabetes  In house pager #[phone]    If before 9AM or after 6PM, or on weekends/holidays, please call endocrine answering service for assistance ([phone]).For nonurgent matters email [email or web address] for assistance.    A/P: Pt is a 89 yo F with PMH CHF, anemia, pHTN, CAD, CVA (no deficits), T2D, [place], COPD (2L NC PRN, qhs), PVD, HTN, HLD, and ?lung CA (pending RT) p/w dysuria x 1wk along with tremors and SOB, found to be hyperglycemic and will be on steroids. Endocrinology was consulted for management of diabetes mellitus.    #Type 2 Diabetes Mellitus  - Receiving methylpred 20 mg IV q8h - 2/21- ?- solumedrol 20mg IV q8h is continued.  -  Please notify endocrine if any change to steroid plan.  - HbA1c 9.7%; home regimen: Tresiba 10 units at bedtime (Tresiba no longer covered working on switching to a new basal prior to admission), metformin 500mg (?) 2 tabs daily     INPATIENT PLAN:  - Inpatient BG goal 100-200mg/dl: stable today. Below goal at dinner   - Prednisone 20mg PO BID x 3 days (last dose 2/27 today)  - Will decrease similiar to home regimen and monitor glucose would like to avoid hypoglycemia in this elderly pt  - Decrease Lantus to 10 units qhs  - Decreased Admelog to 6 unit for dinner. Will reduce further to Admelog 4 units TID AC for am 2/28 (please hold if npo/not eating)  - Continue Admelog low correction scale TIDQAC and continue low correction scale QHS  - Please check FSG before meals and QHS, or q6h while NPO  - consistent carb diet  - RD consult  - Hypoglycemia Protocol   - changed antibiotics to non dextrose fluid  - Provider to Rn for insulin pen review   - If stay overnight please check lactate level --->defer to primary team     DISCHARGE PLAN:  - As per primary team no further steroids upon discharge   - Lactate is elevated will stop Metformin for now--> repeat lactate defer to primary team   - Please discharge pt on Lantus 10 units sq qhs + Repaglinide 0.5mg p.o. TID AC (please hold if skips meals)   - Pt is now off steroid will keep basal insulin dose at home same dose as she states glucose in am at times is low 100s  - Advised pt if glucose 250 and above and or below 100 to please call her doctor as insulin doses may need ot be adjusted   - Would like to avoid hypoglycemia in this elderly pt  - Reviewed with daughter since pt received prednisone 20mg this am glucose may run slightly higher than usual at home tonight and in am.     - Tresiba no longer covered, to find an alternative basal insulin that is covered.  Please send Lantus solostar pen as test script to check insurance coverage.  Ok to send with current doses and update prior to d/c    If Lantus not covered- can try alternating with one of following   tresiba/basaglar/toujeo/Levemir    - Also given patient had a son with type 1 DM sent c-peptide (with serum glucose), FELISHA Ab, IA-2 Ab, and Zinc transporter Ab with Am labs to rule out JILLIAN, now testing in lab. C-peptide 3.7 (glu 376) consistent with type 2 DM. Can follow up antibodies for completeness; advised pt to follow up these results as an outpt     - Ensure patient has working glucometer, test strips, lancets, alcohol pads, and BD fransico pen needles    - Patient attributes high A1c to several hospital admissions in past few months.  - Patient uses glucometer and has previously declined CGM  - Patient wishes to follow up with pcp and declines initiating care with an endocrinologist.  - Provided Endocrine office information to pt so she can call for antibody results; [phone]  - D/w Daughter Vanessa [phone]; daughter states she knows how ot administer insulin vian insulin pen incase her mother needs assistance     #Hypertension  - BP goal <130/80  - Please obtain urine micro albumin cr ratio as an outpt   - Management as per primary team, not on meds now    #Hyperlipidemia  - ordered for atorvastatin 20 mg continue if no contraindications   - Please obtain lipid profile if not done recently   - Defer to Primary Team     D/w Bianka Palacios  Nurse Practitioner  Division of Endocrinology & Diabetes  In house pager #[phone]    If before 9AM or after 6PM, or on weekends/holidays, please call endocrine answering service for assistance ([phone]).For nonurgent matters email [email or web address] for assistance.    A/P: Pt is a 89 yo F with PMH CHF, anemia, pHTN, CAD, CVA (no deficits), T2D, [place], COPD (2L NC PRN, qhs), PVD, HTN, HLD, and ?lung CA (pending RT) p/w dysuria x 1wk along with tremors and SOB, found to be hyperglycemic and will be on steroids. Endocrinology was consulted for management of diabetes mellitus.    #Type 2 Diabetes Mellitus  - Receiving methylpred 20 mg IV q8h - 2/21- ?- solumedrol 20mg IV q8h is continued.  -  Please notify endocrine if any change to steroid plan.  - HbA1c 9.7%; home regimen: Tresiba 10 units at bedtime (Tresiba no longer covered working on switching to a new basal prior to admission), metformin 500mg (?) 2 tabs daily     INPATIENT PLAN:  - Inpatient BG goal 100-200mg/dl: stable today. Below goal at dinner   - Prednisone 20mg PO BID x 3 days (last dose 2/27 today)  - Decrease Lantus to 15 units qhs  - Decreased Admelog to 6 unit for dinner. Will reduce further to Admelog 4 units TID AC for am 2/28 (please hold if npo/not eating)  - Continue Admelog low correction scale TIDQAC and continue low correction scale QHS  - Please check FSG before meals and QHS, or q6h while NPO; please check a 3am FS   - consistent carb diet  - RD consult  - Hypoglycemia Protocol   - changed antibiotics to non dextrose fluid  - Provider to Rn for insulin pen review   - If stay overnight please check lactate level --->defer to primary team     DISCHARGE PLAN:  - As per primary team no further steroids upon discharge   - Lactate is elevated will stop Metformin for now--> repeat lactate defer to primary team   - Please discharge pt on Lantus 10 units sq qhs + Repaglinide 0.5mg p.o. TID AC (please hold if skips meals)   - Pt is now off steroid will keep basal insulin dose at home same dose as she states glucose in am at times is low 100s  - Advised pt if glucose 250 and above and or below 100 to please call her doctor as insulin doses may need ot be adjusted   - Would like to avoid hypoglycemia in this elderly pt  - Reviewed with daughter since pt received prednisone 20mg this am glucose may run slightly higher than usual at home tonight and in am.     - Tresiba no longer covered, to find an alternative basal insulin that is covered.  Please send Lantus solostar pen as test script to check insurance coverage.  Ok to send with current doses and update prior to d/c    If Lantus not covered- can try alternating with one of following   tresiba/basaglar/toujeo/Levemir    - Also given patient had a son with type 1 DM sent c-peptide (with serum glucose), FELISHA Ab, IA-2 Ab, and Zinc transporter Ab with Am labs to rule out JILLIAN, now testing in lab. C-peptide 3.7 (glu 376) consistent with type 2 DM. Can follow up antibodies for completeness; advised pt to follow up these results as an outpt     - Ensure patient has working glucometer, test strips, lancets, alcohol pads, and BD fransico pen needles    - Patient attributes high A1c to several hospital admissions in past few months.  - Patient uses glucometer and has previously declined CGM  - Patient wishes to follow up with pcp and declines initiating care with an endocrinologist.  - Provided Endocrine office information to pt so she can call for antibody results; [phone]  - D/w Daughter Vanessa [phone]; daughter states she knows how ot administer insulin vian insulin pen incase her mother needs assistance     #Hypertension  - BP goal <130/80  - Please obtain urine micro albumin cr ratio as an outpt   - Management as per primary team, not on meds now    #Hyperlipidemia  - ordered for atorvastatin 20 mg continue if no contraindications   - Please obtain lipid profile if not done recently   - Defer to Primary Team     D/w Bianka Palacios  Nurse Practitioner  Division of Endocrinology & Diabetes  In house pager #[phone]    If before 9AM or after 6PM, or on weekends/holidays, please call endocrine answering service for assistance ([phone]).For nonurgent matters email [email or web address] for assistance.

## 2025-02-27 NOTE — DISCHARGE NOTE PROVIDER - NSDCCPCAREPLAN_GEN_ALL_CORE_FT
PRINCIPAL DISCHARGE DIAGNOSIS  Diagnosis: Septic shock  Assessment and Plan of Treatment: You had a severe pneumonia and urinary infection that was treated with antibiotics. You are now much improved. Continue to take your medications as prescribed. Please follow up with your primary care provider.        SECONDARY DISCHARGE DIAGNOSES  Diagnosis: Multifocal pneumonia  Assessment and Plan of Treatment: Treated with antibitoics. Follow up with your pulmnologist in 1-2 weeks.    Diagnosis: Acute UTI  Assessment and Plan of Treatment: You were treated with antibiotics for a urinary infection. To help prevent future infections maintain healthy hygiene and avoid taking long baths. Wipe from front to back and empty your bladder frequently by keeping yourself properly hydrated. Monitor for signs/symptoms of infection, such as, fever/chills, burning/pain with urination, urinary frequency/hesitancy, cloudy urine, or blood in urine and follow-up with your primary care provider as outpatient for further care.    Diagnosis: Pulmonary mass  Assessment and Plan of Treatment: Follow up with Dr. Kevin Squires for radiation therapy.    Diagnosis: Lung cancer  Assessment and Plan of Treatment: Follow up with your oncologist.    Diagnosis: Pulmonary HTN  Assessment and Plan of Treatment: Continue to take your medications as prescribed. Please follow up with your pulmonologist provider.      Diagnosis: HTN (hypertension)  Assessment and Plan of Treatment: Continue blood pressure medication regimen as directed. Monitor for any visual changes, headaches or dizziness.  Monitor blood pressure regularly.  Follow up with your primary care provider for further management for high blood pressure.      Diagnosis: HLD (hyperlipidemia)  Assessment and Plan of Treatment: Continue prescribed medications to control your cholesterol levels and a DASH (Low fat/salt) diet. Follow up with your primary care provider upon discharge for further management and monitoring of cholesterol levels.      Diagnosis: Type 2 diabetes mellitus  Assessment and Plan of Treatment: Your Hemoglobin A1C is  9.7. Target goal for hemoglobin A1C is <7. Monitor blood glucose levels throughout the day before meals and at bedtime. Record blood sugars and bring to outpatient providers appointment in order to be reviewed by your doctor for management modifications. If your sugars are more than 400 or less than 70 you should contact your PCP immediately. Monitor for signs/symptoms of low blood glucose, such as, dizziness, altered mental status, or cool/clammy skin. In addition, monitor for signs/symptoms of high blood glucose, such as, feeling hot, dry, fatigued, or with increased thirst/urination. Make regular podiatry appointments in order to have feet checked for wounds and uncontrolled toe nail growth to prevent infections, as well as, appointments with an ophthalmologist to monitor your vision.      Diagnosis: COPD without exacerbation  Assessment and Plan of Treatment: Follow up with your pulmnologist.    Diagnosis: Normocytic anemia  Assessment and Plan of Treatment:     Diagnosis: Acute on chronic heart failure  Assessment and Plan of Treatment:

## 2025-02-27 NOTE — DISCHARGE NOTE PROVIDER - HOSPITAL COURSE
89 yo F with PMH CHF, anemia, pHTN, CAD, CVA (no deficits), T2D, [place], COPD (2L NC PRN, qhs), PVD, HTN, HLD, and ?lung CA (pending RT) p/w dysuria x 1wk along with tremors and SOB.     Sepsis 2/2 UTI/Multifocal PNA iso COPD, Lung Ca, pHTN   - s/p HFNC now on 6L NC-> 2L   - w/ Hypotension s/p Midodrine.  - CTA chest neg for PE, increase in LLL mass from prior   - ID: s/p vanc, Macrobid, cefepime     - IV solumedrol -> changed to PO 20 mg BID on 2/24 thru 2/27  - elevated lactate intermittently   - urine legionella neg, [ ] urine strep    Acute CHF   - IV lasix increased to 40 QD 2/22. Switched to Lasix 40mg PO 2/26 as per cards  - TTE: EF 68% Moderate (Grade 2) LVDD.  Mild pHTN     B/L UE Tremors, improving - c/w BB   -Dr. Pierre suggesting neuro but per Dr. Martinez ok to hold off.     Steroid induced hyperglycemia (A1c 9.7%)  - endocrine following [ ] notify endo w/ any changes to steroid plan   - hypoglycemic event 2/24 when transitioned from IV to PO steroids. Hypoglycemic again 2/25 evening (endocrine notified)  - decrease from MISS to JOHNNY when off steroids    Normocytic anemia.   - Hb 10 with MCV 86  - at baseline, per chart review  - no active s/s bleeding   - suspect component of chronic disease given c/f ?malignancy as below.    Pulmonary mass.   - CTA chest with interval increase in LLL mass from prior, 2.8x2.7cm  - pending RT initiation outpt  - d/w pt's radiation oncologist Dr. Isma Squires.   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   - outpt f/u.    STARS PATIENT  GOC: palliative following. DNR/DNI on 2/25  PT: anticipate home w/ PT     89 yo F with PMH CHF, anemia, pHTN, CAD, CVA (no deficits), T2D, [place], COPD (2L NC PRN, qhs), PVD, HTN, HLD, and ?lung CA (pending RT) p/w dysuria x 1wk along with tremors and SOB.     Sepsis 2/2 UTI/Multifocal PNA iso COPD, Lung Ca, pHTN   - s/p HFNC now on 6L NC-> 2L   - w/ Hypotension s/p Midodrine.  - CTA chest neg for PE, increase in LLL mass from prior   - ID: s/p vanc, Macrobid, cefepime     - IV solumedrol -> changed to PO 20 mg BID on 2/24 thru 2/27  - elevated lactate intermittently   - urine legionella neg, [ ] urine strep    Acute CHF   - IV lasix increased to 40 QD 2/22. Switched to Lasix 40mg PO 2/26 as per cards  - TTE: EF 68% Moderate (Grade 2) LVDD.  Mild pHTN     B/L UE Tremors, improving - c/w BB   -Dr. Pierre suggesting neuro but per Dr. Martinez ok to hold off.     Steroid induced hyperglycemia (A1c 9.7%)  - endocrine following [ ] notify endo w/ any changes to steroid plan   - hypoglycemic event 2/24 when transitioned from IV to PO steroids. Hypoglycemic again 2/25 evening (endocrine notified)  - decrease from MISS to JOHNNY when off steroids    Normocytic anemia.   - Hb 10 with MCV 86  - at baseline, per chart review  - no active s/s bleeding   - suspect component of chronic disease given c/f ?malignancy as below.    Pulmonary mass.   - CTA chest with interval increase in LLL mass from prior, 2.8x2.7cm  - pending RT initiation outpt  - d/w pt's radiation oncologist Dr. Isma Squires.   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   - outpt f/u.    STARS PATIENT  GOC: palliative following. DNR/DNI on 2/25  PT: anticipate home w/ PT      On 2/27/25 this case was reviewed with Dr. Martinez, the patient is medically stable and optimized for discharge. All medications were reviewed and prescriptions were sent to mutually agreed upon pharmacy. The patient agrees to follow up with providers as recommended.     89 yo F with PMH CHF, anemia, pHTN, CAD, CVA (no deficits), T2D, [place], COPD (2L NC PRN, qhs), PVD, HTN, HLD, and ?lung CA (pending RT) p/w dysuria x 1wk along with tremors and SOB.     Sepsis 2/2 UTI/Multifocal PNA iso COPD, Lung Ca, pHTN   - s/p HFNC now on 6L NC-> 2L   - w/ Hypotension s/p Midodrine.  - CTA chest neg for PE, increase in LLL mass from prior   - ID: s/p vanc, Macrobid, cefepime     - IV solumedrol -> changed to PO 20 mg BID on 2/24 thru 2/27  - elevated lactate intermittently   - urine legionella neg, urine strep neg    Acute CHF   - IV lasix increased to 40 QD 2/22. Switched to Lasix 40mg PO 2/26 as per cards  - TTE: EF 68% Moderate (Grade 2) LVDD.  Mild pHTN     B/L UE Tremors, improving - c/w BB   -Dr. Pierre suggesting neuro but per Dr. Martinez ok to hold off.     Steroid induced hyperglycemia (A1c 9.7%)  - endocrine following [ ] notify endo w/ any changes to steroid plan   - hypoglycemic event 2/24 when transitioned from IV to PO steroids. Hypoglycemic again 2/25 evening (endocrine notified)  - decrease from MISS to JOHNNY when off steroids    Normocytic anemia.   - Hb 10 with MCV 86  - at baseline, per chart review  - no active s/s bleeding   - suspect component of chronic disease given c/f ?malignancy as below.    Pulmonary mass.   - CTA chest with interval increase in LLL mass from prior, 2.8x2.7cm  - pending RT initiation outpt  - d/w pt's radiation oncologist Dr. Isma Squires.   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   - outpt f/u.    STARS PATIENT  GOC: palliative following. DNR/DNI on 2/25  PT: anticipate home w/ PT      On 2/27/25 this case was reviewed with Dr. Martinez, the patient is medically stable and optimized for discharge. All medications were reviewed and prescriptions were sent to mutually agreed upon pharmacy. The patient agrees to follow up with providers as recommended.     89 yo F with PMH CHF, anemia, pHTN, CAD, CVA (no deficits), T2D, [place], COPD (2L NC PRN, qhs), PVD, HTN, HLD, and ?lung CA (pending RT) p/w dysuria x 1wk along with tremors and SOB.     Sepsis 2/2 UTI/Multifocal PNA iso COPD, Lung Ca, pHTN   - s/p HFNC now on 6L NC-> 2L   - w/ Hypotension s/p Midodrine.  - CTA chest neg for PE, increase in LLL mass from prior   - ID: s/p vanc, Macrobid, cefepime     - IV solumedrol -> changed to PO 20 mg BID on 2/24 thru 2/27  - elevated lactate intermittently   - urine legionella neg, urine strep neg    Acute CHF   - IV lasix increased to 40 QD 2/22. Switched to Lasix 40mg PO 2/26 as per cards  - TTE: EF 68% Moderate (Grade 2) LVDD.  Mild pHTN     B/L UE Tremors, improving - c/w BB   -Dr. Pierre suggesting neuro but per Dr. Martinez ok to hold off.     Steroid induced hyperglycemia (A1c 9.7%)  - endocrine following [ ] notify endo w/ any changes to steroid plan   - hypoglycemic event 2/24 when transitioned from IV to PO steroids. Hypoglycemic again 2/25 evening (endocrine notified)  - decrease from MISS to JOHNNY when off steroids    Normocytic anemia.   - Hb 10 with MCV 86  - at baseline, per chart review  - no active s/s bleeding   - suspect component of chronic disease given c/f ?malignancy as below.    Pulmonary mass.   - CTA chest with interval increase in LLL mass from prior, 2.8x2.7cm  - pending RT initiation outpt  - d/w pt's radiation oncologist Dr. Isma Squires.   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   - outpt f/u.    STARS PATIENT  GOC: palliative following. DNR/DNI on 2/25  PT: anticipate home w/ PT      On 2/03/25 this case was reviewed with Dr. Martinez, the patient is medically stable and optimized for discharge to [facility]. All medications were reviewed and prescriptions were sent to mutually agreed upon pharmacy. The patient agrees to follow up with providers as recommended.

## 2025-02-27 NOTE — DISCHARGE NOTE NURSING/CASE MANAGEMENT/SOCIAL WORK - PATIENT PORTAL LINK FT
You can access the FollowMyHealth Patient Portal offered by [facility] by registering at the following website: [email or web address]. By joining [employer]’s FollowMyHealth portal, you will also be able to view your health information using other applications (apps) compatible with our system.

## 2025-02-27 NOTE — PROGRESS NOTE ADULT - SUBJECTIVE AND OBJECTIVE BOX
SUBJECTIVE/ OVERNIGHT EVENTS:  in better mood today  breathing stable on NC  denied pain  no cough  no cp, no sob, no n/v/d. no abdominal pain.  no headache, no dizziness.   tremors minimal, likely essential tremors vs. from debility, weakness.   d/w the daughter, will limit additional contrast studies such as CT head given it would not .   (minimize contrast induced kidney issues). the daughter agrees with the plan.     --------------------------------------------------------------------------------------------  LABS:                        11.2   9.73  )-----------( 431      ( 27 Feb 2025 07:12 )             35.4     02-27    137  |  94[L]  |  56[H]  ----------------------------<  149[H]  4.5   |  28  |  1.15    Ca    9.0      27 Feb 2025 09:25  Phos  4.6     02-27  Mg     2.30     02-27    TPro  6.5  /  Alb  3.6  /  TBili  0.2  /  DBili  x   /  AST  14  /  ALT  9   /  AlkPhos  79  02-26      CAPILLARY BLOOD GLUCOSE      POCT Blood Glucose.: 141 mg/dL (27 Feb 2025 08:28)  POCT Blood Glucose.: 168 mg/dL (26 Feb 2025 22:18)  POCT Blood Glucose.: 200 mg/dL (26 Feb 2025 16:59)  POCT Blood Glucose.: 180 mg/dL (26 Feb 2025 14:26)        Urinalysis Basic - ( 27 Feb 2025 09:25 )    Color: x / Appearance: x / SG: x / pH: x  Gluc: 149 mg/dL / Ketone: x  / Bili: x / Urobili: x   Blood: x / Protein: x / Nitrite: x   Leuk Esterase: x / RBC: x / WBC x   Sq Epi: x / Non Sq Epi: x / Bacteria: x        RADIOLOGY & ADDITIONAL TESTS:    Imaging Personally Reviewed:  [x] YES  [ ] NO    Consultant(s) Notes Reviewed:  [x] YES  [ ] NO    MEDICATIONS  (STANDING):  aspirin enteric coated 81 milliGRAM(s) Oral daily  atorvastatin 20 milliGRAM(s) Oral at bedtime  dextrose 5%. 1000 milliLiter(s) (50 mL/Hr) IV Continuous <Continuous>  dextrose 5%. 1000 milliLiter(s) (100 mL/Hr) IV Continuous <Continuous>  dextrose 50% Injectable 25 Gram(s) IV Push once  dextrose 50% Injectable 12.5 Gram(s) IV Push once  dextrose 50% Injectable 25 Gram(s) IV Push once  enoxaparin Injectable 40 milliGRAM(s) SubCutaneous every 24 hours  fluticasone propionate/ salmeterol 250-50 MICROgram(s) Diskus 1 Dose(s) Inhalation two times a day  furosemide    Tablet 40 milliGRAM(s) Oral daily  gabapentin 400 milliGRAM(s) Oral at bedtime  glucagon  Injectable 1 milliGRAM(s) IntraMuscular once  insulin glargine Injectable (LANTUS) 25 Unit(s) SubCutaneous at bedtime  insulin lispro (ADMELOG) corrective regimen sliding scale   SubCutaneous three times a day before meals  insulin lispro (ADMELOG) corrective regimen sliding scale   SubCutaneous at bedtime  insulin lispro Injectable (ADMELOG) 13 Unit(s) SubCutaneous three times a day before meals  ipratropium 17 MICROgram(s) HFA Inhaler 1 Puff(s) Inhalation every 6 hours  levalbuterol Inhalation 1.25 milliGRAM(s) Inhalation every 6 hours  metoprolol tartrate 12.5 milliGRAM(s) Oral two times a day  multivitamin 1 Tablet(s) Oral daily  pantoprazole    Tablet 40 milliGRAM(s) Oral before breakfast  polyethylene glycol 3350 17 Gram(s) Oral two times a day  senna 2 Tablet(s) Oral at bedtime  sildenafil (REVATIO) 20 milliGRAM(s) Oral three times a day    MEDICATIONS  (PRN):  acetaminophen     Tablet .. 650 milliGRAM(s) Oral every 6 hours PRN Temp greater or equal to 38C (100.4F), Mild Pain (1 - 3)  dextrose Oral Gel 15 Gram(s) Oral once PRN Blood Glucose LESS THAN 70 milliGRAM(s)/deciliter  guaiFENesin Oral Liquid (Sugar-Free) 100 milliGRAM(s) Oral every 6 hours PRN Cough  melatonin 3 milliGRAM(s) Oral at bedtime PRN Insomnia  ondansetron Injectable 4 milliGRAM(s) IV Push every 8 hours PRN Nausea and/or Vomiting      Care Discussed with Consultants/Other Providers [x] YES  [ ] NO    Vital Signs Last 24 Hrs  T(C): 36.2 (27 Feb 2025 11:25), Max: 36.6 (26 Feb 2025 22:30)  T(F): 97.2 (27 Feb 2025 11:25), Max: 97.9 (26 Feb 2025 22:30)  HR: 101 (27 Feb 2025 11:25) (86 - 111)  BP: 105/35 (27 Feb 2025 11:25) (98/51 - 118/56)  BP(mean): --  RR: 18 (27 Feb 2025 11:25) (17 - 18)  SpO2: 99% (27 Feb 2025 11:25) (99% - 100%)    Parameters below as of 27 Feb 2025 05:15  Patient On (Oxygen Delivery Method): nasal cannula  O2 Flow (L/min): 2    I&O's Summary    26 Feb 2025 07:01  -  27 Feb 2025 07:00  --------------------------------------------------------  IN: 200 mL / OUT: 1540 mL / NET: -1340 mL        PHYSICAL EXAM:  GENERAL: NAD, thin-elderly, comfortable now on hi-flow --> 6L --> 4L --> 3L  HEAD:  Atraumatic, Normocephalic  EYES: EOMI, PERRLA, conjunctiva and sclera clear  NECK: Supple, No JVD  CHEST/LUNG: mild decrease breath sounds bilaterally; No wheeze   HEART: Regular rate and rhythm; No murmurs, rubs, or gallops  ABDOMEN: Soft, Nontender, Nondistended; Bowel sounds present  Neuro: AAOx3, no focal weakness   EXTREMITIES:  2+ Peripheral Pulses, No clubbing, cyanosis, or edema  SKIN: No rashes or lesions

## 2025-02-27 NOTE — PROGRESS NOTE ADULT - SUBJECTIVE AND OBJECTIVE BOX
[facility]  KRISSY Luong Y. Patel, S. Shah, G. Casimir  [phone]  ([phone] - weekdays after 5pm and weekends)    Name: FEDERICO DOS SANTOS  Age/Gender: 89y Female  MRN: [id number]    Interval History:  Notes reviewed.   No concerning overnight events.  Afebrile.   denies SOB this morning  states still having hand tremors    Allergies: penicillin (Unknown)  macrolide antibiotics (Other)  Biaxin (Unknown)      Objective:  Vitals:   T(F): 97.2 (02-27-25 @ 11:25), Max: 98.2 (02-26-25 @ 12:03)  HR: 101 (02-27-25 @ 11:25) (86 - 111)  BP: 118/56 (02-27-25 @ 05:15) (98/51 - 118/56)  RR: 18 (02-27-25 @ 11:25) (17 - 18)  SpO2: 99% (02-27-25 @ 11:25) (99% - 100%)  Physical Examination:  General: no acute distress  HEENT: anicteric, NC  Cardio: S1, S2, normal rate  Resp: clear to auscultation anteriorly   Abd: soft, NT, ND  Ext: no LE edema  Skin: warm, dry    Laboratory Studies:  CBC:                       11.2   9.73  )-----------( 431      ( 27 Feb 2025 07:12 )             35.4     WBC Trend:  9.73 02-27-25 @ 07:12  11.08 02-26-25 @ 05:48  11.65 02-25-25 @ 07:05  11.91 02-24-25 @ 07:40  9.54 02-23-25 @ 07:21  10.32 02-22-25 @ 15:59  9.02 02-21-25 @ 06:33    CMP: 02-27    137  |  94[L]  |  56[H]  ----------------------------<  149[H]  4.5   |  28  |  1.15    Ca    9.0      27 Feb 2025 09:25  Phos  4.6     02-27  Mg     2.30     02-27    TPro  6.5  /  Alb  3.6  /  TBili  0.2  /  DBili  x   /  AST  14  /  ALT  9   /  AlkPhos  79  02-26      LIVER FUNCTIONS - ( 26 Feb 2025 05:48 )  Alb: 3.6 g/dL / Pro: 6.5 g/dL / ALK PHOS: 79 U/L / ALT: 9 U/L / AST: 14 U/L / GGT: x             Urinalysis Basic - ( 27 Feb 2025 09:25 )    Color: x / Appearance: x / SG: x / pH: x  Gluc: 149 mg/dL / Ketone: x  / Bili: x / Urobili: x   Blood: x / Protein: x / Nitrite: x   Leuk Esterase: x / RBC: x / WBC x   Sq Epi: x / Non Sq Epi: x / Bacteria: x      Microbiology: reviewed     Culture - Blood (collected 02-18-25 @ 21:42)  Source: .Blood Blood-Peripheral  Final Report (02-24-25 @ 01:01):    No growth at 5 days    Culture - Urine (collected 02-18-25 @ 21:36)  Source: Clean Catch Clean Catch (Midstream)  Final Report (02-20-25 @ 16:54):    50,000 - 99,000 CFU/mL Enterococcus faecium    <10,000 CFU/ml Normal Urogenital vonnie present  Organism: Enterococcus faecium (02-20-25 @ 16:54)  Organism: Enterococcus faecium (02-20-25 @ 16:54)      Method Type: KHADIJAH      -  Ampicillin: S <=2 Predicts results to ampicillin/sulbactam, amoxacillin-clavulanate and  piperacillin-tazobactam.      -  Ciprofloxacin: I 2      -  Levofloxacin: I 4      -  Nitrofurantoin: S <=32 Should not be used to treat pyelonephritis.      -  Tetracycline: S <=4      -  Vancomycin: S 0.5    Culture - Blood (collected 02-18-25 @ 20:45)  Source: .Blood Blood-Peripheral  Final Report (02-24-25 @ 01:01):    No growth at 5 days        Radiology: reviewed     Medications:  acetaminophen     Tablet .. 650 milliGRAM(s) Oral every 6 hours PRN  aspirin enteric coated 81 milliGRAM(s) Oral daily  atorvastatin 20 milliGRAM(s) Oral at bedtime  dextrose 5%. 1000 milliLiter(s) IV Continuous <Continuous>  dextrose 5%. 1000 milliLiter(s) IV Continuous <Continuous>  dextrose 50% Injectable 25 Gram(s) IV Push once  dextrose 50% Injectable 12.5 Gram(s) IV Push once  dextrose 50% Injectable 25 Gram(s) IV Push once  dextrose Oral Gel 15 Gram(s) Oral once PRN  enoxaparin Injectable 40 milliGRAM(s) SubCutaneous every 24 hours  fluticasone propionate/ salmeterol 250-50 MICROgram(s) Diskus 1 Dose(s) Inhalation two times a day  furosemide    Tablet 40 milliGRAM(s) Oral daily  gabapentin 400 milliGRAM(s) Oral at bedtime  glucagon  Injectable 1 milliGRAM(s) IntraMuscular once  guaiFENesin Oral Liquid (Sugar-Free) 100 milliGRAM(s) Oral every 6 hours PRN  insulin glargine Injectable (LANTUS) 25 Unit(s) SubCutaneous at bedtime  insulin lispro (ADMELOG) corrective regimen sliding scale   SubCutaneous three times a day before meals  insulin lispro (ADMELOG) corrective regimen sliding scale   SubCutaneous at bedtime  insulin lispro Injectable (ADMELOG) 13 Unit(s) SubCutaneous three times a day before meals  ipratropium 17 MICROgram(s) HFA Inhaler 1 Puff(s) Inhalation every 6 hours  levalbuterol Inhalation 1.25 milliGRAM(s) Inhalation every 6 hours  melatonin 3 milliGRAM(s) Oral at bedtime PRN  metoprolol tartrate 12.5 milliGRAM(s) Oral two times a day  multivitamin 1 Tablet(s) Oral daily  ondansetron Injectable 4 milliGRAM(s) IV Push every 8 hours PRN  pantoprazole    Tablet 40 milliGRAM(s) Oral before breakfast  polyethylene glycol 3350 17 Gram(s) Oral two times a day  senna 2 Tablet(s) Oral at bedtime  sildenafil (REVATIO) 20 milliGRAM(s) Oral three times a day    Antimicrobials:

## 2025-02-27 NOTE — DISCHARGE NOTE PROVIDER - CARE PROVIDERS DIRECT ADDRESSES
,DirectAddress_Unknown ,DirectAddress_Unknown,[email or web address]

## 2025-02-28 PROCEDURE — 99232 SBSQ HOSP IP/OBS MODERATE 35: CPT

## 2025-02-28 RX ORDER — SENNA 187 MG
2 TABLET ORAL AT BEDTIME
Refills: 0 | Status: DISCONTINUED | OUTPATIENT
Start: 2025-02-28 | End: 2025-03-03

## 2025-02-28 RX ORDER — INSULIN LISPRO 100 U/ML
3 INJECTION, SOLUTION INTRAVENOUS; SUBCUTANEOUS
Refills: 0 | Status: DISCONTINUED | OUTPATIENT
Start: 2025-02-28 | End: 2025-03-02

## 2025-02-28 RX ORDER — METOPROLOL SUCCINATE 50 MG/1
25 TABLET, EXTENDED RELEASE ORAL
Refills: 0 | Status: DISCONTINUED | OUTPATIENT
Start: 2025-02-28 | End: 2025-03-03

## 2025-02-28 RX ORDER — FUROSEMIDE 10 MG/ML
40 INJECTION INTRAMUSCULAR; INTRAVENOUS DAILY
Refills: 0 | Status: DISCONTINUED | OUTPATIENT
Start: 2025-02-28 | End: 2025-03-03

## 2025-02-28 RX ORDER — INSULIN GLARGINE-YFGN 100 [IU]/ML
10 INJECTION, SOLUTION SUBCUTANEOUS AT BEDTIME
Refills: 0 | Status: DISCONTINUED | OUTPATIENT
Start: 2025-02-28 | End: 2025-03-01

## 2025-02-28 RX ORDER — POLYETHYLENE GLYCOL 3350 17 G/17G
17 POWDER, FOR SOLUTION ORAL
Refills: 0 | Status: DISCONTINUED | OUTPATIENT
Start: 2025-02-28 | End: 2025-03-03

## 2025-02-28 RX ORDER — METOPROLOL SUCCINATE 50 MG/1
12.5 TABLET, EXTENDED RELEASE ORAL ONCE
Refills: 0 | Status: COMPLETED | OUTPATIENT
Start: 2025-02-28 | End: 2025-02-28

## 2025-02-28 RX ADMIN — FUROSEMIDE 40 MILLIGRAM(S): 10 INJECTION INTRAMUSCULAR; INTRAVENOUS at 05:03

## 2025-02-28 RX ADMIN — SILDENAFIL 20 MILLIGRAM(S): 50 TABLET, FILM COATED ORAL at 22:10

## 2025-02-28 RX ADMIN — Medication 40 MILLIGRAM(S): at 05:03

## 2025-02-28 RX ADMIN — Medication 1 TABLET(S): at 13:02

## 2025-02-28 RX ADMIN — INSULIN LISPRO 1: 100 INJECTION, SOLUTION INTRAVENOUS; SUBCUTANEOUS at 22:07

## 2025-02-28 RX ADMIN — ATORVASTATIN CALCIUM 20 MILLIGRAM(S): 80 TABLET, FILM COATED ORAL at 22:07

## 2025-02-28 RX ADMIN — METOPROLOL SUCCINATE 12.5 MILLIGRAM(S): 50 TABLET, EXTENDED RELEASE ORAL at 17:08

## 2025-02-28 RX ADMIN — Medication 1 PUFF(S): at 17:08

## 2025-02-28 RX ADMIN — LEVALBUTEROL HYDROCHLORIDE 1.25 MILLIGRAM(S): 1.25 SOLUTION RESPIRATORY (INHALATION) at 09:55

## 2025-02-28 RX ADMIN — INSULIN GLARGINE-YFGN 10 UNIT(S): 100 INJECTION, SOLUTION SUBCUTANEOUS at 22:08

## 2025-02-28 RX ADMIN — Medication 1 DOSE(S): at 09:39

## 2025-02-28 RX ADMIN — ENOXAPARIN SODIUM 40 MILLIGRAM(S): 100 INJECTION SUBCUTANEOUS at 22:07

## 2025-02-28 RX ADMIN — Medication 1 PUFF(S): at 22:07

## 2025-02-28 RX ADMIN — Medication 1 PUFF(S): at 09:39

## 2025-02-28 RX ADMIN — INSULIN LISPRO 1: 100 INJECTION, SOLUTION INTRAVENOUS; SUBCUTANEOUS at 18:11

## 2025-02-28 RX ADMIN — LEVALBUTEROL HYDROCHLORIDE 1.25 MILLIGRAM(S): 1.25 SOLUTION RESPIRATORY (INHALATION) at 03:35

## 2025-02-28 RX ADMIN — INSULIN LISPRO 3 UNIT(S): 100 INJECTION, SOLUTION INTRAVENOUS; SUBCUTANEOUS at 13:01

## 2025-02-28 RX ADMIN — Medication 3 MILLIGRAM(S): at 22:55

## 2025-02-28 RX ADMIN — LEVALBUTEROL HYDROCHLORIDE 1.25 MILLIGRAM(S): 1.25 SOLUTION RESPIRATORY (INHALATION) at 16:09

## 2025-02-28 RX ADMIN — LEVALBUTEROL HYDROCHLORIDE 1.25 MILLIGRAM(S): 1.25 SOLUTION RESPIRATORY (INHALATION) at 21:11

## 2025-02-28 RX ADMIN — INSULIN LISPRO 2: 100 INJECTION, SOLUTION INTRAVENOUS; SUBCUTANEOUS at 13:01

## 2025-02-28 RX ADMIN — SILDENAFIL 20 MILLIGRAM(S): 50 TABLET, FILM COATED ORAL at 13:02

## 2025-02-28 RX ADMIN — METOPROLOL SUCCINATE 12.5 MILLIGRAM(S): 50 TABLET, EXTENDED RELEASE ORAL at 17:38

## 2025-02-28 RX ADMIN — SILDENAFIL 20 MILLIGRAM(S): 50 TABLET, FILM COATED ORAL at 05:03

## 2025-02-28 RX ADMIN — GABAPENTIN 400 MILLIGRAM(S): 400 CAPSULE ORAL at 22:07

## 2025-02-28 RX ADMIN — Medication 81 MILLIGRAM(S): at 13:02

## 2025-02-28 RX ADMIN — METOPROLOL SUCCINATE 12.5 MILLIGRAM(S): 50 TABLET, EXTENDED RELEASE ORAL at 05:03

## 2025-02-28 RX ADMIN — Medication 1 DOSE(S): at 22:07

## 2025-02-28 RX ADMIN — Medication 1 PUFF(S): at 05:02

## 2025-02-28 RX ADMIN — INSULIN LISPRO 3 UNIT(S): 100 INJECTION, SOLUTION INTRAVENOUS; SUBCUTANEOUS at 18:12

## 2025-02-28 NOTE — PROGRESS NOTE ADULT - PROBLEM SELECTOR PLAN 4
- known hx CHF   - 10/2024 TTE EF 60%, mild MV stenosis, mod AS, mild-mod TR  - POCUS in ER c/f reduced EF  - CXR with pulmonary vascular congestion, CTA chest with pulmonary edema   - TTE reviewed.   - home med: Lasix 40mg daily, lopressor as below -- initially on hold 2/2 sepsis and hypotension  - BP improves. restart Lopressor given tachycardia.   - cautious with fluids, resume lasix --> transition to IV --> PO - known hx CHF   - 10/2024 TTE EF 60%, mild MV stenosis, mod AS, mild-mod TR  - POCUS in ER c/f reduced EF  - CXR with pulmonary vascular congestion, CTA chest with pulmonary edema   - TTE reviewed.   - home med: Lasix 40mg daily, lopressor as below -- initially on hold 2/2 sepsis and hypotension  - BP improves. restart Lopressor given tachycardia. Dose increase to 25 mg qdaily  - cautious with fluids, resume lasix --> transition to IV --> PO

## 2025-02-28 NOTE — PROGRESS NOTE ADULT - ASSESSMENT
Pt is a 89 yo F with PMH CHF, anemia, pHTN, CAD, CVA (no deficits), T2D, [place], COPD (2L NC PRN, qhs), PVD, HTN, HLD, and ?lung CA (pending RT) p/w dysuria x 1wk along with tremors and SOB.   On arrival, T 102.5, , /78 --- SBP 80s, RR 20 O2sat 88% RA -- 2L NC. EKG with sinus tachycardia, no ischemic changes. ER POCUS with diffuse B lines. Labs with leukocytosis, normocytic anemia, trop neg x2, BNP 1406, lactate neg, UA+ with UCx and BCx in lab, RVP neg, MRSA in lab. CTA chest neg PE but with multifocal PNA, pulmonary edema, and 2.8x2.7cm mass LLL interval inc compared to prior. MICU consulted for hypotension with c/f need for pressors, however, improved with IVF and midodrine. Pt given tylenol, cefepime, and vancomycin. ID consulted and PT consulted with recs pending. (19 Feb 2025 10:03)  she is apparently sob to me:  but she says her breathing is OK:  she  is on home oxygen :  recently diagnosed with lung cancer:   now pulm called for pneumonia        Sepsis/ Pneumonia/ COPD / lung cancer:   pneumonia: on antibiotics   CTA chest neg PE but with multifocal PNA and pulmonary edema   cont antibtiocs : seen by id   urine legionella/strep  lEFT LOWER OPACITY:  PER DAUGHTER :  SHE NEVER GOT ANY CHEMO :  SHE WAS SUPPOSED TO GET RADIATION THERAPY,  BUT SHE ENDED UP HERE:     She has had multiplek admission in last few months and hence could not any surgery for the mass:  now it seems to have increased   she seems to be sob:  and has copd:  will add short course of steroids   her vbg is OK:  but she looks sob:  she may need bipap , if her rr worsens    2/20: she looks better today  : on 3 L o f oxygen : she does use oxygen at home:  2 L of oxygen :  ct scan chest showed: No pulmonary embolism. Interval increase in groundglass opacities and subpleural consolidations in bilateral lungs, likely infectious. Interlobularseptal thickening in the upper lobes, which can be suggestive of interstitial edema 3.1 cm masslike consolidation in left lower lobe. Additional smaller nodules in bilateral lungs. Mediastinal and hilar lymphadenopathy which can be reactive or due to  metastasis. Subpleural reticulations, Honeycombing, and peripheral cystic changes in bilateral lungs, suggestive of interstitial lung fibrosis.    2/21: spoke to daughter again : she had mapping ct prior to coming to hospital  : she was supposed to be getting radiation therapy;   no chemo ;   cont iv steroids;   she had no pe  on initial admission she had no pe:  \  she has significant emphysema:    VBG is good     would cont antibiotics   ? hemonc consult??     2/21: she was seen by palliative care;   2/22:  seems pretty good:  has cough + especially in night time:   add anti tussives:  hycodan at night time  \  on cefepime"   2/24:  she looks pretty good:  antibiotics till tomorrow:   on 3 L of ox gen  satting at 100%  VBG today is reasonable!  change to po steroids     2/25: she looks and feels better;   denies Sob to me at this time:   no wheezing:  has poor air entry  ;   cont current x:     2/26: seems to be doing  ok : still has tremors  in hands: ? neuro consult:   still feels SOB off and on   no wheezing ;    2/27:   seems to be doing  ok :no sob:  tremors; +  no cough ; no phlegm      2/28; seems stable:  on 2 l of oxygen :  no wheezing  cont BD; ? dc planning     Acute UTI.   as above.    Acute on chronic heart failure.    - known hx CHF   - 10/2024 TTE EF 60%, mild MV stenosis, mod AS, mild-mod TR  - POCUS in ER c/f reduced EF  - CXR with pulmonary vascular congestion, CTA chest with pulmonary edema   -cont diuretics  defer to card s    2/20: cont current rx:    on 3 L of oxygen    2/21: today hero2 requirement went up : do chest xray and stat high flow:  cont BD and steroids   2/22: on 40% high flow;  but I think can be changed to nasal cannula:  await echo results   2/25: on iv lasix  2/26: on oral  lasix    2/27: cont lasix   2/28 remains on lasix      Pulmonary mass.    CTA chest with interval increase in LLL mass from prior, 2.8x2.7cm  - pending RT initiation outpt?\  - as above:  has not getting any treatment since the diagnosis many months ago   2/22: defer to  onc:   ct scan looks not that good   would struggles to increase lasix   2/24: on iv Lasix   2/25: iv lasix    2/26: changed to po lasix   2/27: cont current therapy:    2/28: pt will eventually get radiation as an outpt      Pulmonary HTN.    sildenafil 20mg TID.  2/24: new echo ; mild pulm htn     HTN (hypertension).   blood pressure is soft:  currently off anti hypertensives:   controlled    Type 2 diabetes mellitus.   monitor and control:    gareth acp

## 2025-02-28 NOTE — PROVIDER CONTACT NOTE (HYPOGLYCEMIA EVENT) - NS PROVIDER CONTACT BACKGROUND-HYPO
Age: 89y    Gender: Female    POCT Blood Glucose:  112 mg/dL (02-28-25 @ 08:59)  66 mg/dL (02-28-25 @ 08:40)  89 mg/dL (02-28-25 @ 03:19)  189 mg/dL (02-27-25 @ 22:39)  83 mg/dL (02-27-25 @ 17:04)  189 mg/dL (02-27-25 @ 12:17)      eMAR:atorvastatin   20 milliGRAM(s) Oral (02-27-25 @ 22:10)    insulin glargine Injectable (LANTUS)   15 Unit(s) SubCutaneous (02-27-25 @ 23:26)    insulin lispro (ADMELOG) corrective regimen sliding scale   1 Unit(s) SubCutaneous (02-27-25 @ 12:25)    insulin lispro Injectable (ADMELOG)   13 Unit(s) SubCutaneous (02-27-25 @ 12:24)    insulin lispro Injectable (ADMELOG)   6 Unit(s) SubCutaneous (02-27-25 @ 17:49)     Age: 89y    Gender: Female    POCT Blood Glucose:  112 mg/dL (02-28-25 @ 08:59)  66 mg/dL (02-28-25 @ 08:40)  - patient refusing recheck fingerstick, asking for apple juice and to recheck only once in 15 mins  89 mg/dL (02-28-25 @ 03:19)  189 mg/dL (02-27-25 @ 22:39)  83 mg/dL (02-27-25 @ 17:04)  189 mg/dL (02-27-25 @ 12:17)      eMAR:atorvastatin   20 milliGRAM(s) Oral (02-27-25 @ 22:10)    insulin glargine Injectable (LANTUS)   15 Unit(s) SubCutaneous (02-27-25 @ 23:26)    insulin lispro (ADMELOG) corrective regimen sliding scale   1 Unit(s) SubCutaneous (02-27-25 @ 12:25)    insulin lispro Injectable (ADMELOG)   13 Unit(s) SubCutaneous (02-27-25 @ 12:24)    insulin lispro Injectable (ADMELOG)   6 Unit(s) SubCutaneous (02-27-25 @ 17:49)

## 2025-02-28 NOTE — PROGRESS NOTE ADULT - SUBJECTIVE AND OBJECTIVE BOX
CARDIOLOGY FOLLOW UP - Dr. Valenzuela    Patient Name: FEDERICO DOS SANTOS    DATE OF SERVICE: 02-28-25 @ 08:48    Patient seen and examined    CC no acute CV events  +gas pain this AM, now resolved       REVIEW OF SYSTEMS:  CONSTITUTIONAL: No fever, weight loss, or fatigue  RESPIRATORY: No cough, wheezing, chills or hemoptysis; No Shortness of Breath  CARDIOVASCULAR: No chest pain, palpitations, passing out, dizziness  GASTROINTESTINAL: No abdominal or epigastric pain. No nausea, vomiting, or hematemesis; No diarrhea or constipation. No melena or hematochezia.      PHYSICAL EXAM:  T(C): 36.3 (02-28-25 @ 05:00), Max: 36.7 (02-27-25 @ 22:00)  HR: 96 (02-28-25 @ 05:00) (72 - 101)  BP: 112/54 (02-28-25 @ 05:00) (105/35 - 135/57)  RR: 18 (02-28-25 @ 05:00) (18 - 18)  SpO2: 97% (02-28-25 @ 05:00) (95% - 99%)  Wt(kg): --  I&O's Summary    27 Feb 2025 07:01  -  28 Feb 2025 07:00  --------------------------------------------------------  IN: 0 mL / OUT: 400 mL / NET: -400 mL        Appearance: Normal	  Cardiovascular: Normal S1 S2,RRR, No JVD, No murmurs  Respiratory: Lungs clear to auscultation	  Gastrointestinal:  Soft, Non-tender, + BS	  Extremities: Normal range of motion, No clubbing, cyanosis or edema      Home Medications:  Advair Diskus 250 mcg-50 mcg inhalation powder: 1 puff(s) inhaled 2 times a day (13 Dec 2024 23:00)  aspirin 81 mg oral delayed release tablet: 1 tab(s) orally once a day (at bedtime) (13 Dec 2024 23:00)  atorvastatin 20 mg oral tablet: 1 tab(s) orally once a day (at bedtime) (13 Dec 2024 23:00)  furosemide 20 mg oral tablet: 2 tab(s) orally once a day (13 Dec 2024 22:55)  gabapentin 400 mg oral capsule: 1 cap(s) orally once a day (at bedtime) (13 Dec 2024 23:00)  ipratropium 42 mcg/inh (0.06%) nasal spray: 2 spray(s) intranasally 2 times a day (13 Dec 2024 23:00)  Metoprolol Tartrate 25 mg oral tablet: 0.5 tab(s) orally 2 times a day (27 Feb 2025 12:28)  Multiple Vitamins oral tablet: 1 tab(s) orally once a day (13 Dec 2024 23:00)  omeprazole 40 mg oral delayed release capsule: 1 cap(s) orally once a day (13 Dec 2024 23:00)  polyethylene glycol 3350 oral powder for reconstitution: 17 gram(s) orally 2 times a day (18 Dec 2024 13:08)  PreserVision AREDS 2 oral capsule: 1 cap(s) orally 2 times a day (13 Dec 2024 23:00)  senna leaf extract oral tablet: 2 tab(s) orally once a day (at bedtime) (18 Dec 2024 13:08)  sildenafil 20 mg oral tablet: 1 tab(s) orally 3 times a day (13 Dec 2024 22:55)      MEDICATIONS  (STANDING):  aspirin enteric coated 81 milliGRAM(s) Oral daily  atorvastatin 20 milliGRAM(s) Oral at bedtime  dextrose 5%. 1000 milliLiter(s) (100 mL/Hr) IV Continuous <Continuous>  dextrose 5%. 1000 milliLiter(s) (50 mL/Hr) IV Continuous <Continuous>  dextrose 50% Injectable 12.5 Gram(s) IV Push once  dextrose 50% Injectable 25 Gram(s) IV Push once  dextrose 50% Injectable 25 Gram(s) IV Push once  enoxaparin Injectable 40 milliGRAM(s) SubCutaneous every 24 hours  fluticasone propionate/ salmeterol 250-50 MICROgram(s) Diskus 1 Dose(s) Inhalation two times a day  furosemide    Tablet 40 milliGRAM(s) Oral daily  gabapentin 400 milliGRAM(s) Oral at bedtime  glucagon  Injectable 1 milliGRAM(s) IntraMuscular once  insulin glargine Injectable (LANTUS) 15 Unit(s) SubCutaneous at bedtime  insulin lispro (ADMELOG) corrective regimen sliding scale   SubCutaneous three times a day before meals  insulin lispro (ADMELOG) corrective regimen sliding scale   SubCutaneous at bedtime  insulin lispro Injectable (ADMELOG) 4 Unit(s) SubCutaneous three times a day before meals  ipratropium 17 MICROgram(s) HFA Inhaler 1 Puff(s) Inhalation every 6 hours  levalbuterol Inhalation 1.25 milliGRAM(s) Inhalation every 6 hours  metoprolol tartrate 12.5 milliGRAM(s) Oral two times a day  multivitamin 1 Tablet(s) Oral daily  pantoprazole    Tablet 40 milliGRAM(s) Oral before breakfast  polyethylene glycol 3350 17 Gram(s) Oral two times a day  senna 2 Tablet(s) Oral at bedtime  sildenafil (REVATIO) 20 milliGRAM(s) Oral three times a day      TELEMETRY: 	  NSR/ST HR 90s  ECG:  	  RADIOLOGY:   DIAGNOSTIC TESTING:  [ ] Echocardiogram:  [ ]  Catheterization:  [ ] Stress Test:    OTHER: 	    LABS:	 	    CKMB Units: 1.6 ng/mL (02-21 @ 10:43)  Troponin T, High Sensitivity Result: 30 ng/L (02-21 @ 10:43)                          11.2   9.73  )-----------( 431      ( 27 Feb 2025 07:12 )             35.4     02-27    137  |  94[L]  |  56[H]  ----------------------------<  149[H]  4.5   |  28  |  1.15    Ca    9.0      27 Feb 2025 09:25  Phos  4.6     02-27  Mg     2.30     02-27        Lipid Profile:   Hgb A1C:      BNP:     Creatinine: 1.15 mg/dL (02-27-25 @ 09:25)  Creatinine: 1.13 mg/dL (02-27-25 @ 07:12)  Creatinine: 1.12 mg/dL (02-26-25 @ 05:48)  Creatinine: 1.04 mg/dL (02-25-25 @ 07:05)      Hemoglobin: 11.2 g/dL (02-27-25 @ 07:12)  Hemoglobin: 9.7 g/dL (02-26-25 @ 05:48)  Hemoglobin: 9.5 g/dL (02-25-25 @ 07:05)

## 2025-02-28 NOTE — PROGRESS NOTE ADULT - SUBJECTIVE AND OBJECTIVE BOX
Chief Complaint: Type 2 DM     History: Pt seen at bedside. Pt tolerating oral diet. Pt denies nausea and vomiting. Pt with Pt reports an adequate appetite. Positive hypoglycemia this am.     MEDICATIONS  (STANDING):  aspirin enteric coated 81 milliGRAM(s) Oral daily  atorvastatin 20 milliGRAM(s) Oral at bedtime  dextrose 5%. 1000 milliLiter(s) (100 mL/Hr) IV Continuous <Continuous>  dextrose 5%. 1000 milliLiter(s) (50 mL/Hr) IV Continuous <Continuous>  dextrose 50% Injectable 12.5 Gram(s) IV Push once  dextrose 50% Injectable 25 Gram(s) IV Push once  dextrose 50% Injectable 25 Gram(s) IV Push once  enoxaparin Injectable 40 milliGRAM(s) SubCutaneous every 24 hours  fluticasone propionate/ salmeterol 250-50 MICROgram(s) Diskus 1 Dose(s) Inhalation two times a day  furosemide    Tablet 40 milliGRAM(s) Oral daily  gabapentin 400 milliGRAM(s) Oral at bedtime  glucagon  Injectable 1 milliGRAM(s) IntraMuscular once  insulin glargine Injectable (LANTUS) 10 Unit(s) SubCutaneous at bedtime  insulin lispro (ADMELOG) corrective regimen sliding scale   SubCutaneous three times a day before meals  insulin lispro (ADMELOG) corrective regimen sliding scale   SubCutaneous at bedtime  insulin lispro Injectable (ADMELOG) 3 Unit(s) SubCutaneous three times a day before meals  ipratropium 17 MICROgram(s) HFA Inhaler 1 Puff(s) Inhalation every 6 hours  levalbuterol Inhalation 1.25 milliGRAM(s) Inhalation every 6 hours  metoprolol tartrate 12.5 milliGRAM(s) Oral two times a day  multivitamin 1 Tablet(s) Oral daily  pantoprazole    Tablet 40 milliGRAM(s) Oral before breakfast  polyethylene glycol 3350 17 Gram(s) Oral two times a day  senna 2 Tablet(s) Oral at bedtime  sildenafil (REVATIO) 20 milliGRAM(s) Oral three times a day    MEDICATIONS  (PRN):  acetaminophen     Tablet .. 650 milliGRAM(s) Oral every 6 hours PRN Temp greater or equal to 38C (100.4F), Mild Pain (1 - 3)  dextrose Oral Gel 15 Gram(s) Oral once PRN Blood Glucose LESS THAN 70 milliGRAM(s)/deciliter  guaiFENesin Oral Liquid (Sugar-Free) 100 milliGRAM(s) Oral every 6 hours PRN Cough  melatonin 3 milliGRAM(s) Oral at bedtime PRN Insomnia  ondansetron Injectable 4 milliGRAM(s) IV Push every 8 hours PRN Nausea and/or Vomiting      Allergies: penicillin (Unknown)  macrolide antibiotics (Other)  Biaxin (Unknown)      Review of Systems:  Respiratory: No SOB, no cough  GI: No nausea, vomiting, abdominal pain  Endocrine: no polyuria, polydipsia      PHYSICAL EXAM:  VITALS: T(C): 36.3 (02-28-25 @ 11:20)  T(F): 97.4 (02-28-25 @ 11:20), Max: 98 (02-27-25 @ 22:00)  HR: 112 (02-28-25 @ 11:20) (72 - 112)  BP: 120/51 (02-28-25 @ 11:20) (107/38 - 135/57)  RR:  (18 - 20)  SpO2:  (95% - 99%)  Wt(kg): --  GENERAL: NAD, well-groomed, well-developed  RESPIRATORY: No labored breathing   GI: Soft, nontender, non distended  PSYCH: Alert and oriented x 3, normal affect, normal mood      CAPILLARY BLOOD GLUCOSE  POCT Blood Glucose.: 226 mg/dL (28 Feb 2025 12:09)  POCT Blood Glucose.: 112 mg/dL (28 Feb 2025 08:59)  POCT Blood Glucose.: 66 mg/dL (28 Feb 2025 08:40)  POCT Blood Glucose.: 89 mg/dL (28 Feb 2025 03:19)  POCT Blood Glucose.: 189 mg/dL (27 Feb 2025 22:39)  POCT Blood Glucose.: 83 mg/dL (27 Feb 2025 17:04)    A1C with Estimated Average Glucose (02.19.25 @ 09:14)    A1C with Estimated Average Glucose Result: 9.7   Estimated Average Glucose: 232      02-27    137  |  94[L]  |  56[H]  ----------------------------<  149[H]  4.5   |  28  |  1.15    eGFR: 45[L]    Ca    9.0      02-27  Mg     2.30     02-27  Phos  4.6     02-27    TPro  6.5  /  Alb  3.6  /  TBili  0.2  /  DBili  x   /  AST  14  /  ALT  9   /  AlkPhos  79  02-26    Thyroid Function Tests:  02-19 @ 09:14 TSH 1.95 FreeT4 -- T3 -- Anti TPO -- Anti Thyroglobulin Ab -- TSI --    Diet, DASH/TLC:   Sodium & Cholesterol Restricted  Consistent Carbohydrate Evening Snack (CSTCHOSN) (02-20-25 @ 10:22) [Active]

## 2025-02-28 NOTE — PROGRESS NOTE ADULT - ASSESSMENT
A/P: Pt is a 89 yo F with PMH CHF, anemia, pHTN, CAD, CVA (no deficits), T2D, [place], COPD (2L NC PRN, qhs), PVD, HTN, HLD, and ?lung CA (pending RT) p/w dysuria x 1wk along with tremors and SOB, found to be hyperglycemic and will be on steroids. Endocrinology was consulted for management of diabetes mellitus.    #Type 2 Diabetes Mellitus  - Receiving methylpred 20 mg IV q8h - 2/21- ?- solumedrol 20mg IV q8h is continued.  - Please notify endocrine if any change to steroid plan.  - HbA1c 9.7%; home regimen: Tresiba 10 units at bedtime (Tresiba no longer covered working on switching to a new basal prior to admission), metformin 500mg (?) 2 tabs daily     INPATIENT PLAN:  - Inpatient BG goal 100-200mg/dl: hypoglycemia this am. Above goal at lunch likely from hypoglycemia correction this am.   - Prednisone 20mg PO BID x 3 days (last dose 2/27 at 6am)  - Decrease Lantus to 10 units qhs  - Decreased Admelog to 3 units TID AC (please hold if npo/not eating)  - Continue Admelog low correction scale TIDQAC and continue low correction scale QHS  - Please check FSG before meals and QHS, or q6h while NPO; please check a 3am FS   - consistent carb diet  - RD consult  - Hypoglycemia Protocol   - changed antibiotics to non dextrose fluid  - Provider to Rn for insulin pen review: pt and daughter declined review   - If stay overnight please check lactate level --->defer to primary team     DISCHARGE PLAN:  - As per primary team no further steroids upon discharge   - Lactate is elevated will stop Metformin for now--> repeat lactate defer to primary team   - Please discharge pt on Basaglar Kwik pen 10 units sq qhs (basaglar is covered by pts insurance as per primary team) + Repaglinide 0.5mg p.o. once a day with largest meal (please hold if skips meals); frequency may need to be increased based on glucose trend as an outpt. This should be determined by pts outside provider   - Pt is now off steroid will keep basal insulin dose at home same dose as she states glucose in am at times is low 100s  - Advised pt if glucose 250 and above and or below 100 to please call her doctor as DM regimen may need to be adjusted   - Would like to avoid hypoglycemia in this elderly pt  - Reviewed with daughter since pt received prednisone 20mg this am glucose may run slightly higher than usual at home tonight and in am.   - Also given patient had a son with type 1 DM sent c-peptide (with serum glucose), FELISHA Ab, IA-2 Ab, and Zinc transporter Ab with Am labs to rule out JILLIAN, now testing in lab. C-peptide 3.7 (glu 376) consistent with type 2 DM. Can follow up antibodies for completeness; advised pt to follow up these results as an outpt   - Ensure patient has working glucometer, test strips, lancets, alcohol pads, and BD fransico pen needles  - Patient attributes high A1c to several hospital admissions in past few months.  - Patient uses glucometer and has previously declined CGM  - Patient wishes to follow up with pcp and declines initiating care with an endocrinologist.  - Provided Endocrine office information to pt so she can call for antibody results; [phone]  - D/w Daughter Vanessa [phone]; daughter states she knows how ot administer insulin via insulin pen incase her mother needs assistance     #Hypertension  - BP goal <130/80  - Please obtain urine micro albumin cr ratio as an outpt   - Management as per primary team, not on meds now    #Hyperlipidemia  - ordered for atorvastatin 20 mg continue if no contraindications   - Please obtain lipid profile if not done recently   - Defer to Primary Team     D/w Mohamud Palacios  Nurse Practitioner  Division of Endocrinology & Diabetes  In house pager #[phone]    If before 9AM or after 6PM, or on weekends/holidays, please call endocrine answering service for assistance ([phone]).For nonurgent matters email [email or web address] for assistance.

## 2025-02-28 NOTE — PROGRESS NOTE ADULT - ASSESSMENT
ECHO 10/7/24: nl LV sys fx EF 60% . Mild mitral valve stenosis. Mild to moderate tricuspid regurgitation. mod AS   ECHO  2/22/25 nl LV sys fx  ef 68%, moderate (grade 2) left ventricular diastolic dysfunction,mild pulmonary hypertension. The aortic valve appears trileaflet with reduced systolic excursion. There is calcification of the aortic valve leaflets. The aortic valve acceleration time is 90 msec. There is no evidence of aortic regurgitation.    a/p    89 yo F with PMH CHF, valvular disease, anemia, pHTN, CAD, CVA (no deficits), T2D, [place], COPD (2L NC PRN, qhs), PVD, HTN, HLD, and ?lung CA (pending RT) p/w dysuria x 1wk along with tremors and SOB + pna    #PNA  -Meeting sepsis criteria w/ fever, tachycardia, leukocytosis  -CTa chest with no PE, Interval increase in groundglass opacities and subpleural consolidations  in bilateral lungs, interlobular septal thickening in  the upper lobes, which can be suggestive of interstitial edema; 3.1 cm mass like consolidation in left lower lobe, Mediastinal and hilar lymphadenopathy which can be reactive or due to  metastasis.  Subpleural reticulations, Honeycombing, and peripheral cystic changes in  bilateral lungs, suggestive of interstitial lung fibrosis.  -sp abx per id/med  -on diuretics      #hypotension   -resolved  -micu eval noted- hypotensive to 80s/40s, given 2x 500cc bolus and Midodrine 10mg w/ improvement in BP to MAP >70  -Pocus w/ collapsed IVC suggestive of intravascular depletion, but w/ evidence of reduced RV and LV function   -bcx ngtd  -not a candidate for MICU per team   -HS trop neg. no ischemic changes to ecg   -ECHO  2/22/25 nl LV sys fx  ef 68%, moderate (grade 2) left ventricular diastolic dysfunction,mild pulmonary hypertension.    #CAD s/p PCI  -stable  -cont asa, statin, BB    #HFpEF  -ECHO  2/22/25 nl LV sys fx  ef 68%, moderate (grade 2) left ventricular diastolic dysfunction,mild pulmonary hypertension.  -c/w lasix po     #Pulmonary HTN.   -c/w home sildenafil 20mg TID.  -echo with mild pulm htn   -diuretics     #Lung CA  -med/ pulm fu   -pending RT initiation as outpt     #UTI   -management per med /ID    dvt ppx

## 2025-02-28 NOTE — PROVIDER CONTACT NOTE (HYPOGLYCEMIA EVENT) - NS PROVIDER CONTACT CONTRIBUTING FACTORS OF EPISODE
IV steroids DC/Other (Specify)/None
Poor oral intake within the last 24 hours/Previous finger stick less than 100 mg/dL

## 2025-02-28 NOTE — PROGRESS NOTE ADULT - SUBJECTIVE AND OBJECTIVE BOX
[facility]  KRISSY Luong Y. Patel, S. Shah, G. Casimir  [phone]  ([phone] - weekdays after 5pm and weekends)    Name: FEDERICO DOS SANTOS  Age/Gender: 89y Female  MRN: [id number]    Interval History:  Notes reviewed.   No concerning overnight events.  Afebrile.   feels tired this morning  states breathing is fine    Allergies: penicillin (Unknown)  macrolide antibiotics (Other)  Biaxin (Unknown)      Objective:  Vitals:   T(F): 97.4 (02-28-25 @ 11:20), Max: 98 (02-27-25 @ 22:00)  HR: 112 (02-28-25 @ 11:20) (72 - 112)  BP: 120/51 (02-28-25 @ 11:20) (106/58 - 135/57)  RR: 20 (02-28-25 @ 11:20) (18 - 20)  SpO2: 97% (02-28-25 @ 11:20) (95% - 99%)  Physical Examination:  General: no acute distress  HEENT: anicteric, NC  Cardio: S1, S2, normal rate  Resp: clear to auscultation anteriorly   Abd: soft, NT, ND  Ext: no LE edema  Skin: warm, dry    Laboratory Studies:  CBC:                       11.2   9.73  )-----------( 431      ( 27 Feb 2025 07:12 )             35.4     WBC Trend:  9.73 02-27-25 @ 07:12  11.08 02-26-25 @ 05:48  11.65 02-25-25 @ 07:05  11.91 02-24-25 @ 07:40  9.54 02-23-25 @ 07:21  10.32 02-22-25 @ 15:59    CMP: 02-27    137  |  94[L]  |  56[H]  ----------------------------<  149[H]  4.5   |  28  |  1.15    Ca    9.0      27 Feb 2025 09:25  Phos  4.6     02-27  Mg     2.30     02-27            Urinalysis Basic - ( 27 Feb 2025 09:25 )    Color: x / Appearance: x / SG: x / pH: x  Gluc: 149 mg/dL / Ketone: x  / Bili: x / Urobili: x   Blood: x / Protein: x / Nitrite: x   Leuk Esterase: x / RBC: x / WBC x   Sq Epi: x / Non Sq Epi: x / Bacteria: x      Microbiology: reviewed     Culture - Blood (collected 02-18-25 @ 21:42)  Source: .Blood Blood-Peripheral  Final Report (02-24-25 @ 01:01):    No growth at 5 days    Culture - Urine (collected 02-18-25 @ 21:36)  Source: Clean Catch Clean Catch (Midstream)  Final Report (02-20-25 @ 16:54):    50,000 - 99,000 CFU/mL Enterococcus faecium    <10,000 CFU/ml Normal Urogenital vonnie present  Organism: Enterococcus faecium (02-20-25 @ 16:54)  Organism: Enterococcus faecium (02-20-25 @ 16:54)      Method Type: KHADIJAH      -  Ampicillin: S <=2 Predicts results to ampicillin/sulbactam, amoxacillin-clavulanate and  piperacillin-tazobactam.      -  Ciprofloxacin: I 2      -  Levofloxacin: I 4      -  Nitrofurantoin: S <=32 Should not be used to treat pyelonephritis.      -  Tetracycline: S <=4      -  Vancomycin: S 0.5    Culture - Blood (collected 02-18-25 @ 20:45)  Source: .Blood Blood-Peripheral  Final Report (02-24-25 @ 01:01):    No growth at 5 days        Radiology: reviewed     Medications:  acetaminophen     Tablet .. 650 milliGRAM(s) Oral every 6 hours PRN  aspirin enteric coated 81 milliGRAM(s) Oral daily  atorvastatin 20 milliGRAM(s) Oral at bedtime  dextrose 5%. 1000 milliLiter(s) IV Continuous <Continuous>  dextrose 5%. 1000 milliLiter(s) IV Continuous <Continuous>  dextrose 50% Injectable 12.5 Gram(s) IV Push once  dextrose 50% Injectable 25 Gram(s) IV Push once  dextrose 50% Injectable 25 Gram(s) IV Push once  dextrose Oral Gel 15 Gram(s) Oral once PRN  enoxaparin Injectable 40 milliGRAM(s) SubCutaneous every 24 hours  fluticasone propionate/ salmeterol 250-50 MICROgram(s) Diskus 1 Dose(s) Inhalation two times a day  furosemide    Tablet 40 milliGRAM(s) Oral daily  gabapentin 400 milliGRAM(s) Oral at bedtime  glucagon  Injectable 1 milliGRAM(s) IntraMuscular once  guaiFENesin Oral Liquid (Sugar-Free) 100 milliGRAM(s) Oral every 6 hours PRN  insulin glargine Injectable (LANTUS) 10 Unit(s) SubCutaneous at bedtime  insulin lispro (ADMELOG) corrective regimen sliding scale   SubCutaneous three times a day before meals  insulin lispro (ADMELOG) corrective regimen sliding scale   SubCutaneous at bedtime  insulin lispro Injectable (ADMELOG) 3 Unit(s) SubCutaneous three times a day before meals  ipratropium 17 MICROgram(s) HFA Inhaler 1 Puff(s) Inhalation every 6 hours  levalbuterol Inhalation 1.25 milliGRAM(s) Inhalation every 6 hours  melatonin 3 milliGRAM(s) Oral at bedtime PRN  metoprolol tartrate 12.5 milliGRAM(s) Oral two times a day  multivitamin 1 Tablet(s) Oral daily  ondansetron Injectable 4 milliGRAM(s) IV Push every 8 hours PRN  pantoprazole    Tablet 40 milliGRAM(s) Oral before breakfast  polyethylene glycol 3350 17 Gram(s) Oral two times a day  senna 2 Tablet(s) Oral at bedtime  sildenafil (REVATIO) 20 milliGRAM(s) Oral three times a day    Antimicrobials:

## 2025-02-28 NOTE — PROVIDER CONTACT NOTE (HYPOGLYCEMIA EVENT) - NS PROVIDER CONTACT ASSESS-HYPO
patient asymptomatic 
immediatley after reading of 58, repeat POCT of 56 was found via glucometer taken at 1730. Patient was diaphoretic, alert and oriented. 12.5 Dextrose 50% given, prior to glucose, patient drank apple juice x1.

## 2025-02-28 NOTE — PROGRESS NOTE ADULT - SUBJECTIVE AND OBJECTIVE BOX
Date of Service: 02-28-25 @ 12:23    Patient is a 89y old  Female who presents with a chief complaint of UTI, PNA (28 Feb 2025 12:04)      Any change in ROS: seems OK:  no apparent sob : no cough ;  no phlegm      MEDICATIONS  (STANDING):  aspirin enteric coated 81 milliGRAM(s) Oral daily  atorvastatin 20 milliGRAM(s) Oral at bedtime  dextrose 5%. 1000 milliLiter(s) (100 mL/Hr) IV Continuous <Continuous>  dextrose 5%. 1000 milliLiter(s) (50 mL/Hr) IV Continuous <Continuous>  dextrose 50% Injectable 12.5 Gram(s) IV Push once  dextrose 50% Injectable 25 Gram(s) IV Push once  dextrose 50% Injectable 25 Gram(s) IV Push once  enoxaparin Injectable 40 milliGRAM(s) SubCutaneous every 24 hours  fluticasone propionate/ salmeterol 250-50 MICROgram(s) Diskus 1 Dose(s) Inhalation two times a day  furosemide    Tablet 40 milliGRAM(s) Oral daily  gabapentin 400 milliGRAM(s) Oral at bedtime  glucagon  Injectable 1 milliGRAM(s) IntraMuscular once  insulin glargine Injectable (LANTUS) 10 Unit(s) SubCutaneous at bedtime  insulin lispro (ADMELOG) corrective regimen sliding scale   SubCutaneous three times a day before meals  insulin lispro (ADMELOG) corrective regimen sliding scale   SubCutaneous at bedtime  insulin lispro Injectable (ADMELOG) 3 Unit(s) SubCutaneous three times a day before meals  ipratropium 17 MICROgram(s) HFA Inhaler 1 Puff(s) Inhalation every 6 hours  levalbuterol Inhalation 1.25 milliGRAM(s) Inhalation every 6 hours  metoprolol tartrate 12.5 milliGRAM(s) Oral two times a day  multivitamin 1 Tablet(s) Oral daily  pantoprazole    Tablet 40 milliGRAM(s) Oral before breakfast  polyethylene glycol 3350 17 Gram(s) Oral two times a day  senna 2 Tablet(s) Oral at bedtime  sildenafil (REVATIO) 20 milliGRAM(s) Oral three times a day    MEDICATIONS  (PRN):  acetaminophen     Tablet .. 650 milliGRAM(s) Oral every 6 hours PRN Temp greater or equal to 38C (100.4F), Mild Pain (1 - 3)  dextrose Oral Gel 15 Gram(s) Oral once PRN Blood Glucose LESS THAN 70 milliGRAM(s)/deciliter  guaiFENesin Oral Liquid (Sugar-Free) 100 milliGRAM(s) Oral every 6 hours PRN Cough  melatonin 3 milliGRAM(s) Oral at bedtime PRN Insomnia  ondansetron Injectable 4 milliGRAM(s) IV Push every 8 hours PRN Nausea and/or Vomiting    Vital Signs Last 24 Hrs  T(C): 36.3 (28 Feb 2025 11:20), Max: 36.7 (27 Feb 2025 22:00)  T(F): 97.4 (28 Feb 2025 11:20), Max: 98 (27 Feb 2025 22:00)  HR: 112 (28 Feb 2025 11:20) (72 - 112)  BP: 120/51 (28 Feb 2025 11:20) (106/58 - 135/57)  BP(mean): --  RR: 20 (28 Feb 2025 11:20) (18 - 20)  SpO2: 97% (28 Feb 2025 11:20) (95% - 99%)    Parameters below as of 28 Feb 2025 11:20  Patient On (Oxygen Delivery Method): nasal cannula  O2 Flow (L/min): 2      I&O's Summary    27 Feb 2025 07:01  -  28 Feb 2025 07:00  --------------------------------------------------------  IN: 0 mL / OUT: 400 mL / NET: -400 mL    28 Feb 2025 07:01  -  28 Feb 2025 12:23  --------------------------------------------------------  IN: 318 mL / OUT: 0 mL / NET: 318 mL          Physical Exam:   GENERAL: NAD, well-groomed, well-developed  HEENT: MARCIO/   Atraumatic, Normocephalic  ENMT: No tonsillar erythema, exudates, or enlargement; Moist mucous membranes, Good dentition, No lesions  NECK: Supple, No JVD, Normal thyroid  CHEST/LUNG: Clear to auscultaion- no wheezing  CVS: Regular rate and rhythm; No murmurs, rubs, or gallops  GI: : Soft, Nontender, Nondistended; Bowel sounds present  NERVOUS SYSTEM:  Alert & Oriented X3  EXTREMITIES: - edema  LYMPH: No lymphadenopathy noted  SKIN: No rashes or lesions  ENDOCRINOLOGY: No Thyromegaly  PSYCH: Appropriate    Labs:  31, 29, 32, 29                            11.2   9.73  )-----------( 431      ( 27 Feb 2025 07:12 )             35.4                         9.7    11.08 )-----------( 462      ( 26 Feb 2025 05:48 )             29.3                         9.5    11.65 )-----------( 459      ( 25 Feb 2025 07:05 )             29.6     02-27    137  |  94[L]  |  56[H]  ----------------------------<  149[H]  4.5   |  28  |  1.15  02-27    134[L]  |  94[L]  |  55[H]  ----------------------------<  171[H]  6.4[HH]   |  23  |  1.13  02-26    135  |  95[L]  |  45[H]  ----------------------------<  123[H]  4.3   |  27  |  1.12  02-25    134[L]  |  95[L]  |  42[H]  ----------------------------<  174[H]  4.8   |  26  |  1.04    Ca    9.0      27 Feb 2025 09:25  Ca    9.7      27 Feb 2025 07:12  Phos  4.6     02-27  Phos  5.4     02-27  Mg     2.30     02-27  Mg     2.40     02-27    TPro  6.5  /  Alb  3.6  /  TBili  0.2  /  DBili  x   /  AST  14  /  ALT  9   /  AlkPhos  79  02-26  TPro  6.6  /  Alb  3.6  /  TBili  0.2  /  DBili  x   /  AST  14  /  ALT  8   /  AlkPhos  80  02-25    CAPILLARY BLOOD GLUCOSE      POCT Blood Glucose.: 226 mg/dL (28 Feb 2025 12:09)  POCT Blood Glucose.: 112 mg/dL (28 Feb 2025 08:59)  POCT Blood Glucose.: 66 mg/dL (28 Feb 2025 08:40)  POCT Blood Glucose.: 89 mg/dL (28 Feb 2025 03:19)  POCT Blood Glucose.: 189 mg/dL (27 Feb 2025 22:39)  POCT Blood Glucose.: 83 mg/dL (27 Feb 2025 17:04)          Urinalysis Basic - ( 27 Feb 2025 09:25 )    Color: x / Appearance: x / SG: x / pH: x  Gluc: 149 mg/dL / Ketone: x  / Bili: x / Urobili: x   Blood: x / Protein: x / Nitrite: x   Leuk Esterase: x / RBC: x / WBC x   Sq Epi: x / Non Sq Epi: x / Bacteria: x    rad< from: Xray Chest 1 View- PORTABLE-Urgent (Xray Chest 1 View- PORTABLE-Urgent .) (02.21.25 @ 11:48) >  TECHNIQUE: Single frontal, portable view of the chest was obtained.    COMPARISON: Chest x-ray 2/18/2025.    FINDINGS:    Chest x-ray 2/20/2025 at 11:06 PM:  Surgical clips in the right axilla.  The heart is normal in size.  Diffuse bilateral patchy and hazy opacities, unchanged.  No pneumothorax.  Trace bilateral pleural effusions and associated atelectasis.  No acute bony abnormality.    Chest x-ray 2/21/2025 at 11:28 AM:  Progression of right base atelectasis or infiltrate. The left lung is   clearer.    IMPRESSION:  Changing infiltrates in latest image.    --- End of Report ---          DEANNA HERNANDEZ MD; Resident Radiologist  This document has been electronically signed.  LJ IRVIN MD; Attending Interventional Radiologist  This document has been electronically signed. Feb 21 2025  3:04PM    < end of copied text >  < from: CT Angio Chest PE Protocol w/ IV Cont (02.19.25 @ 01:11) >  Interval increase in groundglass opacities and subpleural consolidations   in bilateral lungs, likely infectious. Interlobularseptal thickening in   the upper lobes, which can be suggestive of interstitial edema  3.1 cm masslike consolidation in left lower lobe. Additional smaller   nodules in bilateral lungs.  Mediastinal and hilar lymphadenopathy which can be reactive or due to   metastasis.  Subpleural reticulations, Honeycombing, and peripheral cystic changes in   bilateral lungs, suggestive of interstitial lung fibrosis.    < end of copied text >          RECENT CULTURES:        RESPIRATORY CULTURES:          Studies  Chest X-RAY  CT SCAN Chest   Venous Dopplers: LE:   CT Abdomen  Others

## 2025-02-28 NOTE — PROGRESS NOTE ADULT - ASSESSMENT
Pt is a 89 yo F with PMH CHF, anemia, pHTN, CAD, CVA (no deficits), T2D, [place], COPD (2L NC PRN, qhs), PVD, HTN, HLD, and ?lung CA (pending RT) p/w dysuria x 1wk along with tremors and SOB. On arrival, meeting SIRS criteria with hypotension and hypoxia requiring 2L NC. EKG with sinus tachycardia, no ischemic changes. ER POCUS with diffuse B lines. Labs with leukocytosis, normocytic anemia, trop neg x2, BNP 1406, lactate neg, UA+ with UCx and BCx in lab, RVP neg, MRSA in lab. CTA chest neg PE but with multifocal PNA, pulmonary edema, and 2.8x2.7cm mass LLL interval inc compared to prior. MICU consulted for hypotension with c/f need for pressors, however, improved with IVF and midodrine; started on cefepime. ID + pulm consulted and PT consulted with recs pending.

## 2025-02-28 NOTE — PROGRESS NOTE ADULT - NSPROGADDITIONALINFOA_GEN_ALL_CORE
d/w the daughter Vanessa HCP. Pt lives with her. Uses walker to walk, though not always using.   Recently seen by Dr. Shaw Mitchell (rad onc) for measurements. Hoping that pt will be able to proceed with radiation treatment post discharge (although concern for radiation induced pneumonitis)     GOC discussed with pt and the daughter. Per the pt and daughter, if condition is irreversible then no CPR/ventilator. otherwise okay with CPR/ ventilator.   overall prognosis is poor given lung findings of fibrosis.  Palliative team eval appreciated. pt signed DNR/DNI.     hi-flow wean to NC, TTE reviewed. stage 2 diastolic dysfunction.  now back on home PO Lasix.   out of bed to chair if tolerates. physical therapy: Home PT.   completed abx 2/25/25 per ID.   DC Planning. PT re-eval: Home PT.    overall prognosis is poor given extensive lung finding of ILD/ lung Ca, etc  recurrent hospital admissions are inevitable. pt and the daughter not ready for hospice yet.   d/w pulm, ID.    d/w the daughter Vanessa again, now she is asking for rehab placement given her weakness and comorbidities.  PT Re-eval    d/w QUAN Mallory.     - Dr. RACHELL Martinez ([facility])  - [phone]

## 2025-02-28 NOTE — PROGRESS NOTE ADULT - ASSESSMENT
89 y/o F PMhx CHF, anemia, pHTN, CAD, CVA, DMII, COPD (2L NC PRN, qhs), PVD, HTN, and ?lung CA (pending RT) who presented w/ dysuria x 5 days as well as SOB and rigors.    L lung cancer  PNA- treated  sepsis- fever, leukocytosis- resolved  RVP negative  CTA chest- negative for PE but demonstrated increase in groundglass opacities and subpleural consolidations in bilateral lungs; Interlobular septal thickening in the upper lobes, which can be suggestive of interstitial edema 3.1 cm masslike consolidation in left lower lobe. Additional smaller nodules in bilateral lungs. Subpleural reticulations, Honeycombing, and peripheral cystic changes in bilateral lungs, suggestive of interstitial lung fibrosis.  s/p cefepime x 7 days, completed 2/25 AM    UTI- treated  dysuria- resolved  no flank tenderness, UA positive  urine cx E faecium, sensitivities reviewed  blood cultures- NGTD  s/p vanc/ macrobid course, completed 2/25    Recommendations  continue off antibiotics  wean O2 as tolerated  discharge planning    We will sign off. Thank you for allowing us to participate in the care of Ms. Casey. Please feel free to call with any questions or concerns.     Aldo Verduzco M.D.  [facility]  [phone]  After 5pm on weekdays and all day on weekends - please call [phone]  Available on [employer] teams    Thank you for consulting us and involving us in the management of this patients case. In addition to reviewing history, imaging, documents, labs, microbiology, took into account antibiotic stewardship, local antibiogram and infection control strategies and potential transmission issues.

## 2025-02-28 NOTE — PROGRESS NOTE ADULT - SUBJECTIVE AND OBJECTIVE BOX
SUBJECTIVE/ OVERNIGHT EVENTS:  overall feels well  hypoglycemic episode, insulin lowered by endocrine.  d/w the daughter Vanessa, feels she would benefits from rehab given her overall weakness and her medical issues  all questions answered.  pt denied pain  no cp, no sob, no n/v/d. no abdominal pain.  no headache, no dizziness.     --------------------------------------------------------------------------------------------  LABS:                        11.2   9.73  )-----------( 431      ( 27 Feb 2025 07:12 )             35.4     02-27    137  |  94[L]  |  56[H]  ----------------------------<  149[H]  4.5   |  28  |  1.15    Ca    9.0      27 Feb 2025 09:25  Phos  4.6     02-27  Mg     2.30     02-27        CAPILLARY BLOOD GLUCOSE      POCT Blood Glucose.: 226 mg/dL (28 Feb 2025 12:09)  POCT Blood Glucose.: 112 mg/dL (28 Feb 2025 08:59)  POCT Blood Glucose.: 66 mg/dL (28 Feb 2025 08:40)  POCT Blood Glucose.: 89 mg/dL (28 Feb 2025 03:19)  POCT Blood Glucose.: 189 mg/dL (27 Feb 2025 22:39)  POCT Blood Glucose.: 83 mg/dL (27 Feb 2025 17:04)        Urinalysis Basic - ( 27 Feb 2025 09:25 )    Color: x / Appearance: x / SG: x / pH: x  Gluc: 149 mg/dL / Ketone: x  / Bili: x / Urobili: x   Blood: x / Protein: x / Nitrite: x   Leuk Esterase: x / RBC: x / WBC x   Sq Epi: x / Non Sq Epi: x / Bacteria: x        RADIOLOGY & ADDITIONAL TESTS:    Imaging Personally Reviewed:  [x] YES  [ ] NO    Consultant(s) Notes Reviewed:  [x] YES  [ ] NO    MEDICATIONS  (STANDING):  aspirin enteric coated 81 milliGRAM(s) Oral daily  atorvastatin 20 milliGRAM(s) Oral at bedtime  dextrose 5%. 1000 milliLiter(s) (100 mL/Hr) IV Continuous <Continuous>  dextrose 5%. 1000 milliLiter(s) (50 mL/Hr) IV Continuous <Continuous>  dextrose 50% Injectable 12.5 Gram(s) IV Push once  dextrose 50% Injectable 25 Gram(s) IV Push once  dextrose 50% Injectable 25 Gram(s) IV Push once  enoxaparin Injectable 40 milliGRAM(s) SubCutaneous every 24 hours  fluticasone propionate/ salmeterol 250-50 MICROgram(s) Diskus 1 Dose(s) Inhalation two times a day  furosemide    Tablet 40 milliGRAM(s) Oral daily  gabapentin 400 milliGRAM(s) Oral at bedtime  glucagon  Injectable 1 milliGRAM(s) IntraMuscular once  insulin glargine Injectable (LANTUS) 10 Unit(s) SubCutaneous at bedtime  insulin lispro (ADMELOG) corrective regimen sliding scale   SubCutaneous three times a day before meals  insulin lispro (ADMELOG) corrective regimen sliding scale   SubCutaneous at bedtime  insulin lispro Injectable (ADMELOG) 3 Unit(s) SubCutaneous three times a day before meals  ipratropium 17 MICROgram(s) HFA Inhaler 1 Puff(s) Inhalation every 6 hours  levalbuterol Inhalation 1.25 milliGRAM(s) Inhalation every 6 hours  metoprolol tartrate 12.5 milliGRAM(s) Oral two times a day  multivitamin 1 Tablet(s) Oral daily  pantoprazole    Tablet 40 milliGRAM(s) Oral before breakfast  polyethylene glycol 3350 17 Gram(s) Oral two times a day  senna 2 Tablet(s) Oral at bedtime  sildenafil (REVATIO) 20 milliGRAM(s) Oral three times a day    MEDICATIONS  (PRN):  acetaminophen     Tablet .. 650 milliGRAM(s) Oral every 6 hours PRN Temp greater or equal to 38C (100.4F), Mild Pain (1 - 3)  dextrose Oral Gel 15 Gram(s) Oral once PRN Blood Glucose LESS THAN 70 milliGRAM(s)/deciliter  guaiFENesin Oral Liquid (Sugar-Free) 100 milliGRAM(s) Oral every 6 hours PRN Cough  melatonin 3 milliGRAM(s) Oral at bedtime PRN Insomnia  ondansetron Injectable 4 milliGRAM(s) IV Push every 8 hours PRN Nausea and/or Vomiting      Care Discussed with Consultants/Other Providers [x] YES  [ ] NO    Vital Signs Last 24 Hrs  T(C): 36.3 (28 Feb 2025 11:20), Max: 36.7 (27 Feb 2025 22:00)  T(F): 97.4 (28 Feb 2025 11:20), Max: 98 (27 Feb 2025 22:00)  HR: 120 (28 Feb 2025 16:42) (72 - 120)  BP: 126/54 (28 Feb 2025 16:42) (107/38 - 135/57)  BP(mean): --  RR: 20 (28 Feb 2025 11:20) (18 - 20)  SpO2: 97% (28 Feb 2025 11:20) (96% - 99%)    Parameters below as of 28 Feb 2025 11:20  Patient On (Oxygen Delivery Method): nasal cannula  O2 Flow (L/min): 2    I&O's Summary    27 Feb 2025 07:01  -  28 Feb 2025 07:00  --------------------------------------------------------  IN: 0 mL / OUT: 400 mL / NET: -400 mL    28 Feb 2025 07:01  -  28 Feb 2025 16:45  --------------------------------------------------------  IN: 518 mL / OUT: 0 mL / NET: 518 mL      PHYSICAL EXAM:  GENERAL: NAD, thin-elderly, comfortable now on hi-flow --> 6L --> 4L --> 2-3L  HEAD:  Atraumatic, Normocephalic  EYES: EOMI, PERRLA, conjunctiva and sclera clear  NECK: Supple, No JVD  CHEST/LUNG: mild decrease breath sounds bilaterally; No wheeze   HEART: Regular rate and rhythm; No murmurs, rubs, or gallops  ABDOMEN: Soft, Nontender, Nondistended; Bowel sounds present  Neuro: AAOx3, no focal weakness   EXTREMITIES:  2+ Peripheral Pulses, No clubbing, cyanosis, or edema  SKIN: No rashes or lesions      SUBJECTIVE/ OVERNIGHT EVENTS:  overall feels well  hypoglycemic episode, insulin lowered by endocrine.  d/w the daughter Vanessa, feels she would benefits from rehab given her overall weakness and her medical issues  all questions answered.  pt denied pain  no cp, no sob, no n/v/d. no abdominal pain.  no headache, no dizziness.   mild tachy: metoprolol dose increase to 25 mg qdaily  --------------------------------------------------------------------------------------------  LABS:                        11.2   9.73  )-----------( 431      ( 27 Feb 2025 07:12 )             35.4     02-27    137  |  94[L]  |  56[H]  ----------------------------<  149[H]  4.5   |  28  |  1.15    Ca    9.0      27 Feb 2025 09:25  Phos  4.6     02-27  Mg     2.30     02-27        CAPILLARY BLOOD GLUCOSE      POCT Blood Glucose.: 226 mg/dL (28 Feb 2025 12:09)  POCT Blood Glucose.: 112 mg/dL (28 Feb 2025 08:59)  POCT Blood Glucose.: 66 mg/dL (28 Feb 2025 08:40)  POCT Blood Glucose.: 89 mg/dL (28 Feb 2025 03:19)  POCT Blood Glucose.: 189 mg/dL (27 Feb 2025 22:39)  POCT Blood Glucose.: 83 mg/dL (27 Feb 2025 17:04)        Urinalysis Basic - ( 27 Feb 2025 09:25 )    Color: x / Appearance: x / SG: x / pH: x  Gluc: 149 mg/dL / Ketone: x  / Bili: x / Urobili: x   Blood: x / Protein: x / Nitrite: x   Leuk Esterase: x / RBC: x / WBC x   Sq Epi: x / Non Sq Epi: x / Bacteria: x        RADIOLOGY & ADDITIONAL TESTS:    Imaging Personally Reviewed:  [x] YES  [ ] NO    Consultant(s) Notes Reviewed:  [x] YES  [ ] NO    MEDICATIONS  (STANDING):  aspirin enteric coated 81 milliGRAM(s) Oral daily  atorvastatin 20 milliGRAM(s) Oral at bedtime  dextrose 5%. 1000 milliLiter(s) (100 mL/Hr) IV Continuous <Continuous>  dextrose 5%. 1000 milliLiter(s) (50 mL/Hr) IV Continuous <Continuous>  dextrose 50% Injectable 12.5 Gram(s) IV Push once  dextrose 50% Injectable 25 Gram(s) IV Push once  dextrose 50% Injectable 25 Gram(s) IV Push once  enoxaparin Injectable 40 milliGRAM(s) SubCutaneous every 24 hours  fluticasone propionate/ salmeterol 250-50 MICROgram(s) Diskus 1 Dose(s) Inhalation two times a day  furosemide    Tablet 40 milliGRAM(s) Oral daily  gabapentin 400 milliGRAM(s) Oral at bedtime  glucagon  Injectable 1 milliGRAM(s) IntraMuscular once  insulin glargine Injectable (LANTUS) 10 Unit(s) SubCutaneous at bedtime  insulin lispro (ADMELOG) corrective regimen sliding scale   SubCutaneous three times a day before meals  insulin lispro (ADMELOG) corrective regimen sliding scale   SubCutaneous at bedtime  insulin lispro Injectable (ADMELOG) 3 Unit(s) SubCutaneous three times a day before meals  ipratropium 17 MICROgram(s) HFA Inhaler 1 Puff(s) Inhalation every 6 hours  levalbuterol Inhalation 1.25 milliGRAM(s) Inhalation every 6 hours  metoprolol tartrate 12.5 milliGRAM(s) Oral two times a day  multivitamin 1 Tablet(s) Oral daily  pantoprazole    Tablet 40 milliGRAM(s) Oral before breakfast  polyethylene glycol 3350 17 Gram(s) Oral two times a day  senna 2 Tablet(s) Oral at bedtime  sildenafil (REVATIO) 20 milliGRAM(s) Oral three times a day    MEDICATIONS  (PRN):  acetaminophen     Tablet .. 650 milliGRAM(s) Oral every 6 hours PRN Temp greater or equal to 38C (100.4F), Mild Pain (1 - 3)  dextrose Oral Gel 15 Gram(s) Oral once PRN Blood Glucose LESS THAN 70 milliGRAM(s)/deciliter  guaiFENesin Oral Liquid (Sugar-Free) 100 milliGRAM(s) Oral every 6 hours PRN Cough  melatonin 3 milliGRAM(s) Oral at bedtime PRN Insomnia  ondansetron Injectable 4 milliGRAM(s) IV Push every 8 hours PRN Nausea and/or Vomiting      Care Discussed with Consultants/Other Providers [x] YES  [ ] NO    Vital Signs Last 24 Hrs  T(C): 36.3 (28 Feb 2025 11:20), Max: 36.7 (27 Feb 2025 22:00)  T(F): 97.4 (28 Feb 2025 11:20), Max: 98 (27 Feb 2025 22:00)  HR: 120 (28 Feb 2025 16:42) (72 - 120)  BP: 126/54 (28 Feb 2025 16:42) (107/38 - 135/57)  BP(mean): --  RR: 20 (28 Feb 2025 11:20) (18 - 20)  SpO2: 97% (28 Feb 2025 11:20) (96% - 99%)    Parameters below as of 28 Feb 2025 11:20  Patient On (Oxygen Delivery Method): nasal cannula  O2 Flow (L/min): 2    I&O's Summary    27 Feb 2025 07:01  -  28 Feb 2025 07:00  --------------------------------------------------------  IN: 0 mL / OUT: 400 mL / NET: -400 mL    28 Feb 2025 07:01  -  28 Feb 2025 16:45  --------------------------------------------------------  IN: 518 mL / OUT: 0 mL / NET: 518 mL      PHYSICAL EXAM:  GENERAL: NAD, thin-elderly, comfortable now on hi-flow --> 6L --> 4L --> 2-3L  HEAD:  Atraumatic, Normocephalic  EYES: EOMI, PERRLA, conjunctiva and sclera clear  NECK: Supple, No JVD  CHEST/LUNG: mild decrease breath sounds bilaterally; No wheeze   HEART: Regular rate and rhythm; No murmurs, rubs, or gallops  ABDOMEN: Soft, Nontender, Nondistended; Bowel sounds present  Neuro: AAOx3, no focal weakness   EXTREMITIES:  2+ Peripheral Pulses, No clubbing, cyanosis, or edema  SKIN: No rashes or lesions

## 2025-03-01 LAB
ANION GAP SERPL CALC-SCNC: 12 MMOL/L — SIGNIFICANT CHANGE UP (ref 7–14)
BUN SERPL-MCNC: 47 MG/DL — HIGH (ref 7–23)
CALCIUM SERPL-MCNC: 9.2 MG/DL — SIGNIFICANT CHANGE UP (ref 8.4–10.5)
CHLORIDE SERPL-SCNC: 97 MMOL/L — LOW (ref 98–107)
CO2 SERPL-SCNC: 28 MMOL/L — SIGNIFICANT CHANGE UP (ref 22–31)
CREAT SERPL-MCNC: 1.1 MG/DL — SIGNIFICANT CHANGE UP (ref 0.5–1.3)
EGFR: 47 ML/MIN/1.73M2 — LOW
EGFR: 47 ML/MIN/1.73M2 — LOW
GAD65 AB SER-MCNC: 0.02 NMOL/L — SIGNIFICANT CHANGE UP
GLUCOSE SERPL-MCNC: 108 MG/DL — HIGH (ref 70–99)
HCT VFR BLD CALC: 27.9 % — LOW (ref 34.5–45)
HGB BLD-MCNC: 9.1 G/DL — LOW (ref 11.5–15.5)
ISLET CELL512 AB SER-SCNC: 0 NMOL/L — SIGNIFICANT CHANGE UP
MAGNESIUM SERPL-MCNC: 1.9 MG/DL — SIGNIFICANT CHANGE UP (ref 1.6–2.6)
MCHC RBC-ENTMCNC: 28.4 PG — SIGNIFICANT CHANGE UP (ref 27–34)
MCHC RBC-ENTMCNC: 32.6 G/DL — SIGNIFICANT CHANGE UP (ref 32–36)
MCV RBC AUTO: 87.2 FL — SIGNIFICANT CHANGE UP (ref 80–100)
NRBC # BLD AUTO: 0 K/UL — SIGNIFICANT CHANGE UP (ref 0–0)
NRBC # FLD: 0 K/UL — SIGNIFICANT CHANGE UP (ref 0–0)
NRBC BLD AUTO-RTO: 0 /100 WBCS — SIGNIFICANT CHANGE UP (ref 0–0)
PHOSPHATE SERPL-MCNC: 4 MG/DL — SIGNIFICANT CHANGE UP (ref 2.5–4.5)
PLATELET # BLD AUTO: 352 K/UL — SIGNIFICANT CHANGE UP (ref 150–400)
POTASSIUM SERPL-MCNC: 4.3 MMOL/L — SIGNIFICANT CHANGE UP (ref 3.5–5.3)
POTASSIUM SERPL-SCNC: 4.3 MMOL/L — SIGNIFICANT CHANGE UP (ref 3.5–5.3)
RBC # BLD: 3.2 M/UL — LOW (ref 3.8–5.2)
RBC # FLD: 16 % — HIGH (ref 10.3–14.5)
SODIUM SERPL-SCNC: 137 MMOL/L — SIGNIFICANT CHANGE UP (ref 135–145)
WBC # BLD: 7.92 K/UL — SIGNIFICANT CHANGE UP (ref 3.8–10.5)
WBC # FLD AUTO: 7.92 K/UL — SIGNIFICANT CHANGE UP (ref 3.8–10.5)

## 2025-03-01 PROCEDURE — 99232 SBSQ HOSP IP/OBS MODERATE 35: CPT

## 2025-03-01 RX ORDER — ALPRAZOLAM 0.5 MG
0.25 TABLET, EXTENDED RELEASE 24 HR ORAL ONCE
Refills: 0 | Status: DISCONTINUED | OUTPATIENT
Start: 2025-03-01 | End: 2025-03-01

## 2025-03-01 RX ORDER — INSULIN GLARGINE-YFGN 100 [IU]/ML
9 INJECTION, SOLUTION SUBCUTANEOUS AT BEDTIME
Refills: 0 | Status: DISCONTINUED | OUTPATIENT
Start: 2025-03-01 | End: 2025-03-02

## 2025-03-01 RX ADMIN — SILDENAFIL 20 MILLIGRAM(S): 50 TABLET, FILM COATED ORAL at 05:27

## 2025-03-01 RX ADMIN — INSULIN GLARGINE-YFGN 9 UNIT(S): 100 INJECTION, SOLUTION SUBCUTANEOUS at 21:58

## 2025-03-01 RX ADMIN — ATORVASTATIN CALCIUM 20 MILLIGRAM(S): 80 TABLET, FILM COATED ORAL at 21:29

## 2025-03-01 RX ADMIN — SILDENAFIL 20 MILLIGRAM(S): 50 TABLET, FILM COATED ORAL at 21:29

## 2025-03-01 RX ADMIN — LEVALBUTEROL HYDROCHLORIDE 1.25 MILLIGRAM(S): 1.25 SOLUTION RESPIRATORY (INHALATION) at 08:59

## 2025-03-01 RX ADMIN — Medication 1 PUFF(S): at 21:30

## 2025-03-01 RX ADMIN — Medication 1 PUFF(S): at 05:27

## 2025-03-01 RX ADMIN — INSULIN LISPRO 3 UNIT(S): 100 INJECTION, SOLUTION INTRAVENOUS; SUBCUTANEOUS at 18:42

## 2025-03-01 RX ADMIN — GABAPENTIN 400 MILLIGRAM(S): 400 CAPSULE ORAL at 21:29

## 2025-03-01 RX ADMIN — Medication 1 DOSE(S): at 21:31

## 2025-03-01 RX ADMIN — Medication 1 TABLET(S): at 13:21

## 2025-03-01 RX ADMIN — LEVALBUTEROL HYDROCHLORIDE 1.25 MILLIGRAM(S): 1.25 SOLUTION RESPIRATORY (INHALATION) at 15:16

## 2025-03-01 RX ADMIN — METOPROLOL SUCCINATE 25 MILLIGRAM(S): 50 TABLET, EXTENDED RELEASE ORAL at 11:55

## 2025-03-01 RX ADMIN — Medication 0.25 MILLIGRAM(S): at 13:46

## 2025-03-01 RX ADMIN — LEVALBUTEROL HYDROCHLORIDE 1.25 MILLIGRAM(S): 1.25 SOLUTION RESPIRATORY (INHALATION) at 03:10

## 2025-03-01 RX ADMIN — Medication 40 MILLIGRAM(S): at 05:27

## 2025-03-01 RX ADMIN — FUROSEMIDE 40 MILLIGRAM(S): 10 INJECTION INTRAMUSCULAR; INTRAVENOUS at 05:27

## 2025-03-01 RX ADMIN — Medication 1 PUFF(S): at 16:58

## 2025-03-01 RX ADMIN — INSULIN LISPRO 3 UNIT(S): 100 INJECTION, SOLUTION INTRAVENOUS; SUBCUTANEOUS at 09:10

## 2025-03-01 RX ADMIN — SILDENAFIL 20 MILLIGRAM(S): 50 TABLET, FILM COATED ORAL at 13:20

## 2025-03-01 RX ADMIN — Medication 1 PUFF(S): at 09:12

## 2025-03-01 RX ADMIN — Medication 1 DOSE(S): at 09:10

## 2025-03-01 RX ADMIN — INSULIN LISPRO 2: 100 INJECTION, SOLUTION INTRAVENOUS; SUBCUTANEOUS at 18:42

## 2025-03-01 RX ADMIN — ENOXAPARIN SODIUM 40 MILLIGRAM(S): 100 INJECTION SUBCUTANEOUS at 21:30

## 2025-03-01 RX ADMIN — INSULIN LISPRO 1: 100 INJECTION, SOLUTION INTRAVENOUS; SUBCUTANEOUS at 12:55

## 2025-03-01 RX ADMIN — Medication 81 MILLIGRAM(S): at 13:20

## 2025-03-01 RX ADMIN — METOPROLOL SUCCINATE 25 MILLIGRAM(S): 50 TABLET, EXTENDED RELEASE ORAL at 21:29

## 2025-03-01 RX ADMIN — LEVALBUTEROL HYDROCHLORIDE 1.25 MILLIGRAM(S): 1.25 SOLUTION RESPIRATORY (INHALATION) at 22:18

## 2025-03-01 RX ADMIN — INSULIN LISPRO 3 UNIT(S): 100 INJECTION, SOLUTION INTRAVENOUS; SUBCUTANEOUS at 12:55

## 2025-03-01 NOTE — PROVIDER CONTACT NOTE (OTHER) - ACTION/TREATMENT ORDERED:
ACP aware. ACP ok giving metoprolol now and reschedule next metoprolol for 8pm. Ok to give metoprolol with BP above 100 systolic.

## 2025-03-01 NOTE — PROGRESS NOTE ADULT - ASSESSMENT
Pt is a 89 yo F with PMH CHF, anemia, pHTN, CAD, CVA (no deficits), T2D, [place], COPD (2L NC PRN, qhs), PVD, HTN, HLD, and ?lung CA (pending RT) p/w dysuria x 1wk along with tremors and SOB.   On arrival, T 102.5, , /78 --- SBP 80s, RR 20 O2sat 88% RA -- 2L NC. EKG with sinus tachycardia, no ischemic changes. ER POCUS with diffuse B lines. Labs with leukocytosis, normocytic anemia, trop neg x2, BNP 1406, lactate neg, UA+ with UCx and BCx in lab, RVP neg, MRSA in lab. CTA chest neg PE but with multifocal PNA, pulmonary edema, and 2.8x2.7cm mass LLL interval inc compared to prior. MICU consulted for hypotension with c/f need for pressors, however, improved with IVF and midodrine. Pt given tylenol, cefepime, and vancomycin. ID consulted and PT consulted with recs pending. (19 Feb 2025 10:03)  she is apparently sob to me:  but she says her breathing is OK:  she  is on home oxygen :  recently diagnosed with lung cancer:   now pulm called for pneumonia        Sepsis/ Pneumonia/ COPD / lung cancer:   pneumonia: on antibiotics   CTA chest neg PE but with multifocal PNA and pulmonary edema   cont antibtiocs : seen by id   urine legionella/strep  lEFT LOWER OPACITY:  PER DAUGHTER :  SHE NEVER GOT ANY CHEMO :  SHE WAS SUPPOSED TO GET RADIATION THERAPY,  BUT SHE ENDED UP HERE:     She has had multiplek admission in last few months and hence could not any surgery for the mass:  now it seems to have increased   she seems to be sob:  and has copd:  will add short course of steroids   her vbg is OK:  but she looks sob:  she may need bipap , if her rr worsens    2/20: she looks better today  : on 3 L o f oxygen : she does use oxygen at home:  2 L of oxygen :  ct scan chest showed: No pulmonary embolism. Interval increase in groundglass opacities and subpleural consolidations in bilateral lungs, likely infectious. Interlobularseptal thickening in the upper lobes, which can be suggestive of interstitial edema 3.1 cm masslike consolidation in left lower lobe. Additional smaller nodules in bilateral lungs. Mediastinal and hilar lymphadenopathy which can be reactive or due to  metastasis. Subpleural reticulations, Honeycombing, and peripheral cystic changes in bilateral lungs, suggestive of interstitial lung fibrosis.    2/21: spoke to daughter again : she had mapping ct prior to coming to hospital  : she was supposed to be getting radiation therapy;   no chemo ;   cont iv steroids;   she had no pe  on initial admission she had no pe:  \  she has significant emphysema:    VBG is good     would cont antibiotics   ? hemonc consult??     2/21: she was seen by palliative care;   2/22:  seems pretty good:  has cough + especially in night time:   add anti tussives:  hycodan at night time  \  on cefepime"   2/24:  she looks pretty good:  antibiotics till tomorrow:   on 3 L of ox gen  satting at 100%  VBG today is reasonable!  change to po steroids     2/25: she looks and feels better;   denies Sob to me at this time:   no wheezing:  has poor air entry  ;   cont current x:     2/26: seems to be doing  ok : still has tremors  in hands: ? neuro consult:   still feels SOB off and on   no wheezing ;    2/27:   seems to be doing  ok :no sob:  tremors; +  no cough ; no phlegm      2/28; seems stable:  on 2 l of oxygen :  no wheezing  cont BD; ? dc planning     3/1:  seems OK:  no sob:  no cough:   no sob:  no wheezing/   on room air      Acute UTI.   as above.    Acute on chronic heart failure.    - known hx CHF   - 10/2024 TTE EF 60%, mild MV stenosis, mod AS, mild-mod TR  - POCUS in ER c/f reduced EF  - CXR with pulmonary vascular congestion, CTA chest with pulmonary edema   -cont diuretics  defer to card s    2/20: cont current rx:    on 3 L of oxygen    2/21: today hero2 requirement went up : do chest xray and stat high flow:  cont BD and steroids   2/22: on 40% high flow;  but I think can be changed to nasal cannula:  await echo results   2/25: on iv lasix  2/26: on oral  lasix    2/27: cont lasix   2/28 remains on lasix    3/1: on lasix     Pulmonary mass.    CTA chest with interval increase in LLL mass from prior, 2.8x2.7cm  - pending RT initiation outpt?\  - as above:  has not getting any treatment since the diagnosis many months ago   2/22: defer to  onc:   ct scan looks not that good   would struggles to increase lasix   2/24: on iv Lasix   2/25: iv lasix    2/26: changed to po lasix   2/27: cont current therapy:    2/28: pt will eventually get radiation as an outpt    3/1: seems to be doing  ok  no sob;  on 2 L of oxygen     Pulmonary HTN.    sildenafil 20mg TID.  2/24: new echo ; mild pulm htn   3/1:  on sildenafil     HTN (hypertension).   blood pressure is soft:  currently off anti hypertensives:   controlled    Type 2 diabetes mellitus.   monitor and control:    gareth acp

## 2025-03-01 NOTE — PROGRESS NOTE ADULT - SUBJECTIVE AND OBJECTIVE BOX
SUBJECTIVE/ OVERNIGHT EVENTS:  intermittent hand tremors   comfortable  heart rate and FS better.   the daughter Vanessa at bedside.  PT: recommends rehab.  essential tremors and possibly anxiety, offered CT head to r/o brain mets, refused.   pt and the daughter said, they would rather "not know" since "will not consider any immediate treatment such as chemo/radiation anyway"  agree with a trial of Xanax 0.25 mg x 1      --------------------------------------------------------------------------------------------  LABS:                        9.1    7.92  )-----------( 352      ( 01 Mar 2025 07:28 )             27.9     03-01    137  |  97[L]  |  47[H]  ----------------------------<  108[H]  4.3   |  28  |  1.10    Ca    9.2      01 Mar 2025 07:28  Phos  4.0     03-01  Mg     1.90     03-01        CAPILLARY BLOOD GLUCOSE      POCT Blood Glucose.: 181 mg/dL (01 Mar 2025 12:13)  POCT Blood Glucose.: 127 mg/dL (01 Mar 2025 08:11)  POCT Blood Glucose.: 116 mg/dL (01 Mar 2025 07:18)  POCT Blood Glucose.: 266 mg/dL (28 Feb 2025 21:34)  POCT Blood Glucose.: 167 mg/dL (28 Feb 2025 18:00)        Urinalysis Basic - ( 01 Mar 2025 07:28 )    Color: x / Appearance: x / SG: x / pH: x  Gluc: 108 mg/dL / Ketone: x  / Bili: x / Urobili: x   Blood: x / Protein: x / Nitrite: x   Leuk Esterase: x / RBC: x / WBC x   Sq Epi: x / Non Sq Epi: x / Bacteria: x        RADIOLOGY & ADDITIONAL TESTS:    Imaging Personally Reviewed:  [x] YES  [ ] NO    Consultant(s) Notes Reviewed:  [x] YES  [ ] NO    MEDICATIONS  (STANDING):  ALPRAZolam 0.25 milliGRAM(s) Oral once  aspirin enteric coated 81 milliGRAM(s) Oral daily  atorvastatin 20 milliGRAM(s) Oral at bedtime  dextrose 5%. 1000 milliLiter(s) (100 mL/Hr) IV Continuous <Continuous>  dextrose 5%. 1000 milliLiter(s) (50 mL/Hr) IV Continuous <Continuous>  dextrose 50% Injectable 25 Gram(s) IV Push once  dextrose 50% Injectable 12.5 Gram(s) IV Push once  dextrose 50% Injectable 25 Gram(s) IV Push once  enoxaparin Injectable 40 milliGRAM(s) SubCutaneous every 24 hours  fluticasone propionate/ salmeterol 250-50 MICROgram(s) Diskus 1 Dose(s) Inhalation two times a day  furosemide    Tablet 40 milliGRAM(s) Oral daily  gabapentin 400 milliGRAM(s) Oral at bedtime  glucagon  Injectable 1 milliGRAM(s) IntraMuscular once  insulin glargine Injectable (LANTUS) 9 Unit(s) SubCutaneous at bedtime  insulin lispro (ADMELOG) corrective regimen sliding scale   SubCutaneous three times a day before meals  insulin lispro (ADMELOG) corrective regimen sliding scale   SubCutaneous at bedtime  insulin lispro Injectable (ADMELOG) 3 Unit(s) SubCutaneous three times a day before meals  ipratropium 17 MICROgram(s) HFA Inhaler 1 Puff(s) Inhalation every 6 hours  levalbuterol Inhalation 1.25 milliGRAM(s) Inhalation every 6 hours  metoprolol tartrate 25 milliGRAM(s) Oral two times a day  multivitamin 1 Tablet(s) Oral daily  pantoprazole    Tablet 40 milliGRAM(s) Oral before breakfast  sildenafil (REVATIO) 20 milliGRAM(s) Oral three times a day    MEDICATIONS  (PRN):  acetaminophen     Tablet .. 650 milliGRAM(s) Oral every 6 hours PRN Temp greater or equal to 38C (100.4F), Mild Pain (1 - 3)  dextrose Oral Gel 15 Gram(s) Oral once PRN Blood Glucose LESS THAN 70 milliGRAM(s)/deciliter  guaiFENesin Oral Liquid (Sugar-Free) 100 milliGRAM(s) Oral every 6 hours PRN Cough  melatonin 3 milliGRAM(s) Oral at bedtime PRN Insomnia  ondansetron Injectable 4 milliGRAM(s) IV Push every 8 hours PRN Nausea and/or Vomiting  polyethylene glycol 3350 17 Gram(s) Oral two times a day PRN Constipation  senna 2 Tablet(s) Oral at bedtime PRN Constipation      Care Discussed with Consultants/Other Providers [x] YES  [ ] NO    Vital Signs Last 24 Hrs  T(C): 36.5 (01 Mar 2025 05:20), Max: 36.8 (28 Feb 2025 19:25)  T(F): 97.7 (01 Mar 2025 05:20), Max: 98.2 (28 Feb 2025 19:25)  HR: 106 (01 Mar 2025 11:54) (92 - 120)  BP: 109/57 (01 Mar 2025 11:54) (103/42 - 126/54)  BP(mean): --  RR: 19 (01 Mar 2025 11:54) (18 - 19)  SpO2: 98% (01 Mar 2025 11:54) (98% - 100%)    Parameters below as of 01 Mar 2025 11:54  Patient On (Oxygen Delivery Method): nasal cannula  O2 Flow (L/min): 2    I&O's Summary    28 Feb 2025 07:01  -  01 Mar 2025 07:00  --------------------------------------------------------  IN: 918 mL / OUT: 2000 mL / NET: -1082 mL            PHYSICAL EXAM:  GENERAL: NAD, thin-elderly, comfortable now on hi-flow --> 6L --> 4L --> 2-3L  HEAD:  Atraumatic, Normocephalic  EYES: EOMI, PERRLA, conjunctiva and sclera clear  NECK: Supple, No JVD  CHEST/LUNG: mild decrease breath sounds bilaterally; No wheeze   HEART: Regular rate and rhythm; No murmurs, rubs, or gallops  ABDOMEN: Soft, Nontender, Nondistended; Bowel sounds present  Neuro: AAOx3, no focal weakness   EXTREMITIES:  2+ Peripheral Pulses, No clubbing, cyanosis, or edema  SKIN: No rashes or lesions

## 2025-03-01 NOTE — PROGRESS NOTE ADULT - NSPROGADDITIONALINFOA_GEN_ALL_CORE
d/w the daughter Vanessa HCP. Pt lives with her. Baseline, she uses walker to walk, though not always using.   Recently seen by Dr. Shaw Mitchell (rad onc) for measurements. Hoping that pt will be able to proceed with radiation treatment post discharge (although concern for radiation induced pneumonitis)     GOC discussed with pt and the daughter. Per the pt and daughter, if condition is irreversible then no CPR/ventilator. otherwise okay with CPR/ ventilator.   overall prognosis is poor given lung findings of fibrosis.  Palliative team eval appreciated. pt signed DNR/DNI.     hi-flow wean to NC, TTE reviewed. stage 2 diastolic dysfunction.  now back on home PO Lasix.   out of bed to chair if tolerates. physical therapy: Home PT.   completed abx 2/25/25 per ID.   DC Planning. PT re-eval: Home PT.    overall prognosis is poor given extensive lung finding of ILD/ lung Ca, etc  recurrent hospital admissions are inevitable. pt and the daughter not ready for hospice yet.     d/w the daughter Vanessa again, now she is asking for rehab placement given her weakness and comorbidities.  PT Re-eval: rehab.  dc planning to rehab.     d/w QUAN Wilks.     - Dr. RACHELL Martinez ([facility])  - [phone]

## 2025-03-01 NOTE — PROGRESS NOTE ADULT - PROBLEM SELECTOR PLAN 4
- known hx CHF   - 10/2024 TTE EF 60%, mild MV stenosis, mod AS, mild-mod TR  - POCUS in ER c/f reduced EF  - CXR with pulmonary vascular congestion, CTA chest with pulmonary edema   - TTE reviewed.   - home med: Lasix 40mg daily, lopressor as below -- initially on hold 2/2 sepsis and hypotension  - BP improves. restart Lopressor given tachycardia. Dose increase to 25 mg qdaily  - cautious with fluids, resume lasix --> transition to IV --> PO

## 2025-03-01 NOTE — PROGRESS NOTE ADULT - SUBJECTIVE AND OBJECTIVE BOX
Chief Complaint: Type 2 DM     History: Pt seen at bedside. Pt tolerating oral diet. Pt denies nausea and vomiting/any signs of hypoglycemia. Pt reports an adequate appetite. Daughter Vanessa at bedside.     MEDICATIONS  (STANDING):  aspirin enteric coated 81 milliGRAM(s) Oral daily  atorvastatin 20 milliGRAM(s) Oral at bedtime  dextrose 5%. 1000 milliLiter(s) (100 mL/Hr) IV Continuous <Continuous>  dextrose 5%. 1000 milliLiter(s) (50 mL/Hr) IV Continuous <Continuous>  dextrose 50% Injectable 25 Gram(s) IV Push once  dextrose 50% Injectable 12.5 Gram(s) IV Push once  dextrose 50% Injectable 25 Gram(s) IV Push once  enoxaparin Injectable 40 milliGRAM(s) SubCutaneous every 24 hours  fluticasone propionate/ salmeterol 250-50 MICROgram(s) Diskus 1 Dose(s) Inhalation two times a day  furosemide    Tablet 40 milliGRAM(s) Oral daily  gabapentin 400 milliGRAM(s) Oral at bedtime  glucagon  Injectable 1 milliGRAM(s) IntraMuscular once  insulin glargine Injectable (LANTUS) 9 Unit(s) SubCutaneous at bedtime  insulin lispro (ADMELOG) corrective regimen sliding scale   SubCutaneous three times a day before meals  insulin lispro (ADMELOG) corrective regimen sliding scale   SubCutaneous at bedtime  insulin lispro Injectable (ADMELOG) 3 Unit(s) SubCutaneous three times a day before meals  ipratropium 17 MICROgram(s) HFA Inhaler 1 Puff(s) Inhalation every 6 hours  levalbuterol Inhalation 1.25 milliGRAM(s) Inhalation every 6 hours  metoprolol tartrate 25 milliGRAM(s) Oral two times a day  multivitamin 1 Tablet(s) Oral daily  pantoprazole    Tablet 40 milliGRAM(s) Oral before breakfast  sildenafil (REVATIO) 20 milliGRAM(s) Oral three times a day    MEDICATIONS  (PRN):  acetaminophen     Tablet .. 650 milliGRAM(s) Oral every 6 hours PRN Temp greater or equal to 38C (100.4F), Mild Pain (1 - 3)  dextrose Oral Gel 15 Gram(s) Oral once PRN Blood Glucose LESS THAN 70 milliGRAM(s)/deciliter  guaiFENesin Oral Liquid (Sugar-Free) 100 milliGRAM(s) Oral every 6 hours PRN Cough  melatonin 3 milliGRAM(s) Oral at bedtime PRN Insomnia  ondansetron Injectable 4 milliGRAM(s) IV Push every 8 hours PRN Nausea and/or Vomiting  polyethylene glycol 3350 17 Gram(s) Oral two times a day PRN Constipation  senna 2 Tablet(s) Oral at bedtime PRN Constipation      Allergies: penicillin (Unknown)  macrolide antibiotics (Other)  Biaxin (Unknown)    Review of Systems:  Respiratory: No SOB, no cough  GI: No nausea, vomiting, abdominal pain  Endocrine: no polyuria, polydipsia    PHYSICAL EXAM:  VITALS: T(C): 36.5 (03-01-25 @ 05:20)  T(F): 97.7 (03-01-25 @ 05:20), Max: 98.2 (02-28-25 @ 19:25)  HR: 106 (03-01-25 @ 11:54) (92 - 120)  BP: 109/57 (03-01-25 @ 11:54) (103/42 - 126/54)  RR:  (18 - 19)  SpO2:  (98% - 100%)  Wt(kg): --  GENERAL: NAD, well-groomed, well-developed  RESPIRATORY: No labored breathing   GI: Soft, nontender, non distended  PSYCH: Alert and oriented x 3, normal affect, normal mood      CAPILLARY BLOOD GLUCOSE  POCT Blood Glucose.: 181 mg/dL (01 Mar 2025 12:13)  POCT Blood Glucose.: 127 mg/dL (01 Mar 2025 08:11)  POCT Blood Glucose.: 116 mg/dL (01 Mar 2025 07:18)  POCT Blood Glucose.: 266 mg/dL (28 Feb 2025 21:34)  POCT Blood Glucose.: 167 mg/dL (28 Feb 2025 18:00)    A1C with Estimated Average Glucose (02.19.25 @ 09:14)    A1C with Estimated Average Glucose Result: 9.7   Estimated Average Glucose: 232      03-01    137  |  97[L]  |  47[H]  ----------------------------<  108[H]  4.3   |  28  |  1.10    eGFR: 47[L]    Ca    9.2      03-01  Mg     1.90     03-01  Phos  4.0     03-01      Thyroid Function Tests:  02-19 @ 09:14 TSH 1.95 FreeT4 -- T3 -- Anti TPO -- Anti Thyroglobulin Ab -- TSI --    Diet, DASH/TLC:   Sodium & Cholesterol Restricted  Consistent Carbohydrate Evening Snack (CSTCHOSN) (02-20-25 @ 10:22) [Active]

## 2025-03-01 NOTE — PROGRESS NOTE ADULT - ASSESSMENT
A/P: Pt is a 89 yo F with PMH CHF, anemia, pHTN, CAD, CVA (no deficits), T2D, [place], COPD (2L NC PRN, qhs), PVD, HTN, HLD, and ?lung CA (pending RT) p/w dysuria x 1wk along with tremors and SOB, found to be hyperglycemic and will be on steroids. Endocrinology was consulted for management of diabetes mellitus.    #Type 2 Diabetes Mellitus  - Receiving methylpred 20 mg IV q8h - 2/21- ?- solumedrol 20mg IV q8h is continued.  - Please notify endocrine if any change to steroid plan.  - HbA1c 9.7%; home regimen: Tresiba 10 units at bedtime (Tresiba no longer covered working on switching to a new basal prior to admission), metformin 500mg (?) 2 tabs daily     INPATIENT PLAN:  - Inpatient BG goal 100-200mg/dl: above goal yesterday evening. Unclear if pt ate prior to FS. At times has crackers in between meals. Pt with Variable blood glucose. Would be cautious with insulin adjustments due to age and renal function   - Last prednisone dose on 2/27 at 6am)  - Decrease Lantus to 9 units qhs  - Continue Admelog 3 units TID AC (please hold if npo/not eating)  - Continue Admelog low correction scale TIDQAC and continue low correction scale QHS  - Please check FSG before meals and QHS, or q6h while NPO; please check a 3am FS   - consistent carb diet  - RD consult  - Hypoglycemia Protocol   - changed antibiotics to non dextrose fluid  - Provider to Rn for insulin pen review: pt and daughter declined review   - If stay overnight please check lactate level --->defer to primary team     DISCHARGE PLAN:  - As per primary team no further steroids upon discharge   - Lactate is elevated will stop Metformin for now--> repeat lactate defer to primary team   - If going to Rehab discharge her on basal/bolus (doses TBD)  - Tentative dc recs from rehab to home is Basaglar Kwik pen sq qhs (basaglar is covered by pts insurance as per primary team) dose TBD + Repaglinide 0.5mg p.o. once a day with largest meal (please hold if skips meals); final dm recommendations from rehab to home TBD by medical team at rehab based on pts glucose trend   - Pt is now off steroid will keep basal insulin dose at home same dose as she states glucose in am at times is low 100s  - Advised pt if glucose 250 and above and or below 100 to please call her doctor as DM regimen may need to be adjusted   - Would like to avoid hypoglycemia in this elderly pt  - Reviewed with daughter since pt received prednisone 20mg this am glucose may run slightly higher than usual at home tonight and in am.   - Also given patient had a son with type 1 DM sent c-peptide (with serum glucose), FELISHA Ab, IA-2 Ab, and Zinc transporter Ab with Am labs to rule out JILLIAN, now testing in lab. C-peptide 3.7 (glu 376) consistent with type 2 DM. Can follow up antibodies for completeness; advised pt to follow up these results as an outpt   - Ensure patient has working glucometer, test strips, lancets, alcohol pads, and BD fransico pen needles  - Patient attributes high A1c to several hospital admissions in past few months.  - Patient uses glucometer and has previously declined CGM  - Patient wishes to follow up with pcp and declines initiating care with an endocrinologist.  - Provided Endocrine office information to pt so she can call for antibody results; [phone]  - D/w Daughter Vanessa [phone]; daughter states she knows how ot administer insulin via insulin pen incase her mother needs assistance     #Hypertension  - BP goal <130/80  - Please obtain urine micro albumin cr ratio as an outpt   - Management as per primary team, not on meds now    #Hyperlipidemia  - ordered for atorvastatin 20 mg continue if no contraindications   - Please obtain lipid profile if not done recently   - Defer to Primary Team       Yen Palacios  Nurse Practitioner  Division of Endocrinology & Diabetes  In house pager #[phone]    If before 9AM or after 6PM, or on weekends/holidays, please call endocrine answering service for assistance ([phone]).For nonurgent matters email [email or web address] for assistance.

## 2025-03-01 NOTE — PROGRESS NOTE ADULT - SUBJECTIVE AND OBJECTIVE BOX
CARDIOLOGY FOLLOW UP - Dr. Valenzuela    Patient Name: FEDERICO DOS SANTOS    DATE OF SERVICE: 03-01-25 @ 07:39    Patient seen and examined    CC no CP or SOB      REVIEW OF SYSTEMS:  CONSTITUTIONAL: No fever, weight loss, or fatigue  RESPIRATORY: No cough, wheezing, chills or hemoptysis; No Shortness of Breath  CARDIOVASCULAR: No chest pain, palpitations, passing out, dizziness  GASTROINTESTINAL: No abdominal or epigastric pain. No nausea, vomiting, or hematemesis; No diarrhea or constipation. No melena or hematochezia.      PHYSICAL EXAM:  T(C): 36.5 (03-01-25 @ 05:20), Max: 36.8 (02-28-25 @ 19:25)  HR: 100 (03-01-25 @ 05:20) (92 - 120)  BP: 104/51 (03-01-25 @ 05:20) (103/42 - 126/54)  RR: 18 (03-01-25 @ 05:20) (18 - 20)  SpO2: 100% (03-01-25 @ 05:20) (97% - 100%)  Wt(kg): --  I&O's Summary    28 Feb 2025 07:01  -  01 Mar 2025 07:00  --------------------------------------------------------  IN: 918 mL / OUT: 2000 mL / NET: -1082 mL        Appearance: Normal	  Cardiovascular: Normal S1 S2,RRR, No JVD, No murmurs  Respiratory: Lungs clear to auscultation	  Gastrointestinal:  Soft, Non-tender, + BS	  Extremities: Normal range of motion, No clubbing, cyanosis or edema      Home Medications:  Advair Diskus 250 mcg-50 mcg inhalation powder: 1 puff(s) inhaled 2 times a day (13 Dec 2024 23:00)  aspirin 81 mg oral delayed release tablet: 1 tab(s) orally once a day (at bedtime) (13 Dec 2024 23:00)  atorvastatin 20 mg oral tablet: 1 tab(s) orally once a day (at bedtime) (13 Dec 2024 23:00)  furosemide 20 mg oral tablet: 2 tab(s) orally once a day (13 Dec 2024 22:55)  gabapentin 400 mg oral capsule: 1 cap(s) orally once a day (at bedtime) (13 Dec 2024 23:00)  ipratropium 42 mcg/inh (0.06%) nasal spray: 2 spray(s) intranasally 2 times a day (13 Dec 2024 23:00)  Metoprolol Tartrate 25 mg oral tablet: 0.5 tab(s) orally 2 times a day (27 Feb 2025 12:28)  Multiple Vitamins oral tablet: 1 tab(s) orally once a day (13 Dec 2024 23:00)  omeprazole 40 mg oral delayed release capsule: 1 cap(s) orally once a day (13 Dec 2024 23:00)  polyethylene glycol 3350 oral powder for reconstitution: 17 gram(s) orally 2 times a day (18 Dec 2024 13:08)  PreserVision AREDS 2 oral capsule: 1 cap(s) orally 2 times a day (13 Dec 2024 23:00)  senna leaf extract oral tablet: 2 tab(s) orally once a day (at bedtime) (18 Dec 2024 13:08)  sildenafil 20 mg oral tablet: 1 tab(s) orally 3 times a day (13 Dec 2024 22:55)      MEDICATIONS  (STANDING):  aspirin enteric coated 81 milliGRAM(s) Oral daily  atorvastatin 20 milliGRAM(s) Oral at bedtime  dextrose 5%. 1000 milliLiter(s) (100 mL/Hr) IV Continuous <Continuous>  dextrose 5%. 1000 milliLiter(s) (50 mL/Hr) IV Continuous <Continuous>  dextrose 50% Injectable 25 Gram(s) IV Push once  dextrose 50% Injectable 12.5 Gram(s) IV Push once  dextrose 50% Injectable 25 Gram(s) IV Push once  enoxaparin Injectable 40 milliGRAM(s) SubCutaneous every 24 hours  fluticasone propionate/ salmeterol 250-50 MICROgram(s) Diskus 1 Dose(s) Inhalation two times a day  furosemide    Tablet 40 milliGRAM(s) Oral daily  gabapentin 400 milliGRAM(s) Oral at bedtime  glucagon  Injectable 1 milliGRAM(s) IntraMuscular once  insulin glargine Injectable (LANTUS) 10 Unit(s) SubCutaneous at bedtime  insulin lispro (ADMELOG) corrective regimen sliding scale   SubCutaneous three times a day before meals  insulin lispro (ADMELOG) corrective regimen sliding scale   SubCutaneous at bedtime  insulin lispro Injectable (ADMELOG) 3 Unit(s) SubCutaneous three times a day before meals  ipratropium 17 MICROgram(s) HFA Inhaler 1 Puff(s) Inhalation every 6 hours  levalbuterol Inhalation 1.25 milliGRAM(s) Inhalation every 6 hours  metoprolol tartrate 25 milliGRAM(s) Oral two times a day  multivitamin 1 Tablet(s) Oral daily  pantoprazole    Tablet 40 milliGRAM(s) Oral before breakfast  sildenafil (REVATIO) 20 milliGRAM(s) Oral three times a day      TELEMETRY: 	  ST 100s-110s  ECG:  	  RADIOLOGY:   DIAGNOSTIC TESTING:  [ ] Echocardiogram:  [ ]  Catheterization:  [ ] Stress Test:    OTHER: 	    LABS:	 	        02-27    137  |  94[L]  |  56[H]  ----------------------------<  149[H]  4.5   |  28  |  1.15    Ca    9.0      27 Feb 2025 09:25  Phos  4.6     02-27  Mg     2.30     02-27        Lipid Profile:   Hgb A1C:      BNP:     Creatinine: 1.15 mg/dL (02-27-25 @ 09:25)  Creatinine: 1.13 mg/dL (02-27-25 @ 07:12)  Creatinine: 1.12 mg/dL (02-26-25 @ 05:48)      Hemoglobin: 11.2 g/dL (02-27-25 @ 07:12)  Hemoglobin: 9.7 g/dL (02-26-25 @ 05:48)

## 2025-03-01 NOTE — PROGRESS NOTE ADULT - ASSESSMENT
ECHO 10/7/24: nl LV sys fx EF 60% . Mild mitral valve stenosis. Mild to moderate tricuspid regurgitation. mod AS   ECHO  2/22/25 nl LV sys fx  ef 68%, moderate (grade 2) left ventricular diastolic dysfunction,mild pulmonary hypertension. The aortic valve appears trileaflet with reduced systolic excursion. There is calcification of the aortic valve leaflets. The aortic valve acceleration time is 90 msec. There is no evidence of aortic regurgitation.    a/p    89 yo F with PMH CHF, valvular disease, anemia, pHTN, CAD, CVA (no deficits), T2D, [place], COPD (2L NC PRN, qhs), PVD, HTN, HLD, and ?lung CA (pending RT) p/w dysuria x 1wk along with tremors and SOB + pna    #PNA  -Meeting sepsis criteria w/ fever, tachycardia, leukocytosis  -CTa chest with no PE, Interval increase in groundglass opacities and subpleural consolidations  in bilateral lungs, interlobular septal thickening in  the upper lobes, which can be suggestive of interstitial edema; 3.1 cm mass like consolidation in left lower lobe, Mediastinal and hilar lymphadenopathy which can be reactive or due to  metastasis.  Subpleural reticulations, Honeycombing, and peripheral cystic changes in  bilateral lungs, suggestive of interstitial lung fibrosis.  -sp abx per id/med  -on diuretics      #hypotension   -resolved  -micu eval noted- hypotensive to 80s/40s, given 2x 500cc bolus and Midodrine 10mg w/ improvement in BP to MAP >70  -Pocus w/ collapsed IVC suggestive of intravascular depletion, but w/ evidence of reduced RV and LV function   -bcx ngtd  -not a candidate for MICU per team   -HS trop neg. no ischemic changes to ecg   -ECHO  2/22/25 nl LV sys fx  ef 68%, moderate (grade 2) left ventricular diastolic dysfunction,mild pulmonary hypertension.    #CAD s/p PCI  -stable  -cont asa, statin, BB    #HFpEF  -ECHO  2/22/25 nl LV sys fx  ef 68%, moderate (grade 2) left ventricular diastolic dysfunction,mild pulmonary hypertension.  -c/w lasix po   -metoprolol dose increased for tachycardia     #Pulmonary HTN.   -c/w home sildenafil 20mg TID.  -echo with mild pulm htn   -on diuretics     #Lung CA  -med/ pulm fu   -pending RT initiation as outpt     #UTI   -management per med /ID    dvt ppx

## 2025-03-01 NOTE — PROGRESS NOTE ADULT - ASSESSMENT
Pt is a 89 yo F with PMH CHF, anemia, pHTN, CAD, CVA (no deficits), T2D, [place], COPD (2L NC PRN, qhs), PVD, HTN, HLD, and ?lung CA (pending RT) p/w dysuria x 1wk along with tremors and SOB. On arrival, meeting SIRS criteria with hypotension and hypoxia requiring 2L NC. EKG with sinus tachycardia, no ischemic changes. ER POCUS with diffuse B lines. Labs with leukocytosis, normocytic anemia, trop neg x2, BNP 1406, lactate neg, UA+ with UCx and BCx in lab, RVP neg, MRSA in lab. CTA chest neg PE but with multifocal PNA, pulmonary edema, and 2.8x2.7cm mass LLL interval inc compared to prior. MICU consulted for hypotension with c/f need for pressors, however, improved with IVF and midodrine; started on cefepime. ID + pulm consulted and PT consulted with recs pending.

## 2025-03-02 RX ORDER — INSULIN LISPRO 100 U/ML
4 INJECTION, SOLUTION INTRAVENOUS; SUBCUTANEOUS
Refills: 0 | Status: DISCONTINUED | OUTPATIENT
Start: 2025-03-02 | End: 2025-03-03

## 2025-03-02 RX ORDER — INSULIN GLARGINE-YFGN 100 [IU]/ML
10 INJECTION, SOLUTION SUBCUTANEOUS AT BEDTIME
Refills: 0 | Status: DISCONTINUED | OUTPATIENT
Start: 2025-03-02 | End: 2025-03-03

## 2025-03-02 RX ADMIN — INSULIN LISPRO 3: 100 INJECTION, SOLUTION INTRAVENOUS; SUBCUTANEOUS at 12:55

## 2025-03-02 RX ADMIN — LEVALBUTEROL HYDROCHLORIDE 1.25 MILLIGRAM(S): 1.25 SOLUTION RESPIRATORY (INHALATION) at 04:05

## 2025-03-02 RX ADMIN — Medication 1 DOSE(S): at 21:08

## 2025-03-02 RX ADMIN — METOPROLOL SUCCINATE 25 MILLIGRAM(S): 50 TABLET, EXTENDED RELEASE ORAL at 05:40

## 2025-03-02 RX ADMIN — INSULIN LISPRO 4 UNIT(S): 100 INJECTION, SOLUTION INTRAVENOUS; SUBCUTANEOUS at 18:25

## 2025-03-02 RX ADMIN — Medication 81 MILLIGRAM(S): at 12:05

## 2025-03-02 RX ADMIN — SILDENAFIL 20 MILLIGRAM(S): 50 TABLET, FILM COATED ORAL at 05:40

## 2025-03-02 RX ADMIN — ENOXAPARIN SODIUM 40 MILLIGRAM(S): 100 INJECTION SUBCUTANEOUS at 21:09

## 2025-03-02 RX ADMIN — LEVALBUTEROL HYDROCHLORIDE 1.25 MILLIGRAM(S): 1.25 SOLUTION RESPIRATORY (INHALATION) at 08:06

## 2025-03-02 RX ADMIN — FUROSEMIDE 40 MILLIGRAM(S): 10 INJECTION INTRAMUSCULAR; INTRAVENOUS at 05:40

## 2025-03-02 RX ADMIN — LEVALBUTEROL HYDROCHLORIDE 1.25 MILLIGRAM(S): 1.25 SOLUTION RESPIRATORY (INHALATION) at 15:16

## 2025-03-02 RX ADMIN — Medication 1 TABLET(S): at 12:05

## 2025-03-02 RX ADMIN — INSULIN GLARGINE-YFGN 10 UNIT(S): 100 INJECTION, SOLUTION SUBCUTANEOUS at 21:09

## 2025-03-02 RX ADMIN — Medication 1 DOSE(S): at 09:22

## 2025-03-02 RX ADMIN — INSULIN LISPRO 2: 100 INJECTION, SOLUTION INTRAVENOUS; SUBCUTANEOUS at 09:21

## 2025-03-02 RX ADMIN — INSULIN LISPRO 3 UNIT(S): 100 INJECTION, SOLUTION INTRAVENOUS; SUBCUTANEOUS at 09:22

## 2025-03-02 RX ADMIN — Medication 1 PUFF(S): at 17:08

## 2025-03-02 RX ADMIN — SILDENAFIL 20 MILLIGRAM(S): 50 TABLET, FILM COATED ORAL at 21:09

## 2025-03-02 RX ADMIN — INSULIN LISPRO 3 UNIT(S): 100 INJECTION, SOLUTION INTRAVENOUS; SUBCUTANEOUS at 12:56

## 2025-03-02 RX ADMIN — LEVALBUTEROL HYDROCHLORIDE 1.25 MILLIGRAM(S): 1.25 SOLUTION RESPIRATORY (INHALATION) at 21:52

## 2025-03-02 RX ADMIN — ATORVASTATIN CALCIUM 20 MILLIGRAM(S): 80 TABLET, FILM COATED ORAL at 21:09

## 2025-03-02 RX ADMIN — Medication 1 PUFF(S): at 05:40

## 2025-03-02 RX ADMIN — INSULIN LISPRO 2: 100 INJECTION, SOLUTION INTRAVENOUS; SUBCUTANEOUS at 18:25

## 2025-03-02 RX ADMIN — METOPROLOL SUCCINATE 25 MILLIGRAM(S): 50 TABLET, EXTENDED RELEASE ORAL at 17:09

## 2025-03-02 RX ADMIN — GABAPENTIN 400 MILLIGRAM(S): 400 CAPSULE ORAL at 21:09

## 2025-03-02 RX ADMIN — Medication 40 MILLIGRAM(S): at 05:40

## 2025-03-02 RX ADMIN — Medication 3 MILLIGRAM(S): at 21:09

## 2025-03-02 RX ADMIN — SILDENAFIL 20 MILLIGRAM(S): 50 TABLET, FILM COATED ORAL at 12:05

## 2025-03-02 RX ADMIN — Medication 1 PUFF(S): at 09:22

## 2025-03-02 NOTE — PROGRESS NOTE ADULT - SUBJECTIVE AND OBJECTIVE BOX
SUBJECTIVE/ OVERNIGHT EVENTS:  comfortable  feels well  xanax doesn't do much for tremors/anxiety  feel overall better  feels weak, wants rehab.  no cp, no sob, no n/v/d. no abdominal pain.  no headache, no dizziness.   sugars reviewed.  Endo adjusted insulin        --------------------------------------------------------------------------------------------  LABS:                        9.1    7.92  )-----------( 352      ( 01 Mar 2025 07:28 )             27.9     03-01    137  |  97[L]  |  47[H]  ----------------------------<  108[H]  4.3   |  28  |  1.10    Ca    9.2      01 Mar 2025 07:28  Phos  4.0     03-01  Mg     1.90     03-01        CAPILLARY BLOOD GLUCOSE      POCT Blood Glucose.: 267 mg/dL (02 Mar 2025 12:10)  POCT Blood Glucose.: 207 mg/dL (02 Mar 2025 09:09)  POCT Blood Glucose.: 240 mg/dL (02 Mar 2025 03:28)  POCT Blood Glucose.: 130 mg/dL (01 Mar 2025 21:56)  POCT Blood Glucose.: 203 mg/dL (01 Mar 2025 18:17)        Urinalysis Basic - ( 01 Mar 2025 07:28 )    Color: x / Appearance: x / SG: x / pH: x  Gluc: 108 mg/dL / Ketone: x  / Bili: x / Urobili: x   Blood: x / Protein: x / Nitrite: x   Leuk Esterase: x / RBC: x / WBC x   Sq Epi: x / Non Sq Epi: x / Bacteria: x        RADIOLOGY & ADDITIONAL TESTS:    Imaging Personally Reviewed:  [x] YES  [ ] NO    Consultant(s) Notes Reviewed:  [x] YES  [ ] NO    MEDICATIONS  (STANDING):  aspirin enteric coated 81 milliGRAM(s) Oral daily  atorvastatin 20 milliGRAM(s) Oral at bedtime  dextrose 5%. 1000 milliLiter(s) (50 mL/Hr) IV Continuous <Continuous>  dextrose 5%. 1000 milliLiter(s) (100 mL/Hr) IV Continuous <Continuous>  dextrose 50% Injectable 25 Gram(s) IV Push once  dextrose 50% Injectable 12.5 Gram(s) IV Push once  dextrose 50% Injectable 25 Gram(s) IV Push once  enoxaparin Injectable 40 milliGRAM(s) SubCutaneous every 24 hours  fluticasone propionate/ salmeterol 250-50 MICROgram(s) Diskus 1 Dose(s) Inhalation two times a day  furosemide    Tablet 40 milliGRAM(s) Oral daily  gabapentin 400 milliGRAM(s) Oral at bedtime  glucagon  Injectable 1 milliGRAM(s) IntraMuscular once  insulin glargine Injectable (LANTUS) 10 Unit(s) SubCutaneous at bedtime  insulin lispro (ADMELOG) corrective regimen sliding scale   SubCutaneous three times a day before meals  insulin lispro (ADMELOG) corrective regimen sliding scale   SubCutaneous at bedtime  insulin lispro Injectable (ADMELOG) 4 Unit(s) SubCutaneous three times a day before meals  ipratropium 17 MICROgram(s) HFA Inhaler 1 Puff(s) Inhalation every 6 hours  levalbuterol Inhalation 1.25 milliGRAM(s) Inhalation every 6 hours  metoprolol tartrate 25 milliGRAM(s) Oral two times a day  multivitamin 1 Tablet(s) Oral daily  pantoprazole    Tablet 40 milliGRAM(s) Oral before breakfast  sildenafil (REVATIO) 20 milliGRAM(s) Oral three times a day    MEDICATIONS  (PRN):  acetaminophen     Tablet .. 650 milliGRAM(s) Oral every 6 hours PRN Temp greater or equal to 38C (100.4F), Mild Pain (1 - 3)  dextrose Oral Gel 15 Gram(s) Oral once PRN Blood Glucose LESS THAN 70 milliGRAM(s)/deciliter  guaiFENesin Oral Liquid (Sugar-Free) 100 milliGRAM(s) Oral every 6 hours PRN Cough  melatonin 3 milliGRAM(s) Oral at bedtime PRN Insomnia  ondansetron Injectable 4 milliGRAM(s) IV Push every 8 hours PRN Nausea and/or Vomiting  polyethylene glycol 3350 17 Gram(s) Oral two times a day PRN Constipation  senna 2 Tablet(s) Oral at bedtime PRN Constipation      Care Discussed with Consultants/Other Providers [x] YES  [ ] NO    Vital Signs Last 24 Hrs  T(C): 36.4 (02 Mar 2025 11:04), Max: 36.7 (01 Mar 2025 19:11)  T(F): 97.6 (02 Mar 2025 11:04), Max: 98.1 (01 Mar 2025 19:11)  HR: 104 (02 Mar 2025 11:04) (95 - 104)  BP: 101/50 (02 Mar 2025 11:04) (101/50 - 113/55)  BP(mean): --  RR: 18 (02 Mar 2025 11:04) (18 - 18)  SpO2: 100% (02 Mar 2025 11:04) (98% - 100%)    Parameters below as of 02 Mar 2025 11:04  Patient On (Oxygen Delivery Method): nasal cannula  O2 Flow (L/min): 2    I&O's Summary    01 Mar 2025 07:01  -  02 Mar 2025 07:00  --------------------------------------------------------  IN: 120 mL / OUT: 1150 mL / NET: -1030 mL    02 Mar 2025 07:01  -  02 Mar 2025 13:26  --------------------------------------------------------  IN: 0 mL / OUT: 650 mL / NET: -650 mL          PHYSICAL EXAM:  GENERAL: NAD, thin-elderly, comfortable now on hi-flow --> 6L --> 4L --> 2-3L  HEAD:  Atraumatic, Normocephalic  EYES: EOMI, PERRLA, conjunctiva and sclera clear  NECK: Supple, No JVD  CHEST/LUNG: mild decrease breath sounds bilaterally; No wheeze   HEART: Regular rate and rhythm; No murmurs, rubs, or gallops  ABDOMEN: Soft, Nontender, Nondistended; Bowel sounds present  Neuro: AAOx3, no focal weakness   EXTREMITIES:  2+ Peripheral Pulses, No clubbing, cyanosis, or edema  SKIN: No rashes or lesions

## 2025-03-02 NOTE — PROGRESS NOTE ADULT - ASSESSMENT
ECHO 10/7/24: nl LV sys fx EF 60% . Mild mitral valve stenosis. Mild to moderate tricuspid regurgitation. mod AS   ECHO  2/22/25 nl LV sys fx  ef 68%, moderate (grade 2) left ventricular diastolic dysfunction,mild pulmonary hypertension. The aortic valve appears trileaflet with reduced systolic excursion. There is calcification of the aortic valve leaflets. The aortic valve acceleration time is 90 msec. There is no evidence of aortic regurgitation.    a/p    89 yo F with PMH CHF, valvular disease, anemia, pHTN, CAD, CVA (no deficits), T2D, [place], COPD (2L NC PRN, qhs), PVD, HTN, HLD, and ?lung CA (pending RT) p/w dysuria x 1wk along with tremors and SOB + pna    #PNA  -Meeting sepsis criteria w/ fever, tachycardia, leukocytosis  -CTa chest with no PE, Interval increase in groundglass opacities and subpleural consolidations  in bilateral lungs, interlobular septal thickening in  the upper lobes, which can be suggestive of interstitial edema; 3.1 cm mass like consolidation in left lower lobe, Mediastinal and hilar lymphadenopathy which can be reactive or due to  metastasis.  Subpleural reticulations, Honeycombing, and peripheral cystic changes in  bilateral lungs, suggestive of interstitial lung fibrosis.  -sp abx per id/med  -on diuretics      #hypotension   -resolved  -micu eval noted- hypotensive to 80s/40s, given 2x 500cc bolus and Midodrine 10mg w/ improvement in BP to MAP >70  -Pocus w/ collapsed IVC suggestive of intravascular depletion, but w/ evidence of reduced RV and LV function   -bcx ngtd  -not a candidate for MICU per team   -HS trop neg. no ischemic changes to ecg   -ECHO  2/22/25 nl LV sys fx  ef 68%, moderate (grade 2) left ventricular diastolic dysfunction,mild pulmonary hypertension.    #CAD s/p PCI  -stable  -cont asa, statin, BB    #HFpEF  -ECHO  2/22/25 nl LV sys fx  ef 68%, moderate (grade 2) left ventricular diastolic dysfunction,mild pulmonary hypertension.  -c/w lasix po   -metoprolol dose increased for tachycardia     #Pulmonary HTN.   -c/w home sildenafil 20mg TID.  -echo with mild pulm htn   -on diuretics     #Lung CA  -med/ pulm fu   -pending RT initiation as outpt     #UTI   -management per med /ID    dvt ppx

## 2025-03-02 NOTE — PROGRESS NOTE ADULT - NSPROGADDITIONALINFOA_GEN_ALL_CORE
d/w the daughter Vanessa HCP. Pt lives with her. Baseline, she uses walker to walk, though not always using.   Recently seen by Dr. Shaw Mitchell (rad onc) for measurements. Hoping that pt will be able to proceed with radiation treatment post discharge (although concern for radiation induced pneumonitis)     GOC discussed with pt and the daughter. Per the pt and daughter, if condition is irreversible then no CPR/ventilator. otherwise okay with CPR/ ventilator.   overall prognosis is poor given lung findings of fibrosis.  Palliative team eval appreciated. pt signed DNR/DNI.     hi-flow wean to NC, TTE reviewed. stage 2 diastolic dysfunction.  now back on home PO Lasix.   out of bed to chair if tolerates. physical therapy: Home PT.   completed abx 2/25/25 per ID.   DC Planning. PT re-eval: Home PT.    overall prognosis is poor given extensive lung finding of ILD/ lung Ca, etc  recurrent hospital admissions are inevitable. pt and the daughter not ready for hospice yet.     d/w the daughter Vanessa again, now she is asking for rehab placement given her weakness and comorbidities.  PT Re-eval: rehab.  dc planning to rehab.     d/w QUAN Bautista.     - Dr. RACHELL Martinez ([facility])  - [phone]

## 2025-03-02 NOTE — PROGRESS NOTE ADULT - ASSESSMENT
Pt is a 89 yo F with PMH CHF, anemia, pHTN, CAD, CVA (no deficits), T2D, [place], COPD (2L NC PRN, qhs), PVD, HTN, HLD, and ?lung CA (pending RT) p/w dysuria x 1wk along with tremors and SOB. On arrival, meeting SIRS criteria with hypotension and hypoxia requiring 2L NC. EKG with sinus tachycardia, no ischemic changes. ER POCUS with diffuse B lines. Labs with leukocytosis, normocytic anemia, trop neg x2, BNP 1406, lactate neg, UA+ with UCx and BCx in lab, RVP neg, MRSA in lab. CTA chest neg PE but with multifocal PNA, pulmonary edema, and 2.8x2.7cm mass LLL interval inc compared to prior. MICU consulted for hypotension with c/f need for pressors, however, improved with IVF and midodrine; started on cefepime. ID + pulm consulted and PT consulted with recs pending.

## 2025-03-02 NOTE — PROGRESS NOTE ADULT - SUBJECTIVE AND OBJECTIVE BOX
Date of Service: 03-02-25 @ 11:55    Patient is a 89y old  Female who presents with a chief complaint of UTI, PNA (02 Mar 2025 07:22)      Any change in ROS: seems OK:  no sob;  on 2 l     MEDICATIONS  (STANDING):  aspirin enteric coated 81 milliGRAM(s) Oral daily  atorvastatin 20 milliGRAM(s) Oral at bedtime  dextrose 5%. 1000 milliLiter(s) (50 mL/Hr) IV Continuous <Continuous>  dextrose 5%. 1000 milliLiter(s) (100 mL/Hr) IV Continuous <Continuous>  dextrose 50% Injectable 25 Gram(s) IV Push once  dextrose 50% Injectable 12.5 Gram(s) IV Push once  dextrose 50% Injectable 25 Gram(s) IV Push once  enoxaparin Injectable 40 milliGRAM(s) SubCutaneous every 24 hours  fluticasone propionate/ salmeterol 250-50 MICROgram(s) Diskus 1 Dose(s) Inhalation two times a day  furosemide    Tablet 40 milliGRAM(s) Oral daily  gabapentin 400 milliGRAM(s) Oral at bedtime  glucagon  Injectable 1 milliGRAM(s) IntraMuscular once  insulin glargine Injectable (LANTUS) 9 Unit(s) SubCutaneous at bedtime  insulin lispro (ADMELOG) corrective regimen sliding scale   SubCutaneous three times a day before meals  insulin lispro (ADMELOG) corrective regimen sliding scale   SubCutaneous at bedtime  insulin lispro Injectable (ADMELOG) 3 Unit(s) SubCutaneous three times a day before meals  ipratropium 17 MICROgram(s) HFA Inhaler 1 Puff(s) Inhalation every 6 hours  levalbuterol Inhalation 1.25 milliGRAM(s) Inhalation every 6 hours  metoprolol tartrate 25 milliGRAM(s) Oral two times a day  multivitamin 1 Tablet(s) Oral daily  pantoprazole    Tablet 40 milliGRAM(s) Oral before breakfast  sildenafil (REVATIO) 20 milliGRAM(s) Oral three times a day    MEDICATIONS  (PRN):  acetaminophen     Tablet .. 650 milliGRAM(s) Oral every 6 hours PRN Temp greater or equal to 38C (100.4F), Mild Pain (1 - 3)  dextrose Oral Gel 15 Gram(s) Oral once PRN Blood Glucose LESS THAN 70 milliGRAM(s)/deciliter  guaiFENesin Oral Liquid (Sugar-Free) 100 milliGRAM(s) Oral every 6 hours PRN Cough  melatonin 3 milliGRAM(s) Oral at bedtime PRN Insomnia  ondansetron Injectable 4 milliGRAM(s) IV Push every 8 hours PRN Nausea and/or Vomiting  polyethylene glycol 3350 17 Gram(s) Oral two times a day PRN Constipation  senna 2 Tablet(s) Oral at bedtime PRN Constipation    Vital Signs Last 24 Hrs  T(C): 36.4 (02 Mar 2025 11:04), Max: 36.7 (01 Mar 2025 19:11)  T(F): 97.6 (02 Mar 2025 11:04), Max: 98.1 (01 Mar 2025 19:11)  HR: 104 (02 Mar 2025 11:04) (95 - 104)  BP: 101/50 (02 Mar 2025 11:04) (101/50 - 113/55)  BP(mean): --  RR: 18 (02 Mar 2025 11:04) (18 - 18)  SpO2: 100% (02 Mar 2025 11:04) (98% - 100%)    Parameters below as of 02 Mar 2025 11:04  Patient On (Oxygen Delivery Method): nasal cannula  O2 Flow (L/min): 2      I&O's Summary    01 Mar 2025 07:01  -  02 Mar 2025 07:00  --------------------------------------------------------  IN: 120 mL / OUT: 1150 mL / NET: -1030 mL          Physical Exam:   GENERAL: NAD, well-groomed, well-developed  HEENT: MARCIO/   Atraumatic, Normocephalic  ENMT: No tonsillar erythema, exudates, or enlargement; Moist mucous membranes, Good dentition, No lesions  NECK: Supple, No JVD, Normal thyroid  CHEST/LUNG: Clear to auscultaion-  CVS: Regular rate and rhythm; No murmurs, rubs, or gallops  GI: : Soft, Nontender, Nondistended; Bowel sounds present  NERVOUS SYSTEM:  Alert & Oriented X3  EXTREMITIES: - edema  LYMPH: No lymphadenopathy noted  SKIN: No rashes or lesions  ENDOCRINOLOGY: No Thyromegaly  PSYCH: Appropriate    Labs:  31, 29, 32                            9.1    7.92  )-----------( 352      ( 01 Mar 2025 07:28 )             27.9                         11.2   9.73  )-----------( 431      ( 27 Feb 2025 07:12 )             35.4     03-01    137  |  97[L]  |  47[H]  ----------------------------<  108[H]  4.3   |  28  |  1.10  02-27    137  |  94[L]  |  56[H]  ----------------------------<  149[H]  4.5   |  28  |  1.15  02-27    134[L]  |  94[L]  |  55[H]  ----------------------------<  171[H]  6.4[HH]   |  23  |  1.13    Ca    9.2      01 Mar 2025 07:28  Phos  4.0     03-01  Mg     1.90     03-01      CAPILLARY BLOOD GLUCOSE      POCT Blood Glucose.: 207 mg/dL (02 Mar 2025 09:09)  POCT Blood Glucose.: 240 mg/dL (02 Mar 2025 03:28)  POCT Blood Glucose.: 130 mg/dL (01 Mar 2025 21:56)  POCT Blood Glucose.: 203 mg/dL (01 Mar 2025 18:17)  POCT Blood Glucose.: 181 mg/dL (01 Mar 2025 12:13)          Urinalysis Basic - ( 01 Mar 2025 07:28 )    Color: x / Appearance: x / SG: x / pH: x  Gluc: 108 mg/dL / Ketone: x  / Bili: x / Urobili: x   Blood: x / Protein: x / Nitrite: x   Leuk Esterase: x / RBC: x / WBC x   Sq Epi: x / Non Sq Epi: x / Bacteria: x    rad< from: Xray Chest 1 View- PORTABLE-Urgent (Xray Chest 1 View- PORTABLE-Urgent .) (02.21.25 @ 11:48) >  FINDINGS:    Chest x-ray 2/20/2025 at 11:06 PM:  Surgical clips in the right axilla.  The heart is normal in size.  Diffuse bilateral patchy and hazy opacities, unchanged.  No pneumothorax.  Trace bilateral pleural effusions and associated atelectasis.  No acute bony abnormality.    Chest x-ray 2/21/2025 at 11:28 AM:  Progression of right base atelectasis or infiltrate. The left lung is   clearer.    IMPRESSION:  Changing infiltrates in latest image.    --- End of Report ---          DEANNA HERNANDEZ MD; Resident Radiologist  This document has been electronically signed.  LJ IRVIN MD; Attending Interventional Radiologist  This document has been electronically signed. Feb 21 2025  3:04PM    < end of copied text >          RECENT CULTURES:        RESPIRATORY CULTURES:          Studies  Chest X-RAY  CT SCAN Chest   Venous Dopplers: LE:   CT Abdomen  Others

## 2025-03-02 NOTE — PROGRESS NOTE ADULT - TIME BILLING
Agree with above ACP note.  cv stable  cont current tx  cont diuretics as ordered  med/pulmo f/u
Agree with above ACP note.  events noted  cont iv lasix   med/pulmo f/u
Agree with above ACP note.  events noted  cont lasix PO  med/pulmo f/u
Patient seen and examined.  Agree with above ACP note.  cv stable  cont current tx  cont po duiretics  med/pulmo f/u
Agree with above ACP note.  cv stable  cont current tx  cont diuretics as ordered  med/pulmo f/u
Agree with above ACP note.  cv stable  cont current tx  cont diuretics as ordered  med/pulmo f/u
Agree with above ACP note.  events noted  abx per med/pulmonary  lasix daily   goc per family

## 2025-03-02 NOTE — PROGRESS NOTE ADULT - ASSESSMENT
Pt is a 89 yo F with PMH CHF, anemia, pHTN, CAD, CVA (no deficits), T2D, [place], COPD (2L NC PRN, qhs), PVD, HTN, HLD, and ?lung CA (pending RT) p/w dysuria x 1wk along with tremors and SOB.   On arrival, T 102.5, , /78 --- SBP 80s, RR 20 O2sat 88% RA -- 2L NC. EKG with sinus tachycardia, no ischemic changes. ER POCUS with diffuse B lines. Labs with leukocytosis, normocytic anemia, trop neg x2, BNP 1406, lactate neg, UA+ with UCx and BCx in lab, RVP neg, MRSA in lab. CTA chest neg PE but with multifocal PNA, pulmonary edema, and 2.8x2.7cm mass LLL interval inc compared to prior. MICU consulted for hypotension with c/f need for pressors, however, improved with IVF and midodrine. Pt given tylenol, cefepime, and vancomycin. ID consulted and PT consulted with recs pending. (19 Feb 2025 10:03)  she is apparently sob to me:  but she says her breathing is OK:  she  is on home oxygen :  recently diagnosed with lung cancer:   now pulm called for pneumonia        Sepsis/ Pneumonia/ COPD / lung cancer:   pneumonia: on antibiotics   CTA chest neg PE but with multifocal PNA and pulmonary edema   cont antibtiocs : seen by id   urine legionella/strep  lEFT LOWER OPACITY:  PER DAUGHTER :  SHE NEVER GOT ANY CHEMO :  SHE WAS SUPPOSED TO GET RADIATION THERAPY,  BUT SHE ENDED UP HERE:     She has had multiplek admission in last few months and hence could not any surgery for the mass:  now it seems to have increased   she seems to be sob:  and has copd:  will add short course of steroids   her vbg is OK:  but she looks sob:  she may need bipap , if her rr worsens    2/20: she looks better today  : on 3 L o f oxygen : she does use oxygen at home:  2 L of oxygen :  ct scan chest showed: No pulmonary embolism. Interval increase in groundglass opacities and subpleural consolidations in bilateral lungs, likely infectious. Interlobularseptal thickening in the upper lobes, which can be suggestive of interstitial edema 3.1 cm masslike consolidation in left lower lobe. Additional smaller nodules in bilateral lungs. Mediastinal and hilar lymphadenopathy which can be reactive or due to  metastasis. Subpleural reticulations, Honeycombing, and peripheral cystic changes in bilateral lungs, suggestive of interstitial lung fibrosis.    2/21: spoke to daughter again : she had mapping ct prior to coming to hospital  : she was supposed to be getting radiation therapy;   no chemo ;   cont iv steroids;   she had no pe  on initial admission she had no pe:  \  she has significant emphysema:    VBG is good     would cont antibiotics   ? hemonc consult??     2/21: she was seen by palliative care;   2/22:  seems pretty good:  has cough + especially in night time:   add anti tussives:  hycodan at night time  \  on cefepime"   2/24:  she looks pretty good:  antibiotics till tomorrow:   on 3 L of ox gen  satting at 100%  VBG today is reasonable!  change to po steroids     2/25: she looks and feels better;   denies Sob to me at this time:   no wheezing:  has poor air entry  ;   cont current x:     2/26: seems to be doing  ok : still has tremors  in hands: ? neuro consult:   still feels SOB off and on   no wheezing ;    2/27:   seems to be doing  ok :no sob:  tremors; +  no cough ; no phlegm      2/28; seems stable:  on 2 l of oxygen :  no wheezing  cont BD; ? dc planning     3/1:  seems OK:  no sob:  no cough:   no sob:  no wheezing/   on room air     3/2: stable:   on 2 Lof oxygen ;  needs rehab per daughter;   off steroids      Acute UTI.   as above.    Acute on chronic heart failure.    - known hx CHF   - 10/2024 TTE EF 60%, mild MV stenosis, mod AS, mild-mod TR  - POCUS in ER c/f reduced EF  - CXR with pulmonary vascular congestion, CTA chest with pulmonary edema   -cont diuretics  defer to card s    2/20: cont current rx:    on 3 L of oxygen    2/21: today hero2 requirement went up : do chest xray and stat high flow:  cont BD and steroids   2/22: on 40% high flow;  but I think can be changed to nasal cannula:  await echo results   2/25: on iv lasix  2/26: on oral  lasix    2/27: cont lasix   2/28 remains on lasix    3/1: on lasix   3/2: cont lasix      Pulmonary mass.    CTA chest with interval increase in LLL mass from prior, 2.8x2.7cm  - pending RT initiation outpt?\  - as above:  has not getting any treatment since the diagnosis many months ago   2/22: defer to  onc:   ct scan looks not that good   would struggles to increase lasix   2/24: on iv Lasix   2/25: iv lasix    2/26: changed to po lasix   2/27: cont current therapy:    2/28: pt will eventually get radiation as an outpt    3/1: seems to be doing  ok  no sob;  on 2 L of oxygen     Pulmonary HTN.    sildenafil 20mg TID.  2/24: new echo ; mild pulm htn   3/1:  on sildenafil   3/2; cont sildenafil     HTN (hypertension).   blood pressure is soft:  currently off anti hypertensives:   controlled    Type 2 diabetes mellitus.   monitor and control:    gareth acp

## 2025-03-02 NOTE — PROGRESS NOTE ADULT - SUBJECTIVE AND OBJECTIVE BOX
CARDIOLOGY FOLLOW UP - Dr. Valenzuela    Patient Name: FEDERICO DOS SANTOS    DATE OF SERVICE: 03-02-25 @ 07:23    Patient seen and examined    CC no CP or SOB      REVIEW OF SYSTEMS:  CONSTITUTIONAL: No fever, weight loss, or fatigue  RESPIRATORY: No cough, wheezing, chills or hemoptysis; No Shortness of Breath  CARDIOVASCULAR: No chest pain, palpitations, passing out, dizziness  GASTROINTESTINAL: No abdominal or epigastric pain. No nausea, vomiting, or hematemesis; No diarrhea or constipation. No melena or hematochezia.      PHYSICAL EXAM:  T(C): 36.6 (03-02-25 @ 04:50), Max: 36.7 (03-01-25 @ 19:11)  HR: 102 (03-02-25 @ 04:50) (95 - 106)  BP: 110/52 (03-02-25 @ 04:50) (101/52 - 113/55)  RR: 18 (03-02-25 @ 04:50) (18 - 19)  SpO2: 98% (03-02-25 @ 04:50) (98% - 100%)  Wt(kg): --  I&O's Summary    01 Mar 2025 07:01  -  02 Mar 2025 07:00  --------------------------------------------------------  IN: 120 mL / OUT: 1150 mL / NET: -1030 mL        Appearance: Normal	  Cardiovascular: Normal S1 S2,RRR, No JVD, No murmurs  Respiratory: Lungs clear to auscultation	  Gastrointestinal:  Soft, Non-tender, + BS	  Extremities: Normal range of motion, No clubbing, cyanosis or edema      Home Medications:  Advair Diskus 250 mcg-50 mcg inhalation powder: 1 puff(s) inhaled 2 times a day (13 Dec 2024 23:00)  aspirin 81 mg oral delayed release tablet: 1 tab(s) orally once a day (at bedtime) (13 Dec 2024 23:00)  atorvastatin 20 mg oral tablet: 1 tab(s) orally once a day (at bedtime) (13 Dec 2024 23:00)  furosemide 20 mg oral tablet: 2 tab(s) orally once a day (13 Dec 2024 22:55)  gabapentin 400 mg oral capsule: 1 cap(s) orally once a day (at bedtime) (13 Dec 2024 23:00)  ipratropium 42 mcg/inh (0.06%) nasal spray: 2 spray(s) intranasally 2 times a day (13 Dec 2024 23:00)  Metoprolol Tartrate 25 mg oral tablet: 0.5 tab(s) orally 2 times a day (27 Feb 2025 12:28)  Multiple Vitamins oral tablet: 1 tab(s) orally once a day (13 Dec 2024 23:00)  omeprazole 40 mg oral delayed release capsule: 1 cap(s) orally once a day (13 Dec 2024 23:00)  polyethylene glycol 3350 oral powder for reconstitution: 17 gram(s) orally 2 times a day (18 Dec 2024 13:08)  PreserVision AREDS 2 oral capsule: 1 cap(s) orally 2 times a day (13 Dec 2024 23:00)  senna leaf extract oral tablet: 2 tab(s) orally once a day (at bedtime) (18 Dec 2024 13:08)  sildenafil 20 mg oral tablet: 1 tab(s) orally 3 times a day (13 Dec 2024 22:55)      MEDICATIONS  (STANDING):  aspirin enteric coated 81 milliGRAM(s) Oral daily  atorvastatin 20 milliGRAM(s) Oral at bedtime  dextrose 5%. 1000 milliLiter(s) (50 mL/Hr) IV Continuous <Continuous>  dextrose 5%. 1000 milliLiter(s) (100 mL/Hr) IV Continuous <Continuous>  dextrose 50% Injectable 25 Gram(s) IV Push once  dextrose 50% Injectable 12.5 Gram(s) IV Push once  dextrose 50% Injectable 25 Gram(s) IV Push once  enoxaparin Injectable 40 milliGRAM(s) SubCutaneous every 24 hours  fluticasone propionate/ salmeterol 250-50 MICROgram(s) Diskus 1 Dose(s) Inhalation two times a day  furosemide    Tablet 40 milliGRAM(s) Oral daily  gabapentin 400 milliGRAM(s) Oral at bedtime  glucagon  Injectable 1 milliGRAM(s) IntraMuscular once  insulin glargine Injectable (LANTUS) 9 Unit(s) SubCutaneous at bedtime  insulin lispro (ADMELOG) corrective regimen sliding scale   SubCutaneous three times a day before meals  insulin lispro (ADMELOG) corrective regimen sliding scale   SubCutaneous at bedtime  insulin lispro Injectable (ADMELOG) 3 Unit(s) SubCutaneous three times a day before meals  ipratropium 17 MICROgram(s) HFA Inhaler 1 Puff(s) Inhalation every 6 hours  levalbuterol Inhalation 1.25 milliGRAM(s) Inhalation every 6 hours  metoprolol tartrate 25 milliGRAM(s) Oral two times a day  multivitamin 1 Tablet(s) Oral daily  pantoprazole    Tablet 40 milliGRAM(s) Oral before breakfast  sildenafil (REVATIO) 20 milliGRAM(s) Oral three times a day      TELEMETRY: 	  NSR 80s-90s  ECG:  	  RADIOLOGY:   DIAGNOSTIC TESTING:  [ ] Echocardiogram:  [ ]  Catheterization:  [ ] Stress Test:    OTHER: 	    LABS:	 	                            9.1    7.92  )-----------( 352      ( 01 Mar 2025 07:28 )             27.9     03-01    137  |  97[L]  |  47[H]  ----------------------------<  108[H]  4.3   |  28  |  1.10    Ca    9.2      01 Mar 2025 07:28  Phos  4.0     03-01  Mg     1.90     03-01        Lipid Profile:   Hgb A1C:      BNP:     Creatinine: 1.10 mg/dL (03-01-25 @ 07:28)  Creatinine: 1.15 mg/dL (02-27-25 @ 09:25)  Creatinine: 1.13 mg/dL (02-27-25 @ 07:12)      Hemoglobin: 9.1 g/dL (03-01-25 @ 07:28)  Hemoglobin: 11.2 g/dL (02-27-25 @ 07:12)

## 2025-03-02 NOTE — CHART NOTE - NSCHARTNOTEFT_GEN_A_CORE
Endocrine follow up.   See last progress note for complete plan of care including discharge planning.   Chart reviewed.       Type 2 Diabetes Mellitus    - FS goal 100-180 mg/dl   - FS above goal today   - Increase Lantus to 10 units at bedtime  - Increase Admelog to 4 units TID AC. Hold if not eating/NPO.   - continue low Admelog correctional scale TID AC  - continue separate low Admelog correctional scale at HS  - FS TID AC & HS ---> q6 if NPO   - hypoglycemia protocol PRN   - consistent carbohydrate diet        Kirsten Vasquez AGAPIOTRP-BC  Nurse Practitioner  Division of Endocrinology & Diabetes  Available via Microsoft Teams             CAPILLARY BLOOD GLUCOSE      POCT Blood Glucose.: 267 mg/dL (02 Mar 2025 12:10)  POCT Blood Glucose.: 207 mg/dL (02 Mar 2025 09:09)  POCT Blood Glucose.: 240 mg/dL (02 Mar 2025 03:28)  POCT Blood Glucose.: 130 mg/dL (01 Mar 2025 21:56)  POCT Blood Glucose.: 203 mg/dL (01 Mar 2025 18:17)      03-01    137  |  97[L]  |  47[H]  ----------------------------<  108[H]  4.3   |  28  |  1.10    Ca    9.2      01 Mar 2025 07:28  Phos  4.0     03-01  Mg     1.90     03-01        MEDICATIONS  (STANDING):  aspirin enteric coated 81 milliGRAM(s) Oral daily  atorvastatin 20 milliGRAM(s) Oral at bedtime  dextrose 5%. 1000 milliLiter(s) (50 mL/Hr) IV Continuous <Continuous>  dextrose 5%. 1000 milliLiter(s) (100 mL/Hr) IV Continuous <Continuous>  dextrose 50% Injectable 25 Gram(s) IV Push once  dextrose 50% Injectable 12.5 Gram(s) IV Push once  dextrose 50% Injectable 25 Gram(s) IV Push once  enoxaparin Injectable 40 milliGRAM(s) SubCutaneous every 24 hours  fluticasone propionate/ salmeterol 250-50 MICROgram(s) Diskus 1 Dose(s) Inhalation two times a day  furosemide    Tablet 40 milliGRAM(s) Oral daily  gabapentin 400 milliGRAM(s) Oral at bedtime  glucagon  Injectable 1 milliGRAM(s) IntraMuscular once  insulin glargine Injectable (LANTUS) 10 Unit(s) SubCutaneous at bedtime  insulin lispro (ADMELOG) corrective regimen sliding scale   SubCutaneous three times a day before meals  insulin lispro (ADMELOG) corrective regimen sliding scale   SubCutaneous at bedtime  insulin lispro Injectable (ADMELOG) 4 Unit(s) SubCutaneous three times a day before meals  ipratropium 17 MICROgram(s) HFA Inhaler 1 Puff(s) Inhalation every 6 hours  levalbuterol Inhalation 1.25 milliGRAM(s) Inhalation every 6 hours  metoprolol tartrate 25 milliGRAM(s) Oral two times a day  multivitamin 1 Tablet(s) Oral daily  pantoprazole    Tablet 40 milliGRAM(s) Oral before breakfast  sildenafil (REVATIO) 20 milliGRAM(s) Oral three times a day      Diet, DASH/TLC:   Sodium & Cholesterol Restricted  Consistent Carbohydrate {Evening Snack} (CSTCHOSN) (02-20-25 @ 10:22)      A1C with Estimated Average Glucose Result: 9.7 % (02-19-25 @ 09:14)  A1C with Estimated Average Glucose Result: 8.0 % (12-13-24 @ 14:35)  A1C with Estimated Average Glucose Result: 6.6 % (09-05-24 @ 13:42)  A1C with Estimated Average Glucose Result: 9.2 % (04-20-24 @ 08:50)

## 2025-03-03 ENCOUNTER — APPOINTMENT (OUTPATIENT)
Dept: PULMONOLOGY | Facility: CLINIC | Age: 89
End: 2025-03-03

## 2025-03-03 VITALS — HEART RATE: 97 BPM

## 2025-03-03 PROCEDURE — 99232 SBSQ HOSP IP/OBS MODERATE 35: CPT

## 2025-03-03 RX ORDER — OXYCODONE HYDROCHLORIDE 30 MG/1
2.5 TABLET ORAL EVERY 6 HOURS
Refills: 0 | Status: DISCONTINUED | OUTPATIENT
Start: 2025-03-03 | End: 2025-03-03

## 2025-03-03 RX ORDER — INSULIN LISPRO 100 U/ML
4 INJECTION, SOLUTION INTRAVENOUS; SUBCUTANEOUS
Qty: 0 | Refills: 0 | DISCHARGE
Start: 2025-03-03

## 2025-03-03 RX ORDER — OXYCODONE HYDROCHLORIDE 30 MG/1
0.5 TABLET ORAL
Qty: 8 | Refills: 0
Start: 2025-03-03 | End: 2025-03-06

## 2025-03-03 RX ORDER — INSULIN LISPRO 100 U/ML
1 INJECTION, SOLUTION INTRAVENOUS; SUBCUTANEOUS
Qty: 0 | Refills: 0 | DISCHARGE
Start: 2025-03-03

## 2025-03-03 RX ORDER — METOPROLOL SUCCINATE 50 MG/1
1 TABLET, EXTENDED RELEASE ORAL
Qty: 0 | Refills: 0 | DISCHARGE
Start: 2025-03-03

## 2025-03-03 RX ADMIN — Medication 1 TABLET(S): at 11:36

## 2025-03-03 RX ADMIN — FUROSEMIDE 40 MILLIGRAM(S): 10 INJECTION INTRAMUSCULAR; INTRAVENOUS at 11:35

## 2025-03-03 RX ADMIN — INSULIN LISPRO 4 UNIT(S): 100 INJECTION, SOLUTION INTRAVENOUS; SUBCUTANEOUS at 09:07

## 2025-03-03 RX ADMIN — INSULIN LISPRO 4 UNIT(S): 100 INJECTION, SOLUTION INTRAVENOUS; SUBCUTANEOUS at 12:32

## 2025-03-03 RX ADMIN — LEVALBUTEROL HYDROCHLORIDE 1.25 MILLIGRAM(S): 1.25 SOLUTION RESPIRATORY (INHALATION) at 10:25

## 2025-03-03 RX ADMIN — LEVALBUTEROL HYDROCHLORIDE 1.25 MILLIGRAM(S): 1.25 SOLUTION RESPIRATORY (INHALATION) at 03:27

## 2025-03-03 RX ADMIN — METOPROLOL SUCCINATE 25 MILLIGRAM(S): 50 TABLET, EXTENDED RELEASE ORAL at 11:35

## 2025-03-03 RX ADMIN — DEXTROMETHORPHAN HBR, GUAIFENESIN 100 MILLIGRAM(S): 200 LIQUID ORAL at 05:13

## 2025-03-03 RX ADMIN — Medication 81 MILLIGRAM(S): at 11:36

## 2025-03-03 RX ADMIN — Medication 40 MILLIGRAM(S): at 05:13

## 2025-03-03 RX ADMIN — SILDENAFIL 20 MILLIGRAM(S): 50 TABLET, FILM COATED ORAL at 11:36

## 2025-03-03 RX ADMIN — Medication 1 PUFF(S): at 09:06

## 2025-03-03 RX ADMIN — INSULIN LISPRO 2: 100 INJECTION, SOLUTION INTRAVENOUS; SUBCUTANEOUS at 12:31

## 2025-03-03 RX ADMIN — INSULIN LISPRO 2: 100 INJECTION, SOLUTION INTRAVENOUS; SUBCUTANEOUS at 09:06

## 2025-03-03 RX ADMIN — Medication 1 DOSE(S): at 09:05

## 2025-03-03 RX ADMIN — SILDENAFIL 20 MILLIGRAM(S): 50 TABLET, FILM COATED ORAL at 05:13

## 2025-03-03 RX ADMIN — Medication 650 MILLIGRAM(S): at 14:56

## 2025-03-03 NOTE — PROGRESS NOTE ADULT - SUBJECTIVE AND OBJECTIVE BOX
Date of Service: 03-03-25 @ 13:17    Patient is a 89y old  Female who presents with a chief complaint of UTI, PNA (03 Mar 2025 12:43)      Any change in ROS: seems pretty good:  on 2 L of oxygen :  no sob:  no cough ;       MEDICATIONS  (STANDING):  aspirin enteric coated 81 milliGRAM(s) Oral daily  atorvastatin 20 milliGRAM(s) Oral at bedtime  dextrose 5%. 1000 milliLiter(s) (50 mL/Hr) IV Continuous <Continuous>  dextrose 5%. 1000 milliLiter(s) (100 mL/Hr) IV Continuous <Continuous>  dextrose 50% Injectable 25 Gram(s) IV Push once  dextrose 50% Injectable 12.5 Gram(s) IV Push once  dextrose 50% Injectable 25 Gram(s) IV Push once  enoxaparin Injectable 40 milliGRAM(s) SubCutaneous every 24 hours  fluticasone propionate/ salmeterol 250-50 MICROgram(s) Diskus 1 Dose(s) Inhalation two times a day  furosemide    Tablet 40 milliGRAM(s) Oral daily  gabapentin 400 milliGRAM(s) Oral at bedtime  glucagon  Injectable 1 milliGRAM(s) IntraMuscular once  insulin glargine Injectable (LANTUS) 10 Unit(s) SubCutaneous at bedtime  insulin lispro (ADMELOG) corrective regimen sliding scale   SubCutaneous three times a day before meals  insulin lispro (ADMELOG) corrective regimen sliding scale   SubCutaneous at bedtime  insulin lispro Injectable (ADMELOG) 4 Unit(s) SubCutaneous three times a day before meals  ipratropium 17 MICROgram(s) HFA Inhaler 1 Puff(s) Inhalation every 6 hours  levalbuterol Inhalation 1.25 milliGRAM(s) Inhalation every 6 hours  metoprolol tartrate 25 milliGRAM(s) Oral two times a day  multivitamin 1 Tablet(s) Oral daily  pantoprazole    Tablet 40 milliGRAM(s) Oral before breakfast  sildenafil (REVATIO) 20 milliGRAM(s) Oral three times a day    MEDICATIONS  (PRN):  acetaminophen     Tablet .. 650 milliGRAM(s) Oral every 6 hours PRN Temp greater or equal to 38C (100.4F), Mild Pain (1 - 3)  dextrose Oral Gel 15 Gram(s) Oral once PRN Blood Glucose LESS THAN 70 milliGRAM(s)/deciliter  guaiFENesin Oral Liquid (Sugar-Free) 100 milliGRAM(s) Oral every 6 hours PRN Cough  melatonin 3 milliGRAM(s) Oral at bedtime PRN Insomnia  ondansetron Injectable 4 milliGRAM(s) IV Push every 8 hours PRN Nausea and/or Vomiting  oxyCODONE    IR 2.5 milliGRAM(s) Oral every 6 hours PRN Severe Pain (7 - 10)  polyethylene glycol 3350 17 Gram(s) Oral two times a day PRN Constipation  senna 2 Tablet(s) Oral at bedtime PRN Constipation    Vital Signs Last 24 Hrs  T(C): 36.8 (03 Mar 2025 11:30), Max: 36.8 (03 Mar 2025 11:30)  T(F): 98.2 (03 Mar 2025 11:30), Max: 98.2 (03 Mar 2025 11:30)  HR: 97 (03 Mar 2025 13:00) (97 - 122)  BP: 105/56 (03 Mar 2025 11:30) (98/50 - 110/50)  BP(mean): --  RR: 18 (03 Mar 2025 11:30) (18 - 18)  SpO2: 99% (03 Mar 2025 11:30) (95% - 100%)    Parameters below as of 03 Mar 2025 11:30  Patient On (Oxygen Delivery Method): room air  O2 Flow (L/min): 2      I&O's Summary    02 Mar 2025 07:01  -  03 Mar 2025 07:00  --------------------------------------------------------  IN: 0 mL / OUT: 650 mL / NET: -650 mL          Physical Exam:   GENERAL: NAD, well-groomed, well-developed  HEENT: MARCIO/   Atraumatic, Normocephalic  ENMT: No tonsillar erythema, exudates, or enlargement; Moist mucous membranes, Good dentition, No lesions  NECK: Supple, No JVD, Normal thyroid  CHEST/LUNG: Clear to auscultaion,  CVS: Regular rate and rhythm; No murmurs, rubs, or gallops  GI: : Soft, Nontender, Nondistended; Bowel sounds present  NERVOUS SYSTEM:  Alert & Oriented X3  EXTREMITIES: -edema  LYMPH: No lymphadenopathy noted  SKIN: No rashes or lesions  ENDOCRINOLOGY: No Thyromegaly  PSYCH: Appropriate    Labs:  31, 29                            9.1    7.92  )-----------( 352      ( 01 Mar 2025 07:28 )             27.9     03-01    137  |  97[L]  |  47[H]  ----------------------------<  108[H]  4.3   |  28  |  1.10        CAPILLARY BLOOD GLUCOSE      POCT Blood Glucose.: 222 mg/dL (03 Mar 2025 12:03)  POCT Blood Glucose.: 211 mg/dL (03 Mar 2025 08:22)  POCT Blood Glucose.: 201 mg/dL (02 Mar 2025 21:05)  POCT Blood Glucose.: 207 mg/dL (02 Mar 2025 18:07)          rad< from: Xray Chest 1 View- PORTABLE-Urgent (Xray Chest 1 View- PORTABLE-Urgent .) (02.21.25 @ 11:48) >  Chest x-ray 2/20/2025 at 11:06 PM:  Surgical clips in the right axilla.  The heart is normal in size.  Diffuse bilateral patchy and hazy opacities, unchanged.  No pneumothorax.  Trace bilateral pleural effusions and associated atelectasis.  No acute bony abnormality.    Chest x-ray 2/21/2025 at 11:28 AM:  Progression of right base atelectasis or infiltrate. The left lung is   clearer.    IMPRESSION:  Changing infiltrates in latest image.    --- End of Report ---          DEANNA HERNANDEZ MD; Resident Radiologist  This document has been electronically signed.  LJ IRVIN MD; Attending Interventional Radiologist  This document has been electronically signed. Feb 21 2025  3:04PM    < end of copied text >  < from: Xray Chest 1 View- PORTABLE-Urgent (Xray Chest 1 View- PORTABLE-Urgent .) (02.20.25 @ 23:25) >  The heart is normal in size.  Diffuse bilateral patchy and hazy opacities, unchanged.  No pneumothorax.  Trace bilateral pleural effusions and associated atelectasis.  No acute bony abnormality.    Chest x-ray 2/21/2025 at 11:28 AM:  Progression of right base atelectasis or infiltrate. The left lung is   clearer.    IMPRESSION:  Changing infiltrates in latest image.    --- End of Report ---          DEANNA HERNANDEZ MD; Resident Radiologist  This document has been electronically signed.  LJ IRVIN MD; Attending Interventional Radiologist  This document has been electronically signed. Feb 21 2025  3:04PM    < end of copied text >          RECENT CULTURES:        RESPIRATORY CULTURES:          Studies  Chest X-RAY  CT SCAN Chest   Venous Dopplers: LE:   CT Abdomen  Others

## 2025-03-03 NOTE — PROGRESS NOTE ADULT - NUTRITIONAL ASSESSMENT
This patient has been assessed with a concern for Malnutrition and has been determined to have a diagnosis/diagnoses of Severe protein-calorie malnutrition.    This patient is being managed with:   Diet DASH/TLC-  Sodium & Cholesterol Restricted  Consistent Carbohydrate {Evening Snack} (CSTCHOSN)  Entered: Feb 20 2025 10:22AM  
Diet, DASH/TLC:   Sodium & Cholesterol Restricted  Consistent Carbohydrate {Evening Snack} (CSTCHOSN) (02-20-25 @ 10:22) [Active]
This patient has been assessed with a concern for Malnutrition and has been determined to have a diagnosis/diagnoses of Severe protein-calorie malnutrition.    This patient is being managed with:   Diet DASH/TLC-  Sodium & Cholesterol Restricted  Consistent Carbohydrate {Evening Snack} (CSTCHOSN)  Entered: Feb 20 2025 10:22AM  
This patient has been assessed with a concern for Malnutrition and has been determined to have a diagnosis/diagnoses of Severe protein-calorie malnutrition.    This patient is being managed with:   Diet DASH/TLC-  Sodium & Cholesterol Restricted  Consistent Carbohydrate {Evening Snack} (CSTCHOSN)  Entered: Feb 20 2025 10:22AM  
Diet, DASH/TLC:   Sodium & Cholesterol Restricted  Consistent Carbohydrate {Evening Snack} (CSTCHOSN) (02-20-25 @ 10:22) [Active]
This patient has been assessed with a concern for Malnutrition and has been determined to have a diagnosis/diagnoses of Severe protein-calorie malnutrition.    This patient is being managed with:   Diet DASH/TLC-  Sodium & Cholesterol Restricted  Consistent Carbohydrate {Evening Snack} (CSTCHOSN)  Entered: Feb 20 2025 10:22AM  
Diet, DASH/TLC:   Sodium & Cholesterol Restricted  Consistent Carbohydrate {Evening Snack} (CSTCHOSN) (02-20-25 @ 10:22) [Active]
This patient has been assessed with a concern for Malnutrition and has been determined to have a diagnosis/diagnoses of Severe protein-calorie malnutrition.    This patient is being managed with:   Diet DASH/TLC-  Sodium & Cholesterol Restricted  Consistent Carbohydrate {Evening Snack} (CSTCHOSN)  Entered: Feb 20 2025 10:22AM  

## 2025-03-03 NOTE — PROVIDER CONTACT NOTE (OTHER) - REASON
Pt /38
pt tachy up to 120s
patient experiencing tremors and SOB
pt tachy up to 120s
patient having tremors and sweating
Pt SOB and desatting
Pt hyperglycemic

## 2025-03-03 NOTE — PROGRESS NOTE ADULT - PROBLEM SELECTOR PROBLEM 2
HTN (hypertension)
HTN (hypertension)
Multifocal pneumonia
HTN (hypertension)
Multifocal pneumonia
HTN (hypertension)
Multifocal pneumonia
HTN (hypertension)
Multifocal pneumonia

## 2025-03-03 NOTE — PROGRESS NOTE ADULT - PROVIDER SPECIALTY LIST ADULT
Cardiology
Endocrinology
Infectious Disease
Pulmonology
Pulmonology
Cardiology
Cardiology
Infectious Disease
Infectious Disease
Pulmonology
Cardiology
Endocrinology
Infectious Disease
Pulmonology
Cardiology
Endocrinology
Endocrinology
Infectious Disease
Pulmonology
Endocrinology
Endocrinology
Internal Medicine
Endocrinology
Internal Medicine
Endocrinology
Internal Medicine

## 2025-03-03 NOTE — PROVIDER CONTACT NOTE (OTHER) - BACKGROUND
patient admitted for sepsis, COPD, on home o2, CVA, DM2, hypertension and recent dx of lung cancer.
patient is a 89 year old female admitted for pneumonia
pt admitted for sepsis
Pt admitted for sepsis
patient presenting with dysuria, tremors, and SOB   PMHX PVD, COPD, anemia, DM2, HTN, CVA
pt admitted for sepsis
Pt admitted for sepsis

## 2025-03-03 NOTE — PROGRESS NOTE ADULT - PROBLEM SELECTOR PLAN 2
- as above  - pulm following, appreciate recs

## 2025-03-03 NOTE — PROGRESS NOTE ADULT - PROBLEM SELECTOR PROBLEM 4
Acute on chronic heart failure

## 2025-03-03 NOTE — PROGRESS NOTE ADULT - PROBLEM/PLAN-9
DISPLAY PLAN FREE TEXT
100

## 2025-03-03 NOTE — PROGRESS NOTE ADULT - PROBLEM SELECTOR PROBLEM 7
Pulmonary HTN

## 2025-03-03 NOTE — PROGRESS NOTE ADULT - ASSESSMENT
A/P: Pt is a 89 yo F with PMH CHF, anemia, pHTN, CAD, CVA (no deficits), T2D, [place], COPD (2L NC PRN, qhs), PVD, HTN, HLD, and ?lung CA (pending RT) p/w dysuria x 1wk along with tremors and SOB, found to be hyperglycemic and will be on steroids. Endocrinology was consulted for management of diabetes mellitus.    #Type 2 Diabetes Mellitus  - Receiving methylpred 20 mg IV q8h - 2/21- ?- solumedrol 20mg IV q8h is continued.  - Please notify endocrine if any change to steroid plan.  - HbA1c 9.7%; home regimen: Tresiba 10 units at bedtime (Tresiba no longer covered working on switching to a new basal prior to admission), metformin 500mg (?) 2 tabs daily     INPATIENT PLAN:  - Inpatient BG goal 100-220s mg/dl: stable today.  Would be cautious with insulin adjustments due to age and renal function   - Last prednisone dose on 2/27 at 6am)  - Continue Lantus 10 units qhs  - Continue Admelog 4 units TID AC (please hold if npo/not eating)  - Continue Admelog low correction scale TIDQAC and continue low correction scale QHS  - Please check FSG before meals and QHS, or q6h while NPO; please check a 3am FS   - consistent carb diet  - RD consult  - Hypoglycemia Protocol   - changed antibiotics to non dextrose fluid  - Provider to Rn for insulin pen review: pt and daughter declined review   - If stay overnight please check lactate level --->defer to primary team     DISCHARGE PLAN:  - As per primary team no further steroids upon discharge   - Lactate is elevated will stop Metformin for now--> repeat lactate defer to primary team   - As per primary team pt is being discharged to Rehab today. Please discharge pt on current inpt insulin regimen as above   - Continue Lantus 10 units qhs  - Continue Admelog 4 units TID AC (please hold if npo/not eating)  - Continue Admelog low correction scale TIDQAC and continue low correction scale QHS  - Tentative dc recs from rehab to home is Basaglar Kwik pen sq qhs (basaglar is covered by pts insurance as per primary team) dose TBD + Repaglinide (please hold if skips meals) frequency and dosing TBD by medical team at rehab; based on glucose trends and insulin dosing at Rehab.  - FYI pt states glucose in am at times is low 100s in am on basal insulin pen 10 units sq qhs   - Advised pt if glucose 250 and above and or below 100 to please call her doctor as DM regimen may need to be adjusted   - Would like to avoid hypoglycemia in this elderly pt  - Also given patient had a son with type 1 DM sent c-peptide (with serum glucose), FELISHA Ab 0.02 (WNL), IA-2 Ab 0.00 (WNL), and Zinc transporter Ab (specimen received awaiting results) to rule out JILLIAN.  C-peptide 3.7 (glu 376) consistent with type 2 DM. Can follow up antibodies for completeness; advised pt and daughter to follow up these results as an outpt   - Ensure patient has working glucometer, test strips, lancets, alcohol pads, and BD fransico pen needles  - Patient attributes high A1c to several hospital admissions in past few months.  - Patient uses glucometer and again declined CGM (daughter is aware)   - Patient wishes to follow up with pcp and declines initiating care with an endocrinologist.  - Provided Endocrine office information to pt so she can call for antibody results; [phone]  - D/w Daughter Vanessa [phone]; daughter states she knows how ot administer insulin via insulin pen incase her mother needs assistance     #Hypertension  - BP goal <130/80  - Please obtain urine micro albumin cr ratio as an outpt   - Management as per primary team, not on meds now    #Hyperlipidemia  - ordered for atorvastatin 20 mg continue if no contraindications   - Please obtain lipid profile if not done recently   - Defer to Primary Team     D/w Michele GLASS   D/w Dr. Audrey Palacios  Nurse Practitioner  Division of Endocrinology & Diabetes  In house pager #[phone]    If before 9AM or after 6PM, or on weekends/holidays, please call endocrine answering service for assistance ([phone]).For nonurgent matters email [email or web address] for assistance.

## 2025-03-03 NOTE — PROGRESS NOTE ADULT - REASON FOR ADMISSION
UTI, PNA

## 2025-03-03 NOTE — PROGRESS NOTE ADULT - SUBJECTIVE AND OBJECTIVE BOX
Chief Complaint: Type 2 DM     History: Pt seen at bedside. Pt tolerating oral diet. Pt denies nausea and vomiting/any signs of hypoglycemia. Pt reports an adequate appetite.     MEDICATIONS  (STANDING):  aspirin enteric coated 81 milliGRAM(s) Oral daily  atorvastatin 20 milliGRAM(s) Oral at bedtime  dextrose 5%. 1000 milliLiter(s) (50 mL/Hr) IV Continuous <Continuous>  dextrose 5%. 1000 milliLiter(s) (100 mL/Hr) IV Continuous <Continuous>  dextrose 50% Injectable 25 Gram(s) IV Push once  dextrose 50% Injectable 12.5 Gram(s) IV Push once  dextrose 50% Injectable 25 Gram(s) IV Push once  enoxaparin Injectable 40 milliGRAM(s) SubCutaneous every 24 hours  fluticasone propionate/ salmeterol 250-50 MICROgram(s) Diskus 1 Dose(s) Inhalation two times a day  furosemide    Tablet 40 milliGRAM(s) Oral daily  gabapentin 400 milliGRAM(s) Oral at bedtime  glucagon  Injectable 1 milliGRAM(s) IntraMuscular once  insulin glargine Injectable (LANTUS) 10 Unit(s) SubCutaneous at bedtime  insulin lispro (ADMELOG) corrective regimen sliding scale   SubCutaneous three times a day before meals  insulin lispro (ADMELOG) corrective regimen sliding scale   SubCutaneous at bedtime  insulin lispro Injectable (ADMELOG) 4 Unit(s) SubCutaneous three times a day before meals  ipratropium 17 MICROgram(s) HFA Inhaler 1 Puff(s) Inhalation every 6 hours  levalbuterol Inhalation 1.25 milliGRAM(s) Inhalation every 6 hours  metoprolol tartrate 25 milliGRAM(s) Oral two times a day  multivitamin 1 Tablet(s) Oral daily  pantoprazole    Tablet 40 milliGRAM(s) Oral before breakfast  sildenafil (REVATIO) 20 milliGRAM(s) Oral three times a day    MEDICATIONS  (PRN):  acetaminophen     Tablet .. 650 milliGRAM(s) Oral every 6 hours PRN Temp greater or equal to 38C (100.4F), Mild Pain (1 - 3)  dextrose Oral Gel 15 Gram(s) Oral once PRN Blood Glucose LESS THAN 70 milliGRAM(s)/deciliter  guaiFENesin Oral Liquid (Sugar-Free) 100 milliGRAM(s) Oral every 6 hours PRN Cough  melatonin 3 milliGRAM(s) Oral at bedtime PRN Insomnia  ondansetron Injectable 4 milliGRAM(s) IV Push every 8 hours PRN Nausea and/or Vomiting  oxyCODONE    IR 2.5 milliGRAM(s) Oral every 6 hours PRN Severe Pain (7 - 10)  polyethylene glycol 3350 17 Gram(s) Oral two times a day PRN Constipation  senna 2 Tablet(s) Oral at bedtime PRN Constipation      Allergies: Penicillin (Unknown)  macrolide antibiotics (Other)  Biaxin (Unknown)      Review of Systems:  Respiratory: No SOB, no cough  GI: No nausea, vomiting, abdominal pain  Endocrine: no polyuria, polydipsia    PHYSICAL EXAM:  VITALS: T(C): 36.8 (03-03-25 @ 11:30)  T(F): 98.2 (03-03-25 @ 11:30), Max: 98.2 (03-03-25 @ 11:30)  HR: 122 (03-03-25 @ 11:30) (100 - 122)  BP: 105/56 (03-03-25 @ 11:30) (98/50 - 110/50)  RR:  (18 - 18)  SpO2:  (95% - 100%)  Wt(kg): --  GENERAL: NAD, well-groomed, well-developed  RESPIRATORY: No labored breathing   GI: Soft, nontender, non distended  PSYCH: Alert and oriented x 3, normal affect, normal mood      CAPILLARY BLOOD GLUCOSE  POCT Blood Glucose.: 222 mg/dL (03 Mar 2025 12:03)  POCT Blood Glucose.: 211 mg/dL (03 Mar 2025 08:22)  POCT Blood Glucose.: 201 mg/dL (02 Mar 2025 21:05)  POCT Blood Glucose.: 207 mg/dL (02 Mar 2025 18:07)    A1C with Estimated Average Glucose (02.19.25 @ 09:14)    A1C with Estimated Average Glucose Result: 9.7   Estimated Average Glucose: 232      03-01    137  |  97[L]  |  47[H]  ----------------------------<  108[H]  4.3   |  28  |  1.10    eGFR: 47[L]    Ca    9.2      03-01  Mg     1.90     03-01  Phos  4.0     03-01    Thyroid Function Tests:  02-19 @ 09:14 TSH 1.95 FreeT4 -- T3 -- Anti TPO -- Anti Thyroglobulin Ab -- TSI --    Diet, DASH/TLC:   Sodium & Cholesterol Restricted  Consistent Carbohydrate Evening Snack (CSTCHOSN) (02-20-25 @ 10:22) [Active]

## 2025-03-03 NOTE — PROGRESS NOTE ADULT - PROBLEM SELECTOR PLAN 11
- not in acute exacerbation; baseline O2 req 2L NC PRN/qhs  - currently at baseline O2 requirement  - c/w home advair  - duonebs q6h PRN
- not in acute exacerbation; baseline O2 req 2L NC PRN/qhs  - currently at baseline O2 requirement  - c/w home advair  - amrita q6h x24h -- transition to PRN in AM
- not in acute exacerbation; baseline O2 req 2L NC PRN/qhs  - currently at baseline O2 requirement  - c/w home advair  - amrita q6h x24h -- transition to PRN in AM
- not in acute exacerbation; baseline O2 req 2L NC PRN/qhs  - currently at baseline O2 requirement  - c/w home advair  - duonebs q6h PRN
- not in acute exacerbation; baseline O2 req 2L NC PRN/qhs  - currently at baseline O2 requirement  - c/w home advair  - amrita q6h x24h -- transition to PRN in AM
- not in acute exacerbation; baseline O2 req 2L NC PRN/qhs  - currently at baseline O2 requirement  - c/w home advair  - amrita q6h x24h -- transition to PRN in AM
- not in acute exacerbation; baseline O2 req 2L NC PRN/qhs  - currently at baseline O2 requirement  - c/w home advair  - duonebs q6h PRN
- not in acute exacerbation; baseline O2 req 2L NC PRN/qhs  - currently at baseline O2 requirement  - c/w home advair  - duonebs q6h PRN
- not in acute exacerbation; baseline O2 req 2L NC PRN/qhs  - currently at baseline O2 requirement  - c/w home advair  - amrita q6h x24h -- transition to PRN in AM
- not in acute exacerbation; baseline O2 req 2L NC PRN/qhs  - currently at baseline O2 requirement  - c/w home advair  - duonebs q6h PRN
- not in acute exacerbation; baseline O2 req 2L NC PRN/qhs  - currently at baseline O2 requirement  - c/w home advair  - duonebs q6h PRN
- not in acute exacerbation; baseline O2 req 2L NC PRN/qhs  - currently at baseline O2 requirement  - c/w home advair  - amrita q6h x24h -- transition to PRN in AM

## 2025-03-03 NOTE — PROGRESS NOTE ADULT - PROBLEM SELECTOR PROBLEM 5
Normocytic anemia

## 2025-03-03 NOTE — PROGRESS NOTE ADULT - NSPROGADDITIONALINFOA_GEN_ALL_CORE
d/w the daughter Vanessa HCP. Pt lives with her. Baseline, she uses walker to walk, though not always using.   Recently seen by Dr. Shaw Mitchell (rad onc) for measurements. Hoping that pt will be able to proceed with radiation treatment post discharge (although concern for radiation induced pneumonitis)     GOC discussed with pt and the daughter. Per the pt and daughter, if condition is irreversible then no CPR/ventilator. otherwise okay with CPR/ ventilator.   overall prognosis is poor given lung findings of fibrosis.  Palliative team eval appreciated. pt signed DNR/DNI.     hi-flow wean to NC, TTE reviewed. stage 2 diastolic dysfunction.  now back on home PO Lasix.   out of bed to chair if tolerates. physical therapy: Home PT.   completed abx 2/25/25 per ID.   DC Planning. PT re-eval: Home PT.    overall prognosis is poor given extensive lung finding of ILD/ lung Ca, etc  recurrent hospital admissions are inevitable. pt and the daughter not ready for hospice yet.     d/w the daughter Vanessa again, now she is asking for rehab placement given her weakness and comorbidities.  PT Re-eval: rehab.  dc planning to rehab.     d/w QUAN Bautista.     - Dr. RACHELL Martinez ([facility])  - [phone] d/w the daughter Vanessa HCP. Pt lives with her. Baseline, she uses walker to walk, though not always using.   Recently seen by Dr. Shaw Mitchell (rad onc) for measurements. Hoping that pt will be able to proceed with radiation treatment post discharge (although concern for radiation induced pneumonitis)     GOC discussed with pt and the daughter. Per the pt and daughter, if condition is irreversible then no CPR/ventilator. otherwise okay with CPR/ ventilator.   overall prognosis is poor given lung findings of fibrosis.  Palliative team eval appreciated. pt signed DNR/DNI.     hi-flow wean to NC, TTE reviewed. stage 2 diastolic dysfunction.  now back on home PO Lasix.   out of bed to chair if tolerates. physical therapy: Home PT.   completed abx 2/25/25 per ID.   DC Planning. PT re-eval: Home PT.    overall prognosis is poor given extensive lung finding of ILD/ lung Ca, etc  recurrent hospital admissions are inevitable. pt and the daughter not ready for hospice yet.     d/w the daughter Vanessa again, now she is asking for rehab placement given her weakness and comorbidities.  PT Re-eval: rehab.  dc planning to rehab.   HR better after metoprolol. tele: rate 90.    d/w QUAN Bautista.     - Dr. RACHELL Martinez ([facility])  - [phone]

## 2025-03-03 NOTE — PROGRESS NOTE ADULT - PROBLEM SELECTOR PROBLEM 1
Sepsis
Sepsis
Type 2 diabetes mellitus
Sepsis
Type 2 diabetes mellitus
Type 2 diabetes mellitus
Sepsis
Type 2 diabetes mellitus
Sepsis

## 2025-03-03 NOTE — PROVIDER CONTACT NOTE (OTHER) - SITUATION
Pt hyperglycemic
pt tachy up to 120s
Pt SOB and desatting
Pt /38
patient experiencing periods of tremors, SOB, and hyperventilation.
pt tachy up to 120s
patient having tremors and sweating, states the last time she felt this way her blood sugar was in the 40s

## 2025-03-03 NOTE — CHART NOTE - NSCHARTNOTEFT_GEN_A_CORE
Pt will not require RT while at rehab. Plan to F/u OP w/ Radiation oncologist Dr. Isma Squires.    LEE RosalesC  pager [phone]

## 2025-03-03 NOTE — PROGRESS NOTE ADULT - PROBLEM SELECTOR PLAN 3
- as above  - ID following, appreciate recs

## 2025-03-03 NOTE — PROGRESS NOTE ADULT - PROBLEM SELECTOR PROBLEM 11
COPD without exacerbation

## 2025-03-03 NOTE — PROGRESS NOTE ADULT - PROBLEM SELECTOR PROBLEM 3
HLD (hyperlipidemia)
HLD (hyperlipidemia)
Acute UTI
HLD (hyperlipidemia)
Acute UTI
HLD (hyperlipidemia)
HLD (hyperlipidemia)
Acute UTI
HLD (hyperlipidemia)
Acute UTI
HLD (hyperlipidemia)
Acute UTI

## 2025-03-03 NOTE — PROGRESS NOTE ADULT - PROBLEM SELECTOR PLAN 6
- CTA chest with interval increase in LLL mass from prior, 2.8x2.7cm  - pending RT initiation outpt  - d/w pt's radiation oncologist Dr. Isma Squires.   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   - outpt f/u
- CTA chest with interval increase in LLL mass from prior, 2.8x2.7cm  - pending RT initiation outpt  - d/w pt's radiation oncologist Dr. Isma Squires.   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   - outpt f/u
- CTA chest with interval increase in LLL mass from prior, 2.8x2.7cm  - pending RT initiation outpt  - outpt f/u
- CTA chest with interval increase in LLL mass from prior, 2.8x2.7cm  - pending RT initiation outpt  - d/w pt's radiation oncologist Dr. Isma Squires.   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   - outpt f/u

## 2025-03-03 NOTE — PROGRESS NOTE ADULT - PROBLEM SELECTOR PLAN 8
- home meds: lasix 40mg daily, lopressor 25mg am/12.5mg pm  - hold home meds in setting of hypotension; now resume as able  - see above.
0
- home meds: lasix 40mg daily, lopressor 25mg am/12.5mg pm  - hold home meds in setting of hypotension; now resume as able  - see above.
- home meds: lasix 40mg daily, lopressor 25mg am/12.5mg pm  - hold home meds in setting of hypotension; resume as able

## 2025-03-03 NOTE — PROGRESS NOTE ADULT - PROBLEM SELECTOR PLAN 7
- c/w home sildenafil 20mg TID

## 2025-03-03 NOTE — PROGRESS NOTE ADULT - ASSESSMENT
ECHO 10/7/24: nl LV sys fx EF 60% . Mild mitral valve stenosis. Mild to moderate tricuspid regurgitation. mod AS   ECHO  2/22/25 nl LV sys fx  ef 68%, moderate (grade 2) left ventricular diastolic dysfunction,mild pulmonary hypertension. The aortic valve appears trileaflet with reduced systolic excursion. There is calcification of the aortic valve leaflets. The aortic valve acceleration time is 90 msec. There is no evidence of aortic regurgitation.    a/p    89 yo F with PMH CHF, valvular disease, anemia, pHTN, CAD, CVA (no deficits), T2D, [place], COPD (2L NC PRN, qhs), PVD, HTN, HLD, and ?lung CA (pending RT) p/w dysuria x 1wk along with tremors and SOB + pna    #PNA  -Meeting sepsis criteria w/ fever, tachycardia, leukocytosis  -CTa chest with no PE, Interval increase in groundglass opacities and subpleural consolidations  in bilateral lungs, interlobular septal thickening in  the upper lobes, which can be suggestive of interstitial edema; 3.1 cm mass like consolidation in left lower lobe, Mediastinal and hilar lymphadenopathy which can be reactive or due to  metastasis.  Subpleural reticulations, Honeycombing, and peripheral cystic changes in  bilateral lungs, suggestive of interstitial lung fibrosis.  -sp abx per id/med  -on diuretics      #hypotension   -resolved  -micu eval noted- hypotensive to 80s/40s, given 2x 500cc bolus and Midodrine 10mg w/ improvement in BP to MAP >70  -Pocus w/ collapsed IVC suggestive of intravascular depletion, but w/ evidence of reduced RV and LV function   -bcx ngtd  -not a candidate for MICU per team   -HS trop neg. no ischemic changes to ecg   -ECHO  2/22/25 nl LV sys fx  ef 68%, moderate (grade 2) left ventricular diastolic dysfunction,mild pulmonary hypertension.    #CAD s/p PCI  -stable  -cont asa, statin, BB    #HFpEF  -ECHO  2/22/25 nl LV sys fx  ef 68%, moderate (grade 2) left ventricular diastolic dysfunction,mild pulmonary hypertension.  -c/w lasix po   -metoprolol dose increased for tachycardia     #Pulmonary HTN.   -c/w home sildenafil 20mg TID.  -echo with mild pulm htn   -on diuretics     #Lung CA  -med/ pulm fu   -pending RT initiation as outpt     #UTI   -management per med /ID    dvt ppx    35 minutes spent on total encounter; more than 50% of the visit was spent counseling and/or coordinating care by the attending physician.

## 2025-03-03 NOTE — PROGRESS NOTE ADULT - PROBLEM SELECTOR PLAN 1
- pt p/w dysuria x6d along with SOB and tremors  - on arrival, meeting SIRS criteria with likely pulmonary/ source  - EKG ST without ischemic changes  - POCUS in ER with diffuse B-lines and reduced EF  - hypotensive in ER requiring midodrine 10mg x1 and IVF -- 1.25L NS -- MICU consulted, no ICU needs currently  - hx UTI with citrobacter and enterococcus   - lactate neg, RVP neg  - UA+ with UCx: Enterococcus  - BCx prelimip neg  - MRSA PCR: not detected.   - procal 2.19  - CTA chest neg PE but with multifocal PNA and pulmonary edema   - s/p cefepime and vancomycin in ER  - ID consulted, appreciate recs  - c/w cefepime. vanc --> Macrobid per ID.  - f/u urine legionella/strep  - BP improved, defer additional midodrine
- pt p/w dysuria x6d along with SOB and tremors  - on arrival, meeting SIRS criteria with likely pulmonary/ source  - EKG ST without ischemic changes  - POCUS in ER with diffuse B-lines and reduced EF  - hypotensive in ER requiring midodrine 10mg x1 and IVF -- 1.25L NS -- MICU consulted, no ICU needs currently  - hx UTI with citrobacter and enterococcus   - lactate neg, RVP neg  - UA+ with UCx: Enterococcus  - BCx neg  - MRSA PCR: not detected.   - procal 2.19  - CTA chest neg PE but with multifocal PNA and pulmonary edema   - s/p cefepime and vancomycin in ER  - ID consulted, appreciate recs  - c/w cefepime. vanc --> Macrobid per ID.  - f/u urine legionella/strep  - BP improved, defer additional midodrine
- pt p/w dysuria x6d along with SOB and tremors  - on arrival, meeting SIRS criteria with likely pulmonary/ source  - EKG ST without ischemic changes  - POCUS in ER with diffuse B-lines and reduced EF  - hypotensive in ER requiring midodrine 10mg x1 and IVF -- 1.25L NS -- MICU consulted, no ICU needs currently  - hx UTI with citrobacter and enterococcus   - lactate neg, RVP neg  - UA+ with UCx: Enterococcus  - BCx prelimip neg  - MRSA PCR: not detected.   - procal 2.19  - CTA chest neg PE but with multifocal PNA and pulmonary edema   - s/p cefepime and vancomycin in ER  - ID consulted, appreciate recs  - c/w cefepime. vanc --> Macrobid per ID.  - f/u urine legionella/strep  - BP improved, defer additional midodrine
- pt p/w dysuria x6d along with SOB and tremors  - on arrival, meeting SIRS criteria with likely pulmonary/ source  - EKG ST without ischemic changes  - POCUS in ER with diffuse B-lines and reduced EF  - hypotensive in ER requiring midodrine 10mg x1 and IVF -- 1.25L NS -- MICU consulted, no ICU needs currently  - hx UTI with citrobacter and enterococcus   - lactate neg, RVP neg  - UA+ with UCx: Enterococcus  - BCx neg  - MRSA PCR: not detected.   - procal 2.19  - CTA chest neg PE but with multifocal PNA and pulmonary edema   - s/p cefepime and vancomycin in ER  - ID consulted, appreciate recs  - c/w cefepime. vanc --> Macrobid per ID.  - f/u urine legionella/strep  - BP improved, defer additional midodrine
- pt p/w dysuria x6d along with SOB and tremors  - on arrival, meeting SIRS criteria with likely pulmonary/ source  - EKG ST without ischemic changes  - POCUS in ER with diffuse B-lines and reduced EF  - hypotensive in ER requiring midodrine 10mg x1 and IVF -- 1.25L NS -- MICU consulted, no ICU needs    - hx UTI with citrobacter and enterococcus   - lactate neg, RVP neg  - UA+ with UCx: Enterococcus  - BCx neg  - MRSA PCR: not detected.   - procal 2.19  - CTA chest neg PE but with multifocal PNA and pulmonary edema   - s/p cefepime and vancomycin in ER  - ID consulted, appreciate recs  - c/w cefepime. vanc --> Macrobid per ID.  - urine legionella/strep: neg  - BP improved, defer additional midodrine
- pt p/w dysuria x6d along with SOB and tremors  - on arrival, meeting SIRS criteria with likely pulmonary/ source  - EKG ST without ischemic changes  - POCUS in ER with diffuse B-lines and reduced EF  - hypotensive in ER requiring midodrine 10mg x1 and IVF -- 1.25L NS -- MICU consulted, no ICU needs currently  - hx UTI with citrobacter and enterococcus   - lactate neg, RVP neg  - UA+ with UCx in lab  - f/u BCx  - f/u MRSA PCR  - procal 2.19  - CTA chest neg PE but with multifocal PNA and pulmonary edema   - s/p cefepime and vancomycin in ER  - ID consulted, appreciate recs  - c/w cefepime/vanc per ID.  - f/u urine legionella/strep  - BP improved, defer additional midodrine
- pt p/w dysuria x6d along with SOB and tremors  - on arrival, meeting SIRS criteria with likely pulmonary/ source  - EKG ST without ischemic changes  - POCUS in ER with diffuse B-lines and reduced EF  - hypotensive in ER requiring midodrine 10mg x1 and IVF -- 1.25L NS -- MICU consulted, no ICU needs currently  - hx UTI with citrobacter and enterococcus   - lactate neg, RVP neg  - UA+ with UCx: Enterococcus  - BCx neg  - MRSA PCR: not detected.   - procal 2.19  - CTA chest neg PE but with multifocal PNA and pulmonary edema   - s/p cefepime and vancomycin in ER  - ID consulted, appreciate recs  - c/w cefepime. vanc --> Macrobid per ID.  - f/u urine legionella/strep  - BP improved, defer additional midodrine
- pt p/w dysuria x6d along with SOB and tremors  - on arrival, meeting SIRS criteria with likely pulmonary/ source  - EKG ST without ischemic changes  - POCUS in ER with diffuse B-lines and reduced EF  - hypotensive in ER requiring midodrine 10mg x1 and IVF -- 1.25L NS -- MICU consulted, no ICU needs currently  - hx UTI with citrobacter and enterococcus   - lactate neg, RVP neg  - UA+ with UCx: Enterococcus  - BCx neg  - MRSA PCR: not detected.   - procal 2.19  - CTA chest neg PE but with multifocal PNA and pulmonary edema   - s/p cefepime and vancomycin in ER  - ID consulted, appreciate recs  - c/w cefepime. vanc --> Macrobid per ID.  - f/u urine legionella/strep  - BP improved, defer additional midodrine
- pt p/w dysuria x6d along with SOB and tremors  - on arrival, meeting SIRS criteria with likely pulmonary/ source  - EKG ST without ischemic changes  - POCUS in ER with diffuse B-lines and reduced EF  - hypotensive in ER requiring midodrine 10mg x1 and IVF -- 1.25L NS -- MICU consulted, no ICU needs    - hx UTI with citrobacter and enterococcus   - lactate neg, RVP neg  - UA+ with UCx: Enterococcus  - BCx neg  - MRSA PCR: not detected.   - procal 2.19  - CTA chest neg PE but with multifocal PNA and pulmonary edema   - s/p cefepime and vancomycin in ER  - ID consulted, appreciate recs  - c/w cefepime. vanc --> Macrobid per ID.  - urine legionella/strep: neg  - BP improved, defer additional midodrine
- pt p/w dysuria x6d along with SOB and tremors  - on arrival, meeting SIRS criteria with likely pulmonary/ source  - EKG ST without ischemic changes  - POCUS in ER with diffuse B-lines and reduced EF  - hypotensive in ER requiring midodrine 10mg x1 and IVF -- 1.25L NS -- MICU consulted, no ICU needs    - hx UTI with citrobacter and enterococcus   - lactate neg, RVP neg  - UA+ with UCx: Enterococcus  - BCx neg  - MRSA PCR: not detected.   - procal 2.19  - CTA chest neg PE but with multifocal PNA and pulmonary edema   - s/p cefepime and vancomycin in ER  - ID consulted, appreciate recs  - c/w cefepime. vanc --> Macrobid per ID.  - urine legionella/strep: neg  - BP improved, defer additional midodrine
- pt p/w dysuria x6d along with SOB and tremors  - on arrival, meeting SIRS criteria with likely pulmonary/ source  - EKG ST without ischemic changes  - POCUS in ER with diffuse B-lines and reduced EF  - hypotensive in ER requiring midodrine 10mg x1 and IVF -- 1.25L NS -- MICU consulted, no ICU needs currently  - hx UTI with citrobacter and enterococcus   - lactate neg, RVP neg  - UA+ with UCx: Enterococcus  - BCx prelimip neg  - MRSA PCR: not detected.   - procal 2.19  - CTA chest neg PE but with multifocal PNA and pulmonary edema   - s/p cefepime and vancomycin in ER  - ID consulted, appreciate recs  - c/w cefepime. vanc --> Macrobid per ID.  - f/u urine legionella/strep  - BP improved, defer additional midodrine
- pt p/w dysuria x6d along with SOB and tremors  - on arrival, meeting SIRS criteria with likely pulmonary/ source  - EKG ST without ischemic changes  - POCUS in ER with diffuse B-lines and reduced EF  - hypotensive in ER requiring midodrine 10mg x1 and IVF -- 1.25L NS -- MICU consulted, no ICU needs currently  - hx UTI with citrobacter and enterococcus   - lactate neg, RVP neg  - UA+ with UCx: Enterococcus  - BCx neg  - MRSA PCR: not detected.   - procal 2.19  - CTA chest neg PE but with multifocal PNA and pulmonary edema   - s/p cefepime and vancomycin in ER  - ID consulted, appreciate recs  - c/w cefepime. vanc --> Macrobid per ID.  - urine legionella/strep: neg  - BP improved, defer additional midodrine

## 2025-03-03 NOTE — PROGRESS NOTE ADULT - ASSESSMENT
Pt is a 89 yo F with PMH CHF, anemia, pHTN, CAD, CVA (no deficits), T2D, [place], COPD (2L NC PRN, qhs), PVD, HTN, HLD, and ?lung CA (pending RT) p/w dysuria x 1wk along with tremors and SOB.   On arrival, T 102.5, , /78 --- SBP 80s, RR 20 O2sat 88% RA -- 2L NC. EKG with sinus tachycardia, no ischemic changes. ER POCUS with diffuse B lines. Labs with leukocytosis, normocytic anemia, trop neg x2, BNP 1406, lactate neg, UA+ with UCx and BCx in lab, RVP neg, MRSA in lab. CTA chest neg PE but with multifocal PNA, pulmonary edema, and 2.8x2.7cm mass LLL interval inc compared to prior. MICU consulted for hypotension with c/f need for pressors, however, improved with IVF and midodrine. Pt given tylenol, cefepime, and vancomycin. ID consulted and PT consulted with recs pending. (19 Feb 2025 10:03)  she is apparently sob to me:  but she says her breathing is OK:  she  is on home oxygen :  recently diagnosed with lung cancer:   now pulm called for pneumonia        Sepsis/ Pneumonia/ COPD / lung cancer:   pneumonia: on antibiotics   CTA chest neg PE but with multifocal PNA and pulmonary edema   cont antibtiocs : seen by id   urine legionella/strep  lEFT LOWER OPACITY:  PER DAUGHTER :  SHE NEVER GOT ANY CHEMO :  SHE WAS SUPPOSED TO GET RADIATION THERAPY,  BUT SHE ENDED UP HERE:     She has had multiplek admission in last few months and hence could not any surgery for the mass:  now it seems to have increased   she seems to be sob:  and has copd:  will add short course of steroids   her vbg is OK:  but she looks sob:  she may need bipap , if her rr worsens    2/20: she looks better today  : on 3 L o f oxygen : she does use oxygen at home:  2 L of oxygen :  ct scan chest showed: No pulmonary embolism. Interval increase in groundglass opacities and subpleural consolidations in bilateral lungs, likely infectious. Interlobularseptal thickening in the upper lobes, which can be suggestive of interstitial edema 3.1 cm masslike consolidation in left lower lobe. Additional smaller nodules in bilateral lungs. Mediastinal and hilar lymphadenopathy which can be reactive or due to  metastasis. Subpleural reticulations, Honeycombing, and peripheral cystic changes in bilateral lungs, suggestive of interstitial lung fibrosis.    2/21: spoke to daughter again : she had mapping ct prior to coming to hospital  : she was supposed to be getting radiation therapy;   no chemo ;   cont iv steroids;   she had no pe  on initial admission she had no pe:  \  she has significant emphysema:    VBG is good     would cont antibiotics   ? hemonc consult??     2/21: she was seen by palliative care;   2/22:  seems pretty good:  has cough + especially in night time:   add anti tussives:  hycodan at night time  \  on cefepime"   2/24:  she looks pretty good:  antibiotics till tomorrow:   on 3 L of ox gen  satting at 100%  VBG today is reasonable!  change to po steroids     2/25: she looks and feels better;   denies Sob to me at this time:   no wheezing:  has poor air entry  ;   cont current x:     2/26: seems to be doing  ok : still has tremors  in hands: ? neuro consult:   still feels SOB off and on   no wheezing ;    2/27:   seems to be doing  ok :no sob:  tremors; +  no cough ; no phlegm      2/28; seems stable:  on 2 l of oxygen :  no wheezing  cont BD; ? dc planning     3/1:  seems OK:  no sob:  no cough:   no sob:  no wheezing/   on room air     3/2: stable:   on 2 Lof oxygen ;  needs rehab per daughter;   off steroids     3/3: seems to be doing  ok :  no cough :   on 2 L of oxygen :    for dc      Acute UTI.   as above.    Acute on chronic heart failure.    - known hx CHF   - 10/2024 TTE EF 60%, mild MV stenosis, mod AS, mild-mod TR  - POCUS in ER c/f reduced EF  - CXR with pulmonary vascular congestion, CTA chest with pulmonary edema   -cont diuretics  defer to card s    2/20: cont current rx:    on 3 L of oxygen    2/21: today hero2 requirement went up : do chest xray and stat high flow:  cont BD and steroids   2/22: on 40% high flow;  but I think can be changed to nasal cannula:  await echo results   2/25: on iv lasix  2/26: on oral  lasix    2/27: cont lasix   2/28 remains on lasix    3/1: on lasix   3/2: cont lasix    3/3: cont lasix     Pulmonary mass.    CTA chest with interval increase in LLL mass from prior, 2.8x2.7cm  - pending RT initiation outpt?\  - as above:  has not getting any treatment since the diagnosis many months ago   2/22: defer to  onc:   ct scan looks not that good   would struggles to increase lasix   2/24: on iv Lasix   2/25: iv lasix    2/26: changed to po lasix   2/27: cont current therapy:    2/28: pt will eventually get radiation as an outpt    3/1: seems to be doing  ok  no sob;  on 2 L of oxygen   3/3: seems OK:  no sob;  on  oxygen      Pulmonary HTN.    sildenafil 20mg TID.  2/24: new echo ; mild pulm htn   3/1:  on sildenafil   3/2; cont sildenafil     HTN (hypertension).   blood pressure is soft:  currently off anti hypertensives:   controlled    Type 2 diabetes mellitus.   monitor and control:    gareth acp

## 2025-03-03 NOTE — PROGRESS NOTE ADULT - PROBLEM SELECTOR PLAN 9
- c/w home atorvastatin 20mg daily

## 2025-03-03 NOTE — PROVIDER CONTACT NOTE (OTHER) - NAME OF MD/NP/PA/DO NOTIFIED:
YULIA Sewell
Bianka Velasquezaud
Bianka Velasquezaud
ACP Mohamud Boudreaux
YULIA Sewell
YULIA Holbrook
chun irizarry
YULIA Holbrook

## 2025-03-03 NOTE — PROGRESS NOTE ADULT - PROBLEM SELECTOR PLAN 12
- F: none  - E: replete K<4, Mg<2   - N: DASH/TLC  - D: lovenox 40mg q24h  - G: protonix 40mg daily    code: full  dispo: pending medical optimization and PT eval
- F: none  - E: replete K<4, Mg<2   - N: DASH/TLC  - D: lovenox 40mg q24h  - G: protonix 40mg daily    code: full  dispo: PT re-eval
- F: none  - E: replete K<4, Mg<2   - N: DASH/TLC  - D: lovenox 40mg q24h  - G: protonix 40mg daily    code: full  dispo: pending medical optimization and PT eval
- F: none  - E: replete K<4, Mg<2   - N: DASH/TLC  - D: lovenox 40mg q24h  - G: protonix 40mg daily    code: full  dispo: pending medical optimization and PT eval
- F: none  - E: replete K<4, Mg<2   - N: DASH/TLC  - D: lovenox 40mg q24h  - G: protonix 40mg daily    code: full  dispo: PT re-eval
- F: none  - E: replete K<4, Mg<2   - N: DASH/TLC  - D: lovenox 40mg q24h  - G: protonix 40mg daily    code: full  dispo: pending medical optimization and PT eval
- F: none  - E: replete K<4, Mg<2   - N: DASH/TLC  - D: lovenox 40mg q24h  - G: protonix 40mg daily    code: full  dispo: PT re-eval
- F: none  - E: replete K<4, Mg<2   - N: DASH/TLC  - D: lovenox 40mg q24h  - G: protonix 40mg daily    code: full  dispo: pending medical optimization and PT eval
- F: none  - E: replete K<4, Mg<2   - N: DASH/TLC  - D: lovenox 40mg q24h  - G: protonix 40mg daily    code: full  dispo: PT re-eval
- F: none  - E: replete K<4, Mg<2   - N: DASH/TLC  - D: lovenox 40mg q24h  - G: protonix 40mg daily    code: full  dispo: pending medical optimization and PT eval

## 2025-03-03 NOTE — PROVIDER CONTACT NOTE (OTHER) - DATE AND TIME:
21-Feb-2025 10:22
01-Mar-2025 11:54
21-Feb-2025 09:05
20-Feb-2025 21:59
27-Feb-2025 17:24
28-Feb-2025 17:00
03-Mar-2025 11:31
26-Feb-2025 14:46

## 2025-03-03 NOTE — PROGRESS NOTE ADULT - SUBJECTIVE AND OBJECTIVE BOX
CARDIOLOGY FOLLOW UP - Dr. Valenzuela  Date of Service: 03-03-25 @ 10:34    CC: no events    Review of Systems:  Constitutional: No fever, weight loss, or fatigue  Respiratory: No cough, wheezing, or hemoptysis, no shortness of breath  Cardiovascular: No chest pain, palpitations, passing out, dizziness, or leg swelling  Gastrointestinal: No abd or epigastric pain. No nausea, vomiting, or hematemesis; no diarrhea or consiptaiton, no melena or hematochezia  Vascular: No edema     TELEMETRY:    PHYSICAL EXAM:  T(C): 36.6 (03-03-25 @ 06:30), Max: 36.6 (03-02-25 @ 20:45)  HR: 100 (03-03-25 @ 06:30) (100 - 104)  BP: 98/50 (03-03-25 @ 06:30) (98/50 - 110/50)  RR: 18 (03-03-25 @ 06:30) (18 - 18)  SpO2: 98% (03-03-25 @ 06:30) (95% - 100%)  Wt(kg): --  I&O's Summary    02 Mar 2025 07:01  -  03 Mar 2025 07:00  --------------------------------------------------------  IN: 0 mL / OUT: 650 mL / NET: -650 mL        Appearance: Normal	  Cardiovascular: Normal S1 S2,RRR, No JVD, No murmurs  Respiratory: Lungs clear to auscultation	  Gastrointestinal:  Soft, Non-tender, + BS	  Extremities: Normal range of motion, No clubbing, cyanosis or edema  Vascular: Peripheral pulses palpable 2+ bilaterally       Home Medications:  Advair Diskus 250 mcg-50 mcg inhalation powder: 1 puff(s) inhaled 2 times a day (13 Dec 2024 23:00)  aspirin 81 mg oral delayed release tablet: 1 tab(s) orally once a day (at bedtime) (13 Dec 2024 23:00)  atorvastatin 20 mg oral tablet: 1 tab(s) orally once a day (at bedtime) (13 Dec 2024 23:00)  furosemide 20 mg oral tablet: 2 tab(s) orally once a day (13 Dec 2024 22:55)  gabapentin 400 mg oral capsule: 1 cap(s) orally once a day (at bedtime) (13 Dec 2024 23:00)  ipratropium 42 mcg/inh (0.06%) nasal spray: 2 spray(s) intranasally 2 times a day (13 Dec 2024 23:00)  Metoprolol Tartrate 25 mg oral tablet: 0.5 tab(s) orally 2 times a day (27 Feb 2025 12:28)  Multiple Vitamins oral tablet: 1 tab(s) orally once a day (13 Dec 2024 23:00)  omeprazole 40 mg oral delayed release capsule: 1 cap(s) orally once a day (13 Dec 2024 23:00)  polyethylene glycol 3350 oral powder for reconstitution: 17 gram(s) orally 2 times a day (18 Dec 2024 13:08)  PreserVision AREDS 2 oral capsule: 1 cap(s) orally 2 times a day (13 Dec 2024 23:00)  senna leaf extract oral tablet: 2 tab(s) orally once a day (at bedtime) (18 Dec 2024 13:08)  sildenafil 20 mg oral tablet: 1 tab(s) orally 3 times a day (13 Dec 2024 22:55)        MEDICATIONS  (STANDING):  aspirin enteric coated 81 milliGRAM(s) Oral daily  atorvastatin 20 milliGRAM(s) Oral at bedtime  dextrose 5%. 1000 milliLiter(s) (50 mL/Hr) IV Continuous <Continuous>  dextrose 5%. 1000 milliLiter(s) (100 mL/Hr) IV Continuous <Continuous>  dextrose 50% Injectable 25 Gram(s) IV Push once  dextrose 50% Injectable 12.5 Gram(s) IV Push once  dextrose 50% Injectable 25 Gram(s) IV Push once  enoxaparin Injectable 40 milliGRAM(s) SubCutaneous every 24 hours  fluticasone propionate/ salmeterol 250-50 MICROgram(s) Diskus 1 Dose(s) Inhalation two times a day  furosemide    Tablet 40 milliGRAM(s) Oral daily  gabapentin 400 milliGRAM(s) Oral at bedtime  glucagon  Injectable 1 milliGRAM(s) IntraMuscular once  insulin glargine Injectable (LANTUS) 10 Unit(s) SubCutaneous at bedtime  insulin lispro (ADMELOG) corrective regimen sliding scale   SubCutaneous three times a day before meals  insulin lispro (ADMELOG) corrective regimen sliding scale   SubCutaneous at bedtime  insulin lispro Injectable (ADMELOG) 4 Unit(s) SubCutaneous three times a day before meals  ipratropium 17 MICROgram(s) HFA Inhaler 1 Puff(s) Inhalation every 6 hours  levalbuterol Inhalation 1.25 milliGRAM(s) Inhalation every 6 hours  metoprolol tartrate 25 milliGRAM(s) Oral two times a day  multivitamin 1 Tablet(s) Oral daily  pantoprazole    Tablet 40 milliGRAM(s) Oral before breakfast  sildenafil (REVATIO) 20 milliGRAM(s) Oral three times a day        EKG:  RADIOLOGY:  DIAGNOSTIC TESTING:  [ ] Echocardiogram:  [ ] Catherterization:  [ ] Stress Test:  OTHER:     LABS:	 	                  CARDIAC MARKERS:

## 2025-03-03 NOTE — PROVIDER CONTACT NOTE (OTHER) - ASSESSMENT
Pt finger stick this am was 427
Pt /38. denies chest pain/SOB and discomfort at this time.
Pt HR on tele up to 120s, PO metoprolol was not given this AM. /57
patient asymptomatic   vitals documented in flowsheets
vital signs stable as documented, patient denies chest pain, shortness of breath, headache, dizziness, blurry vision; no change in mentation.
Pt HR on tele up to 120s, PO metoprolol was not given this AM. /56
Pt complaining of chest tightness, SOB, and is tachypneic. Pt , RR 22, O2 80% on 6L NC.
patient HR 90s-low 100s, /44, 02 93% 3L, oral temp of 97.4. patient experiencing arm tremors, says SOB, states she has experienced this before and it is what brought her into the hospital. fs 330

## 2025-03-03 NOTE — PROGRESS NOTE ADULT - ASSESSMENT
Pt is a 89 yo F with PMH CHF, anemia, pHTN, CAD, CVA (no deficits), T2D, [place], COPD (2L NC PRN, qhs), PVD, HTN, HLD, and ?lung CA (pending RT) p/w dysuria x 1wk along with tremors and SOB. On arrival, meeting SIRS criteria with hypotension and hypoxia requiring 2L NC. EKG with sinus tachycardia, no ischemic changes. ER POCUS with diffuse B lines. Labs with leukocytosis, normocytic anemia, trop neg x2, BNP 1406, lactate neg, UA+ with UCx and BCx in lab, RVP neg, MRSA in lab. CTA chest neg PE but with multifocal PNA, pulmonary edema, and 2.8x2.7cm mass LLL interval inc compared to prior. MICU consulted for hypotension with c/f need for pressors, however, improved with IVF and midodrine; started on cefepime. ID + pulm consulted and PT consulted with recs pending.

## 2025-03-03 NOTE — PROGRESS NOTE ADULT - SUBJECTIVE AND OBJECTIVE BOX
SUBJECTIVE/ OVERNIGHT EVENTS:  feels well  comfortable  report intermittent discomfort left lower rib area, mild "nagging feeling"  will try tylenol PRN.   asks for opioids PRN incase if she needs in the rehab.  no cp, no sob, no n/v/d. no abdominal pain.  no headache, no dizziness.     --------------------------------------------------------------------------------------------  LABS:            CAPILLARY BLOOD GLUCOSE      POCT Blood Glucose.: 222 mg/dL (03 Mar 2025 12:03)  POCT Blood Glucose.: 211 mg/dL (03 Mar 2025 08:22)  POCT Blood Glucose.: 201 mg/dL (02 Mar 2025 21:05)  POCT Blood Glucose.: 207 mg/dL (02 Mar 2025 18:07)            RADIOLOGY & ADDITIONAL TESTS:    Imaging Personally Reviewed:  [x] YES  [ ] NO    Consultant(s) Notes Reviewed:  [x] YES  [ ] NO    MEDICATIONS  (STANDING):  aspirin enteric coated 81 milliGRAM(s) Oral daily  atorvastatin 20 milliGRAM(s) Oral at bedtime  dextrose 5%. 1000 milliLiter(s) (50 mL/Hr) IV Continuous <Continuous>  dextrose 5%. 1000 milliLiter(s) (100 mL/Hr) IV Continuous <Continuous>  dextrose 50% Injectable 25 Gram(s) IV Push once  dextrose 50% Injectable 12.5 Gram(s) IV Push once  dextrose 50% Injectable 25 Gram(s) IV Push once  enoxaparin Injectable 40 milliGRAM(s) SubCutaneous every 24 hours  fluticasone propionate/ salmeterol 250-50 MICROgram(s) Diskus 1 Dose(s) Inhalation two times a day  furosemide    Tablet 40 milliGRAM(s) Oral daily  gabapentin 400 milliGRAM(s) Oral at bedtime  glucagon  Injectable 1 milliGRAM(s) IntraMuscular once  insulin glargine Injectable (LANTUS) 10 Unit(s) SubCutaneous at bedtime  insulin lispro (ADMELOG) corrective regimen sliding scale   SubCutaneous three times a day before meals  insulin lispro (ADMELOG) corrective regimen sliding scale   SubCutaneous at bedtime  insulin lispro Injectable (ADMELOG) 4 Unit(s) SubCutaneous three times a day before meals  ipratropium 17 MICROgram(s) HFA Inhaler 1 Puff(s) Inhalation every 6 hours  levalbuterol Inhalation 1.25 milliGRAM(s) Inhalation every 6 hours  metoprolol tartrate 25 milliGRAM(s) Oral two times a day  multivitamin 1 Tablet(s) Oral daily  pantoprazole    Tablet 40 milliGRAM(s) Oral before breakfast  sildenafil (REVATIO) 20 milliGRAM(s) Oral three times a day    MEDICATIONS  (PRN):  acetaminophen     Tablet .. 650 milliGRAM(s) Oral every 6 hours PRN Temp greater or equal to 38C (100.4F), Mild Pain (1 - 3)  dextrose Oral Gel 15 Gram(s) Oral once PRN Blood Glucose LESS THAN 70 milliGRAM(s)/deciliter  guaiFENesin Oral Liquid (Sugar-Free) 100 milliGRAM(s) Oral every 6 hours PRN Cough  melatonin 3 milliGRAM(s) Oral at bedtime PRN Insomnia  ondansetron Injectable 4 milliGRAM(s) IV Push every 8 hours PRN Nausea and/or Vomiting  oxyCODONE    IR 2.5 milliGRAM(s) Oral every 6 hours PRN Severe Pain (7 - 10)  polyethylene glycol 3350 17 Gram(s) Oral two times a day PRN Constipation  senna 2 Tablet(s) Oral at bedtime PRN Constipation      Care Discussed with Consultants/Other Providers [x] YES  [ ] NO    Vital Signs Last 24 Hrs  T(C): 36.8 (03 Mar 2025 11:30), Max: 36.8 (03 Mar 2025 11:30)  T(F): 98.2 (03 Mar 2025 11:30), Max: 98.2 (03 Mar 2025 11:30)  HR: 122 (03 Mar 2025 11:30) (100 - 122)  BP: 105/56 (03 Mar 2025 11:30) (98/50 - 110/50)  BP(mean): --  RR: 18 (03 Mar 2025 11:30) (18 - 18)  SpO2: 99% (03 Mar 2025 11:30) (95% - 100%)    Parameters below as of 03 Mar 2025 11:30  Patient On (Oxygen Delivery Method): room air  O2 Flow (L/min): 2    I&O's Summary    02 Mar 2025 07:01  -  03 Mar 2025 07:00  --------------------------------------------------------  IN: 0 mL / OUT: 650 mL / NET: -650 mL      PHYSICAL EXAM:  GENERAL: NAD, thin-elderly, comfortable now on hi-flow --> 6L --> 4L --> 2-3L  HEAD:  Atraumatic, Normocephalic  EYES: EOMI, PERRLA, conjunctiva and sclera clear  NECK: Supple, No JVD  CHEST/LUNG: mild decrease breath sounds bilaterally; No wheeze   HEART: Regular rate and rhythm; No murmurs, rubs, or gallops  BACK and Flank: no spot tenderness, no bruise, no ecchymosis, pain nonreproducible.   ABDOMEN: Soft, Nontender, Nondistended; Bowel sounds present  Neuro: AAOx3, no focal weakness   EXTREMITIES:  2+ Peripheral Pulses, No clubbing, cyanosis, or edema  SKIN: No rashes or lesions

## 2025-03-04 ENCOUNTER — NON-APPOINTMENT (OUTPATIENT)
Age: 89
End: 2025-03-04

## 2025-03-06 LAB — ZINC TRANSPORTER 8 AB, RESULT: <15 U/ML — SIGNIFICANT CHANGE UP

## 2025-03-21 NOTE — ED ADULT TRIAGE NOTE - TEMPERATURE IN FAHRENHEIT (DEGREES F)
Patient called asking for a referral for OB/GYN, for cysts on her overlies .  Please advise and thank you  
97.6

## 2025-05-05 ENCOUNTER — NON-APPOINTMENT (OUTPATIENT)
Age: 89
End: 2025-05-05

## 2025-05-07 ENCOUNTER — APPOINTMENT (OUTPATIENT)
Dept: OTOLARYNGOLOGY | Facility: CLINIC | Age: 89
End: 2025-05-07
Payer: MEDICARE

## 2025-05-07 VITALS
BODY MASS INDEX: 23.1 KG/M2 | HEIGHT: 59 IN | DIASTOLIC BLOOD PRESSURE: 56 MMHG | WEIGHT: 114.6 LBS | HEART RATE: 91 BPM | SYSTOLIC BLOOD PRESSURE: 91 MMHG

## 2025-05-07 DIAGNOSIS — J31.0 CHRONIC RHINITIS: ICD-10-CM

## 2025-05-07 DIAGNOSIS — R09.81 NASAL CONGESTION: ICD-10-CM

## 2025-05-07 DIAGNOSIS — H61.23 IMPACTED CERUMEN, BILATERAL: ICD-10-CM

## 2025-05-07 DIAGNOSIS — H90.3 SENSORINEURAL HEARING LOSS, BILATERAL: ICD-10-CM

## 2025-05-07 DIAGNOSIS — J34.2 DEVIATED NASAL SEPTUM: ICD-10-CM

## 2025-05-07 DIAGNOSIS — J34.89 NASAL CONGESTION: ICD-10-CM

## 2025-05-07 PROCEDURE — 69210 REMOVE IMPACTED EAR WAX UNI: CPT

## 2025-05-07 PROCEDURE — 99213 OFFICE O/P EST LOW 20 MIN: CPT | Mod: 25

## 2025-06-02 ENCOUNTER — APPOINTMENT (OUTPATIENT)
Dept: ENDOCRINOLOGY | Facility: CLINIC | Age: 89
End: 2025-06-02

## 2025-07-28 NOTE — PHYSICAL THERAPY INITIAL EVALUATION ADULT - LEVEL OF INDEPENDENCE: GAIT, REHAB EVAL
Thank you so much for choosing me to provide your care today!    If you were dilated your vision may remain blurry   or light sensitive for several hours.    The nature of eye and vision problems can require frequent follow up, please make every effort to adhere to any future appointments.    If you have any issues, questions, or concerns,   please do not hesitate to reach out.    If you receive a survey in regards to your care today, please mention any exceptional care my office staff and/or technicians provided.    You can reach our office at this number:    [phone]    Please consider signing up for and utilizing [employer]!  This is the best way to directly reach me or other  providers     contact guard

## 2025-09-10 ENCOUNTER — APPOINTMENT (OUTPATIENT)
Dept: OTOLARYNGOLOGY | Facility: CLINIC | Age: 89
End: 2025-09-10